# Patient Record
Sex: FEMALE | Race: WHITE | NOT HISPANIC OR LATINO
[De-identification: names, ages, dates, MRNs, and addresses within clinical notes are randomized per-mention and may not be internally consistent; named-entity substitution may affect disease eponyms.]

---

## 2017-02-02 ENCOUNTER — APPOINTMENT (OUTPATIENT)
Dept: RADIOLOGY | Facility: CLINIC | Age: 57
End: 2017-02-02

## 2017-02-02 ENCOUNTER — OUTPATIENT (OUTPATIENT)
Dept: OUTPATIENT SERVICES | Facility: HOSPITAL | Age: 57
LOS: 1 days | End: 2017-02-02
Payer: MEDICAID

## 2017-02-02 DIAGNOSIS — Z00.8 ENCOUNTER FOR OTHER GENERAL EXAMINATION: ICD-10-CM

## 2017-02-02 PROCEDURE — 71046 X-RAY EXAM CHEST 2 VIEWS: CPT

## 2017-03-03 ENCOUNTER — APPOINTMENT (OUTPATIENT)
Dept: NEUROLOGY | Facility: CLINIC | Age: 57
End: 2017-03-03

## 2017-03-16 ENCOUNTER — RESULT REVIEW (OUTPATIENT)
Age: 57
End: 2017-03-16

## 2017-04-27 ENCOUNTER — EMERGENCY (EMERGENCY)
Facility: HOSPITAL | Age: 57
LOS: 1 days | Discharge: LEFT BEFORE DISCHARGE | End: 2017-04-27
Attending: EMERGENCY MEDICINE | Admitting: EMERGENCY MEDICINE
Payer: MEDICAID

## 2017-04-27 VITALS
HEART RATE: 70 BPM | SYSTOLIC BLOOD PRESSURE: 141 MMHG | RESPIRATION RATE: 18 BRPM | TEMPERATURE: 98 F | DIASTOLIC BLOOD PRESSURE: 90 MMHG

## 2017-04-27 VITALS — WEIGHT: 164.91 LBS | HEIGHT: 63 IN

## 2017-04-27 LAB
ALBUMIN SERPL ELPH-MCNC: 3.7 G/DL — SIGNIFICANT CHANGE UP (ref 3.3–5)
ALP SERPL-CCNC: 125 U/L — HIGH (ref 40–120)
ALT FLD-CCNC: 37 U/L — SIGNIFICANT CHANGE UP (ref 12–78)
ANION GAP SERPL CALC-SCNC: 9 MMOL/L — SIGNIFICANT CHANGE UP (ref 5–17)
AST SERPL-CCNC: 22 U/L — SIGNIFICANT CHANGE UP (ref 15–37)
BASOPHILS # BLD AUTO: 0.1 K/UL — SIGNIFICANT CHANGE UP (ref 0–0.2)
BASOPHILS NFR BLD AUTO: 1.4 % — SIGNIFICANT CHANGE UP (ref 0–2)
BILIRUB SERPL-MCNC: 0.4 MG/DL — SIGNIFICANT CHANGE UP (ref 0.2–1.2)
BUN SERPL-MCNC: 12 MG/DL — SIGNIFICANT CHANGE UP (ref 7–23)
CALCIUM SERPL-MCNC: 9 MG/DL — SIGNIFICANT CHANGE UP (ref 8.5–10.1)
CHLORIDE SERPL-SCNC: 111 MMOL/L — HIGH (ref 96–108)
CK SERPL-CCNC: 232 U/L — HIGH (ref 26–192)
CO2 SERPL-SCNC: 23 MMOL/L — SIGNIFICANT CHANGE UP (ref 22–31)
CREAT SERPL-MCNC: 0.79 MG/DL — SIGNIFICANT CHANGE UP (ref 0.5–1.3)
EOSINOPHIL # BLD AUTO: 0.3 K/UL — SIGNIFICANT CHANGE UP (ref 0–0.5)
EOSINOPHIL NFR BLD AUTO: 2.4 % — SIGNIFICANT CHANGE UP (ref 0–6)
GLUCOSE SERPL-MCNC: 47 MG/DL — LOW (ref 70–99)
HCT VFR BLD CALC: 41.9 % — SIGNIFICANT CHANGE UP (ref 34.5–45)
HGB BLD-MCNC: 14.6 G/DL — SIGNIFICANT CHANGE UP (ref 11.5–15.5)
LYMPHOCYTES # BLD AUTO: 3.8 K/UL — HIGH (ref 1–3.3)
LYMPHOCYTES # BLD AUTO: 34.4 % — SIGNIFICANT CHANGE UP (ref 13–44)
MAGNESIUM SERPL-MCNC: 1.9 MG/DL — SIGNIFICANT CHANGE UP (ref 1.8–2.4)
MCHC RBC-ENTMCNC: 31 PG — SIGNIFICANT CHANGE UP (ref 27–34)
MCHC RBC-ENTMCNC: 34.8 GM/DL — SIGNIFICANT CHANGE UP (ref 32–36)
MCV RBC AUTO: 89.2 FL — SIGNIFICANT CHANGE UP (ref 80–100)
MONOCYTES # BLD AUTO: 1.1 K/UL — HIGH (ref 0–0.9)
MONOCYTES NFR BLD AUTO: 9.8 % — SIGNIFICANT CHANGE UP (ref 2–14)
NEUTROPHILS # BLD AUTO: 5.7 K/UL — SIGNIFICANT CHANGE UP (ref 1.8–7.4)
NEUTROPHILS NFR BLD AUTO: 52.1 % — SIGNIFICANT CHANGE UP (ref 43–77)
NT-PROBNP SERPL-SCNC: 207 PG/ML — HIGH (ref 0–125)
PLATELET # BLD AUTO: 250 K/UL — SIGNIFICANT CHANGE UP (ref 150–400)
POTASSIUM SERPL-MCNC: 4 MMOL/L — SIGNIFICANT CHANGE UP (ref 3.5–5.3)
POTASSIUM SERPL-SCNC: 4 MMOL/L — SIGNIFICANT CHANGE UP (ref 3.5–5.3)
PROT SERPL-MCNC: 7.2 GM/DL — SIGNIFICANT CHANGE UP (ref 6–8.3)
RBC # BLD: 4.7 M/UL — SIGNIFICANT CHANGE UP (ref 3.8–5.2)
RBC # FLD: 11.4 % — SIGNIFICANT CHANGE UP (ref 10.3–14.5)
SODIUM SERPL-SCNC: 143 MMOL/L — SIGNIFICANT CHANGE UP (ref 135–145)
TROPONIN I SERPL-MCNC: <0.015 NG/ML — SIGNIFICANT CHANGE UP (ref 0.01–0.04)
TROPONIN I SERPL-MCNC: <0.015 NG/ML — SIGNIFICANT CHANGE UP (ref 0.01–0.04)
WBC # BLD: 11 K/UL — HIGH (ref 3.8–10.5)
WBC # FLD AUTO: 11 K/UL — HIGH (ref 3.8–10.5)

## 2017-04-27 PROCEDURE — 71020: CPT | Mod: 26

## 2017-04-27 PROCEDURE — 71250 CT THORAX DX C-: CPT | Mod: 26

## 2017-04-27 PROCEDURE — 93010 ELECTROCARDIOGRAM REPORT: CPT

## 2017-04-27 PROCEDURE — 99283 EMERGENCY DEPT VISIT LOW MDM: CPT

## 2017-04-27 RX ORDER — DOCUSATE SODIUM 100 MG
100 CAPSULE ORAL THREE TIMES A DAY
Qty: 0 | Refills: 0 | Status: DISCONTINUED | OUTPATIENT
Start: 2017-04-27 | End: 2017-05-01

## 2017-04-27 RX ORDER — DEXTROSE 50 % IN WATER 50 %
25 SYRINGE (ML) INTRAVENOUS ONCE
Qty: 0 | Refills: 0 | Status: DISCONTINUED | OUTPATIENT
Start: 2017-04-27 | End: 2017-05-01

## 2017-04-27 RX ORDER — AZITHROMYCIN 500 MG/1
500 TABLET, FILM COATED ORAL EVERY 24 HOURS
Qty: 0 | Refills: 0 | Status: DISCONTINUED | OUTPATIENT
Start: 2017-04-28 | End: 2017-05-01

## 2017-04-27 RX ORDER — METOPROLOL TARTRATE 50 MG
25 TABLET ORAL
Qty: 0 | Refills: 0 | Status: DISCONTINUED | OUTPATIENT
Start: 2017-04-27 | End: 2017-05-01

## 2017-04-27 RX ORDER — ISOSORBIDE MONONITRATE 60 MG/1
30 TABLET, EXTENDED RELEASE ORAL DAILY
Qty: 0 | Refills: 0 | Status: DISCONTINUED | OUTPATIENT
Start: 2017-04-27 | End: 2017-05-01

## 2017-04-27 RX ORDER — ATORVASTATIN CALCIUM 80 MG/1
40 TABLET, FILM COATED ORAL AT BEDTIME
Qty: 0 | Refills: 0 | Status: DISCONTINUED | OUTPATIENT
Start: 2017-04-27 | End: 2017-05-01

## 2017-04-27 RX ORDER — ASPIRIN/CALCIUM CARB/MAGNESIUM 324 MG
325 TABLET ORAL DAILY
Qty: 0 | Refills: 0 | Status: DISCONTINUED | OUTPATIENT
Start: 2017-04-27 | End: 2017-05-01

## 2017-04-27 RX ORDER — AZITHROMYCIN 500 MG/1
TABLET, FILM COATED ORAL
Qty: 0 | Refills: 0 | Status: DISCONTINUED | OUTPATIENT
Start: 2017-04-27 | End: 2017-05-01

## 2017-04-27 RX ORDER — DEXTROSE 50 % IN WATER 50 %
12.5 SYRINGE (ML) INTRAVENOUS ONCE
Qty: 0 | Refills: 0 | Status: DISCONTINUED | OUTPATIENT
Start: 2017-04-27 | End: 2017-05-01

## 2017-04-27 RX ORDER — GLUCAGON INJECTION, SOLUTION 0.5 MG/.1ML
1 INJECTION, SOLUTION SUBCUTANEOUS ONCE
Qty: 0 | Refills: 0 | Status: DISCONTINUED | OUTPATIENT
Start: 2017-04-27 | End: 2017-05-01

## 2017-04-27 RX ORDER — RANOLAZINE 500 MG/1
1000 TABLET, FILM COATED, EXTENDED RELEASE ORAL
Qty: 0 | Refills: 0 | Status: DISCONTINUED | OUTPATIENT
Start: 2017-04-27 | End: 2017-05-01

## 2017-04-27 RX ORDER — ALBUTEROL 90 UG/1
2.5 AEROSOL, METERED ORAL ONCE
Qty: 0 | Refills: 0 | Status: COMPLETED | OUTPATIENT
Start: 2017-04-27 | End: 2017-04-27

## 2017-04-27 RX ORDER — ONDANSETRON 8 MG/1
4 TABLET, FILM COATED ORAL EVERY 6 HOURS
Qty: 0 | Refills: 0 | Status: DISCONTINUED | OUTPATIENT
Start: 2017-04-27 | End: 2017-05-01

## 2017-04-27 RX ORDER — PANTOPRAZOLE SODIUM 20 MG/1
40 TABLET, DELAYED RELEASE ORAL
Qty: 0 | Refills: 0 | Status: DISCONTINUED | OUTPATIENT
Start: 2017-04-27 | End: 2017-05-01

## 2017-04-27 RX ORDER — MORPHINE SULFATE 50 MG/1
2 CAPSULE, EXTENDED RELEASE ORAL EVERY 6 HOURS
Qty: 0 | Refills: 0 | Status: DISCONTINUED | OUTPATIENT
Start: 2017-04-27 | End: 2017-04-27

## 2017-04-27 RX ORDER — DEXTROSE 50 % IN WATER 50 %
1 SYRINGE (ML) INTRAVENOUS ONCE
Qty: 0 | Refills: 0 | Status: DISCONTINUED | OUTPATIENT
Start: 2017-04-27 | End: 2017-05-01

## 2017-04-27 RX ORDER — CLOPIDOGREL BISULFATE 75 MG/1
75 TABLET, FILM COATED ORAL DAILY
Qty: 0 | Refills: 0 | Status: DISCONTINUED | OUTPATIENT
Start: 2017-04-27 | End: 2017-05-01

## 2017-04-27 RX ORDER — GABAPENTIN 400 MG/1
600 CAPSULE ORAL THREE TIMES A DAY
Qty: 0 | Refills: 0 | Status: DISCONTINUED | OUTPATIENT
Start: 2017-04-27 | End: 2017-05-01

## 2017-04-27 RX ORDER — AMLODIPINE BESYLATE 2.5 MG/1
5 TABLET ORAL DAILY
Qty: 0 | Refills: 0 | Status: DISCONTINUED | OUTPATIENT
Start: 2017-04-27 | End: 2017-05-01

## 2017-04-27 RX ORDER — NITROGLYCERIN 6.5 MG
0.4 CAPSULE, EXTENDED RELEASE ORAL
Qty: 0 | Refills: 0 | Status: DISCONTINUED | OUTPATIENT
Start: 2017-04-27 | End: 2017-05-01

## 2017-04-27 RX ORDER — HEPARIN SODIUM 5000 [USP'U]/ML
5000 INJECTION INTRAVENOUS; SUBCUTANEOUS EVERY 12 HOURS
Qty: 0 | Refills: 0 | Status: DISCONTINUED | OUTPATIENT
Start: 2017-04-27 | End: 2017-05-01

## 2017-04-27 RX ORDER — SODIUM CHLORIDE 9 MG/ML
1000 INJECTION, SOLUTION INTRAVENOUS
Qty: 0 | Refills: 0 | Status: DISCONTINUED | OUTPATIENT
Start: 2017-04-27 | End: 2017-05-01

## 2017-04-27 RX ORDER — AZITHROMYCIN 500 MG/1
500 TABLET, FILM COATED ORAL ONCE
Qty: 0 | Refills: 0 | Status: COMPLETED | OUTPATIENT
Start: 2017-04-27 | End: 2017-04-27

## 2017-04-27 RX ORDER — SENNA PLUS 8.6 MG/1
2 TABLET ORAL AT BEDTIME
Qty: 0 | Refills: 0 | Status: DISCONTINUED | OUTPATIENT
Start: 2017-04-27 | End: 2017-05-01

## 2017-04-27 RX ORDER — INSULIN LISPRO 100/ML
VIAL (ML) SUBCUTANEOUS
Qty: 0 | Refills: 0 | Status: DISCONTINUED | OUTPATIENT
Start: 2017-04-27 | End: 2017-05-01

## 2017-04-27 RX ORDER — IPRATROPIUM/ALBUTEROL SULFATE 18-103MCG
3 AEROSOL WITH ADAPTER (GRAM) INHALATION EVERY 6 HOURS
Qty: 0 | Refills: 0 | Status: DISCONTINUED | OUTPATIENT
Start: 2017-04-27 | End: 2017-05-01

## 2017-04-27 RX ORDER — LEVOTHYROXINE SODIUM 125 MCG
112 TABLET ORAL DAILY
Qty: 0 | Refills: 0 | Status: DISCONTINUED | OUTPATIENT
Start: 2017-04-27 | End: 2017-05-01

## 2017-04-27 RX ORDER — INSULIN LISPRO 100/ML
VIAL (ML) SUBCUTANEOUS AT BEDTIME
Qty: 0 | Refills: 0 | Status: DISCONTINUED | OUTPATIENT
Start: 2017-04-27 | End: 2017-05-01

## 2017-04-27 RX ORDER — DEXTROSE 50 % IN WATER 50 %
25 SYRINGE (ML) INTRAVENOUS ONCE
Qty: 0 | Refills: 0 | Status: DISCONTINUED | OUTPATIENT
Start: 2017-04-27 | End: 2017-04-27

## 2017-04-27 RX ORDER — SODIUM CHLORIDE 9 MG/ML
1000 INJECTION INTRAMUSCULAR; INTRAVENOUS; SUBCUTANEOUS
Qty: 0 | Refills: 0 | Status: DISCONTINUED | OUTPATIENT
Start: 2017-04-27 | End: 2017-05-01

## 2017-04-27 RX ADMIN — AZITHROMYCIN 255 MILLIGRAM(S): 500 TABLET, FILM COATED ORAL at 18:24

## 2017-04-27 RX ADMIN — ALBUTEROL 2.5 MILLIGRAM(S): 90 AEROSOL, METERED ORAL at 12:30

## 2017-04-27 RX ADMIN — Medication 125 MILLIGRAM(S): at 12:36

## 2017-04-27 NOTE — H&P ADULT - NSHPLABSRESULTS_GEN_ALL_CORE
Lab Results:  CBC  CBC Full  -  ( 27 Apr 2017 13:26 )  WBC Count : 11.0 K/uL  Hemoglobin : 14.6 g/dL  Hematocrit : 41.9 %  Platelet Count - Automated : 250 K/uL  Mean Cell Volume : 89.2 fl  Mean Cell Hemoglobin : 31.0 pg  Mean Cell Hemoglobin Concentration : 34.8 gm/dL  Auto Neutrophil # : 5.7 K/uL  Auto Lymphocyte # : 3.8 K/uL  Auto Monocyte # : 1.1 K/uL  Auto Eosinophil # : 0.3 K/uL  Auto Basophil # : 0.1 K/uL  Auto Neutrophil % : 52.1 %  Auto Lymphocyte % : 34.4 %  Auto Monocyte % : 9.8 %  Auto Eosinophil % : 2.4 %  Auto Basophil % : 1.4 %    .		Differential:	[] Automated		[] Manual  Chemistry                        14.6   11.0  )-----------( 250      ( 27 Apr 2017 13:26 )             41.9     04-27    143  |  111<H>  |  12  ----------------------------<  47<L>  4.0   |  23  |  0.79    Ca    9.0      27 Apr 2017 13:26  Mg     1.9     04-27    TPro  7.2  /  Alb  3.7  /  TBili  0.4  /  DBili  x   /  AST  22  /  ALT  37  /  AlkPhos  125<H>  04-27    LIVER FUNCTIONS - ( 27 Apr 2017 13:26 )  Alb: 3.7 g/dL / Pro: 7.2 gm/dL / ALK PHOS: 125 U/L / ALT: 37 U/L / AST: 22 U/L / GGT: x             RADIOLOGY RESULTS:  Perliminary CXR- no infiltrates or effusion    EKG-     EXAM:  CT CHEST 04/27/2017    IMPRESSION:     Mild emphysema. No acute airspace disease, consolidation, suspicious   pulmonary nodule or mass lesion.    Debris in the proximal to mid esophagus without significant esophageal   dilatation or evidence of hiatal hernia. Findings may represent reflux or   dysmotility.    Significant progression of atherosclerotic plaque in the thoracic aorta   and coronary arteries since 2006. Coronary artery calcification   indicating significant underlying plaque burden. Recommend cardiac risk   assessment if not already performed. I25.10 Lab Results:  CBC  CBC Full  -  ( 27 Apr 2017 13:26 )  WBC Count : 11.0 K/uL  Hemoglobin : 14.6 g/dL  Hematocrit : 41.9 %  Platelet Count - Automated : 250 K/uL  Mean Cell Volume : 89.2 fl  Mean Cell Hemoglobin : 31.0 pg  Mean Cell Hemoglobin Concentration : 34.8 gm/dL  Auto Neutrophil # : 5.7 K/uL  Auto Lymphocyte # : 3.8 K/uL  Auto Monocyte # : 1.1 K/uL  Auto Eosinophil # : 0.3 K/uL  Auto Basophil # : 0.1 K/uL  Auto Neutrophil % : 52.1 %  Auto Lymphocyte % : 34.4 %  Auto Monocyte % : 9.8 %  Auto Eosinophil % : 2.4 %  Auto Basophil % : 1.4 %    .		Differential:	[] Automated		[] Manual  Chemistry                        14.6   11.0  )-----------( 250      ( 27 Apr 2017 13:26 )             41.9     04-27    143  |  111<H>  |  12  ----------------------------<  47<L>  4.0   |  23  |  0.79    Ca    9.0      27 Apr 2017 13:26  Mg     1.9     04-27    TPro  7.2  /  Alb  3.7  /  TBili  0.4  /  DBili  x   /  AST  22  /  ALT  37  /  AlkPhos  125<H>  04-27    LIVER FUNCTIONS - ( 27 Apr 2017 13:26 )  Alb: 3.7 g/dL / Pro: 7.2 gm/dL / ALK PHOS: 125 U/L / ALT: 37 U/L / AST: 22 U/L / GGT: x             RADIOLOGY RESULTS:  Perliminary CXR- no infiltrates or effusion    EKG- NSR nonspecific ST/T wave changes    EXAM:  CT CHEST 04/27/2017    IMPRESSION:     Mild emphysema. No acute airspace disease, consolidation, suspicious   pulmonary nodule or mass lesion.    Debris in the proximal to mid esophagus without significant esophageal   dilatation or evidence of hiatal hernia. Findings may represent reflux or   dysmotility.    Significant progression of atherosclerotic plaque in the thoracic aorta   and coronary arteries since 2006. Coronary artery calcification   indicating significant underlying plaque burden. Recommend cardiac risk   assessment if not already performed. I25.10

## 2017-04-27 NOTE — ED STATDOCS - OBJECTIVE STATEMENT
55 y/o female with DM type 1, hypothyroidism, CAD, COPD s/p stent in legs presents to the ED c/o SOB x few days. Also c/o right rib pain and CP. Describes the CP as an angina attack and has had it in the past. ai [place] for it. Denies fever. +cough with white phlegm production. Denies leg pain or swelling. No recent travel. Current smoker. NKDA. 57 y/o female with DM type 1, hypothyroidism,  HTN,  neuropathy, chronic LBP, CAD, COPD s/p stent in legs presents to the ED c/o SOB x few days. Also c/o right rib pain and CP. Describes the CP as an angina attack and has had it in the past. yashes Ranexa for it. Denies fever. +cough with white phlegm production. Denies leg pain or swelling. No recent travel. Current smoker. NKDA.

## 2017-04-27 NOTE — H&P ADULT - NSHPPHYSICALEXAM_GEN_ALL_CORE
PHYSICAL EXAM:  Constitutional: NAD, awake and alert, well-developed  Neurological: AAO x 3, no focal deficits  HEENT: PERRLA, EOMI, MMM  Neck: Soft and supple, No LAD, No JVD  Respiratory: Breath sounds are clear bilaterally, No wheezing, rales or rhonchi  Cardiovascular: S1 and S2, regular rate and rhythm; no Murmurs, gallops or rubs  Gastrointestinal: Bowel Sounds present, soft, nontender, nondistended, no guarding, no rebound tenderness  Back: No CVA tenderness   Extremities: No peripheral edema  Vascular: 2+ peripheral pulses  Musculoskeletal: 5/5 strength b/l upper and lower extremities  Skin: No rashes  Breast: Deferred  Rectal: Deferred PHYSICAL EXAM:  Constitutional: NAD, awake and alert, well-developed  Neurological: AAO x 3, no focal deficits  HEENT: PERRLA, EOMI, MMM  Neck: Soft and supple, No LAD, No JVD  Respiratory: Breath sounds are clear bilaterally, No wheezing, rales or rhonchi  Cardiovascular: S1 and S2, regular rate and rhythm; no Murmurs, gallops or rubs; + Chest pain reproducible under the right breast; Skin intact  Gastrointestinal: Bowel Sounds present, soft, nontender, nondistended, no guarding, no rebound tenderness  Back: No CVA tenderness   Extremities: No peripheral edema  Vascular: 2+ peripheral pulses  Musculoskeletal: 5/5 strength b/l upper and lower extremities  Skin: No rashes  Breast: Deferred  Rectal: Deferred

## 2017-04-27 NOTE — H&P ADULT - ASSESSMENT
*Chest Pain + Dyspnea R/O ACS  -CXR- clear  -CT chest - Mild emphysema, possible GERD/dysmotility  -Admit to telemetry  -continue ASA, plavix, BB, ranexa and statin  -NTG/Morphine PRN  -serial troponin and EKG  -cardio consult  -FU ECHO  -Cardio consult    *Hypoglycemia with Hx of IDDM  -continue ISS  -Hold Lantus and reassess in AM  -monitor FSBG closely    *HTN - stable  -continue Norvasc, BB and imdur    *Neuropathy  continue Gabapentin    *PVD S/P Stent  -continue ASA, plavix and Statin    *Hypothyroidism  -continue Synthroid  -FU TSH levels *Chest Pain R/O ACS  -CXR- clear  -CT chest - Mild emphysema, possible GERD/dysmotility  -Admit to telemetry  -continue ASA, plavix, BB, ranexa and statin  -NTG/Morphine PRN  -serial troponin and EKG  -cardio consult  -FU ECHO    *Dyspnea and Cough 2ndry to Acute bronchitis  -O2 2L NC, monitor pulse Ox  -zithromax  -No PNA on CT or CXR    *Hypoglycemia with Hx of IDDM  -continue ISS  -Hold Lantus and reassess in AM  -monitor FSBG closely    *HTN - stable  -continue Norvasc, BB and imdur    *Neuropathy  continue Gabapentin    *PVD S/P Stent  -continue ASA, plavix and Statin    *Hypothyroidism  -continue Synthroid  -FU TSH levels

## 2017-04-27 NOTE — ED STATDOCS - ATTENDING CONTRIBUTION TO CARE
I personally saw the patient with the PA, and completed the key components of the history and physical exam. I then discussed the management plan with the PA.  I, Emir Delacruz,, performed the initial face to face bedside interview with this patient regarding history of present illness, review of symptoms and relevant past medical, social and family history.  I completed an independent physical examination.  I was the initial provider who evaluated this patient.  The history, relevant review of systems, past medical and surgical history, medical decision making, and physical examination was documented by the scribe in my presence and I attest to the accuracy of the documentation.

## 2017-04-27 NOTE — ED STATDOCS - PROGRESS NOTE DETAILS
SAMIRA Mcclendon:   Patient has been seen, evaluated and orders have been written by the attending in intake. Patient is stable.  I will follow up the results of orders written and I will continue to evaluate/observe the patient.  PMD:  Wenceslao   Cardio:  Rossy Mcclendon PA-C FS 47.  Pt. still does not have a peripheral IV line.  Flores Mcclendon PA-C

## 2017-04-27 NOTE — ED ADULT NURSE REASSESSMENT NOTE - NS ED NURSE REASSESS COMMENT FT1
Pt states her angina chest pain is gone but she has 4/10 chest pain with deep inspiration and 10/10 when coughing. Call bell in reach. Resp even and unlabored. skin good color, warm and dry.

## 2017-04-27 NOTE — ED STATDOCS - PMH
CAD (coronary artery disease)    COPD (chronic obstructive pulmonary disease)    DM (diabetes mellitus), type 1    Hypothyroidism

## 2017-04-27 NOTE — ED STATDOCS - NS ED MD SCRIBE ATTENDING SCRIBE SECTIONS
VITAL SIGNS( Pullset)/RESULTS/PAST MEDICAL/SURGICAL/SOCIAL HISTORY/HISTORY OF PRESENT ILLNESS/PHYSICAL EXAM/REVIEW OF SYSTEMS/DISPOSITION/PROGRESS NOTE

## 2017-04-27 NOTE — H&P ADULT - HISTORY OF PRESENT ILLNESS
57 y/o female with PMH of DM type 1, hypothyroidism, HTN, neuropathy, chronic LBP, CAD, COPD, PVD s/p stent in b/L LE presents to the ED c/o SOB x few days. Also c/o right rib pain and CP. Describes the CP as an angina attack and has had it in the past. ai [place] for it. Denies fever. +cough with white phlegm production. Denies leg pain or swelling. No recent travel.     PMH: DM type 1, hypothyroidism,  HTN,  neuropathy, chronic LBP, CAD, COPD, PVD s/p stent B/L LE, Anxiety, Bipolar Dx  PSH:  PVD s/p stent in legs  SH: Lives at Home. No alcohol or illicit drug use. + Tobacco use   FH: Reviewed and Noncontributory to current Illness 57 y/o female with PMH of DM type 1, hypothyroidism, HTN, neuropathy, chronic LBP, CAD, COPD, PVD s/p stent in b/L LE presents to the ED c/o SOB and productive cough x few days. Also c/o right rib pain and CP in the middle of the chest. Describes the CP as an angina attack and has had it in the past. takes Ranexa for it. Denies fever, trauma to chest, wheezing, n/v +cough with white phlegm production. Denies leg pain or swelling. No recent travel.     PMH: DM type 1, hypothyroidism,  HTN,  neuropathy, chronic LBP, CAD, COPD, PVD s/p stent B/L LE, Anxiety, Bipolar Dx  PSH:  PVD s/p stent in legs  SH: Lives at Home. No alcohol or illicit drug use. + Tobacco use   FH: Reviewed and Noncontributory to current Illness

## 2017-04-27 NOTE — H&P ADULT - NSHPREVIEWOFSYSTEMS_GEN_ALL_CORE
REVIEW OF SYSTEMS:    CONSTITUTIONAL: No weakness, fevers or chills  EYES/ENT: No visual changes; vertigo or throat pain   NECK: No pain or stiffness  RESPIRATORY: No cough, wheezing, hemoptysis or shortness of breath  CARDIOVASCULAR: No chest pain or palpitations  GASTROINTESTINAL: No abdominal or epigastric pain. No nausea, vomiting, or hematemesis; No diarrhea or constipation. No melena or hematochezia.  GENITOURINARY: No dysuria, urinary frequency or hematuria  NEUROLOGICAL: No numbness or weakness  EXTREMITIES: No swelling or tenderness  SKIN: No itching, burning, rashes, or lesions   All other review of systems is negative unless indicated above. REVIEW OF SYSTEMS:    CONSTITUTIONAL: No weakness, fevers or chills  EYES/ENT: No visual changes; vertigo or throat pain   NECK: No pain or stiffness  RESPIRATORY: +cough, +dyspnea No wheezing, hemoptysis  CARDIOVASCULAR: +chest pain No palpitations  GASTROINTESTINAL: No abdominal or epigastric pain. No nausea, vomiting, or hematemesis; No diarrhea or constipation. No melena or hematochezia.  GENITOURINARY: No dysuria, urinary frequency or hematuria  NEUROLOGICAL: No numbness or weakness  EXTREMITIES: No swelling or tenderness  SKIN: No itching, burning, rashes, or lesions   All other review of systems is negative unless indicated above.

## 2017-04-27 NOTE — H&P ADULT - NSHPSOCIALHISTORY_GEN_ALL_CORE
Lives at home. No alcohol or illicit drug use Lives at home. No alcohol or illicit drug use; +Tobacco 1 pack/day for the past 20-30 years

## 2017-04-27 NOTE — ED STATDOCS - MEDICAL DECISION MAKING DETAILS
CXR, CT 57 y/o F w cough, R sided pleuritic chest pain, and dyspnea. Also reports centrally located chest pain similar to previous episodes. Labs, CXR, CT PE r/o PE

## 2017-04-27 NOTE — ED STATDOCS - CARE PLAN
Principal Discharge DX:	Chest pain, unspecified type  Secondary Diagnosis:	Dyspnea, unspecified type

## 2017-04-29 RX ADMIN — Medication 3 MILLILITER(S): at 19:26

## 2017-05-01 DIAGNOSIS — R06.00 DYSPNEA, UNSPECIFIED: ICD-10-CM

## 2017-05-01 DIAGNOSIS — R07.9 CHEST PAIN, UNSPECIFIED: ICD-10-CM

## 2017-05-01 DIAGNOSIS — R06.02 SHORTNESS OF BREATH: ICD-10-CM

## 2017-06-08 ENCOUNTER — OUTPATIENT (OUTPATIENT)
Dept: OUTPATIENT SERVICES | Facility: HOSPITAL | Age: 57
LOS: 1 days | Discharge: HOME | End: 2017-06-08
Payer: MEDICAID

## 2017-06-08 VITALS
WEIGHT: 166.01 LBS | TEMPERATURE: 98 F | SYSTOLIC BLOOD PRESSURE: 110 MMHG | DIASTOLIC BLOOD PRESSURE: 56 MMHG | HEIGHT: 63 IN | OXYGEN SATURATION: 100 % | HEART RATE: 67 BPM | RESPIRATION RATE: 18 BRPM

## 2017-06-08 DIAGNOSIS — Z95.828 PRESENCE OF OTHER VASCULAR IMPLANTS AND GRAFTS: Chronic | ICD-10-CM

## 2017-06-08 DIAGNOSIS — Z98.890 OTHER SPECIFIED POSTPROCEDURAL STATES: Chronic | ICD-10-CM

## 2017-06-08 DIAGNOSIS — Z98.62 PERIPHERAL VASCULAR ANGIOPLASTY STATUS: Chronic | ICD-10-CM

## 2017-06-08 DIAGNOSIS — Z41.1 ENCOUNTER FOR COSMETIC SURGERY: Chronic | ICD-10-CM

## 2017-06-08 DIAGNOSIS — Z90.49 ACQUIRED ABSENCE OF OTHER SPECIFIED PARTS OF DIGESTIVE TRACT: Chronic | ICD-10-CM

## 2017-06-08 DIAGNOSIS — R13.19 OTHER DYSPHAGIA: ICD-10-CM

## 2017-06-08 DIAGNOSIS — K21.9 GASTRO-ESOPHAGEAL REFLUX DISEASE WITHOUT ESOPHAGITIS: ICD-10-CM

## 2017-06-08 LAB
ANION GAP SERPL CALC-SCNC: 5 MMOL/L — SIGNIFICANT CHANGE UP (ref 5–17)
BASOPHILS # BLD AUTO: 0.1 K/UL — SIGNIFICANT CHANGE UP (ref 0–0.2)
BASOPHILS NFR BLD AUTO: 1.4 % — SIGNIFICANT CHANGE UP (ref 0–2)
BUN SERPL-MCNC: 15 MG/DL — SIGNIFICANT CHANGE UP (ref 7–23)
CALCIUM SERPL-MCNC: 8.7 MG/DL — SIGNIFICANT CHANGE UP (ref 8.5–10.1)
CHLORIDE SERPL-SCNC: 106 MMOL/L — SIGNIFICANT CHANGE UP (ref 96–108)
CO2 SERPL-SCNC: 28 MMOL/L — SIGNIFICANT CHANGE UP (ref 22–31)
CREAT SERPL-MCNC: 0.8 MG/DL — SIGNIFICANT CHANGE UP (ref 0.5–1.3)
EOSINOPHIL # BLD AUTO: 0.2 K/UL — SIGNIFICANT CHANGE UP (ref 0–0.5)
EOSINOPHIL NFR BLD AUTO: 2 % — SIGNIFICANT CHANGE UP (ref 0–6)
GLUCOSE SERPL-MCNC: 143 MG/DL — HIGH (ref 70–99)
HCT VFR BLD CALC: 36 % — SIGNIFICANT CHANGE UP (ref 34.5–45)
HGB BLD-MCNC: 12.4 G/DL — SIGNIFICANT CHANGE UP (ref 11.5–15.5)
LYMPHOCYTES # BLD AUTO: 2.4 K/UL — SIGNIFICANT CHANGE UP (ref 1–3.3)
LYMPHOCYTES # BLD AUTO: 25.6 % — SIGNIFICANT CHANGE UP (ref 13–44)
MCHC RBC-ENTMCNC: 31 PG — SIGNIFICANT CHANGE UP (ref 27–34)
MCHC RBC-ENTMCNC: 34.4 GM/DL — SIGNIFICANT CHANGE UP (ref 32–36)
MCV RBC AUTO: 90 FL — SIGNIFICANT CHANGE UP (ref 80–100)
MONOCYTES # BLD AUTO: 0.7 K/UL — SIGNIFICANT CHANGE UP (ref 0–0.9)
MONOCYTES NFR BLD AUTO: 7.8 % — SIGNIFICANT CHANGE UP (ref 2–14)
NEUTROPHILS # BLD AUTO: 6 K/UL — SIGNIFICANT CHANGE UP (ref 1.8–7.4)
NEUTROPHILS NFR BLD AUTO: 63.2 % — SIGNIFICANT CHANGE UP (ref 43–77)
PLATELET # BLD AUTO: 236 K/UL — SIGNIFICANT CHANGE UP (ref 150–400)
POTASSIUM SERPL-MCNC: 4.6 MMOL/L — SIGNIFICANT CHANGE UP (ref 3.5–5.3)
POTASSIUM SERPL-SCNC: 4.6 MMOL/L — SIGNIFICANT CHANGE UP (ref 3.5–5.3)
RBC # BLD: 4 M/UL — SIGNIFICANT CHANGE UP (ref 3.8–5.2)
RBC # FLD: 11.6 % — SIGNIFICANT CHANGE UP (ref 10.3–14.5)
SODIUM SERPL-SCNC: 139 MMOL/L — SIGNIFICANT CHANGE UP (ref 135–145)
WBC # BLD: 9.5 K/UL — SIGNIFICANT CHANGE UP (ref 3.8–10.5)
WBC # FLD AUTO: 9.5 K/UL — SIGNIFICANT CHANGE UP (ref 3.8–10.5)

## 2017-06-08 PROCEDURE — 93010 ELECTROCARDIOGRAM REPORT: CPT

## 2017-06-08 RX ORDER — INSULIN LISPRO 100/ML
1 VIAL (ML) SUBCUTANEOUS
Qty: 0 | Refills: 0 | COMMUNITY

## 2017-06-08 RX ORDER — METOPROLOL TARTRATE 50 MG
1 TABLET ORAL
Qty: 0 | Refills: 0 | COMMUNITY

## 2017-06-08 RX ORDER — PANTOPRAZOLE SODIUM 20 MG/1
1 TABLET, DELAYED RELEASE ORAL
Qty: 0 | Refills: 0 | COMMUNITY

## 2017-06-08 RX ORDER — AMLODIPINE BESYLATE 2.5 MG/1
1 TABLET ORAL
Qty: 0 | Refills: 0 | COMMUNITY

## 2017-06-08 RX ORDER — ENOXAPARIN SODIUM 100 MG/ML
30 INJECTION SUBCUTANEOUS
Qty: 0 | Refills: 0 | COMMUNITY

## 2017-06-08 RX ORDER — GABAPENTIN 400 MG/1
1 CAPSULE ORAL
Qty: 0 | Refills: 0 | COMMUNITY

## 2017-06-08 NOTE — H&P PST ADULT - HISTORY OF PRESENT ILLNESS
56 years old female with difficulty eating for "a month and a half".  She said "it feels as if my food is not going down. Like a lump in my stomach.'  Admits to early satiety. She said she has loss 15 pounds since February. Denies loss of appetite nausea or vomiting. planned upper endoscopy.

## 2017-06-08 NOTE — H&P PST ADULT - PSH
H/O angioplasty  Right iliac artery. 5/12/2017  H/O arterial bypass of lower limb  Femoral - Popliteal. 11/2014  H/O rhinoplasty  1980  H/O ventral hernia repair  3/2017  History of incision and drainage  of lower extremity incisional site x 5 . last done5/2015  History of laparoscopic cholecystectomy  2/2016  S/P dilatation and curettage  1991

## 2017-06-08 NOTE — H&P PST ADULT - ASSESSMENT
56 years old female present to PST prior to upper endoscopy with Dr. Jerardo Bangura.  Plan  1. Expect a call from Endoscopy the day before your procedure between 11am and 3pm.  2. Follow the GI doctor's instructions for preparation  and day before procedure activities.  3. Follow the GI doctor's  instructions for medications.

## 2017-06-08 NOTE — H&P PST ADULT - VISION (WITH CORRECTIVE LENSES IF THE PATIENT USUALLY WEARS THEM):
Normal vision: sees adequately in most situations; can see medication labels, newsprint/Bifocal lenses

## 2017-06-08 NOTE — H&P PST ADULT - FAMILY HISTORY
Father  Still living? No  Family history of alcoholism in father, Age at diagnosis: Age Unknown  Family history of heart disease, Age at diagnosis: Age Unknown     Mother  Still living? No  Family history of stroke, Age at diagnosis: Age Unknown  Family history of cardiovascular disease, Age at diagnosis: Age Unknown     Sibling  Still living? Yes, Estimated age: 61-70  Family history of epilepsy, Age at diagnosis: Age Unknown  Family history of asthma, Age at diagnosis: Age Unknown     Sibling  Still living? Yes, Estimated age: 61-70  Family history of epilepsy, Age at diagnosis: Age Unknown

## 2017-06-08 NOTE — H&P PST ADULT - PMH
Angina pectoris    Anxiety and depression    CAD (coronary artery disease)    Constipation, unspecified constipation type    COPD (chronic obstructive pulmonary disease)    DM (diabetes mellitus), type 1    Essential hypertension    Gall bladder disease  gall bladder removed  Hiatal hernia with GERD  hiatal hernia repaired  History of MRSA infection  left lower extremity incisional area 2015  Hyperlipidemia, unspecified hyperlipidemia type    Hypothyroidism    Lumbar herniated disc    Osteoarthritis of spine, unspecified spinal osteoarthritis complication status, unspecified spinal region    Osteoarthritis, unspecified osteoarthritis type, unspecified site    Overactive bladder    PAD (peripheral artery disease)    Shoulder disorder  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 Decreased ROM.  Transient cerebral ischemia, unspecified type    Urinary incontinence, unspecified type

## 2017-06-16 ENCOUNTER — RESULT REVIEW (OUTPATIENT)
Age: 57
End: 2017-06-16

## 2017-06-16 ENCOUNTER — OUTPATIENT (OUTPATIENT)
Dept: OUTPATIENT SERVICES | Facility: HOSPITAL | Age: 57
LOS: 1 days | Discharge: HOME | End: 2017-06-16
Payer: MEDICAID

## 2017-06-16 VITALS
WEIGHT: 164.91 LBS | TEMPERATURE: 98 F | DIASTOLIC BLOOD PRESSURE: 54 MMHG | RESPIRATION RATE: 11 BRPM | HEART RATE: 66 BPM | HEIGHT: 63 IN | OXYGEN SATURATION: 100 % | SYSTOLIC BLOOD PRESSURE: 103 MMHG

## 2017-06-16 DIAGNOSIS — Z95.828 PRESENCE OF OTHER VASCULAR IMPLANTS AND GRAFTS: Chronic | ICD-10-CM

## 2017-06-16 DIAGNOSIS — Z98.62 PERIPHERAL VASCULAR ANGIOPLASTY STATUS: Chronic | ICD-10-CM

## 2017-06-16 DIAGNOSIS — Z98.890 OTHER SPECIFIED POSTPROCEDURAL STATES: Chronic | ICD-10-CM

## 2017-06-16 DIAGNOSIS — Z41.1 ENCOUNTER FOR COSMETIC SURGERY: Chronic | ICD-10-CM

## 2017-06-16 DIAGNOSIS — Z90.49 ACQUIRED ABSENCE OF OTHER SPECIFIED PARTS OF DIGESTIVE TRACT: Chronic | ICD-10-CM

## 2017-06-16 PROCEDURE — 88305 TISSUE EXAM BY PATHOLOGIST: CPT | Mod: 26

## 2017-06-16 PROCEDURE — 88312 SPECIAL STAINS GROUP 1: CPT | Mod: 26

## 2017-06-16 PROCEDURE — 88313 SPECIAL STAINS GROUP 2: CPT | Mod: 26

## 2017-06-16 RX ORDER — SODIUM CHLORIDE 9 MG/ML
1000 INJECTION INTRAMUSCULAR; INTRAVENOUS; SUBCUTANEOUS
Qty: 0 | Refills: 0 | Status: DISCONTINUED | OUTPATIENT
Start: 2017-06-16 | End: 2017-07-01

## 2017-06-16 RX ADMIN — SODIUM CHLORIDE 75 MILLILITER(S): 9 INJECTION INTRAMUSCULAR; INTRAVENOUS; SUBCUTANEOUS at 13:33

## 2017-06-20 LAB — SURGICAL PATHOLOGY FINAL REPORT - CH: SIGNIFICANT CHANGE UP

## 2017-06-22 DIAGNOSIS — Z86.010 PERSONAL HISTORY OF COLONIC POLYPS: ICD-10-CM

## 2017-06-22 DIAGNOSIS — R13.10 DYSPHAGIA, UNSPECIFIED: ICD-10-CM

## 2017-06-22 DIAGNOSIS — F31.9 BIPOLAR DISORDER, UNSPECIFIED: ICD-10-CM

## 2017-06-22 DIAGNOSIS — E03.9 HYPOTHYROIDISM, UNSPECIFIED: ICD-10-CM

## 2017-06-22 DIAGNOSIS — I73.9 PERIPHERAL VASCULAR DISEASE, UNSPECIFIED: ICD-10-CM

## 2017-06-22 DIAGNOSIS — F41.9 ANXIETY DISORDER, UNSPECIFIED: ICD-10-CM

## 2017-06-22 DIAGNOSIS — I25.10 ATHEROSCLEROTIC HEART DISEASE OF NATIVE CORONARY ARTERY WITHOUT ANGINA PECTORIS: ICD-10-CM

## 2017-06-22 DIAGNOSIS — K21.0 GASTRO-ESOPHAGEAL REFLUX DISEASE WITH ESOPHAGITIS: ICD-10-CM

## 2017-06-22 DIAGNOSIS — E78.5 HYPERLIPIDEMIA, UNSPECIFIED: ICD-10-CM

## 2017-06-22 DIAGNOSIS — I10 ESSENTIAL (PRIMARY) HYPERTENSION: ICD-10-CM

## 2017-06-22 DIAGNOSIS — K29.50 UNSPECIFIED CHRONIC GASTRITIS WITHOUT BLEEDING: ICD-10-CM

## 2017-06-22 DIAGNOSIS — Z79.02 LONG TERM (CURRENT) USE OF ANTITHROMBOTICS/ANTIPLATELETS: ICD-10-CM

## 2017-06-22 DIAGNOSIS — F17.210 NICOTINE DEPENDENCE, CIGARETTES, UNCOMPLICATED: ICD-10-CM

## 2017-09-26 ENCOUNTER — OUTPATIENT (OUTPATIENT)
Dept: OUTPATIENT SERVICES | Facility: HOSPITAL | Age: 57
LOS: 1 days | End: 2017-09-26
Payer: MEDICAID

## 2017-09-26 ENCOUNTER — APPOINTMENT (OUTPATIENT)
Dept: MAMMOGRAPHY | Facility: CLINIC | Age: 57
End: 2017-09-26
Payer: MEDICAID

## 2017-09-26 DIAGNOSIS — Z95.828 PRESENCE OF OTHER VASCULAR IMPLANTS AND GRAFTS: Chronic | ICD-10-CM

## 2017-09-26 DIAGNOSIS — Z98.890 OTHER SPECIFIED POSTPROCEDURAL STATES: Chronic | ICD-10-CM

## 2017-09-26 DIAGNOSIS — Z00.8 ENCOUNTER FOR OTHER GENERAL EXAMINATION: ICD-10-CM

## 2017-09-26 DIAGNOSIS — Z41.1 ENCOUNTER FOR COSMETIC SURGERY: Chronic | ICD-10-CM

## 2017-09-26 DIAGNOSIS — Z90.49 ACQUIRED ABSENCE OF OTHER SPECIFIED PARTS OF DIGESTIVE TRACT: Chronic | ICD-10-CM

## 2017-09-26 DIAGNOSIS — Z98.62 PERIPHERAL VASCULAR ANGIOPLASTY STATUS: Chronic | ICD-10-CM

## 2017-09-26 PROCEDURE — G0202: CPT | Mod: 26

## 2017-09-26 PROCEDURE — 77063 BREAST TOMOSYNTHESIS BI: CPT

## 2017-09-26 PROCEDURE — 77063 BREAST TOMOSYNTHESIS BI: CPT | Mod: 26

## 2017-09-26 PROCEDURE — 77067 SCR MAMMO BI INCL CAD: CPT

## 2017-10-06 ENCOUNTER — APPOINTMENT (OUTPATIENT)
Dept: NEUROLOGY | Facility: CLINIC | Age: 57
End: 2017-10-06
Payer: MEDICAID

## 2017-10-06 VITALS
DIASTOLIC BLOOD PRESSURE: 56 MMHG | HEIGHT: 63 IN | HEART RATE: 62 BPM | SYSTOLIC BLOOD PRESSURE: 118 MMHG | WEIGHT: 164 LBS | BODY MASS INDEX: 29.06 KG/M2

## 2017-10-06 PROCEDURE — 99214 OFFICE O/P EST MOD 30 MIN: CPT

## 2017-10-06 RX ORDER — OXYCODONE 5 MG/1
5 TABLET ORAL
Qty: 24 | Refills: 0 | Status: COMPLETED | COMMUNITY
Start: 2017-05-13

## 2017-10-06 RX ORDER — PEN NEEDLE, DIABETIC 29 G X1/2"
32G X 4 MM NEEDLE, DISPOSABLE MISCELLANEOUS
Qty: 100 | Refills: 0 | Status: ACTIVE | COMMUNITY
Start: 2016-12-08

## 2017-10-06 RX ORDER — ATORVASTATIN CALCIUM 40 MG/1
40 TABLET, FILM COATED ORAL
Qty: 30 | Refills: 0 | Status: ACTIVE | COMMUNITY
Start: 2017-02-23

## 2017-10-06 RX ORDER — PREDNISONE 20 MG/1
20 TABLET ORAL
Qty: 12 | Refills: 0 | Status: COMPLETED | COMMUNITY
Start: 2017-05-02

## 2018-02-05 ENCOUNTER — INPATIENT (INPATIENT)
Facility: HOSPITAL | Age: 58
LOS: 0 days | Discharge: LEFT BEFORE DISCHARGE | End: 2018-02-06
Attending: FAMILY MEDICINE | Admitting: FAMILY MEDICINE
Payer: MEDICAID

## 2018-02-05 VITALS
HEART RATE: 67 BPM | WEIGHT: 164.91 LBS | SYSTOLIC BLOOD PRESSURE: 72 MMHG | OXYGEN SATURATION: 100 % | TEMPERATURE: 98 F | HEIGHT: 63 IN | RESPIRATION RATE: 18 BRPM | DIASTOLIC BLOOD PRESSURE: 40 MMHG

## 2018-02-05 DIAGNOSIS — Z98.890 OTHER SPECIFIED POSTPROCEDURAL STATES: Chronic | ICD-10-CM

## 2018-02-05 DIAGNOSIS — Z90.49 ACQUIRED ABSENCE OF OTHER SPECIFIED PARTS OF DIGESTIVE TRACT: Chronic | ICD-10-CM

## 2018-02-05 DIAGNOSIS — Z98.62 PERIPHERAL VASCULAR ANGIOPLASTY STATUS: Chronic | ICD-10-CM

## 2018-02-05 DIAGNOSIS — Z95.828 PRESENCE OF OTHER VASCULAR IMPLANTS AND GRAFTS: Chronic | ICD-10-CM

## 2018-02-05 DIAGNOSIS — Z41.1 ENCOUNTER FOR COSMETIC SURGERY: Chronic | ICD-10-CM

## 2018-02-05 LAB
ACETONE SERPL-MCNC: NEGATIVE — SIGNIFICANT CHANGE UP
ALBUMIN SERPL ELPH-MCNC: 4 G/DL — SIGNIFICANT CHANGE UP (ref 3.3–5)
ALP SERPL-CCNC: 153 U/L — HIGH (ref 40–120)
ALT FLD-CCNC: 23 U/L — SIGNIFICANT CHANGE UP (ref 12–78)
AMPHET UR-MCNC: NEGATIVE — SIGNIFICANT CHANGE UP
ANION GAP SERPL CALC-SCNC: 6 MMOL/L — SIGNIFICANT CHANGE UP (ref 5–17)
APPEARANCE UR: CLEAR — SIGNIFICANT CHANGE UP
AST SERPL-CCNC: 10 U/L — LOW (ref 15–37)
BACTERIA # UR AUTO: (no result)
BARBITURATES UR SCN-MCNC: POSITIVE — SIGNIFICANT CHANGE UP
BASOPHILS # BLD AUTO: 0.1 K/UL — SIGNIFICANT CHANGE UP (ref 0–0.2)
BASOPHILS NFR BLD AUTO: 0.9 % — SIGNIFICANT CHANGE UP (ref 0–2)
BENZODIAZ UR-MCNC: NEGATIVE — SIGNIFICANT CHANGE UP
BILIRUB SERPL-MCNC: 0.7 MG/DL — SIGNIFICANT CHANGE UP (ref 0.2–1.2)
BILIRUB UR-MCNC: (no result)
BUN SERPL-MCNC: 20 MG/DL — SIGNIFICANT CHANGE UP (ref 7–23)
CALCIUM SERPL-MCNC: 8.9 MG/DL — SIGNIFICANT CHANGE UP (ref 8.5–10.1)
CHLORIDE SERPL-SCNC: 103 MMOL/L — SIGNIFICANT CHANGE UP (ref 96–108)
CK SERPL-CCNC: 76 U/L — SIGNIFICANT CHANGE UP (ref 26–192)
CO2 SERPL-SCNC: 27 MMOL/L — SIGNIFICANT CHANGE UP (ref 22–31)
COCAINE METAB.OTHER UR-MCNC: POSITIVE — SIGNIFICANT CHANGE UP
COLOR SPEC: YELLOW — SIGNIFICANT CHANGE UP
COMMENT - URINE: SIGNIFICANT CHANGE UP
CREAT SERPL-MCNC: 1.62 MG/DL — HIGH (ref 0.5–1.3)
DIFF PNL FLD: (no result)
EOSINOPHIL # BLD AUTO: 0.1 K/UL — SIGNIFICANT CHANGE UP (ref 0–0.5)
EOSINOPHIL NFR BLD AUTO: 0.6 % — SIGNIFICANT CHANGE UP (ref 0–6)
EPI CELLS # UR: SIGNIFICANT CHANGE UP
GLUCOSE BLDC GLUCOMTR-MCNC: 225 MG/DL — HIGH (ref 70–99)
GLUCOSE SERPL-MCNC: 277 MG/DL — HIGH (ref 70–99)
GLUCOSE UR QL: 1000 MG/DL
HCT VFR BLD CALC: 35.4 % — SIGNIFICANT CHANGE UP (ref 34.5–45)
HGB BLD-MCNC: 12.1 G/DL — SIGNIFICANT CHANGE UP (ref 11.5–15.5)
KETONES UR-MCNC: NEGATIVE — SIGNIFICANT CHANGE UP
LACTATE SERPL-SCNC: 1.7 MMOL/L — SIGNIFICANT CHANGE UP (ref 0.7–2)
LEUKOCYTE ESTERASE UR-ACNC: (no result)
LYMPHOCYTES # BLD AUTO: 2.6 K/UL — SIGNIFICANT CHANGE UP (ref 1–3.3)
LYMPHOCYTES # BLD AUTO: 20.5 % — SIGNIFICANT CHANGE UP (ref 13–44)
MCHC RBC-ENTMCNC: 30.5 PG — SIGNIFICANT CHANGE UP (ref 27–34)
MCHC RBC-ENTMCNC: 34.1 GM/DL — SIGNIFICANT CHANGE UP (ref 32–36)
MCV RBC AUTO: 89.3 FL — SIGNIFICANT CHANGE UP (ref 80–100)
METHADONE UR-MCNC: NEGATIVE — SIGNIFICANT CHANGE UP
MONOCYTES # BLD AUTO: 0.8 K/UL — SIGNIFICANT CHANGE UP (ref 0–0.9)
MONOCYTES NFR BLD AUTO: 6.4 % — SIGNIFICANT CHANGE UP (ref 2–14)
NEUTROPHILS # BLD AUTO: 9 K/UL — HIGH (ref 1.8–7.4)
NEUTROPHILS NFR BLD AUTO: 71.6 % — SIGNIFICANT CHANGE UP (ref 43–77)
NITRITE UR-MCNC: NEGATIVE — SIGNIFICANT CHANGE UP
OPIATES UR-MCNC: NEGATIVE — SIGNIFICANT CHANGE UP
PCP SPEC-MCNC: SIGNIFICANT CHANGE UP
PCP UR-MCNC: NEGATIVE — SIGNIFICANT CHANGE UP
PH UR: 5 — SIGNIFICANT CHANGE UP (ref 5–8)
PLATELET # BLD AUTO: 299 K/UL — SIGNIFICANT CHANGE UP (ref 150–400)
POTASSIUM SERPL-MCNC: 3.9 MMOL/L — SIGNIFICANT CHANGE UP (ref 3.5–5.3)
POTASSIUM SERPL-SCNC: 3.9 MMOL/L — SIGNIFICANT CHANGE UP (ref 3.5–5.3)
PROT SERPL-MCNC: 7.3 GM/DL — SIGNIFICANT CHANGE UP (ref 6–8.3)
PROT UR-MCNC: 30 MG/DL
RBC # BLD: 3.96 M/UL — SIGNIFICANT CHANGE UP (ref 3.8–5.2)
RBC # FLD: 11.6 % — SIGNIFICANT CHANGE UP (ref 10.3–14.5)
RBC CASTS # UR COMP ASSIST: (no result) /HPF (ref 0–4)
SODIUM SERPL-SCNC: 136 MMOL/L — SIGNIFICANT CHANGE UP (ref 135–145)
SP GR SPEC: 1.01 — SIGNIFICANT CHANGE UP (ref 1.01–1.02)
THC UR QL: POSITIVE — SIGNIFICANT CHANGE UP
TROPONIN I SERPL-MCNC: <0.015 NG/ML — SIGNIFICANT CHANGE UP (ref 0.01–0.04)
TROPONIN I SERPL-MCNC: <0.015 NG/ML — SIGNIFICANT CHANGE UP (ref 0.01–0.04)
UROBILINOGEN FLD QL: 1 MG/DL
WBC # BLD: 12.6 K/UL — HIGH (ref 3.8–10.5)
WBC # FLD AUTO: 12.6 K/UL — HIGH (ref 3.8–10.5)
WBC UR QL: SIGNIFICANT CHANGE UP

## 2018-02-05 PROCEDURE — 99291 CRITICAL CARE FIRST HOUR: CPT

## 2018-02-05 PROCEDURE — 70450 CT HEAD/BRAIN W/O DYE: CPT | Mod: 26

## 2018-02-05 PROCEDURE — 93010 ELECTROCARDIOGRAM REPORT: CPT

## 2018-02-05 PROCEDURE — 71045 X-RAY EXAM CHEST 1 VIEW: CPT | Mod: 26

## 2018-02-05 PROCEDURE — 95816 EEG AWAKE AND DROWSY: CPT | Mod: 26

## 2018-02-05 RX ORDER — ISOSORBIDE MONONITRATE 60 MG/1
30 TABLET, EXTENDED RELEASE ORAL DAILY
Qty: 0 | Refills: 0 | Status: DISCONTINUED | OUTPATIENT
Start: 2018-02-05 | End: 2018-02-05

## 2018-02-05 RX ORDER — INSULIN GLARGINE 100 [IU]/ML
25 INJECTION, SOLUTION SUBCUTANEOUS EVERY MORNING
Qty: 0 | Refills: 0 | Status: DISCONTINUED | OUTPATIENT
Start: 2018-02-06 | End: 2018-02-06

## 2018-02-05 RX ORDER — NICOTINE POLACRILEX 2 MG
1 GUM BUCCAL DAILY
Qty: 0 | Refills: 0 | Status: DISCONTINUED | OUTPATIENT
Start: 2018-02-05 | End: 2018-02-06

## 2018-02-05 RX ORDER — ONDANSETRON 8 MG/1
4 TABLET, FILM COATED ORAL EVERY 6 HOURS
Qty: 0 | Refills: 0 | Status: DISCONTINUED | OUTPATIENT
Start: 2018-02-05 | End: 2018-02-06

## 2018-02-05 RX ORDER — DEXTROSE 50 % IN WATER 50 %
1 SYRINGE (ML) INTRAVENOUS ONCE
Qty: 0 | Refills: 0 | Status: DISCONTINUED | OUTPATIENT
Start: 2018-02-05 | End: 2018-02-06

## 2018-02-05 RX ORDER — DEXTROSE 50 % IN WATER 50 %
25 SYRINGE (ML) INTRAVENOUS ONCE
Qty: 0 | Refills: 0 | Status: DISCONTINUED | OUTPATIENT
Start: 2018-02-05 | End: 2018-02-06

## 2018-02-05 RX ORDER — LEVOTHYROXINE SODIUM 125 MCG
112 TABLET ORAL DAILY
Qty: 0 | Refills: 0 | Status: DISCONTINUED | OUTPATIENT
Start: 2018-02-06 | End: 2018-02-06

## 2018-02-05 RX ORDER — DEXTROSE 50 % IN WATER 50 %
12.5 SYRINGE (ML) INTRAVENOUS ONCE
Qty: 0 | Refills: 0 | Status: DISCONTINUED | OUTPATIENT
Start: 2018-02-05 | End: 2018-02-06

## 2018-02-05 RX ORDER — LEVOTHYROXINE SODIUM 125 MCG
112 TABLET ORAL DAILY
Qty: 0 | Refills: 0 | Status: DISCONTINUED | OUTPATIENT
Start: 2018-02-05 | End: 2018-02-05

## 2018-02-05 RX ORDER — INSULIN LISPRO 100/ML
VIAL (ML) SUBCUTANEOUS AT BEDTIME
Qty: 0 | Refills: 0 | Status: DISCONTINUED | OUTPATIENT
Start: 2018-02-05 | End: 2018-02-06

## 2018-02-05 RX ORDER — METOPROLOL TARTRATE 50 MG
50 TABLET ORAL DAILY
Qty: 0 | Refills: 0 | Status: DISCONTINUED | OUTPATIENT
Start: 2018-02-05 | End: 2018-02-05

## 2018-02-05 RX ORDER — GABAPENTIN 400 MG/1
800 CAPSULE ORAL
Qty: 0 | Refills: 0 | Status: DISCONTINUED | OUTPATIENT
Start: 2018-02-06 | End: 2018-02-06

## 2018-02-05 RX ORDER — RANOLAZINE 500 MG/1
1000 TABLET, FILM COATED, EXTENDED RELEASE ORAL
Qty: 0 | Refills: 0 | Status: DISCONTINUED | OUTPATIENT
Start: 2018-02-05 | End: 2018-02-05

## 2018-02-05 RX ORDER — INSULIN LISPRO 100/ML
VIAL (ML) SUBCUTANEOUS
Qty: 0 | Refills: 0 | Status: DISCONTINUED | OUTPATIENT
Start: 2018-02-05 | End: 2018-02-06

## 2018-02-05 RX ORDER — GABAPENTIN 400 MG/1
800 CAPSULE ORAL
Qty: 0 | Refills: 0 | Status: DISCONTINUED | OUTPATIENT
Start: 2018-02-05 | End: 2018-02-05

## 2018-02-05 RX ORDER — SODIUM CHLORIDE 9 MG/ML
1000 INJECTION, SOLUTION INTRAVENOUS
Qty: 0 | Refills: 0 | Status: DISCONTINUED | OUTPATIENT
Start: 2018-02-05 | End: 2018-02-06

## 2018-02-05 RX ORDER — HEPARIN SODIUM 5000 [USP'U]/ML
5000 INJECTION INTRAVENOUS; SUBCUTANEOUS EVERY 12 HOURS
Qty: 0 | Refills: 0 | Status: DISCONTINUED | OUTPATIENT
Start: 2018-02-05 | End: 2018-02-06

## 2018-02-05 RX ORDER — SODIUM CHLORIDE 9 MG/ML
2000 INJECTION INTRAMUSCULAR; INTRAVENOUS; SUBCUTANEOUS ONCE
Qty: 0 | Refills: 0 | Status: COMPLETED | OUTPATIENT
Start: 2018-02-05 | End: 2018-02-05

## 2018-02-05 RX ORDER — ACETAMINOPHEN 500 MG
650 TABLET ORAL EVERY 6 HOURS
Qty: 0 | Refills: 0 | Status: DISCONTINUED | OUTPATIENT
Start: 2018-02-05 | End: 2018-02-06

## 2018-02-05 RX ORDER — CLOPIDOGREL BISULFATE 75 MG/1
75 TABLET, FILM COATED ORAL DAILY
Qty: 0 | Refills: 0 | Status: DISCONTINUED | OUTPATIENT
Start: 2018-02-06 | End: 2018-02-06

## 2018-02-05 RX ORDER — SODIUM CHLORIDE 9 MG/ML
3 INJECTION INTRAMUSCULAR; INTRAVENOUS; SUBCUTANEOUS ONCE
Qty: 0 | Refills: 0 | Status: COMPLETED | OUTPATIENT
Start: 2018-02-05 | End: 2018-02-05

## 2018-02-05 RX ORDER — INSULIN GLARGINE 100 [IU]/ML
25 INJECTION, SOLUTION SUBCUTANEOUS EVERY MORNING
Qty: 0 | Refills: 0 | Status: DISCONTINUED | OUTPATIENT
Start: 2018-02-05 | End: 2018-02-05

## 2018-02-05 RX ORDER — AMLODIPINE BESYLATE 2.5 MG/1
5 TABLET ORAL DAILY
Qty: 0 | Refills: 0 | Status: DISCONTINUED | OUTPATIENT
Start: 2018-02-05 | End: 2018-02-05

## 2018-02-05 RX ORDER — SODIUM CHLORIDE 9 MG/ML
1000 INJECTION INTRAMUSCULAR; INTRAVENOUS; SUBCUTANEOUS
Qty: 0 | Refills: 0 | Status: DISCONTINUED | OUTPATIENT
Start: 2018-02-05 | End: 2018-02-06

## 2018-02-05 RX ORDER — ATORVASTATIN CALCIUM 80 MG/1
40 TABLET, FILM COATED ORAL AT BEDTIME
Qty: 0 | Refills: 0 | Status: DISCONTINUED | OUTPATIENT
Start: 2018-02-05 | End: 2018-02-06

## 2018-02-05 RX ORDER — SODIUM CHLORIDE 9 MG/ML
3000 INJECTION INTRAMUSCULAR; INTRAVENOUS; SUBCUTANEOUS ONCE
Qty: 0 | Refills: 0 | Status: COMPLETED | OUTPATIENT
Start: 2018-02-05 | End: 2018-02-05

## 2018-02-05 RX ORDER — ASPIRIN/CALCIUM CARB/MAGNESIUM 324 MG
325 TABLET ORAL DAILY
Qty: 0 | Refills: 0 | Status: DISCONTINUED | OUTPATIENT
Start: 2018-02-06 | End: 2018-02-06

## 2018-02-05 RX ORDER — CLOPIDOGREL BISULFATE 75 MG/1
75 TABLET, FILM COATED ORAL DAILY
Qty: 0 | Refills: 0 | Status: DISCONTINUED | OUTPATIENT
Start: 2018-02-05 | End: 2018-02-05

## 2018-02-05 RX ORDER — PANTOPRAZOLE SODIUM 20 MG/1
40 TABLET, DELAYED RELEASE ORAL
Qty: 0 | Refills: 0 | Status: DISCONTINUED | OUTPATIENT
Start: 2018-02-05 | End: 2018-02-05

## 2018-02-05 RX ORDER — ASPIRIN/CALCIUM CARB/MAGNESIUM 324 MG
325 TABLET ORAL DAILY
Qty: 0 | Refills: 0 | Status: DISCONTINUED | OUTPATIENT
Start: 2018-02-05 | End: 2018-02-05

## 2018-02-05 RX ORDER — GLUCAGON INJECTION, SOLUTION 0.5 MG/.1ML
1 INJECTION, SOLUTION SUBCUTANEOUS ONCE
Qty: 0 | Refills: 0 | Status: DISCONTINUED | OUTPATIENT
Start: 2018-02-05 | End: 2018-02-06

## 2018-02-05 RX ORDER — ATORVASTATIN CALCIUM 80 MG/1
40 TABLET, FILM COATED ORAL AT BEDTIME
Qty: 0 | Refills: 0 | Status: DISCONTINUED | OUTPATIENT
Start: 2018-02-05 | End: 2018-02-05

## 2018-02-05 RX ORDER — PANTOPRAZOLE SODIUM 20 MG/1
40 TABLET, DELAYED RELEASE ORAL
Qty: 0 | Refills: 0 | Status: DISCONTINUED | OUTPATIENT
Start: 2018-02-05 | End: 2018-02-06

## 2018-02-05 RX ADMIN — SODIUM CHLORIDE 2000 MILLILITER(S): 9 INJECTION INTRAMUSCULAR; INTRAVENOUS; SUBCUTANEOUS at 19:28

## 2018-02-05 RX ADMIN — SODIUM CHLORIDE 3000 MILLILITER(S): 9 INJECTION INTRAMUSCULAR; INTRAVENOUS; SUBCUTANEOUS at 17:00

## 2018-02-05 RX ADMIN — SODIUM CHLORIDE 100 MILLILITER(S): 9 INJECTION INTRAMUSCULAR; INTRAVENOUS; SUBCUTANEOUS at 23:07

## 2018-02-05 RX ADMIN — ATORVASTATIN CALCIUM 40 MILLIGRAM(S): 80 TABLET, FILM COATED ORAL at 23:07

## 2018-02-05 RX ADMIN — SODIUM CHLORIDE 3 MILLILITER(S): 9 INJECTION INTRAMUSCULAR; INTRAVENOUS; SUBCUTANEOUS at 17:00

## 2018-02-05 NOTE — ED PROVIDER NOTE - CRITICAL CARE PROVIDED
direct patient care (not related to procedure)/interpretation of diagnostic studies/additional history taking/documentation/consult w/ pt's family directly relating to pts condition

## 2018-02-05 NOTE — H&P ADULT - HISTORY OF PRESENT ILLNESS
· HPI Objective Statement: 57 y-o Female with PMHX of DM type 1, CAD, HTN , Angio plasty presents to the ED BIB EMS c/o of seizure like episodes. Pt's daughter at bedside states they were at the laundromat when she started to walk awkwardly and shake. Pt notes intermittent CP, hx of recent fall, did not see outpatient after fall. Pt notes poor PO intake and feels dehydrated. EMS personnel note BGL of 443. Denies SOB, HA, LOC, N/V, dysuria. 57 y-o Female with PMHX of DM type 1, CAD, HTN , Angio plasty, hx of MRSA, hx of ETOH abuse  presents to the ED BIB EMS c/o of seizure like episodes.  As per daughter whom lives with her mother, patient was witnessed to be lethargic and  incoherent. Daughter subsequently checked her FS and she was  449 at which point daughter gave her insulin which initally improved her symptoms but then patient started to shake and became non reponsive  and was subsequently bought to ER. In ER patient was found to be hypotensive SBP 72, HR 67.  In ED they attempted to put central line, but were unsuccesful. They then gave her several fluid bolus , which bought her BP up. Patient is full oriented in ER on my encounter. She states the last thing she remmebrs is comming out of the ambulance into ER. Daughter states paitent was incoherent the until patient came to ER.  As per daughter, jeny drinks 3-4 tiems per week, snorts cocaine and smokes crack daily. Currently has no complaints. Denies any chest pain, SOB, n/v/d. 57 y-o Female with PMHX of DM type 1, CAD, HTN , Angio plasty, hx of MRSA, hx of ETOH abuse  presents to the ED BIB EMS c/o of seizure like episodes.  As per daughter whom lives with her mother, patient was witnessed to be lethargic and  incoherent. Daughter subsequently checked her FS and she was  449 at which point daughter gave her insulin which initally improved her symptoms but then patient started to shake and became non reponsive  and was subsequently bought to ER. In ER patient was found to be hypotensive SBP 72, HR 67.  In ED they were unable to obtain access, so they put a right groin femoral. They then gave her several fluid bolus , which bought her BP up. Patient is full oriented in ER on my encounter. She states the last thing she remmebrs is comming out of the ambulance into ER. Daughter states paitent was incoherent the until patient came to ER.  As per daughter, jeny drinks 3-4 tiems per week, snorts cocaine and smokes crack daily. Currently has no complaints. Denies any chest pain, SOB, n/v/d.

## 2018-02-05 NOTE — H&P ADULT - NSHPLABSRESULTS_GEN_ALL_CORE
05 Feb 2018 16:53    136    |  103    |  20     ----------------------------<  277    3.9     |  27     |  1.62     Ca    8.9        05 Feb 2018 16:53    TPro  7.3    /  Alb  4.0    /  TBili  0.7    /  DBili  x      /  AST  10     /  ALT  23     /  AlkPhos  153    05 Feb 2018 16:53  LIVER FUNCTIONS - ( 05 Feb 2018 16:53 )  Alb: 4.0 g/dL / Pro: 7.3 gm/dL / ALK PHOS: 153 U/L / ALT: 23 U/L / AST: 10 U/L / GGT: x         CBC Full  -  ( 05 Feb 2018 16:53 )  WBC Count : 12.6 K/uL  Hemoglobin : 12.1 g/dL  Hematocrit : 35.4 %  Platelet Count - Automated : 299 K/uL  Mean Cell Volume : 89.3 fl  Mean Cell Hemoglobin : 30.5 pg  Mean Cell Hemoglobin Concentration : 34.1 gm/dL  Auto Neutrophil # : 9.0 K/uL  Auto Lymphocyte # : 2.6 K/uL  Auto Monocyte # : 0.8 K/uL  Auto Eosinophil # : 0.1 K/uL  Auto Basophil # : 0.1 K/uL  Auto Neutrophil % : 71.6 %  Auto Lymphocyte % : 20.5 %  Auto Monocyte % : 6.4 %  Auto Eosinophil % : 0.6 %  Auto Basophil % : 0.9 %  CARDIAC MARKERS ( 05 Feb 2018 16:53 )  <0.015 ng/mL / x     / 76 U/L / x     / x

## 2018-02-05 NOTE — H&P ADULT - NSHPSOCIALHISTORY_GEN_ALL_CORE
patient is a etoh drinker > 3-4 x per week, marijuana use, and as per daughter crack use and cocaine snorting.

## 2018-02-05 NOTE — ED PROVIDER NOTE - OBJECTIVE STATEMENT
57 y-o Female with PMHX of DM type 1, CAD, HTN , Angio plasty presents to the ED BIB EMS c/o of seizure like episodes. Pt's daughter at bedside states they were at the laundromat when she started to walk awkwardly and shake. Pt notes intermittent CP, hx of recent fall, did not see outpatient after fall. Pt notes poor PO intake and feels dehydrated. EMS personnel note BGL of 443. Denies SOB, HA, LOC, N/V, dysuria. PMD Carole Billy

## 2018-02-05 NOTE — ED ADULT NURSE NOTE - CHIEF COMPLAINT QUOTE
Pt's family called EMS for possible stroke because of slurred speech.  Pt without deficit on arrival, evaluated by Dr. Ayers at intake.  Pt states she feels "spaced out" but reportedly was unable to stand at home earlier.

## 2018-02-05 NOTE — ED ADULT NURSE REASSESSMENT NOTE - NS ED NURSE REASSESS COMMENT FT1
Pt A&O x4, recv'd pt from [facility] in bed with rails up, cardiac monitor in place, IV fluids infusing. Pt hypotensive but awake and alert at this time. Bed rails up for safety, Dr. Garcia at bedside. will monitor.

## 2018-02-05 NOTE — H&P ADULT - ASSESSMENT
57 y-o Female with PMHX of DM type 1, CAD, HTN , Angio plasty, hx of MRSA, hx of ETOH abuse  presents to the ED BIB EMS c/o of seizure like episodes.  As per daughter whom lives with her mother, patient was witnessed to be lethargic and  incoherent. Daughter subsequently checked her FS and she was  449 at which point daughter gave her insulin which initally improved her symptoms but then patient started to shake and became non reponsive  and was subsequently bought to ER. In ER patient was found to be hypotensive SBP 72, HR 67.  In ED they attempted to put central line, but were unsuccesful. They then gave her several fluid bolus , which bought her BP up. Patient is full oriented in ER on my encounter. She states the last thing she remmebrs is comming out of the ambulance into ER. Daughter states paitent was incoherent the until patient came to ER.  As per daughter, patietn drinks 3-4 tiems per week, snorts cocaine and smokes crack daily. Patient has had these shaking symptoms before, and was previoulsy followed outpatient w/Dr. nancy cain.  Currently has no complaints.  Found to have SBP of 77, HR 67 on initial arrival to ER  WBC 12.6, creatinine 1.62      Admit to [facility]     1-Seizure like acitivity- possibly secondary to siezure   -patient has family hx of seizure, both her siblings have seizure.  -EEG  -neurology consult Dr. nancy cain, is her neurologist  -urine drug screen    2-Hypotension- SIRS/sepsis vs dehydration  -s/p several liters bolus  -BP on my arrival is 90SBP,  -patient is not tachycardic, and is full coherent  -start fluids 100cc/hr  -blood cultures  -urine cultures    3-acute renal failure:  c/w iv fluids    4-DVT proph- sc heparian    5-c/w home mds 57 y-o Female with PMHX of DM type 1, CAD, HTN , Angio plasty, hx of MRSA, hx of ETOH abuse  presents to the ED BIB EMS c/o of seizure like episodes.  As per daughter whom lives with her mother, patient was witnessed to be lethargic and  incoherent. Daughter subsequently checked her FS and she was  449 at which point daughter gave her insulin which initally improved her symptoms but then patient started to shake and became non reponsive  and was subsequently bought to ER. In ER patient was found to be hypotensive SBP 72, HR 67.  In ED they were unable to obtain access, so they put a right groin femoral. They then gave her several fluid bolus , which bought her BP up. Patient is full oriented in ER on my encounter. She states the last thing she remmebrs is comming out of the ambulance into ER. Daughter states paitent was incoherent the until patient came to ER.  As per daughter, jeny drinks 3-4 tiems per week, snorts cocaine and smokes crack daily. Currently has no complaints.   Found to have SBP of 77, HR 67 on initial arrival to ER  WBC 12.6, creatinine 1.62      [facility]    1-Seizure like acitivity- possibly secondary to siezure   -patient has family hx of seizure, both her siblings have seizure.  -EEG  -neurology consult Dr. nancy cain, is her neurologist  -urine drug screen    2-Hypotension- SIRS/sepsis vs dehydration  -s/p several liters bolus  -BP on my arrival is 90SBP,  -patient is not tachycardic, and is full coherent  -start fluids 100cc/hr  -blood cultures  -urine cultures    3-acute renal failure:  c/w iv fluids    4-DVT proph- sc heparian    5-c/w home mds    NOTE: PATIENT HAS A RIGHT GROIN FEMORAL THAT NEEDS TO COME OUT 2/6 BY LATE AFTERNOON.  MUST OBTAIN PERIPHERAL ACESS PRIOR TO THAT.

## 2018-02-05 NOTE — ED PROVIDER NOTE - MEDICAL DECISION MAKING DETAILS
Syncope with hypotension and hyperglycemia.  Pt has no e/o DKA.  right femoral TLC placed for IV access.  Pt hydrated with 4 L NS

## 2018-02-05 NOTE — ED ADULT TRIAGE NOTE - CHIEF COMPLAINT QUOTE
Pt's family called EMS for possible stroke because of slurred speech.  Pt without deficit on arrival, evaluated by Dr. Ayers at intake.  Pt sttes she feels "spaced out" but reportedly was unable to stand at home earlier.

## 2018-02-06 VITALS
DIASTOLIC BLOOD PRESSURE: 45 MMHG | RESPIRATION RATE: 18 BRPM | HEART RATE: 80 BPM | SYSTOLIC BLOOD PRESSURE: 129 MMHG | OXYGEN SATURATION: 96 % | TEMPERATURE: 98 F

## 2018-02-06 LAB
ALBUMIN SERPL ELPH-MCNC: 3 G/DL — LOW (ref 3.3–5)
ALP SERPL-CCNC: 136 U/L — HIGH (ref 40–120)
ALT FLD-CCNC: 19 U/L — SIGNIFICANT CHANGE UP (ref 12–78)
ANION GAP SERPL CALC-SCNC: 7 MMOL/L — SIGNIFICANT CHANGE UP (ref 5–17)
AST SERPL-CCNC: 10 U/L — LOW (ref 15–37)
BASOPHILS # BLD AUTO: 0.1 K/UL — SIGNIFICANT CHANGE UP (ref 0–0.2)
BASOPHILS NFR BLD AUTO: 0.8 % — SIGNIFICANT CHANGE UP (ref 0–2)
BILIRUB SERPL-MCNC: 0.4 MG/DL — SIGNIFICANT CHANGE UP (ref 0.2–1.2)
BUN SERPL-MCNC: 10 MG/DL — SIGNIFICANT CHANGE UP (ref 7–23)
CALCIUM SERPL-MCNC: 7.7 MG/DL — LOW (ref 8.5–10.1)
CHLORIDE SERPL-SCNC: 114 MMOL/L — HIGH (ref 96–108)
CO2 SERPL-SCNC: 21 MMOL/L — LOW (ref 22–31)
CREAT SERPL-MCNC: 0.72 MG/DL — SIGNIFICANT CHANGE UP (ref 0.5–1.3)
CULTURE RESULTS: SIGNIFICANT CHANGE UP
EOSINOPHIL # BLD AUTO: 0.2 K/UL — SIGNIFICANT CHANGE UP (ref 0–0.5)
EOSINOPHIL NFR BLD AUTO: 2.2 % — SIGNIFICANT CHANGE UP (ref 0–6)
GLUCOSE BLDC GLUCOMTR-MCNC: 164 MG/DL — HIGH (ref 70–99)
GLUCOSE BLDC GLUCOMTR-MCNC: 278 MG/DL — HIGH (ref 70–99)
GLUCOSE SERPL-MCNC: 158 MG/DL — HIGH (ref 70–99)
HBA1C BLD-MCNC: 9.3 % — HIGH (ref 4–5.6)
HCT VFR BLD CALC: 32.3 % — LOW (ref 34.5–45)
HGB BLD-MCNC: 10.9 G/DL — LOW (ref 11.5–15.5)
LYMPHOCYTES # BLD AUTO: 2 K/UL — SIGNIFICANT CHANGE UP (ref 1–3.3)
LYMPHOCYTES # BLD AUTO: 20.9 % — SIGNIFICANT CHANGE UP (ref 13–44)
MCHC RBC-ENTMCNC: 30.4 PG — SIGNIFICANT CHANGE UP (ref 27–34)
MCHC RBC-ENTMCNC: 33.9 GM/DL — SIGNIFICANT CHANGE UP (ref 32–36)
MCV RBC AUTO: 89.7 FL — SIGNIFICANT CHANGE UP (ref 80–100)
MONOCYTES # BLD AUTO: 0.7 K/UL — SIGNIFICANT CHANGE UP (ref 0–0.9)
MONOCYTES NFR BLD AUTO: 6.7 % — SIGNIFICANT CHANGE UP (ref 2–14)
NEUTROPHILS # BLD AUTO: 6.7 K/UL — SIGNIFICANT CHANGE UP (ref 1.8–7.4)
NEUTROPHILS NFR BLD AUTO: 69.4 % — SIGNIFICANT CHANGE UP (ref 43–77)
PLATELET # BLD AUTO: 256 K/UL — SIGNIFICANT CHANGE UP (ref 150–400)
POTASSIUM SERPL-MCNC: 4 MMOL/L — SIGNIFICANT CHANGE UP (ref 3.5–5.3)
POTASSIUM SERPL-SCNC: 4 MMOL/L — SIGNIFICANT CHANGE UP (ref 3.5–5.3)
PROT SERPL-MCNC: 6 GM/DL — SIGNIFICANT CHANGE UP (ref 6–8.3)
RBC # BLD: 3.6 M/UL — LOW (ref 3.8–5.2)
RBC # FLD: 12 % — SIGNIFICANT CHANGE UP (ref 10.3–14.5)
SODIUM SERPL-SCNC: 142 MMOL/L — SIGNIFICANT CHANGE UP (ref 135–145)
SPECIMEN SOURCE: SIGNIFICANT CHANGE UP
T3 SERPL-MCNC: 62 NG/DL — LOW (ref 80–200)
T4 AB SER-ACNC: 7 UG/DL — SIGNIFICANT CHANGE UP (ref 4.6–12)
TSH SERPL-MCNC: 0.47 UU/ML — SIGNIFICANT CHANGE UP (ref 0.36–3.74)
WBC # BLD: 9.7 K/UL — SIGNIFICANT CHANGE UP (ref 3.8–10.5)
WBC # FLD AUTO: 9.7 K/UL — SIGNIFICANT CHANGE UP (ref 3.8–10.5)

## 2018-02-06 PROCEDURE — 99222 1ST HOSP IP/OBS MODERATE 55: CPT

## 2018-02-06 PROCEDURE — 95819 EEG AWAKE AND ASLEEP: CPT | Mod: 26

## 2018-02-06 RX ADMIN — Medication 2: at 08:36

## 2018-02-06 RX ADMIN — Medication 325 MILLIGRAM(S): at 12:06

## 2018-02-06 RX ADMIN — SODIUM CHLORIDE 100 MILLILITER(S): 9 INJECTION INTRAMUSCULAR; INTRAVENOUS; SUBCUTANEOUS at 09:57

## 2018-02-06 RX ADMIN — CLOPIDOGREL BISULFATE 75 MILLIGRAM(S): 75 TABLET, FILM COATED ORAL at 12:06

## 2018-02-06 RX ADMIN — GABAPENTIN 800 MILLIGRAM(S): 400 CAPSULE ORAL at 06:10

## 2018-02-06 RX ADMIN — PANTOPRAZOLE SODIUM 40 MILLIGRAM(S): 20 TABLET, DELAYED RELEASE ORAL at 06:10

## 2018-02-06 RX ADMIN — HEPARIN SODIUM 5000 UNIT(S): 5000 INJECTION INTRAVENOUS; SUBCUTANEOUS at 06:10

## 2018-02-06 RX ADMIN — Medication 112 MICROGRAM(S): at 06:10

## 2018-02-06 RX ADMIN — Medication 6: at 12:04

## 2018-02-06 RX ADMIN — INSULIN GLARGINE 25 UNIT(S): 100 INJECTION, SOLUTION SUBCUTANEOUS at 10:28

## 2018-02-06 NOTE — CONSULT NOTE ADULT - ASSESSMENT
57 year old female with PMHX of DM type 1, CAD, HTN , ETOH / substance abuse presented to the ED via EMS with ? seizure-like episodes; patient was witnessed to be lethargic and incoherent, , got insulin, her symptoms improved, then patient started to shake and became less reponsive; In ER patient was found to be hypotensive SBP 72, HR 67, was given fluid bolus, her BP perked up, and she was fully oriented upon admitting physicians exam. UA was + for coacaine, THC and brabiturates. CTH was negative.    Patient states thro'out the episode she was fully aware and could hear people around her, she recalls coming out of the ambulance into ER. Pt admits to drinking wine regularly and using POT, as per daughter she snorts cocaine and smokes crack daily.     # Episode of shaking / unresponsiveness: doubt seizure    # Encephalopathy; toxic-metabolic / hypotension    - I would not add AED at present; pt will f/u with Dr. Gay, f/u with EEG, then decide on further treament.  - For now continue Gabapentin.  - Pt counselled about smoking, substance abuse    D/W Dr. Garcia

## 2018-02-06 NOTE — PROGRESS NOTE ADULT - SUBJECTIVE AND OBJECTIVE BOX
CHIEF COMPLAINT/Diagnosis:  illicit drug use/ hypotension/ dehydration    SUBJECTIVE: wants to leave the hospital, says "i dont want to be here anymore"    REVIEW OF SYSTEMS:    CONSTITUTIONAL: No weakness, fevers or chills  EYES/ENT: No visual changes;  No vertigo or throat pain   NECK: No pain or stiffness  RESPIRATORY: No cough, wheezing, hemoptysis; No shortness of breath  CARDIOVASCULAR: No chest pain or palpitations  GASTROINTESTINAL: No abdominal or epigastric pain. No nausea, vomiting, or hematemesis; No diarrhea or constipation. No melena or hematochezia.  GENITOURINARY: No dysuria, frequency or hematuria  NEUROLOGICAL: No numbness or weakness  SKIN: No itching, burning, rashes, or lesions   All other review of systems is negative unless indicated above    Vital Signs Last 24 Hrs  T(C): 36.6 (06 Feb 2018 10:37), Max: 37.2 (06 Feb 2018 09:51)  T(F): 97.8 (06 Feb 2018 10:37), Max: 98.9 (06 Feb 2018 09:51)  HR: 80 (06 Feb 2018 10:37) (66 - 80)  BP: 129/45 (06 Feb 2018 10:37) (50/34 - 131/59)  BP(mean): --  RR: 18 (06 Feb 2018 10:37) (14 - 20)  SpO2: 96% (06 Feb 2018 10:37) (95% - 100%)    I&O's Summary    05 Feb 2018 07:01  -  06 Feb 2018 07:00  --------------------------------------------------------  IN: 100 mL / OUT: 0 mL / NET: 100 mL    06 Feb 2018 07:01  -  06 Feb 2018 13:24  --------------------------------------------------------  IN: 1000 mL / OUT: 0 mL / NET: 1000 mL        CAPILLARY BLOOD GLUCOSE      POCT Blood Glucose.: 278 mg/dL (06 Feb 2018 12:02)  POCT Blood Glucose.: 164 mg/dL (06 Feb 2018 08:34)  POCT Blood Glucose.: 225 mg/dL (05 Feb 2018 23:04)      PHYSICAL EXAM:    Constitutional: NAD, awake and alert, well-developed  HEENT: PERR, EOMI, Normal Hearing, MMM  Neck: Soft and supple, No LAD, No JVD  Respiratory: Breath sounds are clear bilaterally, No wheezing, rales or rhonchi  Cardiovascular: S1 and S2, regular rate and rhythm, no Murmurs, gallops or rubs  Gastrointestinal: Bowel Sounds present, soft, nontender, nondistended, no guarding, no rebound  Extremities: No peripheral edema  Vascular: 2+ peripheral pulses  Neurological: A/O x 3, no focal deficits  Musculoskeletal: 5/5 strength b/l upper and lower extremities  Skin: No rashes    MEDICATIONS:  MEDICATIONS  (STANDING):  aspirin 325 milliGRAM(s) Oral daily  atorvastatin 40 milliGRAM(s) Oral at bedtime  clopidogrel Tablet 75 milliGRAM(s) Oral daily  dextrose 5%. 1000 milliLiter(s) (50 mL/Hr) IV Continuous <Continuous>  dextrose 50% Injectable 12.5 Gram(s) IV Push once  dextrose 50% Injectable 25 Gram(s) IV Push once  dextrose 50% Injectable 25 Gram(s) IV Push once  gabapentin 800 milliGRAM(s) Oral two times a day  heparin  Injectable 5000 Unit(s) SubCutaneous every 12 hours  insulin glargine Injectable (LANTUS) 25 Unit(s) SubCutaneous every morning  insulin lispro (HumaLOG) corrective regimen sliding scale   SubCutaneous three times a day before meals  insulin lispro (HumaLOG) corrective regimen sliding scale   SubCutaneous at bedtime  levothyroxine 112 MICROGram(s) Oral daily  nicotine -  14 mG/24Hr(s) Patch 1 patch Transdermal daily  pantoprazole    Tablet 40 milliGRAM(s) Oral two times a day before meals  sodium chloride 0.9%. 1000 milliLiter(s) (100 mL/Hr) IV Continuous <Continuous>      LABS: All Labs Reviewed:                        10.9   9.7   )-----------( 256      ( 06 Feb 2018 07:39 )             32.3     02-06    142  |  114<H>  |  10  ----------------------------<  158<H>  4.0   |  21<L>  |  0.72    Ca    7.7<L>      06 Feb 2018 07:39    TPro  6.0  /  Alb  3.0<L>  /  TBili  0.4  /  DBili  x   /  AST  10<L>  /  ALT  19  /  AlkPhos  136<H>  02-06      CARDIAC MARKERS ( 05 Feb 2018 19:34 )  <0.015 ng/mL / x     / x     / x     / x      CARDIAC MARKERS ( 05 Feb 2018 16:53 )  <0.015 ng/mL / x     / 76 U/L / x     / x          Assessment and Plan    57 y-o Female with PMHX of DM type 1, CAD, HTN , Angio plasty, hx of MRSA, hx of ETOH abuse  presents to the ED BIB EMS c/o of seizure like episodes.  As per daughter whom lives with her mother, patient was witnessed to be lethargic and  incoherent. Daughter subsequently checked her FS and she was  449 at which point daughter gave her insulin which initally improved her symptoms but then patient started to shake and became non reponsive  and was subsequently bought to ER. In ER patient was found to be hypotensive SBP 72, HR 67.  In ED they were unable to obtain access, so they put a right groin femoral. They then gave her several fluid bolus , which bought her BP up. Patient is full oriented in ER on my encounter. She states the last thing she remmebrs is comming out of the ambulance into ER. Daughter states paitent was incoherent the until patient came to ER.  As per daughter, jeny drinks 3-4 tiems per week, snorts cocaine and smokes crack daily. Currently has no complaints.   Found to have SBP of 77, HR 67 on initial arrival to ER  WBC 12.6, creatinine 1.62      1-Seizure like acitivity- possibly secondary to siezure  vs drug abuse  -patient has family hx of seizure, both her siblings have seizure.  -EEG completed  -patient will up outpatient with her neurologist  -no siezure like acitivity noted while inpatient. likely patient did not have a seizure , and sedation and unarousability likely secondary to drug overdose  -urine drug screen is positive- cocaine, marijuana and barbituates    2-Hypotension- SIRS/sepsis vs dehydration  -patient has hx of sepsis and mrsa  -BP much improved now  -blood cultures pending  -urine cultures pending  -patient currently wants to leave. Advised that given her high risk outpatient behavior (illicit drugs), can not discharge her without blood cultures.   -at this time she will sign out AMA    3-acute renal failure:  c/w iv fluids    4-DVT proph- sc heparian    5-c/w home mds        -patient currently wants to leave. Advised that given her high risk outpatient behavior (illicit drugs), can not discharge her without blood cultures.   -at this time she will sign out AMA  -right groin femoral line is removed.

## 2018-02-06 NOTE — CONSULT NOTE ADULT - SUBJECTIVE AND OBJECTIVE BOX
CC: 57 y old female who presents with a chief complaint of seizure-like episode (05 Feb 2018 19:11)    HPI:  57 year old female with PMHX of DM type 1, CAD, HTN , Angioplasty, hx of MRSA, hx of ETOH abuse presented to the ED via EMS with c/o of seizure-like episodes.  As per pts daughter who lives with her mother, patient was witnessed to be lethargic and incoherent, her FS was  449 at which point daughter gave her insulin, her symptoms improved, but then patient started to shake and became less reponsive was bought to ER. In ER patient was found to be hypotensive SBP 72, HR 67, was given fluid bolus, her BP perked up, and she was fully oriented upon admitting physicians exam. UA was + for coacaine, THC and brabiturates. CTH was negative.    Upon questioning pt today, she states thro'out the episode she was fully aware and could hear people around her, she recalls coming out of the ambulance into ER. Pt admits to drinking wine regularly and using POT, as per daughter she snorts cocaine and smokes crack daily.     Pt is known to Dr. 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 Decreased ROM.  Angina pectoris  History of MRSA infection: left lower extremity incisional area 2015  Anxiety and depression  Transient cerebral ischemia, unspecified type  Osteoarthritis, unspecified osteoarthritis type, unspecified site  Osteoarthritis of spine, unspecified spinal osteoarthritis complication status, unspecified spinal region  Lumbar herniated disc  Urinary incontinence, unspecified type  Overactive bladder  Gall bladder disease: gall bladder removed  Constipation, unspecified constipation type  Hiatal hernia with GERD: hiatal hernia repaired  PAD (peripheral artery disease)  Hyperlipidemia, unspecified hyperlipidemia type  Essential hypertension  Hypothyroidism  DM (diabetes mellitus), type 1  COPD (chronic obstructive pulmonary disease)  CAD (coronary artery disease)  S/P dilatation and curettage: 1991  H/O rhinoplasty: 1980  H/O angioplasty: Right iliac artery. 5/12/2017  H/O ventral hernia repair: 3/2017  History of incision and drainage: of lower extremity incisional site x 5 . last done5/2015  H/O arterial bypass of lower limb: Femoral - Popliteal. 11/2014  History of laparoscopic cholecystectomy: 2/2016    FAMILY HISTORY:  Family history of asthma (Sibling): sister  Family history of epilepsy (Sibling, Sibling): 2 sisters  Family history of cardiovascular disease (Mother): mother  Family history of stroke (Mother): mother  Family history of heart disease (Father): father  Family history of alcoholism in father (Father)    Social Hx: Smoker, drinks wine, Cocaine /THC use regular    MEDICATIONS  (STANDING):  aspirin 325 milliGRAM(s) Oral daily  atorvastatin 40 milliGRAM(s) Oral at bedtime  clopidogrel Tablet 75 milliGRAM(s) Oral daily  dextrose 5%. 1000 milliLiter(s) (50 mL/Hr) IV Continuous <Continuous>  dextrose 50% Injectable 12.5 Gram(s) IV Push once  dextrose 50% Injectable 25 Gram(s) IV Push once  dextrose 50% Injectable 25 Gram(s) IV Push once  gabapentin 800 milliGRAM(s) Oral two times a day  heparin  Injectable 5000 Unit(s) SubCutaneous every 12 hours  insulin glargine Injectable (LANTUS) 25 Unit(s) SubCutaneous every morning  insulin lispro (HumaLOG) corrective regimen sliding scale   SubCutaneous three times a day before meals  insulin lispro (HumaLOG) corrective regimen sliding scale   SubCutaneous at bedtime  levothyroxine 112 MICROGram(s) Oral daily  nicotine -  14 mG/24Hr(s) Patch 1 patch Transdermal daily  pantoprazole    Tablet 40 milliGRAM(s) Oral two times a day before meals  sodium chloride 0.9%. 1000 milliLiter(s) (100 mL/Hr) IV Continuous <Continuous>     Allergies  No Known Allergies  Intolerances    ROS: Pertinent positives in HPI, all other ROS were reviewed and are negative.      Vital Signs Last 24 Hrs  T(C): 36.6 (06 Feb 2018 10:37), Max: 37.2 (06 Feb 2018 09:51)  T(F): 97.8 (06 Feb 2018 10:37), Max: 98.9 (06 Feb 2018 09:51)  HR: 80 (06 Feb 2018 10:37) (66 - 80)  BP: 129/45 (06 Feb 2018 10:37) (50/34 - 131/59)  BP(mean): --  RR: 18 (06 Feb 2018 10:37) (14 - 20)  SpO2: 96% (06 Feb 2018 10:37) (95% - 100%)    GE:  Constitutional: awake and alert.  HEENT: PERRLA, EOMI,   Neck: Supple.  Respiratory: Breath sounds are clear bilaterally  Cardiovascular: S1 and S2, regular rhythm  Extremities:  no edema  Vascular: Caritid Bruit - no  Musculoskeletal: no joint swelling/tenderness, no abnormal movements  Skin: No rashes    Neurological exam:  HF: A x O x 3. Speech fluent, No Aphasia or paraphasic errors. Naming /repetition intact   CN: CALE, EOMI, VFF, facial sensation normal, no NLFD, tongue midline, Palate moves equally, SCM equal bilaterally  Motor: No pronator drift, Strength 5/5 in all 4 ext, (mild weakness - left arm - developmental), normal bulk and tone, no tremor, rigidit.    Sens: Intact to light touch / PP    Reflexes: BJ 2+, BR 2+, KJ 2+, AJ trace, downgoing toes b/l  Coord:  No FNFA, dysmetria, PRANAY intact   Gait/Balance: Not tested    Labs:   02-06    U-TOX: + THC, BARBIT, Cocaine    142  |  114<H>  |  10  ----------------------------<  158<H>  4.0   |  21<L>  |  0.72    Ca    7.7<L>      06 Feb 2018 07:39    TPro  6.0  /  Alb  3.0<L>  /  TBili  0.4  /  DBili  x   /  AST  10<L>  /  ALT  19  /  AlkPhos  136<H>  02-06                      10.9   9.7   )-----------( 256      ( 06 Feb 2018 07:39 )             32.3       Radiology:  - CT Head:< from: CT Head No Cont (02.05.18 @ 18:26) >  No intracranial hemorrhage, mass effect or CT evidence of acute infarct      < end of copied text >    - EEG- P

## 2018-02-11 LAB
CULTURE RESULTS: SIGNIFICANT CHANGE UP
CULTURE RESULTS: SIGNIFICANT CHANGE UP
SPECIMEN SOURCE: SIGNIFICANT CHANGE UP
SPECIMEN SOURCE: SIGNIFICANT CHANGE UP

## 2018-02-15 DIAGNOSIS — F10.10 ALCOHOL ABUSE, UNCOMPLICATED: ICD-10-CM

## 2018-02-15 DIAGNOSIS — Z98.62 PERIPHERAL VASCULAR ANGIOPLASTY STATUS: ICD-10-CM

## 2018-02-15 DIAGNOSIS — Z79.82 LONG TERM (CURRENT) USE OF ASPIRIN: ICD-10-CM

## 2018-02-15 DIAGNOSIS — I25.119 ATHEROSCLEROTIC HEART DISEASE OF NATIVE CORONARY ARTERY WITH UNSPECIFIED ANGINA PECTORIS: ICD-10-CM

## 2018-02-15 DIAGNOSIS — F13.10 SEDATIVE, HYPNOTIC OR ANXIOLYTIC ABUSE, UNCOMPLICATED: ICD-10-CM

## 2018-02-15 DIAGNOSIS — R32 UNSPECIFIED URINARY INCONTINENCE: ICD-10-CM

## 2018-02-15 DIAGNOSIS — F41.9 ANXIETY DISORDER, UNSPECIFIED: ICD-10-CM

## 2018-02-15 DIAGNOSIS — F12.10 CANNABIS ABUSE, UNCOMPLICATED: ICD-10-CM

## 2018-02-15 DIAGNOSIS — G92 TOXIC ENCEPHALOPATHY: ICD-10-CM

## 2018-02-15 DIAGNOSIS — Z79.4 LONG TERM (CURRENT) USE OF INSULIN: ICD-10-CM

## 2018-02-15 DIAGNOSIS — M51.26 OTHER INTERVERTEBRAL DISC DISPLACEMENT, LUMBAR REGION: ICD-10-CM

## 2018-02-15 DIAGNOSIS — E10.65 TYPE 1 DIABETES MELLITUS WITH HYPERGLYCEMIA: ICD-10-CM

## 2018-02-15 DIAGNOSIS — M47.9 SPONDYLOSIS, UNSPECIFIED: ICD-10-CM

## 2018-02-15 DIAGNOSIS — F14.10 COCAINE ABUSE, UNCOMPLICATED: ICD-10-CM

## 2018-02-15 DIAGNOSIS — I10 ESSENTIAL (PRIMARY) HYPERTENSION: ICD-10-CM

## 2018-02-15 DIAGNOSIS — F32.9 MAJOR DEPRESSIVE DISORDER, SINGLE EPISODE, UNSPECIFIED: ICD-10-CM

## 2018-02-15 DIAGNOSIS — K21.9 GASTRO-ESOPHAGEAL REFLUX DISEASE WITHOUT ESOPHAGITIS: ICD-10-CM

## 2018-02-15 DIAGNOSIS — Z53.21 PROCEDURE AND TREATMENT NOT CARRIED OUT DUE TO PATIENT LEAVING PRIOR TO BEING SEEN BY HEALTH CARE PROVIDER: ICD-10-CM

## 2018-02-15 DIAGNOSIS — I95.9 HYPOTENSION, UNSPECIFIED: ICD-10-CM

## 2018-02-15 DIAGNOSIS — J44.9 CHRONIC OBSTRUCTIVE PULMONARY DISEASE, UNSPECIFIED: ICD-10-CM

## 2018-02-15 DIAGNOSIS — R55 SYNCOPE AND COLLAPSE: ICD-10-CM

## 2018-02-15 DIAGNOSIS — T50.902A POISONING BY UNSPECIFIED DRUGS, MEDICAMENTS AND BIOLOGICAL SUBSTANCES, INTENTIONAL SELF-HARM, INITIAL ENCOUNTER: ICD-10-CM

## 2018-02-15 DIAGNOSIS — E03.9 HYPOTHYROIDISM, UNSPECIFIED: ICD-10-CM

## 2018-02-15 DIAGNOSIS — Q74.0 OTHER CONGENITAL MALFORMATIONS OF UPPER LIMB(S), INCLUDING SHOULDER GIRDLE: ICD-10-CM

## 2018-02-15 DIAGNOSIS — E86.0 DEHYDRATION: ICD-10-CM

## 2018-02-15 DIAGNOSIS — E78.5 HYPERLIPIDEMIA, UNSPECIFIED: ICD-10-CM

## 2018-02-15 DIAGNOSIS — N17.9 ACUTE KIDNEY FAILURE, UNSPECIFIED: ICD-10-CM

## 2018-02-15 DIAGNOSIS — E10.51 TYPE 1 DIABETES MELLITUS WITH DIABETIC PERIPHERAL ANGIOPATHY WITHOUT GANGRENE: ICD-10-CM

## 2018-02-15 DIAGNOSIS — R56.9 UNSPECIFIED CONVULSIONS: ICD-10-CM

## 2018-04-13 ENCOUNTER — APPOINTMENT (OUTPATIENT)
Dept: NEUROLOGY | Facility: CLINIC | Age: 58
End: 2018-04-13

## 2018-05-07 ENCOUNTER — RESULT REVIEW (OUTPATIENT)
Age: 58
End: 2018-05-07

## 2018-07-19 ENCOUNTER — APPOINTMENT (OUTPATIENT)
Dept: NEUROLOGY | Facility: CLINIC | Age: 58
End: 2018-07-19
Payer: MEDICAID

## 2018-07-19 VITALS
HEART RATE: 70 BPM | SYSTOLIC BLOOD PRESSURE: 122 MMHG | WEIGHT: 148 LBS | HEIGHT: 63 IN | DIASTOLIC BLOOD PRESSURE: 54 MMHG | BODY MASS INDEX: 26.22 KG/M2

## 2018-07-19 DIAGNOSIS — R47.81 SLURRED SPEECH: ICD-10-CM

## 2018-07-19 PROBLEM — G45.9 TRANSIENT CEREBRAL ISCHEMIC ATTACK, UNSPECIFIED: Chronic | Status: ACTIVE | Noted: 2017-06-08

## 2018-07-19 PROBLEM — F41.9 ANXIETY DISORDER, UNSPECIFIED: Chronic | Status: ACTIVE | Noted: 2017-06-08

## 2018-07-19 PROBLEM — R32 UNSPECIFIED URINARY INCONTINENCE: Chronic | Status: ACTIVE | Noted: 2017-06-08

## 2018-07-19 PROBLEM — I73.9 PERIPHERAL VASCULAR DISEASE, UNSPECIFIED: Chronic | Status: ACTIVE | Noted: 2017-06-08

## 2018-07-19 PROBLEM — N32.81 OVERACTIVE BLADDER: Chronic | Status: ACTIVE | Noted: 2017-06-08

## 2018-07-19 PROBLEM — J44.9 CHRONIC OBSTRUCTIVE PULMONARY DISEASE, UNSPECIFIED: Chronic | Status: ACTIVE | Noted: 2017-04-30

## 2018-07-19 PROBLEM — I25.10 ATHEROSCLEROTIC HEART DISEASE OF NATIVE CORONARY ARTERY WITHOUT ANGINA PECTORIS: Chronic | Status: ACTIVE | Noted: 2017-04-30

## 2018-07-19 PROBLEM — M47.9 SPONDYLOSIS, UNSPECIFIED: Chronic | Status: ACTIVE | Noted: 2017-06-08

## 2018-07-19 PROBLEM — I20.9 ANGINA PECTORIS, UNSPECIFIED: Chronic | Status: ACTIVE | Noted: 2017-06-08

## 2018-07-19 PROBLEM — M51.26 OTHER INTERVERTEBRAL DISC DISPLACEMENT, LUMBAR REGION: Chronic | Status: ACTIVE | Noted: 2017-06-08

## 2018-07-19 PROBLEM — M25.9 JOINT DISORDER, UNSPECIFIED: Chronic | Status: ACTIVE | Noted: 2017-06-08

## 2018-07-19 PROBLEM — Z86.14 PERSONAL HISTORY OF METHICILLIN RESISTANT STAPHYLOCOCCUS AUREUS INFECTION: Chronic | Status: ACTIVE | Noted: 2017-06-08

## 2018-07-19 PROBLEM — E03.9 HYPOTHYROIDISM, UNSPECIFIED: Chronic | Status: ACTIVE | Noted: 2017-04-30

## 2018-07-19 PROBLEM — K21.9 GASTRO-ESOPHAGEAL REFLUX DISEASE WITHOUT ESOPHAGITIS: Chronic | Status: ACTIVE | Noted: 2017-06-08

## 2018-07-19 PROBLEM — E78.5 HYPERLIPIDEMIA, UNSPECIFIED: Chronic | Status: ACTIVE | Noted: 2017-06-08

## 2018-07-19 PROBLEM — I10 ESSENTIAL (PRIMARY) HYPERTENSION: Chronic | Status: ACTIVE | Noted: 2017-06-08

## 2018-07-19 PROCEDURE — 99214 OFFICE O/P EST MOD 30 MIN: CPT

## 2018-09-06 ENCOUNTER — OUTPATIENT (OUTPATIENT)
Dept: OUTPATIENT SERVICES | Facility: HOSPITAL | Age: 58
LOS: 1 days | Discharge: HOME | End: 2018-09-06
Payer: MEDICAID

## 2018-09-06 VITALS
RESPIRATION RATE: 18 BRPM | TEMPERATURE: 98 F | HEIGHT: 63 IN | OXYGEN SATURATION: 100 % | SYSTOLIC BLOOD PRESSURE: 130 MMHG | DIASTOLIC BLOOD PRESSURE: 57 MMHG | WEIGHT: 149.03 LBS | HEART RATE: 74 BPM

## 2018-09-06 DIAGNOSIS — M43.16 SPONDYLOLISTHESIS, LUMBAR REGION: ICD-10-CM

## 2018-09-06 DIAGNOSIS — M51.37 OTHER INTERVERTEBRAL DISC DEGENERATION, LUMBOSACRAL REGION: ICD-10-CM

## 2018-09-06 DIAGNOSIS — Z95.828 PRESENCE OF OTHER VASCULAR IMPLANTS AND GRAFTS: Chronic | ICD-10-CM

## 2018-09-06 DIAGNOSIS — Z98.62 PERIPHERAL VASCULAR ANGIOPLASTY STATUS: Chronic | ICD-10-CM

## 2018-09-06 DIAGNOSIS — Z90.49 ACQUIRED ABSENCE OF OTHER SPECIFIED PARTS OF DIGESTIVE TRACT: Chronic | ICD-10-CM

## 2018-09-06 DIAGNOSIS — Z98.890 OTHER SPECIFIED POSTPROCEDURAL STATES: Chronic | ICD-10-CM

## 2018-09-06 DIAGNOSIS — Z29.9 ENCOUNTER FOR PROPHYLACTIC MEASURES, UNSPECIFIED: ICD-10-CM

## 2018-09-06 DIAGNOSIS — Z01.818 ENCOUNTER FOR OTHER PREPROCEDURAL EXAMINATION: ICD-10-CM

## 2018-09-06 DIAGNOSIS — M51.36 OTHER INTERVERTEBRAL DISC DEGENERATION, LUMBAR REGION: ICD-10-CM

## 2018-09-06 DIAGNOSIS — Z53.8 PROCEDURE AND TREATMENT NOT CARRIED OUT FOR OTHER REASONS: ICD-10-CM

## 2018-09-06 DIAGNOSIS — Z41.1 ENCOUNTER FOR COSMETIC SURGERY: Chronic | ICD-10-CM

## 2018-09-06 LAB
ANION GAP SERPL CALC-SCNC: 10 MMOL/L — SIGNIFICANT CHANGE UP (ref 5–17)
APPEARANCE UR: CLEAR — SIGNIFICANT CHANGE UP
APTT BLD: 30.7 SEC — SIGNIFICANT CHANGE UP (ref 27.5–37.4)
BACTERIA # UR AUTO: ABNORMAL
BASOPHILS # BLD AUTO: 0.05 K/UL — SIGNIFICANT CHANGE UP (ref 0–0.2)
BASOPHILS NFR BLD AUTO: 0.5 % — SIGNIFICANT CHANGE UP (ref 0–2)
BILIRUB UR-MCNC: ABNORMAL
BLD GP AB SCN SERPL QL: SIGNIFICANT CHANGE UP
BUN SERPL-MCNC: 27 MG/DL — HIGH (ref 7–23)
CALCIUM SERPL-MCNC: 9.3 MG/DL — SIGNIFICANT CHANGE UP (ref 8.5–10.1)
CHLORIDE SERPL-SCNC: 105 MMOL/L — SIGNIFICANT CHANGE UP (ref 96–108)
CO2 SERPL-SCNC: 24 MMOL/L — SIGNIFICANT CHANGE UP (ref 22–31)
COLOR SPEC: ABNORMAL
CREAT SERPL-MCNC: 1.35 MG/DL — HIGH (ref 0.5–1.3)
CRP SERPL-MCNC: 0.4 MG/DL — SIGNIFICANT CHANGE UP (ref 0–0.4)
DIFF PNL FLD: NEGATIVE — SIGNIFICANT CHANGE UP
EOSINOPHIL # BLD AUTO: 0.26 K/UL — SIGNIFICANT CHANGE UP (ref 0–0.5)
EOSINOPHIL NFR BLD AUTO: 2.8 % — SIGNIFICANT CHANGE UP (ref 0–6)
EPI CELLS # UR: ABNORMAL
ERYTHROCYTE [SEDIMENTATION RATE] IN BLOOD: 38 MM/HR — HIGH (ref 0–20)
GLUCOSE SERPL-MCNC: 122 MG/DL — HIGH (ref 70–99)
GLUCOSE UR QL: 250 MG/DL
HCT VFR BLD CALC: 34.8 % — SIGNIFICANT CHANGE UP (ref 34.5–45)
HGB BLD-MCNC: 11.8 G/DL — SIGNIFICANT CHANGE UP (ref 11.5–15.5)
HYALINE CASTS # UR AUTO: ABNORMAL /LPF
IMM GRANULOCYTES NFR BLD AUTO: 0.4 % — SIGNIFICANT CHANGE UP (ref 0–1.5)
INR BLD: 1.08 RATIO — SIGNIFICANT CHANGE UP (ref 0.88–1.16)
KETONES UR-MCNC: ABNORMAL
LEUKOCYTE ESTERASE UR-ACNC: ABNORMAL
LYMPHOCYTES # BLD AUTO: 2.76 K/UL — SIGNIFICANT CHANGE UP (ref 1–3.3)
LYMPHOCYTES # BLD AUTO: 30 % — SIGNIFICANT CHANGE UP (ref 13–44)
MCHC RBC-ENTMCNC: 30.5 PG — SIGNIFICANT CHANGE UP (ref 27–34)
MCHC RBC-ENTMCNC: 33.9 GM/DL — SIGNIFICANT CHANGE UP (ref 32–36)
MCV RBC AUTO: 89.9 FL — SIGNIFICANT CHANGE UP (ref 80–100)
MONOCYTES # BLD AUTO: 0.72 K/UL — SIGNIFICANT CHANGE UP (ref 0–0.9)
MONOCYTES NFR BLD AUTO: 7.8 % — SIGNIFICANT CHANGE UP (ref 2–14)
NEUTROPHILS # BLD AUTO: 5.38 K/UL — SIGNIFICANT CHANGE UP (ref 1.8–7.4)
NEUTROPHILS NFR BLD AUTO: 58.5 % — SIGNIFICANT CHANGE UP (ref 43–77)
NITRITE UR-MCNC: POSITIVE
NRBC # BLD: 0 /100 WBCS — SIGNIFICANT CHANGE UP (ref 0–0)
PH UR: 5 — SIGNIFICANT CHANGE UP (ref 5–8)
PLATELET # BLD AUTO: 317 K/UL — SIGNIFICANT CHANGE UP (ref 150–400)
POTASSIUM SERPL-MCNC: 4.6 MMOL/L — SIGNIFICANT CHANGE UP (ref 3.5–5.3)
POTASSIUM SERPL-SCNC: 4.6 MMOL/L — SIGNIFICANT CHANGE UP (ref 3.5–5.3)
PROT UR-MCNC: 30 MG/DL
PROTHROM AB SERPL-ACNC: 11.7 SEC — SIGNIFICANT CHANGE UP (ref 9.8–12.7)
RBC # BLD: 3.87 M/UL — SIGNIFICANT CHANGE UP (ref 3.8–5.2)
RBC # FLD: 12.6 % — SIGNIFICANT CHANGE UP (ref 10.3–14.5)
RBC CASTS # UR COMP ASSIST: SIGNIFICANT CHANGE UP /HPF (ref 0–4)
SODIUM SERPL-SCNC: 139 MMOL/L — SIGNIFICANT CHANGE UP (ref 135–145)
SP GR SPEC: 1.01 — SIGNIFICANT CHANGE UP (ref 1.01–1.02)
TYPE + AB SCN PNL BLD: SIGNIFICANT CHANGE UP
UROBILINOGEN FLD QL: 4 MG/DL
WBC # BLD: 9.21 K/UL — SIGNIFICANT CHANGE UP (ref 3.8–10.5)
WBC # FLD AUTO: 9.21 K/UL — SIGNIFICANT CHANGE UP (ref 3.8–10.5)
WBC UR QL: ABNORMAL

## 2018-09-06 PROCEDURE — 93010 ELECTROCARDIOGRAM REPORT: CPT

## 2018-09-06 RX ORDER — ATORVASTATIN CALCIUM 80 MG/1
1 TABLET, FILM COATED ORAL
Qty: 0 | Refills: 0 | COMMUNITY

## 2018-09-06 RX ORDER — ENOXAPARIN SODIUM 100 MG/ML
25 INJECTION SUBCUTANEOUS
Qty: 0 | Refills: 0 | COMMUNITY

## 2018-09-06 RX ORDER — GABAPENTIN 400 MG/1
1 CAPSULE ORAL
Qty: 0 | Refills: 0 | COMMUNITY

## 2018-09-06 RX ORDER — METOPROLOL TARTRATE 50 MG
2 TABLET ORAL
Qty: 0 | Refills: 0 | COMMUNITY

## 2018-09-06 RX ORDER — INSULIN LISPRO 100/ML
1 VIAL (ML) SUBCUTANEOUS
Qty: 0 | Refills: 0 | COMMUNITY

## 2018-09-06 RX ORDER — AMLODIPINE BESYLATE 2.5 MG/1
1 TABLET ORAL
Qty: 0 | Refills: 0 | COMMUNITY

## 2018-09-06 RX ORDER — PANTOPRAZOLE SODIUM 20 MG/1
1 TABLET, DELAYED RELEASE ORAL
Qty: 0 | Refills: 0 | COMMUNITY

## 2018-09-06 NOTE — H&P PST ADULT - ASSESSMENT
This is a 56 y/o female with lower back pain who is now scheduled for a L4 -L5 laminectomy.    Patient instructed on     1. NPO post midnight of surgery  2. On the use of EZ sponges  3. Mupirocin use  4. Aware that she needs medical clearance (has an appointment with Dr. Carole Billy on 9/11/18)  5. May take  with a sip of water This is a 58 y/o female with lower back pain who is now scheduled for a L4 -L5 laminectomy.    Patient instructed on     1. NPO post midnight of surgery  2. On the use of EZ sponges  3. Mupirocin use  4. Aware that she needs medical clearance (has an appointment with Dr. Carole Billy on 9/11/18)  5. May take  with a sip of water Lisinopril, Imdur, Ranexa, Synthroid, Lopressor, Symbicort, Chantix, Gabapentin, Protonix, Abilify, Atorvastatin, and Ellipta on morning of procedure with a sip of water This is a 58 y/o female with lower back pain who is now scheduled for a L4 -L5 laminectomy.    Patient instructed on     1. NPO post midnight of surgery  2. On the use of EZ sponges  3. Mupirocin use  4. Aware that she needs medical clearance (has an appointment with Dr. Carole Billy on 18)  5. May take  with a sip of water Lisinopril, Imdur, Ranexa, Synthroid, Lopressor, Symbicort, Chantix, Gabapentin, Protonix, Abilify, Atorvastatin, and Ellipta on morning of procedure with a sip of water    CAPRINI SCORE    AGE RELATED RISK FACTORS                                                       MOBILITY RELATED FACTORS  X Age 41-60 years                                            (1 Point)                  [ ] Bed rest                                                        (1 Point)  [ ] Age: 61-74 years                                           (2 Points)                [ ] Plaster cast                                                   (2 Points)  [ ] Age= 75 years                                              (3 Points)                 [ ] Bed bound for more than 72 hours                   (2 Points)    DISEASE RELATED RISK FACTORS                                               GENDER SPECIFIC FACTORS  [ ] Edema in the lower extremities                       (1 Point)                  [ ] Pregnancy                                                     (1 Point)  [ ] Varicose veins                                               (1 Point)                  [ ] Post-partum < 6 weeks                                   (1 Point)             X BMI > 25 Kg/m2                                            (1 Point)                  [ ] Hormonal therapy  or oral contraception            (1 Point)                 [ ] Sepsis (in the previous month)                        (1 Point)                  [ ] History of pregnancy complications  [ ] Pneumonia or serious lung disease                                               [ ] Unexplained or recurrent                       (1 Point)           (in the previous month)                               (1 Point)  [ ] Abnormal pulmonary function test                     (1 Point)                 SURGERY RELATED RISK FACTORS  [ ] Acute myocardial infarction                              (1 Point)                 [ ]  Section                                            (1 Point)  [ ] Congestive heart failure (in the previous month)  (1 Point)                 [ ] Minor surgery                                                 (1 Point)   [ ] Inflammatory bowel disease                             (1 Point)                 [ ] Arthroscopic surgery                                        (2 Points)  [ ] Central venous access                                    (2 Points)                 X General surgery lasting more than 45 minutes   (2 Points)       [ ] Stroke (in the previous month)                          (5 Points)               [ ] Elective arthroplasty                                        (5 Points)                                                                                                                                               HEMATOLOGY RELATED FACTORS                                                 TRAUMA RELATED RISK FACTORS  [ ] Prior episodes of VTE                                     (3 Points)                 [ ] Fracture of the hip, pelvis, or leg                       (5 Points)  [ ] Positive family history for VTE                         (3 Points)                 [ ] Acute spinal cord injury (in the previous month)  (5 Points)  [ ] Prothrombin 69654 A                                      (3 Points)                 [ ] Paralysis  (less than 1 month)                          (5 Points)  [ ] Factor V Leiden                                             (3 Points)                 [ ] Multiple Trauma within 1 month                         (5 Points)  [ ] Lupus anticoagulants                                     (3 Points)                                                           [ ] Anticardiolipin antibodies                                (3 Points)                                                       [ ] High homocysteine in the blood                      (3 Points)                                             [ ] Other congenital or acquired thrombophilia       (3 Points)                                                [ ] Heparin induced thrombocytopenia                  (3 Points)                                          Total Score 4

## 2018-09-06 NOTE — H&P PST ADULT - PSH
H/O angioplasty  Right iliac artery. 5/12/2017  H/O arterial bypass of lower limb  Femoral - Popliteal. 11/2014 Left lower side with stents  H/O rhinoplasty  1980  H/O ventral hernia repair  3/2017  History of incision and drainage  of lower extremity incisional site x 5 . last done5/2015  History of laparoscopic cholecystectomy  2/2016  S/P dilatation and curettage  1991

## 2018-09-06 NOTE — H&P PST ADULT - MUSCULOSKELETAL
No joint pain, swelling or deformity; no limitation of movement negative details… detailed exam Left UE with decreased ROM due to deformity from birth

## 2018-09-06 NOTE — H&P PST ADULT - HISTORY OF PRESENT ILLNESS
This is a 58 y/o female with a PMH of bipolar, anxiety, depression COPD (not on O2), PAD s/p fem-pop bypass 2014 complicated by wound infections (MRSA), sepsis and a ruptured femoral artery, HTN, HLD, angina, DM on insulin and GERD. Pt reports lower back pain that radiates to her pelvis and B/L LE. Denies any injury or trauma to her back.  Pain is described as a sharp, stabbing pain. She has been taking Aleve for pain with moderated relief, but reports she will be taking Tramadol as of later today after she picks it up at the pharmacy. She is now scheduled for a L4 -L5 laminectomy. This is a 58 y/o female with a PMH of bipolar, anxiety, depression COPD (not on O2), PAD, s/p fem-pop bypass 2014 complicated by wound infections (MRSA), sepsis and a ruptured femoral artery, HTN, HLD, angina, DM on insulin and GERD. Pt reports lower back pain that radiates to her pelvis and B/L LE. Denies any injury or trauma to her back.  Pain is described as a sharp, stabbing pain. She has been taking Aleve for pain with moderated relief, but reports she will be taking Tramadol as of later today after she picks it up at the pharmacy. She is now scheduled for a L4 -L5 laminectomy.

## 2018-09-06 NOTE — H&P PST ADULT - PMH
Angina pectoris    Anxiety and depression    Bipolar depression    CAD (coronary artery disease)    COPD (chronic obstructive pulmonary disease)    DM (diabetes mellitus), type 1    Essential hypertension    Hiatal hernia with GERD  hiatal hernia repaired  History of MRSA infection  left lower extremity incisional area 2015  Hyperlipidemia, unspecified hyperlipidemia type    Hypothyroidism    Lumbar herniated disc    Osteoarthritis of spine, unspecified spinal osteoarthritis complication status, unspecified spinal region    Overactive bladder    PAD (peripheral artery disease)    Shoulder disorder  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 Decreased ROM.  Transient cerebral ischemia, unspecified type    Urinary incontinence, unspecified type

## 2018-09-07 PROBLEM — K59.00 CONSTIPATION, UNSPECIFIED: Chronic | Status: INACTIVE | Noted: 2017-06-08 | Resolved: 2018-09-06

## 2018-09-07 PROBLEM — M19.90 UNSPECIFIED OSTEOARTHRITIS, UNSPECIFIED SITE: Chronic | Status: INACTIVE | Noted: 2017-06-08 | Resolved: 2018-09-06

## 2018-09-07 PROBLEM — K82.9 DISEASE OF GALLBLADDER, UNSPECIFIED: Chronic | Status: INACTIVE | Noted: 2017-06-08 | Resolved: 2018-09-06

## 2018-09-07 LAB
CULTURE RESULTS: SIGNIFICANT CHANGE UP
MRSA PCR RESULT.: SIGNIFICANT CHANGE UP
S AUREUS DNA NOSE QL NAA+PROBE: DETECTED
SPECIMEN SOURCE: SIGNIFICANT CHANGE UP

## 2018-09-12 RX ORDER — ONDANSETRON 8 MG/1
4 TABLET, FILM COATED ORAL EVERY 6 HOURS
Qty: 0 | Refills: 0 | Status: DISCONTINUED | OUTPATIENT
Start: 2018-11-08 | End: 2018-11-09

## 2018-09-12 RX ORDER — NALOXONE HYDROCHLORIDE 4 MG/.1ML
0.1 SPRAY NASAL
Qty: 0 | Refills: 0 | Status: DISCONTINUED | OUTPATIENT
Start: 2018-11-08 | End: 2018-11-09

## 2018-09-12 RX ORDER — HYDROMORPHONE HYDROCHLORIDE 2 MG/ML
30 INJECTION INTRAMUSCULAR; INTRAVENOUS; SUBCUTANEOUS
Qty: 0 | Refills: 0 | Status: DISCONTINUED | OUTPATIENT
Start: 2018-11-08 | End: 2018-11-09

## 2018-09-12 RX ORDER — HYDROMORPHONE HYDROCHLORIDE 2 MG/ML
0.5 INJECTION INTRAMUSCULAR; INTRAVENOUS; SUBCUTANEOUS
Qty: 0 | Refills: 0 | Status: DISCONTINUED | OUTPATIENT
Start: 2018-11-08 | End: 2018-11-09

## 2018-09-13 PROBLEM — F31.30 BIPOLAR DISORDER, CURRENT EPISODE DEPRESSED, MILD OR MODERATE SEVERITY, UNSPECIFIED: Chronic | Status: ACTIVE | Noted: 2018-09-06

## 2018-10-19 ENCOUNTER — APPOINTMENT (OUTPATIENT)
Dept: MAMMOGRAPHY | Facility: CLINIC | Age: 58
End: 2018-10-19
Payer: COMMERCIAL

## 2018-10-19 ENCOUNTER — OUTPATIENT (OUTPATIENT)
Dept: OUTPATIENT SERVICES | Facility: HOSPITAL | Age: 58
LOS: 1 days | End: 2018-10-19
Payer: MEDICAID

## 2018-10-19 DIAGNOSIS — Z41.1 ENCOUNTER FOR COSMETIC SURGERY: Chronic | ICD-10-CM

## 2018-10-19 DIAGNOSIS — Z90.49 ACQUIRED ABSENCE OF OTHER SPECIFIED PARTS OF DIGESTIVE TRACT: Chronic | ICD-10-CM

## 2018-10-19 DIAGNOSIS — Z98.890 OTHER SPECIFIED POSTPROCEDURAL STATES: Chronic | ICD-10-CM

## 2018-10-19 DIAGNOSIS — Z95.828 PRESENCE OF OTHER VASCULAR IMPLANTS AND GRAFTS: Chronic | ICD-10-CM

## 2018-10-19 DIAGNOSIS — Z98.62 PERIPHERAL VASCULAR ANGIOPLASTY STATUS: Chronic | ICD-10-CM

## 2018-10-19 DIAGNOSIS — Z00.8 ENCOUNTER FOR OTHER GENERAL EXAMINATION: ICD-10-CM

## 2018-10-19 PROCEDURE — 77067 SCR MAMMO BI INCL CAD: CPT | Mod: 26

## 2018-10-19 PROCEDURE — 77063 BREAST TOMOSYNTHESIS BI: CPT

## 2018-10-19 PROCEDURE — 77063 BREAST TOMOSYNTHESIS BI: CPT | Mod: 26

## 2018-10-19 PROCEDURE — 77067 SCR MAMMO BI INCL CAD: CPT

## 2018-11-02 ENCOUNTER — OUTPATIENT (OUTPATIENT)
Dept: OUTPATIENT SERVICES | Facility: HOSPITAL | Age: 58
LOS: 1 days | Discharge: HOME | End: 2018-11-02

## 2018-11-02 VITALS
WEIGHT: 147.93 LBS | OXYGEN SATURATION: 98 % | TEMPERATURE: 98 F | HEART RATE: 69 BPM | HEIGHT: 63 IN | RESPIRATION RATE: 20 BRPM | DIASTOLIC BLOOD PRESSURE: 53 MMHG | SYSTOLIC BLOOD PRESSURE: 97 MMHG

## 2018-11-02 DIAGNOSIS — Z98.890 OTHER SPECIFIED POSTPROCEDURAL STATES: Chronic | ICD-10-CM

## 2018-11-02 DIAGNOSIS — Z90.49 ACQUIRED ABSENCE OF OTHER SPECIFIED PARTS OF DIGESTIVE TRACT: Chronic | ICD-10-CM

## 2018-11-02 DIAGNOSIS — Z95.828 PRESENCE OF OTHER VASCULAR IMPLANTS AND GRAFTS: Chronic | ICD-10-CM

## 2018-11-02 DIAGNOSIS — M51.36 OTHER INTERVERTEBRAL DISC DEGENERATION, LUMBAR REGION: ICD-10-CM

## 2018-11-02 DIAGNOSIS — Z98.62 PERIPHERAL VASCULAR ANGIOPLASTY STATUS: Chronic | ICD-10-CM

## 2018-11-02 DIAGNOSIS — M51.37 OTHER INTERVERTEBRAL DISC DEGENERATION, LUMBOSACRAL REGION: ICD-10-CM

## 2018-11-02 DIAGNOSIS — Z41.1 ENCOUNTER FOR COSMETIC SURGERY: Chronic | ICD-10-CM

## 2018-11-02 DIAGNOSIS — M43.16 SPONDYLOLISTHESIS, LUMBAR REGION: ICD-10-CM

## 2018-11-02 LAB
ANION GAP SERPL CALC-SCNC: 9 MMOL/L — SIGNIFICANT CHANGE UP (ref 5–17)
APPEARANCE UR: CLEAR — SIGNIFICANT CHANGE UP
APTT BLD: 31.2 SEC — SIGNIFICANT CHANGE UP (ref 27.5–36.3)
BACTERIA # UR AUTO: ABNORMAL
BASOPHILS # BLD AUTO: 0.07 K/UL — SIGNIFICANT CHANGE UP (ref 0–0.2)
BASOPHILS NFR BLD AUTO: 0.6 % — SIGNIFICANT CHANGE UP (ref 0–2)
BILIRUB UR-MCNC: ABNORMAL
BLD GP AB SCN SERPL QL: SIGNIFICANT CHANGE UP
BUN SERPL-MCNC: 16 MG/DL — SIGNIFICANT CHANGE UP (ref 7–23)
CALCIUM SERPL-MCNC: 9.2 MG/DL — SIGNIFICANT CHANGE UP (ref 8.5–10.1)
CHLORIDE SERPL-SCNC: 107 MMOL/L — SIGNIFICANT CHANGE UP (ref 96–108)
CO2 SERPL-SCNC: 24 MMOL/L — SIGNIFICANT CHANGE UP (ref 22–31)
COLOR SPEC: YELLOW — SIGNIFICANT CHANGE UP
CREAT SERPL-MCNC: 1.48 MG/DL — HIGH (ref 0.5–1.3)
CRP SERPL-MCNC: 0.1 MG/DL — SIGNIFICANT CHANGE UP (ref 0–0.4)
DIFF PNL FLD: NEGATIVE — SIGNIFICANT CHANGE UP
EOSINOPHIL # BLD AUTO: 0.29 K/UL — SIGNIFICANT CHANGE UP (ref 0–0.5)
EOSINOPHIL NFR BLD AUTO: 2.5 % — SIGNIFICANT CHANGE UP (ref 0–6)
EPI CELLS # UR: SIGNIFICANT CHANGE UP
ERYTHROCYTE [SEDIMENTATION RATE] IN BLOOD: 32 MM/HR — HIGH (ref 0–20)
GLUCOSE SERPL-MCNC: 98 MG/DL — SIGNIFICANT CHANGE UP (ref 70–99)
GLUCOSE UR QL: NEGATIVE MG/DL — SIGNIFICANT CHANGE UP
HCT VFR BLD CALC: 36.6 % — SIGNIFICANT CHANGE UP (ref 34.5–45)
HGB BLD-MCNC: 12.4 G/DL — SIGNIFICANT CHANGE UP (ref 11.5–15.5)
HYALINE CASTS # UR AUTO: ABNORMAL /LPF
IMM GRANULOCYTES NFR BLD AUTO: 0.4 % — SIGNIFICANT CHANGE UP (ref 0–1.5)
INR BLD: 1.09 RATIO — SIGNIFICANT CHANGE UP (ref 0.88–1.16)
KETONES UR-MCNC: ABNORMAL
LEUKOCYTE ESTERASE UR-ACNC: ABNORMAL
LYMPHOCYTES # BLD AUTO: 29.1 % — SIGNIFICANT CHANGE UP (ref 13–44)
LYMPHOCYTES # BLD AUTO: 3.31 K/UL — HIGH (ref 1–3.3)
MCHC RBC-ENTMCNC: 30.8 PG — SIGNIFICANT CHANGE UP (ref 27–34)
MCHC RBC-ENTMCNC: 33.9 GM/DL — SIGNIFICANT CHANGE UP (ref 32–36)
MCV RBC AUTO: 91 FL — SIGNIFICANT CHANGE UP (ref 80–100)
MONOCYTES # BLD AUTO: 0.88 K/UL — SIGNIFICANT CHANGE UP (ref 0–0.9)
MONOCYTES NFR BLD AUTO: 7.7 % — SIGNIFICANT CHANGE UP (ref 2–14)
MRSA PCR RESULT.: SIGNIFICANT CHANGE UP
NEUTROPHILS # BLD AUTO: 6.8 K/UL — SIGNIFICANT CHANGE UP (ref 1.8–7.4)
NEUTROPHILS NFR BLD AUTO: 59.7 % — SIGNIFICANT CHANGE UP (ref 43–77)
NITRITE UR-MCNC: NEGATIVE — SIGNIFICANT CHANGE UP
NRBC # BLD: 0 /100 WBCS — SIGNIFICANT CHANGE UP (ref 0–0)
PH UR: 6 — SIGNIFICANT CHANGE UP (ref 5–8)
PLATELET # BLD AUTO: 338 K/UL — SIGNIFICANT CHANGE UP (ref 150–400)
POTASSIUM SERPL-MCNC: 4.4 MMOL/L — SIGNIFICANT CHANGE UP (ref 3.5–5.3)
POTASSIUM SERPL-SCNC: 4.4 MMOL/L — SIGNIFICANT CHANGE UP (ref 3.5–5.3)
PROT UR-MCNC: 30 MG/DL
PROTHROM AB SERPL-ACNC: 12.1 SEC — SIGNIFICANT CHANGE UP (ref 10–12.9)
RBC # BLD: 4.02 M/UL — SIGNIFICANT CHANGE UP (ref 3.8–5.2)
RBC # FLD: 13.2 % — SIGNIFICANT CHANGE UP (ref 10.3–14.5)
RBC CASTS # UR COMP ASSIST: SIGNIFICANT CHANGE UP /HPF (ref 0–4)
S AUREUS DNA NOSE QL NAA+PROBE: DETECTED
SODIUM SERPL-SCNC: 140 MMOL/L — SIGNIFICANT CHANGE UP (ref 135–145)
SP GR SPEC: 1.01 — SIGNIFICANT CHANGE UP (ref 1.01–1.02)
TYPE + AB SCN PNL BLD: SIGNIFICANT CHANGE UP
UROBILINOGEN FLD QL: 4 MG/DL
WBC # BLD: 11.39 K/UL — HIGH (ref 3.8–10.5)
WBC # FLD AUTO: 11.39 K/UL — HIGH (ref 3.8–10.5)
WBC UR QL: SIGNIFICANT CHANGE UP

## 2018-11-02 RX ORDER — INSULIN LISPRO 100/ML
1 VIAL (ML) SUBCUTANEOUS
Qty: 0 | Refills: 0 | COMMUNITY

## 2018-11-02 RX ORDER — ARIPIPRAZOLE 15 MG/1
1 TABLET ORAL
Qty: 0 | Refills: 0 | COMMUNITY

## 2018-11-02 RX ORDER — PANTOPRAZOLE SODIUM 20 MG/1
1 TABLET, DELAYED RELEASE ORAL
Qty: 0 | Refills: 0 | COMMUNITY

## 2018-11-02 RX ORDER — METOPROLOL TARTRATE 50 MG
1 TABLET ORAL
Qty: 0 | Refills: 0 | COMMUNITY

## 2018-11-02 RX ORDER — ENOXAPARIN SODIUM 100 MG/ML
40 INJECTION SUBCUTANEOUS
Qty: 0 | Refills: 0 | COMMUNITY

## 2018-11-02 RX ORDER — BUDESONIDE AND FORMOTEROL FUMARATE DIHYDRATE 160; 4.5 UG/1; UG/1
2 AEROSOL RESPIRATORY (INHALATION)
Qty: 0 | Refills: 0 | COMMUNITY

## 2018-11-02 NOTE — H&P PST ADULT - HISTORY OF PRESENT ILLNESS
This is a 58 y/o female with a PMH of bipolar, anxiety, depression COPD (not on O2), PAD, s/p fem-pop bypass 2014 complicated by wound infections (MRSA), sepsis and a ruptured femoral artery, HTN, HLD, angina, DM on insulin and GERD. Pt reports lower back pain that radiates to her pelvis and B/L LE. Denies any injury or trauma to her back.  Pain is described as a sharp, stabbing pain. She has been taking Aleve for pain with moderated relief, but reports she will be taking Tramadol as of later today after she picks it up at the pharmacy. She is now scheduled for a L4 -L5 laminectomy and fusion. This is a 56 y/o female with a PMH of bipolar, anxiety, depression COPD (not on O2), PAD, s/p fem-pop bypass 2014 complicated by wound infections (MRSA), sepsis and a ruptured femoral artery, HTN, HLD, angina, DM on insulin and GERD. Pt reports lower back pain that radiates to her pelvis and B/L LE. Denies any injury or trauma to her back.  Pain is described as a sharp, stabbing pain. She has been taking Aleve and Tramadol for pain with moderate pain relief. She is now scheduled for a L4 -L5 laminectomy and fusion. Pt reports cancellation of this surgery 2 months ago due to her need of cardiac optimization.

## 2018-11-02 NOTE — H&P PST ADULT - PMH
Angina pectoris    Anxiety and depression    Bipolar depression    CAD (coronary artery disease)    Carotid artery stenosis  " 45 percent"  COPD (chronic obstructive pulmonary disease)    Cystic breast  b/l  DDD (degenerative disc disease), lumbar    DM (diabetes mellitus), type 1    Essential hypertension    GERD (gastroesophageal reflux disease)    Hiatal hernia with GERD  hiatal hernia repaired  History of MRSA infection  left lower extremity incisional area 2015  Hyperlipidemia, unspecified hyperlipidemia type    Hypothyroidism    Lumbar herniated disc    Osteoarthritis of spine, unspecified spinal osteoarthritis complication status, unspecified spinal region    Overactive bladder    PAD (peripheral artery disease)    Shoulder disorder  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 Decreased ROM.  Spinal stenosis of lumbar region    Transient cerebral ischemia, unspecified type    Urinary incontinence, unspecified type

## 2018-11-02 NOTE — H&P PST ADULT - ASSESSMENT
This is a 58 y/o female with lower back pain who is now scheduled for a L4 -L5 laminectomy.    Patient instructed on     1. NPO post midnight of surgery  2. On the use of EZ sponges  3. Mupirocin use  4. Aware that she needs medical clearance (has an appointment with Dr. Carole Billy on 18)  5. May take  with a sip of water Lisinopril, Imdur, Ranexa, Synthroid, Lopressor, Symbicort, Chantix, Gabapentin, Protonix, Abilify, Atorvastatin, and Ellipta on morning of procedure with a sip of water    CAPRINI SCORE    AGE RELATED RISK FACTORS                                                       MOBILITY RELATED FACTORS  X Age 41-60 years                                            (1 Point)                  [ ] Bed rest                                                        (1 Point)  [ ] Age: 61-74 years                                           (2 Points)                [ ] Plaster cast                                                   (2 Points)  [ ] Age= 75 years                                              (3 Points)                 [ ] Bed bound for more than 72 hours                   (2 Points)    DISEASE RELATED RISK FACTORS                                               GENDER SPECIFIC FACTORS  [ ] Edema in the lower extremities                       (1 Point)                  [ ] Pregnancy                                                     (1 Point)  [ ] Varicose veins                                               (1 Point)                  [ ] Post-partum < 6 weeks                                   (1 Point)             X BMI > 25 Kg/m2                                            (1 Point)                  [ ] Hormonal therapy  or oral contraception            (1 Point)                 [ ] Sepsis (in the previous month)                        (1 Point)                  [ ] History of pregnancy complications  [ ] Pneumonia or serious lung disease                                               [ ] Unexplained or recurrent                       (1 Point)           (in the previous month)                               (1 Point)  [ ] Abnormal pulmonary function test                     (1 Point)                 SURGERY RELATED RISK FACTORS  [ ] Acute myocardial infarction                              (1 Point)                 [ ]  Section                                            (1 Point)  [ ] Congestive heart failure (in the previous month)  (1 Point)                 [ ] Minor surgery                                                 (1 Point)   [ ] Inflammatory bowel disease                             (1 Point)                 [ ] Arthroscopic surgery                                        (2 Points)  [ ] Central venous access                                    (2 Points)                 X General surgery lasting more than 45 minutes   (2 Points)       [ ] Stroke (in the previous month)                          (5 Points)               [ ] Elective arthroplasty                                        (5 Points)                                                                                                                                               HEMATOLOGY RELATED FACTORS                                                 TRAUMA RELATED RISK FACTORS  [ ] Prior episodes of VTE                                     (3 Points)                 [ ] Fracture of the hip, pelvis, or leg                       (5 Points)  [ ] Positive family history for VTE                         (3 Points)                 [ ] Acute spinal cord injury (in the previous month)  (5 Points)  [ ] Prothrombin 26622 A                                      (3 Points)                 [ ] Paralysis  (less than 1 month)                          (5 Points)  [ ] Factor V Leiden                                             (3 Points)                 [ ] Multiple Trauma within 1 month                         (5 Points)  [ ] Lupus anticoagulants                                     (3 Points)                                                           [ ] Anticardiolipin antibodies                                (3 Points)                                                       [ ] High homocysteine in the blood                      (3 Points)                                             [ ] Other congenital or acquired thrombophilia       (3 Points)                                                [ ] Heparin induced thrombocytopenia                  (3 Points)                                          Total Score 4 This is a 56 y/o female with lower back pain who is now scheduled for a L4 -L5 laminectomy and fusion.    Plan  1. Stop all NSAIDS, herbal supplements and vitamins for 7 days.  2. NPO at midnight.  3. Take the following medications (isosorbide, levothyroxine, lisinopril, abilify, metoprolol, pantoprazole ) with small sips of water on the morning of your procedure/surgery. And use inhalers morning of surgery.  4. Use EZ sponges as directed  5. Use mupirocin as directed  6. Pt seen cardiologist already. Pt to see PMD prior to surgery.    CAPRINI SCORE    AGE RELATED RISK FACTORS                                                       MOBILITY RELATED FACTORS  X Age 41-60 years                                            (1 Point)                  [ ] Bed rest                                                        (1 Point)  [ ] Age: 61-74 years                                           (2 Points)                [ ] Plaster cast                                                   (2 Points)  [ ] Age= 75 years                                              (3 Points)                 [ ] Bed bound for more than 72 hours                   (2 Points)    DISEASE RELATED RISK FACTORS                                               GENDER SPECIFIC FACTORS  [ ] Edema in the lower extremities                       (1 Point)                  [ ] Pregnancy                                                     (1 Point)  [ ] Varicose veins                                               (1 Point)                  [ ] Post-partum < 6 weeks                                   (1 Point)             X BMI > 25 Kg/m2                                            (1 Point)                  [ ] Hormonal therapy  or oral contraception            (1 Point)                 [ ] Sepsis (in the previous month)                        (1 Point)                  [ ] History of pregnancy complications  [ ] Pneumonia or serious lung disease                                               [ ] Unexplained or recurrent                       (1 Point)           (in the previous month)                               (1 Point)  [ ] Abnormal pulmonary function test                     (1 Point)                 SURGERY RELATED RISK FACTORS  [ ] Acute myocardial infarction                              (1 Point)                 [ ]  Section                                            (1 Point)  [ ] Congestive heart failure (in the previous month)  (1 Point)                 [ ] Minor surgery                                                 (1 Point)   [ ] Inflammatory bowel disease                             (1 Point)                 [ ] Arthroscopic surgery                                        (2 Points)  [ ] Central venous access                                    (2 Points)                 X General surgery lasting more than 45 minutes   (2 Points)       [ ] Stroke (in the previous month)                          (5 Points)               [ ] Elective arthroplasty                                        (5 Points)                                                                                                                                               HEMATOLOGY RELATED FACTORS                                                 TRAUMA RELATED RISK FACTORS  [ ] Prior episodes of VTE                                     (3 Points)                 [ ] Fracture of the hip, pelvis, or leg                       (5 Points)  [ ] Positive family history for VTE                         (3 Points)                 [ ] Acute spinal cord injury (in the previous month)  (5 Points)  [ ] Prothrombin  A                                      (3 Points)                 [ ] Paralysis  (less than 1 month)                          (5 Points)  [ ] Factor V Leiden                                             (3 Points)                 [ ] Multiple Trauma within 1 month                         (5 Points)  [ ] Lupus anticoagulants                                     (3 Points)                                                           [ ] Anticardiolipin antibodies                                (3 Points)                                                       [ ] High homocysteine in the blood                      (3 Points)                                             [ ] Other congenital or acquired thrombophilia       (3 Points)                                                [ ] Heparin induced thrombocytopenia                  (3 Points)                                          Total Score 4

## 2018-11-02 NOTE — H&P PST ADULT - PSH
H/O angioplasty  Right iliac artery. 5/12/2017  H/O arterial bypass of lower limb  Femoral - Popliteal. 11/2014 Left lower side with stents  H/O hernia repair  hiatal hernia - 2017, 2018  H/O rhinoplasty  1980  H/O ventral hernia repair  3/2017  History of incision and drainage  of lower extremity incisional site x 5 . last done5/2015  History of laparoscopic cholecystectomy  2/2016  S/P dilatation and curettage  1991

## 2018-11-03 LAB
CULTURE RESULTS: SIGNIFICANT CHANGE UP
SPECIMEN SOURCE: SIGNIFICANT CHANGE UP

## 2018-11-04 RX ORDER — MUPIROCIN 20 MG/G
1 OINTMENT TOPICAL
Qty: 0 | Refills: 0 | COMMUNITY
Start: 2018-11-04 | End: 2018-11-08

## 2018-11-07 RX ORDER — SODIUM CHLORIDE 9 MG/ML
1000 INJECTION INTRAMUSCULAR; INTRAVENOUS; SUBCUTANEOUS
Qty: 0 | Refills: 0 | Status: DISCONTINUED | OUTPATIENT
Start: 2018-11-08 | End: 2018-11-11

## 2018-11-08 ENCOUNTER — INPATIENT (INPATIENT)
Facility: HOSPITAL | Age: 58
LOS: 5 days | Discharge: HOME | End: 2018-11-14
Attending: ORTHOPAEDIC SURGERY | Admitting: ORTHOPAEDIC SURGERY
Payer: MEDICAID

## 2018-11-08 VITALS
DIASTOLIC BLOOD PRESSURE: 52 MMHG | OXYGEN SATURATION: 98 % | TEMPERATURE: 98 F | HEIGHT: 63 IN | SYSTOLIC BLOOD PRESSURE: 93 MMHG | HEART RATE: 81 BPM | WEIGHT: 148.37 LBS | RESPIRATION RATE: 15 BRPM

## 2018-11-08 DIAGNOSIS — Z98.890 OTHER SPECIFIED POSTPROCEDURAL STATES: Chronic | ICD-10-CM

## 2018-11-08 DIAGNOSIS — Z98.62 PERIPHERAL VASCULAR ANGIOPLASTY STATUS: Chronic | ICD-10-CM

## 2018-11-08 DIAGNOSIS — Z90.49 ACQUIRED ABSENCE OF OTHER SPECIFIED PARTS OF DIGESTIVE TRACT: Chronic | ICD-10-CM

## 2018-11-08 DIAGNOSIS — Z95.828 PRESENCE OF OTHER VASCULAR IMPLANTS AND GRAFTS: Chronic | ICD-10-CM

## 2018-11-08 DIAGNOSIS — Z41.1 ENCOUNTER FOR COSMETIC SURGERY: Chronic | ICD-10-CM

## 2018-11-08 LAB
GLUCOSE BLDC GLUCOMTR-MCNC: 122 MG/DL — HIGH (ref 70–99)
GLUCOSE BLDC GLUCOMTR-MCNC: 162 MG/DL — HIGH (ref 70–99)
GLUCOSE BLDC GLUCOMTR-MCNC: 173 MG/DL — HIGH (ref 70–99)
GLUCOSE BLDC GLUCOMTR-MCNC: 198 MG/DL — HIGH (ref 70–99)
GLUCOSE BLDC GLUCOMTR-MCNC: 236 MG/DL — HIGH (ref 70–99)

## 2018-11-08 RX ORDER — HYDROMORPHONE HYDROCHLORIDE 2 MG/ML
30 INJECTION INTRAMUSCULAR; INTRAVENOUS; SUBCUTANEOUS
Qty: 0 | Refills: 0 | Status: DISCONTINUED | OUTPATIENT
Start: 2018-11-08 | End: 2018-11-10

## 2018-11-08 RX ORDER — DEXTROSE 50 % IN WATER 50 %
25 SYRINGE (ML) INTRAVENOUS ONCE
Qty: 0 | Refills: 0 | Status: DISCONTINUED | OUTPATIENT
Start: 2018-11-08 | End: 2018-11-14

## 2018-11-08 RX ORDER — DOCUSATE SODIUM 100 MG
100 CAPSULE ORAL THREE TIMES A DAY
Qty: 0 | Refills: 0 | Status: DISCONTINUED | OUTPATIENT
Start: 2018-11-08 | End: 2018-11-14

## 2018-11-08 RX ORDER — RANOLAZINE 500 MG/1
1000 TABLET, FILM COATED, EXTENDED RELEASE ORAL
Qty: 0 | Refills: 0 | Status: DISCONTINUED | OUTPATIENT
Start: 2018-11-08 | End: 2018-11-14

## 2018-11-08 RX ORDER — DIPHENHYDRAMINE HCL 50 MG
25 CAPSULE ORAL EVERY 4 HOURS
Qty: 0 | Refills: 0 | Status: DISCONTINUED | OUTPATIENT
Start: 2018-11-08 | End: 2018-11-14

## 2018-11-08 RX ORDER — CEFAZOLIN SODIUM 1 G
2000 VIAL (EA) INJECTION EVERY 8 HOURS
Qty: 0 | Refills: 0 | Status: COMPLETED | OUTPATIENT
Start: 2018-11-08 | End: 2018-11-09

## 2018-11-08 RX ORDER — INSULIN LISPRO 100/ML
2 VIAL (ML) SUBCUTANEOUS ONCE
Qty: 0 | Refills: 0 | Status: COMPLETED | OUTPATIENT
Start: 2018-11-08 | End: 2018-11-08

## 2018-11-08 RX ORDER — MEPERIDINE HYDROCHLORIDE 50 MG/ML
12.5 INJECTION INTRAMUSCULAR; INTRAVENOUS; SUBCUTANEOUS
Qty: 0 | Refills: 0 | Status: DISCONTINUED | OUTPATIENT
Start: 2018-11-08 | End: 2018-11-08

## 2018-11-08 RX ORDER — SODIUM CHLORIDE 9 MG/ML
1000 INJECTION, SOLUTION INTRAVENOUS
Qty: 0 | Refills: 0 | Status: DISCONTINUED | OUTPATIENT
Start: 2018-11-08 | End: 2018-11-11

## 2018-11-08 RX ORDER — SODIUM CHLORIDE 9 MG/ML
3 INJECTION INTRAMUSCULAR; INTRAVENOUS; SUBCUTANEOUS EVERY 8 HOURS
Qty: 0 | Refills: 0 | Status: DISCONTINUED | OUTPATIENT
Start: 2018-11-08 | End: 2018-11-08

## 2018-11-08 RX ORDER — INSULIN GLARGINE 100 [IU]/ML
8 INJECTION, SOLUTION SUBCUTANEOUS AT BEDTIME
Qty: 0 | Refills: 0 | Status: DISCONTINUED | OUTPATIENT
Start: 2018-11-08 | End: 2018-11-12

## 2018-11-08 RX ORDER — GLUCAGON INJECTION, SOLUTION 0.5 MG/.1ML
1 INJECTION, SOLUTION SUBCUTANEOUS ONCE
Qty: 0 | Refills: 0 | Status: DISCONTINUED | OUTPATIENT
Start: 2018-11-08 | End: 2018-11-14

## 2018-11-08 RX ORDER — INSULIN LISPRO 100/ML
VIAL (ML) SUBCUTANEOUS AT BEDTIME
Qty: 0 | Refills: 0 | Status: DISCONTINUED | OUTPATIENT
Start: 2018-11-08 | End: 2018-11-14

## 2018-11-08 RX ORDER — NALOXONE HYDROCHLORIDE 4 MG/.1ML
0.1 SPRAY NASAL
Qty: 0 | Refills: 0 | Status: DISCONTINUED | OUTPATIENT
Start: 2018-11-08 | End: 2018-11-14

## 2018-11-08 RX ORDER — ONDANSETRON 8 MG/1
4 TABLET, FILM COATED ORAL EVERY 6 HOURS
Qty: 0 | Refills: 0 | Status: DISCONTINUED | OUTPATIENT
Start: 2018-11-08 | End: 2018-11-14

## 2018-11-08 RX ORDER — ACETAMINOPHEN 500 MG
975 TABLET ORAL ONCE
Qty: 0 | Refills: 0 | Status: COMPLETED | OUTPATIENT
Start: 2018-11-08 | End: 2018-11-08

## 2018-11-08 RX ORDER — SODIUM CHLORIDE 9 MG/ML
1000 INJECTION, SOLUTION INTRAVENOUS
Qty: 0 | Refills: 0 | Status: DISCONTINUED | OUTPATIENT
Start: 2018-11-08 | End: 2018-11-14

## 2018-11-08 RX ORDER — OXYCODONE HYDROCHLORIDE 5 MG/1
10 TABLET ORAL ONCE
Qty: 0 | Refills: 0 | Status: DISCONTINUED | OUTPATIENT
Start: 2018-11-08 | End: 2018-11-08

## 2018-11-08 RX ORDER — CEFAZOLIN SODIUM 1 G
2000 VIAL (EA) INJECTION ONCE
Qty: 0 | Refills: 0 | Status: COMPLETED | OUTPATIENT
Start: 2018-11-08 | End: 2018-11-08

## 2018-11-08 RX ORDER — DEXTROSE 50 % IN WATER 50 %
15 SYRINGE (ML) INTRAVENOUS ONCE
Qty: 0 | Refills: 0 | Status: DISCONTINUED | OUTPATIENT
Start: 2018-11-08 | End: 2018-11-14

## 2018-11-08 RX ORDER — SODIUM CHLORIDE 9 MG/ML
1000 INJECTION, SOLUTION INTRAVENOUS
Qty: 0 | Refills: 0 | Status: DISCONTINUED | OUTPATIENT
Start: 2018-11-08 | End: 2018-11-08

## 2018-11-08 RX ORDER — DEXTROSE 50 % IN WATER 50 %
12.5 SYRINGE (ML) INTRAVENOUS ONCE
Qty: 0 | Refills: 0 | Status: DISCONTINUED | OUTPATIENT
Start: 2018-11-08 | End: 2018-11-14

## 2018-11-08 RX ORDER — CEFAZOLIN SODIUM 1 G
VIAL (EA) INJECTION
Qty: 0 | Refills: 0 | Status: COMPLETED | OUTPATIENT
Start: 2018-11-08 | End: 2018-11-09

## 2018-11-08 RX ORDER — HYDROMORPHONE HYDROCHLORIDE 2 MG/ML
1 INJECTION INTRAMUSCULAR; INTRAVENOUS; SUBCUTANEOUS
Qty: 0 | Refills: 0 | Status: DISCONTINUED | OUTPATIENT
Start: 2018-11-08 | End: 2018-11-14

## 2018-11-08 RX ORDER — INSULIN LISPRO 100/ML
VIAL (ML) SUBCUTANEOUS
Qty: 0 | Refills: 0 | Status: DISCONTINUED | OUTPATIENT
Start: 2018-11-08 | End: 2018-11-14

## 2018-11-08 RX ORDER — INSULIN LISPRO 100/ML
4 VIAL (ML) SUBCUTANEOUS ONCE
Qty: 0 | Refills: 0 | Status: COMPLETED | OUTPATIENT
Start: 2018-11-08 | End: 2018-11-08

## 2018-11-08 RX ORDER — ALBUTEROL 90 UG/1
2.5 AEROSOL, METERED ORAL ONCE
Qty: 0 | Refills: 0 | Status: COMPLETED | OUTPATIENT
Start: 2018-11-08 | End: 2018-11-08

## 2018-11-08 RX ORDER — ONDANSETRON 8 MG/1
4 TABLET, FILM COATED ORAL ONCE
Qty: 0 | Refills: 0 | Status: DISCONTINUED | OUTPATIENT
Start: 2018-11-08 | End: 2018-11-08

## 2018-11-08 RX ORDER — ACETAMINOPHEN 500 MG
1000 TABLET ORAL ONCE
Qty: 0 | Refills: 0 | Status: DISCONTINUED | OUTPATIENT
Start: 2018-11-08 | End: 2018-11-08

## 2018-11-08 RX ORDER — GABAPENTIN 400 MG/1
600 CAPSULE ORAL THREE TIMES A DAY
Qty: 0 | Refills: 0 | Status: DISCONTINUED | OUTPATIENT
Start: 2018-11-08 | End: 2018-11-14

## 2018-11-08 RX ADMIN — ALBUTEROL 2.5 MILLIGRAM(S): 90 AEROSOL, METERED ORAL at 07:21

## 2018-11-08 RX ADMIN — SODIUM CHLORIDE 100 MILLILITER(S): 9 INJECTION, SOLUTION INTRAVENOUS at 22:50

## 2018-11-08 RX ADMIN — HYDROMORPHONE HYDROCHLORIDE 30 MILLILITER(S): 2 INJECTION INTRAMUSCULAR; INTRAVENOUS; SUBCUTANEOUS at 11:45

## 2018-11-08 RX ADMIN — OXYCODONE HYDROCHLORIDE 10 MILLIGRAM(S): 5 TABLET ORAL at 07:21

## 2018-11-08 RX ADMIN — Medication 2 UNIT(S): at 11:40

## 2018-11-08 RX ADMIN — Medication 100 MILLIGRAM(S): at 17:03

## 2018-11-08 RX ADMIN — Medication 975 MILLIGRAM(S): at 07:21

## 2018-11-08 RX ADMIN — GABAPENTIN 600 MILLIGRAM(S): 400 CAPSULE ORAL at 22:22

## 2018-11-08 RX ADMIN — INSULIN GLARGINE 8 UNIT(S): 100 INJECTION, SOLUTION SUBCUTANEOUS at 22:23

## 2018-11-08 RX ADMIN — OXYCODONE HYDROCHLORIDE 10 MILLIGRAM(S): 5 TABLET ORAL at 07:26

## 2018-11-08 RX ADMIN — Medication 4 UNIT(S): at 07:42

## 2018-11-08 RX ADMIN — Medication 100 MILLIGRAM(S): at 22:22

## 2018-11-08 RX ADMIN — Medication 975 MILLIGRAM(S): at 07:26

## 2018-11-08 NOTE — CONSULT NOTE ADULT - SUBJECTIVE AND OBJECTIVE BOX
Date of Service 11-08-18 @ 18:03    Patient is a 57y old  Female who presents with a chief complaint of " I had my surgery"    HPI: Pt is a 58 y/o female with h/o angina, anxiety, bipolar d/o, hypothyroidism, hyperlipidemia, type 2 diabetes on insulin, COPD, bipolar disorder, lumbar stenosis who has been having increasing LBP.  She was admitted for elective L4-L5 laminectomy with fusion.  Postop medicine consult called for comanagement.  Pt seen and examined in PACU, c/o mild bank pain, no CP or SOB.       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 Decreased ROM.  Angina pectoris  History of MRSA infection: left lower extremity incisional area 2015  Anxiety and depression  Transient cerebral ischemia, unspecified type  Osteoarthritis of spine, unspecified spinal osteoarthritis complication status, unspecified spinal region  Lumbar herniated disc  Urinary incontinence, unspecified type  Overactive bladder  Hiatal hernia with GERD: hiatal hernia repaired  PAD (peripheral artery disease)  Hyperlipidemia, unspecified hyperlipidemia type  Essential hypertension  Hypothyroidism  DM (diabetes mellitus), type 1  COPD (chronic obstructive pulmonary disease)  CAD (coronary artery disease)  H/O hernia repair: hiatal hernia - 2017, 2018  S/P dilatation and curettage: 1991  H/O rhinoplasty: 1980  H/O angioplasty: Right iliac artery. 5/12/2017  H/O ventral hernia repair: 3/2017  History of incision and drainage: of lower extremity incisional site x 5 . last done5/2015  H/O arterial bypass of lower limb: Femoral - Popliteal. 11/2014 Left lower side with stents  History of laparoscopic cholecystectomy: 2/2016      Allergies    No Known Allergies    Intolerances        MEDICATIONS  (STANDING):  acetaminophen  IVPB .. 1000 milliGRAM(s) IV Intermittent once  ceFAZolin   IVPB 2000 milliGRAM(s) IV Intermittent every 8 hours  ceFAZolin   IVPB      HYDROmorphone PCA (1 mG/mL) 30 milliLiter(s) PCA Continuous PCA Continuous  lactated ringers. 1000 milliLiter(s) (100 mL/Hr) IV Continuous <Continuous>    MEDICATIONS  (PRN):  diphenhydrAMINE   Injectable 25 milliGRAM(s) IV Push every 4 hours PRN Pruritus  HYDROmorphone PCA (1 mG/mL) Rescue Clinician Bolus 1 milliGRAM(s) IV Push every 15 minutes PRN for Pain Scale GREATER THAN 6  meperidine     Injectable 12.5 milliGRAM(s) IV Push every 10 minutes PRN Shivering  naloxone Injectable 0.1 milliGRAM(s) IV Push every 3 minutes PRN For ANY of the following changes in patient status:  A. RR LESS THAN 10 breaths per minute, B. Oxygen saturation LESS THAN 90%, C. Sedation score of 6  ondansetron Injectable 4 milliGRAM(s) IV Push every 6 hours PRN Nausea  ondansetron Injectable 4 milliGRAM(s) IV Push once PRN Nausea and/or Vomiting      FAMILY HISTORY:  Family history of asthma (Sibling): sister  Family history of epilepsy (Sibling, Sibling): 2 sisters  Family history of cardiovascular disease (Mother): mother  Family history of stroke (Mother): mother  Family history of heart disease (Father): father  Family history of alcoholism in father (Father)      Social History: smoking1 ppd for 30 years, social ETOH use      Vital Signs Last 24 Hrs  T(C): 36.1 (08 Nov 2018 11:40), Max: 36.6 (08 Nov 2018 07:27)  T(F): 97 (08 Nov 2018 11:40), Max: 97.8 (08 Nov 2018 07:27)  HR: 69 (08 Nov 2018 17:00) (66 - 81)  BP: 104/63 (08 Nov 2018 17:00) (93/52 - 109/43)  BP(mean): --  RR: 14 (08 Nov 2018 17:00) (10 - 15)  SpO2: 100% (08 Nov 2018 17:00) (98% - 100%)    Daily Height in cm: 160.02 (08 Nov 2018 08:09)    Daily     I&O's Summary    08 Nov 2018 07:01  -  08 Nov 2018 18:03  --------------------------------------------------------  IN: 1300 mL / OUT: 130 mL / NET: 1170 mL          Data

## 2018-11-08 NOTE — CONSULT NOTE ADULT - RS GEN PE MLT RESP DETAILS PC
diminished breath sounds, R/diminished breath sounds, L/breath sounds equal/respirations non-labored

## 2018-11-08 NOTE — BRIEF OPERATIVE NOTE - POST-OP DX
Lumbar stenosis with neurogenic claudication  11/08/2018    Active  Oumar Zarco  Spondylolisthesis at L4-L5 level  11/08/2018    Active  Oumar Zarco

## 2018-11-08 NOTE — BRIEF OPERATIVE NOTE - OPERATION/FINDINGS
spondylolisthesis L4-5, severe facet hypertrophy and arthrosis L4-5, spinal instability L4-5, stenosis L4-5

## 2018-11-08 NOTE — BRIEF OPERATIVE NOTE - PROCEDURE
<<-----Click on this checkbox to enter Procedure Lumbar fusion with pedicle screw  11/08/2018  L4,L5 laminectomy, foraminotomies, posterior fusion with bilateral pedicle screw instrumentation, loval bone graft, BMP, intraop fluoro, intra op neurophysiologic monitoring  Active  CFRENDO

## 2018-11-08 NOTE — CONSULT NOTE ADULT - ASSESSMENT
Pt is a 56 y/o female with h/o angina, anxiety, bipolar d/o, hypothyroidism, hyperlipidemia, type 2 diabetes on insulin, COPD, bipolar disorder, lumbar stenosis who has been having increasing LBP.  She was admitted for elective L4-L5 laminectomy with fusion.  Postop medicine consult called for comanagement.      * Lumbar Stenosis-s/p L4-L5 laminectomy with fusion, monitor drain outpt, pain control, PT, monitor postop labs.  * HTN-monitor postop bp on metoprolol, hold lisinopril  * CAD-stable, continue ranexa, atorvastatin, metoprolol, monitor off asa and plavix.  * Type 2 Diabetes-resume insulin but lower dose until she tolerates po diet.  * Hypothyroidism-synthroid  * Hyperlipidemia-atorvastatin  * Disp-OOB, PT, monitor in SDU  * Proph- venodynes while drain in place   * Comm- d/w pt, RN

## 2018-11-09 DIAGNOSIS — M51.36 OTHER INTERVERTEBRAL DISC DEGENERATION, LUMBAR REGION: ICD-10-CM

## 2018-11-09 LAB
ANION GAP SERPL CALC-SCNC: 6 MMOL/L — SIGNIFICANT CHANGE UP (ref 5–17)
BASOPHILS # BLD AUTO: 0.03 K/UL — SIGNIFICANT CHANGE UP (ref 0–0.2)
BASOPHILS NFR BLD AUTO: 0.3 % — SIGNIFICANT CHANGE UP (ref 0–2)
BUN SERPL-MCNC: 16 MG/DL — SIGNIFICANT CHANGE UP (ref 7–23)
CALCIUM SERPL-MCNC: 7.6 MG/DL — LOW (ref 8.5–10.1)
CHLORIDE SERPL-SCNC: 107 MMOL/L — SIGNIFICANT CHANGE UP (ref 96–108)
CO2 SERPL-SCNC: 26 MMOL/L — SIGNIFICANT CHANGE UP (ref 22–31)
CREAT SERPL-MCNC: 0.84 MG/DL — SIGNIFICANT CHANGE UP (ref 0.5–1.3)
EOSINOPHIL # BLD AUTO: 0.3 K/UL — SIGNIFICANT CHANGE UP (ref 0–0.5)
EOSINOPHIL NFR BLD AUTO: 2.9 % — SIGNIFICANT CHANGE UP (ref 0–6)
GLUCOSE BLDC GLUCOMTR-MCNC: 141 MG/DL — HIGH (ref 70–99)
GLUCOSE BLDC GLUCOMTR-MCNC: 171 MG/DL — HIGH (ref 70–99)
GLUCOSE BLDC GLUCOMTR-MCNC: 178 MG/DL — HIGH (ref 70–99)
GLUCOSE BLDC GLUCOMTR-MCNC: 191 MG/DL — HIGH (ref 70–99)
GLUCOSE SERPL-MCNC: 174 MG/DL — HIGH (ref 70–99)
HBA1C BLD-MCNC: 8 % — HIGH (ref 4–5.6)
HCT VFR BLD CALC: 30 % — LOW (ref 34.5–45)
HGB BLD-MCNC: 9.7 G/DL — LOW (ref 11.5–15.5)
IMM GRANULOCYTES NFR BLD AUTO: 0.2 % — SIGNIFICANT CHANGE UP (ref 0–1.5)
LYMPHOCYTES # BLD AUTO: 1.48 K/UL — SIGNIFICANT CHANGE UP (ref 1–3.3)
LYMPHOCYTES # BLD AUTO: 14.4 % — SIGNIFICANT CHANGE UP (ref 13–44)
MCHC RBC-ENTMCNC: 30.4 PG — SIGNIFICANT CHANGE UP (ref 27–34)
MCHC RBC-ENTMCNC: 32.3 GM/DL — SIGNIFICANT CHANGE UP (ref 32–36)
MCV RBC AUTO: 94 FL — SIGNIFICANT CHANGE UP (ref 80–100)
MONOCYTES # BLD AUTO: 0.77 K/UL — SIGNIFICANT CHANGE UP (ref 0–0.9)
MONOCYTES NFR BLD AUTO: 7.5 % — SIGNIFICANT CHANGE UP (ref 2–14)
NEUTROPHILS # BLD AUTO: 7.71 K/UL — HIGH (ref 1.8–7.4)
NEUTROPHILS NFR BLD AUTO: 74.7 % — SIGNIFICANT CHANGE UP (ref 43–77)
NRBC # BLD: 0 /100 WBCS — SIGNIFICANT CHANGE UP (ref 0–0)
PLATELET # BLD AUTO: 220 K/UL — SIGNIFICANT CHANGE UP (ref 150–400)
POTASSIUM SERPL-MCNC: 4.3 MMOL/L — SIGNIFICANT CHANGE UP (ref 3.5–5.3)
POTASSIUM SERPL-SCNC: 4.3 MMOL/L — SIGNIFICANT CHANGE UP (ref 3.5–5.3)
RBC # BLD: 3.19 M/UL — LOW (ref 3.8–5.2)
RBC # FLD: 12.7 % — SIGNIFICANT CHANGE UP (ref 10.3–14.5)
SODIUM SERPL-SCNC: 139 MMOL/L — SIGNIFICANT CHANGE UP (ref 135–145)
WBC # BLD: 10.31 K/UL — SIGNIFICANT CHANGE UP (ref 3.8–10.5)
WBC # FLD AUTO: 10.31 K/UL — SIGNIFICANT CHANGE UP (ref 3.8–10.5)

## 2018-11-09 RX ORDER — ATORVASTATIN CALCIUM 80 MG/1
40 TABLET, FILM COATED ORAL AT BEDTIME
Qty: 0 | Refills: 0 | Status: DISCONTINUED | OUTPATIENT
Start: 2018-11-09 | End: 2018-11-14

## 2018-11-09 RX ORDER — PANTOPRAZOLE SODIUM 20 MG/1
40 TABLET, DELAYED RELEASE ORAL
Qty: 0 | Refills: 0 | Status: DISCONTINUED | OUTPATIENT
Start: 2018-11-09 | End: 2018-11-14

## 2018-11-09 RX ORDER — METOPROLOL TARTRATE 50 MG
25 TABLET ORAL EVERY 12 HOURS
Qty: 0 | Refills: 0 | Status: DISCONTINUED | OUTPATIENT
Start: 2018-11-09 | End: 2018-11-14

## 2018-11-09 RX ORDER — LEVOTHYROXINE SODIUM 125 MCG
112 TABLET ORAL DAILY
Qty: 0 | Refills: 0 | Status: DISCONTINUED | OUTPATIENT
Start: 2018-11-09 | End: 2018-11-14

## 2018-11-09 RX ORDER — IPRATROPIUM/ALBUTEROL SULFATE 18-103MCG
3 AEROSOL WITH ADAPTER (GRAM) INHALATION EVERY 6 HOURS
Qty: 0 | Refills: 0 | Status: DISCONTINUED | OUTPATIENT
Start: 2018-11-09 | End: 2018-11-14

## 2018-11-09 RX ORDER — ACETAMINOPHEN 500 MG
650 TABLET ORAL EVERY 6 HOURS
Qty: 0 | Refills: 0 | Status: DISCONTINUED | OUTPATIENT
Start: 2018-11-09 | End: 2018-11-14

## 2018-11-09 RX ADMIN — Medication 3 MILLILITER(S): at 20:17

## 2018-11-09 RX ADMIN — INSULIN GLARGINE 8 UNIT(S): 100 INJECTION, SOLUTION SUBCUTANEOUS at 22:08

## 2018-11-09 RX ADMIN — RANOLAZINE 1000 MILLIGRAM(S): 500 TABLET, FILM COATED, EXTENDED RELEASE ORAL at 05:36

## 2018-11-09 RX ADMIN — GABAPENTIN 600 MILLIGRAM(S): 400 CAPSULE ORAL at 22:02

## 2018-11-09 RX ADMIN — Medication 100 MILLIGRAM(S): at 14:48

## 2018-11-09 RX ADMIN — Medication 25 MILLIGRAM(S): at 10:03

## 2018-11-09 RX ADMIN — PANTOPRAZOLE SODIUM 40 MILLIGRAM(S): 20 TABLET, DELAYED RELEASE ORAL at 22:17

## 2018-11-09 RX ADMIN — Medication 2: at 11:53

## 2018-11-09 RX ADMIN — PANTOPRAZOLE SODIUM 40 MILLIGRAM(S): 20 TABLET, DELAYED RELEASE ORAL at 10:03

## 2018-11-09 RX ADMIN — RANOLAZINE 1000 MILLIGRAM(S): 500 TABLET, FILM COATED, EXTENDED RELEASE ORAL at 17:00

## 2018-11-09 RX ADMIN — ATORVASTATIN CALCIUM 40 MILLIGRAM(S): 80 TABLET, FILM COATED ORAL at 22:03

## 2018-11-09 RX ADMIN — Medication 650 MILLIGRAM(S): at 14:51

## 2018-11-09 RX ADMIN — Medication 100 MILLIGRAM(S): at 05:36

## 2018-11-09 RX ADMIN — GABAPENTIN 600 MILLIGRAM(S): 400 CAPSULE ORAL at 05:36

## 2018-11-09 RX ADMIN — Medication 3 MILLILITER(S): at 14:17

## 2018-11-09 RX ADMIN — SODIUM CHLORIDE 100 MILLILITER(S): 9 INJECTION, SOLUTION INTRAVENOUS at 11:51

## 2018-11-09 RX ADMIN — Medication 650 MILLIGRAM(S): at 16:00

## 2018-11-09 RX ADMIN — Medication 100 MILLIGRAM(S): at 22:02

## 2018-11-09 RX ADMIN — Medication 2: at 08:07

## 2018-11-09 RX ADMIN — Medication 1 TABLET(S): at 11:52

## 2018-11-09 RX ADMIN — Medication 2: at 16:50

## 2018-11-09 RX ADMIN — SODIUM CHLORIDE 100 MILLILITER(S): 9 INJECTION, SOLUTION INTRAVENOUS at 05:37

## 2018-11-09 RX ADMIN — Medication 25 MILLIGRAM(S): at 22:03

## 2018-11-09 RX ADMIN — SODIUM CHLORIDE 100 MILLILITER(S): 9 INJECTION, SOLUTION INTRAVENOUS at 21:58

## 2018-11-09 RX ADMIN — Medication 100 MILLIGRAM(S): at 01:17

## 2018-11-09 NOTE — PROGRESS NOTE ADULT - SUBJECTIVE AND OBJECTIVE BOX
POD 1  Patient has incisional pain but states her LE radiculopathy is gone.   She has been tolerating PO clear fluids.     PE: Afebrile, NAD VSS.   DDI  Moving LE well.   LLE foot  (AT/EHL) with 2/5 strength;  otherwise 5/5 throughout LE.     WBC10.31  H/H 9.7/30.0  Hv 100cc last shift.

## 2018-11-09 NOTE — PHYSICAL THERAPY INITIAL EVALUATION ADULT - GENERAL OBSERVATIONS, REHAB EVAL
pt rec'd supine in bed on SDU, monitors, hughes, HVAC, PCA/IV. agreeable to PT. LSO at bedside. + cough.

## 2018-11-09 NOTE — PHYSICAL THERAPY INITIAL EVALUATION ADULT - ACTIVE RANGE OF MOTION EXAMINATION, REHAB EVAL
bilateral upper extremity Active ROM was WFL (within functional limits)/bilateral  lower extremity Active ROM was WFL (within functional limits)/B hip <90 deg, L shld elev w/ IR (pt w/ "paralysis" since birth per pt), min L DF bilateral upper extremity Active ROM was WFL (within functional limits)/bilateral  lower extremity Active ROM was WFL (within functional limits)/except B hip <90 deg, L shld elev w/ IR (pt w/ "paralysis" since birth per pt), min L DF

## 2018-11-09 NOTE — PROGRESS NOTE ADULT - SUBJECTIVE AND OBJECTIVE BOX
POD#1 s/p PSF L4-5    Pain well controlled. No paresthesias.   Using PCA.   No overnight events.   Black in place; Hemovac in place.     Vital Signs Last 24 Hrs  T(C): 36.8 (09 Nov 2018 05:29), Max: 36.9 (08 Nov 2018 21:07)  T(F): 98.3 (09 Nov 2018 05:29), Max: 98.4 (08 Nov 2018 21:07)  HR: 85 (09 Nov 2018 01:00) (66 - 85)  BP: 110/43 (09 Nov 2018 01:00) (93/52 - 120/49)  BP(mean): 59 (09 Nov 2018 01:00) (59 - 68)  RR: 11 (09 Nov 2018 01:00) (8 - 15)  SpO2: 97% (09 Nov 2018 01:00) (97% - 100%)    PE:     PHYSICAL EXAM:  General; Awake and alert, Oriented x 3  Spine exam: DSG CDI; HMV intact.   Spine:                       Motor exam:          Right Upper extremity: Delt 5/5, Biceps 5/5, Triceps 5/5, Wrist Ext 5/5, Wrist Flexion 5/5,  Strength 5/5         SILT C5-S1, No Clonus, Negative [place], +RP, Compartments soft           Left Upper extremity: Delt 5/5, Biceps 5/5, Triceps 5/5, Wrist Ext 5/5, Wrist Flexion 5/5,  Strength 5/5         SILT C5-S1, No Clonus, Negative [place], +RP, Compartments soft           Right Lower Extremity: Hip Flexion 5/5, Hip Extension 5/5, Knee Flexion 5/5, Knee Extension 5/5, Ankle Dorsiflexion 5/5,          Ankle Plantar Flexion 5/5, Extensor hallucis Longus 5/5         SILT L2-S1, No pain with SLR, Negative Clonus, Negative Babinski                  Left Lower Extremity: Hip Flexion 5/5, Hip Extension 5/5, Knee Flexion 5/5, Knee Extension 5/5, Ankle Dorsiflexion 5/5,          Ankle Plantar Flexion 5/5, Extensor hallucis Longus 5/5         SILT L2-S1, No pain with SLR, Negative Clonus, Negative Babinski                                            A/P: 57F s/p L4-5 PSF POD #1    Pain control w PCA  Black  Need AM drain outputs  OOB w PT  Dispo planning

## 2018-11-09 NOTE — PHYSICAL THERAPY INITIAL EVALUATION ADULT - MANUAL MUSCLE TESTING RESULTS, REHAB EVAL
except B hip NT, L shld weakness, L EHL 2/5, DF 2-/5-NS PA aware of weakness. (pt reports some pre-existing weakness and/or numbness but not this extent)/no strength deficits were identified

## 2018-11-09 NOTE — PROGRESS NOTE ADULT - ASSESSMENT
Pt is a 56 y/o female with h/o angina, anxiety, bipolar d/o, hypothyroidism, hyperlipidemia, type 2 diabetes on insulin, COPD, bipolar disorder, lumbar stenosis who has been having increasing LBP.  She was admitted for elective L4-L5 laminectomy with fusion.  Postop medicine consult called for comanagement.      * Lumbar Stenosis-s/p L4-L5 laminectomy with fusion, monitor drain outpt, pain control, PT, monitor postop labs.  * HTN-monitor postop bp on metoprolol, hold lisinopril for now  * CAD-stable, continue ranexa, atorvastatin, metoprolol, monitor off asa and plavix.  * Type 2 Diabetes-continue lower dose lantus with ISS until better po diet.    * Acute blood Loss anemia-monitor cbc and drain outpt.  * Hypothyroidism-synthroid  * Hyperlipidemia-atorvastatin  * Disp-OOB, PT, monitor in SDU  * Proph- venodynes while drain in place   * Comm- d/w pt, RN

## 2018-11-09 NOTE — PROGRESS NOTE ADULT - SUBJECTIVE AND OBJECTIVE BOX
Pt is c/o back pain from surgery otherwise uneventful night.  No new complaints.    Date of Service: 11-09-18 @ 09:37    Vital Signs Last 24 Hrs  T(C): 36.7 (09 Nov 2018 08:40), Max: 36.9 (08 Nov 2018 21:07)  T(F): 98.1 (09 Nov 2018 08:40), Max: 98.4 (08 Nov 2018 21:07)  HR: 86 (09 Nov 2018 09:00) (66 - 88)  BP: 138/49 (09 Nov 2018 09:00) (95/42 - 138/49)  BP(mean): 69 (09 Nov 2018 09:00) (59 - 72)  RR: 12 (09 Nov 2018 09:00) (8 - 15)  SpO2: 93% (09 Nov 2018 08:00) (93% - 100%)    Daily     Daily     I&O's Detail    08 Nov 2018 07:01  -  09 Nov 2018 07:00  --------------------------------------------------------  IN:    lactated ringers.: 2000 mL    Other: 1300 mL  Total IN: 3300 mL    OUT:    Drain: 205 mL    Indwelling Catheter - Urethral: 1295 mL    Other: 100 mL  Total OUT: 1600 mL    Total NET: 1700 mL      09 Nov 2018 07:01  -  09 Nov 2018 09:37  --------------------------------------------------------  IN:    lactated ringers.: 100 mL  Total IN: 100 mL    OUT:  Total OUT: 0 mL    Total NET: 100 mL          CAPILLARY BLOOD GLUCOSE      POCT Blood Glucose.: 178 mg/dL (09 Nov 2018 07:55)  POCT Blood Glucose.: 162 mg/dL (08 Nov 2018 22:21)  POCT Blood Glucose.: 122 mg/dL (08 Nov 2018 16:57)  POCT Blood Glucose.: 173 mg/dL (08 Nov 2018 11:30)  POCT Blood Glucose.: 198 mg/dL (08 Nov 2018 10:01)                                      9.7    10.31 )-----------( 220      ( 09 Nov 2018 05:14 )             30.0       11-09    139  |  107  |  16  ----------------------------<  174<H>  4.3   |  26  |  0.84    Ca    7.6<L>      09 Nov 2018 05:14                        MEDICATIONS  (STANDING):  dextrose 5%. 1000 milliLiter(s) (50 mL/Hr) IV Continuous <Continuous>  dextrose 50% Injectable 12.5 Gram(s) IV Push once  dextrose 50% Injectable 25 Gram(s) IV Push once  dextrose 50% Injectable 25 Gram(s) IV Push once  docusate sodium 100 milliGRAM(s) Oral three times a day  gabapentin 600 milliGRAM(s) Oral three times a day  HYDROmorphone PCA (1 mG/mL) 30 milliLiter(s) PCA Continuous PCA Continuous  insulin glargine Injectable (LANTUS) 8 Unit(s) SubCutaneous at bedtime  insulin lispro (HumaLOG) corrective regimen sliding scale   SubCutaneous three times a day before meals  insulin lispro (HumaLOG) corrective regimen sliding scale   SubCutaneous at bedtime  lactated ringers. 1000 milliLiter(s) (100 mL/Hr) IV Continuous <Continuous>  multivitamin 1 Tablet(s) Oral daily  ranolazine 1000 milliGRAM(s) Oral two times a day  sodium chloride 0.9%. 1000 milliLiter(s) (75 mL/Hr) IV Continuous <Continuous>    MEDICATIONS  (PRN):  dextrose 40% Gel 15 Gram(s) Oral once PRN Blood Glucose LESS THAN 70 milliGRAM(s)/deciliter  diphenhydrAMINE   Injectable 25 milliGRAM(s) IV Push every 4 hours PRN Pruritus  glucagon  Injectable 1 milliGRAM(s) IntraMuscular once PRN Glucose LESS THAN 70 milligrams/deciliter  HYDROmorphone PCA (1 mG/mL) Rescue Clinician Bolus 1 milliGRAM(s) IV Push every 15 minutes PRN for Pain Scale GREATER THAN 6  naloxone Injectable 0.1 milliGRAM(s) IV Push every 3 minutes PRN For ANY of the following changes in patient status:  A. RR LESS THAN 10 breaths per minute, B. Oxygen saturation LESS THAN 90%, C. Sedation score of 6  ondansetron Injectable 4 milliGRAM(s) IV Push every 6 hours PRN Nausea

## 2018-11-09 NOTE — PHYSICAL THERAPY INITIAL EVALUATION ADULT - DID THE PATIENT HAVE SURGERY?
L4,L5 laminectomy, foraminotomies, posterior fusion with bilateral pedicle screw instrumentation/yes

## 2018-11-10 LAB
ANION GAP SERPL CALC-SCNC: 6 MMOL/L — SIGNIFICANT CHANGE UP (ref 5–17)
BASOPHILS # BLD AUTO: 0.02 K/UL — SIGNIFICANT CHANGE UP (ref 0–0.2)
BASOPHILS NFR BLD AUTO: 0.2 % — SIGNIFICANT CHANGE UP (ref 0–2)
BUN SERPL-MCNC: 13 MG/DL — SIGNIFICANT CHANGE UP (ref 7–23)
CALCIUM SERPL-MCNC: 8.2 MG/DL — LOW (ref 8.5–10.1)
CHLORIDE SERPL-SCNC: 105 MMOL/L — SIGNIFICANT CHANGE UP (ref 96–108)
CO2 SERPL-SCNC: 27 MMOL/L — SIGNIFICANT CHANGE UP (ref 22–31)
CREAT SERPL-MCNC: 0.78 MG/DL — SIGNIFICANT CHANGE UP (ref 0.5–1.3)
EOSINOPHIL # BLD AUTO: 0.2 K/UL — SIGNIFICANT CHANGE UP (ref 0–0.5)
EOSINOPHIL NFR BLD AUTO: 1.9 % — SIGNIFICANT CHANGE UP (ref 0–6)
GLUCOSE BLDC GLUCOMTR-MCNC: 164 MG/DL — HIGH (ref 70–99)
GLUCOSE BLDC GLUCOMTR-MCNC: 274 MG/DL — HIGH (ref 70–99)
GLUCOSE BLDC GLUCOMTR-MCNC: 281 MG/DL — HIGH (ref 70–99)
GLUCOSE SERPL-MCNC: 130 MG/DL — HIGH (ref 70–99)
HCT VFR BLD CALC: 31.9 % — LOW (ref 34.5–45)
HGB BLD-MCNC: 10.3 G/DL — LOW (ref 11.5–15.5)
IMM GRANULOCYTES NFR BLD AUTO: 0.2 % — SIGNIFICANT CHANGE UP (ref 0–1.5)
LYMPHOCYTES # BLD AUTO: 1.87 K/UL — SIGNIFICANT CHANGE UP (ref 1–3.3)
LYMPHOCYTES # BLD AUTO: 17.9 % — SIGNIFICANT CHANGE UP (ref 13–44)
MCHC RBC-ENTMCNC: 29.9 PG — SIGNIFICANT CHANGE UP (ref 27–34)
MCHC RBC-ENTMCNC: 32.3 GM/DL — SIGNIFICANT CHANGE UP (ref 32–36)
MCV RBC AUTO: 92.7 FL — SIGNIFICANT CHANGE UP (ref 80–100)
MONOCYTES # BLD AUTO: 0.86 K/UL — SIGNIFICANT CHANGE UP (ref 0–0.9)
MONOCYTES NFR BLD AUTO: 8.2 % — SIGNIFICANT CHANGE UP (ref 2–14)
NEUTROPHILS # BLD AUTO: 7.48 K/UL — HIGH (ref 1.8–7.4)
NEUTROPHILS NFR BLD AUTO: 71.6 % — SIGNIFICANT CHANGE UP (ref 43–77)
NRBC # BLD: 0 /100 WBCS — SIGNIFICANT CHANGE UP (ref 0–0)
PLATELET # BLD AUTO: 205 K/UL — SIGNIFICANT CHANGE UP (ref 150–400)
POTASSIUM SERPL-MCNC: 4.6 MMOL/L — SIGNIFICANT CHANGE UP (ref 3.5–5.3)
POTASSIUM SERPL-SCNC: 4.6 MMOL/L — SIGNIFICANT CHANGE UP (ref 3.5–5.3)
RBC # BLD: 3.44 M/UL — LOW (ref 3.8–5.2)
RBC # FLD: 12.9 % — SIGNIFICANT CHANGE UP (ref 10.3–14.5)
SODIUM SERPL-SCNC: 138 MMOL/L — SIGNIFICANT CHANGE UP (ref 135–145)
WBC # BLD: 10.45 K/UL — SIGNIFICANT CHANGE UP (ref 3.8–10.5)
WBC # FLD AUTO: 10.45 K/UL — SIGNIFICANT CHANGE UP (ref 3.8–10.5)

## 2018-11-10 RX ORDER — OXYCODONE HYDROCHLORIDE 5 MG/1
10 TABLET ORAL EVERY 4 HOURS
Qty: 0 | Refills: 0 | Status: DISCONTINUED | OUTPATIENT
Start: 2018-11-10 | End: 2018-11-14

## 2018-11-10 RX ORDER — ARIPIPRAZOLE 15 MG/1
20 TABLET ORAL DAILY
Qty: 0 | Refills: 0 | Status: DISCONTINUED | OUTPATIENT
Start: 2018-11-10 | End: 2018-11-14

## 2018-11-10 RX ORDER — OXYCODONE HYDROCHLORIDE 5 MG/1
5 TABLET ORAL EVERY 4 HOURS
Qty: 0 | Refills: 0 | Status: DISCONTINUED | OUTPATIENT
Start: 2018-11-10 | End: 2018-11-14

## 2018-11-10 RX ORDER — HYDROMORPHONE HYDROCHLORIDE 2 MG/ML
0.5 INJECTION INTRAMUSCULAR; INTRAVENOUS; SUBCUTANEOUS
Qty: 0 | Refills: 0 | Status: DISCONTINUED | OUTPATIENT
Start: 2018-11-10 | End: 2018-11-14

## 2018-11-10 RX ADMIN — Medication 6: at 16:43

## 2018-11-10 RX ADMIN — OXYCODONE HYDROCHLORIDE 10 MILLIGRAM(S): 5 TABLET ORAL at 16:18

## 2018-11-10 RX ADMIN — RANOLAZINE 1000 MILLIGRAM(S): 500 TABLET, FILM COATED, EXTENDED RELEASE ORAL at 18:01

## 2018-11-10 RX ADMIN — PANTOPRAZOLE SODIUM 40 MILLIGRAM(S): 20 TABLET, DELAYED RELEASE ORAL at 18:00

## 2018-11-10 RX ADMIN — GABAPENTIN 600 MILLIGRAM(S): 400 CAPSULE ORAL at 14:32

## 2018-11-10 RX ADMIN — Medication 100 MILLIGRAM(S): at 14:32

## 2018-11-10 RX ADMIN — Medication 3 MILLILITER(S): at 08:46

## 2018-11-10 RX ADMIN — GABAPENTIN 600 MILLIGRAM(S): 400 CAPSULE ORAL at 23:22

## 2018-11-10 RX ADMIN — Medication 25 MILLIGRAM(S): at 12:19

## 2018-11-10 RX ADMIN — Medication 3 MILLILITER(S): at 02:14

## 2018-11-10 RX ADMIN — Medication 3 MILLILITER(S): at 20:24

## 2018-11-10 RX ADMIN — Medication 112 MICROGRAM(S): at 05:30

## 2018-11-10 RX ADMIN — RANOLAZINE 1000 MILLIGRAM(S): 500 TABLET, FILM COATED, EXTENDED RELEASE ORAL at 05:31

## 2018-11-10 RX ADMIN — PANTOPRAZOLE SODIUM 40 MILLIGRAM(S): 20 TABLET, DELAYED RELEASE ORAL at 05:30

## 2018-11-10 RX ADMIN — GABAPENTIN 600 MILLIGRAM(S): 400 CAPSULE ORAL at 05:30

## 2018-11-10 RX ADMIN — OXYCODONE HYDROCHLORIDE 10 MILLIGRAM(S): 5 TABLET ORAL at 15:55

## 2018-11-10 RX ADMIN — Medication 100 MILLIGRAM(S): at 05:30

## 2018-11-10 RX ADMIN — ATORVASTATIN CALCIUM 40 MILLIGRAM(S): 80 TABLET, FILM COATED ORAL at 23:22

## 2018-11-10 RX ADMIN — Medication 100 MILLIGRAM(S): at 23:22

## 2018-11-10 RX ADMIN — ARIPIPRAZOLE 20 MILLIGRAM(S): 15 TABLET ORAL at 16:40

## 2018-11-10 RX ADMIN — Medication 1 TABLET(S): at 12:21

## 2018-11-10 NOTE — PROGRESS NOTE ADULT - SUBJECTIVE AND OBJECTIVE BOX
Pt was seen earlier today, c/o back pain, no CP or SOB.    Date of Service: 11-10-18 @ 13:20    Vital Signs Last 24 Hrs  T(C): 36.9 (10 Nov 2018 09:09), Max: 38.3 (09 Nov 2018 13:52)  T(F): 98.4 (10 Nov 2018 09:09), Max: 101 (09 Nov 2018 13:52)  HR: 91 (10 Nov 2018 12:01) (76 - 92)  BP: 130/58 (10 Nov 2018 09:01) (102/71 - 134/71)  BP(mean): 76 (10 Nov 2018 09:01) (65 - 87)  RR: 15 (10 Nov 2018 12:01) (9 - 20)  SpO2: 87% (10 Nov 2018 12:01) (80% - 100%)    Daily     Daily     I&O's Detail    09 Nov 2018 07:01  -  10 Nov 2018 07:00  --------------------------------------------------------  IN:    lactated ringers.: 2000 mL  Total IN: 2000 mL    OUT:    Drain: 75 mL    Indwelling Catheter - Urethral: 1450 mL  Total OUT: 1525 mL    Total NET: 475 mL          CAPILLARY BLOOD GLUCOSE      POCT Blood Glucose.: 164 mg/dL (10 Nov 2018 12:26)  POCT Blood Glucose.: 141 mg/dL (09 Nov 2018 22:02)  POCT Blood Glucose.: 191 mg/dL (09 Nov 2018 16:48)                                      10.3   10.45 )-----------( 205      ( 10 Nov 2018 05:49 )             31.9       11-10    138  |  105  |  13  ----------------------------<  130<H>  4.6   |  27  |  0.78    Ca    8.2<L>      10 Nov 2018 05:49                        MEDICATIONS  (STANDING):  ALBUTerol/ipratropium for Nebulization 3 milliLiter(s) Nebulizer every 6 hours  atorvastatin 40 milliGRAM(s) Oral at bedtime  dextrose 5%. 1000 milliLiter(s) (50 mL/Hr) IV Continuous <Continuous>  dextrose 50% Injectable 12.5 Gram(s) IV Push once  dextrose 50% Injectable 25 Gram(s) IV Push once  dextrose 50% Injectable 25 Gram(s) IV Push once  docusate sodium 100 milliGRAM(s) Oral three times a day  gabapentin 600 milliGRAM(s) Oral three times a day  insulin glargine Injectable (LANTUS) 8 Unit(s) SubCutaneous at bedtime  insulin lispro (HumaLOG) corrective regimen sliding scale   SubCutaneous three times a day before meals  insulin lispro (HumaLOG) corrective regimen sliding scale   SubCutaneous at bedtime  lactated ringers. 1000 milliLiter(s) (100 mL/Hr) IV Continuous <Continuous>  levothyroxine 112 MICROGram(s) Oral daily  metoprolol tartrate 25 milliGRAM(s) Oral every 12 hours  multivitamin 1 Tablet(s) Oral daily  pantoprazole    Tablet 40 milliGRAM(s) Oral two times a day  ranolazine 1000 milliGRAM(s) Oral two times a day  sodium chloride 0.9%. 1000 milliLiter(s) (75 mL/Hr) IV Continuous <Continuous>    MEDICATIONS  (PRN):  acetaminophen   Tablet .. 650 milliGRAM(s) Oral every 6 hours PRN Temp greater or equal to 38C (100.4F)  dextrose 40% Gel 15 Gram(s) Oral once PRN Blood Glucose LESS THAN 70 milliGRAM(s)/deciliter  diphenhydrAMINE   Injectable 25 milliGRAM(s) IV Push every 4 hours PRN Pruritus  glucagon  Injectable 1 milliGRAM(s) IntraMuscular once PRN Glucose LESS THAN 70 milligrams/deciliter  HYDROmorphone  Injectable 0.5 milliGRAM(s) IV Push every 3 hours PRN Severe Pain (7 - 10)  HYDROmorphone PCA (1 mG/mL) Rescue Clinician Bolus 1 milliGRAM(s) IV Push every 15 minutes PRN for Pain Scale GREATER THAN 6  naloxone Injectable 0.1 milliGRAM(s) IV Push every 3 minutes PRN For ANY of the following changes in patient status:  A. RR LESS THAN 10 breaths per minute, B. Oxygen saturation LESS THAN 90%, C. Sedation score of 6  ondansetron Injectable 4 milliGRAM(s) IV Push every 6 hours PRN Nausea  oxyCODONE    IR 5 milliGRAM(s) Oral every 4 hours PRN Moderate Pain (4 - 6)  oxyCODONE    IR 10 milliGRAM(s) Oral every 4 hours PRN Severe Pain (7 - 10)

## 2018-11-10 NOTE — PROGRESS NOTE ADULT - SUBJECTIVE AND OBJECTIVE BOX
Ortho Spine Progress Note    Pt having some discomfort this am.  No flatus yet.  Unable to mobilize well yesterday, just 3 feet with PT.    AFVSS  A&O x 3  Dsg dry/intact.  Dsg changed.  HV with 20 cc overnight shift, removed.  Incision clean and dry, healing well.   Motor exam -  5/5 throughout RLE  2/5 left tib ant, otherwise 5/5 throughout LLE  Sensation grossly intact light touch throughout b/l LE     WBC 10.45  Hgb 10.3

## 2018-11-10 NOTE — PROGRESS NOTE ADULT - ASSESSMENT
Pt is a 56 y/o female with h/o angina, anxiety, bipolar d/o, hypothyroidism, hyperlipidemia, type 2 diabetes on insulin, COPD, bipolar disorder, lumbar stenosis who has been having increasing LBP.  She was admitted for elective L4-L5 laminectomy with fusion.  Postop medicine consult called for comanagement.      * Lumbar Stenosis-s/p L4-L5 laminectomy with fusion, drain just removed, continue pain control, PT, monitor postop labs.  * HTN-monitor postop bp on metoprolol, hold lisinopril for now  * CAD-stable, continue ranexa, atorvastatin, metoprolol, monitor off asa and plavix (resume when okay with spine)  * Type 2 Diabetes-continue lower dose lantus with ISS until better po diet.    * Acute blood Loss anemia-monitor cbc and drain outpt.  * Hypothyroidism-synthroid  * Hyperlipidemia-atorvastatin  * Disp-OOB, PT   * Proph- venodynes until heparin okay with spine  * Comm- d/w pt, RN Pt is a 58 y/o female with h/o angina, anxiety, bipolar d/o, hypothyroidism, hyperlipidemia, type 2 diabetes on insulin, COPD, bipolar disorder, lumbar stenosis who has been having increasing LBP.  She was admitted for elective L4-L5 laminectomy with fusion.  Postop medicine consult called for comanagement.      * Lumbar Stenosis-s/p L4-L5 laminectomy with fusion, drain just removed, continue pain control, PT, monitor postop labs.  * HTN-monitor postop bp on metoprolol, hold lisinopril for now  * CAD-stable, continue ranexa, atorvastatin, metoprolol, monitor off asa and plavix (resume when okay with spine)  * Type 2 Diabetes-continue lower dose lantus with ISS until better po diet.    * Tobacco Use-reviewed importance of smoking cessation ? underlying COPD, nebs prn, encouraged use of incentive spirometry, PFTs as outpt  * Acute blood Loss anemia-monitor cbc and drain outpt.  * Hypothyroidism-synthroid  * Hyperlipidemia-atorvastatin  * Disp-OOB, PT   * Proph- venodynes until heparin okay with spine  * Comm- d/w pt, RN

## 2018-11-10 NOTE — PROGRESS NOTE ADULT - ASSESSMENT
58 yo F s/p L4-5 laminectomy/fusion  - HV removed  - D/C PCA. Transition to PO meds.   - D/C hughes today.   - Mobilize with PT with LSO brace  - Transfer to regular floor   - Encouraged IS x 10 q 1hr  [facility]  No chem DVT ppx due to risk of epidural hematoma    Bo Bryan MD  [facility]

## 2018-11-11 LAB
ANION GAP SERPL CALC-SCNC: 8 MMOL/L — SIGNIFICANT CHANGE UP (ref 5–17)
BASOPHILS # BLD AUTO: 0.02 K/UL — SIGNIFICANT CHANGE UP (ref 0–0.2)
BASOPHILS NFR BLD AUTO: 0.2 % — SIGNIFICANT CHANGE UP (ref 0–2)
BUN SERPL-MCNC: 10 MG/DL — SIGNIFICANT CHANGE UP (ref 7–23)
CALCIUM SERPL-MCNC: 8.4 MG/DL — LOW (ref 8.5–10.1)
CHLORIDE SERPL-SCNC: 104 MMOL/L — SIGNIFICANT CHANGE UP (ref 96–108)
CO2 SERPL-SCNC: 25 MMOL/L — SIGNIFICANT CHANGE UP (ref 22–31)
CREAT SERPL-MCNC: 0.76 MG/DL — SIGNIFICANT CHANGE UP (ref 0.5–1.3)
EOSINOPHIL # BLD AUTO: 0.08 K/UL — SIGNIFICANT CHANGE UP (ref 0–0.5)
EOSINOPHIL NFR BLD AUTO: 0.9 % — SIGNIFICANT CHANGE UP (ref 0–6)
GLUCOSE BLDC GLUCOMTR-MCNC: 239 MG/DL — HIGH (ref 70–99)
GLUCOSE BLDC GLUCOMTR-MCNC: 288 MG/DL — HIGH (ref 70–99)
GLUCOSE BLDC GLUCOMTR-MCNC: 290 MG/DL — HIGH (ref 70–99)
GLUCOSE BLDC GLUCOMTR-MCNC: 294 MG/DL — HIGH (ref 70–99)
GLUCOSE SERPL-MCNC: 201 MG/DL — HIGH (ref 70–99)
HCT VFR BLD CALC: 27.6 % — LOW (ref 34.5–45)
HGB BLD-MCNC: 9.1 G/DL — LOW (ref 11.5–15.5)
IMM GRANULOCYTES NFR BLD AUTO: 0.5 % — SIGNIFICANT CHANGE UP (ref 0–1.5)
LYMPHOCYTES # BLD AUTO: 1.73 K/UL — SIGNIFICANT CHANGE UP (ref 1–3.3)
LYMPHOCYTES # BLD AUTO: 19.8 % — SIGNIFICANT CHANGE UP (ref 13–44)
MCHC RBC-ENTMCNC: 30.5 PG — SIGNIFICANT CHANGE UP (ref 27–34)
MCHC RBC-ENTMCNC: 33 GM/DL — SIGNIFICANT CHANGE UP (ref 32–36)
MCV RBC AUTO: 92.6 FL — SIGNIFICANT CHANGE UP (ref 80–100)
MONOCYTES # BLD AUTO: 0.79 K/UL — SIGNIFICANT CHANGE UP (ref 0–0.9)
MONOCYTES NFR BLD AUTO: 9 % — SIGNIFICANT CHANGE UP (ref 2–14)
NEUTROPHILS # BLD AUTO: 6.07 K/UL — SIGNIFICANT CHANGE UP (ref 1.8–7.4)
NEUTROPHILS NFR BLD AUTO: 69.6 % — SIGNIFICANT CHANGE UP (ref 43–77)
NRBC # BLD: 0 /100 WBCS — SIGNIFICANT CHANGE UP (ref 0–0)
PLATELET # BLD AUTO: 171 K/UL — SIGNIFICANT CHANGE UP (ref 150–400)
POTASSIUM SERPL-MCNC: 4.1 MMOL/L — SIGNIFICANT CHANGE UP (ref 3.5–5.3)
POTASSIUM SERPL-SCNC: 4.1 MMOL/L — SIGNIFICANT CHANGE UP (ref 3.5–5.3)
RBC # BLD: 2.98 M/UL — LOW (ref 3.8–5.2)
RBC # FLD: 12.5 % — SIGNIFICANT CHANGE UP (ref 10.3–14.5)
SODIUM SERPL-SCNC: 137 MMOL/L — SIGNIFICANT CHANGE UP (ref 135–145)
WBC # BLD: 8.73 K/UL — SIGNIFICANT CHANGE UP (ref 3.8–10.5)
WBC # FLD AUTO: 8.73 K/UL — SIGNIFICANT CHANGE UP (ref 3.8–10.5)

## 2018-11-11 PROCEDURE — 93971 EXTREMITY STUDY: CPT | Mod: 26,RT

## 2018-11-11 PROCEDURE — 71045 X-RAY EXAM CHEST 1 VIEW: CPT | Mod: 26

## 2018-11-11 PROCEDURE — 72131 CT LUMBAR SPINE W/O DYE: CPT | Mod: 26

## 2018-11-11 PROCEDURE — 71250 CT THORAX DX C-: CPT | Mod: 26

## 2018-11-11 RX ORDER — PIPERACILLIN AND TAZOBACTAM 4; .5 G/20ML; G/20ML
3.38 INJECTION, POWDER, LYOPHILIZED, FOR SOLUTION INTRAVENOUS EVERY 8 HOURS
Qty: 0 | Refills: 0 | Status: DISCONTINUED | OUTPATIENT
Start: 2018-11-11 | End: 2018-11-14

## 2018-11-11 RX ORDER — HEPARIN SODIUM 5000 [USP'U]/ML
5000 INJECTION INTRAVENOUS; SUBCUTANEOUS EVERY 12 HOURS
Qty: 0 | Refills: 0 | Status: DISCONTINUED | OUTPATIENT
Start: 2018-11-11 | End: 2018-11-14

## 2018-11-11 RX ADMIN — GABAPENTIN 600 MILLIGRAM(S): 400 CAPSULE ORAL at 04:52

## 2018-11-11 RX ADMIN — Medication 6: at 17:15

## 2018-11-11 RX ADMIN — Medication 3 MILLILITER(S): at 19:58

## 2018-11-11 RX ADMIN — PIPERACILLIN AND TAZOBACTAM 25 GRAM(S): 4; .5 INJECTION, POWDER, LYOPHILIZED, FOR SOLUTION INTRAVENOUS at 16:23

## 2018-11-11 RX ADMIN — Medication 4: at 08:09

## 2018-11-11 RX ADMIN — GABAPENTIN 600 MILLIGRAM(S): 400 CAPSULE ORAL at 13:33

## 2018-11-11 RX ADMIN — Medication 100 MILLIGRAM(S): at 13:33

## 2018-11-11 RX ADMIN — Medication 3 MILLILITER(S): at 02:00

## 2018-11-11 RX ADMIN — HEPARIN SODIUM 5000 UNIT(S): 5000 INJECTION INTRAVENOUS; SUBCUTANEOUS at 17:18

## 2018-11-11 RX ADMIN — PANTOPRAZOLE SODIUM 40 MILLIGRAM(S): 20 TABLET, DELAYED RELEASE ORAL at 17:16

## 2018-11-11 RX ADMIN — INSULIN GLARGINE 8 UNIT(S): 100 INJECTION, SOLUTION SUBCUTANEOUS at 23:08

## 2018-11-11 RX ADMIN — Medication 1 TABLET(S): at 11:41

## 2018-11-11 RX ADMIN — PIPERACILLIN AND TAZOBACTAM 25 GRAM(S): 4; .5 INJECTION, POWDER, LYOPHILIZED, FOR SOLUTION INTRAVENOUS at 23:09

## 2018-11-11 RX ADMIN — Medication 3 MILLILITER(S): at 14:45

## 2018-11-11 RX ADMIN — GABAPENTIN 600 MILLIGRAM(S): 400 CAPSULE ORAL at 23:08

## 2018-11-11 RX ADMIN — Medication 100 MILLIGRAM(S): at 23:08

## 2018-11-11 RX ADMIN — Medication 6: at 11:39

## 2018-11-11 RX ADMIN — OXYCODONE HYDROCHLORIDE 10 MILLIGRAM(S): 5 TABLET ORAL at 15:04

## 2018-11-11 RX ADMIN — Medication 1: at 00:06

## 2018-11-11 RX ADMIN — Medication 3 MILLILITER(S): at 08:49

## 2018-11-11 RX ADMIN — PANTOPRAZOLE SODIUM 40 MILLIGRAM(S): 20 TABLET, DELAYED RELEASE ORAL at 04:52

## 2018-11-11 RX ADMIN — Medication 25 MILLIGRAM(S): at 17:16

## 2018-11-11 RX ADMIN — ARIPIPRAZOLE 20 MILLIGRAM(S): 15 TABLET ORAL at 11:42

## 2018-11-11 RX ADMIN — OXYCODONE HYDROCHLORIDE 10 MILLIGRAM(S): 5 TABLET ORAL at 14:38

## 2018-11-11 RX ADMIN — Medication 1: at 23:08

## 2018-11-11 RX ADMIN — ATORVASTATIN CALCIUM 40 MILLIGRAM(S): 80 TABLET, FILM COATED ORAL at 23:08

## 2018-11-11 RX ADMIN — OXYCODONE HYDROCHLORIDE 10 MILLIGRAM(S): 5 TABLET ORAL at 10:55

## 2018-11-11 RX ADMIN — INSULIN GLARGINE 8 UNIT(S): 100 INJECTION, SOLUTION SUBCUTANEOUS at 00:10

## 2018-11-11 RX ADMIN — Medication 112 MICROGRAM(S): at 04:53

## 2018-11-11 RX ADMIN — RANOLAZINE 1000 MILLIGRAM(S): 500 TABLET, FILM COATED, EXTENDED RELEASE ORAL at 17:17

## 2018-11-11 RX ADMIN — Medication 25 MILLIGRAM(S): at 04:51

## 2018-11-11 RX ADMIN — RANOLAZINE 1000 MILLIGRAM(S): 500 TABLET, FILM COATED, EXTENDED RELEASE ORAL at 04:51

## 2018-11-11 RX ADMIN — OXYCODONE HYDROCHLORIDE 10 MILLIGRAM(S): 5 TABLET ORAL at 09:58

## 2018-11-11 NOTE — PROGRESS NOTE ADULT - SUBJECTIVE AND OBJECTIVE BOX
Pt with intermittent confusion, coughing, denies any SOB or CP.  C/o back pain from surgery.    Date of Service: 11-11-18 @ 09:36    Vital Signs Last 24 Hrs  T(C): 37.2 (11 Nov 2018 04:52), Max: 37.4 (10 Nov 2018 20:17)  T(F): 99 (11 Nov 2018 04:52), Max: 99.4 (10 Nov 2018 20:17)  HR: 85 (11 Nov 2018 04:52) (75 - 92)  BP: 140/44 (11 Nov 2018 04:52) (91/54 - 140/44)  BP(mean): 73 (10 Nov 2018 15:00) (73 - 78)  RR: 16 (11 Nov 2018 04:52) (10 - 20)  SpO2: 100% (11 Nov 2018 04:52) (82% - 100%)    Daily     Daily     I&O's Detail    10 Nov 2018 07:01  -  11 Nov 2018 07:00  --------------------------------------------------------  IN:    Oral Fluid: 240 mL    sodium chloride 0.9%: 225 mL  Total IN: 465 mL    OUT:    Voided: 301 mL  Total OUT: 301 mL    Total NET: 164 mL          CAPILLARY BLOOD GLUCOSE      POCT Blood Glucose.: 239 mg/dL (11 Nov 2018 07:32)  POCT Blood Glucose.: 281 mg/dL (10 Nov 2018 23:17)  POCT Blood Glucose.: 274 mg/dL (10 Nov 2018 16:39)  POCT Blood Glucose.: 164 mg/dL (10 Nov 2018 12:26)                                      9.1    8.73  )-----------( 171      ( 11 Nov 2018 05:44 )             27.6       11-11    137  |  104  |  10  ----------------------------<  201<H>  4.1   |  25  |  0.76    Ca    8.4<L>      11 Nov 2018 05:44                        MEDICATIONS  (STANDING):  ALBUTerol/ipratropium for Nebulization 3 milliLiter(s) Nebulizer every 6 hours  ARIPiprazole 20 milliGRAM(s) Oral daily  atorvastatin 40 milliGRAM(s) Oral at bedtime  dextrose 5%. 1000 milliLiter(s) (50 mL/Hr) IV Continuous <Continuous>  dextrose 50% Injectable 12.5 Gram(s) IV Push once  dextrose 50% Injectable 25 Gram(s) IV Push once  dextrose 50% Injectable 25 Gram(s) IV Push once  docusate sodium 100 milliGRAM(s) Oral three times a day  gabapentin 600 milliGRAM(s) Oral three times a day  insulin glargine Injectable (LANTUS) 8 Unit(s) SubCutaneous at bedtime  insulin lispro (HumaLOG) corrective regimen sliding scale   SubCutaneous three times a day before meals  insulin lispro (HumaLOG) corrective regimen sliding scale   SubCutaneous at bedtime  levothyroxine 112 MICROGram(s) Oral daily  metoprolol tartrate 25 milliGRAM(s) Oral every 12 hours  multivitamin 1 Tablet(s) Oral daily  pantoprazole    Tablet 40 milliGRAM(s) Oral two times a day  ranolazine 1000 milliGRAM(s) Oral two times a day    MEDICATIONS  (PRN):  acetaminophen   Tablet .. 650 milliGRAM(s) Oral every 6 hours PRN Temp greater or equal to 38C (100.4F)  dextrose 40% Gel 15 Gram(s) Oral once PRN Blood Glucose LESS THAN 70 milliGRAM(s)/deciliter  diphenhydrAMINE   Injectable 25 milliGRAM(s) IV Push every 4 hours PRN Pruritus  glucagon  Injectable 1 milliGRAM(s) IntraMuscular once PRN Glucose LESS THAN 70 milligrams/deciliter  HYDROmorphone  Injectable 0.5 milliGRAM(s) IV Push every 3 hours PRN Severe Pain (7 - 10)  HYDROmorphone PCA (1 mG/mL) Rescue Clinician Bolus 1 milliGRAM(s) IV Push every 15 minutes PRN for Pain Scale GREATER THAN 6  naloxone Injectable 0.1 milliGRAM(s) IV Push every 3 minutes PRN For ANY of the following changes in patient status:  A. RR LESS THAN 10 breaths per minute, B. Oxygen saturation LESS THAN 90%, C. Sedation score of 6  ondansetron Injectable 4 milliGRAM(s) IV Push every 6 hours PRN Nausea  oxyCODONE    IR 5 milliGRAM(s) Oral every 4 hours PRN Moderate Pain (4 - 6)  oxyCODONE    IR 10 milliGRAM(s) Oral every 4 hours PRN Severe Pain (7 - 10)

## 2018-11-11 NOTE — PROGRESS NOTE ADULT - SUBJECTIVE AND OBJECTIVE BOX
[facility]                                                           Orthopedic Spine Progress Note      POST OPERATIVE DAY #:   STATUS POST:                 Pre-Op Dx: Lumbar stenosis with neurogenic claudication  Spondylolisthesis at L4-L5 level    Post-Op Dx:  Lumbar stenosis with neurogenic claudication  Spondylolisthesis at L4-L5 level    . Procedure:      SUBJECTIVE: Patient seen and examined. Patient is coughing but just had a Neb treatment and says that's always how she is after the treatment.   She has incisional pain.  She still is complaining of Left foot weakness. She did much better with PT today and was able to walk down the miguel.        Vital Signs Last 24 Hrs  T(C): 37.2 (11 Nov 2018 04:52), Max: 37.4 (10 Nov 2018 20:17)  T(F): 99 (11 Nov 2018 04:52), Max: 99.4 (10 Nov 2018 20:17)  HR: 85 (11 Nov 2018 04:52) (75 - 92)  BP: 140/44 (11 Nov 2018 04:52) (91/54 - 140/44)  BP(mean): 73 (10 Nov 2018 15:00) (73 - 78)  RR: 16 (11 Nov 2018 04:52) (10 - 20)  SpO2: 100% (11 Nov 2018 04:52) (82% - 100%)      OBJECTIVE: NAD, Afebrile.   Tenderness to the Right calf.   Left foot EHL and AT 3/5 strength, otherwise 5/5 throughout the B/L LE.   Dressing changed. Wound without erythema, edema or drainage.         LABS:                        9.1    8.73  )-----------( 171      ( 11 Nov 2018 05:44 )             27.6     11-11    137  |  104  |  10  ----------------------------<  201<H>  4.1   |  25  |  0.76    Ca    8.4<L>      11 Nov 2018 05:44 [facility]                                                           Orthopedic Spine Progress Note      POST OPERATIVE DAY #:   STATUS POST:                 Pre-Op Dx: Lumbar stenosis with neurogenic claudication  Spondylolisthesis at L4-L5 level    Post-Op Dx:  Lumbar stenosis with neurogenic claudication  Spondylolisthesis at L4-L5 level    . Procedure:  Lumbar Lami/Fusion L4/5    SUBJECTIVE: Patient seen and examined. Patient is coughing but just had a Neb treatment and says that's always how she is after the treatment.   She has incisional pain.  She still is complaining of Left foot weakness. She did much better with PT today and was able to walk down the miguel.        Vital Signs Last 24 Hrs  T(C): 37.2 (11 Nov 2018 04:52), Max: 37.4 (10 Nov 2018 20:17)  T(F): 99 (11 Nov 2018 04:52), Max: 99.4 (10 Nov 2018 20:17)  HR: 85 (11 Nov 2018 04:52) (75 - 92)  BP: 140/44 (11 Nov 2018 04:52) (91/54 - 140/44)  BP(mean): 73 (10 Nov 2018 15:00) (73 - 78)  RR: 16 (11 Nov 2018 04:52) (10 - 20)  SpO2: 100% (11 Nov 2018 04:52) (82% - 100%)      OBJECTIVE: NAD, Afebrile.   Tenderness to the Right calf.   Left foot EHL and AT 3/5 strength, otherwise 5/5 throughout the B/L LE.   Dressing changed. Wound without erythema, edema or drainage.         LABS:                        9.1    8.73  )-----------( 171      ( 11 Nov 2018 05:44 )             27.6     11-11    137  |  104  |  10  ----------------------------<  201<H>  4.1   |  25  |  0.76    Ca    8.4<L>      11 Nov 2018 05:44 [facility]                                                           Orthopedic Spine Progress Note      POST OPERATIVE DAY #: 3    Pre-Op Dx: Lumbar stenosis with neurogenic claudication  Spondylolisthesis at L4-L5 level    Post-Op Dx:  Lumbar stenosis with neurogenic claudication  Spondylolisthesis at L4-L5 level    . Procedure:  Lumbar Lami/Fusion L4/5    SUBJECTIVE: Patient seen and examined. Patient is coughing but just had a Neb treatment and says that's always how she is after the treatment.   She has incisional pain.  She still is complaining of Left foot weakness. She did much better with PT today and was able to walk down the miguel.        Vital Signs Last 24 Hrs  T(C): 37.2 (11 Nov 2018 04:52), Max: 37.4 (10 Nov 2018 20:17)  T(F): 99 (11 Nov 2018 04:52), Max: 99.4 (10 Nov 2018 20:17)  HR: 85 (11 Nov 2018 04:52) (75 - 92)  BP: 140/44 (11 Nov 2018 04:52) (91/54 - 140/44)  BP(mean): 73 (10 Nov 2018 15:00) (73 - 78)  RR: 16 (11 Nov 2018 04:52) (10 - 20)  SpO2: 100% (11 Nov 2018 04:52) (82% - 100%)      OBJECTIVE: NAD, Afebrile.   Tenderness to the Right calf.   Left foot EHL and AT 3/5 strength, otherwise 5/5 throughout the B/L LE.   Dressing changed. Wound without erythema, edema or drainage.         LABS:                        9.1    8.73  )-----------( 171      ( 11 Nov 2018 05:44 )             27.6     11-11    137  |  104  |  10  ----------------------------<  201<H>  4.1   |  25  |  0.76    Ca    8.4<L>      11 Nov 2018 05:44

## 2018-11-11 NOTE — PROGRESS NOTE ADULT - ASSESSMENT
Patient is POD 3  Left foot weakness.   Right calf tenderness.  Evaluation by Dr. Polanco appreciated.   He is ordering Chest CT for wet cough.

## 2018-11-11 NOTE — PROGRESS NOTE ADULT - PROBLEM SELECTOR PLAN 1
Mobilize  Continue incentive spirometer.   LLE weakness: Will order CT scan Lumbar spine to assess  RLE venous doppler to r/o DVT    PT recommending considering an AFO if LLE weakness persists.

## 2018-11-11 NOTE — PROGRESS NOTE ADULT - SUBJECTIVE AND OBJECTIVE BOX
Discussed with Dr. Zarco:    May start SubQ heparin for DVT prophylaxis.   May resume pre-operative Anticoagulation of Plavix and ASA 1 week post op.   Dr. Sherri miller.

## 2018-11-11 NOTE — PROGRESS NOTE ADULT - ASSESSMENT
Pt is a 56 y/o female with h/o angina, anxiety, bipolar d/o, hypothyroidism, hyperlipidemia, type 2 diabetes on insulin, COPD, bipolar disorder, lumbar stenosis who has been having increasing LBP.  She was admitted for elective L4-L5 laminectomy with fusion.  Postop medicine consult called for comanagement.      * Lumbar Stenosis-s/p L4-L5 laminectomy with fusion, continue pain control (minimize narcotics), PT, monitor postop labs.  * HTN-monitor postop bp on metoprolol, hold lisinopril for now  * Cough-from smoking, order cxr to r/o pneumonia, continue nebs, encouraged use of incentive spirometry.   * CAD-stable, continue ranexa, atorvastatin, metoprolol, monitor off asa and plavix (resume when okay with spine)  * Type 2 Diabetes-continue lower dose lantus with ISS until better po diet.    * Tobacco Use-reviewed importance of smoking cessation ? underlying COPD, nebs prn, encouraged use of incentive spirometry, PFTs as outpt  * Acute blood Loss anemia-monitor cbc and drain outpt.  * Hypothyroidism-synthroid  * Hyperlipidemia-atorvastatin  * Disp-OOB, PT   * Proph- venodynes, heparin when okay with spine  * Comm- d/w pt, RN, Jenn

## 2018-11-12 LAB
ALBUMIN SERPL ELPH-MCNC: 2.2 G/DL — LOW (ref 3.3–5)
ALP SERPL-CCNC: 146 U/L — HIGH (ref 40–120)
ALT FLD-CCNC: 28 U/L — SIGNIFICANT CHANGE UP (ref 12–78)
ANION GAP SERPL CALC-SCNC: 9 MMOL/L — SIGNIFICANT CHANGE UP (ref 5–17)
AST SERPL-CCNC: 23 U/L — SIGNIFICANT CHANGE UP (ref 15–37)
BILIRUB SERPL-MCNC: 0.4 MG/DL — SIGNIFICANT CHANGE UP (ref 0.2–1.2)
BUN SERPL-MCNC: 11 MG/DL — SIGNIFICANT CHANGE UP (ref 7–23)
CALCIUM SERPL-MCNC: 8.2 MG/DL — LOW (ref 8.5–10.1)
CHLORIDE SERPL-SCNC: 101 MMOL/L — SIGNIFICANT CHANGE UP (ref 96–108)
CO2 SERPL-SCNC: 25 MMOL/L — SIGNIFICANT CHANGE UP (ref 22–31)
CREAT SERPL-MCNC: 0.82 MG/DL — SIGNIFICANT CHANGE UP (ref 0.5–1.3)
GLUCOSE BLDC GLUCOMTR-MCNC: 286 MG/DL — HIGH (ref 70–99)
GLUCOSE BLDC GLUCOMTR-MCNC: 290 MG/DL — HIGH (ref 70–99)
GLUCOSE BLDC GLUCOMTR-MCNC: 319 MG/DL — HIGH (ref 70–99)
GLUCOSE BLDC GLUCOMTR-MCNC: 353 MG/DL — HIGH (ref 70–99)
GLUCOSE SERPL-MCNC: 256 MG/DL — HIGH (ref 70–99)
HCT VFR BLD CALC: 25.6 % — LOW (ref 34.5–45)
HGB BLD-MCNC: 8.6 G/DL — LOW (ref 11.5–15.5)
MCHC RBC-ENTMCNC: 30.7 PG — SIGNIFICANT CHANGE UP (ref 27–34)
MCHC RBC-ENTMCNC: 33.6 GM/DL — SIGNIFICANT CHANGE UP (ref 32–36)
MCV RBC AUTO: 91.4 FL — SIGNIFICANT CHANGE UP (ref 80–100)
NRBC # BLD: 0 /100 WBCS — SIGNIFICANT CHANGE UP (ref 0–0)
PLATELET # BLD AUTO: 195 K/UL — SIGNIFICANT CHANGE UP (ref 150–400)
POTASSIUM SERPL-MCNC: 3.8 MMOL/L — SIGNIFICANT CHANGE UP (ref 3.5–5.3)
POTASSIUM SERPL-SCNC: 3.8 MMOL/L — SIGNIFICANT CHANGE UP (ref 3.5–5.3)
PROT SERPL-MCNC: 6.3 GM/DL — SIGNIFICANT CHANGE UP (ref 6–8.3)
RBC # BLD: 2.8 M/UL — LOW (ref 3.8–5.2)
RBC # FLD: 12.4 % — SIGNIFICANT CHANGE UP (ref 10.3–14.5)
SODIUM SERPL-SCNC: 135 MMOL/L — SIGNIFICANT CHANGE UP (ref 135–145)
WBC # BLD: 7.83 K/UL — SIGNIFICANT CHANGE UP (ref 3.8–10.5)
WBC # FLD AUTO: 7.83 K/UL — SIGNIFICANT CHANGE UP (ref 3.8–10.5)

## 2018-11-12 RX ORDER — INSULIN GLARGINE 100 [IU]/ML
20 INJECTION, SOLUTION SUBCUTANEOUS AT BEDTIME
Qty: 0 | Refills: 0 | Status: DISCONTINUED | OUTPATIENT
Start: 2018-11-12 | End: 2018-11-13

## 2018-11-12 RX ADMIN — INSULIN GLARGINE 20 UNIT(S): 100 INJECTION, SOLUTION SUBCUTANEOUS at 22:59

## 2018-11-12 RX ADMIN — HEPARIN SODIUM 5000 UNIT(S): 5000 INJECTION INTRAVENOUS; SUBCUTANEOUS at 06:19

## 2018-11-12 RX ADMIN — Medication 25 MILLIGRAM(S): at 17:14

## 2018-11-12 RX ADMIN — GABAPENTIN 600 MILLIGRAM(S): 400 CAPSULE ORAL at 22:59

## 2018-11-12 RX ADMIN — Medication 1 TABLET(S): at 11:54

## 2018-11-12 RX ADMIN — ATORVASTATIN CALCIUM 40 MILLIGRAM(S): 80 TABLET, FILM COATED ORAL at 22:59

## 2018-11-12 RX ADMIN — PIPERACILLIN AND TAZOBACTAM 25 GRAM(S): 4; .5 INJECTION, POWDER, LYOPHILIZED, FOR SOLUTION INTRAVENOUS at 14:08

## 2018-11-12 RX ADMIN — Medication 3 MILLILITER(S): at 09:08

## 2018-11-12 RX ADMIN — GABAPENTIN 600 MILLIGRAM(S): 400 CAPSULE ORAL at 14:08

## 2018-11-12 RX ADMIN — PIPERACILLIN AND TAZOBACTAM 25 GRAM(S): 4; .5 INJECTION, POWDER, LYOPHILIZED, FOR SOLUTION INTRAVENOUS at 23:03

## 2018-11-12 RX ADMIN — OXYCODONE HYDROCHLORIDE 5 MILLIGRAM(S): 5 TABLET ORAL at 17:17

## 2018-11-12 RX ADMIN — Medication 100 MILLIGRAM(S): at 06:19

## 2018-11-12 RX ADMIN — Medication 3 MILLILITER(S): at 20:03

## 2018-11-12 RX ADMIN — Medication 10: at 16:55

## 2018-11-12 RX ADMIN — PANTOPRAZOLE SODIUM 40 MILLIGRAM(S): 20 TABLET, DELAYED RELEASE ORAL at 17:14

## 2018-11-12 RX ADMIN — GABAPENTIN 600 MILLIGRAM(S): 400 CAPSULE ORAL at 06:19

## 2018-11-12 RX ADMIN — Medication 6: at 11:49

## 2018-11-12 RX ADMIN — ARIPIPRAZOLE 20 MILLIGRAM(S): 15 TABLET ORAL at 11:53

## 2018-11-12 RX ADMIN — PIPERACILLIN AND TAZOBACTAM 25 GRAM(S): 4; .5 INJECTION, POWDER, LYOPHILIZED, FOR SOLUTION INTRAVENOUS at 06:15

## 2018-11-12 RX ADMIN — PANTOPRAZOLE SODIUM 40 MILLIGRAM(S): 20 TABLET, DELAYED RELEASE ORAL at 06:19

## 2018-11-12 RX ADMIN — Medication 25 MILLIGRAM(S): at 06:19

## 2018-11-12 RX ADMIN — RANOLAZINE 1000 MILLIGRAM(S): 500 TABLET, FILM COATED, EXTENDED RELEASE ORAL at 06:21

## 2018-11-12 RX ADMIN — RANOLAZINE 1000 MILLIGRAM(S): 500 TABLET, FILM COATED, EXTENDED RELEASE ORAL at 17:14

## 2018-11-12 RX ADMIN — Medication 112 MICROGRAM(S): at 06:20

## 2018-11-12 RX ADMIN — HEPARIN SODIUM 5000 UNIT(S): 5000 INJECTION INTRAVENOUS; SUBCUTANEOUS at 17:13

## 2018-11-12 RX ADMIN — OXYCODONE HYDROCHLORIDE 5 MILLIGRAM(S): 5 TABLET ORAL at 18:02

## 2018-11-12 RX ADMIN — Medication 3 MILLILITER(S): at 13:21

## 2018-11-12 RX ADMIN — Medication 2: at 23:05

## 2018-11-12 RX ADMIN — Medication 6: at 08:27

## 2018-11-12 NOTE — PROGRESS NOTE ADULT - SUBJECTIVE AND OBJECTIVE BOX
Pt jade.  no acute events overnight.        Vital Signs Last 24 Hrs  T(C): 37.2 (12 Nov 2018 05:20), Max: 37.2 (12 Nov 2018 05:20)  T(F): 98.9 (12 Nov 2018 05:20), Max: 98.9 (12 Nov 2018 05:20)  HR: 80 (12 Nov 2018 05:20) (72 - 93)  BP: 132/50 (12 Nov 2018 05:20) (122/51 - 148/55)  BP(mean): --  RR: 16 (12 Nov 2018 05:20) (16 - 16)  SpO2: 97% (12 Nov 2018 05:20) (94% - 100%)    PE:     PHYSICAL EXAM:  General; Awake and alert, Oriented x 3  Spine exam: DSG CDI;   Spine:                       Motor exam:          Right Upper extremity: Delt 5/5, Biceps 5/5, Triceps 5/5, Wrist Ext 5/5, Wrist Flexion 5/5,  Strength 5/5         SILT C5-S1, No Clonus, Negative [place], +RP, Compartments soft           Left Upper extremity: Delt 5/5, Biceps 5/5, Triceps 5/5, Wrist Ext 5/5, Wrist Flexion 5/5,  Strength 5/5         SILT C5-S1, No Clonus, Negative [place], +RP, Compartments soft           Right Lower Extremity: Hip Flexion 5/5, Hip Extension 5/5, Knee Flexion 5/5, Knee Extension 5/5, Ankle Dorsiflexion 5/5,          Ankle Plantar Flexion 5/5, Extensor hallucis Longus 5/5         SILT L2-S1, No pain with SLR, Negative Clonus, Negative Babinski                  Left Lower Extremity: Hip Flexion 4/5, Hip Extension 5/5, Knee Flexion 5/5, Knee Extension 4/5, Ankle Dorsiflexion 3/5,          Ankle Plantar Flexion 5/5, Extensor hallucis Longus 3/5         SILT L2-S1, No pain with SLR, Negative Clonus, Negative Babinski                                            A/P: 57F s/p L4-5 PSF POD #4    Pain control  WBAT  OOB w PT  fu labs  med tx for PNA  Dispo planning

## 2018-11-12 NOTE — PROGRESS NOTE ADULT - PROBLEM SELECTOR PLAN 1
Mobilize  Incentive spirometer.  Possible d/c home tomorrow is she can walk more today.   Continue IV Zosyn while in hospital for possible pneumonia.

## 2018-11-12 NOTE — PROGRESS NOTE ADULT - SUBJECTIVE AND OBJECTIVE BOX
[facility]                                                           Orthopedic Spine Progress Note      POST OPERATIVE DAY #: 4  STATUS POST: Lumbar lami/fusion L4/5                 Pre-Op Dx: Lumbar stenosis with neurogenic claudication  Spondylolisthesis at L4-L5 level    Post-Op Dx:  Lumbar stenosis with neurogenic claudication  Spondylolisthesis at L4-L5 level  SUBJECTIVE: Patient seen and examined. She feels she is moving the left foot a little better.   Medicine note appreciated.   Vital Signs Last 24 Hrs  T(C): 37.2 (12 Nov 2018 05:20), Max: 37.2 (12 Nov 2018 05:20)  T(F): 98.9 (12 Nov 2018 05:20), Max: 98.9 (12 Nov 2018 05:20)  HR: 80 (12 Nov 2018 05:20) (72 - 93)  BP: 132/50 (12 Nov 2018 05:20) (122/51 - 148/55)  BP(mean): --  RR: 16 (12 Nov 2018 05:20) (16 - 16)  SpO2: 97% (12 Nov 2018 05:20) (94% - 100%)    OBJECTIVE: NAD A&Ox3  Afebrile  Moving LE well except the left foot. Moving this a little better than since post op, AT and EHL 3/5  Wound looks good. Dressing saturated with Urine. Used Tegaderm and gauze.                                LABS:                        8.6    7.83  )-----------( 195      ( 12 Nov 2018 06:10 )             25.6     11-12    135  |  101  |  11  ----------------------------<  256<H>  3.8   |  25  |  0.82    Ca    8.2<L>      12 Nov 2018 06:10    TPro  6.3  /  Alb  2.2<L>  /  TBili  0.4  /  DBili  x   /  AST  23  /  ALT  28  /  AlkPhos  146<H>  11-12    RAD:  CT scan of Lumbar spine reviewed by Dr. Bryan, instrumentation in good position  CT lungs: Atelectasis vs. Pneumonia, now on IV Zosyn

## 2018-11-12 NOTE — PROGRESS NOTE ADULT - ASSESSMENT
Pt is a 56 y/o female with h/o angina, anxiety, bipolar d/o, hypothyroidism, hyperlipidemia, type 2 diabetes on insulin, COPD, bipolar disorder, lumbar stenosis who has been having increasing LBP.  She was admitted for elective L4-L5 laminectomy with fusion.  Postop medicine consult called for comanagement.      * Lumbar Stenosis-s/p L4-L5 laminectomy with fusion, continue pain control, PT, monitor postop labs, further management per spine surgery.  * HTN-monitor postop bp on metoprolol, hold lisinopril for now  * Blt lower lobe pneumonia- hard to differntiate from atelectasis, continue empiric zosyn, clinically lung exam better, continue nebs, encouraged use of incentive spirometry.  When ready for d/c by spine can d/c home on augmentin 875 mg po q12h for 7 days.  * CAD-stable, continue ranexa, atorvastatin, metoprolol, monitor off asa and plavix (resume when okay with spine)  * Type 2 Diabetes-continue lower dose lantus with ISS until better po diet.    * Tobacco Use-reviewed importance of smoking cessation ? underlying COPD, nebs prn, encouraged use of incentive spirometry, PFTs as outpt  * Acute blood Loss anemia-monitor cbc and drain outpt.  * Hypothyroidism-synthroid  * Hyperlipidemia-atorvastatin  * Disp-OOB, PT   * Proph- heparin    * Comm- d/w pt, RN Pt is a 58 y/o female with h/o angina, anxiety, bipolar d/o, hypothyroidism, hyperlipidemia, type 2 diabetes on insulin, COPD, bipolar disorder, lumbar stenosis who has been having increasing LBP.  She was admitted for elective L4-L5 laminectomy with fusion.  Postop medicine consult called for comanagement.      * Lumbar Stenosis-s/p L4-L5 laminectomy with fusion, continue pain control, PT, monitor postop labs, further management per spine surgery.  * HTN-monitor postop bp on metoprolol, hold lisinopril for now  * Blt lower lobe pneumonia- hard to differntiate from atelectasis, continue empiric zosyn, clinically lung exam better, continue nebs, encouraged use of incentive spirometry.  When ready for d/c by spine can d/c home on augmentin 875 mg po q12h for 7 days.  * CAD-stable, continue ranexa, atorvastatin, metoprolol, monitor off asa and plavix (resume when okay with spine)  * Type 2 Diabetes-bgm elevated, inc lantus, continue with ISS    * Tobacco Use-reviewed importance of smoking cessation ? underlying COPD, nebs prn, encouraged use of incentive spirometry, PFTs as outpt  * Acute blood Loss anemia-monitor cbc and drain outpt.  * Hypothyroidism-synthroid  * Hyperlipidemia-atorvastatin  * Disp-OOB, PT   * Proph- heparin    * Comm- d/w pt, RN

## 2018-11-12 NOTE — PROGRESS NOTE ADULT - SUBJECTIVE AND OBJECTIVE BOX
Pt is c/o LBP.  She was started on IV zosyn last night due to evidence of blt lower lobe pneumonia on CT chest.  Pt feels better, less cough.    Date of Service: 11-12-18 @ 08:02    Vital Signs Last 24 Hrs  T(C): 37.2 (12 Nov 2018 05:20), Max: 37.2 (12 Nov 2018 05:20)  T(F): 98.9 (12 Nov 2018 05:20), Max: 98.9 (12 Nov 2018 05:20)  HR: 80 (12 Nov 2018 05:20) (72 - 93)  BP: 132/50 (12 Nov 2018 05:20) (122/51 - 148/55)  BP(mean): --  RR: 16 (12 Nov 2018 05:20) (16 - 16)  SpO2: 97% (12 Nov 2018 05:20) (94% - 100%)    Daily     Daily     I&O's Detail    11 Nov 2018 07:01  -  12 Nov 2018 07:00  --------------------------------------------------------  IN:    IV PiggyBack: 100 mL    Oral Fluid: 720 mL  Total IN: 820 mL    OUT:    Voided: 1251 mL  Total OUT: 1251 mL    Total NET: -431 mL          CAPILLARY BLOOD GLUCOSE      POCT Blood Glucose.: 290 mg/dL (12 Nov 2018 07:34)  POCT Blood Glucose.: 290 mg/dL (11 Nov 2018 23:06)  POCT Blood Glucose.: 288 mg/dL (11 Nov 2018 16:27)  POCT Blood Glucose.: 294 mg/dL (11 Nov 2018 11:26)                                      8.6    7.83  )-----------( 195      ( 12 Nov 2018 06:10 )             25.6       11-12    135  |  101  |  11  ----------------------------<  256<H>  3.8   |  25  |  0.82    Ca    8.2<L>      12 Nov 2018 06:10    TPro  6.3  /  Alb  2.2<L>  /  TBili  0.4  /  DBili  x   /  AST  23  /  ALT  28  /  AlkPhos  146<H>  11-12          LIVER FUNCTIONS - ( 12 Nov 2018 06:10 )  Alb: 2.2 g/dL / Pro: 6.3 gm/dL / ALK PHOS: 146 U/L / ALT: 28 U/L / AST: 23 U/L / GGT: x             CT chest-< from: CT Chest No Cont (11.11.18 @ 10:14) >  IMPRESSION:   Bilateral lower lobe pneumonia.     Extensive atherosclerotic arterial calcification, greater than expected   for the patient's age, including diffuse coronary artery calcification.      < end of copied text >          MEDICATIONS  (STANDING):  ALBUTerol/ipratropium for Nebulization 3 milliLiter(s) Nebulizer every 6 hours  ARIPiprazole 20 milliGRAM(s) Oral daily  atorvastatin 40 milliGRAM(s) Oral at bedtime  dextrose 5%. 1000 milliLiter(s) (50 mL/Hr) IV Continuous <Continuous>  dextrose 50% Injectable 12.5 Gram(s) IV Push once  dextrose 50% Injectable 25 Gram(s) IV Push once  dextrose 50% Injectable 25 Gram(s) IV Push once  docusate sodium 100 milliGRAM(s) Oral three times a day  gabapentin 600 milliGRAM(s) Oral three times a day  heparin  Injectable 5000 Unit(s) SubCutaneous every 12 hours  insulin glargine Injectable (LANTUS) 8 Unit(s) SubCutaneous at bedtime  insulin lispro (HumaLOG) corrective regimen sliding scale   SubCutaneous three times a day before meals  insulin lispro (HumaLOG) corrective regimen sliding scale   SubCutaneous at bedtime  levothyroxine 112 MICROGram(s) Oral daily  metoprolol tartrate 25 milliGRAM(s) Oral every 12 hours  multivitamin 1 Tablet(s) Oral daily  pantoprazole    Tablet 40 milliGRAM(s) Oral two times a day  piperacillin/tazobactam IVPB. 3.375 Gram(s) IV Intermittent every 8 hours  ranolazine 1000 milliGRAM(s) Oral two times a day    MEDICATIONS  (PRN):  acetaminophen   Tablet .. 650 milliGRAM(s) Oral every 6 hours PRN Temp greater or equal to 38C (100.4F)  dextrose 40% Gel 15 Gram(s) Oral once PRN Blood Glucose LESS THAN 70 milliGRAM(s)/deciliter  diphenhydrAMINE   Injectable 25 milliGRAM(s) IV Push every 4 hours PRN Pruritus  glucagon  Injectable 1 milliGRAM(s) IntraMuscular once PRN Glucose LESS THAN 70 milligrams/deciliter  HYDROmorphone  Injectable 0.5 milliGRAM(s) IV Push every 3 hours PRN Severe Pain (7 - 10)  HYDROmorphone PCA (1 mG/mL) Rescue Clinician Bolus 1 milliGRAM(s) IV Push every 15 minutes PRN for Pain Scale GREATER THAN 6  naloxone Injectable 0.1 milliGRAM(s) IV Push every 3 minutes PRN For ANY of the following changes in patient status:  A. RR LESS THAN 10 breaths per minute, B. Oxygen saturation LESS THAN 90%, C. Sedation score of 6  ondansetron Injectable 4 milliGRAM(s) IV Push every 6 hours PRN Nausea  oxyCODONE    IR 5 milliGRAM(s) Oral every 4 hours PRN Moderate Pain (4 - 6)  oxyCODONE    IR 10 milliGRAM(s) Oral every 4 hours PRN Severe Pain (7 - 10)

## 2018-11-12 NOTE — PROGRESS NOTE ADULT - ASSESSMENT
Continue Zosyn while inhouse. May be placed on Augmentin  PO 875mg q12 x 7 days when discharged.  MOBILIZE!  I discussed home vs. Rehab. She would like to go home.   She needs to get up and walk more than she has. This was discussed with patient at length.

## 2018-11-13 LAB
ALBUMIN SERPL ELPH-MCNC: 2.1 G/DL — LOW (ref 3.3–5)
ALP SERPL-CCNC: 132 U/L — HIGH (ref 40–120)
ALT FLD-CCNC: 27 U/L — SIGNIFICANT CHANGE UP (ref 12–78)
ANION GAP SERPL CALC-SCNC: 8 MMOL/L — SIGNIFICANT CHANGE UP (ref 5–17)
AST SERPL-CCNC: 21 U/L — SIGNIFICANT CHANGE UP (ref 15–37)
BILIRUB SERPL-MCNC: 0.4 MG/DL — SIGNIFICANT CHANGE UP (ref 0.2–1.2)
BUN SERPL-MCNC: 13 MG/DL — SIGNIFICANT CHANGE UP (ref 7–23)
CALCIUM SERPL-MCNC: 8.5 MG/DL — SIGNIFICANT CHANGE UP (ref 8.5–10.1)
CHLORIDE SERPL-SCNC: 105 MMOL/L — SIGNIFICANT CHANGE UP (ref 96–108)
CO2 SERPL-SCNC: 27 MMOL/L — SIGNIFICANT CHANGE UP (ref 22–31)
CREAT SERPL-MCNC: 0.83 MG/DL — SIGNIFICANT CHANGE UP (ref 0.5–1.3)
GLUCOSE BLDC GLUCOMTR-MCNC: 241 MG/DL — HIGH (ref 70–99)
GLUCOSE BLDC GLUCOMTR-MCNC: 250 MG/DL — HIGH (ref 70–99)
GLUCOSE BLDC GLUCOMTR-MCNC: 272 MG/DL — HIGH (ref 70–99)
GLUCOSE BLDC GLUCOMTR-MCNC: 338 MG/DL — HIGH (ref 70–99)
GLUCOSE SERPL-MCNC: 200 MG/DL — HIGH (ref 70–99)
HCT VFR BLD CALC: 26.1 % — LOW (ref 34.5–45)
HGB BLD-MCNC: 8.6 G/DL — LOW (ref 11.5–15.5)
MCHC RBC-ENTMCNC: 30.2 PG — SIGNIFICANT CHANGE UP (ref 27–34)
MCHC RBC-ENTMCNC: 33 GM/DL — SIGNIFICANT CHANGE UP (ref 32–36)
MCV RBC AUTO: 91.6 FL — SIGNIFICANT CHANGE UP (ref 80–100)
NRBC # BLD: 0 /100 WBCS — SIGNIFICANT CHANGE UP (ref 0–0)
PLATELET # BLD AUTO: 218 K/UL — SIGNIFICANT CHANGE UP (ref 150–400)
POTASSIUM SERPL-MCNC: 3.8 MMOL/L — SIGNIFICANT CHANGE UP (ref 3.5–5.3)
POTASSIUM SERPL-SCNC: 3.8 MMOL/L — SIGNIFICANT CHANGE UP (ref 3.5–5.3)
PROT SERPL-MCNC: 6.2 GM/DL — SIGNIFICANT CHANGE UP (ref 6–8.3)
RBC # BLD: 2.85 M/UL — LOW (ref 3.8–5.2)
RBC # FLD: 12.1 % — SIGNIFICANT CHANGE UP (ref 10.3–14.5)
SODIUM SERPL-SCNC: 140 MMOL/L — SIGNIFICANT CHANGE UP (ref 135–145)
WBC # BLD: 6.59 K/UL — SIGNIFICANT CHANGE UP (ref 3.8–10.5)
WBC # FLD AUTO: 6.59 K/UL — SIGNIFICANT CHANGE UP (ref 3.8–10.5)

## 2018-11-13 RX ORDER — INSULIN GLARGINE 100 [IU]/ML
30 INJECTION, SOLUTION SUBCUTANEOUS AT BEDTIME
Qty: 0 | Refills: 0 | Status: DISCONTINUED | OUTPATIENT
Start: 2018-11-13 | End: 2018-11-14

## 2018-11-13 RX ADMIN — Medication 8: at 16:49

## 2018-11-13 RX ADMIN — OXYCODONE HYDROCHLORIDE 10 MILLIGRAM(S): 5 TABLET ORAL at 16:54

## 2018-11-13 RX ADMIN — ARIPIPRAZOLE 20 MILLIGRAM(S): 15 TABLET ORAL at 11:48

## 2018-11-13 RX ADMIN — PANTOPRAZOLE SODIUM 40 MILLIGRAM(S): 20 TABLET, DELAYED RELEASE ORAL at 18:18

## 2018-11-13 RX ADMIN — HEPARIN SODIUM 5000 UNIT(S): 5000 INJECTION INTRAVENOUS; SUBCUTANEOUS at 18:18

## 2018-11-13 RX ADMIN — PANTOPRAZOLE SODIUM 40 MILLIGRAM(S): 20 TABLET, DELAYED RELEASE ORAL at 07:14

## 2018-11-13 RX ADMIN — Medication 3 MILLILITER(S): at 08:04

## 2018-11-13 RX ADMIN — GABAPENTIN 600 MILLIGRAM(S): 400 CAPSULE ORAL at 07:14

## 2018-11-13 RX ADMIN — OXYCODONE HYDROCHLORIDE 10 MILLIGRAM(S): 5 TABLET ORAL at 16:12

## 2018-11-13 RX ADMIN — OXYCODONE HYDROCHLORIDE 5 MILLIGRAM(S): 5 TABLET ORAL at 07:27

## 2018-11-13 RX ADMIN — PIPERACILLIN AND TAZOBACTAM 25 GRAM(S): 4; .5 INJECTION, POWDER, LYOPHILIZED, FOR SOLUTION INTRAVENOUS at 15:06

## 2018-11-13 RX ADMIN — Medication 25 MILLIGRAM(S): at 07:14

## 2018-11-13 RX ADMIN — ATORVASTATIN CALCIUM 40 MILLIGRAM(S): 80 TABLET, FILM COATED ORAL at 23:28

## 2018-11-13 RX ADMIN — PIPERACILLIN AND TAZOBACTAM 25 GRAM(S): 4; .5 INJECTION, POWDER, LYOPHILIZED, FOR SOLUTION INTRAVENOUS at 07:14

## 2018-11-13 RX ADMIN — RANOLAZINE 1000 MILLIGRAM(S): 500 TABLET, FILM COATED, EXTENDED RELEASE ORAL at 07:14

## 2018-11-13 RX ADMIN — GABAPENTIN 600 MILLIGRAM(S): 400 CAPSULE ORAL at 23:28

## 2018-11-13 RX ADMIN — Medication 112 MICROGRAM(S): at 07:14

## 2018-11-13 RX ADMIN — GABAPENTIN 600 MILLIGRAM(S): 400 CAPSULE ORAL at 15:07

## 2018-11-13 RX ADMIN — Medication 4: at 08:19

## 2018-11-13 RX ADMIN — OXYCODONE HYDROCHLORIDE 5 MILLIGRAM(S): 5 TABLET ORAL at 08:22

## 2018-11-13 RX ADMIN — Medication 3 MILLILITER(S): at 14:24

## 2018-11-13 RX ADMIN — PIPERACILLIN AND TAZOBACTAM 25 GRAM(S): 4; .5 INJECTION, POWDER, LYOPHILIZED, FOR SOLUTION INTRAVENOUS at 23:27

## 2018-11-13 RX ADMIN — Medication 6: at 11:48

## 2018-11-13 RX ADMIN — Medication 1 TABLET(S): at 11:48

## 2018-11-13 RX ADMIN — Medication 3 MILLILITER(S): at 19:43

## 2018-11-13 RX ADMIN — HEPARIN SODIUM 5000 UNIT(S): 5000 INJECTION INTRAVENOUS; SUBCUTANEOUS at 07:14

## 2018-11-13 RX ADMIN — INSULIN GLARGINE 30 UNIT(S): 100 INJECTION, SOLUTION SUBCUTANEOUS at 23:29

## 2018-11-13 RX ADMIN — RANOLAZINE 1000 MILLIGRAM(S): 500 TABLET, FILM COATED, EXTENDED RELEASE ORAL at 18:18

## 2018-11-13 RX ADMIN — Medication 25 MILLIGRAM(S): at 18:18

## 2018-11-13 NOTE — PROGRESS NOTE ADULT - SUBJECTIVE AND OBJECTIVE BOX
57 female  POD 5 L4-5 lami/fusion  VSS, afebrile  resting comfortably  preop lower extremity markedly improved  weakness left TA/EHL persists (3/5)  sensation intact throughout all dermatomes to light touch  incision clean and dry  no calf tenderness  on abx for pneumonia as per medicine team  continue scd's  incentive spirometry  mobilization, need to do stairs with PT  tentative d/c home tomorrow vs rehab tomorrow pending progress with PT

## 2018-11-13 NOTE — PROGRESS NOTE ADULT - SUBJECTIVE AND OBJECTIVE BOX
Pt jade.  no acute events overnight.        Vital Signs Last 24 Hrs  T(C): 36.8 (13 Nov 2018 06:50), Max: 37.7 (12 Nov 2018 17:00)  T(F): 98.2 (13 Nov 2018 06:50), Max: 99.9 (12 Nov 2018 17:00)  HR: 80 (13 Nov 2018 06:50) (72 - 84)  BP: 116/49 (13 Nov 2018 06:50) (91/57 - 133/59)  BP(mean): --  RR: 16 (12 Nov 2018 23:50) (16 - 17)  SpO2: 94% (13 Nov 2018 06:50) (94% - 100%)    PE:     PHYSICAL EXAM:  General; Awake and alert, Oriented x 3  Spine exam: DSG CDI;   Spine:                       Motor exam:          Right Upper extremity: Delt 5/5, Biceps 5/5, Triceps 5/5, Wrist Ext 5/5, Wrist Flexion 5/5,  Strength 5/5         SILT C5-S1, No Clonus, Negative [place], +RP, Compartments soft           Left Upper extremity: Delt 5/5, Biceps 5/5, Triceps 5/5, Wrist Ext 5/5, Wrist Flexion 5/5,  Strength 5/5         SILT C5-S1, No Clonus, Negative [place], +RP, Compartments soft           Right Lower Extremity: Hip Flexion 5/5, Hip Extension 5/5, Knee Flexion 5/5, Knee Extension 5/5, Ankle Dorsiflexion 5/5,          Ankle Plantar Flexion 5/5, Extensor hallucis Longus 5/5         SILT L2-S1, No pain with SLR, Negative Clonus, Negative Babinski                  Left Lower Extremity: Hip Flexion 4/5, Hip Extension 5/5, Knee Flexion 5/5, Knee Extension 4/5, Ankle Dorsiflexion 1-2/5,          Ankle Plantar Flexion 5/5, Extensor hallucis Longus 1-2/5         SILT L2-S1, No pain with SLR, Negative Clonus, Negative Babinski                                            A/P: 57F s/p L4-5 PSF POD #5    Pain control  WBAT  OOB w PT  fu labs   PNA tx  AFO LLE for ambulation  Dispo planning

## 2018-11-13 NOTE — PROGRESS NOTE ADULT - SUBJECTIVE AND OBJECTIVE BOX
Pt is c/o LBP from surgery, cough better, no new complaints.  Tolerating IV zosyn.      Date of Service: 11-13-18 @ 10:40    Vital Signs Last 24 Hrs  T(C): 36.8 (13 Nov 2018 06:50), Max: 37.7 (12 Nov 2018 17:00)  T(F): 98.2 (13 Nov 2018 06:50), Max: 99.9 (12 Nov 2018 17:00)  HR: 80 (13 Nov 2018 08:05) (72 - 86)  BP: 116/49 (13 Nov 2018 06:50) (91/57 - 133/59)  BP(mean): --  RR: 16 (12 Nov 2018 23:50) (16 - 17)  SpO2: 94% (13 Nov 2018 06:50) (94% - 100%)    Daily     Daily     I&O's Detail    12 Nov 2018 07:01  -  13 Nov 2018 07:00  --------------------------------------------------------  IN:    IV PiggyBack: 100 mL  Total IN: 100 mL    OUT:    Voided: 1 mL  Total OUT: 1 mL    Total NET: 99 mL          CAPILLARY BLOOD GLUCOSE      POCT Blood Glucose.: 250 mg/dL (13 Nov 2018 08:11)  POCT Blood Glucose.: 319 mg/dL (12 Nov 2018 22:51)  POCT Blood Glucose.: 353 mg/dL (12 Nov 2018 16:53)  POCT Blood Glucose.: 286 mg/dL (12 Nov 2018 11:48)                                      8.6    6.59  )-----------( 218      ( 13 Nov 2018 05:46 )             26.1       11-13    140  |  105  |  13  ----------------------------<  200<H>  3.8   |  27  |  0.83    Ca    8.5      13 Nov 2018 05:46    TPro  6.2  /  Alb  2.1<L>  /  TBili  0.4  /  DBili  x   /  AST  21  /  ALT  27  /  AlkPhos  132<H>  11-13          LIVER FUNCTIONS - ( 13 Nov 2018 05:46 )  Alb: 2.1 g/dL / Pro: 6.2 gm/dL / ALK PHOS: 132 U/L / ALT: 27 U/L / AST: 21 U/L / GGT: x                     MEDICATIONS  (STANDING):  ALBUTerol/ipratropium for Nebulization 3 milliLiter(s) Nebulizer every 6 hours  ARIPiprazole 20 milliGRAM(s) Oral daily  atorvastatin 40 milliGRAM(s) Oral at bedtime  dextrose 5%. 1000 milliLiter(s) (50 mL/Hr) IV Continuous <Continuous>  dextrose 50% Injectable 12.5 Gram(s) IV Push once  dextrose 50% Injectable 25 Gram(s) IV Push once  dextrose 50% Injectable 25 Gram(s) IV Push once  docusate sodium 100 milliGRAM(s) Oral three times a day  gabapentin 600 milliGRAM(s) Oral three times a day  heparin  Injectable 5000 Unit(s) SubCutaneous every 12 hours  insulin glargine Injectable (LANTUS) 20 Unit(s) SubCutaneous at bedtime  insulin lispro (HumaLOG) corrective regimen sliding scale   SubCutaneous three times a day before meals  insulin lispro (HumaLOG) corrective regimen sliding scale   SubCutaneous at bedtime  levothyroxine 112 MICROGram(s) Oral daily  metoprolol tartrate 25 milliGRAM(s) Oral every 12 hours  multivitamin 1 Tablet(s) Oral daily  pantoprazole    Tablet 40 milliGRAM(s) Oral two times a day  piperacillin/tazobactam IVPB. 3.375 Gram(s) IV Intermittent every 8 hours  ranolazine 1000 milliGRAM(s) Oral two times a day    MEDICATIONS  (PRN):  acetaminophen   Tablet .. 650 milliGRAM(s) Oral every 6 hours PRN Temp greater or equal to 38C (100.4F)  dextrose 40% Gel 15 Gram(s) Oral once PRN Blood Glucose LESS THAN 70 milliGRAM(s)/deciliter  diphenhydrAMINE   Injectable 25 milliGRAM(s) IV Push every 4 hours PRN Pruritus  glucagon  Injectable 1 milliGRAM(s) IntraMuscular once PRN Glucose LESS THAN 70 milligrams/deciliter  HYDROmorphone  Injectable 0.5 milliGRAM(s) IV Push every 3 hours PRN Severe Pain (7 - 10)  HYDROmorphone PCA (1 mG/mL) Rescue Clinician Bolus 1 milliGRAM(s) IV Push every 15 minutes PRN for Pain Scale GREATER THAN 6  naloxone Injectable 0.1 milliGRAM(s) IV Push every 3 minutes PRN For ANY of the following changes in patient status:  A. RR LESS THAN 10 breaths per minute, B. Oxygen saturation LESS THAN 90%, C. Sedation score of 6  ondansetron Injectable 4 milliGRAM(s) IV Push every 6 hours PRN Nausea  oxyCODONE    IR 5 milliGRAM(s) Oral every 4 hours PRN Moderate Pain (4 - 6)  oxyCODONE    IR 10 milliGRAM(s) Oral every 4 hours PRN Severe Pain (7 - 10)

## 2018-11-13 NOTE — PROGRESS NOTE ADULT - SUBJECTIVE AND OBJECTIVE BOX
POD#5. Pt seen resting on chair reading a book. Pt in good spirits and states "I feel so much better compared to yesterday. I was able to get up from bed". Pt did not do physical therapy sessions yet. She has incisional site pain tolerable with current meds. She has left buttock pain radiating to posterior aspect of LLE to the left foot since surgery. She is able to void on own without complications. Pt denies chest pain    PE  Gen appearance: NAD  Motor strength: 3/5 of the left ant tib and left EHL otherwise 5/5 of b/l lower ext  Sensation: hyposensitive of the dorsum of left foot otherwise intact throughout  No calf tenderness bilaterally  Incisional site: clean and dry. Dressing clean, but changed to visualize wound.     Plan  tmax:99.9, VSS  WBC:6.59, H/H: 8.6/26.1 manage per Med  Cont abx per Med team  Mobilize with physical therapy  DC home vs rehab tomorrow   Encouraged incentive spirometer.

## 2018-11-13 NOTE — PROGRESS NOTE ADULT - ASSESSMENT
Pt is a 58 y/o female with h/o angina, anxiety, bipolar d/o, hypothyroidism, hyperlipidemia, type 2 diabetes on insulin, COPD, bipolar disorder, lumbar stenosis who has been having increasing LBP.  She was admitted for elective L4-L5 laminectomy with fusion.  Postop medicine consult called for comanagement.      * Lumbar Stenosis-s/p L4-L5 laminectomy with fusion, continue pain control, PT, monitor postop labs, further management per spine surgery.  * HTN-monitor postop bp on metoprolol, hold lisinopril for now  * Blt lower lobe pneumonia- hard to differntiate from atelectasis, continue empiric zosyn, clinically improving, continue nebs, encouraged use of incentive spirometry.  When ready for d/c by spine can d/c home on augmentin 875 mg po q12h for 7 days.    * CAD-stable, continue ranexa, atorvastatin, metoprolol, monitor off asa and plavix (resume when okay with spine, not for 1 week per PA).  * Type 2 Diabetes- bgm remains elevated, inc lantus to 30 units, continue with ISS    * Tobacco Use-reviewed importance of smoking cessation ? underlying COPD, nebs prn, encouraged use of incentive spirometry, PFTs as outpt  * Acute blood Loss anemia-monitor cbc    * Hypothyroidism-synthroid  * Hyperlipidemia-atorvastatin  * Disp-OOB, PT, s/c planning per spine surgery  * Proph- heparin    * Comm- d/w pt, RN

## 2018-11-14 ENCOUNTER — TRANSCRIPTION ENCOUNTER (OUTPATIENT)
Age: 58
End: 2018-11-14

## 2018-11-14 VITALS
SYSTOLIC BLOOD PRESSURE: 117 MMHG | HEART RATE: 76 BPM | OXYGEN SATURATION: 98 % | TEMPERATURE: 98 F | DIASTOLIC BLOOD PRESSURE: 49 MMHG | RESPIRATION RATE: 16 BRPM

## 2018-11-14 LAB
GLUCOSE BLDC GLUCOMTR-MCNC: 211 MG/DL — HIGH (ref 70–99)
GLUCOSE BLDC GLUCOMTR-MCNC: 301 MG/DL — HIGH (ref 70–99)

## 2018-11-14 RX ORDER — OXYCODONE HYDROCHLORIDE 5 MG/1
1 TABLET ORAL
Qty: 0 | Refills: 0 | COMMUNITY
Start: 2018-11-14

## 2018-11-14 RX ADMIN — Medication 4: at 08:23

## 2018-11-14 RX ADMIN — RANOLAZINE 1000 MILLIGRAM(S): 500 TABLET, FILM COATED, EXTENDED RELEASE ORAL at 06:57

## 2018-11-14 RX ADMIN — OXYCODONE HYDROCHLORIDE 10 MILLIGRAM(S): 5 TABLET ORAL at 09:46

## 2018-11-14 RX ADMIN — PANTOPRAZOLE SODIUM 40 MILLIGRAM(S): 20 TABLET, DELAYED RELEASE ORAL at 06:57

## 2018-11-14 RX ADMIN — Medication 1 TABLET(S): at 12:04

## 2018-11-14 RX ADMIN — HEPARIN SODIUM 5000 UNIT(S): 5000 INJECTION INTRAVENOUS; SUBCUTANEOUS at 06:56

## 2018-11-14 RX ADMIN — Medication 112 MICROGRAM(S): at 06:57

## 2018-11-14 RX ADMIN — OXYCODONE HYDROCHLORIDE 10 MILLIGRAM(S): 5 TABLET ORAL at 02:36

## 2018-11-14 RX ADMIN — PIPERACILLIN AND TAZOBACTAM 25 GRAM(S): 4; .5 INJECTION, POWDER, LYOPHILIZED, FOR SOLUTION INTRAVENOUS at 06:57

## 2018-11-14 RX ADMIN — Medication 8: at 12:04

## 2018-11-14 RX ADMIN — Medication 3 MILLILITER(S): at 08:42

## 2018-11-14 RX ADMIN — OXYCODONE HYDROCHLORIDE 10 MILLIGRAM(S): 5 TABLET ORAL at 10:40

## 2018-11-14 RX ADMIN — OXYCODONE HYDROCHLORIDE 10 MILLIGRAM(S): 5 TABLET ORAL at 02:06

## 2018-11-14 RX ADMIN — Medication 3 MILLILITER(S): at 02:10

## 2018-11-14 RX ADMIN — Medication 25 MILLIGRAM(S): at 06:57

## 2018-11-14 RX ADMIN — GABAPENTIN 600 MILLIGRAM(S): 400 CAPSULE ORAL at 06:56

## 2018-11-14 RX ADMIN — GABAPENTIN 600 MILLIGRAM(S): 400 CAPSULE ORAL at 13:20

## 2018-11-14 RX ADMIN — ARIPIPRAZOLE 20 MILLIGRAM(S): 15 TABLET ORAL at 12:04

## 2018-11-14 NOTE — PROGRESS NOTE ADULT - GASTROINTESTINAL DETAILS
nontender/bowel sounds normal/soft/no distention
no distention/soft/nontender/bowel sounds normal
soft/nontender/no distention/bowel sounds normal
no distention/nontender/soft/bowel sounds normal
soft/bowel sounds normal/nontender/no distention
no distention/bowel sounds normal/soft/nontender

## 2018-11-14 NOTE — PROGRESS NOTE ADULT - PSYCHIATRIC DETAILS
normal behavior/anxious
normal affect/normal behavior
normal behavior/normal affect
normal affect/normal behavior

## 2018-11-14 NOTE — DISCHARGE NOTE ADULT - CARE PLAN
Principal Discharge DX:	Spinal stenosis of lumbar region with neurogenic claudication  Goal:	increase strength, decrease pain, improve mobility  Assessment and plan of treatment:	surgery, analgesics, physical therapy

## 2018-11-14 NOTE — PROGRESS NOTE ADULT - RS GEN PE MLT RESP DETAILS PC
breath sounds equal/diminished breath sounds, R/respirations non-labored/diminished breath sounds, L
breath sounds equal/respirations non-labored/no wheezes/no rales/rhonchi
respirations non-labored/no rales/breath sounds equal/rhonchi
no wheezes/rhonchi/breath sounds equal/respirations non-labored/no rales
rhonchi/no wheezes/breath sounds equal/no rales/respirations non-labored
no rhonchi/diminished breath sounds, L/respirations non-labored/diminished breath sounds, R/breath sounds equal/no rales/no wheezes

## 2018-11-14 NOTE — PROGRESS NOTE ADULT - ASSESSMENT
Pt is a 56 y/o female with h/o angina, anxiety, bipolar d/o, hypothyroidism, hyperlipidemia, type 2 diabetes on insulin, COPD, bipolar disorder, lumbar stenosis who has been having increasing LBP.  She was admitted for elective L4-L5 laminectomy with fusion.  Postop medicine consult called for comanagement.      * Lumbar Stenosis-s/p L4-L5 laminectomy with fusion, continue pain control, PT, monitor postop labs, further management per spine surgery.  * HTN-monitor postop bp on metoprolol, resume lisinopril at discharge  * Blt lower lobe pneumonia- hard to differntiate from atelectasis, doing well, change zosyn to augmentin 875 mg po q12h for 5 more days, encouraged use of incentive spirometry.  Advised pt to f/u with her pulm Dr Vega as outpt  * CAD-stable, continue ranexa, atorvastatin, metoprolol, resume asa and plavix  when okay with spine   * Type 2 Diabetes- continue lantus to 30 units, continue with ISS    * Tobacco Use-again reviewed importance of smoking cessation ? underlying COPD, nebs prn, encouraged use of incentive spirometry, PFTs as outpt  * Acute blood Loss anemia-monitor cbc    * Hypothyroidism-synthroid  * Hyperlipidemia-atorvastatin  * Disp-OOB, PT, s/c planning per spine surgery, pt is medically stable for d/c  * Proph- heparin    * Comm- d/w pt, RN

## 2018-11-14 NOTE — DISCHARGE NOTE ADULT - HOSPITAL COURSE
Homeless
The patient was admitted on 11-08-18 for elective L4-5 laminectomy and fusion done on the day of admission. The patient tolerated the surgery without complication and was transferred to monitored unit from the PACU (history of diabetes, HTN, CAD, PVD, MRSA, prior fem-pop bypass w/complications). Medicine (Dr. Polanco) was consulted to oversee medical comorbidities. Preoperative lower extremity radicular pain was improved in PACU. POD #1 - denied lower extremity pain, VSS, afebrile, labs stable, drain output 100cc - drain kept, weakness left EHL and ant tib (had preop), remainder of motor 5/5, PCA/hughes were kept, PT evaluation ordered. POD #2 - not mobilizing well w/PT, VSS, afebrile, dressing changed - wound clean, dry, intact, drain 20cc - removed, labs stable, PCA/hughes were discontinued, transferred to [facility]. POD #3 - wet cough after nebulizer treatment - CT chest ordered by Dr. Polanco - atelectasis versus b/l pneumonia - IV Zosyn started, mobilizing better w/PT, left foot weakness - CT lumbar ordered by attending - screws in good position w/o compression, right calf tenderness on exam - Doppler ordered and was negative for DVT, dressing changed - wound intact, Tmax 99.4, labs stable, voiding. POD #4 - VSS, afebrile, labs stable, tolerating IV Zosyn, dressing changed, mobilizing better, left AFO delivered to patient for left foot weakness. POD #5 - Tmax 99.9, VSS, labs stable, continued to mobilize, wound healing well, motor/neuro unchanged. POD #6 - VSS, afebrile, cleared by medicine for discharge home on Augmentin 875mg BID x 5 days, is to f/u with Pulmonology (Dr. Vega) next week. Mobilizing better, wearing left AFO on ambulation, voiding well, pain well-controlled w/PO medications, wound healing well - dressing changed. Patient is subsequently discharged home in stable condition on 11-14-18.

## 2018-11-14 NOTE — PROGRESS NOTE ADULT - MS EXT PE MLT D E PC
no clubbing/no cyanosis
no cyanosis/no clubbing
no cyanosis/no clubbing
no clubbing/no cyanosis

## 2018-11-14 NOTE — PROGRESS NOTE ADULT - CARDIOVASCULAR DETAILS
positive S1/positive S2
positive S2/positive S1
positive S2/positive S1
positive S1/positive S2

## 2018-11-14 NOTE — DISCHARGE NOTE ADULT - MEDICATION SUMMARY - MEDICATIONS TO TAKE
I will START or STAY ON the medications listed below when I get home from the hospital:    aspirin 325 mg oral tablet  -- 1 tab(s) by mouth once a day. Last dose before procedure was 10/31/18  -- Indication: For blood thinner    oxyCODONE 5 mg oral tablet  -- 1 tab(s) by mouth every 4 hours, As needed, Moderate Pain (4 - 6)  -- Indication: For pain    oxyCODONE 10 mg oral tablet  -- 1 tab(s) by mouth every 4 hours, As needed, Severe Pain (7 - 10)  -- Indication: For pain    lisinopril 5 mg oral tablet  -- 1 tab(s) by mouth once a day  -- Indication: For HTN    isosorbide mononitrate 30 mg oral tablet, extended release  -- 1 tab(s) by mouth once a day (in the morning)  -- Indication: For HTN    Ranexa 1000 mg oral tablet, extended release  -- 1 tab(s) by mouth 2 times a day  -- Indication: For HTN    gabapentin 300 mg oral tablet  -- 2 cap(s) by mouth 3 times a day. Equals 600mg 3 times a day  -- Indication: For neuropathic pain    Basaglar KwikPen 100 units/mL subcutaneous solution  -- 40 unit(s) subcutaneous once a day (at bedtime)  -- Indication: For Diabetes    HumaLOG KwikPen 100 units/mL subcutaneous solution  -- dose(s) subcutaneous 4 times a day (before meals and at bedtime) as per fingerstick result  -- Indication: For Diabetes    atorvastatin 40 mg oral tablet  -- 1 tab(s) by mouth once a day  -- Indication: For cholesterol    clopidogrel 75 mg oral tablet  -- 1 tab(s) by mouth once a day. Last dose was 10/31/18 as per cardiologist  -- Indication: For blood thinner    Abilify 20 mg oral tablet  -- 1 tab(s) by mouth once a day  -- Indication: For psychiatric    metoprolol tartrate 25 mg oral tablet  -- 1 tab(s) by mouth 2 times a day  -- Indication: For HTN    Incruse Ellipta 62.5 mcg/inh inhalation powder  -- 1 puff(s) inhaled once a day  -- Indication: For asthma/COPD    albuterol  -- inhaled 2 times a day  -- Indication: For asthma/COPD    Bactroban 2% nasal ointment  -- 1 application into nose 2 times a day  -- Indication: For MRSA    Augmentin 875 mg-125 mg oral tablet  -- 1 tab(s) by mouth every 12 hours  -- Indication: For pneumonia    pantoprazole 40 mg oral delayed release tablet  -- 1 tab(s) by mouth 2 times a day  -- Indication: For GERD    levothyroxine 112 mcg (0.112 mg) oral tablet  -- 1 tab(s) by mouth once a day  -- Indication: For thyroid    Multiple Vitamins oral capsule  -- 1 cap(s) by mouth once a day  -- Indication: For general health

## 2018-11-14 NOTE — DISCHARGE NOTE ADULT - PATIENT PORTAL LINK FT
You can access the [facility] Patient Portal, offered by [facility], by registering with the following website: [email or web address]

## 2018-11-14 NOTE — PROGRESS NOTE ADULT - RESPIRATORY COMMENTS
krystal huerta
poor inspiratory efforts blt
blt occasional ronchi
lucie huerta
overall better air entry

## 2018-11-14 NOTE — DISCHARGE NOTE ADULT - INSTRUCTIONS
diabetic diet Monitor for any signs of infection such as new onset pain, redness, swelling, smell or drainage at the incisional site or a temperature > 101 degrees F.  Turn & reposition within the bed/chair to promote skin health & prevent and skin break down.

## 2018-11-14 NOTE — PROGRESS NOTE ADULT - SUBJECTIVE AND OBJECTIVE BOX
Pt jade.  no acute events overnight.        Vital Signs Last 24 Hrs  T(C): 36.9 (14 Nov 2018 05:25), Max: 37.1 (13 Nov 2018 11:03)  T(F): 98.5 (14 Nov 2018 05:25), Max: 98.7 (13 Nov 2018 11:03)  HR: 75 (14 Nov 2018 05:25) (71 - 86)  BP: 133/58 (14 Nov 2018 05:25) (122/46 - 138/44)  BP(mean): --  RR: 16 (14 Nov 2018 05:25) (16 - 16)  SpO2: 100% (14 Nov 2018 05:25) (97% - 100%)    PE:     PHYSICAL EXAM:  General; Awake and alert, Oriented x 3  Spine exam: DSG CDI;   Spine:                       Motor exam:          Right Upper extremity: Delt 5/5, Biceps 5/5, Triceps 5/5, Wrist Ext 5/5, Wrist Flexion 5/5,  Strength 5/5         SILT C5-S1, No Clonus, Negative [place], +RP, Compartments soft           Left Upper extremity: Delt 5/5, Biceps 5/5, Triceps 5/5, Wrist Ext 5/5, Wrist Flexion 5/5,  Strength 5/5         SILT C5-S1, No Clonus, Negative [place], +RP, Compartments soft           Right Lower Extremity: Hip Flexion 5/5, Hip Extension 5/5, Knee Flexion 5/5, Knee Extension 5/5, Ankle Dorsiflexion 5/5,          Ankle Plantar Flexion 5/5, Extensor hallucis Longus 5/5         SILT L2-S1, No pain with SLR, Negative Clonus, Negative Babinski                  Left Lower Extremity: Hip Flexion 4/5, Hip Extension 5/5, Knee Flexion 5/5, Knee Extension 4/5, Ankle Dorsiflexion 2-3/5,          Ankle Plantar Flexion 5/5, Extensor hallucis Longus 2-3/5         SILT L2-S1, No pain with SLR, Negative Clonus, Negative Babinski                                            A/P: 57F s/p L4-5 PSF POD #6    Pain control  WBAT  OOB w PT  fu labs   PNA tx per medicine  AFO LLE for ambulation  Dispo planning

## 2018-11-14 NOTE — DISCHARGE NOTE ADULT - CARE PROVIDER_API CALL
Oumar Zarco (DO), Orthopaedic Surgery  [address] [place] [address]  Phone: [phone]  Fax: [phone]    TRISHA Vega (DO), Pulmonary Disease; Sleep Medicine  [address] [place] [address]  Phone: [phone]  Fax: [phone]

## 2018-11-14 NOTE — PROGRESS NOTE ADULT - REASON FOR ADMISSION
Back pain
L4-5 spondylolisthesis, laminectomy and fusion with instrumentation
s/p lumbar fusion
Back pain
Low back pain
low back pain with leg pain.
back pain

## 2018-11-14 NOTE — DISCHARGE NOTE ADULT - MEDICATION SUMMARY - MEDICATIONS TO STOP TAKING
I will STOP taking the medications listed below when I get home from the hospital:    naproxen sodium 220 mg oral tablet  -- 1 tab(s) by mouth 2 times a day, As Needed - for severe pain, for moderate pain

## 2018-11-14 NOTE — PROGRESS NOTE ADULT - SUBJECTIVE AND OBJECTIVE BOX
Pt feels much better, LBP better, cough improved, no SOB.  She wants to go home today.    Date of Service: 11-14-18 @ 08:58    Vital Signs Last 24 Hrs  T(C): 36.9 (14 Nov 2018 05:25), Max: 37.1 (13 Nov 2018 11:03)  T(F): 98.5 (14 Nov 2018 05:25), Max: 98.7 (13 Nov 2018 11:03)  HR: 72 (14 Nov 2018 08:40) (71 - 80)  BP: 133/58 (14 Nov 2018 05:25) (122/46 - 138/44)  BP(mean): --  RR: 16 (14 Nov 2018 05:25) (16 - 16)  SpO2: 100% (14 Nov 2018 05:25) (97% - 100%)    Daily     Daily     I&O's Detail    13 Nov 2018 07:01  -  14 Nov 2018 07:00  --------------------------------------------------------  IN:    IV PiggyBack: 100 mL  Total IN: 100 mL    OUT:  Total OUT: 0 mL    Total NET: 100 mL          CAPILLARY BLOOD GLUCOSE      POCT Blood Glucose.: 211 mg/dL (14 Nov 2018 08:13)  POCT Blood Glucose.: 241 mg/dL (13 Nov 2018 23:23)  POCT Blood Glucose.: 338 mg/dL (13 Nov 2018 16:47)  POCT Blood Glucose.: 272 mg/dL (13 Nov 2018 11:45)                                      8.6    6.59  )-----------( 218      ( 13 Nov 2018 05:46 )             26.1       11-13    140  |  105  |  13  ----------------------------<  200<H>  3.8   |  27  |  0.83    Ca    8.5      13 Nov 2018 05:46    TPro  6.2  /  Alb  2.1<L>  /  TBili  0.4  /  DBili  x   /  AST  21  /  ALT  27  /  AlkPhos  132<H>  11-13          LIVER FUNCTIONS - ( 13 Nov 2018 05:46 )  Alb: 2.1 g/dL / Pro: 6.2 gm/dL / ALK PHOS: 132 U/L / ALT: 27 U/L / AST: 21 U/L / GGT: x                     MEDICATIONS  (STANDING):  ALBUTerol/ipratropium for Nebulization 3 milliLiter(s) Nebulizer every 6 hours  ARIPiprazole 20 milliGRAM(s) Oral daily  atorvastatin 40 milliGRAM(s) Oral at bedtime  dextrose 5%. 1000 milliLiter(s) (50 mL/Hr) IV Continuous <Continuous>  dextrose 50% Injectable 12.5 Gram(s) IV Push once  dextrose 50% Injectable 25 Gram(s) IV Push once  dextrose 50% Injectable 25 Gram(s) IV Push once  docusate sodium 100 milliGRAM(s) Oral three times a day  gabapentin 600 milliGRAM(s) Oral three times a day  heparin  Injectable 5000 Unit(s) SubCutaneous every 12 hours  insulin glargine Injectable (LANTUS) 30 Unit(s) SubCutaneous at bedtime  insulin lispro (HumaLOG) corrective regimen sliding scale   SubCutaneous three times a day before meals  insulin lispro (HumaLOG) corrective regimen sliding scale   SubCutaneous at bedtime  levothyroxine 112 MICROGram(s) Oral daily  metoprolol tartrate 25 milliGRAM(s) Oral every 12 hours  multivitamin 1 Tablet(s) Oral daily  pantoprazole    Tablet 40 milliGRAM(s) Oral two times a day  piperacillin/tazobactam IVPB. 3.375 Gram(s) IV Intermittent every 8 hours  ranolazine 1000 milliGRAM(s) Oral two times a day    MEDICATIONS  (PRN):  acetaminophen   Tablet .. 650 milliGRAM(s) Oral every 6 hours PRN Temp greater or equal to 38C (100.4F)  dextrose 40% Gel 15 Gram(s) Oral once PRN Blood Glucose LESS THAN 70 milliGRAM(s)/deciliter  diphenhydrAMINE   Injectable 25 milliGRAM(s) IV Push every 4 hours PRN Pruritus  glucagon  Injectable 1 milliGRAM(s) IntraMuscular once PRN Glucose LESS THAN 70 milligrams/deciliter  HYDROmorphone  Injectable 0.5 milliGRAM(s) IV Push every 3 hours PRN Severe Pain (7 - 10)  HYDROmorphone PCA (1 mG/mL) Rescue Clinician Bolus 1 milliGRAM(s) IV Push every 15 minutes PRN for Pain Scale GREATER THAN 6  naloxone Injectable 0.1 milliGRAM(s) IV Push every 3 minutes PRN For ANY of the following changes in patient status:  A. RR LESS THAN 10 breaths per minute, B. Oxygen saturation LESS THAN 90%, C. Sedation score of 6  ondansetron Injectable 4 milliGRAM(s) IV Push every 6 hours PRN Nausea  oxyCODONE    IR 5 milliGRAM(s) Oral every 4 hours PRN Moderate Pain (4 - 6)  oxyCODONE    IR 10 milliGRAM(s) Oral every 4 hours PRN Severe Pain (7 - 10)

## 2018-11-14 NOTE — DISCHARGE NOTE ADULT - NS AS ACTIVITY OBS
Stairs allowed/Walking-Outdoors allowed/No Heavy lifting/straining/Do not make important decisions/Showering allowed/Walking-Indoors allowed

## 2018-11-20 DIAGNOSIS — Z72.0 TOBACCO USE: ICD-10-CM

## 2018-11-20 DIAGNOSIS — Z79.4 LONG TERM (CURRENT) USE OF INSULIN: ICD-10-CM

## 2018-11-20 DIAGNOSIS — E03.9 HYPOTHYROIDISM, UNSPECIFIED: ICD-10-CM

## 2018-11-20 DIAGNOSIS — R32 UNSPECIFIED URINARY INCONTINENCE: ICD-10-CM

## 2018-11-20 DIAGNOSIS — M48.062 SPINAL STENOSIS, LUMBAR REGION WITH NEUROGENIC CLAUDICATION: ICD-10-CM

## 2018-11-20 DIAGNOSIS — G31.84 MILD COGNITIVE IMPAIRMENT OF UNCERTAIN OR UNKNOWN ETIOLOGY: ICD-10-CM

## 2018-11-20 DIAGNOSIS — F31.9 BIPOLAR DISORDER, UNSPECIFIED: ICD-10-CM

## 2018-11-20 DIAGNOSIS — E11.9 TYPE 2 DIABETES MELLITUS WITHOUT COMPLICATIONS: ICD-10-CM

## 2018-11-20 DIAGNOSIS — I73.9 PERIPHERAL VASCULAR DISEASE, UNSPECIFIED: ICD-10-CM

## 2018-11-20 DIAGNOSIS — J98.11 ATELECTASIS: ICD-10-CM

## 2018-11-20 DIAGNOSIS — Z90.49 ACQUIRED ABSENCE OF OTHER SPECIFIED PARTS OF DIGESTIVE TRACT: ICD-10-CM

## 2018-11-20 DIAGNOSIS — M43.16 SPONDYLOLISTHESIS, LUMBAR REGION: ICD-10-CM

## 2018-11-20 DIAGNOSIS — J18.9 PNEUMONIA, UNSPECIFIED ORGANISM: ICD-10-CM

## 2018-11-20 DIAGNOSIS — F43.10 POST-TRAUMATIC STRESS DISORDER, UNSPECIFIED: ICD-10-CM

## 2018-11-20 DIAGNOSIS — N32.81 OVERACTIVE BLADDER: ICD-10-CM

## 2018-11-20 DIAGNOSIS — M47.9 SPONDYLOSIS, UNSPECIFIED: ICD-10-CM

## 2018-11-20 DIAGNOSIS — D62 ACUTE POSTHEMORRHAGIC ANEMIA: ICD-10-CM

## 2018-11-20 DIAGNOSIS — J44.9 CHRONIC OBSTRUCTIVE PULMONARY DISEASE, UNSPECIFIED: ICD-10-CM

## 2018-11-20 DIAGNOSIS — F41.9 ANXIETY DISORDER, UNSPECIFIED: ICD-10-CM

## 2018-11-20 DIAGNOSIS — I25.10 ATHEROSCLEROTIC HEART DISEASE OF NATIVE CORONARY ARTERY WITHOUT ANGINA PECTORIS: ICD-10-CM

## 2018-11-20 DIAGNOSIS — Z95.820 PERIPHERAL VASCULAR ANGIOPLASTY STATUS WITH IMPLANTS AND GRAFTS: ICD-10-CM

## 2018-11-23 PROBLEM — M51.36 OTHER INTERVERTEBRAL DISC DEGENERATION, LUMBAR REGION: Chronic | Status: ACTIVE | Noted: 2018-11-02

## 2018-11-23 PROBLEM — K21.9 GASTRO-ESOPHAGEAL REFLUX DISEASE WITHOUT ESOPHAGITIS: Chronic | Status: ACTIVE | Noted: 2018-11-02

## 2018-11-23 PROBLEM — N60.19 DIFFUSE CYSTIC MASTOPATHY OF UNSPECIFIED BREAST: Chronic | Status: ACTIVE | Noted: 2018-11-02

## 2018-11-23 PROBLEM — M48.061 SPINAL STENOSIS, LUMBAR REGION WITHOUT NEUROGENIC CLAUDICATION: Chronic | Status: ACTIVE | Noted: 2018-11-02

## 2018-11-23 PROBLEM — I65.29 OCCLUSION AND STENOSIS OF UNSPECIFIED CAROTID ARTERY: Chronic | Status: ACTIVE | Noted: 2018-11-02

## 2018-11-26 ENCOUNTER — APPOINTMENT (OUTPATIENT)
Dept: MAMMOGRAPHY | Facility: CLINIC | Age: 58
End: 2018-11-26
Payer: MEDICAID

## 2018-12-17 ENCOUNTER — OUTPATIENT (OUTPATIENT)
Dept: OUTPATIENT SERVICES | Facility: HOSPITAL | Age: 58
LOS: 1 days | End: 2018-12-17
Payer: MEDICAID

## 2018-12-17 ENCOUNTER — APPOINTMENT (OUTPATIENT)
Dept: MAMMOGRAPHY | Facility: CLINIC | Age: 58
End: 2018-12-17

## 2018-12-17 DIAGNOSIS — Z98.890 OTHER SPECIFIED POSTPROCEDURAL STATES: Chronic | ICD-10-CM

## 2018-12-17 DIAGNOSIS — Z98.62 PERIPHERAL VASCULAR ANGIOPLASTY STATUS: Chronic | ICD-10-CM

## 2018-12-17 DIAGNOSIS — Z95.828 PRESENCE OF OTHER VASCULAR IMPLANTS AND GRAFTS: Chronic | ICD-10-CM

## 2018-12-17 DIAGNOSIS — Z90.49 ACQUIRED ABSENCE OF OTHER SPECIFIED PARTS OF DIGESTIVE TRACT: Chronic | ICD-10-CM

## 2018-12-17 DIAGNOSIS — Z41.1 ENCOUNTER FOR COSMETIC SURGERY: Chronic | ICD-10-CM

## 2018-12-17 DIAGNOSIS — Z00.8 ENCOUNTER FOR OTHER GENERAL EXAMINATION: ICD-10-CM

## 2018-12-17 PROCEDURE — G0279: CPT | Mod: 26

## 2018-12-17 PROCEDURE — G0279: CPT

## 2018-12-17 PROCEDURE — 77066 DX MAMMO INCL CAD BI: CPT

## 2018-12-17 PROCEDURE — 77066 DX MAMMO INCL CAD BI: CPT | Mod: 26

## 2019-01-18 ENCOUNTER — APPOINTMENT (OUTPATIENT)
Dept: NEUROLOGY | Facility: CLINIC | Age: 59
End: 2019-01-18
Payer: MEDICAID

## 2019-01-18 VITALS
SYSTOLIC BLOOD PRESSURE: 118 MMHG | HEART RATE: 71 BPM | HEIGHT: 63 IN | DIASTOLIC BLOOD PRESSURE: 58 MMHG | BODY MASS INDEX: 23.92 KG/M2 | WEIGHT: 135 LBS

## 2019-01-18 PROCEDURE — 99214 OFFICE O/P EST MOD 30 MIN: CPT

## 2019-01-18 RX ORDER — VARENICLINE TARTRATE 1 MG/1
1 TABLET, FILM COATED ORAL
Refills: 0 | Status: DISCONTINUED | COMMUNITY
End: 2019-01-18

## 2019-01-18 NOTE — REASON FOR VISIT
[Follow-Up: _____] : a [unfilled] follow-up visit [FreeTextEntry1] : Follow up visit for pt with Diabetic neuropathy and short term memory problems with s/p lB surgery with left Foot drop

## 2019-01-18 NOTE — PHYSICAL EXAM
[General Appearance - Alert] : alert [General Appearance - In No Acute Distress] : in no acute distress [Oriented To Time, Place, And Person] : oriented to person, place, and time [Impaired Insight] : insight and judgment were intact [Affect] : the affect was normal [Person] : oriented to person [Place] : oriented to place [Time] : oriented to time [Short Term Intact] : short term memory impaired [Concentration Intact] : normal concentrating ability [Visual Intact] : visual attention was ~T not ~L decreased [Naming Objects] : no difficulty naming common objects [Repeating Phrases] : no difficulty repeating a phrase [Writing A Sentence] : no difficulty writing a sentence [Fluency] : fluency intact [Comprehension] : comprehension intact [Reading] : reading intact [Past History] : adequate knowledge of personal past history [Cranial Nerves Optic (II)] : visual acuity intact bilaterally,  visual fields full to confrontation, pupils equal round and reactive to light [Cranial Nerves Oculomotor (III)] : extraocular motion intact [Cranial Nerves Trigeminal (V)] : facial sensation intact symmetrically [Cranial Nerves Facial (VII)] : face symmetrical [Cranial Nerves Vestibulocochlear (VIII)] : hearing was intact bilaterally [Cranial Nerves Glossopharyngeal (IX)] : tongue and palate midline [Cranial Nerves Accessory (XI - Cranial And Spinal)] : head turning and shoulder shrug symmetric [Cranial Nerves Hypoglossal (XII)] : there was no tongue deviation with protrusion [Motor Strength] : muscle strength was normal in all four extremities [No Muscle Atrophy] : normal bulk in all four extremities [Motor Strength Lower Extremities Left] : there was weakness of the left lower extremity [Sensation Tactile Decrease] : light touch was intact [Limited Balance] : the patient's balance was impaired [Past-pointing] : there was no past-pointing [Tremor] : no tremor present [1+] : Patella left 1+ [0] : Ankle jerk left 0 [Plantar Reflex Right Only] : normal on the right [Plantar Reflex Left Only] : normal on the left [___] : absent on the right [___] : absent on the left [FreeTextEntry4] : MMSE scores 27/30 [FreeTextEntry6] : left foot drop [FreeTextEntry7] : Distal sensory loss [FreeTextEntry8] : Unsteady and left foot drop with weakness [Sclera] : the sclera and conjunctiva were normal [PERRL With Normal Accommodation] : pupils were equal in size, round, reactive to light, with normal accommodation [Extraocular Movements] : extraocular movements were intact [Outer Ear] : the ears and nose were normal in appearance [Oropharynx] : the oropharynx was normal [Neck Appearance] : the appearance of the neck was normal [Neck Cervical Mass (___cm)] : no neck mass was observed [Jugular Venous Distention Increased] : there was no jugular-venous distention [Thyroid Diffuse Enlargement] : the thyroid was not enlarged [Thyroid Nodule] : there were no palpable thyroid nodules [Auscultation Breath Sounds / Voice Sounds] : lungs were clear to auscultation bilaterally [Heart Rate And Rhythm] : heart rate was normal and rhythm regular [Heart Sounds] : normal S1 and S2 [Heart Sounds Gallop] : no gallops [Murmurs] : no murmurs [Heart Sounds Pericardial Friction Rub] : no pericardial rub [Full Pulse] : the pedal pulses are present [Edema] : there was no peripheral edema [Bowel Sounds] : normal bowel sounds [Abdomen Soft] : soft [Abdomen Tenderness] : non-tender [Abdomen Mass (___ Cm)] : no abdominal mass palpated [No CVA Tenderness] : no ~M costovertebral angle tenderness [No Spinal Tenderness] : no spinal tenderness [Nail Clubbing] : no clubbing  or cyanosis of the fingernails [Motor Tone] : muscle strength and tone were normal [Skin Turgor] : normal skin turgor [] : no rash [FreeTextEntry1] : bruises on UE noted

## 2019-01-18 NOTE — DISCUSSION/SUMMARY
[FreeTextEntry1] : Patient with diabetic peripheral neuropathy with underlying cognitive impairment with especially difficulty with names status post spinal surgery recently and left footdrop postoperatively.\par \par Continue recommend physical therapy.\par \par AFO brace for walking.\par \par Recommend exercises for strengthening.\par \par Continue gabapentin 600 mg 3 times a day.\par \par Followup evaluation in 6 months or as needed.\par \par Recommend smoking cessation as patient stopped using Chantix.\par \par Followup with you for other medical problems and recommended to see endocrinologist for adequate control of blood sugars.\par \par Patient education provided.\par \par

## 2019-01-18 NOTE — HISTORY OF PRESENT ILLNESS
[FreeTextEntry1] : She is 58-year-old patient coming here for followup evaluation for diabetic peripheral neuropathy and mild sharp some cognitive impairment with chronic low back pain status post surgery with laminectomy recently in November with complication with left foot drop. Patient is doing well but consistently elevated hemoglobin A1c levels above  8.\par \par Patient started physical therapy for her back and foot drop. No new complaints. Still taking gabapentin 600 mg 3 times a day and currently postoperatively taking hydrocodone twice a day which is helping her pain. Offers no other new complaints since last visit. Mild difficulties with names still persisting.\par \par No other focal weakness except for left foot weakness. Had brace but not wearing it.

## 2019-01-18 NOTE — REVIEW OF SYSTEMS
[Memory Lapses or Loss] : memory loss [Hand Weakness] :  hand weakness [Leg Weakness] : leg weakness [Poor Coordination] : poor coordination [Numbness] : numbness [Tingling] : tingling [Abnormal Sensation] : an abnormal sensation [Hypersensitivity] : hypersensitivity [Dizziness] : dizziness [Difficulty Walking] : no difficulty walking [Frequent Falls] : not falling [Limping] : not limping [Depression] : depression [Joint Pain] : joint pain [Joint Stiffness] : joint stiffness [Limb Pain] : limb pain [Limb Swelling] : limb swelling [Skin Wound] : skin wound [Negative] : Heme/Lymph [de-identified] : left foot drop [FreeTextEntry9] : left foot weakness

## 2019-01-23 ENCOUNTER — OUTPATIENT (OUTPATIENT)
Dept: OUTPATIENT SERVICES | Facility: HOSPITAL | Age: 59
LOS: 1 days | End: 2019-01-23
Payer: MEDICAID

## 2019-01-23 ENCOUNTER — RESULT REVIEW (OUTPATIENT)
Age: 59
End: 2019-01-23

## 2019-01-23 ENCOUNTER — APPOINTMENT (OUTPATIENT)
Dept: MAMMOGRAPHY | Facility: CLINIC | Age: 59
End: 2019-01-23
Payer: MEDICAID

## 2019-01-23 DIAGNOSIS — Z41.1 ENCOUNTER FOR COSMETIC SURGERY: Chronic | ICD-10-CM

## 2019-01-23 DIAGNOSIS — Z98.890 OTHER SPECIFIED POSTPROCEDURAL STATES: Chronic | ICD-10-CM

## 2019-01-23 DIAGNOSIS — Z00.8 ENCOUNTER FOR OTHER GENERAL EXAMINATION: ICD-10-CM

## 2019-01-23 DIAGNOSIS — Z98.62 PERIPHERAL VASCULAR ANGIOPLASTY STATUS: Chronic | ICD-10-CM

## 2019-01-23 DIAGNOSIS — Z90.49 ACQUIRED ABSENCE OF OTHER SPECIFIED PARTS OF DIGESTIVE TRACT: Chronic | ICD-10-CM

## 2019-01-23 DIAGNOSIS — Z95.828 PRESENCE OF OTHER VASCULAR IMPLANTS AND GRAFTS: Chronic | ICD-10-CM

## 2019-01-23 PROCEDURE — A4648: CPT

## 2019-01-23 PROCEDURE — 19081 BX BREAST 1ST LESION STRTCTC: CPT | Mod: LT

## 2019-01-23 PROCEDURE — 77065 DX MAMMO INCL CAD UNI: CPT | Mod: 26,LT

## 2019-01-23 PROCEDURE — 19081 BX BREAST 1ST LESION STRTCTC: CPT

## 2019-01-23 PROCEDURE — 77065 DX MAMMO INCL CAD UNI: CPT

## 2019-01-24 ENCOUNTER — INPATIENT (INPATIENT)
Facility: HOSPITAL | Age: 59
LOS: 0 days | Discharge: HOME | End: 2019-01-25
Attending: FAMILY MEDICINE | Admitting: FAMILY MEDICINE
Payer: MEDICAID

## 2019-01-24 VITALS
SYSTOLIC BLOOD PRESSURE: 79 MMHG | OXYGEN SATURATION: 98 % | RESPIRATION RATE: 18 BRPM | HEART RATE: 105 BPM | WEIGHT: 134.92 LBS | TEMPERATURE: 98 F | DIASTOLIC BLOOD PRESSURE: 43 MMHG | HEIGHT: 63 IN

## 2019-01-24 DIAGNOSIS — Z98.62 PERIPHERAL VASCULAR ANGIOPLASTY STATUS: Chronic | ICD-10-CM

## 2019-01-24 DIAGNOSIS — M48.061 SPINAL STENOSIS, LUMBAR REGION WITHOUT NEUROGENIC CLAUDICATION: ICD-10-CM

## 2019-01-24 DIAGNOSIS — F31.30 BIPOLAR DISORDER, CURRENT EPISODE DEPRESSED, MILD OR MODERATE SEVERITY, UNSPECIFIED: ICD-10-CM

## 2019-01-24 DIAGNOSIS — I73.9 PERIPHERAL VASCULAR DISEASE, UNSPECIFIED: ICD-10-CM

## 2019-01-24 DIAGNOSIS — Z98.890 OTHER SPECIFIED POSTPROCEDURAL STATES: Chronic | ICD-10-CM

## 2019-01-24 DIAGNOSIS — E03.9 HYPOTHYROIDISM, UNSPECIFIED: ICD-10-CM

## 2019-01-24 DIAGNOSIS — Z95.828 PRESENCE OF OTHER VASCULAR IMPLANTS AND GRAFTS: Chronic | ICD-10-CM

## 2019-01-24 DIAGNOSIS — R55 SYNCOPE AND COLLAPSE: ICD-10-CM

## 2019-01-24 DIAGNOSIS — I95.9 HYPOTENSION, UNSPECIFIED: ICD-10-CM

## 2019-01-24 DIAGNOSIS — I10 ESSENTIAL (PRIMARY) HYPERTENSION: ICD-10-CM

## 2019-01-24 DIAGNOSIS — Z90.49 ACQUIRED ABSENCE OF OTHER SPECIFIED PARTS OF DIGESTIVE TRACT: Chronic | ICD-10-CM

## 2019-01-24 DIAGNOSIS — Z41.1 ENCOUNTER FOR COSMETIC SURGERY: Chronic | ICD-10-CM

## 2019-01-24 DIAGNOSIS — J42 UNSPECIFIED CHRONIC BRONCHITIS: ICD-10-CM

## 2019-01-24 DIAGNOSIS — E11.65 TYPE 2 DIABETES MELLITUS WITH HYPERGLYCEMIA: ICD-10-CM

## 2019-01-24 LAB
ALBUMIN SERPL ELPH-MCNC: 3.4 G/DL — SIGNIFICANT CHANGE UP (ref 3.3–5)
ALP SERPL-CCNC: 171 U/L — HIGH (ref 40–120)
ALT FLD-CCNC: 28 U/L — SIGNIFICANT CHANGE UP (ref 12–78)
ANION GAP SERPL CALC-SCNC: 11 MMOL/L — SIGNIFICANT CHANGE UP (ref 5–17)
APTT BLD: 29 SEC — SIGNIFICANT CHANGE UP (ref 27.5–36.3)
AST SERPL-CCNC: 20 U/L — SIGNIFICANT CHANGE UP (ref 15–37)
BASOPHILS # BLD AUTO: 0.03 K/UL — SIGNIFICANT CHANGE UP (ref 0–0.2)
BASOPHILS # BLD AUTO: 0.04 K/UL — SIGNIFICANT CHANGE UP (ref 0–0.2)
BASOPHILS NFR BLD AUTO: 0.5 % — SIGNIFICANT CHANGE UP (ref 0–2)
BASOPHILS NFR BLD AUTO: 0.5 % — SIGNIFICANT CHANGE UP (ref 0–2)
BILIRUB SERPL-MCNC: 0.4 MG/DL — SIGNIFICANT CHANGE UP (ref 0.2–1.2)
BUN SERPL-MCNC: 13 MG/DL — SIGNIFICANT CHANGE UP (ref 7–23)
CALCIUM SERPL-MCNC: 8.8 MG/DL — SIGNIFICANT CHANGE UP (ref 8.5–10.1)
CHLORIDE SERPL-SCNC: 98 MMOL/L — SIGNIFICANT CHANGE UP (ref 96–108)
CO2 SERPL-SCNC: 23 MMOL/L — SIGNIFICANT CHANGE UP (ref 22–31)
CREAT SERPL-MCNC: 0.94 MG/DL — SIGNIFICANT CHANGE UP (ref 0.5–1.3)
EOSINOPHIL # BLD AUTO: 0.04 K/UL — SIGNIFICANT CHANGE UP (ref 0–0.5)
EOSINOPHIL # BLD AUTO: 0.06 K/UL — SIGNIFICANT CHANGE UP (ref 0–0.5)
EOSINOPHIL NFR BLD AUTO: 0.5 % — SIGNIFICANT CHANGE UP (ref 0–6)
EOSINOPHIL NFR BLD AUTO: 1 % — SIGNIFICANT CHANGE UP (ref 0–6)
GLUCOSE BLDC GLUCOMTR-MCNC: 376 MG/DL — HIGH (ref 70–99)
GLUCOSE BLDC GLUCOMTR-MCNC: 377 MG/DL — HIGH (ref 70–99)
GLUCOSE BLDC GLUCOMTR-MCNC: 480 MG/DL — CRITICAL HIGH (ref 70–99)
GLUCOSE SERPL-MCNC: 441 MG/DL — HIGH (ref 70–99)
HCT VFR BLD CALC: 28.8 % — LOW (ref 34.5–45)
HCT VFR BLD CALC: 30.7 % — LOW (ref 34.5–45)
HGB BLD-MCNC: 10 G/DL — LOW (ref 11.5–15.5)
HGB BLD-MCNC: 10.5 G/DL — LOW (ref 11.5–15.5)
IMM GRANULOCYTES NFR BLD AUTO: 0.2 % — SIGNIFICANT CHANGE UP (ref 0–1.5)
IMM GRANULOCYTES NFR BLD AUTO: 0.3 % — SIGNIFICANT CHANGE UP (ref 0–1.5)
INR BLD: 1.04 RATIO — SIGNIFICANT CHANGE UP (ref 0.88–1.16)
LYMPHOCYTES # BLD AUTO: 1.66 K/UL — SIGNIFICANT CHANGE UP (ref 1–3.3)
LYMPHOCYTES # BLD AUTO: 1.76 K/UL — SIGNIFICANT CHANGE UP (ref 1–3.3)
LYMPHOCYTES # BLD AUTO: 21 % — SIGNIFICANT CHANGE UP (ref 13–44)
LYMPHOCYTES # BLD AUTO: 28.9 % — SIGNIFICANT CHANGE UP (ref 13–44)
MCHC RBC-ENTMCNC: 29.8 PG — SIGNIFICANT CHANGE UP (ref 27–34)
MCHC RBC-ENTMCNC: 30.1 PG — SIGNIFICANT CHANGE UP (ref 27–34)
MCHC RBC-ENTMCNC: 34.2 GM/DL — SIGNIFICANT CHANGE UP (ref 32–36)
MCHC RBC-ENTMCNC: 34.7 GM/DL — SIGNIFICANT CHANGE UP (ref 32–36)
MCV RBC AUTO: 86.7 FL — SIGNIFICANT CHANGE UP (ref 80–100)
MCV RBC AUTO: 87.2 FL — SIGNIFICANT CHANGE UP (ref 80–100)
MONOCYTES # BLD AUTO: 0.48 K/UL — SIGNIFICANT CHANGE UP (ref 0–0.9)
MONOCYTES # BLD AUTO: 0.52 K/UL — SIGNIFICANT CHANGE UP (ref 0–0.9)
MONOCYTES NFR BLD AUTO: 6.2 % — SIGNIFICANT CHANGE UP (ref 2–14)
MONOCYTES NFR BLD AUTO: 8.3 % — SIGNIFICANT CHANGE UP (ref 2–14)
NEUTROPHILS # BLD AUTO: 3.5 K/UL — SIGNIFICANT CHANGE UP (ref 1.8–7.4)
NEUTROPHILS # BLD AUTO: 6.01 K/UL — SIGNIFICANT CHANGE UP (ref 1.8–7.4)
NEUTROPHILS NFR BLD AUTO: 61 % — SIGNIFICANT CHANGE UP (ref 43–77)
NEUTROPHILS NFR BLD AUTO: 71.6 % — SIGNIFICANT CHANGE UP (ref 43–77)
PLATELET # BLD AUTO: 271 K/UL — SIGNIFICANT CHANGE UP (ref 150–400)
PLATELET # BLD AUTO: 303 K/UL — SIGNIFICANT CHANGE UP (ref 150–400)
POTASSIUM SERPL-MCNC: 4.4 MMOL/L — SIGNIFICANT CHANGE UP (ref 3.5–5.3)
POTASSIUM SERPL-SCNC: 4.4 MMOL/L — SIGNIFICANT CHANGE UP (ref 3.5–5.3)
PROT SERPL-MCNC: 7.2 GM/DL — SIGNIFICANT CHANGE UP (ref 6–8.3)
PROTHROM AB SERPL-ACNC: 11.6 SEC — SIGNIFICANT CHANGE UP (ref 10–12.9)
RBC # BLD: 3.32 M/UL — LOW (ref 3.8–5.2)
RBC # BLD: 3.52 M/UL — LOW (ref 3.8–5.2)
RBC # FLD: 13.2 % — SIGNIFICANT CHANGE UP (ref 10.3–14.5)
RBC # FLD: 13.2 % — SIGNIFICANT CHANGE UP (ref 10.3–14.5)
SODIUM SERPL-SCNC: 132 MMOL/L — LOW (ref 135–145)
TYPE + AB SCN PNL BLD: SIGNIFICANT CHANGE UP
WBC # BLD: 5.75 K/UL — SIGNIFICANT CHANGE UP (ref 3.8–10.5)
WBC # BLD: 8.39 K/UL — SIGNIFICANT CHANGE UP (ref 3.8–10.5)
WBC # FLD AUTO: 5.75 K/UL — SIGNIFICANT CHANGE UP (ref 3.8–10.5)
WBC # FLD AUTO: 8.39 K/UL — SIGNIFICANT CHANGE UP (ref 3.8–10.5)

## 2019-01-24 PROCEDURE — 99285 EMERGENCY DEPT VISIT HI MDM: CPT

## 2019-01-24 PROCEDURE — 93010 ELECTROCARDIOGRAM REPORT: CPT

## 2019-01-24 PROCEDURE — 99223 1ST HOSP IP/OBS HIGH 75: CPT

## 2019-01-24 PROCEDURE — 86079 PHYS BLOOD BANK SERV AUTHRJ: CPT

## 2019-01-24 RX ORDER — INSULIN GLARGINE 100 [IU]/ML
45 INJECTION, SOLUTION SUBCUTANEOUS AT BEDTIME
Qty: 0 | Refills: 0 | Status: DISCONTINUED | OUTPATIENT
Start: 2019-01-24 | End: 2019-01-25

## 2019-01-24 RX ORDER — ALBUTEROL 90 UG/1
0 AEROSOL, METERED ORAL
Qty: 0 | Refills: 0 | COMMUNITY

## 2019-01-24 RX ORDER — METOPROLOL TARTRATE 50 MG
25 TABLET ORAL
Qty: 0 | Refills: 0 | Status: DISCONTINUED | OUTPATIENT
Start: 2019-01-24 | End: 2019-01-25

## 2019-01-24 RX ORDER — ATORVASTATIN CALCIUM 80 MG/1
40 TABLET, FILM COATED ORAL AT BEDTIME
Qty: 0 | Refills: 0 | Status: DISCONTINUED | OUTPATIENT
Start: 2019-01-24 | End: 2019-01-25

## 2019-01-24 RX ORDER — INSULIN LISPRO 100/ML
3 VIAL (ML) SUBCUTANEOUS
Qty: 0 | Refills: 0 | Status: DISCONTINUED | OUTPATIENT
Start: 2019-01-24 | End: 2019-01-25

## 2019-01-24 RX ORDER — LEVOTHYROXINE SODIUM 125 MCG
112 TABLET ORAL DAILY
Qty: 0 | Refills: 0 | Status: DISCONTINUED | OUTPATIENT
Start: 2019-01-24 | End: 2019-01-25

## 2019-01-24 RX ORDER — RANOLAZINE 500 MG/1
1000 TABLET, FILM COATED, EXTENDED RELEASE ORAL
Qty: 0 | Refills: 0 | Status: DISCONTINUED | OUTPATIENT
Start: 2019-01-24 | End: 2019-01-25

## 2019-01-24 RX ORDER — DEXTROSE 50 % IN WATER 50 %
25 SYRINGE (ML) INTRAVENOUS ONCE
Qty: 0 | Refills: 0 | Status: DISCONTINUED | OUTPATIENT
Start: 2019-01-24 | End: 2019-01-25

## 2019-01-24 RX ORDER — LISINOPRIL 2.5 MG/1
5 TABLET ORAL DAILY
Qty: 0 | Refills: 0 | Status: DISCONTINUED | OUTPATIENT
Start: 2019-01-24 | End: 2019-01-25

## 2019-01-24 RX ORDER — INSULIN LISPRO 100/ML
VIAL (ML) SUBCUTANEOUS AT BEDTIME
Qty: 0 | Refills: 0 | Status: DISCONTINUED | OUTPATIENT
Start: 2019-01-24 | End: 2019-01-25

## 2019-01-24 RX ORDER — ACETAMINOPHEN 500 MG
650 TABLET ORAL EVERY 6 HOURS
Qty: 0 | Refills: 0 | Status: DISCONTINUED | OUTPATIENT
Start: 2019-01-24 | End: 2019-01-25

## 2019-01-24 RX ORDER — GABAPENTIN 400 MG/1
600 CAPSULE ORAL EVERY 8 HOURS
Qty: 0 | Refills: 0 | Status: DISCONTINUED | OUTPATIENT
Start: 2019-01-24 | End: 2019-01-25

## 2019-01-24 RX ORDER — GLUCAGON INJECTION, SOLUTION 0.5 MG/.1ML
1 INJECTION, SOLUTION SUBCUTANEOUS ONCE
Qty: 0 | Refills: 0 | Status: DISCONTINUED | OUTPATIENT
Start: 2019-01-24 | End: 2019-01-25

## 2019-01-24 RX ORDER — SENNA PLUS 8.6 MG/1
2 TABLET ORAL AT BEDTIME
Qty: 0 | Refills: 0 | Status: DISCONTINUED | OUTPATIENT
Start: 2019-01-24 | End: 2019-01-25

## 2019-01-24 RX ORDER — DOCUSATE SODIUM 100 MG
100 CAPSULE ORAL THREE TIMES A DAY
Qty: 0 | Refills: 0 | Status: DISCONTINUED | OUTPATIENT
Start: 2019-01-24 | End: 2019-01-25

## 2019-01-24 RX ORDER — CLOPIDOGREL BISULFATE 75 MG/1
75 TABLET, FILM COATED ORAL DAILY
Qty: 0 | Refills: 0 | Status: DISCONTINUED | OUTPATIENT
Start: 2019-01-24 | End: 2019-01-25

## 2019-01-24 RX ORDER — ISOSORBIDE MONONITRATE 60 MG/1
30 TABLET, EXTENDED RELEASE ORAL DAILY
Qty: 0 | Refills: 0 | Status: DISCONTINUED | OUTPATIENT
Start: 2019-01-24 | End: 2019-01-25

## 2019-01-24 RX ORDER — PANTOPRAZOLE SODIUM 20 MG/1
40 TABLET, DELAYED RELEASE ORAL
Qty: 0 | Refills: 0 | Status: DISCONTINUED | OUTPATIENT
Start: 2019-01-24 | End: 2019-01-25

## 2019-01-24 RX ORDER — ALBUTEROL 90 UG/1
2 AEROSOL, METERED ORAL EVERY 6 HOURS
Qty: 0 | Refills: 0 | Status: DISCONTINUED | OUTPATIENT
Start: 2019-01-24 | End: 2019-01-25

## 2019-01-24 RX ORDER — INSULIN LISPRO 100/ML
VIAL (ML) SUBCUTANEOUS
Qty: 0 | Refills: 0 | Status: DISCONTINUED | OUTPATIENT
Start: 2019-01-24 | End: 2019-01-25

## 2019-01-24 RX ORDER — ASPIRIN/CALCIUM CARB/MAGNESIUM 324 MG
325 TABLET ORAL DAILY
Qty: 0 | Refills: 0 | Status: DISCONTINUED | OUTPATIENT
Start: 2019-01-24 | End: 2019-01-25

## 2019-01-24 RX ORDER — DEXTROSE 50 % IN WATER 50 %
15 SYRINGE (ML) INTRAVENOUS ONCE
Qty: 0 | Refills: 0 | Status: DISCONTINUED | OUTPATIENT
Start: 2019-01-24 | End: 2019-01-25

## 2019-01-24 RX ORDER — DEXTROSE 50 % IN WATER 50 %
12.5 SYRINGE (ML) INTRAVENOUS ONCE
Qty: 0 | Refills: 0 | Status: DISCONTINUED | OUTPATIENT
Start: 2019-01-24 | End: 2019-01-25

## 2019-01-24 RX ORDER — ARIPIPRAZOLE 15 MG/1
20 TABLET ORAL DAILY
Qty: 0 | Refills: 0 | Status: DISCONTINUED | OUTPATIENT
Start: 2019-01-24 | End: 2019-01-25

## 2019-01-24 RX ORDER — SODIUM CHLORIDE 9 MG/ML
1000 INJECTION, SOLUTION INTRAVENOUS
Qty: 0 | Refills: 0 | Status: DISCONTINUED | OUTPATIENT
Start: 2019-01-24 | End: 2019-01-25

## 2019-01-24 NOTE — ED PROVIDER NOTE - SECONDARY DIAGNOSIS.
Hypotensive episode Hemorrhage DM (diabetes mellitus), type 1 H/O arterial bypass of lower limb H/O angioplasty

## 2019-01-24 NOTE — CONSULT NOTE ADULT - ASSESSMENT
A/P:  Left breast hemorrhage s/p stereotactic biopsy at outside institution, while pt on Plavix anticoagulation  Advise transfusion of platelets and blood products as required  Advise pressure dressing to region  Strongly advise breast surgical service consultation/evaluation  Multiple medical comorbidities of  Angina pectoris, Anxiety and depression, Bipolar depression, CAD, Carotid artery stenosis, COPD, Cystic breast  b/l, DDD, DM 1, Essential hypertension, GERD, HLD, Hypothyroidism, Lumbar herniated disc, Osteoarthritis of spine, severe PAD (peripheral artery disease), Transient cerebral ischemia  Advise medical admission for hemodynamic evaluation  No acute general surgical intervention for pt at present  Reconsult prn surgical needs

## 2019-01-24 NOTE — CONSULT NOTE ADULT - SUBJECTIVE AND OBJECTIVE BOX
CC:Patient is a 58y old  Female who presents with a chief complaint of left breast hemorrhage    Subjective:  Pt seen and examined at bedside with chaperone. Pt is AAOx3, pt in no acute distress. Pt s/p left breast stereotactic biopsy at outside institution 1/23/19; pt on plavix anticoagulation during procedure. Pt c/o continued hemorrhage from left breast s/p biopsy. Pt was hypotensive in ER, responded with fluid and blood product administration. Pt denied c/o fever, chills, chest pain, SOB, abd pain, N/V/D, extremity pain or dysfunction, hemoptysis, hematemesis, hematuria, hematochexia, headache, diplopia, vertigo, dizzyness. Pt tolerating diet, (+) void, (+) ambulation, (+) bowel function    Pt had c/o lightheadedness this am, 1/24/19    Pt on Plavix anticoagulation and has been since prior to breast biopsy per pt account    ROS:  lightheadedness, left breast hemorrhage otherwise as abovementioned    PMH: Angina pectoris, Anxiety and depression, Bipolar depression, CAD, Carotid artery stenosis, COPD, Cystic breast  b/l, DDD, DM 1, Essential hypertension, GERD, HLD, Hypothyroidism, Lumbar herniated disc, Osteoarthritis of spine, severe PAD (peripheral artery disease), Transient cerebral ischemia  PSH: ventral hernia repair, b/l lower ext angioplasty, fem-pop, , cholecystectomy  No Known Allergies  SH: 1 PPD tobacco x 46 years, denied etoh, or illicit drug use  FH:     Vital Signs Last 24 Hrs  T(C): 36.9 (24 Jan 2019 17:50), Max: 37 (24 Jan 2019 17:32)  T(F): 98.5 (24 Jan 2019 17:50), Max: 98.6 (24 Jan 2019 17:32)  HR: 105 (24 Jan 2019 17:50) (95 - 112)  BP: 146/83 (24 Jan 2019 17:50) (73/56 - 165/70)  BP(mean): --  RR: 15 (24 Jan 2019 17:50) (12 - 18)  SpO2: 100% (24 Jan 2019 17:50) (98% - 100%)    Labs:                                10.0   8.39  )-----------( 303      ( 24 Jan 2019 15:13 )             28.8     CBC Full  -  ( 24 Jan 2019 15:13 )  WBC Count : 8.39 K/uL  Hemoglobin : 10.0 g/dL  Hematocrit : 28.8 %  Platelet Count - Automated : 303 K/uL  Mean Cell Volume : 86.7 fl  Mean Cell Hemoglobin : 30.1 pg  Mean Cell Hemoglobin Concentration : 34.7 gm/dL  Auto Neutrophil # : 6.01 K/uL  Auto Lymphocyte # : 1.76 K/uL  Auto Monocyte # : 0.52 K/uL  Auto Eosinophil # : 0.04 K/uL  Auto Basophil # : 0.04 K/uL  Auto Neutrophil % : 71.6 %  Auto Lymphocyte % : 21.0 %  Auto Monocyte % : 6.2 %  Auto Eosinophil % : 0.5 %  Auto Basophil % : 0.5 %    01-24    132<L>  |  98  |  13  ----------------------------<  441<H>  4.4   |  23  |  0.94    Ca    8.8      24 Jan 2019 15:13    TPro  7.2  /  Alb  3.4  /  TBili  0.4  /  DBili  x   /  AST  20  /  ALT  28  /  AlkPhos  171<H>  01-24    LIVER FUNCTIONS - ( 24 Jan 2019 15:13 )  Alb: 3.4 g/dL / Pro: 7.2 gm/dL / ALK PHOS: 171 U/L / ALT: 28 U/L / AST: 20 U/L / GGT: x           PT/INR - ( 24 Jan 2019 15:13 )   PT: 11.6 sec;   INR: 1.04 ratio         PTT - ( 24 Jan 2019 15:13 )  PTT:29.0 sec      Meds:  acetaminophen   Tablet .. 650 milliGRAM(s) Oral every 6 hours PRN  ALBUTerol    90 MICROgram(s) HFA Inhaler 2 Puff(s) Inhalation every 6 hours PRN  ARIPiprazole 20 milliGRAM(s) Oral daily  aspirin 325 milliGRAM(s) Oral daily  atorvastatin 40 milliGRAM(s) Oral at bedtime  clopidogrel Tablet 75 milliGRAM(s) Oral daily  dextrose 40% Gel 15 Gram(s) Oral once PRN  dextrose 5%. 1000 milliLiter(s) IV Continuous <Continuous>  dextrose 50% Injectable 12.5 Gram(s) IV Push once  dextrose 50% Injectable 25 Gram(s) IV Push once  dextrose 50% Injectable 25 Gram(s) IV Push once  docusate sodium 100 milliGRAM(s) Oral three times a day  gabapentin 600 milliGRAM(s) Oral every 8 hours  glucagon  Injectable 1 milliGRAM(s) IntraMuscular once PRN  insulin glargine Injectable (LANTUS) 45 Unit(s) SubCutaneous at bedtime  insulin lispro (HumaLOG) corrective regimen sliding scale   SubCutaneous three times a day before meals  insulin lispro (HumaLOG) corrective regimen sliding scale   SubCutaneous at bedtime  insulin lispro Injectable (HumaLOG) 3 Unit(s) SubCutaneous three times a day before meals  isosorbide   mononitrate ER Tablet (IMDUR) 30 milliGRAM(s) Oral daily  levothyroxine 112 MICROGram(s) Oral daily  lisinopril 5 milliGRAM(s) Oral daily  metoprolol tartrate 25 milliGRAM(s) Oral two times a day  multivitamin 1 Tablet(s) Oral daily  pantoprazole    Tablet 40 milliGRAM(s) Oral before breakfast  ranolazine 1000 milliGRAM(s) Oral two times a day  senna 2 Tablet(s) Oral at bedtime PRN      Radiology:      Physical exam:  Pt is AAOx3  Pt in no acute distress  Neuro: CNII-XII grossly intact   Psych: normal affect  HEENT: Normocephalic, atraumatic, ABHI, EOM wnl  Neck: No crepitus, no ecchymosis, no hematoma, to exam, no JVD, no tracheal deviation  Cardiovascular: S1S2 Present  Chest: s/p left breast stereotactic biopsy at upper outer quadrant of breast (unknown direction or location of biopsy however), without evidence of active hemorrhage at time of exam  Respiratory: Respiratory Effort normal; no wheezes, rales or rhonchi to exam, CTAB  ABD: bowel sounds (+), soft, nontender, non distended, no rebound, no guarding, no rigidity, no skin changes to exam. No pelvic instability to exam, no skin changes, negative grewal's sign to exam  Musculoskeletal: All digits are warm and well perfused. Pt demonstrates grossly intact sensoromotor function. Pt has good capillary refill to digits, no calf edema or tenderness to exam.  Skin: no jaundice or icteric sclera to exam b/l, no skin changes to exam

## 2019-01-24 NOTE — H&P ADULT - PROBLEM SELECTOR PLAN 2
- resolved and likely secondary to vasovagal episode  - c/w NS at 100 cc/hr  - continue BP monitoring

## 2019-01-24 NOTE — ED PROVIDER NOTE - OBJECTIVE STATEMENT
59 yo female with PMH of breast calcifications s/p left breast biposy yesterday (thinks it was with a Dr. Barakat in [place]), peripheral vascular disease s/p lower extremity stent, DM type I, COPD and active smoker, presents to the ED for left breast bleeding since her biopsy yesterday and lightheadedness. EMS reports pt was tachycardic and hypotensive. Pt is on aspirin and plavix but states she did not take it today. Bleeding controlled by EMS with direct pressure dressing. Pt states the biopsy site has been oozing but no pulsatile bleeding. She states she tried putting "liquid bandage" on the bleeding but that did not work.

## 2019-01-24 NOTE — H&P ADULT - HISTORY OF PRESENT ILLNESS
59 y/o female with PMHx of PVD with stents to LE and on Plavix, Carotid Artery disease, IDDM, HTN, Current tobacco user that is admitted to  for a 1 day history of bleeding from breast biopsy site.  Patient states that she had a core biospy of left breast calcifications yesterday and that since the biopsy she has noticed a steady stream of blood coming from the biopsy site.  Patient states that despite applying a liquid bandage, that the wound has continued to bleed.  She states that this afternoon she stood up and felt very dizzy and light headed.  Denied chest pain, shortness of breath, and other symptoms.        ED course - Patient seen by Surgical team, pressure dressing applied to left breast wound with resolution of bleeding.  Hgb stable, BP on admission initially low and improved after platelets and PRBC.

## 2019-01-24 NOTE — ED ADULT NURSE REASSESSMENT NOTE - NS ED NURSE REASSESS COMMENT FT1
Notified by blood bank, 1 unit platelets available, 2nd unit will need to be ordered and will not be available for 2-3 hours.  Dr. Francisco Javier miller.

## 2019-01-24 NOTE — ED ADULT NURSE REASSESSMENT NOTE - NS ED NURSE REASSESS COMMENT FT1
H/H back around 10 & 30. 1 unit PRBCs infusion complete to treat the symptomatic pt. Pt states she is feeling better. BPs back within normal range. blood bank release request form sent for 1 unit of platelets. Pt appears more comfortable. Will recheck BG and continue to monitor

## 2019-01-24 NOTE — H&P ADULT - ASSESSMENT
57 y/o female with multiple medical comorbidities that presents for presyncope after persistent bleeding from left breast biopsy site  IMPROVE VTE Individual Risk Assessment    RISK                                                                Points    [  ] Previous VTE                                                  3    [  ] Thrombophilia                                               2    [  ] Lower limb paralysis                                      2        (unable to hold up >15 seconds)      [  ] Current Cancer                                              2         (within 6 months)    [  ] Immobilization > 24 hrs                                1    [  ] ICU/CCU stay > 24 hours                              1    [  ] Age > 60                                                      1    IMPROVE VTE Score __0___

## 2019-01-24 NOTE — ED PROVIDER NOTE - CARE PLAN
Principal Discharge DX:	Near syncope  Secondary Diagnosis:	Hypotensive episode  Secondary Diagnosis:	Hemorrhage  Secondary Diagnosis:	DM (diabetes mellitus), type 1  Secondary Diagnosis:	H/O angioplasty  Secondary Diagnosis:	H/O arterial bypass of lower limb

## 2019-01-24 NOTE — ED ADULT NURSE NOTE - OBJECTIVE STATEMENT
pt present to ED BIBEMS, c/o dizziness. Recently had a L breast biopsy done yesterday and site has been slowly oozing since. ABD pad and curlex dressing over site, not saturated. Pt states she has not drank any water. Lives with her two kids and they called the ambulance. pt stated when she stood up earlier she felt like she was going to pass out. States extensive pmhx. See MD note. pressure 99/54 upon arrival into trauma bay, pressure then dropped to systolic mid 80s and then to 73 systolic. 2L fluid going. Md at bedside. Called blood bank, emergent PRBCs en route. RFA 20G & EDMOND 20G placed. Pt appears pale and is lethargic but speaking full sentences and cooperative. T&S sent along with labs. Will ctm and reassess upon hanging blood.

## 2019-01-24 NOTE — ED ADULT NURSE NOTE - NSIMPLEMENTINTERV_GEN_ALL_ED
Implemented All Fall with Harm Risk Interventions:  [place] to call system. Call bell, personal items and telephone within reach. Instruct patient to call for assistance. Room bathroom lighting operational. Non-slip footwear when patient is off stretcher. Physically safe environment: no spills, clutter or unnecessary equipment. Stretcher in lowest position, wheels locked, appropriate side rails in place. Provide visual cue, wrist band, yellow gown, etc. Monitor gait and stability. Monitor for mental status changes and reorient to person, place, and time. Review medications for side effects contributing to fall risk. Reinforce activity limits and safety measures with patient and family. Provide visual clues: red socks.

## 2019-01-24 NOTE — H&P ADULT - NSHPPHYSICALEXAM_GEN_ALL_CORE
ICU Vital Signs Last 24 Hrs  T(C): 36.9 (24 Jan 2019 17:50), Max: 37 (24 Jan 2019 17:32)  T(F): 98.5 (24 Jan 2019 17:50), Max: 98.6 (24 Jan 2019 17:32)  HR: 105 (24 Jan 2019 17:50) (95 - 112)  BP: 146/83 (24 Jan 2019 17:50) (73/56 - 165/70)  RR: 15 (24 Jan 2019 17:50) (12 - 18)  SpO2: 100% (24 Jan 2019 17:50) (98% - 100%)

## 2019-01-24 NOTE — ED ADULT TRIAGE NOTE - CHIEF COMPLAINT QUOTE
patient had breast biopsy at approx 1:30 yesterday. patient reports she has been bleeding from site since biopsy. patient hypotensive in triage.

## 2019-01-24 NOTE — ED PROVIDER NOTE - ATTENDING CONTRIBUTION TO CARE
58F on plavix for PVD c/o continuous bleeding at site of breast biopsy. (+) dizziness upon standing. Hypotensive on arrival  Plan: labs, PRBCs, surgery eval

## 2019-01-24 NOTE — H&P ADULT - PROBLEM SELECTOR PLAN 4
- resume lantus and increase to 45 U QHS  - obtain am hgba1c  - HISS  - Accucheck QAC and HS  - premeal Humalog 3U QAC

## 2019-01-24 NOTE — H&P ADULT - PROBLEM SELECTOR PLAN 3
- unlikely that bleeding from biopsy caused hemodynamic instability  - will obtain left breast US   - trend hgb

## 2019-01-24 NOTE — ED PROVIDER NOTE - PROGRESS NOTE DETAILS
Paged surgeon on call, saw patient. Bleeding controlled with direct pressure prior to surgeon's arrival. He requested breast surgery to evaluate pt, breast surgery consulted. Pt admitted for near syncope, hypotension, given one unit PRBC and two units platelets in setting of plavix use and bleeding

## 2019-01-25 ENCOUNTER — TRANSCRIPTION ENCOUNTER (OUTPATIENT)
Age: 59
End: 2019-01-25

## 2019-01-25 VITALS
DIASTOLIC BLOOD PRESSURE: 57 MMHG | SYSTOLIC BLOOD PRESSURE: 136 MMHG | RESPIRATION RATE: 18 BRPM | TEMPERATURE: 98 F | HEART RATE: 80 BPM | OXYGEN SATURATION: 100 %

## 2019-01-25 LAB
ADD ON TEST-SPECIMEN IN LAB: SIGNIFICANT CHANGE UP
ALBUMIN SERPL ELPH-MCNC: 3.1 G/DL — LOW (ref 3.3–5)
ANION GAP SERPL CALC-SCNC: 7 MMOL/L — SIGNIFICANT CHANGE UP (ref 5–17)
BASOPHILS # BLD AUTO: 0.03 K/UL — SIGNIFICANT CHANGE UP (ref 0–0.2)
BASOPHILS NFR BLD AUTO: 0.5 % — SIGNIFICANT CHANGE UP (ref 0–2)
BUN SERPL-MCNC: 8 MG/DL — SIGNIFICANT CHANGE UP (ref 7–23)
CALCIUM SERPL-MCNC: 8.3 MG/DL — LOW (ref 8.5–10.1)
CHLORIDE SERPL-SCNC: 108 MMOL/L — SIGNIFICANT CHANGE UP (ref 96–108)
CO2 SERPL-SCNC: 25 MMOL/L — SIGNIFICANT CHANGE UP (ref 22–31)
CREAT SERPL-MCNC: 0.85 MG/DL — SIGNIFICANT CHANGE UP (ref 0.5–1.3)
EOSINOPHIL # BLD AUTO: 0.12 K/UL — SIGNIFICANT CHANGE UP (ref 0–0.5)
EOSINOPHIL NFR BLD AUTO: 2.2 % — SIGNIFICANT CHANGE UP (ref 0–6)
GLUCOSE BLDC GLUCOMTR-MCNC: 211 MG/DL — HIGH (ref 70–99)
GLUCOSE BLDC GLUCOMTR-MCNC: 295 MG/DL — HIGH (ref 70–99)
GLUCOSE BLDC GLUCOMTR-MCNC: 324 MG/DL — HIGH (ref 70–99)
GLUCOSE SERPL-MCNC: 240 MG/DL — HIGH (ref 70–99)
HBA1C BLD-MCNC: 7.8 % — HIGH (ref 4–5.6)
HCT VFR BLD CALC: 28.8 % — LOW (ref 34.5–45)
HCV AB S/CO SERPL IA: 0.14 S/CO — SIGNIFICANT CHANGE UP
HCV AB SERPL-IMP: SIGNIFICANT CHANGE UP
HGB BLD-MCNC: 9.7 G/DL — LOW (ref 11.5–15.5)
IMM GRANULOCYTES NFR BLD AUTO: 0.4 % — SIGNIFICANT CHANGE UP (ref 0–1.5)
LYMPHOCYTES # BLD AUTO: 1.59 K/UL — SIGNIFICANT CHANGE UP (ref 1–3.3)
LYMPHOCYTES # BLD AUTO: 29.1 % — SIGNIFICANT CHANGE UP (ref 13–44)
MCHC RBC-ENTMCNC: 29.5 PG — SIGNIFICANT CHANGE UP (ref 27–34)
MCHC RBC-ENTMCNC: 33.7 GM/DL — SIGNIFICANT CHANGE UP (ref 32–36)
MCV RBC AUTO: 87.5 FL — SIGNIFICANT CHANGE UP (ref 80–100)
MONOCYTES # BLD AUTO: 0.41 K/UL — SIGNIFICANT CHANGE UP (ref 0–0.9)
MONOCYTES NFR BLD AUTO: 7.5 % — SIGNIFICANT CHANGE UP (ref 2–14)
NEUTROPHILS # BLD AUTO: 3.3 K/UL — SIGNIFICANT CHANGE UP (ref 1.8–7.4)
NEUTROPHILS NFR BLD AUTO: 60.3 % — SIGNIFICANT CHANGE UP (ref 43–77)
NRBC # BLD: 0 /100 WBCS — SIGNIFICANT CHANGE UP (ref 0–0)
PHOSPHATE SERPL-MCNC: 2.8 MG/DL — SIGNIFICANT CHANGE UP (ref 2.5–4.5)
PLATELET # BLD AUTO: 237 K/UL — SIGNIFICANT CHANGE UP (ref 150–400)
POTASSIUM SERPL-MCNC: 4.1 MMOL/L — SIGNIFICANT CHANGE UP (ref 3.5–5.3)
POTASSIUM SERPL-SCNC: 4.1 MMOL/L — SIGNIFICANT CHANGE UP (ref 3.5–5.3)
RBC # BLD: 3.29 M/UL — LOW (ref 3.8–5.2)
RBC # FLD: 13.5 % — SIGNIFICANT CHANGE UP (ref 10.3–14.5)
SODIUM SERPL-SCNC: 140 MMOL/L — SIGNIFICANT CHANGE UP (ref 135–145)
WBC # BLD: 5.47 K/UL — SIGNIFICANT CHANGE UP (ref 3.8–10.5)
WBC # FLD AUTO: 5.47 K/UL — SIGNIFICANT CHANGE UP (ref 3.8–10.5)

## 2019-01-25 PROCEDURE — 76641 ULTRASOUND BREAST COMPLETE: CPT | Mod: 26,LT

## 2019-01-25 RX ORDER — ACETAMINOPHEN 500 MG
2 TABLET ORAL
Qty: 0 | Refills: 0 | DISCHARGE
Start: 2019-01-25

## 2019-01-25 RX ORDER — UMECLIDINIUM 62.5 UG/1
1 AEROSOL, POWDER ORAL
Qty: 0 | Refills: 0 | COMMUNITY

## 2019-01-25 RX ORDER — SENNA PLUS 8.6 MG/1
2 TABLET ORAL
Qty: 40 | Refills: 0
Start: 2019-01-25 | End: 2019-02-13

## 2019-01-25 RX ORDER — CLOPIDOGREL BISULFATE 75 MG/1
1 TABLET, FILM COATED ORAL
Qty: 0 | Refills: 0 | COMMUNITY

## 2019-01-25 RX ORDER — FERROUS SULFATE 325(65) MG
1 TABLET ORAL
Qty: 30 | Refills: 0
Start: 2019-01-25 | End: 2019-02-23

## 2019-01-25 RX ORDER — DOCUSATE SODIUM 100 MG
1 CAPSULE ORAL
Qty: 60 | Refills: 0
Start: 2019-01-25 | End: 2019-02-23

## 2019-01-25 RX ADMIN — Medication 25 MILLIGRAM(S): at 05:11

## 2019-01-25 RX ADMIN — Medication 1 TABLET(S): at 11:28

## 2019-01-25 RX ADMIN — Medication 3 UNIT(S): at 08:53

## 2019-01-25 RX ADMIN — Medication 2: at 00:33

## 2019-01-25 RX ADMIN — Medication 3: at 13:14

## 2019-01-25 RX ADMIN — Medication 3 UNIT(S): at 13:14

## 2019-01-25 RX ADMIN — RANOLAZINE 1000 MILLIGRAM(S): 500 TABLET, FILM COATED, EXTENDED RELEASE ORAL at 05:11

## 2019-01-25 RX ADMIN — ISOSORBIDE MONONITRATE 30 MILLIGRAM(S): 60 TABLET, EXTENDED RELEASE ORAL at 11:28

## 2019-01-25 RX ADMIN — GABAPENTIN 600 MILLIGRAM(S): 400 CAPSULE ORAL at 05:11

## 2019-01-25 RX ADMIN — GABAPENTIN 600 MILLIGRAM(S): 400 CAPSULE ORAL at 00:33

## 2019-01-25 RX ADMIN — Medication 112 MICROGRAM(S): at 05:11

## 2019-01-25 RX ADMIN — INSULIN GLARGINE 45 UNIT(S): 100 INJECTION, SOLUTION SUBCUTANEOUS at 01:23

## 2019-01-25 RX ADMIN — ARIPIPRAZOLE 20 MILLIGRAM(S): 15 TABLET ORAL at 11:27

## 2019-01-25 RX ADMIN — Medication 2: at 08:53

## 2019-01-25 RX ADMIN — ATORVASTATIN CALCIUM 40 MILLIGRAM(S): 80 TABLET, FILM COATED ORAL at 00:34

## 2019-01-25 RX ADMIN — PANTOPRAZOLE SODIUM 40 MILLIGRAM(S): 20 TABLET, DELAYED RELEASE ORAL at 05:12

## 2019-01-25 RX ADMIN — LISINOPRIL 5 MILLIGRAM(S): 2.5 TABLET ORAL at 05:11

## 2019-01-25 NOTE — DISCHARGE NOTE ADULT - CARE PROVIDER_API CALL
Carole Billy), Family Medicine  [address] [place] [address]  Phone: [phone]  Fax: [phone]    Betzy Naranjo), Surgery  [facility]  [address] [place] [address]  Phone: [phone]  Fax: [phone]

## 2019-01-25 NOTE — DISCHARGE NOTE ADULT - HOSPITAL COURSE
Subjective:  Patient is a 58y old  Female who presents with a chief complaint of I have been bleeding all night and I felt dizzy   HPI:    57 y/o female with PMHx of PVD with stents to LE and on Plavix, Carotid Artery disease, IDDM, HTN, Current tobacco user that is admitted to  for a 1 day history of bleeding from breast biopsy site.  Patient states that she had a core biospy of left breast calcifications yesterday and that since the biopsy she has noticed a steady stream of blood coming from the biopsy site.  Patient states that despite applying a liquid bandage, that the wound has continued to bleed.  She states that this afternoon she stood up and felt very dizzy and light headed.  Denied chest pain, shortness of breath, and other symptoms.    ED course - Patient seen by Surgical team, pressure dressing applied to left breast wound with resolution of bleeding.  Hgb stable, BP on admission initially low and improved after platelets and PRBC.   1/25 - Patient seen and examined at bedside earlier today, denies pain, wants to go home, afebrile, denies dizziness, cp, palpitations    Review of system- Rest of the review of system are negative except mentioned in HPI    OBJECTIVE:   T(C): 36.7 (01-25-19 @ 11:30), Max: 37.3 (01-24-19 @ 22:24)  HR: 80 (01-25-19 @ 11:30) (80 - 112)  BP: 136/57 (01-25-19 @ 11:30) (73/56 - 176/78)  RR: 18 (01-25-19 @ 11:30) (12 - 18)  SpO2: 100% (01-25-19 @ 11:30) (96% - 100%)  Wt(kg): --  Daily Height in cm: 160.02 (24 Jan 2019 14:50)    Daily     PHYSICAL EXAM:  GENERAL: NAD  NERVOUS SYSTEM:  Alert & Oriented X3, non- focal exam, Motor Strength 5/5 B/L upper and lower extremities; DTRs 2+ intact and symmetric  HEAD:  Atraumatic, Normocephalic  EYES: EOMI, PERRLA, conjunctiva and sclera clear  HEENT: Moist mucous membranes  NECK: Supple, No JVD  CHEST/LUNG: Clear to auscultation bilaterally; No rales, no rhonchi, no wheezing, or rubs  HEART: Regular rate and rhythm; No murmurs, rubs, or gallops  ABDOMEN: Soft, Nontender, Nondistended; Bowel sounds present  GENITOURINARY- Voiding, no suprapubic tenderness  EXTREMITIES:  2+ Peripheral Pulses, No clubbing, cyanosis, or edema  MUSCULOSKELETAL:- No muscle tenderness, Muscle tone normal, No joint tenderness, no Joint swelling, Joint range of motion-normal  SKIN-no rash, lupper breast subcutaneous hematoma , tender to touch     57 y/o female with multiple medical comorbidities that presents for presyncope after persistent bleeding from left breast biopsy site  Near syncope.  Plan: - likely vasovagal  - orthostatic vitals.   Hypotensive episode.  Plan: - resolved and likely secondary to vasovagal episode  - c/w NS at 100 cc/hr  - continue BP monitoring.   R/O Blood loss anemia.  Plan: - unlikely that bleeding from biopsy caused hemodynamic instability  - will obtain left breast US  - hematoma d/w Dr. Bangura , pt can follow up with Dr. Naranjo as o/p   - trend hgb. stable, start iron    Type 2 diabetes mellitus with hyperglycemia, with long-term current use of insulin.  Plan: - resume lantus and increase to 45 U QHS  - obtain am hgba1c 7.8  - HISS, - Accucheck QAC and HS  - premeal Humalog 3U QAC  Bipolar depression.  Plan: - c/w with Ablilify 20 daily.   Spinal stenosis of lumbar region without neurogenic claudication. Plan: - c/w Gabapentin TID.  Peripheral vascular disease with claudication.  Plan: - would c/w ASA and Plavix.   Acquired hypothyroidism.  Plan: - c/w levothyroxine.   Chronic bronchitis, unspecified chronic bronchitis type. Plan; - c/w Ventolin  - patient may use her Incruse inhaler.  Disposition - medically optimized to be discharged home with close follow up with PCP, breast surgery within 1 week  return to ED if fever, abdominal pain, nausea, vomiting, chest pain, dyspnea  Discharge plan discussed with patient, RN  Patient advised to follow up with PCP within 3-7 days  time spend 40 min  Discharge note faxed to PCP with my contact information to call me back

## 2019-01-25 NOTE — DISCHARGE NOTE ADULT - PLAN OF CARE
prevent worsening take tylenol as needed for pain, follow up with breast surgeon Dr. Marcella Devine within 1 week take iron with colcae, check CBC in 1 week, follow up with PCP within 1 week follow up with PCP within 1 week

## 2019-01-25 NOTE — PROVIDER CONTACT NOTE (OTHER) - SITUATION
spoke with Yoko in office regarding consult.  Yoko stated that patient would have to follow up with Dr. Naranjo in office b/c of her type of insurance

## 2019-01-25 NOTE — DISCHARGE NOTE ADULT - CARE PLAN
Principal Discharge DX:	Breast hematoma  Goal:	prevent worsening  Assessment and plan of treatment:	take tylenol as needed for pain, follow up with breast surgeon Dr. Marcella Devine within 1 week  Secondary Diagnosis:	Blood loss anemia  Assessment and plan of treatment:	take iron with colcae, check CBC in 1 week, follow up with PCP within 1 week  Secondary Diagnosis:	CAD (coronary artery disease)  Assessment and plan of treatment:	follow up with PCP within 1 week  Secondary Diagnosis:	DM (diabetes mellitus), type 1  Assessment and plan of treatment:	follow up with PCP within 1 week

## 2019-01-25 NOTE — DISCHARGE NOTE ADULT - MEDICATION SUMMARY - MEDICATIONS TO TAKE
I will START or STAY ON the medications listed below when I get home from the hospital:    aspirin 325 mg oral tablet  -- 1 tab(s) by mouth once a day. Last dose before procedure was 10/31/18  -- Indication: For Home med    acetaminophen 325 mg oral tablet  -- 2 tab(s) by mouth every 6 hours, As needed, Temp greater or equal to 38C (100.4F), Mild Pain (1 - 3)  -- Indication: For Home med    lisinopril 5 mg oral tablet  -- 1 tab(s) by mouth once a day  -- Indication: For Home med    isosorbide mononitrate 30 mg oral tablet, extended release  -- 1 tab(s) by mouth once a day (in the morning)  -- Indication: For Home med    Ranexa 1000 mg oral tablet, extended release  -- 1 tab(s) by mouth 2 times a day  -- Indication: For Home med    gabapentin 300 mg oral tablet  -- 2 cap(s) by mouth 3 times a day. Equals 600mg 3 times a day  -- Indication: For Home med    Basaglar KwikPen 100 units/mL subcutaneous solution  -- 40 unit(s) subcutaneous once a day (at bedtime)  -- Indication: For Home med    HumaLOG KwikPen 100 units/mL subcutaneous solution  -- dose(s) subcutaneous 4 times a day (before meals and at bedtime) as per fingerstick result  -- Indication: For Home med    atorvastatin 40 mg oral tablet  -- 1 tab(s) by mouth once a day  -- Indication: For Home med    clopidogrel 75 mg oral tablet  -- 1 tab(s) by mouth once a day.   restat in 2 days   -- Indication: For Home med    Abilify 20 mg oral tablet  -- 1 tab(s) by mouth once a day  -- Indication: For Home med    metoprolol tartrate 25 mg oral tablet  -- 1 tab(s) by mouth 2 times a day  -- Indication: For Home med    Ventolin HFA 90 mcg/inh inhalation aerosol  -- 2 puff(s) inhaled 4 times a day, As Needed  -- Indication: For Home med    ferrous sulfate 325 mg (65 mg elemental iron) oral delayed release tablet  -- 1 tab(s) by mouth once a day   -- May discolor urine or feces.  Swallow whole.  Do not crush.    -- Indication: For Anemia    docusate sodium 100 mg oral capsule  -- 1 cap(s) by mouth 2 times a day   -- Indication: For Constipation    senna oral tablet  -- 2 tab(s) by mouth once a day (at bedtime), As needed, Constipation  -- Indication: For Constipation     pantoprazole 40 mg oral delayed release tablet  -- 1 tab(s) by mouth 2 times a day  -- Indication: For Home med    levothyroxine 112 mcg (0.112 mg) oral tablet  -- 1 tab(s) by mouth once a day  -- Indication: For Home med    Multiple Vitamins oral capsule  -- 1 cap(s) by mouth once a day  -- Indication: For Home med

## 2019-01-25 NOTE — ED ADULT NURSE REASSESSMENT NOTE - NS ED NURSE REASSESS COMMENT FT1
as per ultrasound, dr keshav chappell, requested ultrasound to be done 1/26/19 morning.  pt resting comfortably in bed, awaiting transfer to unit  pt in no distress  vss

## 2019-01-25 NOTE — DISCHARGE NOTE ADULT - CARE PROVIDERS DIRECT ADDRESSES
[email or web address]

## 2019-01-25 NOTE — DISCHARGE NOTE ADULT - OTHER SIGNIFICANT FINDINGS
Complete Blood Count + Automated Diff (01.25.19 @ 05:44)    WBC Count: 5.47 K/uL    RBC Count: 3.29 M/uL    Hemoglobin: 9.7 g/dL    Hematocrit: 28.8 %    Mean Cell Volume: 87.5 fl    Mean Cell Hemoglobin: 29.5 pg    Mean Cell Hemoglobin Conc: 33.7 gm/dL    Red Cell Distrib Width: 13.5 %    Platelet Count - Automated: 237 K/uL    Auto Neutrophil #: 3.30 K/uL    Auto Lymphocyte #: 1.59 K/uL    Auto Monocyte #: 0.41 K/uL    Auto Eosinophil #: 0.12 K/uL    Auto Basophil #: 0.03 K/uL    Auto Neutrophil %: 60.3: Differential percentages must be correlated with absolute numbers for  clinical significance. %    Auto Lymphocyte %: 29.1 %    Auto Monocyte %: 7.5 %    Auto Eosinophil %: 2.2 %    Auto Basophil %: 0.5 %    Auto Immature Granulocyte %: 0.4 %    Basic Metabolic Panel (11.11.18 @ 05:44)    Sodium, Serum: 137 mmol/L    Potassium, Serum: 4.1 mmol/L    Chloride, Serum: 104 mmol/L    Carbon Dioxide, Serum: 25 mmol/L    Anion Gap, Serum: 8 mmol/L    Blood Urea Nitrogen, Serum: 10 mg/dL    Creatinine, Serum: 0.76 mg/dL    Glucose, Serum: 201 mg/dL    Calcium, Total Serum: 8.4 mg/dL    < from: US Breast Complete, Left (01.25.19 @ 11:05) >    The upper outer quadrant of the left breast demonstrates a 7.6 x 1.7 x   4.1 cm hematoma in the RIGHT upper quadrant approximately at 2:00, 12 cm   from the nipple. No vascularity is noted.          IMPRESSION:     1. targeted Left breast:  7.6 x 1.7 x 4.1 cm hematoma in the RIGHT upper   quadrant approximately at 2:00, 12 cm from the nipple. No vascularity is   noted.        .         < end of copied text >

## 2019-01-25 NOTE — DISCHARGE NOTE ADULT - PATIENT PORTAL LINK FT
You can access the [facility] Patient Portal, offered by [facility], by registering with the following website: [email or web address]

## 2019-01-29 DIAGNOSIS — R55 SYNCOPE AND COLLAPSE: ICD-10-CM

## 2019-01-29 DIAGNOSIS — M48.061 SPINAL STENOSIS, LUMBAR REGION WITHOUT NEUROGENIC CLAUDICATION: ICD-10-CM

## 2019-01-29 DIAGNOSIS — E11.65 TYPE 2 DIABETES MELLITUS WITH HYPERGLYCEMIA: ICD-10-CM

## 2019-01-29 DIAGNOSIS — L76.21 POSTPROCEDURAL HEMORRHAGE OF SKIN AND SUBCUTANEOUS TISSUE FOLLOWING A DERMATOLOGIC PROCEDURE: ICD-10-CM

## 2019-01-29 DIAGNOSIS — F31.30 BIPOLAR DISORDER, CURRENT EPISODE DEPRESSED, MILD OR MODERATE SEVERITY, UNSPECIFIED: ICD-10-CM
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
## 2019-01-29 DIAGNOSIS — I25.10 ATHEROSCLEROTIC HEART DISEASE OF NATIVE CORONARY ARTERY WITHOUT ANGINA PECTORIS: ICD-10-CM

## 2019-01-29 DIAGNOSIS — D62 ACUTE POSTHEMORRHAGIC ANEMIA: ICD-10-CM

## 2019-01-29 DIAGNOSIS — Z95.5 PRESENCE OF CORONARY ANGIOPLASTY IMPLANT AND GRAFT: ICD-10-CM

## 2019-01-29 DIAGNOSIS — I95.9 HYPOTENSION, UNSPECIFIED: ICD-10-CM

## 2019-01-29 DIAGNOSIS — Z79.4 LONG TERM (CURRENT) USE OF INSULIN: ICD-10-CM

## 2019-01-29 DIAGNOSIS — E03.9 HYPOTHYROIDISM, UNSPECIFIED: ICD-10-CM
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
## 2019-02-04 NOTE — ED PROVIDER NOTE - NSTIMEPROVIDERCAREINITIATE_GEN_ER
Transition of Care Coordination/Hospital to Post Acute Facility: 
  
Date/Time:  2019 10:03 AM 
 
Patient was admitted to [facility] on 19 and discharged on 19 for dementai/ Failure to thrive. Patient was transferred to [facility] for continuation of care. Inpatient RRAT score: 22 Top Challenges reviewed Patient refused to move in with daughter or son. Lives alone and has dementia. Method of communication with care team :email Nurse Navigator(NN) [facility] with [facility] to provide introduction to self and explanation of the Nurse Navigator Role. Verified name and  as patient identifiers. Discussed and reviewed  anticipated length of stay, anticipated needs at time of discharge, discharge summary, follow up appointments, medication reconciliation ACP:  
Does the patient have a current ACP (including DDNR):  no 
Does the post acute facility have a copy of the patients ACP:  N/A Medication(s):  
New Medications at Discharge: levothyroxine, protonix Changed Medications at Discharge: none Discontinued Medications at Discharge: Norvasc, Aricept, toprol, lasix, seraquel PCP/Specialist follow up: No future appointments. Opportunity to ask questions was provided. Contact information was provided for future reference or further questions. Will continue to monitor. Med rec completed. CBC, BMP and Mg scheduled for 19, CBC and BMP weekly 19TSH scheduled for 3/1/19 24-Jan-2019 14:52
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
## 2019-07-17 VITALS — BODY MASS INDEX: 24.27 KG/M2 | WEIGHT: 137 LBS | HEIGHT: 63 IN

## 2019-07-17 DIAGNOSIS — F17.210 NICOTINE DEPENDENCE, CIGARETTES, UNCOMPLICATED: ICD-10-CM

## 2019-07-23 ENCOUNTER — APPOINTMENT (OUTPATIENT)
Dept: NEUROLOGY | Facility: CLINIC | Age: 59
End: 2019-07-23
Payer: MEDICAID

## 2019-07-23 VITALS
HEIGHT: 63 IN | DIASTOLIC BLOOD PRESSURE: 73 MMHG | HEART RATE: 89 BPM | WEIGHT: 135 LBS | SYSTOLIC BLOOD PRESSURE: 134 MMHG | BODY MASS INDEX: 23.92 KG/M2

## 2019-07-23 DIAGNOSIS — R29.6 REPEATED FALLS: ICD-10-CM

## 2019-07-23 DIAGNOSIS — M54.5 LOW BACK PAIN: ICD-10-CM

## 2019-07-23 DIAGNOSIS — Z86.73 PERSONAL HISTORY OF TRANSIENT ISCHEMIC ATTACK (TIA), AND CEREBRAL INFARCTION W/OUT RESIDUAL DEFICITS: ICD-10-CM

## 2019-07-23 DIAGNOSIS — G89.29 LOW BACK PAIN: ICD-10-CM

## 2019-07-23 DIAGNOSIS — R51 HEADACHE: ICD-10-CM

## 2019-07-23 DIAGNOSIS — G31.84 MILD COGNITIVE IMPAIRMENT, SO STATED: ICD-10-CM

## 2019-07-23 DIAGNOSIS — R41.3 OTHER AMNESIA: ICD-10-CM

## 2019-07-23 PROCEDURE — 99214 OFFICE O/P EST MOD 30 MIN: CPT

## 2019-07-23 RX ORDER — LISINOPRIL 5 MG/1
5 TABLET ORAL
Refills: 0 | Status: DISCONTINUED | COMMUNITY
End: 2019-07-23

## 2019-07-23 RX ORDER — AMLODIPINE BESYLATE 5 MG/1
5 TABLET ORAL
Qty: 30 | Refills: 0 | Status: DISCONTINUED | COMMUNITY
Start: 2017-04-24 | End: 2019-07-23

## 2019-07-23 RX ORDER — HYDROCODONE BITARTRATE AND ACETAMINOPHEN 10; 325 MG/1; MG/1
10-325 TABLET ORAL
Refills: 0 | Status: ACTIVE | COMMUNITY

## 2019-07-23 NOTE — REASON FOR VISIT
[Follow-Up: _____] : a [unfilled] follow-up visit [FreeTextEntry1] : Follow up for pt with Left > RT LE weakness with foot drop and frequent falls s/p Lumbar surgery

## 2019-07-23 NOTE — HISTORY OF PRESENT ILLNESS
[FreeTextEntry1] : She is 58-year-old patient coming here for followup evaluation for underlying diabetic neuropathy with chronic low back pain status post  lumbar laminectomy with left foot drop persistent symptoms with increasing frequent falls and imbalance and unsteadiness.\par \par Patient received physical therapy with some improvement with her footdrop. Persistent chronic pain syndrome currently on hydrocodone with gabapentin for neuropathy symptoms. Unsteadiness and imbalance persisting. Also complained about frequent falls. Increasing headaches recently with forgetfulness unchanged.\par \par Patient had EMG/NCV studies done prior to her surgery positive for radiculopathy with abnormal study left more than right side done at [facility].\par \par

## 2019-07-23 NOTE — PHYSICAL EXAM
[General Appearance - Alert] : alert [General Appearance - In No Acute Distress] : in no acute distress [Oriented To Time, Place, And Person] : oriented to person, place, and time [Impaired Insight] : insight and judgment were intact [Affect] : the affect was normal [Person] : oriented to person [Short Term Intact] : short term memory impaired [Time] : oriented to time [Place] : oriented to place [Concentration Intact] : normal concentrating ability [Visual Intact] : visual attention was ~T not ~L decreased [Naming Objects] : no difficulty naming common objects [Writing A Sentence] : no difficulty writing a sentence [Repeating Phrases] : no difficulty repeating a phrase [Fluency] : fluency intact [Comprehension] : comprehension intact [Reading] : reading intact [Cranial Nerves Oculomotor (III)] : extraocular motion intact [Past History] : adequate knowledge of personal past history [Cranial Nerves Optic (II)] : visual acuity intact bilaterally,  visual fields full to confrontation, pupils equal round and reactive to light [Cranial Nerves Trigeminal (V)] : facial sensation intact symmetrically [Cranial Nerves Vestibulocochlear (VIII)] : hearing was intact bilaterally [Cranial Nerves Facial (VII)] : face symmetrical [Cranial Nerves Hypoglossal (XII)] : there was no tongue deviation with protrusion [Cranial Nerves Accessory (XI - Cranial And Spinal)] : head turning and shoulder shrug symmetric [Cranial Nerves Glossopharyngeal (IX)] : tongue and palate midline [Motor Strength] : muscle strength was normal in all four extremities [No Muscle Atrophy] : normal bulk in all four extremities [Limited Balance] : the patient's balance was impaired [Motor Strength Lower Extremities Left] : there was weakness of the left lower extremity [Sensation Tactile Decrease] : light touch was intact [Tremor] : no tremor present [Past-pointing] : there was no past-pointing [1+] : Patella right 1+ [Plantar Reflex Right Only] : normal on the right [0] : Ankle jerk left 0 [___] : absent on the right [Plantar Reflex Left Only] : normal on the left [___] : absent on the left [FreeTextEntry4] : MMSE scores 27/30 [FreeTextEntry7] : Distal sensory loss [FreeTextEntry6] : left foot drop [Sclera] : the sclera and conjunctiva were normal [FreeTextEntry8] : Unsteady and left foot drop with weakness [PERRL With Normal Accommodation] : pupils were equal in size, round, reactive to light, with normal accommodation [Oropharynx] : the oropharynx was normal [Outer Ear] : the ears and nose were normal in appearance [Extraocular Movements] : extraocular movements were intact [Neck Cervical Mass (___cm)] : no neck mass was observed [Neck Appearance] : the appearance of the neck was normal [Thyroid Diffuse Enlargement] : the thyroid was not enlarged [Thyroid Nodule] : there were no palpable thyroid nodules [Jugular Venous Distention Increased] : there was no jugular-venous distention [Auscultation Breath Sounds / Voice Sounds] : lungs were clear to auscultation bilaterally [Heart Sounds] : normal S1 and S2 [Heart Rate And Rhythm] : heart rate was normal and rhythm regular [Murmurs] : no murmurs [Heart Sounds Gallop] : no gallops [Heart Sounds Pericardial Friction Rub] : no pericardial rub [Edema] : there was no peripheral edema [Full Pulse] : the pedal pulses are present [Abdomen Soft] : soft [Bowel Sounds] : normal bowel sounds [Abdomen Mass (___ Cm)] : no abdominal mass palpated [Abdomen Tenderness] : non-tender [No Spinal Tenderness] : no spinal tenderness [No CVA Tenderness] : no ~M costovertebral angle tenderness [Motor Tone] : muscle strength and tone were normal [Nail Clubbing] : no clubbing  or cyanosis of the fingernails [] : no rash [Skin Turgor] : normal skin turgor [FreeTextEntry1] : bruises on UE noted

## 2019-07-23 NOTE — CONSULT LETTER
[Dear  ___] : Dear  [unfilled], [Consult Letter:] : I had the pleasure of evaluating your patient, [unfilled]. [Please see my note below.] : Please see my note below. [Consult Closing:] : Thank you very much for allowing me to participate in the care of this patient.  If you have any questions, please do not hesitate to contact me. [Sincerely,] : Sincerely, [DrDevin  ___] : Dr. VANG

## 2019-07-23 NOTE — REVIEW OF SYSTEMS
[Hand Weakness] :  hand weakness [Memory Lapses or Loss] : memory loss [Leg Weakness] : leg weakness [Poor Coordination] : poor coordination [Tingling] : tingling [Numbness] : numbness [Hypersensitivity] : hypersensitivity [Abnormal Sensation] : an abnormal sensation [Frequent Falls] : frequent falls [Dizziness] : dizziness [Difficulty Walking] : no difficulty walking [Joint Pain] : joint pain [Limping] : not limping [Depression] : depression [Joint Stiffness] : joint stiffness [Limb Swelling] : limb swelling [Limb Pain] : limb pain [Skin Wound] : skin wound [Negative] : Heme/Lymph [de-identified] : left foot drop\par Frequent falls [FreeTextEntry9] : left foot weakness

## 2019-07-23 NOTE — DISCUSSION/SUMMARY
[FreeTextEntry1] : Patient with a diabetic neuropathy with underlying cognitive impairment with forgetfulness increasing headaches and left side more than right side weakness with footdrop.\par \par Recommend patient to follow up with Dr. leger.\par \par MRI of the brain to rule out any CNS pathology.\par \par Repeat EMG/NCV studies of both lower extremities.\par \par Get copies of lab work from your office.\par \par Recommend use cane for fall prevention.\par \par Return for reevaluation after workup is completed.\par \par Patient education provided to discuss the patient.\par \par

## 2019-07-29 ENCOUNTER — APPOINTMENT (OUTPATIENT)
Dept: MAMMOGRAPHY | Facility: CLINIC | Age: 59
End: 2019-07-29
Payer: MEDICAID

## 2019-07-29 ENCOUNTER — FORM ENCOUNTER (OUTPATIENT)
Age: 59
End: 2019-07-29

## 2019-07-29 ENCOUNTER — APPOINTMENT (OUTPATIENT)
Dept: CT IMAGING | Facility: CLINIC | Age: 59
End: 2019-07-29
Payer: MEDICAID

## 2019-07-29 ENCOUNTER — OUTPATIENT (OUTPATIENT)
Dept: OUTPATIENT SERVICES | Facility: HOSPITAL | Age: 59
LOS: 1 days | End: 2019-07-29
Payer: MEDICAID

## 2019-07-29 DIAGNOSIS — Z98.890 OTHER SPECIFIED POSTPROCEDURAL STATES: Chronic | ICD-10-CM

## 2019-07-29 DIAGNOSIS — Z95.828 PRESENCE OF OTHER VASCULAR IMPLANTS AND GRAFTS: Chronic | ICD-10-CM

## 2019-07-29 DIAGNOSIS — Z98.62 PERIPHERAL VASCULAR ANGIOPLASTY STATUS: Chronic | ICD-10-CM

## 2019-07-29 DIAGNOSIS — Z41.1 ENCOUNTER FOR COSMETIC SURGERY: Chronic | ICD-10-CM

## 2019-07-29 DIAGNOSIS — Z12.2 ENCOUNTER FOR SCREENING FOR MALIGNANT NEOPLASM OF RESPIRATORY ORGANS: ICD-10-CM

## 2019-07-29 DIAGNOSIS — Z90.49 ACQUIRED ABSENCE OF OTHER SPECIFIED PARTS OF DIGESTIVE TRACT: Chronic | ICD-10-CM

## 2019-07-29 DIAGNOSIS — R92.2 INCONCLUSIVE MAMMOGRAM: ICD-10-CM

## 2019-07-29 PROCEDURE — 77066 DX MAMMO INCL CAD BI: CPT | Mod: 26

## 2019-07-29 PROCEDURE — 77066 DX MAMMO INCL CAD BI: CPT

## 2019-07-29 PROCEDURE — G0297: CPT

## 2019-07-29 PROCEDURE — G0279: CPT | Mod: 26

## 2019-07-29 PROCEDURE — G0297: CPT | Mod: 26

## 2019-07-29 PROCEDURE — G0279: CPT

## 2019-07-30 ENCOUNTER — OUTPATIENT (OUTPATIENT)
Dept: OUTPATIENT SERVICES | Facility: HOSPITAL | Age: 59
LOS: 1 days | End: 2019-07-30
Payer: MEDICAID

## 2019-07-30 ENCOUNTER — APPOINTMENT (OUTPATIENT)
Dept: MRI IMAGING | Facility: CLINIC | Age: 59
End: 2019-07-30
Payer: MEDICAID

## 2019-07-30 DIAGNOSIS — Z41.1 ENCOUNTER FOR COSMETIC SURGERY: Chronic | ICD-10-CM

## 2019-07-30 DIAGNOSIS — Z90.49 ACQUIRED ABSENCE OF OTHER SPECIFIED PARTS OF DIGESTIVE TRACT: Chronic | ICD-10-CM

## 2019-07-30 DIAGNOSIS — Z98.890 OTHER SPECIFIED POSTPROCEDURAL STATES: Chronic | ICD-10-CM

## 2019-07-30 DIAGNOSIS — Z95.828 PRESENCE OF OTHER VASCULAR IMPLANTS AND GRAFTS: Chronic | ICD-10-CM

## 2019-07-30 DIAGNOSIS — Z98.62 PERIPHERAL VASCULAR ANGIOPLASTY STATUS: Chronic | ICD-10-CM

## 2019-07-30 DIAGNOSIS — Z00.8 ENCOUNTER FOR OTHER GENERAL EXAMINATION: ICD-10-CM

## 2019-07-30 PROCEDURE — 70551 MRI BRAIN STEM W/O DYE: CPT

## 2019-07-30 PROCEDURE — 70551 MRI BRAIN STEM W/O DYE: CPT | Mod: 26

## 2019-08-02 ENCOUNTER — EMERGENCY (EMERGENCY)
Facility: HOSPITAL | Age: 59
LOS: 0 days | Discharge: HOME | End: 2019-08-03
Attending: FAMILY MEDICINE | Admitting: FAMILY MEDICINE
Payer: MEDICAID

## 2019-08-02 VITALS
RESPIRATION RATE: 16 BRPM | HEIGHT: 63 IN | SYSTOLIC BLOOD PRESSURE: 157 MMHG | HEART RATE: 97 BPM | DIASTOLIC BLOOD PRESSURE: 76 MMHG | WEIGHT: 138.01 LBS | TEMPERATURE: 99 F | OXYGEN SATURATION: 96 %

## 2019-08-02 DIAGNOSIS — Z41.1 ENCOUNTER FOR COSMETIC SURGERY: Chronic | ICD-10-CM

## 2019-08-02 DIAGNOSIS — Z98.62 PERIPHERAL VASCULAR ANGIOPLASTY STATUS: Chronic | ICD-10-CM

## 2019-08-02 DIAGNOSIS — Z90.49 ACQUIRED ABSENCE OF OTHER SPECIFIED PARTS OF DIGESTIVE TRACT: Chronic | ICD-10-CM
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
## 2019-08-02 DIAGNOSIS — Z98.890 OTHER SPECIFIED POSTPROCEDURAL STATES: Chronic | ICD-10-CM

## 2019-08-02 DIAGNOSIS — Z79.4 LONG TERM (CURRENT) USE OF INSULIN: ICD-10-CM

## 2019-08-02 DIAGNOSIS — I20.9 ANGINA PECTORIS, UNSPECIFIED: ICD-10-CM

## 2019-08-02 DIAGNOSIS — I25.10 ATHEROSCLEROTIC HEART DISEASE OF NATIVE CORONARY ARTERY WITHOUT ANGINA PECTORIS: ICD-10-CM

## 2019-08-02 DIAGNOSIS — S00.83XA CONTUSION OF OTHER PART OF HEAD, INITIAL ENCOUNTER: ICD-10-CM

## 2019-08-02 DIAGNOSIS — Z95.828 PRESENCE OF OTHER VASCULAR IMPLANTS AND GRAFTS: Chronic | ICD-10-CM

## 2019-08-02 DIAGNOSIS — E78.5 HYPERLIPIDEMIA, UNSPECIFIED: ICD-10-CM

## 2019-08-02 DIAGNOSIS — F14.19 COCAINE ABUSE WITH UNSPECIFIED COCAINE-INDUCED DISORDER: ICD-10-CM

## 2019-08-02 DIAGNOSIS — F10.10 ALCOHOL ABUSE, UNCOMPLICATED: ICD-10-CM

## 2019-08-02 DIAGNOSIS — S09.90XA UNSPECIFIED INJURY OF HEAD, INITIAL ENCOUNTER: ICD-10-CM

## 2019-08-02 DIAGNOSIS — I73.9 PERIPHERAL VASCULAR DISEASE, UNSPECIFIED: ICD-10-CM

## 2019-08-02 DIAGNOSIS — E11.9 TYPE 2 DIABETES MELLITUS WITHOUT COMPLICATIONS: ICD-10-CM
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
## 2019-08-02 DIAGNOSIS — Z23 ENCOUNTER FOR IMMUNIZATION: ICD-10-CM

## 2019-08-02 DIAGNOSIS — S51.012A LACERATION WITHOUT FOREIGN BODY OF LEFT ELBOW, INITIAL ENCOUNTER: ICD-10-CM

## 2019-08-02 PROCEDURE — 70486 CT MAXILLOFACIAL W/O DYE: CPT | Mod: 26

## 2019-08-02 PROCEDURE — 71045 X-RAY EXAM CHEST 1 VIEW: CPT | Mod: 26

## 2019-08-02 PROCEDURE — 70450 CT HEAD/BRAIN W/O DYE: CPT | Mod: 26

## 2019-08-02 PROCEDURE — 99284 EMERGENCY DEPT VISIT MOD MDM: CPT

## 2019-08-02 PROCEDURE — 73130 X-RAY EXAM OF HAND: CPT | Mod: 26,LT

## 2019-08-02 RX ORDER — TETANUS TOXOID, REDUCED DIPHTHERIA TOXOID AND ACELLULAR PERTUSSIS VACCINE, ADSORBED 5; 2.5; 8; 8; 2.5 [IU]/.5ML; [IU]/.5ML; UG/.5ML; UG/.5ML; UG/.5ML
0.5 SUSPENSION INTRAMUSCULAR ONCE
Refills: 0 | Status: COMPLETED | OUTPATIENT
Start: 2019-08-02 | End: 2019-08-02

## 2019-08-02 RX ADMIN — TETANUS TOXOID, REDUCED DIPHTHERIA TOXOID AND ACELLULAR PERTUSSIS VACCINE, ADSORBED 0.5 MILLILITER(S): 5; 2.5; 8; 8; 2.5 SUSPENSION INTRAMUSCULAR at 23:30

## 2019-08-02 NOTE — ED PROVIDER NOTE - PMH
Angina pectoris    Anxiety and depression    Bipolar depression    CAD (coronary artery disease)    Carotid artery stenosis  " 45 percent"  COPD (chronic obstructive pulmonary disease)    Cystic breast  b/l  DDD (degenerative disc disease), lumbar    DM (diabetes mellitus), type 1    Essential hypertension    ETOH abuse    GERD (gastroesophageal reflux disease)    Hiatal hernia with GERD  hiatal hernia repaired  History of MRSA infection  left lower extremity incisional area 2015  Hyperlipidemia, unspecified hyperlipidemia type    Hypothyroidism    Lumbar herniated disc    Osteoarthritis of spine, unspecified spinal osteoarthritis complication status, unspecified spinal region    Overactive bladder    PAD (peripheral artery disease)    Shoulder disorder  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 Decreased ROM.  Spinal stenosis of lumbar region    Substance abuse    Transient cerebral ischemia, unspecified type    Urinary incontinence, unspecified type

## 2019-08-02 NOTE — ED PROVIDER NOTE - ATTENDING CONTRIBUTION TO CARE
Pt eval, treatment and plan contemporarily with Acp Kristian and resident Ahsan. Agree with notes. Christine WILSON Pt eval, treatment and plan contemporarily with Sanjana Townsend. Agree with notes. Christine WILSON

## 2019-08-02 NOTE — ED PROVIDER NOTE - SKIN, MLM
Skin normal color for race, warm, dry. No evidence of rash. +left forearm with multiple superficial skin tears and hematoma. +hematoma right hand and left elbow. +skin tear nose. +hematoma left forehead. +abrasions to chin.

## 2019-08-02 NOTE — ED PROVIDER NOTE - CLINICAL SUMMARY MEDICAL DECISION MAKING FREE TEXT BOX
Plan for CT head, x-rays, and reassess. Plan for CT head, x-rays, and reassess.    No acute injury.  Patient to be d/c

## 2019-08-02 NOTE — ED PROVIDER NOTE - MUSCULOSKELETAL, MLM
Spine appears normal, range of motion is not limited, no muscle or joint tenderness. 5/5 strength bilateral UE/LE

## 2019-08-02 NOTE — ED PROVIDER NOTE - NSFOLLOWUPINSTRUCTIONS_ED_ALL_ED_FT
Abrasion    WHAT YOU NEED TO KNOW:    An abrasion is a scrape on your skin. It happens when your skin rubs against a rough surface. Some examples of an abrasion include rug burn, a skinned elbow, or road rash. Abrasions can be many shapes and sizes. The wound may hurt, bleed, bruise, or swell.     DISCHARGE INSTRUCTIONS:    Return to the emergency department if:     The bleeding does not stop after 10 minutes of firm pressure.      You cannot rinse one or more foreign objects out of your wound.      You have red streaks on your skin coming from your wound.    Contact your healthcare provider if:     You have a fever or chills.       Your abrasion is red, warm, swollen, or draining pus.      You have questions or concerns about your condition or care.    Care for your abrasion:     Wash your hands and dry them with a clean towel.      Press a clean cloth against your wound to stop any bleeding.      Rinse your wound with a lot of clean water. Do not use harsh soap, alcohol, or iodine solutions.      Use a clean, wet cloth to remove any objects, such as small pieces of rocks or dirt.      Rub antibiotic ointment on your wound. This may help prevent infection and help your wound heal.      Cover the wound with a non-stick bandage. Change the bandage daily, and if gets wet or dirty.     Follow up with your healthcare provider as directed: Write down your questions so you remember to ask them during your visits.    Head Injury    WHAT YOU NEED TO KNOW:    A head injury is most often caused by a blow to the head. This may occur from a fall, bicycle injury, sports injury, being struck in the head, or a motor vehicle accident.     DISCHARGE INSTRUCTIONS:    Call 911 or have someone else call for any of the following:     You cannot be woken.      You have a seizure.      You stop responding to others or you faint.      You have blurry or double vision.      Your speech becomes slurred or confused.      You have arm or leg weakness, loss of feeling, or new problems with coordination.      Your pupils are larger than usual or one pupil is a different size than the other.       You have blood or clear fluid coming out of your ears or nose.    Return to the emergency department if:     You have repeated or forceful vomiting.      You feel confused.      Your headache gets worse or becomes severe.      You or someone caring for you notices that you are harder to wake than usual.    Contact your healthcare provider if:     Your symptoms last longer than 6 weeks after the injury.      You have questions or concerns about your condition or care.    Medicines:     Acetaminophen decreases pain. Acetaminophen is available without a doctor's order. Ask how much to take and how often to take it. Follow directions. Acetaminophen can cause liver damage if not taken correctly.      Take your medicine as directed. Contact your healthcare provider if you think your medicine is not helping or if you have side effects. Tell him or her if you are allergic to any medicine. Keep a list of the medicines, vitamins, and herbs you take. Include the amounts, and when and why you take them. Bring the list or the pill bottles to follow-up visits. Carry your medicine list with you in case of an emergency.    Self-care:     Rest or do quiet activities for 24 to 48 hours. Limit your time watching TV, using the computer, or doing tasks that require a lot of thinking. Slowly return to your normal activities as directed. Do not play sports or do activities that may cause you to get hit in the head. Ask your healthcare provider when you can return to sports.       Apply ice on your head for 15 to 20 minutes every hour or as directed. Use an ice pack, or put crushed ice in a plastic bag. Cover it with a towel before you apply it to your skin. Ice helps prevent tissue damage and decreases swelling and pain.       Have someone stay with you for 24 hours or as directed. This person can monitor you for complications and call 911. When you are awake the person should ask you a few questions to see if you are thinking clearly. An example would be to ask your name or your address.     Prevent another head injury:     Wear a helmet that fits properly. Do this when you play sports, or ride a bike, scooter, or skateboard. Helmets help decrease your risk of a serious head injury. Talk to your healthcare provider about other ways you can protect yourself if you play sports.      Wear your seat belt every time you are in a car. This helps to decrease your risk for a head injury if you are in a car accident.     Follow up with your healthcare provider as directed: Write down your questions so you remember to ask them during your visits.

## 2019-08-02 NOTE — ED PROVIDER NOTE - OBJECTIVE STATEMENT
57 y/o female with a PMHx of CAD on ASA and Plavix, COPD, DM, HTN, HLD, hypothyroidism, EtOH abuse, substance abuse presents to the ED s/p mechanical fall off curb today. +abrasions to left arm, right hand, and face. +hematoma to left forehead. Pt with hx spinal surgery resulting in chronic drop foot, which causes her frequent falls. Daughter contributes to history that pt abuses EtOH and crack cocaine, suspects that is cause of fall. Denies SOB. Denies any pain.

## 2019-08-02 NOTE — ED PROVIDER NOTE - PROGRESS NOTE DETAILS
Patient seen and evaluated.  Multiple skin tears with no repairable laceration.  wound care with irrigation and dressing performed.  No acute traumatic injury on imaging.  Patient ambulating without assistance -Antonette Townsend PA-C

## 2019-09-05 ENCOUNTER — APPOINTMENT (OUTPATIENT)
Dept: ORTHOPEDIC SURGERY | Facility: CLINIC | Age: 59
End: 2019-09-05
Payer: MEDICAID

## 2019-09-05 VITALS — DIASTOLIC BLOOD PRESSURE: 63 MMHG | SYSTOLIC BLOOD PRESSURE: 130 MMHG | HEART RATE: 77 BPM

## 2019-09-05 VITALS — WEIGHT: 138 LBS | BODY MASS INDEX: 24.45 KG/M2 | HEIGHT: 63 IN

## 2019-09-05 DIAGNOSIS — Z78.9 OTHER SPECIFIED HEALTH STATUS: ICD-10-CM

## 2019-09-05 DIAGNOSIS — Z87.09 PERSONAL HISTORY OF OTHER DISEASES OF THE RESPIRATORY SYSTEM: ICD-10-CM

## 2019-09-05 DIAGNOSIS — Z82.61 FAMILY HISTORY OF ARTHRITIS: ICD-10-CM

## 2019-09-05 DIAGNOSIS — Z56.0 UNEMPLOYMENT, UNSPECIFIED: ICD-10-CM

## 2019-09-05 DIAGNOSIS — Z87.39 PERSONAL HISTORY OF OTHER DISEASES OF THE MUSCULOSKELETAL SYSTEM AND CONNECTIVE TISSUE: ICD-10-CM

## 2019-09-05 DIAGNOSIS — Z86.69 PERSONAL HISTORY OF OTHER DISEASES OF THE NERVOUS SYSTEM AND SENSE ORGANS: ICD-10-CM

## 2019-09-05 PROCEDURE — 72100 X-RAY EXAM L-S SPINE 2/3 VWS: CPT

## 2019-09-05 PROCEDURE — 99204 OFFICE O/P NEW MOD 45 MIN: CPT

## 2019-09-05 SDOH — ECONOMIC STABILITY - INCOME SECURITY: UNEMPLOYMENT, UNSPECIFIED: Z56.0

## 2019-09-12 ENCOUNTER — APPOINTMENT (OUTPATIENT)
Dept: NEUROLOGY | Facility: CLINIC | Age: 59
End: 2019-09-12
Payer: MEDICAID

## 2019-09-12 DIAGNOSIS — M54.16 RADICULOPATHY, LUMBAR REGION: ICD-10-CM

## 2019-09-12 DIAGNOSIS — E11.42 TYPE 2 DIABETES MELLITUS WITH DIABETIC POLYNEUROPATHY: ICD-10-CM

## 2019-09-12 DIAGNOSIS — M21.372 FOOT DROP, LEFT FOOT: ICD-10-CM

## 2019-09-12 DIAGNOSIS — Z98.1 ARTHRODESIS STATUS: ICD-10-CM

## 2019-09-12 DIAGNOSIS — R51 HEADACHE: ICD-10-CM

## 2019-09-12 DIAGNOSIS — G57.31 LESION OF LATERAL POPLITEAL NERVE, RIGHT LOWER LIMB: ICD-10-CM

## 2019-09-12 PROCEDURE — 95885 MUSC TST DONE W/NERV TST LIM: CPT

## 2019-09-12 PROCEDURE — 95910 NRV CNDJ TEST 7-8 STUDIES: CPT

## 2019-09-12 RX ORDER — DOXYCYCLINE 100 MG/1
100 TABLET, FILM COATED ORAL
Qty: 14 | Refills: 0 | Status: ACTIVE | COMMUNITY
Start: 2019-03-26

## 2019-09-12 RX ORDER — NALOXONE HYDROCHLORIDE NASAL 4 MG/.1ML
4 SPRAY NASAL
Qty: 2 | Refills: 0 | Status: ACTIVE | COMMUNITY
Start: 2019-05-18

## 2019-09-12 NOTE — REVIEW OF SYSTEMS
[Memory Lapses or Loss] : memory loss [Hand Weakness] :  hand weakness [Leg Weakness] : leg weakness [Poor Coordination] : poor coordination [Numbness] : numbness [Tingling] : tingling [Abnormal Sensation] : an abnormal sensation [Hypersensitivity] : hypersensitivity [Dizziness] : dizziness [Difficulty Walking] : no difficulty walking [Frequent Falls] : frequent falls [Limping] : not limping [Joint Pain] : joint pain [Depression] : depression [Joint Stiffness] : joint stiffness [Limb Pain] : limb pain [Limb Swelling] : limb swelling [Skin Wound] : skin wound [Negative] : Heme/Lymph [de-identified] : left foot drop\par Frequent falls [FreeTextEntry9] : left foot weakness

## 2019-09-12 NOTE — HISTORY OF PRESENT ILLNESS
[FreeTextEntry1] : She is 58-year-old patient coming here for EMG/NCV studies of both lower extremities. For left footdrop and underlying diabetic neuropathy.

## 2019-09-12 NOTE — H&P ADULT - PROBLEM/PLAN-6
----- Message from Neo Levine sent at 2019  1:25 PM CDT -----  Contact: Wife - Snow Worrell  MRN: [id number]  : 1951  PCP: Alfonso Langford  Home Phone      [phone]  Work Phone      Not on file.  Mobile          [phone]      MESSAGE: BP medication has not been received -- checked with both [facility] & [facility] & neither on has a request for it -- please check into it & advise     Call Snow @ [phone]    PCP: Primit[facility]  
losartan (COZAAR) 25 MG tablet 90 tablet 1 8/26/2019 8/25/2020 --   Sig - Route: Take 1 tablet (25 mg total) by mouth once daily. - Oral   Sent to pharmacy as: losartan (COZAAR) 25 MG tablet   Class: Normal   Order: [id number]   Date/Time Signed: 8/26/2019 13:51       E-Prescribing Status: Receipt confirmed by pharmacy (8/26/2019  1:52 PM CDT)   Pharmacy     [facility] - [address]        Losartan rx called to [facility]    [facility]  
DISPLAY PLAN FREE TEXT

## 2019-09-12 NOTE — PROCEDURE
[FreeTextEntry1] : She is unable to complete the study of both lower extremities from the EMG/NCV studies.\par \par She refused needle study of the left lower extremity and paraspinal muscles.\par \par The study findings revealed \par 1.sensory motor axonal demyelinating peripheral neuropathy involving both lower extremities. \par 2. Superimposed left peroneal/sciatic neuropathy involving left lower extremity could not be ruled out .\par 3. Superimposed chronic right L4-5  and L5-S1 radiculopathy. Patient refused left lower extremity and paraspinal EMG. Clinical correlation recommended.

## 2019-09-12 NOTE — PHYSICAL EXAM
[General Appearance - Alert] : alert [General Appearance - In No Acute Distress] : in no acute distress [Affect] : the affect was normal [Impaired Insight] : insight and judgment were intact [Oriented To Time, Place, And Person] : oriented to person, place, and time [Person] : oriented to person [Short Term Intact] : short term memory impaired [Place] : oriented to place [Time] : oriented to time [Visual Intact] : visual attention was ~T not ~L decreased [Concentration Intact] : normal concentrating ability [Naming Objects] : no difficulty naming common objects [Writing A Sentence] : no difficulty writing a sentence [Repeating Phrases] : no difficulty repeating a phrase [Comprehension] : comprehension intact [Reading] : reading intact [Fluency] : fluency intact [Cranial Nerves Optic (II)] : visual acuity intact bilaterally,  visual fields full to confrontation, pupils equal round and reactive to light [Past History] : adequate knowledge of personal past history [Cranial Nerves Trigeminal (V)] : facial sensation intact symmetrically [Cranial Nerves Oculomotor (III)] : extraocular motion intact [Cranial Nerves Facial (VII)] : face symmetrical [Cranial Nerves Vestibulocochlear (VIII)] : hearing was intact bilaterally [Cranial Nerves Glossopharyngeal (IX)] : tongue and palate midline [Cranial Nerves Accessory (XI - Cranial And Spinal)] : head turning and shoulder shrug symmetric [Cranial Nerves Hypoglossal (XII)] : there was no tongue deviation with protrusion [No Muscle Atrophy] : normal bulk in all four extremities [Motor Strength] : muscle strength was normal in all four extremities [Motor Strength Lower Extremities Left] : there was weakness of the left lower extremity [Limited Balance] : the patient's balance was impaired [Sensation Tactile Decrease] : light touch was intact [Past-pointing] : there was no past-pointing [Tremor] : no tremor present [1+] : Patella right 1+ [Plantar Reflex Right Only] : normal on the right [0] : Ankle jerk right 0 [Plantar Reflex Left Only] : normal on the left [___] : absent on the right [___] : absent on the left [FreeTextEntry4] : MMSE scores 27/30 [FreeTextEntry7] : Distal sensory loss [FreeTextEntry6] : left foot drop [FreeTextEntry8] : Unsteady and left foot drop with weakness [PERRL With Normal Accommodation] : pupils were equal in size, round, reactive to light, with normal accommodation [Sclera] : the sclera and conjunctiva were normal [Oropharynx] : the oropharynx was normal [Outer Ear] : the ears and nose were normal in appearance [Extraocular Movements] : extraocular movements were intact [Neck Cervical Mass (___cm)] : no neck mass was observed [Neck Appearance] : the appearance of the neck was normal [Thyroid Nodule] : there were no palpable thyroid nodules [Jugular Venous Distention Increased] : there was no jugular-venous distention [Thyroid Diffuse Enlargement] : the thyroid was not enlarged [Heart Rate And Rhythm] : heart rate was normal and rhythm regular [Auscultation Breath Sounds / Voice Sounds] : lungs were clear to auscultation bilaterally [Heart Sounds Gallop] : no gallops [Heart Sounds] : normal S1 and S2 [Murmurs] : no murmurs [Full Pulse] : the pedal pulses are present [Heart Sounds Pericardial Friction Rub] : no pericardial rub [Bowel Sounds] : normal bowel sounds [Edema] : there was no peripheral edema [Abdomen Soft] : soft [Abdomen Tenderness] : non-tender [Abdomen Mass (___ Cm)] : no abdominal mass palpated [No CVA Tenderness] : no ~M costovertebral angle tenderness [No Spinal Tenderness] : no spinal tenderness [Motor Tone] : muscle strength and tone were normal [Nail Clubbing] : no clubbing  or cyanosis of the fingernails [] : no rash [Skin Turgor] : normal skin turgor [FreeTextEntry1] : bruises on UE noted

## 2019-09-12 NOTE — CONSULT LETTER
[Consult Letter:] : I had the pleasure of evaluating your patient, [unfilled]. [Dear  ___] : Dear  [unfilled], [Consult Closing:] : Thank you very much for allowing me to participate in the care of this patient.  If you have any questions, please do not hesitate to contact me. [Sincerely,] : Sincerely, [Please see my note below.] : Please see my note below. [DrDevin  ___] : Dr. VANG

## 2019-10-09 NOTE — PATIENT PROFILE ADULT. - BLOOD AVOIDANCE/RESTRICTIONS, PROFILE
none
pt pw sore throat  epigastric burning - viral symptoms   + exudate  left tonsil =-  abx  prescriebd labs wnl ivf givne -  pt feels much better  Patient to be discharged from ED. Any available test results were discussed with and printed  for patient.  Verbal instructions given, including instructions to return to ED immediately for any new, worsening, or concerning symptoms. Patient reports understanding of above with capacity and insight. Written discharge instructions additionally given, including follow-up plan.

## 2019-10-17 ENCOUNTER — OUTPATIENT (OUTPATIENT)
Dept: OUTPATIENT SERVICES | Facility: HOSPITAL | Age: 59
LOS: 1 days | End: 2019-10-17
Payer: MEDICAID

## 2019-10-17 VITALS
TEMPERATURE: 97 F | WEIGHT: 141.98 LBS | HEART RATE: 73 BPM | RESPIRATION RATE: 16 BRPM | SYSTOLIC BLOOD PRESSURE: 132 MMHG | DIASTOLIC BLOOD PRESSURE: 60 MMHG | HEIGHT: 63 IN | OXYGEN SATURATION: 100 %

## 2019-10-17 DIAGNOSIS — Z98.890 OTHER SPECIFIED POSTPROCEDURAL STATES: Chronic | ICD-10-CM

## 2019-10-17 DIAGNOSIS — Z98.62 PERIPHERAL VASCULAR ANGIOPLASTY STATUS: Chronic | ICD-10-CM

## 2019-10-17 DIAGNOSIS — Z41.1 ENCOUNTER FOR COSMETIC SURGERY: Chronic | ICD-10-CM

## 2019-10-17 DIAGNOSIS — Z90.49 ACQUIRED ABSENCE OF OTHER SPECIFIED PARTS OF DIGESTIVE TRACT: Chronic | ICD-10-CM

## 2019-10-17 DIAGNOSIS — Z95.828 PRESENCE OF OTHER VASCULAR IMPLANTS AND GRAFTS: Chronic | ICD-10-CM

## 2019-10-17 DIAGNOSIS — Z98.1 ARTHRODESIS STATUS: Chronic | ICD-10-CM

## 2019-10-17 DIAGNOSIS — Z01.818 ENCOUNTER FOR OTHER PREPROCEDURAL EXAMINATION: ICD-10-CM

## 2019-10-17 DIAGNOSIS — Z86.010 PERSONAL HISTORY OF COLONIC POLYPS: ICD-10-CM

## 2019-10-17 LAB
ANION GAP SERPL CALC-SCNC: 4 MMOL/L — LOW (ref 5–17)
BASOPHILS # BLD AUTO: 0.04 K/UL — SIGNIFICANT CHANGE UP (ref 0–0.2)
BASOPHILS NFR BLD AUTO: 0.4 % — SIGNIFICANT CHANGE UP (ref 0–2)
BUN SERPL-MCNC: 9 MG/DL — SIGNIFICANT CHANGE UP (ref 7–23)
CALCIUM SERPL-MCNC: 9.5 MG/DL — SIGNIFICANT CHANGE UP (ref 8.5–10.1)
CHLORIDE SERPL-SCNC: 106 MMOL/L — SIGNIFICANT CHANGE UP (ref 96–108)
CO2 SERPL-SCNC: 29 MMOL/L — SIGNIFICANT CHANGE UP (ref 22–31)
CREAT SERPL-MCNC: 0.84 MG/DL — SIGNIFICANT CHANGE UP (ref 0.5–1.3)
EOSINOPHIL # BLD AUTO: 0.13 K/UL — SIGNIFICANT CHANGE UP (ref 0–0.5)
EOSINOPHIL NFR BLD AUTO: 1.3 % — SIGNIFICANT CHANGE UP (ref 0–6)
GLUCOSE SERPL-MCNC: 51 MG/DL — LOW (ref 70–99)
HBA1C BLD-MCNC: 8.6 % — HIGH (ref 4–5.6)
HCT VFR BLD CALC: 40.5 % — SIGNIFICANT CHANGE UP (ref 34.5–45)
HGB BLD-MCNC: 13.1 G/DL — SIGNIFICANT CHANGE UP (ref 11.5–15.5)
IMM GRANULOCYTES NFR BLD AUTO: 0.3 % — SIGNIFICANT CHANGE UP (ref 0–1.5)
LYMPHOCYTES # BLD AUTO: 2.8 K/UL — SIGNIFICANT CHANGE UP (ref 1–3.3)
LYMPHOCYTES # BLD AUTO: 27.4 % — SIGNIFICANT CHANGE UP (ref 13–44)
MCHC RBC-ENTMCNC: 30.7 PG — SIGNIFICANT CHANGE UP (ref 27–34)
MCHC RBC-ENTMCNC: 32.3 GM/DL — SIGNIFICANT CHANGE UP (ref 32–36)
MCV RBC AUTO: 94.8 FL — SIGNIFICANT CHANGE UP (ref 80–100)
MONOCYTES # BLD AUTO: 0.87 K/UL — SIGNIFICANT CHANGE UP (ref 0–0.9)
MONOCYTES NFR BLD AUTO: 8.5 % — SIGNIFICANT CHANGE UP (ref 2–14)
NEUTROPHILS # BLD AUTO: 6.35 K/UL — SIGNIFICANT CHANGE UP (ref 1.8–7.4)
NEUTROPHILS NFR BLD AUTO: 62.1 % — SIGNIFICANT CHANGE UP (ref 43–77)
PLATELET # BLD AUTO: 299 K/UL — SIGNIFICANT CHANGE UP (ref 150–400)
POTASSIUM SERPL-MCNC: 3.7 MMOL/L — SIGNIFICANT CHANGE UP (ref 3.5–5.3)
POTASSIUM SERPL-SCNC: 3.7 MMOL/L — SIGNIFICANT CHANGE UP (ref 3.5–5.3)
RBC # BLD: 4.27 M/UL — SIGNIFICANT CHANGE UP (ref 3.8–5.2)
RBC # FLD: 12.7 % — SIGNIFICANT CHANGE UP (ref 10.3–14.5)
SODIUM SERPL-SCNC: 139 MMOL/L — SIGNIFICANT CHANGE UP (ref 135–145)
WBC # BLD: 10.22 K/UL — SIGNIFICANT CHANGE UP (ref 3.8–10.5)
WBC # FLD AUTO: 10.22 K/UL — SIGNIFICANT CHANGE UP (ref 3.8–10.5)

## 2019-10-17 PROCEDURE — 80048 BASIC METABOLIC PNL TOTAL CA: CPT

## 2019-10-17 PROCEDURE — 93010 ELECTROCARDIOGRAM REPORT: CPT

## 2019-10-17 PROCEDURE — 83036 HEMOGLOBIN GLYCOSYLATED A1C: CPT

## 2019-10-17 PROCEDURE — 93005 ELECTROCARDIOGRAM TRACING: CPT

## 2019-10-17 PROCEDURE — G0463: CPT | Mod: 25

## 2019-10-17 PROCEDURE — 36415 COLL VENOUS BLD VENIPUNCTURE: CPT

## 2019-10-17 PROCEDURE — 85025 COMPLETE CBC W/AUTO DIFF WBC: CPT

## 2019-10-17 RX ORDER — PANTOPRAZOLE SODIUM 20 MG/1
1 TABLET, DELAYED RELEASE ORAL
Qty: 0 | Refills: 0 | DISCHARGE

## 2019-10-17 RX ORDER — ISOSORBIDE MONONITRATE 60 MG/1
1 TABLET, EXTENDED RELEASE ORAL
Qty: 0 | Refills: 0 | DISCHARGE

## 2019-10-17 RX ORDER — ASPIRIN/CALCIUM CARB/MAGNESIUM 324 MG
1 TABLET ORAL
Qty: 0 | Refills: 0 | DISCHARGE

## 2019-10-17 RX ORDER — INSULIN GLARGINE 100 [IU]/ML
40 INJECTION, SOLUTION SUBCUTANEOUS
Qty: 0 | Refills: 0 | DISCHARGE

## 2019-10-17 RX ORDER — METOPROLOL TARTRATE 50 MG
1 TABLET ORAL
Qty: 0 | Refills: 0 | DISCHARGE

## 2019-10-17 RX ORDER — LISINOPRIL 2.5 MG/1
1 TABLET ORAL
Qty: 0 | Refills: 0 | DISCHARGE

## 2019-10-17 NOTE — H&P PST ADULT - ASSESSMENT
58 years old female present to PST prior to Colonoscopy with Dr. Bangura.    Plan  1. Expect a call from Endoscopy the day before your procedure between 11am and 3pm.  2. Follow the GI doctor's instructions for preparation  and day before procedure activities.  3. Follow the GI doctor's  instructions for medications.

## 2019-10-17 NOTE — H&P PST ADULT - NSICDXPASTSURGICALHX_GEN_ALL_CORE_FT
PAST SURGICAL HISTORY:  H/O angioplasty Right iliac artery. 5/12/2017    H/O arterial bypass of lower limb Femoral - Popliteal. 11/2014 Left lower side with stents    H/O hernia repair hiatal hernia - 2017, 2018    H/O rhinoplasty 1980    H/O ventral hernia repair 3/2017    History of incision and drainage of lower extremity incisional site x 5 . last done5/2015    History of laparoscopic cholecystectomy 2/2016    History of lumbar fusion with laminectomy 11/8/2018    S/P dilatation and curettage 1991

## 2019-10-17 NOTE — H&P PST ADULT - NSICDXFAMILYHX_GEN_ALL_CORE_FT
FAMILY HISTORY:  Father  Still living? No  Family history of alcoholism in father, Age at diagnosis: Age Unknown  Family history of heart disease, Age at diagnosis: Age Unknown    Mother  Still living? No  Family history of cardiovascular disease, Age at diagnosis: Age Unknown  Family history of stroke, Age at diagnosis: Age Unknown    Sibling  Still living? Yes, Estimated age: 61-70  Family history of asthma, Age at diagnosis: Age Unknown  Family history of epilepsy, Age at diagnosis: Age Unknown  Family history of epilepsy, Age at diagnosis: Age Unknown

## 2019-10-17 NOTE — H&P PST ADULT - NSANTHTIREDRD_ENT_A_CORE
Medication(s) Requested: vyvanse 30 mg q am # 30,xanax 1 mg tid prn # 60,zolpidem 5 mg q hs prn # 30 and escitalopram  Last office visit: 10/29 and appt 2/13  Last refill: vyvanse 12/31 and other two 12/28  Is the patient due for refill of this medication(s): Yes vyvanse fill date 1/30/19  PDMP review: Criteria met. Forwarded to Physician/MAU for signature.Enrico Bal      Regarding escitalopram ; dose had been increased to 20 mg daily at 10/29 appt.Looks like when refilled last it was incorrectly filled as 10 mg dose.Pt states feels a little \"off' since then.She also states she tought when hospitalized another MD lowered dose but I can not find any documentation to support that.Is it ok to go back to 20 mg daily dose?     Yes

## 2019-10-17 NOTE — H&P PST ADULT - HISTORY OF PRESENT ILLNESS
58 years old female with a history of colon polyps, precancerous. Last colonoscopy 2016. Denies gastrointestinal issues. Planned colonoscopy.

## 2019-10-18 DIAGNOSIS — Z01.818 ENCOUNTER FOR OTHER PREPROCEDURAL EXAMINATION: ICD-10-CM

## 2019-10-18 DIAGNOSIS — Z86.010 PERSONAL HISTORY OF COLONIC POLYPS: ICD-10-CM

## 2020-01-10 PROBLEM — R91.1 SOLITARY PULMONARY NODULE: Chronic | Status: ACTIVE | Noted: 2019-10-17

## 2020-01-10 PROBLEM — F10.10 ALCOHOL ABUSE, UNCOMPLICATED: Chronic | Status: ACTIVE | Noted: 2019-08-09

## 2020-01-10 PROBLEM — Z86.010 PERSONAL HISTORY OF COLONIC POLYPS: Chronic | Status: ACTIVE | Noted: 2019-10-17

## 2020-01-10 PROBLEM — Z87.19 PERSONAL HISTORY OF OTHER DISEASES OF THE DIGESTIVE SYSTEM: Chronic | Status: ACTIVE | Noted: 2019-10-17

## 2020-01-10 PROBLEM — Z86.73 PERSONAL HISTORY OF TRANSIENT ISCHEMIC ATTACK (TIA), AND CEREBRAL INFARCTION WITHOUT RESIDUAL DEFICITS: Chronic | Status: ACTIVE | Noted: 2019-10-17

## 2020-01-10 PROBLEM — E11.9 TYPE 2 DIABETES MELLITUS WITHOUT COMPLICATIONS: Chronic | Status: ACTIVE | Noted: 2019-10-17

## 2020-01-10 PROBLEM — F19.10 OTHER PSYCHOACTIVE SUBSTANCE ABUSE, UNCOMPLICATED: Chronic | Status: ACTIVE | Noted: 2019-08-09

## 2020-02-03 ENCOUNTER — APPOINTMENT (OUTPATIENT)
Dept: MAMMOGRAPHY | Facility: CLINIC | Age: 60
End: 2020-02-03
Payer: MEDICAID

## 2020-02-03 ENCOUNTER — OUTPATIENT (OUTPATIENT)
Dept: OUTPATIENT SERVICES | Facility: HOSPITAL | Age: 60
LOS: 1 days | End: 2020-02-03
Payer: MEDICAID

## 2020-02-03 DIAGNOSIS — Z98.1 ARTHRODESIS STATUS: Chronic | ICD-10-CM

## 2020-02-03 DIAGNOSIS — Z98.890 OTHER SPECIFIED POSTPROCEDURAL STATES: Chronic | ICD-10-CM

## 2020-02-03 DIAGNOSIS — Z95.828 PRESENCE OF OTHER VASCULAR IMPLANTS AND GRAFTS: Chronic | ICD-10-CM

## 2020-02-03 DIAGNOSIS — Z90.49 ACQUIRED ABSENCE OF OTHER SPECIFIED PARTS OF DIGESTIVE TRACT: Chronic | ICD-10-CM

## 2020-02-03 DIAGNOSIS — R92.2 INCONCLUSIVE MAMMOGRAM: ICD-10-CM

## 2020-02-03 DIAGNOSIS — Z98.62 PERIPHERAL VASCULAR ANGIOPLASTY STATUS: Chronic | ICD-10-CM

## 2020-02-03 DIAGNOSIS — Z00.8 ENCOUNTER FOR OTHER GENERAL EXAMINATION: ICD-10-CM

## 2020-02-03 DIAGNOSIS — Z41.1 ENCOUNTER FOR COSMETIC SURGERY: Chronic | ICD-10-CM

## 2020-02-03 PROCEDURE — 77066 DX MAMMO INCL CAD BI: CPT

## 2020-02-03 PROCEDURE — G0279: CPT

## 2020-02-03 PROCEDURE — 77066 DX MAMMO INCL CAD BI: CPT | Mod: 26

## 2020-02-03 PROCEDURE — G0279: CPT | Mod: 26

## 2020-06-11 NOTE — PROGRESS NOTE ADULT - ENMT
Orthopaedic Surgery Progress Note    Procedure: posterior cervical fusion C1-C2  Surgeon: Dr. Winston    No acute events overnight  Denies CP, SOB, N/V, numbness/tingling    Vital Signs Last 24 Hrs  T(C): 36.7 (11 Jun 2020 08:49), Max: 36.9 (10 Eugene 2020 16:55)  T(F): 98.1 (11 Jun 2020 08:49), Max: 98.5 (10 Eugene 2020 16:55)  HR: 90 (11 Jun 2020 08:49) (90 - 110)  BP: 137/79 (11 Jun 2020 08:49) (101/57 - 140/64)  BP(mean): 81 (10 Eugene 2020 16:16) (74 - 98)  RR: 13 (11 Jun 2020 08:49) (8 - 17)  SpO2: 93% (11 Jun 2020 08:49) (91% - 97%)    General: Pt Alert and oriented, NAD  DSG C/D/I neck prevena, hvx1  Skin warm and well perfused   Sensation: intact to bilateral upper extremities   Motor:  strength 5/5 bilateral upper extremities. biceps/triceps strength 5/5 bilateral upper extremities.         A/P: 64yMale POD#1 s/p PCF C1-2  - Stable  - Pain Control  - DVT ppx: scds   - Post op abx: vancomycin   - PT, WBS:  wbat   - f/u AM labs   - dispo: home today    Ortho Pager [phone] negative

## 2020-08-23 ENCOUNTER — INPATIENT (INPATIENT)
Facility: HOSPITAL | Age: 60
LOS: 28 days | Discharge: NURSING, REHAB OR LONG-TERM CARE | DRG: 720 | End: 2020-09-21
Attending: FAMILY MEDICINE | Admitting: INTERNAL MEDICINE
Payer: MEDICAID

## 2020-08-23 VITALS
HEIGHT: 63 IN | WEIGHT: 119.93 LBS | DIASTOLIC BLOOD PRESSURE: 49 MMHG | TEMPERATURE: 99 F | SYSTOLIC BLOOD PRESSURE: 95 MMHG | HEART RATE: 91 BPM | OXYGEN SATURATION: 100 % | RESPIRATION RATE: 16 BRPM

## 2020-08-23 DIAGNOSIS — I73.9 PERIPHERAL VASCULAR DISEASE, UNSPECIFIED: ICD-10-CM

## 2020-08-23 DIAGNOSIS — I10 ESSENTIAL (PRIMARY) HYPERTENSION: ICD-10-CM

## 2020-08-23 DIAGNOSIS — Z95.828 PRESENCE OF OTHER VASCULAR IMPLANTS AND GRAFTS: Chronic | ICD-10-CM

## 2020-08-23 DIAGNOSIS — I21.3 ST ELEVATION (STEMI) MYOCARDIAL INFARCTION OF UNSPECIFIED SITE: ICD-10-CM

## 2020-08-23 DIAGNOSIS — Z41.1 ENCOUNTER FOR COSMETIC SURGERY: Chronic | ICD-10-CM

## 2020-08-23 DIAGNOSIS — Z98.890 OTHER SPECIFIED POSTPROCEDURAL STATES: Chronic | ICD-10-CM

## 2020-08-23 DIAGNOSIS — Z98.62 PERIPHERAL VASCULAR ANGIOPLASTY STATUS: Chronic | ICD-10-CM

## 2020-08-23 DIAGNOSIS — F19.10 OTHER PSYCHOACTIVE SUBSTANCE ABUSE, UNCOMPLICATED: ICD-10-CM

## 2020-08-23 DIAGNOSIS — Z98.1 ARTHRODESIS STATUS: Chronic | ICD-10-CM

## 2020-08-23 DIAGNOSIS — Z90.49 ACQUIRED ABSENCE OF OTHER SPECIFIED PARTS OF DIGESTIVE TRACT: Chronic | ICD-10-CM

## 2020-08-23 LAB
ADD ON TEST-SPECIMEN IN LAB: SIGNIFICANT CHANGE UP
ALBUMIN SERPL ELPH-MCNC: 2 G/DL — LOW (ref 3.3–5)
ALP SERPL-CCNC: 212 U/L — HIGH (ref 40–120)
ALT FLD-CCNC: 57 U/L — SIGNIFICANT CHANGE UP (ref 12–78)
ANION GAP SERPL CALC-SCNC: 10 MMOL/L — SIGNIFICANT CHANGE UP (ref 5–17)
APPEARANCE UR: CLEAR — SIGNIFICANT CHANGE UP
APTT BLD: 26.8 SEC — LOW (ref 27.5–35.5)
AST SERPL-CCNC: 50 U/L — HIGH (ref 15–37)
BASOPHILS # BLD AUTO: 0 K/UL — SIGNIFICANT CHANGE UP (ref 0–0.2)
BASOPHILS NFR BLD AUTO: 0 % — SIGNIFICANT CHANGE UP (ref 0–2)
BILIRUB SERPL-MCNC: 0.5 MG/DL — SIGNIFICANT CHANGE UP (ref 0.2–1.2)
BILIRUB UR-MCNC: ABNORMAL
BUN SERPL-MCNC: 72 MG/DL — HIGH (ref 7–23)
CALCIUM SERPL-MCNC: 8.7 MG/DL — SIGNIFICANT CHANGE UP (ref 8.5–10.1)
CHLORIDE SERPL-SCNC: 96 MMOL/L — SIGNIFICANT CHANGE UP (ref 96–108)
CO2 SERPL-SCNC: 19 MMOL/L — LOW (ref 22–31)
COLOR SPEC: YELLOW — SIGNIFICANT CHANGE UP
CREAT SERPL-MCNC: 3.32 MG/DL — HIGH (ref 0.5–1.3)
DIFF PNL FLD: ABNORMAL
EOSINOPHIL # BLD AUTO: 0 K/UL — SIGNIFICANT CHANGE UP (ref 0–0.5)
EOSINOPHIL NFR BLD AUTO: 0 % — SIGNIFICANT CHANGE UP (ref 0–6)
GLUCOSE SERPL-MCNC: 239 MG/DL — HIGH (ref 70–99)
GLUCOSE UR QL: NEGATIVE MG/DL — SIGNIFICANT CHANGE UP
HCT VFR BLD CALC: 26.9 % — LOW (ref 34.5–45)
HCT VFR BLD CALC: 27.8 % — LOW (ref 34.5–45)
HGB BLD-MCNC: 9.2 G/DL — LOW (ref 11.5–15.5)
HGB BLD-MCNC: 9.5 G/DL — LOW (ref 11.5–15.5)
INR BLD: 1.23 RATIO — HIGH (ref 0.88–1.16)
KETONES UR-MCNC: ABNORMAL
LACTATE SERPL-SCNC: 1.8 MMOL/L — SIGNIFICANT CHANGE UP (ref 0.7–2)
LEUKOCYTE ESTERASE UR-ACNC: ABNORMAL
LYMPHOCYTES # BLD AUTO: 1.69 K/UL — SIGNIFICANT CHANGE UP (ref 1–3.3)
LYMPHOCYTES # BLD AUTO: 7 % — LOW (ref 13–44)
MCHC RBC-ENTMCNC: 29.4 PG — SIGNIFICANT CHANGE UP (ref 27–34)
MCHC RBC-ENTMCNC: 29.9 PG — SIGNIFICANT CHANGE UP (ref 27–34)
MCHC RBC-ENTMCNC: 34.2 GM/DL — SIGNIFICANT CHANGE UP (ref 32–36)
MCHC RBC-ENTMCNC: 34.2 GM/DL — SIGNIFICANT CHANGE UP (ref 32–36)
MCV RBC AUTO: 85.9 FL — SIGNIFICANT CHANGE UP (ref 80–100)
MCV RBC AUTO: 87.4 FL — SIGNIFICANT CHANGE UP (ref 80–100)
MONOCYTES # BLD AUTO: 1.21 K/UL — HIGH (ref 0–0.9)
MONOCYTES NFR BLD AUTO: 5 % — SIGNIFICANT CHANGE UP (ref 2–14)
NEUTROPHILS # BLD AUTO: 21.29 K/UL — HIGH (ref 1.8–7.4)
NEUTROPHILS NFR BLD AUTO: 87 % — HIGH (ref 43–77)
NITRITE UR-MCNC: NEGATIVE — SIGNIFICANT CHANGE UP
NRBC # BLD: SIGNIFICANT CHANGE UP /100 WBCS (ref 0–0)
PCP SPEC-MCNC: SIGNIFICANT CHANGE UP
PH UR: 5 — SIGNIFICANT CHANGE UP (ref 5–8)
PLATELET # BLD AUTO: 249 K/UL — SIGNIFICANT CHANGE UP (ref 150–400)
PLATELET # BLD AUTO: 253 K/UL — SIGNIFICANT CHANGE UP (ref 150–400)
POTASSIUM SERPL-MCNC: 5.6 MMOL/L — HIGH (ref 3.5–5.3)
POTASSIUM SERPL-SCNC: 5.6 MMOL/L — HIGH (ref 3.5–5.3)
PROT SERPL-MCNC: 7.2 GM/DL — SIGNIFICANT CHANGE UP (ref 6–8.3)
PROT UR-MCNC: 30 MG/DL
PROTHROM AB SERPL-ACNC: 14.1 SEC — HIGH (ref 10.6–13.6)
RBC # BLD: 3.13 M/UL — LOW (ref 3.8–5.2)
RBC # BLD: 3.18 M/UL — LOW (ref 3.8–5.2)
RBC # FLD: 14.1 % — SIGNIFICANT CHANGE UP (ref 10.3–14.5)
RBC # FLD: 14.5 % — SIGNIFICANT CHANGE UP (ref 10.3–14.5)
SARS-COV-2 RNA SPEC QL NAA+PROBE: SIGNIFICANT CHANGE UP
SODIUM SERPL-SCNC: 125 MMOL/L — LOW (ref 135–145)
SP GR SPEC: 1.01 — SIGNIFICANT CHANGE UP (ref 1.01–1.02)
TROPONIN I SERPL-MCNC: 1.66 NG/ML — HIGH (ref 0.01–0.04)
UROBILINOGEN FLD QL: 1 MG/DL
WBC # BLD: 24.19 K/UL — HIGH (ref 3.8–10.5)
WBC # BLD: 24.46 K/UL — HIGH (ref 3.8–10.5)
WBC # FLD AUTO: 24.19 K/UL — HIGH (ref 3.8–10.5)
WBC # FLD AUTO: 24.46 K/UL — HIGH (ref 3.8–10.5)

## 2020-08-23 PROCEDURE — 82272 OCCULT BLD FECES 1-3 TESTS: CPT

## 2020-08-23 PROCEDURE — 85025 COMPLETE CBC W/AUTO DIFF WBC: CPT

## 2020-08-23 PROCEDURE — 85610 PROTHROMBIN TIME: CPT

## 2020-08-23 PROCEDURE — 86901 BLOOD TYPING SEROLOGIC RH(D): CPT

## 2020-08-23 PROCEDURE — 87040 BLOOD CULTURE FOR BACTERIA: CPT

## 2020-08-23 PROCEDURE — 36430 TRANSFUSION BLD/BLD COMPNT: CPT

## 2020-08-23 PROCEDURE — 94668 MNPJ CHEST WALL SBSQ: CPT

## 2020-08-23 PROCEDURE — 85045 AUTOMATED RETICULOCYTE COUNT: CPT

## 2020-08-23 PROCEDURE — 93010 ELECTROCARDIOGRAM REPORT: CPT | Mod: 76

## 2020-08-23 PROCEDURE — U0003: CPT

## 2020-08-23 PROCEDURE — 93458 L HRT ARTERY/VENTRICLE ANGIO: CPT

## 2020-08-23 PROCEDURE — 92526 ORAL FUNCTION THERAPY: CPT | Mod: GN

## 2020-08-23 PROCEDURE — 87150 DNA/RNA AMPLIFIED PROBE: CPT

## 2020-08-23 PROCEDURE — 85027 COMPLETE CBC AUTOMATED: CPT

## 2020-08-23 PROCEDURE — 36415 COLL VENOUS BLD VENIPUNCTURE: CPT

## 2020-08-23 PROCEDURE — 83986 ASSAY PH BODY FLUID NOS: CPT

## 2020-08-23 PROCEDURE — 74019 RADEX ABDOMEN 2 VIEWS: CPT

## 2020-08-23 PROCEDURE — 36600 WITHDRAWAL OF ARTERIAL BLOOD: CPT

## 2020-08-23 PROCEDURE — 93458 L HRT ARTERY/VENTRICLE ANGIO: CPT | Mod: 26

## 2020-08-23 PROCEDURE — 80048 BASIC METABOLIC PNL TOTAL CA: CPT

## 2020-08-23 PROCEDURE — 82803 BLOOD GASES ANY COMBINATION: CPT

## 2020-08-23 PROCEDURE — 93970 EXTREMITY STUDY: CPT

## 2020-08-23 PROCEDURE — 74018 RADEX ABDOMEN 1 VIEW: CPT

## 2020-08-23 PROCEDURE — 94760 N-INVAS EAR/PLS OXIMETRY 1: CPT

## 2020-08-23 PROCEDURE — 80074 ACUTE HEPATITIS PANEL: CPT

## 2020-08-23 PROCEDURE — 84439 ASSAY OF FREE THYROXINE: CPT

## 2020-08-23 PROCEDURE — 83735 ASSAY OF MAGNESIUM: CPT

## 2020-08-23 PROCEDURE — 83615 LACTATE (LD) (LDH) ENZYME: CPT

## 2020-08-23 PROCEDURE — 97162 PT EVAL MOD COMPLEX 30 MIN: CPT | Mod: GP

## 2020-08-23 PROCEDURE — 74176 CT ABD & PELVIS W/O CONTRAST: CPT | Mod: 26

## 2020-08-23 PROCEDURE — 94002 VENT MGMT INPAT INIT DAY: CPT

## 2020-08-23 PROCEDURE — 84481 FREE ASSAY (FT-3): CPT

## 2020-08-23 PROCEDURE — 93005 ELECTROCARDIOGRAM TRACING: CPT

## 2020-08-23 PROCEDURE — 89051 BODY FLUID CELL COUNT: CPT

## 2020-08-23 PROCEDURE — C1769: CPT

## 2020-08-23 PROCEDURE — 80053 COMPREHEN METABOLIC PANEL: CPT

## 2020-08-23 PROCEDURE — 84100 ASSAY OF PHOSPHORUS: CPT

## 2020-08-23 PROCEDURE — C1887: CPT

## 2020-08-23 PROCEDURE — 82945 GLUCOSE OTHER FLUID: CPT

## 2020-08-23 PROCEDURE — 84484 ASSAY OF TROPONIN QUANT: CPT

## 2020-08-23 PROCEDURE — 86850 RBC ANTIBODY SCREEN: CPT

## 2020-08-23 PROCEDURE — 92610 EVALUATE SWALLOWING FUNCTION: CPT | Mod: GN

## 2020-08-23 PROCEDURE — 71045 X-RAY EXAM CHEST 1 VIEW: CPT | Mod: 26

## 2020-08-23 PROCEDURE — 82330 ASSAY OF CALCIUM: CPT

## 2020-08-23 PROCEDURE — 83540 ASSAY OF IRON: CPT

## 2020-08-23 PROCEDURE — 82550 ASSAY OF CK (CPK): CPT

## 2020-08-23 PROCEDURE — 93306 TTE W/DOPPLER COMPLETE: CPT

## 2020-08-23 PROCEDURE — 71250 CT THORAX DX C-: CPT

## 2020-08-23 PROCEDURE — 88305 TISSUE EXAM BY PATHOLOGIST: CPT

## 2020-08-23 PROCEDURE — P9016: CPT

## 2020-08-23 PROCEDURE — 88108 CYTOPATH CONCENTRATE TECH: CPT

## 2020-08-23 PROCEDURE — 97530 THERAPEUTIC ACTIVITIES: CPT | Mod: GP

## 2020-08-23 PROCEDURE — 87186 SC STD MICRODIL/AGAR DIL: CPT

## 2020-08-23 PROCEDURE — 86923 COMPATIBILITY TEST ELECTRIC: CPT

## 2020-08-23 PROCEDURE — 94640 AIRWAY INHALATION TREATMENT: CPT

## 2020-08-23 PROCEDURE — 87635 SARS-COV-2 COVID-19 AMP PRB: CPT

## 2020-08-23 PROCEDURE — 85730 THROMBOPLASTIN TIME PARTIAL: CPT

## 2020-08-23 PROCEDURE — C1894: CPT

## 2020-08-23 PROCEDURE — 82746 ASSAY OF FOLIC ACID SERUM: CPT

## 2020-08-23 PROCEDURE — 83880 ASSAY OF NATRIURETIC PEPTIDE: CPT

## 2020-08-23 PROCEDURE — 87086 URINE CULTURE/COLONY COUNT: CPT

## 2020-08-23 PROCEDURE — 82140 ASSAY OF AMMONIA: CPT

## 2020-08-23 PROCEDURE — 87493 C DIFF AMPLIFIED PROBE: CPT

## 2020-08-23 PROCEDURE — 70450 CT HEAD/BRAIN W/O DYE: CPT

## 2020-08-23 PROCEDURE — 31500 INSERT EMERGENCY AIRWAY: CPT

## 2020-08-23 PROCEDURE — 83970 ASSAY OF PARATHORMONE: CPT

## 2020-08-23 PROCEDURE — 99223 1ST HOSP IP/OBS HIGH 75: CPT

## 2020-08-23 PROCEDURE — 32555 ASPIRATE PLEURA W/ IMAGING: CPT | Mod: LT

## 2020-08-23 PROCEDURE — 84300 ASSAY OF URINE SODIUM: CPT

## 2020-08-23 PROCEDURE — 82310 ASSAY OF CALCIUM: CPT

## 2020-08-23 PROCEDURE — 82962 GLUCOSE BLOOD TEST: CPT

## 2020-08-23 PROCEDURE — 86900 BLOOD TYPING SEROLOGIC ABO: CPT

## 2020-08-23 PROCEDURE — 82570 ASSAY OF URINE CREATININE: CPT

## 2020-08-23 PROCEDURE — 87075 CULTR BACTERIA EXCEPT BLOOD: CPT

## 2020-08-23 PROCEDURE — 87102 FUNGUS ISOLATION CULTURE: CPT

## 2020-08-23 PROCEDURE — 84443 ASSAY THYROID STIM HORMONE: CPT

## 2020-08-23 PROCEDURE — 83935 ASSAY OF URINE OSMOLALITY: CPT

## 2020-08-23 PROCEDURE — 80069 RENAL FUNCTION PANEL: CPT

## 2020-08-23 PROCEDURE — 83605 ASSAY OF LACTIC ACID: CPT

## 2020-08-23 PROCEDURE — 94667 MNPJ CHEST WALL 1ST: CPT

## 2020-08-23 PROCEDURE — 82607 VITAMIN B-12: CPT

## 2020-08-23 PROCEDURE — 87070 CULTURE OTHR SPECIMN AEROBIC: CPT

## 2020-08-23 PROCEDURE — 86769 SARS-COV-2 COVID-19 ANTIBODY: CPT

## 2020-08-23 PROCEDURE — 74176 CT ABD & PELVIS W/O CONTRAST: CPT

## 2020-08-23 PROCEDURE — 99223 1ST HOSP IP/OBS HIGH 75: CPT | Mod: AI

## 2020-08-23 PROCEDURE — 87507 IADNA-DNA/RNA PROBE TQ 12-25: CPT

## 2020-08-23 PROCEDURE — 82306 VITAMIN D 25 HYDROXY: CPT

## 2020-08-23 PROCEDURE — 80307 DRUG TEST PRSMV CHEM ANLYZR: CPT

## 2020-08-23 PROCEDURE — 93931 UPPER EXTREMITY STUDY: CPT | Mod: LT

## 2020-08-23 PROCEDURE — 97116 GAIT TRAINING THERAPY: CPT | Mod: GP

## 2020-08-23 PROCEDURE — 83550 IRON BINDING TEST: CPT

## 2020-08-23 PROCEDURE — C1729: CPT

## 2020-08-23 PROCEDURE — 71045 X-RAY EXAM CHEST 1 VIEW: CPT

## 2020-08-23 PROCEDURE — 83036 HEMOGLOBIN GLYCOSYLATED A1C: CPT

## 2020-08-23 PROCEDURE — 81001 URINALYSIS AUTO W/SCOPE: CPT

## 2020-08-23 PROCEDURE — 76700 US EXAM ABDOM COMPLETE: CPT

## 2020-08-23 PROCEDURE — 80076 HEPATIC FUNCTION PANEL: CPT

## 2020-08-23 PROCEDURE — 82042 OTHER SOURCE ALBUMIN QUAN EA: CPT

## 2020-08-23 PROCEDURE — 80061 LIPID PANEL: CPT

## 2020-08-23 PROCEDURE — 84157 ASSAY OF PROTEIN OTHER: CPT

## 2020-08-23 PROCEDURE — 82728 ASSAY OF FERRITIN: CPT

## 2020-08-23 RX ORDER — ALBUTEROL 90 UG/1
2 AEROSOL, METERED ORAL EVERY 6 HOURS
Refills: 0 | Status: DISCONTINUED | OUTPATIENT
Start: 2020-08-23 | End: 2020-09-21

## 2020-08-23 RX ORDER — OXYCODONE HYDROCHLORIDE 5 MG/1
5 TABLET ORAL EVERY 4 HOURS
Refills: 0 | Status: DISCONTINUED | OUTPATIENT
Start: 2020-08-23 | End: 2020-08-23

## 2020-08-23 RX ORDER — SODIUM POLYSTYRENE SULFONATE 4.1 MEQ/G
15 POWDER, FOR SUSPENSION ORAL ONCE
Refills: 0 | Status: COMPLETED | OUTPATIENT
Start: 2020-08-23 | End: 2020-08-23

## 2020-08-23 RX ORDER — DEXTROSE 50 % IN WATER 50 %
25 SYRINGE (ML) INTRAVENOUS ONCE
Refills: 0 | Status: DISCONTINUED | OUTPATIENT
Start: 2020-08-23 | End: 2020-09-05

## 2020-08-23 RX ORDER — CEFTRIAXONE 500 MG/1
INJECTION, POWDER, FOR SOLUTION INTRAMUSCULAR; INTRAVENOUS
Refills: 0 | Status: DISCONTINUED | OUTPATIENT
Start: 2020-08-23 | End: 2020-08-24

## 2020-08-23 RX ORDER — DEXTROSE 50 % IN WATER 50 %
12.5 SYRINGE (ML) INTRAVENOUS ONCE
Refills: 0 | Status: DISCONTINUED | OUTPATIENT
Start: 2020-08-23 | End: 2020-09-05

## 2020-08-23 RX ORDER — SODIUM CHLORIDE 9 MG/ML
1000 INJECTION INTRAMUSCULAR; INTRAVENOUS; SUBCUTANEOUS
Refills: 0 | Status: DISCONTINUED | OUTPATIENT
Start: 2020-08-23 | End: 2020-08-25

## 2020-08-23 RX ORDER — ISOSORBIDE MONONITRATE 60 MG/1
60 TABLET, EXTENDED RELEASE ORAL DAILY
Refills: 0 | Status: DISCONTINUED | OUTPATIENT
Start: 2020-08-23 | End: 2020-08-27

## 2020-08-23 RX ORDER — SODIUM CHLORIDE 9 MG/ML
1000 INJECTION, SOLUTION INTRAVENOUS
Refills: 0 | Status: DISCONTINUED | OUTPATIENT
Start: 2020-08-23 | End: 2020-08-23

## 2020-08-23 RX ORDER — HEPARIN SODIUM 5000 [USP'U]/ML
5000 INJECTION INTRAVENOUS; SUBCUTANEOUS EVERY 8 HOURS
Refills: 0 | Status: DISCONTINUED | OUTPATIENT
Start: 2020-08-24 | End: 2020-09-11

## 2020-08-23 RX ORDER — ACETAMINOPHEN 500 MG
650 TABLET ORAL EVERY 4 HOURS
Refills: 0 | Status: DISCONTINUED | OUTPATIENT
Start: 2020-08-23 | End: 2020-09-21

## 2020-08-23 RX ORDER — ONDANSETRON 8 MG/1
4 TABLET, FILM COATED ORAL EVERY 6 HOURS
Refills: 0 | Status: DISCONTINUED | OUTPATIENT
Start: 2020-08-23 | End: 2020-09-21

## 2020-08-23 RX ORDER — CEFTRIAXONE 500 MG/1
1000 INJECTION, POWDER, FOR SOLUTION INTRAMUSCULAR; INTRAVENOUS EVERY 24 HOURS
Refills: 0 | Status: DISCONTINUED | OUTPATIENT
Start: 2020-08-24 | End: 2020-08-24

## 2020-08-23 RX ORDER — CEFTRIAXONE 500 MG/1
INJECTION, POWDER, FOR SOLUTION INTRAMUSCULAR; INTRAVENOUS
Refills: 0 | Status: DISCONTINUED | OUTPATIENT
Start: 2020-08-23 | End: 2020-08-23

## 2020-08-23 RX ORDER — CLOPIDOGREL BISULFATE 75 MG/1
75 TABLET, FILM COATED ORAL DAILY
Refills: 0 | Status: DISCONTINUED | OUTPATIENT
Start: 2020-08-23 | End: 2020-09-12

## 2020-08-23 RX ORDER — SODIUM CHLORIDE 9 MG/ML
1700 INJECTION INTRAMUSCULAR; INTRAVENOUS; SUBCUTANEOUS ONCE
Refills: 0 | Status: COMPLETED | OUTPATIENT
Start: 2020-08-23 | End: 2020-08-23

## 2020-08-23 RX ORDER — ATORVASTATIN CALCIUM 80 MG/1
80 TABLET, FILM COATED ORAL AT BEDTIME
Refills: 0 | Status: DISCONTINUED | OUTPATIENT
Start: 2020-08-23 | End: 2020-09-12

## 2020-08-23 RX ORDER — ASPIRIN/CALCIUM CARB/MAGNESIUM 324 MG
325 TABLET ORAL DAILY
Refills: 0 | Status: DISCONTINUED | OUTPATIENT
Start: 2020-08-23 | End: 2020-08-24

## 2020-08-23 RX ORDER — INSULIN GLARGINE 100 [IU]/ML
20 INJECTION, SOLUTION SUBCUTANEOUS AT BEDTIME
Refills: 0 | Status: DISCONTINUED | OUTPATIENT
Start: 2020-08-23 | End: 2020-08-29

## 2020-08-23 RX ORDER — GABAPENTIN 400 MG/1
600 CAPSULE ORAL THREE TIMES A DAY
Refills: 0 | Status: DISCONTINUED | OUTPATIENT
Start: 2020-08-23 | End: 2020-09-01

## 2020-08-23 RX ORDER — OXYCODONE HYDROCHLORIDE 5 MG/1
10 TABLET ORAL EVERY 4 HOURS
Refills: 0 | Status: DISCONTINUED | OUTPATIENT
Start: 2020-08-23 | End: 2020-08-24

## 2020-08-23 RX ORDER — GLUCAGON INJECTION, SOLUTION 0.5 MG/.1ML
1 INJECTION, SOLUTION SUBCUTANEOUS ONCE
Refills: 0 | Status: DISCONTINUED | OUTPATIENT
Start: 2020-08-23 | End: 2020-09-05

## 2020-08-23 RX ORDER — ARIPIPRAZOLE 15 MG/1
20 TABLET ORAL DAILY
Refills: 0 | Status: DISCONTINUED | OUTPATIENT
Start: 2020-08-23 | End: 2020-09-01

## 2020-08-23 RX ORDER — SENNA PLUS 8.6 MG/1
2 TABLET ORAL AT BEDTIME
Refills: 0 | Status: DISCONTINUED | OUTPATIENT
Start: 2020-08-23 | End: 2020-08-27

## 2020-08-23 RX ORDER — SODIUM CHLORIDE 9 MG/ML
1000 INJECTION, SOLUTION INTRAVENOUS
Refills: 0 | Status: DISCONTINUED | OUTPATIENT
Start: 2020-08-23 | End: 2020-09-21

## 2020-08-23 RX ORDER — INSULIN LISPRO 100/ML
6 VIAL (ML) SUBCUTANEOUS
Refills: 0 | Status: DISCONTINUED | OUTPATIENT
Start: 2020-08-23 | End: 2020-08-28

## 2020-08-23 RX ORDER — CEFTRIAXONE 500 MG/1
1000 INJECTION, POWDER, FOR SOLUTION INTRAMUSCULAR; INTRAVENOUS ONCE
Refills: 0 | Status: COMPLETED | OUTPATIENT
Start: 2020-08-23 | End: 2020-08-23

## 2020-08-23 RX ORDER — DEXTROSE 50 % IN WATER 50 %
15 SYRINGE (ML) INTRAVENOUS ONCE
Refills: 0 | Status: DISCONTINUED | OUTPATIENT
Start: 2020-08-23 | End: 2020-09-05

## 2020-08-23 RX ORDER — RANOLAZINE 500 MG/1
1000 TABLET, FILM COATED, EXTENDED RELEASE ORAL
Refills: 0 | Status: DISCONTINUED | OUTPATIENT
Start: 2020-08-23 | End: 2020-09-11

## 2020-08-23 RX ORDER — PANTOPRAZOLE SODIUM 20 MG/1
40 TABLET, DELAYED RELEASE ORAL
Refills: 0 | Status: DISCONTINUED | OUTPATIENT
Start: 2020-08-23 | End: 2020-08-30

## 2020-08-23 RX ORDER — LEVOTHYROXINE SODIUM 125 MCG
112 TABLET ORAL DAILY
Refills: 0 | Status: DISCONTINUED | OUTPATIENT
Start: 2020-08-23 | End: 2020-09-12

## 2020-08-23 RX ORDER — INSULIN LISPRO 100/ML
VIAL (ML) SUBCUTANEOUS
Refills: 0 | Status: DISCONTINUED | OUTPATIENT
Start: 2020-08-23 | End: 2020-08-30

## 2020-08-23 RX ORDER — INSULIN LISPRO 100/ML
VIAL (ML) SUBCUTANEOUS AT BEDTIME
Refills: 0 | Status: DISCONTINUED | OUTPATIENT
Start: 2020-08-23 | End: 2020-08-30

## 2020-08-23 RX ORDER — CARVEDILOL PHOSPHATE 80 MG/1
3.12 CAPSULE, EXTENDED RELEASE ORAL EVERY 12 HOURS
Refills: 0 | Status: DISCONTINUED | OUTPATIENT
Start: 2020-08-23 | End: 2020-08-27

## 2020-08-23 RX ADMIN — SODIUM CHLORIDE 1700 MILLILITER(S): 9 INJECTION INTRAMUSCULAR; INTRAVENOUS; SUBCUTANEOUS at 08:30

## 2020-08-23 RX ADMIN — Medication 2: at 18:29

## 2020-08-23 RX ADMIN — CEFTRIAXONE 1000 MILLIGRAM(S): 500 INJECTION, POWDER, FOR SOLUTION INTRAMUSCULAR; INTRAVENOUS at 14:08

## 2020-08-23 RX ADMIN — PANTOPRAZOLE SODIUM 40 MILLIGRAM(S): 20 TABLET, DELAYED RELEASE ORAL at 13:00

## 2020-08-23 RX ADMIN — RANOLAZINE 1000 MILLIGRAM(S): 500 TABLET, FILM COATED, EXTENDED RELEASE ORAL at 18:29

## 2020-08-23 RX ADMIN — ATORVASTATIN CALCIUM 80 MILLIGRAM(S): 80 TABLET, FILM COATED ORAL at 22:29

## 2020-08-23 RX ADMIN — CLOPIDOGREL BISULFATE 75 MILLIGRAM(S): 75 TABLET, FILM COATED ORAL at 13:00

## 2020-08-23 RX ADMIN — CARVEDILOL PHOSPHATE 3.12 MILLIGRAM(S): 80 CAPSULE, EXTENDED RELEASE ORAL at 18:31

## 2020-08-23 RX ADMIN — SODIUM CHLORIDE 100 MILLILITER(S): 9 INJECTION INTRAMUSCULAR; INTRAVENOUS; SUBCUTANEOUS at 12:30

## 2020-08-23 RX ADMIN — Medication 2: at 13:01

## 2020-08-23 RX ADMIN — GABAPENTIN 600 MILLIGRAM(S): 400 CAPSULE ORAL at 13:00

## 2020-08-23 RX ADMIN — SODIUM POLYSTYRENE SULFONATE 15 GRAM(S): 4.1 POWDER, FOR SUSPENSION ORAL at 12:33

## 2020-08-23 RX ADMIN — Medication 1 TABLET(S): at 13:00

## 2020-08-23 RX ADMIN — Medication 6 UNIT(S): at 18:30

## 2020-08-23 RX ADMIN — Medication 325 MILLIGRAM(S): at 13:00

## 2020-08-23 RX ADMIN — ARIPIPRAZOLE 20 MILLIGRAM(S): 15 TABLET ORAL at 13:00

## 2020-08-23 RX ADMIN — ISOSORBIDE MONONITRATE 60 MILLIGRAM(S): 60 TABLET, EXTENDED RELEASE ORAL at 13:00

## 2020-08-23 RX ADMIN — Medication 112 MICROGRAM(S): at 13:00

## 2020-08-23 RX ADMIN — GABAPENTIN 600 MILLIGRAM(S): 400 CAPSULE ORAL at 22:30

## 2020-08-23 NOTE — ED ADULT TRIAGE NOTE - CHIEF COMPLAINT QUOTE
Pt. to the ED C/O Fever , General Malaise and Abdominal Pain with SOB x 4-5 days. Pt. also reports stiff neck.  Afebrile at this time. Hx. of HTN, DM, and HLD and Chronic Pain.

## 2020-08-23 NOTE — H&P ADULT - NSHPLABSRESULTS_GEN_ALL_CORE
9.2    24.19 )-----------( 249      ( 23 Aug 2020 08:34 )             26.9     23 Aug 2020 08:34    125    |  96     |  72     ----------------------------<  239    5.6     |  19     |  3.32     Ca    8.7        23 Aug 2020 08:34    TPro  7.2    /  Alb  2.0    /  TBili  0.5    /  DBili  x      /  AST  50     /  ALT  57     /  AlkPhos  212    23 Aug 2020 08:34    LIVER FUNCTIONS - ( 23 Aug 2020 08:34 )  Alb: 2.0 g/dL / Pro: 7.2 gm/dL / ALK PHOS: 212 U/L / ALT: 57 U/L / AST: 50 U/L / GGT: x           PT/INR - ( 23 Aug 2020 08:34 )   PT: 14.1 sec;   INR: 1.23 ratio       PTT - ( 23 Aug 2020 08:34 )  PTT:26.8 sec    CARDIAC MARKERS ( 23 Aug 2020 08:34 )  1.660 ng/mL / x     / x     / x     / x        Urinalysis Basic - ( 23 Aug 2020 10:25 )  Color: Yellow / Appearance: Clear / S.010 / pH: x  Gluc: x / Ketone: Trace  / Bili: Small / Urobili: 1 mg/dL   Blood: x / Protein: 30 mg/dL / Nitrite: Negative   Leuk Esterase: Moderate / RBC: x / WBC x   Sq Epi: x / Non Sq Epi: x / Bacteria: x

## 2020-08-23 NOTE — H&P ADULT - ASSESSMENT
#STEMI  Admit to CCU  S/p card cath- occluded RCA  Cardio eval DR Palla  Aspirin, PLavix, increase Lipitor to 80mg, switch BB to coreg due to cocaine use  ECHO in AM  Lipid panel  Cont Imdur, Ranexa    #ARF with hyperkalemia  Unclear triggering events likely combination of prerenal from dehydration+/metformin use  Able to produce urine  Stat Kayexalate  IVF  Renal eval DR Billy  Will get renal imaging on the scan of the abdomen    #Leukocytosis, fever  Not septic on admission  Unclear source  Blood c/s send, will get UA urine c/s  CT abd/pelvis with PO contrast to r/o colitis or other intraabdominal source  Monitor leukocytosis  ID eval    #Anemia  No pro GI bleed  Will get iron studies, [facility]    #Diabetes  Non-complaint with insulin use at home  Start on Lantus +ISS  Hold Metformin  Adjust further based on FS    #PAD s/p bypass s/p stent  ON dual-antiplatelets    #Hypothyroidism/hyperlipidemia  Cont home meds    #COVID negative 8/23/20    #DVT proph- heparin SQ    #Dispo- inpatient admit. D/w pt, DR Palla and DR Billy

## 2020-08-23 NOTE — PATIENT PROFILE ADULT - PUBLIC BENEFITS
Add 34954 Cpt? (Important Note: In 2017 The Use Of 91560 Is Being Tracked By Cms To Determine Future Global Period Reimbursement For Global Periods): no
Detail Level: Detailed
no

## 2020-08-23 NOTE — CONSULT NOTE ADULT - SUBJECTIVE AND OBJECTIVE BOX
Patient is a 59y Female whom presented to the hospital with CAD, PAD s/p fem-pop bypass s/p RT iliac stent, diabetes, hypothyroidism, hyperlipidemia, current active smoker, uses cocaine last day PTA with renal evaluation of AMISH/hyperkalemia. Presnted with multiple non-specific complaints, including fevers on/off last 4-5 days, weakness, no appetite and reduced oral intake as well as abd discomfort. Here noted with ST elevations on presenting EKG, code STEMI called and pt underwent urgent angiogram showing occluded RCA being medically managed. NO complaints now, s/p NS x 1 liter.     PAST MEDICAL & SURGICAL HISTORY:  Lung nodule  Diabetes mellitus  History of TIAs  History of gallbladder disease: surgery  History of colon polyps: precancerous  Substance abuse: history of. last used 14 years ago.  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 Decreased ROM.  Angina pectoris  History of MRSA infection: left lower extremity incisional area   Anxiety and depression  Transient cerebral ischemia, unspecified type  Osteoarthritis of spine, unspecified spinal osteoarthritis complication status, unspecified spinal region  Lumbar herniated disc  Urinary incontinence, unspecified type  Overactive bladder  Hiatal hernia with GERD: hiatal hernia repaired  PAD (peripheral artery disease)  Hyperlipidemia, unspecified hyperlipidemia type  Essential hypertension  Hypothyroidism  COPD (chronic obstructive pulmonary disease)  CAD (coronary artery disease)  History of lumbar fusion: with laminectomy 2018  H/O hernia repair: hiatal hernia - 2018  S/P dilatation and curettage:   H/O rhinoplasty:   H/O angioplasty: Right iliac artery. 2017  H/O ventral hernia repair: 3/2017  History of incision and drainage: of lower extremity incisional site x 5 . last done2015  H/O arterial bypass of lower limb: Femoral - Popliteal. 2014 Left lower side with stents  History of laparoscopic cholecystectomy: 2016      MEDICATIONS  (STANDING):  ARIPiprazole 20 milliGRAM(s) Oral daily  aspirin 325 milliGRAM(s) Oral daily  atorvastatin 80 milliGRAM(s) Oral at bedtime  carvedilol 3.125 milliGRAM(s) Oral every 12 hours  cefTRIAXone Injectable.      cefTRIAXone Injectable. 1000 milliGRAM(s) IV Push once  clopidogrel Tablet 75 milliGRAM(s) Oral daily  dextrose 5%. 1000 milliLiter(s) (50 mL/Hr) IV Continuous <Continuous>  dextrose 50% Injectable 12.5 Gram(s) IV Push once  dextrose 50% Injectable 25 Gram(s) IV Push once  dextrose 50% Injectable 25 Gram(s) IV Push once  gabapentin 600 milliGRAM(s) Oral three times a day  insulin glargine Injectable (LANTUS) 20 Unit(s) SubCutaneous at bedtime  insulin lispro (HumaLOG) corrective regimen sliding scale   SubCutaneous three times a day before meals  insulin lispro (HumaLOG) corrective regimen sliding scale   SubCutaneous at bedtime  insulin lispro Injectable (HumaLOG) 6 Unit(s) SubCutaneous three times a day before meals  isosorbide   mononitrate ER Tablet (IMDUR) 60 milliGRAM(s) Oral daily  lactated ringers. 1000 milliLiter(s) (125 mL/Hr) IV Continuous <Continuous>  levothyroxine 112 MICROGram(s) Oral daily  multivitamin 1 Tablet(s) Oral daily  pantoprazole    Tablet 40 milliGRAM(s) Oral before breakfast  ranolazine 1000 milliGRAM(s) Oral two times a day  sodium chloride 0.9%. 1000 milliLiter(s) (100 mL/Hr) IV Continuous <Continuous>  sodium polystyrene sulfonate Suspension 15 Gram(s) Oral once    MEDICATIONS  (PRN):  acetaminophen   Tablet .. 650 milliGRAM(s) Oral every 4 hours PRN Mild Pain (1 - 3)  ALBUTerol    90 MICROgram(s) HFA Inhaler 2 Puff(s) Inhalation every 6 hours PRN Shortness of Breath and/or Wheezing  aluminum hydroxide/magnesium hydroxide/simethicone Suspension 30 milliLiter(s) Oral every 4 hours PRN Dyspepsia  dextrose 40% Gel 15 Gram(s) Oral once PRN Blood Glucose LESS THAN 70 milliGRAM(s)/deciliter  glucagon  Injectable 1 milliGRAM(s) IntraMuscular once PRN Glucose LESS THAN 70 milligrams/deciliter  ondansetron Injectable 4 milliGRAM(s) IV Push every 6 hours PRN Nausea  oxyCODONE    IR 5 milliGRAM(s) Oral every 4 hours PRN Moderate Pain (4 - 6)  oxyCODONE    IR 10 milliGRAM(s) Oral every 4 hours PRN Severe Pain (7 - 10)  senna 2 Tablet(s) Oral at bedtime PRN Constipation      Allergies    No Known Allergies    Intolerances        SOCIAL HISTORY:  no etoh/cigg now    FAMILY HISTORY:  Family history of asthma (Sibling): sister  Family history of epilepsy (Sibling, Sibling): 2 sisters  Family history of cardiovascular disease: mother  Family history of stroke: mother  Family history of heart disease: father  Family history of alcoholism in father      REVIEW OF SYSTEMS:    CONSTITUTIONAL: stable weakness, fevers or chills  EYES/ENT: No visual changes;  No vertigo or throat pain   NECK: No pain or stiffness  RESPIRATORY: No cough, wheezing, hemoptysis; No shortness of breath  CARDIOVASCULAR: No chest pain or palpitations  GASTROINTESTINAL: No abdominal or epigastric pain. No nausea, vomiting, or hematemesis; No diarrhea or constipation. No melena or hematochezia.  GENITOURINARY: No dysuria, frequency or hematuria  NEUROLOGICAL: No numbness or weakness  SKIN: No itching, burning, rashes, or lesions   All other review of systems is negative unless indicated above.      T(C): , Max: 37.4 (20 @ 08:00)  T(F): , Max: 99.4 (20 @ 08:00)  HR: 89 (20 @ 11:45)  BP: 125/58 (20 @ 11:45)  BP(mean): 75 (20 @ 11:45)  RR: 14 (20 @ 11:45)  SpO2: 100% (20 @ 11:45)  Wt(kg): --    Height (cm): 160.02 ( @ 07:42)  Weight (kg): 54.4 ( @ 07:42)  BMI (kg/m2): 21.2 ( @ 07:42)  BSA (m2): 1.56 ( @ 07:42)    PHYSICAL EXAM:    Constitutional: NAD, frail  HEENT: dry MMM, >then stated age  good aeration b/l  Cardiovascular: S1 and S2  Extremities: No peripheral edema  Neurological: A/O x 3,  : No Black  Skin: No rashes  Access: Not applicable        LABS:                        9.2    24.19 )-----------( 249      ( 23 Aug 2020 08:34 )             26.9     23 Aug 2020 08:34    125    |  96     |  72     ----------------------------<  239    5.6     |  19     |  3.32     Ca    8.7        23 Aug 2020 08:34    TPro  7.2    /  Alb  2.0    /  TBili  0.5    /  DBili  x      /  AST  50     /  ALT  57     /  AlkPhos  212    23 Aug 2020 08:34          Urine Studies:  Urinalysis Basic - ( 23 Aug 2020 10:25 )    Color: Yellow / Appearance: Clear / S.010 / pH: x  Gluc: x / Ketone: Trace  / Bili: Small / Urobili: 1 mg/dL   Blood: x / Protein: 30 mg/dL / Nitrite: Negative   Leuk Esterase: Moderate / RBC: 25-50 /HPF / WBC >50   Sq Epi: x / Non Sq Epi: Few / Bacteria: Many            RADIOLOGY & ADDITIONAL STUDIES:

## 2020-08-23 NOTE — CONSULT NOTE ADULT - ASSESSMENT
59 CAD, PAD s/p fem-pop bypass s/p RT iliac stent, diabetes, hypothyroidism, hyperlipidemia, current active smoker, uses cocaine last use yesterday, chronic back pain s/p lumbar surgery presented with multiple non-specific complaints, including fevers on/off last 4-5 days, weakness, no appetite and reduced oral intake, vague diffuse abd pains, one day of diarrhea. No cough, no SOB. SHe came in today for evaluation of weakness and found to have ST elevations on presenting EKG, code STEMI called and pt underwent urgent angiogram showing occluded RCA.     AMISH  -Suspect pre-renal, dehydration playing a role. Contrast may exacerbate  -IVF maintenance to start, 100/hr NS in place of LR (hyperkalemia and low Na)  -Urine lytes  -Renal imaging, monitor UOP  -NSAID avoidance    Hyponatremia'  -Suspect pre-renal, NS hydration  -urine lytes    Hyperkalemia  -medically treated, april hodgson  -Repeat pending    Thanks, will follow with you    d/c with RN staff, Dr Singh

## 2020-08-23 NOTE — ED PROVIDER NOTE - PMH
Angina pectoris    Anxiety and depression    Bipolar depression    CAD (coronary artery disease)    Carotid artery stenosis  " 45 percent"  COPD (chronic obstructive pulmonary disease)    Cystic breast  b/l  DDD (degenerative disc disease), lumbar    Diabetes mellitus    Essential hypertension    ETOH abuse  last drank "2 months ago"  GERD (gastroesophageal reflux disease)    Hiatal hernia with GERD  hiatal hernia repaired  History of colon polyps  precancerous  History of gallbladder disease  surgery  History of MRSA infection  left lower extremity incisional area 2015  History of TIAs    Hyperlipidemia, unspecified hyperlipidemia type    Hypothyroidism    Lumbar herniated disc    Lung nodule    Osteoarthritis of spine, unspecified spinal osteoarthritis complication status, unspecified spinal region    Overactive bladder    PAD (peripheral artery disease)    Shoulder disorder  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 Decreased ROM.  Spinal stenosis of lumbar region    Substance abuse  history of. last used 14 years ago.  Transient cerebral ischemia, unspecified type    Urinary incontinence, unspecified type

## 2020-08-23 NOTE — ED ADULT NURSE REASSESSMENT NOTE - NS ED NURSE REASSESS COMMENT FT1
Multiple attempts made  to obtain IV access by multiple RN. 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 phelbotomist contacted for blood collection. heike burkett rn obtained IV access to right hand

## 2020-08-23 NOTE — H&P ADULT - NSICDXPASTMEDICALHX_GEN_ALL_CORE_FT
PAST MEDICAL HISTORY:  Angina pectoris     Anxiety and depression     Bipolar depression     CAD (coronary artery disease)     Carotid artery stenosis " 45 percent"    COPD (chronic obstructive pulmonary disease)     Cystic breast b/l    DDD (degenerative disc disease), lumbar     Diabetes mellitus     Essential hypertension     ETOH abuse last drank "2 months ago"    GERD (gastroesophageal reflux disease)     Hiatal hernia with GERD hiatal hernia repaired    History of colon polyps precancerous    History of gallbladder disease surgery    History of MRSA infection left lower extremity incisional area 2015    History of TIAs     Hyperlipidemia, unspecified hyperlipidemia type     Hypothyroidism     Lumbar herniated disc     Lung nodule     Osteoarthritis of spine, unspecified spinal osteoarthritis complication status, unspecified spinal region     Overactive bladder     PAD (peripheral artery disease)     Shoulder disorder 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 Decreased ROM.    Spinal stenosis of lumbar region     Substance abuse history of. last used 14 years ago.    Transient cerebral ischemia, unspecified type     Urinary incontinence, unspecified type

## 2020-08-23 NOTE — ED ADULT NURSE NOTE - NSIMPLEMENTINTERV_GEN_ALL_ED
Implemented All Fall Risk Interventions:  [place] to call system. Call bell, personal items and telephone within reach. Instruct patient to call for assistance. Room bathroom lighting operational. Non-slip footwear when patient is off stretcher. Physically safe environment: no spills, clutter or unnecessary equipment. Stretcher in lowest position, wheels locked, appropriate side rails in place. Provide visual cue, wrist band, yellow gown, etc. Monitor gait and stability. Monitor for mental status changes and reorient to person, place, and time. Review medications for side effects contributing to fall risk. Reinforce activity limits and safety measures with patient and family.

## 2020-08-23 NOTE — H&P ADULT - HISTORY OF PRESENT ILLNESS
58yo/F with PMH CAD, PAD s/p fem-pop bypass s/p RT iliac stent, diabetes, hypothyroidism, hyperlipidemia, current active smoker, uses cocaine last use yesterday, chronic back pain s/p lumbar surgery presented with multiple non-specific complaints, including fevers on/off last 4-5 days, weakness, no appetite and reduced oral intake, vague diffuse abd pains, one day of diarrhea. No cough, no SOB. SHe came in today for evaluation of weakness and found to have ST elevations on presenting EKG, code STEMI called and pt underwent urgent angiogram showing occluded RCA. She denies chest pain at present.

## 2020-08-23 NOTE — ED PROVIDER NOTE - OBJECTIVE STATEMENT
59F hx DM, HTN, CAD, EtOH/cocaine here with 4-5 days of malaise, headache/backache, fever last week to 101 (but none more recently), occasional vague abdominal discomfort and diarrhea.  No chest pain/dyspnea. Today felt weaker -- to ED.  Here mildly hypotensive in mild/mod distress.  Initial ECG with ~1mm ST elevations in III/aVF (new compared with 10/2019). D/W Dr. Whitley -- ECG concerning despite patients vague symptoms.  Code STEMI initiated.

## 2020-08-23 NOTE — CONSULT NOTE ADULT - SUBJECTIVE AND OBJECTIVE BOX
CHIEF COMPLAINT: not feeling well     HPI:  60yo/F with PMH CAD, PAD s/p fem-pop bypass s/p RT iliac stent, diabetes, hypothyroidism, hyperlipidemia, current active smoker, uses cocaine last use yesterday, chronic back pain s/p lumbar surgery presented with multiple non-specific complaints, including fevers on/off last 4-5 days, weakness, no appetite and reduced oral intake, vague diffuse abd pains, one day of diarrhea. No cough, no SOB. SHe came in today for evaluation of weakness and found to have ST elevations on presenting EKG, code STEMI called and pt underwent urgent angiogram showing occluded RCA  recommended medical management for now as she does not  have chest pain and elevated BUN creatinine     he denies chest pain .       PAST MEDICAL & SURGICAL HISTORY:  Lung nodule  Diabetes mellitus  History of TIAs  History of gallbladder disease: surgery  History of colon polyps: precancerous  Substance abuse: history of. last used 14 years ago.  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 Decreased ROM.  Angina pectoris  History of MRSA infection: left lower extremity incisional area 2015  Anxiety and depression  Transient cerebral ischemia, unspecified type  Osteoarthritis of spine, unspecified spinal osteoarthritis complication status, unspecified spinal region  Lumbar herniated disc  Urinary incontinence, unspecified type  Overactive bladder  Hiatal hernia with GERD: hiatal hernia repaired  PAD (peripheral artery disease)  Hyperlipidemia, unspecified hyperlipidemia type  Essential hypertension  Hypothyroidism  COPD (chronic obstructive pulmonary disease)  CAD (coronary artery disease)  History of lumbar fusion: with laminectomy 11/8/2018  H/O hernia repair: hiatal hernia - 2017, 2018  S/P dilatation and curettage: 1991  H/O rhinoplasty: 1980  H/O angioplasty: Right iliac artery. 5/12/2017  H/O ventral hernia repair: 3/2017  History of incision and drainage: of lower extremity incisional site x 5 . last done5/2015  H/O arterial bypass of lower limb: Femoral - Popliteal. 11/2014 Left lower side with stents  History of laparoscopic cholecystectomy: 2/2016      Allergies    No Known Allergies    Intolerances        SOCIAL HISTORY: smoker   uses cocaine daily    FAMILY HISTORY:  Family history of asthma (Sibling): sister  Family history of epilepsy (Sibling, Sibling): 2 sisters  Family history of cardiovascular disease: mother  Family history of stroke: mother  Family history of heart disease: father  Family history of alcoholism in father      MEDICATIONS:  MEDICATIONS  (STANDING):  ARIPiprazole 20 milliGRAM(s) Oral daily  aspirin 325 milliGRAM(s) Oral daily  atorvastatin 80 milliGRAM(s) Oral at bedtime  carvedilol 3.125 milliGRAM(s) Oral every 12 hours  cefTRIAXone Injectable.      cefTRIAXone Injectable. 1000 milliGRAM(s) IV Push once  clopidogrel Tablet 75 milliGRAM(s) Oral daily  dextrose 5%. 1000 milliLiter(s) (50 mL/Hr) IV Continuous <Continuous>  dextrose 50% Injectable 12.5 Gram(s) IV Push once  dextrose 50% Injectable 25 Gram(s) IV Push once  dextrose 50% Injectable 25 Gram(s) IV Push once  gabapentin 600 milliGRAM(s) Oral three times a day  insulin glargine Injectable (LANTUS) 20 Unit(s) SubCutaneous at bedtime  insulin lispro (HumaLOG) corrective regimen sliding scale   SubCutaneous three times a day before meals  insulin lispro (HumaLOG) corrective regimen sliding scale   SubCutaneous at bedtime  insulin lispro Injectable (HumaLOG) 6 Unit(s) SubCutaneous three times a day before meals  isosorbide   mononitrate ER Tablet (IMDUR) 60 milliGRAM(s) Oral daily  levothyroxine 112 MICROGram(s) Oral daily  multivitamin 1 Tablet(s) Oral daily  pantoprazole    Tablet 40 milliGRAM(s) Oral before breakfast  ranolazine 1000 milliGRAM(s) Oral two times a day  sodium chloride 0.9%. 1000 milliLiter(s) (100 mL/Hr) IV Continuous <Continuous>    MEDICATIONS  (PRN):  acetaminophen   Tablet .. 650 milliGRAM(s) Oral every 4 hours PRN Mild Pain (1 - 3)  ALBUTerol    90 MICROgram(s) HFA Inhaler 2 Puff(s) Inhalation every 6 hours PRN Shortness of Breath and/or Wheezing  aluminum hydroxide/magnesium hydroxide/simethicone Suspension 30 milliLiter(s) Oral every 4 hours PRN Dyspepsia  dextrose 40% Gel 15 Gram(s) Oral once PRN Blood Glucose LESS THAN 70 milliGRAM(s)/deciliter  glucagon  Injectable 1 milliGRAM(s) IntraMuscular once PRN Glucose LESS THAN 70 milligrams/deciliter  ondansetron Injectable 4 milliGRAM(s) IV Push every 6 hours PRN Nausea  oxyCODONE    IR 5 milliGRAM(s) Oral every 4 hours PRN Moderate Pain (4 - 6)  oxyCODONE    IR 10 milliGRAM(s) Oral every 4 hours PRN Severe Pain (7 - 10)  senna 2 Tablet(s) Oral at bedtime PRN Constipation      REVIEW OF SYSTEMS:    fatigue not feeling well no chest pain   All other review of systems is negative unless indicated above    Vital Signs Last 24 Hrs  T(C): 36.3 (23 Aug 2020 12:00), Max: 37.4 (23 Aug 2020 08:00)  T(F): 97.3 (23 Aug 2020 12:00), Max: 99.4 (23 Aug 2020 08:00)  HR: 94 (23 Aug 2020 13:30) (83 - 94)  BP: 134/65 (23 Aug 2020 13:30) (95/49 - 134/71)  BP(mean): 83 (23 Aug 2020 13:30) (73 - 85)  RR: 15 (23 Aug 2020 13:30) (13 - 22)  SpO2: 82% (23 Aug 2020 13:30) (82% - 100%)    I&O's Summary      PHYSICAL EXAM:    Constitutional: NAD, awake and alert, well-developed  HEENT: PERR, EOMI,  No oral cyananosis.  Neck:  supple,  No JVD  Respiratory: Breath sounds are clear bilaterally, No wheezing, rales or rhonchi  Cardiovascular: S1 and S2, regular rate and rhythm, no Murmurs, gallops or rubs  Gastrointestinal: Bowel Sounds present, soft, nontender.   Extremities: No peripheral edema. No clubbing or cyanosis.  Vascular: 1+ peripheral pulses  Neurological: A/O x 3, no focal deficits  Musculoskeletal: no calf tenderness.  Skin: No rashes.      LABS: All Labs Reviewed:                        9.5    24.46 )-----------( 253      ( 23 Aug 2020 11:52 )             27.8                         9.2    24.19 )-----------( 249      ( 23 Aug 2020 08:34 )             26.9     23 Aug 2020 11:52    128    |  99     |  69     ----------------------------<  207    5.4     |  20     |  3.15   23 Aug 2020 08:34    125    |  96     |  72     ----------------------------<  239    5.6     |  19     |  3.32     Ca    8.4        23 Aug 2020 11:52  Ca    8.7        23 Aug 2020 08:34    TPro  7.2    /  Alb  2.0    /  TBili  0.5    /  DBili  x      /  AST  50     /  ALT  57     /  AlkPhos  212    23 Aug 2020 08:34    PT/INR - ( 23 Aug 2020 08:34 )   PT: 14.1 sec;   INR: 1.23 ratio         PTT - ( 23 Aug 2020 08:34 )  PTT:26.8 sec  CARDIAC MARKERS ( 23 Aug 2020 11:52 )  1.660 ng/mL / x     / x     / x     / x      CARDIAC MARKERS ( 23 Aug 2020 08:34 )  1.660 ng/mL / x     / x     / x     / x          Blood Culture:         RADIOLOGY/EKG:    NSR  ST elevation lead III AVF      < from: Cardiac Cath Lab - Adult (08.23.20 @ 09:33) >  ondition: Rest     Angiographic Findings     Cardiac Arteries and Lesion Findings    LMCA: Normal.    LAD: Abnormal.Moderate diffuse disease    LCx: Abnormal.Moderate diffuse diease    RCA:     Mid RCA: 100% stenosis.     Impression     Diagnostic Conclusions     Acute occlusion of the RCA of unknown duration. No PCI due to lack of   ischemic symptoms, sepsis, acute renal failure and satisfactory left   coronary anatomy.    Estimated Blood Loss:4ml.    < end of copied text >

## 2020-08-23 NOTE — H&P ADULT - NSHPPHYSICALEXAM_GEN_ALL_CORE
Vital Signs Last 24 Hrs  T(C): 36.3 (23 Aug 2020 10:44), Max: 37.4 (23 Aug 2020 08:00)  T(F): 97.3 (23 Aug 2020 10:44), Max: 99.4 (23 Aug 2020 08:00)  HR: 84 (23 Aug 2020 09:00) (83 - 91)  BP: 113/54 (23 Aug 2020 09:00) (95/49 - 113/54)  BP(mean): --  RR: 15 (23 Aug 2020 09:00) (14 - 16)  SpO2: 100% (23 Aug 2020 09:00) (100% - 100%)

## 2020-08-23 NOTE — ED PROVIDER NOTE - CLINICAL SUMMARY MEDICAL DECISION MAKING FREE TEXT BOX
Weakness, malaise, myalgias, chills w/concerning ECG (BETZAIDA in III/aVF which are new).   D/W Gutierrez -- activate STEMI. Labs/XR

## 2020-08-23 NOTE — H&P ADULT - NSICDXFAMILYHX_GEN_ALL_CORE_FT
FAMILY HISTORY:  Family history of alcoholism in father  Family history of cardiovascular disease, mother  Family history of heart disease, father  Family history of stroke, mother    Sibling  Still living? Yes, Estimated age: 61-70  Family history of asthma, Age at diagnosis: Age Unknown  Family history of epilepsy, Age at diagnosis: Age Unknown  Family history of epilepsy, Age at diagnosis: Age Unknown

## 2020-08-23 NOTE — ED PROVIDER NOTE - PSH
H/O angioplasty  Right iliac artery. 5/12/2017  H/O arterial bypass of lower limb  Femoral - Popliteal. 11/2014 Left lower side with stents  H/O hernia repair  hiatal hernia - 2017, 2018  H/O rhinoplasty  1980  H/O ventral hernia repair  3/2017  History of incision and drainage  of lower extremity incisional site x 5 . last done5/2015  History of laparoscopic cholecystectomy  2/2016  History of lumbar fusion  with laminectomy 11/8/2018  S/P dilatation and curettage  1991

## 2020-08-23 NOTE — ED ADULT NURSE NOTE - OBJECTIVE STATEMENT
pt presents to ED  from home, via EMS, pt alert and orientedx4, pt c/o general maliase, SOB abdomindal discomfort, fever stiff neck for the apst ew days. safety maintained will  continue to monitor Pt presents to ED from home via EMS, pt alert and orientedx4, VSS afebrile. pt c/o stiff neck back pain, malaise SOb fever for 4 days. pt denies chest pain dizziness weakness but admits to mild SOb. pt denies alcohol an substance abuse. pt deneis ELIZABETH hameed MD at bedside for assessment, pt to be evalauted for STEMi dye to abnormal EKG. Code stemi activated at  0815. neuros intact, safety maintaine will continue to montior

## 2020-08-24 LAB
A1C WITH ESTIMATED AVERAGE GLUCOSE RESULT: 8.1 % — HIGH (ref 4–5.6)
ANION GAP SERPL CALC-SCNC: 10 MMOL/L — SIGNIFICANT CHANGE UP (ref 5–17)
ANION GAP SERPL CALC-SCNC: 8 MMOL/L — SIGNIFICANT CHANGE UP (ref 5–17)
BASOPHILS # BLD AUTO: 0.05 K/UL — SIGNIFICANT CHANGE UP (ref 0–0.2)
BASOPHILS NFR BLD AUTO: 0.2 % — SIGNIFICANT CHANGE UP (ref 0–2)
BUN SERPL-MCNC: 58 MG/DL — HIGH (ref 7–23)
BUN SERPL-MCNC: 67 MG/DL — HIGH (ref 7–23)
CALCIUM SERPL-MCNC: 6.4 MG/DL — CRITICAL LOW (ref 8.5–10.1)
CALCIUM SERPL-MCNC: 8.1 MG/DL — LOW (ref 8.5–10.1)
CHLORIDE SERPL-SCNC: 104 MMOL/L — SIGNIFICANT CHANGE UP (ref 96–108)
CHLORIDE SERPL-SCNC: 106 MMOL/L — SIGNIFICANT CHANGE UP (ref 96–108)
CHOLEST SERPL-MCNC: 75 MG/DL — SIGNIFICANT CHANGE UP (ref 10–199)
CO2 SERPL-SCNC: 14 MMOL/L — LOW (ref 22–31)
CO2 SERPL-SCNC: 18 MMOL/L — LOW (ref 22–31)
CREAT SERPL-MCNC: 2.84 MG/DL — HIGH (ref 0.5–1.3)
CREAT SERPL-MCNC: 2.98 MG/DL — HIGH (ref 0.5–1.3)
E COLI DNA BLD POS QL NAA+NON-PROBE: SIGNIFICANT CHANGE UP
EOSINOPHIL # BLD AUTO: 0.11 K/UL — SIGNIFICANT CHANGE UP (ref 0–0.5)
EOSINOPHIL NFR BLD AUTO: 0.5 % — SIGNIFICANT CHANGE UP (ref 0–6)
ESTIMATED AVERAGE GLUCOSE: 186 MG/DL — HIGH (ref 68–114)
GLUCOSE SERPL-MCNC: 112 MG/DL — HIGH (ref 70–99)
GLUCOSE SERPL-MCNC: 186 MG/DL — HIGH (ref 70–99)
GRAM STN FLD: SIGNIFICANT CHANGE UP
HCT VFR BLD CALC: 22.5 % — LOW (ref 34.5–45)
HCT VFR BLD CALC: 25.2 % — LOW (ref 34.5–45)
HDLC SERPL-MCNC: 7 MG/DL — LOW
HGB BLD-MCNC: 7.5 G/DL — LOW (ref 11.5–15.5)
HGB BLD-MCNC: 8.6 G/DL — LOW (ref 11.5–15.5)
IMM GRANULOCYTES NFR BLD AUTO: 2.7 % — HIGH (ref 0–1.5)
LACTATE SERPL-SCNC: 1.9 MMOL/L — SIGNIFICANT CHANGE UP (ref 0.7–2)
LIPID PNL WITH DIRECT LDL SERPL: 20 MG/DL — SIGNIFICANT CHANGE UP
LYMPHOCYTES # BLD AUTO: 1.63 K/UL — SIGNIFICANT CHANGE UP (ref 1–3.3)
LYMPHOCYTES # BLD AUTO: 8 % — LOW (ref 13–44)
MCHC RBC-ENTMCNC: 29.7 PG — SIGNIFICANT CHANGE UP (ref 27–34)
MCHC RBC-ENTMCNC: 30 PG — SIGNIFICANT CHANGE UP (ref 27–34)
MCHC RBC-ENTMCNC: 33.3 GM/DL — SIGNIFICANT CHANGE UP (ref 32–36)
MCHC RBC-ENTMCNC: 34.1 GM/DL — SIGNIFICANT CHANGE UP (ref 32–36)
MCV RBC AUTO: 86.9 FL — SIGNIFICANT CHANGE UP (ref 80–100)
MCV RBC AUTO: 90 FL — SIGNIFICANT CHANGE UP (ref 80–100)
METHOD TYPE: SIGNIFICANT CHANGE UP
MONOCYTES # BLD AUTO: 1.6 K/UL — HIGH (ref 0–0.9)
MONOCYTES NFR BLD AUTO: 7.8 % — SIGNIFICANT CHANGE UP (ref 2–14)
NEUTROPHILS # BLD AUTO: 16.44 K/UL — HIGH (ref 1.8–7.4)
NEUTROPHILS NFR BLD AUTO: 80.8 % — HIGH (ref 43–77)
PLATELET # BLD AUTO: 216 K/UL — SIGNIFICANT CHANGE UP (ref 150–400)
PLATELET # BLD AUTO: 256 K/UL — SIGNIFICANT CHANGE UP (ref 150–400)
POTASSIUM SERPL-MCNC: 5 MMOL/L — SIGNIFICANT CHANGE UP (ref 3.5–5.3)
POTASSIUM SERPL-MCNC: 6.3 MMOL/L — CRITICAL HIGH (ref 3.5–5.3)
POTASSIUM SERPL-SCNC: 5 MMOL/L — SIGNIFICANT CHANGE UP (ref 3.5–5.3)
POTASSIUM SERPL-SCNC: 6.3 MMOL/L — CRITICAL HIGH (ref 3.5–5.3)
RBC # BLD: 2.5 M/UL — LOW (ref 3.8–5.2)
RBC # BLD: 2.9 M/UL — LOW (ref 3.8–5.2)
RBC # FLD: 14.6 % — HIGH (ref 10.3–14.5)
RBC # FLD: 15.2 % — HIGH (ref 10.3–14.5)
SARS-COV-2 IGG SERPL QL IA: NEGATIVE — SIGNIFICANT CHANGE UP
SARS-COV-2 IGM SERPL IA-ACNC: 0.08 INDEX — SIGNIFICANT CHANGE UP
SODIUM SERPL-SCNC: 128 MMOL/L — LOW (ref 135–145)
SODIUM SERPL-SCNC: 132 MMOL/L — LOW (ref 135–145)
SPECIMEN SOURCE: SIGNIFICANT CHANGE UP
SPECIMEN SOURCE: SIGNIFICANT CHANGE UP
TOTAL CHOLESTEROL/HDL RATIO MEASUREMENT: 10.8 RATIO — HIGH (ref 3.3–7.1)
TRIGL SERPL-MCNC: 242 MG/DL — HIGH (ref 10–149)
WBC # BLD: 20.39 K/UL — HIGH (ref 3.8–10.5)
WBC # BLD: 24.65 K/UL — HIGH (ref 3.8–10.5)
WBC # FLD AUTO: 20.39 K/UL — HIGH (ref 3.8–10.5)
WBC # FLD AUTO: 24.65 K/UL — HIGH (ref 3.8–10.5)

## 2020-08-24 PROCEDURE — 93010 ELECTROCARDIOGRAM REPORT: CPT

## 2020-08-24 PROCEDURE — 99233 SBSQ HOSP IP/OBS HIGH 50: CPT

## 2020-08-24 PROCEDURE — 99232 SBSQ HOSP IP/OBS MODERATE 35: CPT

## 2020-08-24 PROCEDURE — 93306 TTE W/DOPPLER COMPLETE: CPT | Mod: 26

## 2020-08-24 RX ORDER — ASPIRIN/CALCIUM CARB/MAGNESIUM 324 MG
81 TABLET ORAL DAILY
Refills: 0 | Status: DISCONTINUED | OUTPATIENT
Start: 2020-08-24 | End: 2020-09-12

## 2020-08-24 RX ORDER — DEXTROSE 50 % IN WATER 50 %
12.5 SYRINGE (ML) INTRAVENOUS ONCE
Refills: 0 | Status: COMPLETED | OUTPATIENT
Start: 2020-08-24 | End: 2020-08-24

## 2020-08-24 RX ORDER — CEFTRIAXONE 500 MG/1
2000 INJECTION, POWDER, FOR SOLUTION INTRAMUSCULAR; INTRAVENOUS EVERY 24 HOURS
Refills: 0 | Status: DISCONTINUED | OUTPATIENT
Start: 2020-08-24 | End: 2020-08-29

## 2020-08-24 RX ORDER — DEXTROSE 50 % IN WATER 50 %
15 SYRINGE (ML) INTRAVENOUS ONCE
Refills: 0 | Status: COMPLETED | OUTPATIENT
Start: 2020-08-24 | End: 2020-08-24

## 2020-08-24 RX ORDER — SODIUM CHLORIDE 9 MG/ML
500 INJECTION INTRAMUSCULAR; INTRAVENOUS; SUBCUTANEOUS ONCE
Refills: 0 | Status: COMPLETED | OUTPATIENT
Start: 2020-08-24 | End: 2020-08-24

## 2020-08-24 RX ORDER — CEFTRIAXONE 500 MG/1
2000 INJECTION, POWDER, FOR SOLUTION INTRAMUSCULAR; INTRAVENOUS EVERY 24 HOURS
Refills: 0 | Status: DISCONTINUED | OUTPATIENT
Start: 2020-08-24 | End: 2020-08-24

## 2020-08-24 RX ADMIN — SODIUM CHLORIDE 1000 MILLILITER(S): 9 INJECTION INTRAMUSCULAR; INTRAVENOUS; SUBCUTANEOUS at 22:30

## 2020-08-24 RX ADMIN — ATORVASTATIN CALCIUM 80 MILLIGRAM(S): 80 TABLET, FILM COATED ORAL at 21:13

## 2020-08-24 RX ADMIN — Medication 6 UNIT(S): at 11:07

## 2020-08-24 RX ADMIN — GABAPENTIN 600 MILLIGRAM(S): 400 CAPSULE ORAL at 21:13

## 2020-08-24 RX ADMIN — Medication 12.5 GRAM(S): at 17:32

## 2020-08-24 RX ADMIN — ARIPIPRAZOLE 20 MILLIGRAM(S): 15 TABLET ORAL at 13:02

## 2020-08-24 RX ADMIN — RANOLAZINE 1000 MILLIGRAM(S): 500 TABLET, FILM COATED, EXTENDED RELEASE ORAL at 06:22

## 2020-08-24 RX ADMIN — GABAPENTIN 600 MILLIGRAM(S): 400 CAPSULE ORAL at 13:08

## 2020-08-24 RX ADMIN — OXYCODONE HYDROCHLORIDE 10 MILLIGRAM(S): 5 TABLET ORAL at 13:04

## 2020-08-24 RX ADMIN — SODIUM CHLORIDE 1000 MILLILITER(S): 9 INJECTION INTRAMUSCULAR; INTRAVENOUS; SUBCUTANEOUS at 22:08

## 2020-08-24 RX ADMIN — HEPARIN SODIUM 5000 UNIT(S): 5000 INJECTION INTRAVENOUS; SUBCUTANEOUS at 06:21

## 2020-08-24 RX ADMIN — Medication 1 TABLET(S): at 11:02

## 2020-08-24 RX ADMIN — Medication 6: at 11:08

## 2020-08-24 RX ADMIN — HEPARIN SODIUM 5000 UNIT(S): 5000 INJECTION INTRAVENOUS; SUBCUTANEOUS at 21:13

## 2020-08-24 RX ADMIN — OXYCODONE HYDROCHLORIDE 10 MILLIGRAM(S): 5 TABLET ORAL at 11:03

## 2020-08-24 RX ADMIN — Medication 15 GRAM(S): at 17:04

## 2020-08-24 RX ADMIN — PANTOPRAZOLE SODIUM 40 MILLIGRAM(S): 20 TABLET, DELAYED RELEASE ORAL at 06:21

## 2020-08-24 RX ADMIN — Medication 15 GRAM(S): at 17:18

## 2020-08-24 RX ADMIN — HEPARIN SODIUM 5000 UNIT(S): 5000 INJECTION INTRAVENOUS; SUBCUTANEOUS at 13:07

## 2020-08-24 RX ADMIN — GABAPENTIN 600 MILLIGRAM(S): 400 CAPSULE ORAL at 06:21

## 2020-08-24 RX ADMIN — Medication 112 MICROGRAM(S): at 06:21

## 2020-08-24 RX ADMIN — ISOSORBIDE MONONITRATE 60 MILLIGRAM(S): 60 TABLET, EXTENDED RELEASE ORAL at 11:02

## 2020-08-24 RX ADMIN — Medication 325 MILLIGRAM(S): at 11:02

## 2020-08-24 RX ADMIN — CARVEDILOL PHOSPHATE 3.12 MILLIGRAM(S): 80 CAPSULE, EXTENDED RELEASE ORAL at 06:21

## 2020-08-24 RX ADMIN — CLOPIDOGREL BISULFATE 75 MILLIGRAM(S): 75 TABLET, FILM COATED ORAL at 11:03

## 2020-08-24 RX ADMIN — CEFTRIAXONE 1000 MILLIGRAM(S): 500 INJECTION, POWDER, FOR SOLUTION INTRAMUSCULAR; INTRAVENOUS at 11:07

## 2020-08-24 RX ADMIN — Medication 4: at 13:02

## 2020-08-24 RX ADMIN — RANOLAZINE 1000 MILLIGRAM(S): 500 TABLET, FILM COATED, EXTENDED RELEASE ORAL at 17:02

## 2020-08-24 RX ADMIN — CEFTRIAXONE 2000 MILLIGRAM(S): 500 INJECTION, POWDER, FOR SOLUTION INTRAMUSCULAR; INTRAVENOUS at 17:17

## 2020-08-24 RX ADMIN — Medication 6 UNIT(S): at 13:01

## 2020-08-24 RX ADMIN — CARVEDILOL PHOSPHATE 3.12 MILLIGRAM(S): 80 CAPSULE, EXTENDED RELEASE ORAL at 17:02

## 2020-08-24 NOTE — PROGRESS NOTE ADULT - SUBJECTIVE AND OBJECTIVE BOX
Seen at the bedside, no overnight events, c/o SOB with exertion due to COPD as per pt, denies chest pain/ pressure, palpitation or dizziness/ light headedness.     Vital Signs Last 24 Hrs  T(C): 37 (24 Aug 2020 06:40), Max: 38.1 (24 Aug 2020 00:35)  T(F): 98.6 (24 Aug 2020 06:40), Max: 100.5 (24 Aug 2020 00:35)  HR: 91 (24 Aug 2020 06:00) (87 - 103)  BP: 107/64 (24 Aug 2020 06:00) (99/41 - 143/51)  BP(mean): 73 (24 Aug 2020 06:00) (55 - 85)  RR: 17 (24 Aug 2020 06:00) (13 - 22)  SpO2: 100% (24 Aug 2020 06:00) (82% - 100%)    Physical exam:  Constitutional: well developed, well nourished, no deformities and no acute distress  Neurological: Alert & Oriented x 3, SCHNEIDER, no focal deficits  HEENT: NC/AT, PERRLA, EOMI,  Neck supple.  Respiratory: + rhonchi, no wheezing   Cardiovascular: (+) S1 & S2, RRR, No murmur/ rub/ gallop   Gastrointestinal: soft, Nontender, slightly distended, (+) BS  Genitourinary: voiding freely  Extremities: No pedal edema, No clubbing, No cyanosis, doppler pulses on B/L LE   Skin:  Rt. groin access site intact: soft, non-tender, no hematoma or bleeding     LABS:                        8.6    24.65 )-----------( 256      ( 24 Aug 2020 06:24 )             25.2     08-24    132<L>  |  104  |  67<H>  ----------------------------<  112<H>  5.0   |  18<L>  |  2.98<H>    Ca    8.1<L>      24 Aug 2020 06:24  TPro  7.2  /  Alb  2.0<L>  /  TBili  0.5  /  DBili  x   /  AST  50<H>  /  ALT  57  /  AlkPhos  212<H>  08-23  PT/INR - ( 23 Aug 2020 08:34 )   PT: 14.1 sec;   INR: 1.23 ratio    PTT - ( 23 Aug 2020 08:34 )  PTT:26.8 sec  CARDIAC MARKERS ( 23 Aug 2020 11:52 )  1.660 ng/mL / x     / x     / x     / x      CARDIAC MARKERS ( 23 Aug 2020 08:34 )  1.660 ng/mL / x     / x     / x     / x        Blood Culture: 08-23 @ 08:34  Organism Blood Culture PCR  Gram Stain Blood -- Gram Stain   Growth in aerobic bottle:  Gram Negative Rods  Specimen Source .Blood None  Culture-Blood --    RADIOLOGY/EKG:    Medications:  acetaminophen   Tablet .. 650 milliGRAM(s) Oral every 4 hours PRN  ALBUTerol    90 MICROgram(s) HFA Inhaler 2 Puff(s) Inhalation every 6 hours PRN  aluminum hydroxide/magnesium hydroxide/simethicone Suspension 30 milliLiter(s) Oral every 4 hours PRN  ARIPiprazole 20 milliGRAM(s) Oral daily  aspirin 325 milliGRAM(s) Oral daily  atorvastatin 80 milliGRAM(s) Oral at bedtime  carvedilol 3.125 milliGRAM(s) Oral every 12 hours  cefTRIAXone Injectable.      cefTRIAXone Injectable. 1000 milliGRAM(s) IV Push every 24 hours  clopidogrel Tablet 75 milliGRAM(s) Oral daily  dextrose 40% Gel 15 Gram(s) Oral once PRN  dextrose 5%. 1000 milliLiter(s) IV Continuous <Continuous>  dextrose 50% Injectable 12.5 Gram(s) IV Push once  dextrose 50% Injectable 25 Gram(s) IV Push once  dextrose 50% Injectable 25 Gram(s) IV Push once  gabapentin 600 milliGRAM(s) Oral three times a day  glucagon  Injectable 1 milliGRAM(s) IntraMuscular once PRN  heparin   Injectable 5000 Unit(s) SubCutaneous every 8 hours  insulin glargine Injectable (LANTUS) 20 Unit(s) SubCutaneous at bedtime  insulin lispro (HumaLOG) corrective regimen sliding scale   SubCutaneous three times a day before meals  insulin lispro (HumaLOG) corrective regimen sliding scale   SubCutaneous at bedtime  insulin lispro Injectable (HumaLOG) 6 Unit(s) SubCutaneous three times a day before meals  isosorbide   mononitrate ER Tablet (IMDUR) 60 milliGRAM(s) Oral daily  levothyroxine 112 MICROGram(s) Oral daily  multivitamin 1 Tablet(s) Oral daily  ondansetron Injectable 4 milliGRAM(s) IV Push every 6 hours PRN  oxyCODONE    IR 5 milliGRAM(s) Oral every 4 hours PRN  oxyCODONE    IR 10 milliGRAM(s) Oral every 4 hours PRN  pantoprazole    Tablet 40 milliGRAM(s) Oral before breakfast  ranolazine 1000 milliGRAM(s) Oral two times a day  senna 2 Tablet(s) Oral at bedtime PRN  sodium chloride 0.9%. 1000 milliLiter(s) IV Continuous <Continuous>    A/P:  60 yo/F with PMH CAD, PAD s/p fem-pop bypass s/p RT iliac stent, diabetes, hypothyroidism, hyperlipidemia, current active smoker, current cocaine user, chronic back pain s/p lumbar surgery presented with multiple non-specific complaints, including fevers on/off last 4-5 days, weakness, no appetite and reduced oral intake, vague diffuse abd pains, one day of diarrhea found to have IW STEMI, s/p LHC on 8/23 revealed 100% RCA disease, recommended medical management in setting of sepsis, acute kidney disease and lack of symptoms.   - CCU monitor  - continue ASA and Plavix   - continue BB  - continue   - continue statin  - post procedure, outcome and follow up care reviewed with patient/MD  - possible discharge home in am if medically stable  - follow up with MD in 4-7 days Seen at the bedside, no overnight events, c/o SOB with exertion due to COPD as per pt, denies chest pain/ pressure, palpitation or dizziness/ light headedness.     Vital Signs Last 24 Hrs  T(C): 37 (24 Aug 2020 06:40), Max: 38.1 (24 Aug 2020 00:35)  T(F): 98.6 (24 Aug 2020 06:40), Max: 100.5 (24 Aug 2020 00:35)  HR: 91 (24 Aug 2020 06:00) (87 - 103)  BP: 107/64 (24 Aug 2020 06:00) (99/41 - 143/51)  BP(mean): 73 (24 Aug 2020 06:00) (55 - 85)  RR: 17 (24 Aug 2020 06:00) (13 - 22)  SpO2: 100% (24 Aug 2020 06:00) (82% - 100%)    Physical exam:  Constitutional: well developed, well nourished, no deformities and no acute distress  Neurological: Alert & Oriented x 3, SCHNEIDER, no focal deficits  HEENT: NC/AT, PERRLA, EOMI,  Neck supple.  Respiratory: + rhonchi, no wheezing   Cardiovascular: (+) S1 & S2, RRR, No murmur/ rub/ gallop   Gastrointestinal: soft, Nontender, slightly distended, (+) BS  Genitourinary: voiding freely  Extremities: No pedal edema, No clubbing, No cyanosis, doppler pulses on B/L LE   Skin:  Rt. groin access site intact: soft, non-tender, no hematoma or bleeding     LABS:                        8.6    24.65 )-----------( 256      ( 24 Aug 2020 06:24 )             25.2     08-24    132<L>  |  104  |  67<H>  ----------------------------<  112<H>  5.0   |  18<L>  |  2.98<H>    Ca    8.1<L>      24 Aug 2020 06:24  TPro  7.2  /  Alb  2.0<L>  /  TBili  0.5  /  DBili  x   /  AST  50<H>  /  ALT  57  /  AlkPhos  212<H>  08-23  PT/INR - ( 23 Aug 2020 08:34 )   PT: 14.1 sec;   INR: 1.23 ratio    PTT - ( 23 Aug 2020 08:34 )  PTT:26.8 sec  CARDIAC MARKERS ( 23 Aug 2020 11:52 )  1.660 ng/mL / x     / x     / x     / x      CARDIAC MARKERS ( 23 Aug 2020 08:34 )  1.660 ng/mL / x     / x     / x     / x        Blood Culture: 08-23 @ 08:34  Organism Blood Culture PCR  Gram Stain Blood -- Gram Stain   Growth in aerobic bottle:  Gram Negative Rods  Specimen Source .Blood None  Culture-Blood --    RADIOLOGY/EKG:    < from: Cardiac Cath Lab - Adult (08.23.20 @ 09:33) >  Angiographic Findings     Cardiac Arteries and Lesion Findings    LMCA: Normal.    LAD: Abnormal.Moderate diffuse disease    LCx: Abnormal.Moderate diffuse diease    RCA:     Mid RCA: 100% stenosis.     Impression     Diagnostic Conclusions     Acute occlusion of the RCA of unknown duration. No PCI due to lack of   ischemic symptoms, sepsis, acute renal failure and satisfactory left   coronary anatomy.    < end of copied text >      Medications:  acetaminophen   Tablet .. 650 milliGRAM(s) Oral every 4 hours PRN  ALBUTerol    90 MICROgram(s) HFA Inhaler 2 Puff(s) Inhalation every 6 hours PRN  aluminum hydroxide/magnesium hydroxide/simethicone Suspension 30 milliLiter(s) Oral every 4 hours PRN  ARIPiprazole 20 milliGRAM(s) Oral daily  aspirin 325 milliGRAM(s) Oral daily  atorvastatin 80 milliGRAM(s) Oral at bedtime  carvedilol 3.125 milliGRAM(s) Oral every 12 hours  cefTRIAXone Injectable.      cefTRIAXone Injectable. 1000 milliGRAM(s) IV Push every 24 hours  clopidogrel Tablet 75 milliGRAM(s) Oral daily  dextrose 40% Gel 15 Gram(s) Oral once PRN  dextrose 5%. 1000 milliLiter(s) IV Continuous <Continuous>  dextrose 50% Injectable 12.5 Gram(s) IV Push once  dextrose 50% Injectable 25 Gram(s) IV Push once  dextrose 50% Injectable 25 Gram(s) IV Push once  gabapentin 600 milliGRAM(s) Oral three times a day  glucagon  Injectable 1 milliGRAM(s) IntraMuscular once PRN  heparin   Injectable 5000 Unit(s) SubCutaneous every 8 hours  insulin glargine Injectable (LANTUS) 20 Unit(s) SubCutaneous at bedtime  insulin lispro (HumaLOG) corrective regimen sliding scale   SubCutaneous three times a day before meals  insulin lispro (HumaLOG) corrective regimen sliding scale   SubCutaneous at bedtime  insulin lispro Injectable (HumaLOG) 6 Unit(s) SubCutaneous three times a day before meals  isosorbide   mononitrate ER Tablet (IMDUR) 60 milliGRAM(s) Oral daily  levothyroxine 112 MICROGram(s) Oral daily  multivitamin 1 Tablet(s) Oral daily  ondansetron Injectable 4 milliGRAM(s) IV Push every 6 hours PRN  oxyCODONE    IR 5 milliGRAM(s) Oral every 4 hours PRN  oxyCODONE    IR 10 milliGRAM(s) Oral every 4 hours PRN  pantoprazole    Tablet 40 milliGRAM(s) Oral before breakfast  ranolazine 1000 milliGRAM(s) Oral two times a day  senna 2 Tablet(s) Oral at bedtime PRN  sodium chloride 0.9%. 1000 milliLiter(s) IV Continuous <Continuous>    A/P:  60 yo/F with PMH CAD, PAD s/p fem-pop bypass s/p RT iliac stent, diabetes, hypothyroidism, hyperlipidemia, current active smoker, current cocaine user, chronic back pain s/p lumbar surgery presented with multiple non-specific complaints, including fevers on/off last 4-5 days, weakness, no appetite and reduced oral intake, vague diffuse abd pains, one day of diarrhea found to have IW STEMI, s/p LHC on 8/23 revealed 100% RCA disease, recommended medical management in setting of sepsis, acute renal failure and lack of symptoms.   - CCU monitor  - continue ASA and Plavix   - continue BB  - continue Isosorbide Mono  - continue high intensity statin   - continue other medical management by primary team   - follow up with cardiology

## 2020-08-24 NOTE — CHART NOTE - NSCHARTNOTEFT_GEN_A_CORE
"58yo/F with PMH CAD, PAD s/p fem-pop bypass s/p RT iliac stent, diabetes, hypothyroidism, hyperlipidemia, current active smoker, uses cocaine last use yesterday, chronic back pain s/p lumbar surgery presented with multiple non-specific complaints, including fevers on/off last 4-5 days, weakness, no appetite and reduced oral intake, vague diffuse abd pains, one day of diarrhea. No cough, no SOB. She came in today for evaluation of weakness and found to have ST elevations on presenting EKG, code STEMI called and pt underwent urgent angiogram showing occluded RCA. She denies chest pain at present".  Patient found to have E. coli urosepsis on Rocephin.    Patient noted by RN to have B/P/ 70 systolic while asleep but woke up to b/p 85 systolic  .  Patient had poor PO fluid intake since IV fluids were discontinued,    Vital Signs Last 24 Hrs  T(C): 37.2 (24 Aug 2020 16:56), Max: 38.1 (24 Aug 2020 00:35)  T(F): 98.9 (24 Aug 2020 16:56), Max: 100.5 (24 Aug 2020 00:35)  HR: 72 (24 Aug 2020 21:45) (72 - 97)  BP: 75/35 (24 Aug 2020 21:45) (75/35 - 130/60)  BP(mean): 45 (24 Aug 2020 21:45) (45 - 77)  RR: 16 (24 Aug 2020 21:45) (0 - 24)  SpO2: 100% (24 Aug 2020 21:45) (97% - 100%)    Patient is alert oriented X 3 and has o specific complaints  Given  500cc normal saline bolus with no significant response   Given a second bolus of 500 cc normal saline    Stat CBC with differential and serum lactate ad BMP ordered. "58yo/F with PMH CAD, PAD s/p fem-pop bypass s/p RT iliac stent, diabetes, hypothyroidism, hyperlipidemia, current active smoker, uses cocaine last use yesterday, chronic back pain s/p lumbar surgery presented with multiple non-specific complaints, including fevers on/off last 4-5 days, weakness, no appetite and reduced oral intake, vague diffuse abd pains, one day of diarrhea. No cough, no SOB. She came in today for evaluation of weakness and found to have ST elevations on presenting EKG, code STEMI called and pt underwent urgent angiogram showing occluded RCA. She denies chest pain at present".  Patient found to have E. coli urosepsis on Rocephin.    Patient noted by RN to have B/P/ 70 systolic while asleep but woke up to b/p 85 systolic  .  Patient had poor PO fluid intake since IV fluids were discontinued,    Vital Signs Last 24 Hrs  T(C): 37.2 (24 Aug 2020 16:56), Max: 38.1 (24 Aug 2020 00:35)  T(F): 98.9 (24 Aug 2020 16:56), Max: 100.5 (24 Aug 2020 00:35)  HR: 72 (24 Aug 2020 21:45) (72 - 97)  BP: 75/35 (24 Aug 2020 21:45) (75/35 - 130/60)  BP(mean): 45 (24 Aug 2020 21:45) (45 - 77)  RR: 16 (24 Aug 2020 21:45) (0 - 24)  SpO2: 100% (24 Aug 2020 21:45) (97% - 100%)    Patient is alert oriented X 3 and has no specific complaints  PERRLA @ 5mm    Lungs  no rales rubs or rhonchi  Heart  RR Rate 60min.   manual b/p 78/50  left arm     Given  500cc normal saline bolus with no significant response   Given a second bolus of 500 cc normal saline (in progress )   Stat CBC with differential , serum lactate ad BMP ordered. "60yo/F with PMH CAD, PAD s/p fem-pop bypass s/p RT iliac stent, diabetes, hypothyroidism, hyperlipidemia, current active smoker, uses cocaine last use yesterday, chronic back pain s/p lumbar surgery presented with multiple non-specific complaints, including fevers on/off last 4-5 days, weakness, no appetite and reduced oral intake, vague diffuse abd pains, one day of diarrhea. No cough, no SOB. She came in today for evaluation of weakness and found to have ST elevations on presenting EKG, code STEMI called and pt underwent urgent angiogram showing occluded RCA. She denies chest pain at present".  Patient found to have E. coli urosepsis on Rocephin.    Patient noted by RN to have B/P/ 70 systolic while asleep but woke up to b/p 85 systolic  .  Patient had poor PO fluid intake since IV fluids were discontinued yesterday     Vital Signs Last 24 Hrs  T(C): 37.2 (24 Aug 2020 16:56), Max: 38.1 (24 Aug 2020 00:35)  T(F): 98.9 (24 Aug 2020 16:56), Max: 100.5 (24 Aug 2020 00:35)  HR: 72 (24 Aug 2020 21:45) (72 - 97)  BP: 75/35 (24 Aug 2020 21:45) (75/35 - 130/60)  BP(mean): 45 (24 Aug 2020 21:45) (45 - 77)  RR: 16 (24 Aug 2020 21:45) (0 - 24)  SpO2: 100% (24 Aug 2020 21:45) (97% - 100%)    08-24    132<L>  |  104  |  67<H>  ----------------------------<  112<H>  5.0   |  18<L>  |  2.98<H>    Ca    8.1<L>      24 Aug 2020 06:24    TPro  7.2  /  Alb  2.0<L>  /  TBili  0.5  /  DBili  x   /  AST  50<H>  /  ALT  57  /  AlkPhos  212<H>  08-23                          8.6    24.65 )-----------( 256      ( 24 Aug 2020 06:24 )             25.2         Patient is alert oriented X 3 and has no specific complaints  PERRLA @ 5mm    Lungs  no rales rubs or rhonchi  Heart  RR Rate 60min.   manual b/p 78/50  left arm     Given  500cc normal saline bolus with no significant response   Given a second bolus of 500 cc normal saline (in progress )   Stat CBC with differential , serum lactate ad BMP ordered. "60yo/F with PMH CAD, PAD s/p fem-pop bypass s/p RT iliac stent, diabetes, hypothyroidism, hyperlipidemia, current active smoker, uses cocaine last use yesterday, chronic back pain s/p lumbar surgery presented with multiple non-specific complaints, including fevers on/off last 4-5 days, weakness, no appetite and reduced oral intake, vague diffuse abd pains, one day of diarrhea. No cough, no SOB. She came in today for evaluation of weakness and found to have ST elevations on presenting EKG, code STEMI called and pt underwent urgent angiogram showing occluded RCA. She denies chest pain at present".  Patient found to have E. coli urosepsis on Rocephin.    Patient noted by RN to have B/P/ 70 systolic while asleep but woke up to b/p 85 systolic  .  Patient had poor PO fluid intake since IV fluids were discontinued yesterday     Vital Signs Last 24 Hrs  T(C): 37.2 (24 Aug 2020 16:56), Max: 38.1 (24 Aug 2020 00:35)  T(F): 98.9 (24 Aug 2020 16:56), Max: 100.5 (24 Aug 2020 00:35)  HR: 72 (24 Aug 2020 21:45) (72 - 97)  BP: 75/35 (24 Aug 2020 21:45) (75/35 - 130/60)  BP(mean): 45 (24 Aug 2020 21:45) (45 - 77)  RR: 16 (24 Aug 2020 21:45) (0 - 24)  SpO2: 100% (24 Aug 2020 21:45) (97% - 100%)    08-24    132<L>  |  104  |  67<H>  ----------------------------<  112<H>  5.0   |  18<L>  |  2.98<H>    Ca    8.1<L>      24 Aug 2020 06:24    TPro  7.2  /  Alb  2.0<L>  /  TBili  0.5  /  DBili  x   /  AST  50<H>  /  ALT  57  /  AlkPhos  212<H>  08-23                          8.6    24.65 )-----------( 256      ( 24 Aug 2020 06:24 )             25.2     Ejection Fraction by 2D Echo   50 %   CXR  Clear       Patient is alert oriented X 3 and has no specific complaints  PERRLA @ 5mm    Lungs  no rales rubs or rhonchi  Heart  RR Rate 60min.   manual b/p 78/50  left arm     Given  500cc normal saline bolus with no significant response   Given a second bolus of 500 cc normal saline (in progress )   Stat CBC with differential , serum lactate ad BMP ordered.    repeat b/p aer 1 liter of NaCL 88 systolic    Continue Normal Saline  at 100 cc per hour "58yo/F with PMH CAD, PAD s/p fem-pop bypass s/p RT iliac stent, diabetes, hypothyroidism, hyperlipidemia, current active smoker, uses cocaine last use yesterday, chronic back pain s/p lumbar surgery presented with multiple non-specific complaints, including fevers on/off last 4-5 days, weakness, no appetite and reduced oral intake, vague diffuse abd pains, one day of diarrhea. No cough, no SOB. She came in today for evaluation of weakness and found to have ST elevations on presenting EKG, code STEMI called and pt underwent urgent angiogram showing occluded RCA. She denies chest pain at present".  Patient found to have E. coli urosepsis on Rocephin.    Patient noted by RN to have B/P/ 70 systolic while asleep but woke up to b/p 85 systolic  .  Patient had poor PO fluid intake since IV fluids were discontinued yesterday     Vital Signs Last 24 Hrs  T(C): 37.2 (24 Aug 2020 16:56), Max: 38.1 (24 Aug 2020 00:35)  T(F): 98.9 (24 Aug 2020 16:56), Max: 100.5 (24 Aug 2020 00:35)  HR: 72 (24 Aug 2020 21:45) (72 - 97)  BP: 75/35 (24 Aug 2020 21:45) (75/35 - 130/60)  BP(mean): 45 (24 Aug 2020 21:45) (45 - 77)  RR: 16 (24 Aug 2020 21:45) (0 - 24)  SpO2: 100% (24 Aug 2020 21:45) (97% - 100%)    08-24    132<L>  |  104  |  67<H>  ----------------------------<  112<H>  5.0   |  18<L>  |  2.98<H>    Ca    8.1<L>      24 Aug 2020 06:24    TPro  7.2  /  Alb  2.0<L>  /  TBili  0.5  /  DBili  x   /  AST  50<H>  /  ALT  57  /  AlkPhos  212<H>  08-23                          8.6    24.65 )-----------( 256      ( 24 Aug 2020 06:24 )             25.2     Ejection Fraction by 2D Echo   50 %   CXR  Clear       Patient is alert oriented X 3 and has no specific complaints  PERRLA @ 5mm    Lungs  no rales rubs or rhonchi  Heart  RR Rate 60min.   manual b/p 78/50  left arm     Given  500cc normal saline bolus with no significant response   Given a second bolus of 500 cc normal saline (in progress )   Stat CBC with differential , serum lactate ad BMP ordered.    repeat b/p aer 1 liter of NaCL 88 systolic    Continue Normal Saline  at 100 cc per hour    ADDENDUM   2320        Patient lost consciousness and  became hypotensive 64 systolic and bradycardic 35/min  o2 sat 90%   Code called , intensivist responded  patient resuscitated, intubated and transferred to the ICU   Dr. Gay called for intervention service    Dr. Eric called for Cardiology     No intervention indicated at this time   Cardiology will follow in ICU

## 2020-08-24 NOTE — PROGRESS NOTE ADULT - PROBLEM SELECTOR PLAN 1
without active chest pain , with total occlusion mid RCA, cont. ASA - reduce to 81 daily, plavix 75 daily, atorva 80 daily, await results of echo. ECG ordered today. without active chest pain , with total occlusion mid RCA, cont. ASA - reduce to 81 daily, plavix 75 daily, atorva 80 daily, await results of echo. ECG ordered today. fasting lipid panel ordered.

## 2020-08-24 NOTE — DIETITIAN INITIAL EVALUATION ADULT. - PERTINENT MEDS FT
MEDICATIONS  (STANDING):  ARIPiprazole 20 milliGRAM(s) Oral daily  aspirin 325 milliGRAM(s) Oral daily  atorvastatin 80 milliGRAM(s) Oral at bedtime  carvedilol 3.125 milliGRAM(s) Oral every 12 hours  cefTRIAXone Injectable.      cefTRIAXone Injectable. 1000 milliGRAM(s) IV Push every 24 hours  clopidogrel Tablet 75 milliGRAM(s) Oral daily  dextrose 5%. 1000 milliLiter(s) (50 mL/Hr) IV Continuous <Continuous>  dextrose 50% Injectable 12.5 Gram(s) IV Push once  dextrose 50% Injectable 25 Gram(s) IV Push once  dextrose 50% Injectable 25 Gram(s) IV Push once  gabapentin 600 milliGRAM(s) Oral three times a day  heparin   Injectable 5000 Unit(s) SubCutaneous every 8 hours  insulin glargine Injectable (LANTUS) 20 Unit(s) SubCutaneous at bedtime  insulin lispro (HumaLOG) corrective regimen sliding scale   SubCutaneous three times a day before meals  insulin lispro (HumaLOG) corrective regimen sliding scale   SubCutaneous at bedtime  insulin lispro Injectable (HumaLOG) 6 Unit(s) SubCutaneous three times a day before meals  isosorbide   mononitrate ER Tablet (IMDUR) 60 milliGRAM(s) Oral daily  levothyroxine 112 MICROGram(s) Oral daily  multivitamin 1 Tablet(s) Oral daily  pantoprazole    Tablet 40 milliGRAM(s) Oral before breakfast  ranolazine 1000 milliGRAM(s) Oral two times a day  sodium chloride 0.9%. 1000 milliLiter(s) (100 mL/Hr) IV Continuous <Continuous>    MEDICATIONS  (PRN):  acetaminophen   Tablet .. 650 milliGRAM(s) Oral every 4 hours PRN Mild Pain (1 - 3)  ALBUTerol    90 MICROgram(s) HFA Inhaler 2 Puff(s) Inhalation every 6 hours PRN Shortness of Breath and/or Wheezing  aluminum hydroxide/magnesium hydroxide/simethicone Suspension 30 milliLiter(s) Oral every 4 hours PRN Dyspepsia  dextrose 40% Gel 15 Gram(s) Oral once PRN Blood Glucose LESS THAN 70 milliGRAM(s)/deciliter  glucagon  Injectable 1 milliGRAM(s) IntraMuscular once PRN Glucose LESS THAN 70 milligrams/deciliter  ondansetron Injectable 4 milliGRAM(s) IV Push every 6 hours PRN Nausea  oxyCODONE    IR 5 milliGRAM(s) Oral every 4 hours PRN Moderate Pain (4 - 6)  oxyCODONE    IR 10 milliGRAM(s) Oral every 4 hours PRN Severe Pain (7 - 10)  senna 2 Tablet(s) Oral at bedtime PRN Constipation

## 2020-08-24 NOTE — DIETITIAN INITIAL EVALUATION ADULT. - NAME AND PHONE
Nirali Fragoso MA, RDN, CDN, [facility]  [phone] (office number)  [phone] (pager number)

## 2020-08-24 NOTE — DIETITIAN INITIAL EVALUATION ADULT. - PHYSICAL APPEARANCE
other (specify)/obese NFPE revealed no significant muscle/fat wasting.  no edema documented  BM (+) 8/22  jennifer score of 16; no PU documented.

## 2020-08-24 NOTE — CONSULT NOTE ADULT - SUBJECTIVE AND OBJECTIVE BOX
Patient is a 59y old  Female who presents with a chief complaint of abd pain, weakness (24 Aug 2020 12:00)    HPI:  60yo/F with PMH CAD, PAD s/p fem-pop bypass s/p RT iliac stent, diabetes, hypothyroidism, hyperlipidemia, current active smoker, uses cocaine last use   chronic back pain s/p lumbar surgery presented with multiple non-specific complaints, including fevers on/off last 4-5 days, weakness, no appetite and reduced oral intake, vague diffuse abd pains, one day of diarrhea. No cough, no SOB. She came in  for evaluation of weakness and found to have ST elevations on presenting EKG, code STEMI called and pt underwent urgent angiogram showing occluded RCA. She denies chest pain at present Also noted with positive UA, blood cx with Ecoli, CT abd/pelvis unremarkable, was started on IV rocephin.       PMH: as above  PSH: as above  Meds: per reconciliation sheet, noted below  MEDICATIONS  (STANDING):  ARIPiprazole 20 milliGRAM(s) Oral daily  aspirin  chewable 81 milliGRAM(s) Oral daily  atorvastatin 80 milliGRAM(s) Oral at bedtime  carvedilol 3.125 milliGRAM(s) Oral every 12 hours  cefTRIAXone   IVPB 2000 milliGRAM(s) IV Intermittent every 24 hours  clopidogrel Tablet 75 milliGRAM(s) Oral daily  dextrose 5%. 1000 milliLiter(s) (50 mL/Hr) IV Continuous <Continuous>  dextrose 50% Injectable 12.5 Gram(s) IV Push once  dextrose 50% Injectable 25 Gram(s) IV Push once  dextrose 50% Injectable 25 Gram(s) IV Push once  gabapentin 600 milliGRAM(s) Oral three times a day  heparin   Injectable 5000 Unit(s) SubCutaneous every 8 hours  insulin glargine Injectable (LANTUS) 20 Unit(s) SubCutaneous at bedtime  insulin lispro (HumaLOG) corrective regimen sliding scale   SubCutaneous three times a day before meals  insulin lispro (HumaLOG) corrective regimen sliding scale   SubCutaneous at bedtime  insulin lispro Injectable (HumaLOG) 6 Unit(s) SubCutaneous three times a day before meals  isosorbide   mononitrate ER Tablet (IMDUR) 60 milliGRAM(s) Oral daily  levothyroxine 112 MICROGram(s) Oral daily  multivitamin 1 Tablet(s) Oral daily  pantoprazole    Tablet 40 milliGRAM(s) Oral before breakfast  ranolazine 1000 milliGRAM(s) Oral two times a day  sodium chloride 0.9%. 1000 milliLiter(s) (100 mL/Hr) IV Continuous <Continuous>      Allergies    No Known Allergies    Intolerances      Social: no smoking, no alcohol, no illegal drugs; no recent travel, no exposure to TB  FAMILY HISTORY:  Family history of asthma (Sibling): sister  Family history of epilepsy (Sibling, Sibling): 2 sisters  Family history of cardiovascular disease: mother  Family history of stroke: mother  Family history of heart disease: father  Family history of alcoholism in father     no history of premature cardiovascular disease in first degree relatives    ROS: no HA, no dizziness, no sore throat, no blurry vision, no palpitations, no SOB, no cough, no abdominal pain, no diarrhea, no N/V, no leg pain, no claudication, no rash, no joint aches, no rectal pain or bleeding, no night sweats  All other systems reviewed and are negative    Vital Signs Last 24 Hrs  T(C): 36.9 (24 Aug 2020 12:00), Max: 38.1 (24 Aug 2020 00:35)  T(F): 98.4 (24 Aug 2020 12:00), Max: 100.5 (24 Aug 2020 00:35)  HR: 88 (24 Aug 2020 15:00) (85 - 103)  BP: 109/47 (24 Aug 2020 15:00) (88/55 - 143/51)  BP(mean): 63 (24 Aug 2020 15:00) (55 - 77)  RR: 15 (24 Aug 2020 15:00) (0 - 24)  SpO2: 100% (24 Aug 2020 15:00) (97% - 100%)  Daily     Daily Weight in k (24 Aug 2020 09:10)    PE:    Constitutional: frail looking  HEENT: NC/AT, EOMI, PERRLA, conjunctivae clear; ears and nose atraumatic; pharynx benign  Neck: supple; thyroid not palpable  Back: no tenderness  Respiratory: respiratory effort normal; clear to auscultation  Cardiovascular: S1S2 regular, no murmurs  Abdomen: soft, not tender, not distended, positive BS; liver and spleen WNL  Genitourinary: no suprapubic tenderness  Lymphatic: no LN palpable  Musculoskeletal: no muscle tenderness, no joint swelling or tenderness  Extremities: no pedal edema  Neurological/ Psychiatric:  moving all extremities  Skin: no rashes; no palpable lesions    Labs: all available labs reviewed                        8.6    24.65 )-----------( 256      ( 24 Aug 2020 06:24 )             25.2     08    132<L>  |  104  |  67<H>  ----------------------------<  112<H>  5.0   |  18<L>  |  2.98<H>    Ca    8.1<L>      24 Aug 2020 06:24    TPro  7.2  /  Alb  2.0<L>  /  TBili  0.5  /  DBili  x   /  AST  50<H>  /  ALT  57  /  AlkPhos  212<H>       LIVER FUNCTIONS - ( 23 Aug 2020 08:34 )  Alb: 2.0 g/dL / Pro: 7.2 gm/dL / ALK PHOS: 212 U/L / ALT: 57 U/L / AST: 50 U/L / GGT: x           Urinalysis Basic - ( 23 Aug 2020 10:25 )    Color: Yellow / Appearance: Clear / S.010 / pH: x  Gluc: x / Ketone: Trace  / Bili: Small / Urobili: 1 mg/dL   Blood: x / Protein: 30 mg/dL / Nitrite: Negative   Leuk Esterase: Moderate / RBC: 25-50 /HPF / WBC >50   Sq Epi: x / Non Sq Epi: Few / Bacteria: Many    Culture - Blood (20 @ 08:34)    Gram Stain:   Growth in aerobic bottle: Gram Negative Rods  Growth in anaerobic bottle: Gram Negative Rods    Specimen Source: .Blood None    Culture Results:   Growth in aerobic bottle: Gram Negative Rods  Growth in anaerobic bottle: Gram Negative Rods      Culture - Blood (20 @ 08:34)    -  Escherichia coli: Detec    Gram Stain:   Growth in aerobic bottle:  Gram Negative Rods    Specimen Source: .Blood None    Organism: Blood Culture PCR    Culture Results:   Growth in aerobic bottle:  Gram Negative Rods  "Due to technical problems, Proteus sp. will Not be reported as part of  the BCID panel until further notice"  ***Blood Panel PCR results on this specimen are available  approximately 3 hours after theGram stain result.***  Gram stain, PCR, and/or culture results may not always  correspond due to difference in methodologies.  ************************************************************  This PCR assay was performed using [employer].  The following targets are tested for: Enterococcus,  vancomycin resistant enterococci, Listeria monocytogenes,  coagulase negative staphylococci, S. aureus,  methicillin resistant S. aureus, Streptococcus agalactiae  (Group B), S. pneumoniae, S. pyogenes (Group A),  Acinetobacter baumannii, Enterobacter cloacae, E. coli,  Klebsiella oxytoca, K. pneumoniae, Proteus sp.,  Serratia marcescens, Haemophilus influenzae,  Neisseria meningitidis, Pseudomonas aeruginosa, Candida  albicans, C. glabrata, C krusei,C parapsilosis,  C. tropicalis and the KPC resistance gene.    Organism Identification: Blood Culture PCR    Method Type: PCR          Radiology: all available radiological tests reviewed  < from: CT Abdomen and Pelvis w/ Oral Cont (20 @ 16:41) >  EXAM:  CT ABDOMEN AND PELVIS OC                            PROCEDURE DATE:  2020          INTERPRETATION:  CLINICAL INFORMATION: Diffuse abdominal pain    COMPARISON: None.    PROCEDURE:  CT of the Abdomen and Pelvis was performed without intravenous contrast.  Intravenous contrast: None.  Oral contrast: positive contrast was administered.  Sagittal and coronal reformats were performed.    FINDINGS:  LOWER CHEST: Coronary artery calcifications. Clear lung bases.    LIVER: Within normal limits.  BILE DUCTS: Normal caliber.  GALLBLADDER: Cholecystectomy.  SPLEEN: Within normal limits.  PANCREAS: Within normal limits.  ADRENALS: Within normal limits.  KIDNEYS/URETERS: Within normal limits.    BLADDER: Within normal limits.  REPRODUCTIVE ORGANS: Uterus and adnexa within normal limits.    BOWEL: Mildly distended small bowel without transition point to suggest obstruction. No wall thickening or inflammatory change.  Appendix normal.  PERITONEUM: No ascites or pneumoperitoneum..  VESSELS: Severe diffuse atherosclerotic arterial calcification. Normal caliber aorta.  RETROPERITONEUM/LYMPH NODES: No lymphadenopathy.  ABDOMINAL WALL: Within normal limits.  BONES: L4-L5 interpedicular screws and connecting rods. Moderate anterolisthesis of L4 on L5. No acute bony abnormality.    IMPRESSION:  Mildly distended small bowel without evidence of obstruction.    < end of copied text >    Advanced directives addressed: full resuscitation

## 2020-08-24 NOTE — DIETITIAN INITIAL EVALUATION ADULT. - PERTINENT LABORATORY DATA
08-24 Na132 mmol/L<L> Glu 112 mg/dL<H> K+ 5.0 mmol/L Cr  2.98 mg/dL<H> BUN 67 mg/dL<H> Phos n/a   Alb n/a   PAB n/a

## 2020-08-24 NOTE — PROGRESS NOTE ADULT - ASSESSMENT
#STEMI  S/p card cath- occluded RCA  Cardio eval DR Palla  Aspirin, PLavix, increase Lipitor to 80mg, switch BB to coreg due to cocaine use  ECHO in AM  Lipid panel  Cont Imdur, Ranexa    #ARF with hyperkalemia  Unclear triggering events likely combination of prerenal from dehydration+/metformin use  Able to produce urine  Stat Kayexalate, hyperkalemia resolved  hyponatremia improving   IVF  Renal eval DR Billy      #Leukocytosis, fever  #Bacteremia, (+) BCx: E.coli, possibly 2/2 UTI, awaiting UCx  Not septic on admission  Unclear source  Blood c/s (+) for E.coli  UA (+), UCx pending  CT abd/pelvis with PO contrast to r/o colitis or other intraabdominal source  Monitor leukocytosis  ID eval    #Anemia  No pro GI bleed  Will get iron studies, [facility]    #Diabetes  Non-complaint with insulin use at home  Start on Lantus +ISS  Hold Metformin  Adjust further based on FS    #PAD s/p bypass s/p stent  ON dual-antiplatelets    #Hypothyroidism/hyperlipidemia  Cont home meds    #COVID negative 8/23/20    #DVT proph- heparin SQ    #Dispo- inpatient admit. D/w pt, DR Palla and DR Billy #STEMI  S/p card cath- occluded RCA  Cardio eval DR Palla appreciated  Aspirin, PLavix, increase Lipitor to 80mg, switch BB to coreg due to cocaine use  ECHO in AM  Lipid panel  Cont Imdur, Ranexa    #ARF with hyperkalemia  Unclear triggering events likely combination of prerenal from dehydration+/metformin use  Able to produce urine  Stat Kayexalate, hyperkalemia resolved  hyponatremia improving  Avoid NSAIDS   IVF- can hold for now as intake at goal, resume if decreased po or rising function as per renal  Renal eval DR Billy appreciated       #Leukocytosis, fever  #Bacteremia, (+) BCx: E.coli, possibly 2/2 UTI, awaiting UCx  Not septic on admission  Unclear source  Blood c/s (+) for E.coli  UA (+), UCx pending  CT abd/pelvis with PO contrast: Mildly distended small bowel without evidence of obstruction.  Monitor leukocytosis  ID eval    #Anemia  No pro GI bleed  Will get iron studies, [facility]    #Diabetes  Non-complaint with insulin use at home  Start on Lantus +ISS  Hold Metformin  Adjust further based on FS    #PAD s/p bypass s/p stent  ON dual-antiplatelets    #Hypothyroidism/hyperlipidemia  Cont home meds    #COVID negative 8/23/20    #DVT proph- heparin SQ    #Inadequate energy intake- Dietitian evaluation and recommendations  1) reinforce diet education and encourage strict adherence to therapeutic diet 2) monitor PO intake/tolerance 3) daily wt checks to track/trend changes #STEMI  S/p card cath- occluded RCA with medical management recommended.  case d/w dr parry  - continue aspirin, plavix, increase Lipitor to 80mg, switch BB to coreg due to cocaine use. no ACEI due to AMISH   ECHO in AM  Lipid panel  Cont Imdur, Ranexa    #ARF with hyperkalemia  Unclear triggering events likely combination of prerenal from dehydration+/metformin use  Able to produce urine  Stat Kayexalate, hyperkalemia resolved  hyponatremia improving  Avoid NSAIDS   IVF- can hold for now as intake at goal, resume if decreased po or rising function as per renal  Renal eval DR Billy appreciated       #Leukocytosis, fever  #Bacteremia, (+) BCx: E.coli, possibly 2/2 UTI, awaiting UCx  Not septic on admission  Unclear source  Blood c/s (+) for E.coli  UA (+), UCx pending  CT abd/pelvis with PO contrast: Mildly distended small bowel without evidence of obstruction.  Monitor leukocytosis  ID eval    #Anemia  No pro GI bleed  Will get iron studies, [facility]    #Diabetes  Non-complaint with insulin use at home  Start on Lantus +ISS  Hold Metformin  Adjust further based on FS    #PAD s/p bypass s/p stent  ON dual-antiplatelets    #Hypothyroidism/hyperlipidemia  Cont home meds    #COVID negative 8/23/20    #DVT proph- heparin SQ    #Inadequate energy intake- Dietitian evaluation and recommendations  1) reinforce diet education and encourage strict adherence to therapeutic diet 2) monitor PO intake/tolerance 3) daily wt checks to track/trend changes

## 2020-08-24 NOTE — PROGRESS NOTE ADULT - SUBJECTIVE AND OBJECTIVE BOX
60yo/F with PMH CAD, PAD s/p fem-pop bypass s/p RT iliac stent, diabetes,   hypothyroidism, hyperlipidemia, current active smoker, uses cocaine last use ,   chronic back pain s/p lumbar surgery presented  in AM with multiple non-specific complaints,  including fever, vague diffuse ab pain  & diarrhea.  ST elevations in inferior leads noted.  Urgent coronary angiogram showed occluded RCA, medical  management reccomended  as no active chest pain, pt was septic & Cr elevated.    20: no complaints overnight: no chest pain, palpitations, dyspnea.  Reviewed results of cardiac cath & plan for med mx.    MEDICATIONS:      INPATIENT  MEDICATIONS  (STANDING):  ARIPiprazole 20 milliGRAM(s) Oral daily  aspirin 325 milliGRAM(s) Oral daily  atorvastatin 80 milliGRAM(s) Oral at bedtime  carvedilol 3.125 milliGRAM(s) Oral every 12 hours  cefTRIAXone Injectable.      cefTRIAXone Injectable. 1000 milliGRAM(s) IV Push every 24 hours  clopidogrel Tablet 75 milliGRAM(s) Oral daily  dextrose 5%. 1000 milliLiter(s) (50 mL/Hr) IV Continuous <Continuous>  dextrose 50% Injectable 12.5 Gram(s) IV Push once  dextrose 50% Injectable 25 Gram(s) IV Push once  dextrose 50% Injectable 25 Gram(s) IV Push once  gabapentin 600 milliGRAM(s) Oral three times a day  heparin   Injectable 5000 Unit(s) SubCutaneous every 8 hours  insulin glargine Injectable (LANTUS) 20 Unit(s) SubCutaneous at bedtime  insulin lispro (HumaLOG) corrective regimen sliding scale   SubCutaneous three times a day before meals  insulin lispro (HumaLOG) corrective regimen sliding scale   SubCutaneous at bedtime  insulin lispro Injectable (HumaLOG) 6 Unit(s) SubCutaneous three times a day before meals  isosorbide   mononitrate ER Tablet (IMDUR) 60 milliGRAM(s) Oral daily  levothyroxine 112 MICROGram(s) Oral daily  multivitamin 1 Tablet(s) Oral daily  pantoprazole    Tablet 40 milliGRAM(s) Oral before breakfast  ranolazine 1000 milliGRAM(s) Oral two times a day  sodium chloride 0.9%. 1000 milliLiter(s) (100 mL/Hr) IV Continuous <Continuous>    Vital Signs Last 24 Hrs  T(C): 37 (24 Aug 2020 06:40), Max: 38.1 (24 Aug 2020 00:35)  T(F): 98.6 (24 Aug 2020 06:40), Max: 100.5 (24 Aug 2020 00:35)  HR: 86 (24 Aug 2020 11:00) (85 - 103)  BP: 106/49 (24 Aug 2020 11:00) (99/41 - 143/51)  BP(mean): 61 (24 Aug 2020 11:00) (55 - 85)  RR: 24 (24 Aug 2020 11:00) (14 - 24)  SpO2: 100% (24 Aug 2020 11:00) (82% - 100%)Daily     Daily Weight in k (24 Aug 2020 09:10)I&O's Summary    23 Aug 2020 07:01  -  24 Aug 2020 07:00  --------------------------------------------------------  IN: 300 mL / OUT: 850 mL / NET: -550 mL    24 Aug 2020 07:01  -  24 Aug 2020 12:04  --------------------------------------------------------  IN: 720 mL / OUT: 0 mL / NET: 720 mL    PHYSICAL EXAM:    Constitutional: NAD,   HEENT: PERR, EOMI,  No oral cyananosis.  Neck:  supple,  No JVD  Respiratory: Breath sounds are clear bilaterally, No wheezing, rales or rhonchi  Cardiovascular: S1 and S2, regular rate and rhythm, no Murmurs, gallops or rubs  Gastrointestinal: Bowel Sounds present, soft, nontender.   Extremities: No peripheral edema. No clubbing or cyanosis.  Vascular: 1+ peripheral pulses  Neurological: A/O x 3, no focal deficits        LABS: All Labs Reviewed:                        8.6    24.65 )-----------( 256      ( 24 Aug 2020 06:24 )             25.2                         9.5    24.46 )-----------( 253      ( 23 Aug 2020 11:52 )             27.8                         9.2    24.19 )-----------( 249      ( 23 Aug 2020 08:34 )             26.9     24 Aug 2020 06:24    132    |  104    |  67     ----------------------------<  112    5.0     |  18     |  2.98   23 Aug 2020 11:52    128    |  99     |  69     ----------------------------<  207    5.4     |  20     |  3.15   23 Aug 2020 08:34    125    |  96     |  72     ----------------------------<  239    5.6     |  19     |  3.32     Ca    8.1        24 Aug 2020 06:24  Ca    8.4        23 Aug 2020 11:52  Ca    8.7        23 Aug 2020 08:34    TPro  7.2    /  Alb  2.0    /  TBili  0.5    /  DBili  x      /  AST  50     /  ALT  57     /  AlkPhos  212    23 Aug 2020 08:34    PT/INR - ( 23 Aug 2020 08:34 )   PT: 14.1 sec;   INR: 1.23 ratio         PTT - ( 23 Aug 2020 08:34 )  PTT:26.8 sec  CARDIAC MARKERS ( 23 Aug 2020 11:52 )  1.660 ng/mL / x     / x     / x     / x      CARDIAC MARKERS ( 23 Aug 2020 08:34 )  1.660 ng/mL / x     / x     / x     / x          ===============================  EKG: NSR, ST elevations II, III, aVF on presentation, decreased toward baseline on subsequent ECGs    TELE: NSR, no arrhythmias  ==============================  ECHO:    pending.    ==============================  CATH:    < from: Cardiac Cath Lab - Adult (20 @ 09:33) >  ondition: Rest     Angiographic Findings     Cardiac Arteries and Lesion Findings    LMCA: Normal.    LAD: Abnormal.Moderate diffuse disease    LCx: Abnormal.Moderate diffuse diease    RCA:     Mid RCA: 100% stenosis.     Impression     Diagnostic Conclusions     Acute occlusion of the RCA of unknown duration. No PCI due to lack of   ischemic symptoms, sepsis, acute renal failure and satisfactory left   coronary anatomy.    Estimated Blood Loss:4ml.    No LV gram performed (likely d/t Cr 2-3 range)  ==============================  STRESS:

## 2020-08-24 NOTE — PROGRESS NOTE ADULT - ASSESSMENT
59 CAD, PAD s/p fem-pop bypass s/p RT iliac stent, diabetes, hypothyroidism, hyperlipidemia, current active smoker, uses cocaine last use yesterday, chronic back pain s/p lumbar surgery presented with multiple non-specific complaints, including fevers on/off last 4-5 days, weakness, no appetite and reduced oral intake, vague diffuse abd pains, one day of diarrhea. No cough, no SOB. SHe came in today for evaluation of weakness and found to have ST elevations on presenting EKG, code STEMI called and pt underwent urgent angiogram showing occluded RCA.     AMISH  -Suspect pre-renal, dehydration playing a role. Contrast may exacerbate but function trending down  -IVF can hold for now as intake at goal, resume if decreased po or rising function  -Urine lytes  -CT, no hydro  -NSAID avoidance    Hyponatremia'  -Suspect pre-renal, NS hydration completed 8/23  -optimize osmolar load, decrease excess FW    Hyperkalemia  -medically treated, april hodgson      d/c with RN staff

## 2020-08-24 NOTE — CONSULT NOTE ADULT - ASSESSMENT
60yo/F with PMH CAD, PAD s/p fem-pop bypass s/p RT iliac stent, diabetes, hypothyroidism, hyperlipidemia, current active smoker, uses cocaine last use 8/22  chronic back pain s/p lumbar surgery presented with multiple non-specific complaints, including fevers on/off last 4-5 days, weakness, no appetite and reduced oral intake, vague diffuse abd pains, one day of diarrhea. No cough, no SOB. She came in 8/23 for evaluation of weakness and found to have ST elevations on presenting EKG, code STEMI called and pt underwent urgent angiogram showing occluded RCA. She denies chest pain at present Also noted with positive UA, blood cx with Ecoli, CT abd/pelvis unremarkable, was started on IV rocephin.       1. sepsis with Ecoli. cystitis. leukocytosis. AMISH  - agree with rocephin, increase dose to 2gm daily  - imaging reviewed, source appears to be   - f/u urine cx f/u blood cx sensitivities   - monitor temps  - tolerating abx well so far; no side effects noted  - reason for abx use and side effects reviewed with patient  - supportive care  - fu cbc    2. other issues - care per medicine

## 2020-08-24 NOTE — PROGRESS NOTE ADULT - SUBJECTIVE AND OBJECTIVE BOX
58yo/F with PMH CAD, PAD s/p fem-pop bypass s/p RT iliac stent, diabetes, hypothyroidism, hyperlipidemia, current active smoker, uses cocaine last use yesterday, chronic back pain s/p lumbar surgery presented with multiple non-specific complaints, including fevers on/off last 4-5 days, weakness, no appetite and reduced oral intake, vague diffuse abd pains, one day of diarrhea. No cough, no SOB. SHe came in today for evaluation of weakness and found to have ST elevations on presenting EKG, code STEMI called and pt underwent urgent angiogram showing occluded RCA. She denies chest pain at present.      Review of Systems:  Other Review of Systems: All other review of systems negative, except as noted in HPI    - no acute events overnight, denies chest pain, SOB. complains that the hospital bed is uncomfortable    ICU Vital Signs Last 24 Hrs  T(C): 37 (24 Aug 2020 06:40), Max: 38.1 (24 Aug 2020 00:35)  T(F): 98.6 (24 Aug 2020 06:40), Max: 100.5 (24 Aug 2020 00:35)  HR: 91 (24 Aug 2020 06:00) (87 - 103)  BP: 107/64 (24 Aug 2020 06:00) (99/41 - 143/51)  BP(mean): 73 (24 Aug 2020 06:00) (55 - 85)  ABP: --  ABP(mean): --  RR: 17 (24 Aug 2020 06:00) (13 - 22)  SpO2: 100% (24 Aug 2020 06:00) (82% - 100%)        · Constitutional	Well-developed, well nourished  · ENMT	detailed exam  · ENMT Comments	dry mucous membranes  · Neck	No bruits; no thyromegaly or nodules  · Respiratory	Breath Sounds equal & clear to percussion & auscultation, no accessory muscle use  · Cardiovascular	Regular rate & rhythm, normal S1, S2; no murmurs, gallops or rubs; no S3, S4  · Gastrointestinal	detailed exam  · Gastrointestinal Comments	soft, slightly sensitive to palpation in the left abdomen  · Genitourinary	Normal genitalia; no lesions; no discharge  · Extremities	No cyanosis, clubbing or edema  · Neurological	Alert & oriented; no sensory, motor or coordination deficits, normal reflexes  · Skin	No lesions; no rash  · Musculoskeletal	No joint pain, swelling or deformity; no limitation of movement                            8.6    24.65 )-----------( 256      ( 24 Aug 2020 06:24 )             25.2   08    132<L>  |  104  |  67<H>  ----------------------------<  112<H>  5.0   |  18<L>  |  2.98<H>    Ca    8.1<L>      24 Aug 2020 06:24    TPro  7.2  /  Alb  2.0<L>  /  TBili  0.5  /  DBili  x   /  AST  50<H>  /  ALT  57  /  AlkPhos  212<H>          PT/INR - ( 23 Aug 2020 08:34 )   PT: 14.1 sec;   INR: 1.23 ratio      Urinalysis Basic - ( 23 Aug 2020 10:25 )    Color: Yellow / Appearance: Clear / S.010 / pH: x  Gluc: x / Ketone: Trace  / Bili: Small / Urobili: 1 mg/dL   Blood: x / Protein: 30 mg/dL / Nitrite: Negative   Leuk Esterase: Moderate / RBC: 25-50 /HPF / WBC >50   Sq Epi: x / Non Sq Epi: Few / Bacteria: Many         Culture - Blood (20 @ 08:34)    -  Escherichia coli: Detec    Gram Stain:   Growth in aerobic bottle:  Gram Negative Rods    < from: CT Abdomen and Pelvis w/ Oral Cont (20 @ 16:41) >    IMPRESSION:  Mildly distended small bowel without evidence of obstruction.    < end of copied text >

## 2020-08-24 NOTE — DIETITIAN INITIAL EVALUATION ADULT. - OTHER INFO
58yo/F with PMH CAD, PAD s/p fem-pop bypass s/p RT iliac stent, diabetes, hypothyroidism, hyperlipidemia, current active smoker, uses cocaine last use yesterday, chronic back pain s/p lumbar surgery presented with multiple non-specific complaints, including fevers on/off last 4-5 days, weakness, no appetite and reduced oral intake, vague diffuse abd pains, one day of diarrhea. No cough, no SOB. SHe came in today for evaluation of weakness and found to have ST elevations on presenting EKG, code STEMI called and pt underwent urgent angiogram showing occluded RCA. She denies chest pain at present. Pt with ARF with hyperkalemia.  leukocytosis.  Diabetes, non-compliant with insulin at home or diet at home.    *pt reports wt loss from 125# to 113# over the past 2 wks.  Pt with measured wt of 131# during admission.  Question accuracy of reported information.

## 2020-08-24 NOTE — PROGRESS NOTE ADULT - PROBLEM SELECTOR PLAN 3
s/p fem pop bypass , prior stent right    continue medication  encourage po medication  quit smoking

## 2020-08-24 NOTE — DIETITIAN INITIAL EVALUATION ADULT. - FACTORS AFF FOOD INTAKE
other (specify)/pt reports poor PO intake x 2 wks; eating 1/2 of 1 meal per day.  dislikes food options her ex- has been providing combined with decreased appetite.  ex- mostly providing high fat, high sodium foods.  d/w pt importance of heart healthy diet and provided written education.  pt verbalized understanding.  PO intake during admission much improved as pt greatly enjoys the hospitals food.

## 2020-08-24 NOTE — DIETITIAN INITIAL EVALUATION ADULT. - ADD RECOMMEND
1) reinforce diet education and encourage strict adherence to therapeutic diet 2) monitor PO intake/tolerance 3) daily wt checks to track/trend changes

## 2020-08-24 NOTE — PROGRESS NOTE ADULT - ATTENDING COMMENTS
patient seen and examined with PA student Zulay Todd was physically present for the key portions of the evaluation and management (E/M) service provided.  I agree with the above history, physical, and plan which I have reviewed and edited where appropriate.  - case d/w dr parry and medical management recommended.    - check ECHO today  - continue current cardiac meds  - AMISH - renal eval noted. improivng. hyponatremia improving  - e.coli bacteremia 2/2 [facility] urinary source. f/u ucx  ID input noted. continue ceftriaxone

## 2020-08-24 NOTE — PROGRESS NOTE ADULT - SUBJECTIVE AND OBJECTIVE BOX
Patient is a 59y Female who reports no complaints as new Taking PO now, good uop reported    REVIEW OF SYSTEMS:    CONSTITUTIONAL: stable weakness, fevers or chills  RESPIRATORY: No cough, wheezing, hemoptysis; No shortness of breath  CARDIOVASCULAR: No chest pain or palpitations  GENITOURINARY: No dysuria, frequency or hematuria  All other review of systems is negative unless indicated above.    MEDICATIONS  (STANDING):  ARIPiprazole 20 milliGRAM(s) Oral daily  aspirin 325 milliGRAM(s) Oral daily  atorvastatin 80 milliGRAM(s) Oral at bedtime  carvedilol 3.125 milliGRAM(s) Oral every 12 hours  cefTRIAXone Injectable.      cefTRIAXone Injectable. 1000 milliGRAM(s) IV Push every 24 hours  clopidogrel Tablet 75 milliGRAM(s) Oral daily  dextrose 5%. 1000 milliLiter(s) (50 mL/Hr) IV Continuous <Continuous>  dextrose 50% Injectable 12.5 Gram(s) IV Push once  dextrose 50% Injectable 25 Gram(s) IV Push once  dextrose 50% Injectable 25 Gram(s) IV Push once  gabapentin 600 milliGRAM(s) Oral three times a day  heparin   Injectable 5000 Unit(s) SubCutaneous every 8 hours  insulin glargine Injectable (LANTUS) 20 Unit(s) SubCutaneous at bedtime  insulin lispro (HumaLOG) corrective regimen sliding scale   SubCutaneous three times a day before meals  insulin lispro (HumaLOG) corrective regimen sliding scale   SubCutaneous at bedtime  insulin lispro Injectable (HumaLOG) 6 Unit(s) SubCutaneous three times a day before meals  isosorbide   mononitrate ER Tablet (IMDUR) 60 milliGRAM(s) Oral daily  levothyroxine 112 MICROGram(s) Oral daily  multivitamin 1 Tablet(s) Oral daily  pantoprazole    Tablet 40 milliGRAM(s) Oral before breakfast  ranolazine 1000 milliGRAM(s) Oral two times a day  sodium chloride 0.9%. 1000 milliLiter(s) (100 mL/Hr) IV Continuous <Continuous>    MEDICATIONS  (PRN):  acetaminophen   Tablet .. 650 milliGRAM(s) Oral every 4 hours PRN Mild Pain (1 - 3)  ALBUTerol    90 MICROgram(s) HFA Inhaler 2 Puff(s) Inhalation every 6 hours PRN Shortness of Breath and/or Wheezing  aluminum hydroxide/magnesium hydroxide/simethicone Suspension 30 milliLiter(s) Oral every 4 hours PRN Dyspepsia  dextrose 40% Gel 15 Gram(s) Oral once PRN Blood Glucose LESS THAN 70 milliGRAM(s)/deciliter  glucagon  Injectable 1 milliGRAM(s) IntraMuscular once PRN Glucose LESS THAN 70 milligrams/deciliter  ondansetron Injectable 4 milliGRAM(s) IV Push every 6 hours PRN Nausea  oxyCODONE    IR 5 milliGRAM(s) Oral every 4 hours PRN Moderate Pain (4 - 6)  oxyCODONE    IR 10 milliGRAM(s) Oral every 4 hours PRN Severe Pain (7 - 10)  senna 2 Tablet(s) Oral at bedtime PRN Constipation        T(C): , Max: 38.1 (20 @ 00:35)  T(F): , Max: 100.5 (20 @ 00:35)  HR: 91 (20 @ 06:00)  BP: 107/64 (20 @ 06:00)  BP(mean): 73 (20 @ 06:00)  RR: 17 (20 @ 06:00)  SpO2: 100% (20 @ 06:00)  Wt(kg): --     @ 07:01  -   @ 07:00  --------------------------------------------------------  IN: 300 mL / OUT: 850 mL / NET: -550 mL          PHYSICAL EXAM:    Constitutional: NAD, frail  HEENT: temp wasting, cachetic. >then stated age  dist BS  Cardiovascular: S1 and S2  Extremities: tr peripheral edema  Neurological: A/O x 3  : No Black  Skin: No rashes  Access: Not applicable        LABS:                        8.6    24.65 )-----------( 256      ( 24 Aug 2020 06:24 )             25.2     24 Aug 2020 06:24    132    |  104    |  67     ----------------------------<  112    5.0     |  18     |  2.98   23 Aug 2020 11:52    128    |  99     |  69     ----------------------------<  207    5.4     |  20     |  3.15   23 Aug 2020 08:34    125    |  96     |  72     ----------------------------<  239    5.6     |  19     |  3.32     Ca    8.1        24 Aug 2020 06:24  Ca    8.4        23 Aug 2020 11:52  Ca    8.7        23 Aug 2020 08:34    TPro  7.2    /  Alb  2.0    /  TBili  0.5    /  DBili  x      /  AST  50     /  ALT  57     /  AlkPhos  212    23 Aug 2020 08:34          Urine Studies:  Urinalysis Basic - ( 23 Aug 2020 10:25 )    Color: Yellow / Appearance: Clear / S.010 / pH: x  Gluc: x / Ketone: Trace  / Bili: Small / Urobili: 1 mg/dL   Blood: x / Protein: 30 mg/dL / Nitrite: Negative   Leuk Esterase: Moderate / RBC: 25-50 /HPF / WBC >50   Sq Epi: x / Non Sq Epi: Few / Bacteria: Many            RADIOLOGY & ADDITIONAL STUDIES:

## 2020-08-24 NOTE — PROVIDER CONTACT NOTE (CHANGE IN STATUS NOTIFICATION) - ASSESSMENT
Pt noted to have low BP 80/40's. baseline BP has been 120-130s/70 throughout day. Pt received coreg at 1700. Pt is lethargic but arousable to voice and answers questions appropriately although speech is garbled. NSR in 70's. SPO2 on room air 100%. .

## 2020-08-25 DIAGNOSIS — N39.0 URINARY TRACT INFECTION, SITE NOT SPECIFIED: ICD-10-CM

## 2020-08-25 DIAGNOSIS — N17.9 ACUTE KIDNEY FAILURE, UNSPECIFIED: ICD-10-CM

## 2020-08-25 DIAGNOSIS — E11.9 TYPE 2 DIABETES MELLITUS WITHOUT COMPLICATIONS: ICD-10-CM

## 2020-08-25 DIAGNOSIS — J96.01 ACUTE RESPIRATORY FAILURE WITH HYPOXIA: ICD-10-CM

## 2020-08-25 DIAGNOSIS — I46.9 CARDIAC ARREST, CAUSE UNSPECIFIED: ICD-10-CM

## 2020-08-25 DIAGNOSIS — A41.9 SEPSIS, UNSPECIFIED ORGANISM: ICD-10-CM

## 2020-08-25 DIAGNOSIS — R57.0 CARDIOGENIC SHOCK: ICD-10-CM

## 2020-08-25 LAB
-  AMIKACIN: SIGNIFICANT CHANGE UP
-  AMIKACIN: SIGNIFICANT CHANGE UP
-  AMOXICILLIN/CLAVULANIC ACID: SIGNIFICANT CHANGE UP
-  AMPICILLIN/SULBACTAM: SIGNIFICANT CHANGE UP
-  AMPICILLIN/SULBACTAM: SIGNIFICANT CHANGE UP
-  AMPICILLIN: SIGNIFICANT CHANGE UP
-  AMPICILLIN: SIGNIFICANT CHANGE UP
-  AZTREONAM: SIGNIFICANT CHANGE UP
-  AZTREONAM: SIGNIFICANT CHANGE UP
-  CEFAZOLIN: SIGNIFICANT CHANGE UP
-  CEFAZOLIN: SIGNIFICANT CHANGE UP
-  CEFEPIME: SIGNIFICANT CHANGE UP
-  CEFEPIME: SIGNIFICANT CHANGE UP
-  CEFOTAXIME: SIGNIFICANT CHANGE UP
-  CEFOXITIN: SIGNIFICANT CHANGE UP
-  CEFOXITIN: SIGNIFICANT CHANGE UP
-  CEFTAZIDIME: SIGNIFICANT CHANGE UP
-  CEFTRIAXONE: SIGNIFICANT CHANGE UP
-  CEFTRIAXONE: SIGNIFICANT CHANGE UP
-  CEFUROXIME: SIGNIFICANT CHANGE UP
-  CIPROFLOXACIN: SIGNIFICANT CHANGE UP
-  CIPROFLOXACIN: SIGNIFICANT CHANGE UP
-  ERTAPENEM: SIGNIFICANT CHANGE UP
-  ERTAPENEM: SIGNIFICANT CHANGE UP
-  GENTAMICIN: SIGNIFICANT CHANGE UP
-  GENTAMICIN: SIGNIFICANT CHANGE UP
-  IMIPENEM: SIGNIFICANT CHANGE UP
-  LEVOFLOXACIN: SIGNIFICANT CHANGE UP
-  LEVOFLOXACIN: SIGNIFICANT CHANGE UP
-  MEROPENEM: SIGNIFICANT CHANGE UP
-  MEROPENEM: SIGNIFICANT CHANGE UP
-  MINOCYCLINE: SIGNIFICANT CHANGE UP
-  NITROFURANTOIN: SIGNIFICANT CHANGE UP
-  PIPERACILLIN/TAZOBACTAM: SIGNIFICANT CHANGE UP
-  PIPERACILLIN/TAZOBACTAM: SIGNIFICANT CHANGE UP
-  TIGECYCLINE: SIGNIFICANT CHANGE UP
-  TOBRAMYCIN: SIGNIFICANT CHANGE UP
-  TOBRAMYCIN: SIGNIFICANT CHANGE UP
-  TRIMETHOPRIM/SULFAMETHOXAZOLE: SIGNIFICANT CHANGE UP
-  TRIMETHOPRIM/SULFAMETHOXAZOLE: SIGNIFICANT CHANGE UP
ALBUMIN SERPL ELPH-MCNC: 1.4 G/DL — LOW (ref 3.3–5)
ALBUMIN SERPL ELPH-MCNC: 1.5 G/DL — LOW (ref 3.3–5)
ALP SERPL-CCNC: 248 U/L — HIGH (ref 40–120)
ALP SERPL-CCNC: 252 U/L — HIGH (ref 40–120)
ALT FLD-CCNC: 76 U/L — SIGNIFICANT CHANGE UP (ref 12–78)
ALT FLD-CCNC: 87 U/L — HIGH (ref 12–78)
ANION GAP SERPL CALC-SCNC: 11 MMOL/L — SIGNIFICANT CHANGE UP (ref 5–17)
ANION GAP SERPL CALC-SCNC: 11 MMOL/L — SIGNIFICANT CHANGE UP (ref 5–17)
ANION GAP SERPL CALC-SCNC: 12 MMOL/L — SIGNIFICANT CHANGE UP (ref 5–17)
APPEARANCE UR: CLEAR — SIGNIFICANT CHANGE UP
AST SERPL-CCNC: 125 U/L — HIGH (ref 15–37)
AST SERPL-CCNC: 175 U/L — HIGH (ref 15–37)
BASE EXCESS BLDA CALC-SCNC: -12.3 MMOL/L — LOW (ref -2–2)
BILIRUB SERPL-MCNC: 0.3 MG/DL — SIGNIFICANT CHANGE UP (ref 0.2–1.2)
BILIRUB SERPL-MCNC: 0.3 MG/DL — SIGNIFICANT CHANGE UP (ref 0.2–1.2)
BILIRUB UR-MCNC: NEGATIVE — SIGNIFICANT CHANGE UP
BLOOD GAS COMMENTS ARTERIAL: SIGNIFICANT CHANGE UP
BUN SERPL-MCNC: 67 MG/DL — HIGH (ref 7–23)
CALCIUM SERPL-MCNC: 6.7 MG/DL — LOW (ref 8.5–10.1)
CALCIUM SERPL-MCNC: 6.8 MG/DL — LOW (ref 8.5–10.1)
CALCIUM SERPL-MCNC: 6.8 MG/DL — LOW (ref 8.5–10.1)
CHLORIDE SERPL-SCNC: 102 MMOL/L — SIGNIFICANT CHANGE UP (ref 96–108)
CHLORIDE SERPL-SCNC: 102 MMOL/L — SIGNIFICANT CHANGE UP (ref 96–108)
CHLORIDE SERPL-SCNC: 103 MMOL/L — SIGNIFICANT CHANGE UP (ref 96–108)
CHOLEST SERPL-MCNC: 65 MG/DL — SIGNIFICANT CHANGE UP (ref 10–199)
CO2 SERPL-SCNC: 14 MMOL/L — LOW (ref 22–31)
CO2 SERPL-SCNC: 16 MMOL/L — LOW (ref 22–31)
CO2 SERPL-SCNC: 16 MMOL/L — LOW (ref 22–31)
COLOR SPEC: YELLOW — SIGNIFICANT CHANGE UP
CREAT SERPL-MCNC: 3.2 MG/DL — HIGH (ref 0.5–1.3)
CREAT SERPL-MCNC: 3.3 MG/DL — HIGH (ref 0.5–1.3)
CREAT SERPL-MCNC: 3.43 MG/DL — HIGH (ref 0.5–1.3)
CULTURE RESULTS: SIGNIFICANT CHANGE UP
DIFF PNL FLD: ABNORMAL
FERRITIN SERPL-MCNC: 521 NG/ML — HIGH (ref 15–150)
GLUCOSE SERPL-MCNC: 139 MG/DL — HIGH (ref 70–99)
GLUCOSE SERPL-MCNC: 291 MG/DL — HIGH (ref 70–99)
GLUCOSE SERPL-MCNC: 310 MG/DL — HIGH (ref 70–99)
GLUCOSE UR QL: NEGATIVE MG/DL — SIGNIFICANT CHANGE UP
HCO3 BLDA-SCNC: 13 MMOL/L — LOW (ref 21–29)
HCT VFR BLD CALC: 22.2 % — LOW (ref 34.5–45)
HCT VFR BLD CALC: 22.8 % — LOW (ref 34.5–45)
HCT VFR BLD CALC: 26.5 % — LOW (ref 34.5–45)
HDLC SERPL-MCNC: 7 MG/DL — LOW
HGB BLD-MCNC: 7.7 G/DL — LOW (ref 11.5–15.5)
HGB BLD-MCNC: 7.7 G/DL — LOW (ref 11.5–15.5)
HGB BLD-MCNC: 8.9 G/DL — LOW (ref 11.5–15.5)
INR BLD: 1.38 RATIO — HIGH (ref 0.88–1.16)
IRON SATN MFR SERPL: 13 % — LOW (ref 14–50)
IRON SATN MFR SERPL: 23 UG/DL — LOW (ref 30–160)
KETONES UR-MCNC: ABNORMAL
LEUKOCYTE ESTERASE UR-ACNC: ABNORMAL
LIPID PNL WITH DIRECT LDL SERPL: 19 MG/DL — SIGNIFICANT CHANGE UP
MAGNESIUM SERPL-MCNC: 1.5 MG/DL — LOW (ref 1.6–2.6)
MAGNESIUM SERPL-MCNC: 2.1 MG/DL — SIGNIFICANT CHANGE UP (ref 1.6–2.6)
MCHC RBC-ENTMCNC: 29.8 PG — SIGNIFICANT CHANGE UP (ref 27–34)
MCHC RBC-ENTMCNC: 30 PG — SIGNIFICANT CHANGE UP (ref 27–34)
MCHC RBC-ENTMCNC: 30.1 PG — SIGNIFICANT CHANGE UP (ref 27–34)
MCHC RBC-ENTMCNC: 33.6 GM/DL — SIGNIFICANT CHANGE UP (ref 32–36)
MCHC RBC-ENTMCNC: 33.8 GM/DL — SIGNIFICANT CHANGE UP (ref 32–36)
MCHC RBC-ENTMCNC: 34.7 GM/DL — SIGNIFICANT CHANGE UP (ref 32–36)
MCV RBC AUTO: 86.7 FL — SIGNIFICANT CHANGE UP (ref 80–100)
MCV RBC AUTO: 88.6 FL — SIGNIFICANT CHANGE UP (ref 80–100)
MCV RBC AUTO: 88.7 FL — SIGNIFICANT CHANGE UP (ref 80–100)
METHOD TYPE: SIGNIFICANT CHANGE UP
METHOD TYPE: SIGNIFICANT CHANGE UP
NITRITE UR-MCNC: NEGATIVE — SIGNIFICANT CHANGE UP
ORGANISM # SPEC MICROSCOPIC CNT: SIGNIFICANT CHANGE UP
PCO2 BLDA: 29 MMHG — LOW (ref 32–46)
PH BLDA: 7.28 — LOW (ref 7.35–7.45)
PH UR: 6.5 — SIGNIFICANT CHANGE UP (ref 5–8)
PHOSPHATE SERPL-MCNC: 3.5 MG/DL — SIGNIFICANT CHANGE UP (ref 2.5–4.5)
PHOSPHATE SERPL-MCNC: 3.5 MG/DL — SIGNIFICANT CHANGE UP (ref 2.5–4.5)
PLATELET # BLD AUTO: 256 K/UL — SIGNIFICANT CHANGE UP (ref 150–400)
PLATELET # BLD AUTO: 290 K/UL — SIGNIFICANT CHANGE UP (ref 150–400)
PLATELET # BLD AUTO: 309 K/UL — SIGNIFICANT CHANGE UP (ref 150–400)
PO2 BLDA: 123 MMHG — HIGH (ref 74–108)
POTASSIUM SERPL-MCNC: 4.3 MMOL/L — SIGNIFICANT CHANGE UP (ref 3.5–5.3)
POTASSIUM SERPL-MCNC: 5 MMOL/L — SIGNIFICANT CHANGE UP (ref 3.5–5.3)
POTASSIUM SERPL-MCNC: 6.2 MMOL/L — CRITICAL HIGH (ref 3.5–5.3)
POTASSIUM SERPL-SCNC: 4.3 MMOL/L — SIGNIFICANT CHANGE UP (ref 3.5–5.3)
POTASSIUM SERPL-SCNC: 5 MMOL/L — SIGNIFICANT CHANGE UP (ref 3.5–5.3)
POTASSIUM SERPL-SCNC: 6.2 MMOL/L — CRITICAL HIGH (ref 3.5–5.3)
PROT SERPL-MCNC: 5.8 GM/DL — LOW (ref 6–8.3)
PROT SERPL-MCNC: 6 GM/DL — SIGNIFICANT CHANGE UP (ref 6–8.3)
PROT UR-MCNC: 100 MG/DL
PROTHROM AB SERPL-ACNC: 15.9 SEC — HIGH (ref 10.6–13.6)
RBC # BLD: 2.56 M/UL — LOW (ref 3.8–5.2)
RBC # BLD: 2.56 M/UL — LOW (ref 3.8–5.2)
RBC # BLD: 2.57 M/UL — LOW (ref 3.8–5.2)
RBC # BLD: 2.99 M/UL — LOW (ref 3.8–5.2)
RBC # FLD: 15 % — HIGH (ref 10.3–14.5)
RBC # FLD: 15.3 % — HIGH (ref 10.3–14.5)
RBC # FLD: 15.4 % — HIGH (ref 10.3–14.5)
RETICS #: 42 K/UL — SIGNIFICANT CHANGE UP (ref 25–125)
RETICS/RBC NFR: 1.6 % — SIGNIFICANT CHANGE UP (ref 0.5–2.5)
SAO2 % BLDA: 98 % — HIGH (ref 92–96)
SODIUM SERPL-SCNC: 127 MMOL/L — LOW (ref 135–145)
SODIUM SERPL-SCNC: 130 MMOL/L — LOW (ref 135–145)
SODIUM SERPL-SCNC: 130 MMOL/L — LOW (ref 135–145)
SP GR SPEC: 1.01 — SIGNIFICANT CHANGE UP (ref 1.01–1.02)
SPECIMEN SOURCE: SIGNIFICANT CHANGE UP
TIBC SERPL-MCNC: 175 UG/DL — LOW (ref 220–430)
TOTAL CHOLESTEROL/HDL RATIO MEASUREMENT: 8.7 RATIO — HIGH (ref 3.3–7.1)
TRIGL SERPL-MCNC: 195 MG/DL — HIGH (ref 10–149)
TROPONIN I SERPL-MCNC: 1.99 NG/ML — HIGH (ref 0.01–0.04)
UIBC SERPL-MCNC: 151 UG/DL — SIGNIFICANT CHANGE UP (ref 110–370)
UROBILINOGEN FLD QL: NEGATIVE MG/DL — SIGNIFICANT CHANGE UP
WBC # BLD: 22.3 K/UL — HIGH (ref 3.8–10.5)
WBC # BLD: 23.91 K/UL — HIGH (ref 3.8–10.5)
WBC # BLD: 29.74 K/UL — HIGH (ref 3.8–10.5)
WBC # FLD AUTO: 22.3 K/UL — HIGH (ref 3.8–10.5)
WBC # FLD AUTO: 23.91 K/UL — HIGH (ref 3.8–10.5)
WBC # FLD AUTO: 29.74 K/UL — HIGH (ref 3.8–10.5)

## 2020-08-25 PROCEDURE — 99233 SBSQ HOSP IP/OBS HIGH 50: CPT

## 2020-08-25 PROCEDURE — 99291 CRITICAL CARE FIRST HOUR: CPT

## 2020-08-25 PROCEDURE — 93010 ELECTROCARDIOGRAM REPORT: CPT

## 2020-08-25 PROCEDURE — 71045 X-RAY EXAM CHEST 1 VIEW: CPT | Mod: 26

## 2020-08-25 RX ORDER — SODIUM POLYSTYRENE SULFONATE 4.1 MEQ/G
30 POWDER, FOR SUSPENSION ORAL ONCE
Refills: 0 | Status: COMPLETED | OUTPATIENT
Start: 2020-08-25 | End: 2020-08-25

## 2020-08-25 RX ORDER — CHLORHEXIDINE GLUCONATE 213 G/1000ML
15 SOLUTION TOPICAL EVERY 12 HOURS
Refills: 0 | Status: DISCONTINUED | OUTPATIENT
Start: 2020-08-25 | End: 2020-08-25

## 2020-08-25 RX ORDER — EPINEPHRINE 0.3 MG/.3ML
1 INJECTION INTRAMUSCULAR; SUBCUTANEOUS ONCE
Refills: 0 | Status: COMPLETED | OUTPATIENT
Start: 2020-08-25 | End: 2020-08-25

## 2020-08-25 RX ORDER — MAGNESIUM SULFATE 500 MG/ML
2 VIAL (ML) INJECTION ONCE
Refills: 0 | Status: COMPLETED | OUTPATIENT
Start: 2020-08-25 | End: 2020-08-25

## 2020-08-25 RX ORDER — NOREPINEPHRINE BITARTRATE/D5W 8 MG/250ML
0.03 PLASTIC BAG, INJECTION (ML) INTRAVENOUS
Qty: 8 | Refills: 0 | Status: DISCONTINUED | OUTPATIENT
Start: 2020-08-25 | End: 2020-08-30

## 2020-08-25 RX ORDER — ETOMIDATE 2 MG/ML
20 INJECTION INTRAVENOUS ONCE
Refills: 0 | Status: COMPLETED | OUTPATIENT
Start: 2020-08-25 | End: 2020-08-25

## 2020-08-25 RX ORDER — MIDODRINE HYDROCHLORIDE 2.5 MG/1
10 TABLET ORAL EVERY 8 HOURS
Refills: 0 | Status: DISCONTINUED | OUTPATIENT
Start: 2020-08-25 | End: 2020-08-28

## 2020-08-25 RX ORDER — SODIUM BICARBONATE 1 MEQ/ML
0.07 SYRINGE (ML) INTRAVENOUS
Qty: 75 | Refills: 0 | Status: DISCONTINUED | OUTPATIENT
Start: 2020-08-25 | End: 2020-08-26

## 2020-08-25 RX ADMIN — Medication 112 MICROGRAM(S): at 05:55

## 2020-08-25 RX ADMIN — GABAPENTIN 600 MILLIGRAM(S): 400 CAPSULE ORAL at 05:55

## 2020-08-25 RX ADMIN — CARVEDILOL PHOSPHATE 3.12 MILLIGRAM(S): 80 CAPSULE, EXTENDED RELEASE ORAL at 05:55

## 2020-08-25 RX ADMIN — ATORVASTATIN CALCIUM 80 MILLIGRAM(S): 80 TABLET, FILM COATED ORAL at 21:54

## 2020-08-25 RX ADMIN — HEPARIN SODIUM 5000 UNIT(S): 5000 INJECTION INTRAVENOUS; SUBCUTANEOUS at 21:53

## 2020-08-25 RX ADMIN — Medication 1 TABLET(S): at 08:40

## 2020-08-25 RX ADMIN — SODIUM POLYSTYRENE SULFONATE 30 GRAM(S): 4.1 POWDER, FOR SUSPENSION ORAL at 04:54

## 2020-08-25 RX ADMIN — GABAPENTIN 600 MILLIGRAM(S): 400 CAPSULE ORAL at 14:19

## 2020-08-25 RX ADMIN — PANTOPRAZOLE SODIUM 40 MILLIGRAM(S): 20 TABLET, DELAYED RELEASE ORAL at 08:40

## 2020-08-25 RX ADMIN — Medication 6 UNIT(S): at 08:32

## 2020-08-25 RX ADMIN — Medication 6 UNIT(S): at 12:09

## 2020-08-25 RX ADMIN — RANOLAZINE 1000 MILLIGRAM(S): 500 TABLET, FILM COATED, EXTENDED RELEASE ORAL at 10:03

## 2020-08-25 RX ADMIN — CLOPIDOGREL BISULFATE 75 MILLIGRAM(S): 75 TABLET, FILM COATED ORAL at 08:40

## 2020-08-25 RX ADMIN — EPINEPHRINE 1 MILLIGRAM(S): 0.3 INJECTION INTRAMUSCULAR; SUBCUTANEOUS at 00:30

## 2020-08-25 RX ADMIN — HEPARIN SODIUM 5000 UNIT(S): 5000 INJECTION INTRAVENOUS; SUBCUTANEOUS at 14:19

## 2020-08-25 RX ADMIN — CEFTRIAXONE 2000 MILLIGRAM(S): 500 INJECTION, POWDER, FOR SOLUTION INTRAMUSCULAR; INTRAVENOUS at 17:07

## 2020-08-25 RX ADMIN — CHLORHEXIDINE GLUCONATE 15 MILLILITER(S): 213 SOLUTION TOPICAL at 05:55

## 2020-08-25 RX ADMIN — Medication 8: at 08:31

## 2020-08-25 RX ADMIN — ETOMIDATE 20 MILLIGRAM(S): 2 INJECTION INTRAVENOUS at 00:00

## 2020-08-25 RX ADMIN — HEPARIN SODIUM 5000 UNIT(S): 5000 INJECTION INTRAVENOUS; SUBCUTANEOUS at 05:55

## 2020-08-25 RX ADMIN — Medication 6: at 12:13

## 2020-08-25 RX ADMIN — INSULIN GLARGINE 20 UNIT(S): 100 INJECTION, SOLUTION SUBCUTANEOUS at 01:46

## 2020-08-25 RX ADMIN — INSULIN GLARGINE 20 UNIT(S): 100 INJECTION, SOLUTION SUBCUTANEOUS at 22:02

## 2020-08-25 RX ADMIN — Medication 50 GRAM(S): at 05:55

## 2020-08-25 RX ADMIN — Medication 3.06 MICROGRAM(S)/KG/MIN: at 18:45

## 2020-08-25 RX ADMIN — ARIPIPRAZOLE 20 MILLIGRAM(S): 15 TABLET ORAL at 10:02

## 2020-08-25 RX ADMIN — Medication 650 MILLIGRAM(S): at 12:16

## 2020-08-25 RX ADMIN — Medication 81 MILLIGRAM(S): at 08:40

## 2020-08-25 RX ADMIN — Medication 4: at 01:45

## 2020-08-25 RX ADMIN — MIDODRINE HYDROCHLORIDE 10 MILLIGRAM(S): 2.5 TABLET ORAL at 22:41

## 2020-08-25 RX ADMIN — Medication 50 MEQ/KG/HR: at 11:54

## 2020-08-25 RX ADMIN — GABAPENTIN 600 MILLIGRAM(S): 400 CAPSULE ORAL at 21:54

## 2020-08-25 NOTE — PROGRESS NOTE ADULT - SUBJECTIVE AND OBJECTIVE BOX
Date of service: 20 @ 11:57    pt seen and examined  events over past 24 hours noted  course c/b hypotension given fluids then code blue called d/t PEA arrest, given CPR/intubated/pressor support  extubated this am, feeling better, being weaned off pressors  afebrile    ROS: no fever or chills; denies dizziness, no HA, no SOB or cough, no abdominal pain, no diarrhea or constipation; no legs pain, no rashes    MEDICATIONS  (STANDING):  ARIPiprazole 20 milliGRAM(s) Oral daily  aspirin  chewable 81 milliGRAM(s) Oral daily  atorvastatin 80 milliGRAM(s) Oral at bedtime  carvedilol 3.125 milliGRAM(s) Oral every 12 hours  cefTRIAXone Injectable. 2000 milliGRAM(s) IV Push every 24 hours  clopidogrel Tablet 75 milliGRAM(s) Oral daily  dextrose 5%. 1000 milliLiter(s) (50 mL/Hr) IV Continuous <Continuous>  dextrose 50% Injectable 12.5 Gram(s) IV Push once  dextrose 50% Injectable 25 Gram(s) IV Push once  dextrose 50% Injectable 25 Gram(s) IV Push once  gabapentin 600 milliGRAM(s) Oral three times a day  heparin   Injectable 5000 Unit(s) SubCutaneous every 8 hours  insulin glargine Injectable (LANTUS) 20 Unit(s) SubCutaneous at bedtime  insulin lispro (HumaLOG) corrective regimen sliding scale   SubCutaneous three times a day before meals  insulin lispro (HumaLOG) corrective regimen sliding scale   SubCutaneous at bedtime  insulin lispro Injectable (HumaLOG) 6 Unit(s) SubCutaneous three times a day before meals  isosorbide   mononitrate ER Tablet (IMDUR) 60 milliGRAM(s) Oral daily  levothyroxine 112 MICROGram(s) Oral daily  multivitamin 1 Tablet(s) Oral daily  norepinephrine Infusion 0.03 MICROgram(s)/kG/Min (3.06 mL/Hr) IV Continuous <Continuous>  pantoprazole    Tablet 40 milliGRAM(s) Oral before breakfast  ranolazine 1000 milliGRAM(s) Oral two times a day  sodium bicarbonate  Infusion 0.069 mEq/kG/Hr (50 mL/Hr) IV Continuous <Continuous>      Vital Signs Last 24 Hrs  T(C): 36.2 (25 Aug 2020 10:20), Max: 37.2 (24 Aug 2020 16:56)  T(F): 97.1 (25 Aug 2020 10:20), Max: 98.9 (24 Aug 2020 16:56)  HR: 75 (25 Aug 2020 09:00) (39 - 95)  BP: 102/52 (25 Aug 2020 09:00) (64/30 - 130/60)  BP(mean): 65 (25 Aug 2020 09:00) (34 - 72)  RR: 16 (25 Aug 2020 09:) (0 - 18)  SpO2: 100% (25 Aug 2020 09:) (89% - 100%)      PE:  Constitutional: frail looking  HEENT: NC/AT, EOMI, PERRLA, conjunctivae clear; ears and nose atraumatic; pharynx benign  Neck: supple; thyroid not palpable  Back: no tenderness  Respiratory: respiratory effort normal; clear to auscultation  Cardiovascular: S1S2 regular, no murmurs  Abdomen: soft, not tender, not distended, positive BS; liver and spleen WNL  Genitourinary: no suprapubic tenderness  Lymphatic: no LN palpable  Musculoskeletal: no muscle tenderness, no joint swelling or tenderness  Extremities: no pedal edema  Neurological/ Psychiatric:  moving all extremities  Skin: no rashes; no palpable lesions    Labs: all available labs reviewed                        7.7    22.30 )-----------( 256      ( 25 Aug 2020 08:42 )             22.2     08-25    130<L>  |  103  |  67<H>  ----------------------------<  310<H>  5.0   |  16<L>  |  3.30<H>    Ca    6.7<L>      25 Aug 2020 08:42  Phos  3.5     08-25  Mg     1.5     08-25    TPro  6.0  /  Alb  1.4<L>  /  TBili  0.3  /  DBili  x   /  AST  125<H>  /  ALT  76  /  AlkPhos  252<H>  08-25      Urinalysis Basic - ( 23 Aug 2020 10:25 )    Color: Yellow / Appearance: Clear / S.010 / pH: x  Gluc: x / Ketone: Trace  / Bili: Small / Urobili: 1 mg/dL   Blood: x / Protein: 30 mg/dL / Nitrite: Negative   Leuk Esterase: Moderate / RBC: 25-50 /HPF / WBC >50   Sq Epi: x / Non Sq Epi: Few / Bacteria: Many    Culture - Urine (20 @ 10:25)    Specimen Source: .Urine Clean Catch (Midstream)    Culture Results:   >100,000 CFU/ml Escherichia coli    Culture - Blood (20 @ 08:34)    Gram Stain:   Growth in aerobic bottle: Gram Negative Rods  Growth in anaerobic bottle: Gram Negative Rods    Specimen Source: .Blood None    Culture Results:   Growth in aerobic and anaerobic bottles: Escherichia coli  See previous culture [id number]    Culture - Blood (20 @ 08:34)    -  Escherichia coli: Detec    Gram Stain:   Growth in aerobic bottle: Gram Negative Rods  Growth in anaerobic bottle: Gram Negative Rods    Specimen Source: .Blood None    Organism: Blood Culture PCR    Culture Results:   Growth in aerobic bottle: Gram Negative Rods  Growth in anaerobic bottle: Gram Negative Rods      Radiology: all available radiological tests reviewed      < from: CT Abdomen and Pelvis w/ Oral Cont (20 @ 16:41) >  EXAM:  CT ABDOMEN AND PELVIS OC                            PROCEDURE DATE:  2020          INTERPRETATION:  CLINICAL INFORMATION: Diffuse abdominal pain    COMPARISON: None.    PROCEDURE:  CT of the Abdomen and Pelvis was performed without intravenous contrast.  Intravenous contrast: None.  Oral contrast: positive contrast was administered.  Sagittal and coronal reformats were performed.    FINDINGS:  LOWER CHEST: Coronary artery calcifications. Clear lung bases.    LIVER: Within normal limits.  BILE DUCTS: Normal caliber.  GALLBLADDER: Cholecystectomy.  SPLEEN: Within normal limits.  PANCREAS: Within normal limits.  ADRENALS: Within normal limits.  KIDNEYS/URETERS: Within normal limits.    BLADDER: Within normal limits.  REPRODUCTIVE ORGANS: Uterus and adnexa within normal limits.    BOWEL: Mildly distended small bowel without transition point to suggest obstruction. No wall thickening or inflammatory change.  Appendix normal.  PERITONEUM: No ascites or pneumoperitoneum..  VESSELS: Severe diffuse atherosclerotic arterial calcification. Normal caliber aorta.  RETROPERITONEUM/LYMPH NODES: No lymphadenopathy.  ABDOMINAL WALL: Within normal limits.  BONES: L4-L5 interpedicular screws and connecting rods. Moderate anterolisthesis of L4 on L5. No acute bony abnormality.    IMPRESSION:  Mildly distended small bowel without evidence of obstruction.    < end of copied text >    Advanced directives addressed: full resuscitation

## 2020-08-25 NOTE — PROCEDURE NOTE - NSPOSTCAREGUIDE_GEN_A_CORE
Care for catheter as per unit/ICU protocols/Instructed patient/caregiver regarding signs and symptoms of infection
Instructed patient/caregiver regarding signs and symptoms of infection

## 2020-08-25 NOTE — CONSULT NOTE ADULT - PROBLEM SELECTOR RECOMMENDATION 7
Hold oral hypoglycemic meds  Regular Insulin Slide Scale  Finger sticks Q 6 hours  Low Carb 1800 Ovidio Diet when can tolorate  Hemoglobin A1c

## 2020-08-25 NOTE — PROVIDER CONTACT NOTE (CHANGE IN STATUS NOTIFICATION) - BACKGROUND
Pt became more difficult to arouse. Bradycardic into 30's, lost pulse, CPR inititated at 2321. See Code sheet for drugs administered. ROSC achieved at 2320

## 2020-08-25 NOTE — CONSULT NOTE ADULT - ASSESSMENT
59 year old female admitted with STEMI taken to cath lab which found 100% blockage of RCA but was determined to manage medically tonight is post cardiac arrest with ROSC and in cardiogenic shock now on Levophed infusion.     Critical Care time: 50 mins assessing presenting problems of acute illness that poses high probability of life threatening deterioration or end organ damage/dysfunction.  Medical decision making inculding Initiating plan of care, reviewing data, reviewing radiology,direct patient bedside evaluation and interpretation of vital signs, any necessary ventilator management , discusion with multidisciplinary team, discussing goals of care with patient/family, all non inclusive of procedures 59 year old female admitted with STEMI taken to cath lab which found 100% blockage of RCA but was determined to manage medically tonight is post cardiac arrest with ROSC and in cardiogenic shock now on Levophed infusion to maintain blood pressure.     Critical Care time: 50 mins assessing presenting problems of acute illness that poses high probability of life threatening deterioration or end organ damage/dysfunction.  Medical decision making inculding Initiating plan of care, reviewing data, reviewing radiology,direct patient bedside evaluation and interpretation of vital signs, any necessary ventilator management , discusion with multidisciplinary team, discussing goals of care with patient/family, all non inclusive of procedures

## 2020-08-25 NOTE — PROGRESS NOTE ADULT - PROBLEM SELECTOR PLAN 1
gram negative sepsis , s/p C P arrest , less likely primary cardiac origin  continue fluids ,  pressors  , weaning , continue antibiotics ,  monitor renal function , transfuse 1 unit PRBC, renal follow up

## 2020-08-25 NOTE — PROVIDER CONTACT NOTE (CHANGE IN STATUS NOTIFICATION) - SITUATION
Pt became more hypotensive 60/30 despite fluid boluses. Becoming newly bradycardic. Hospitalist and ICU PA' s called to bedside emergently.

## 2020-08-25 NOTE — PROGRESS NOTE ADULT - PROBLEM SELECTOR PLAN 2
without active chest pain , with total occlusion mid RCA, cont. ASA 81 mg , plavix 75 daily, atorvastatin  80 daily,

## 2020-08-25 NOTE — PROGRESS NOTE ADULT - ATTENDING COMMENTS
patient seen and examined with PA student Zulay Todd was physically present for the key portions of the evaluation and management (E/M) service provided.  I agree with the above history, physical, and plan which I have reviewed and edited where appropriate.  - pt with sudarshan and PEA overnight and intubated requiring pressor support. now extubated 8/25 AM and being weaned off pressors and to be followed again by hospitalist service as per dr mendez.   - monitor in CCU  - continue current cardiac meds. hold BB and ACEI due to Jonah patient seen and examined with PA student Zulay Todd was physically present for the key portions of the evaluation and management (E/M) service provided.  I agree with the above history, physical, and plan which I have reviewed and edited where appropriate.  - pt with sudarshan and PEA overnight and intubated requiring pressor support. now extubated 8/25 AM and being weaned off pressors and to be followed again by hospitalist service as per dr mendez.   - monitor in CCU  - continue current cardiac meds. hold BB and ACEI due to Jonah  - e.coli bacteremia. continue ceftriaxone.  ID following

## 2020-08-25 NOTE — CONSULT NOTE ADULT - PROBLEM SELECTOR RECOMMENDATION 3
s/p fem pop bypass , prior stent right    continue medication  encourage po medication  quit smoking a
levophed infusion to maintain MAP greater than 65  echocardiogram

## 2020-08-25 NOTE — PROGRESS NOTE ADULT - SUBJECTIVE AND OBJECTIVE BOX
60yo/F with PMH CAD, PAD s/p fem-pop bypass s/p RT iliac stent, diabetes,   hypothyroidism, hyperlipidemia, current active smoker, uses cocaine last use ,   chronic back pain s/p lumbar surgery presented  in AM with multiple non-specific complaints,  including fever, vague diffuse ab pain  & diarrhea.  ST elevations in inferior leads noted.  Urgent coronary angiogram showed occluded RCA, medical  management reccomended  as no active chest pain, pt was septic & Cr elevated.    : no complaints overnight: no chest pain, palpitations, dyspnea.  Reviewed results of cardiac cath & plan for med mx.    20 Events reviewed , patient was noted to be hypotensive bradycardic PEA  unresponsive episode subsequently resuscitated  patient intubated , on pressors , maintaining sbp blood cultures are positive for gram negative rods . patient hemoglobin dropped with     MEDICATIONS  (STANDING):  ARIPiprazole 20 milliGRAM(s) Oral daily  aspirin  chewable 81 milliGRAM(s) Oral daily  atorvastatin 80 milliGRAM(s) Oral at bedtime  carvedilol 3.125 milliGRAM(s) Oral every 12 hours  cefTRIAXone Injectable. 2000 milliGRAM(s) IV Push every 24 hours  chlorhexidine 0.12% Liquid 15 milliLiter(s) Oral Mucosa every 12 hours  clopidogrel Tablet 75 milliGRAM(s) Oral daily  dextrose 5%. 1000 milliLiter(s) (50 mL/Hr) IV Continuous <Continuous>  dextrose 50% Injectable 12.5 Gram(s) IV Push once  dextrose 50% Injectable 25 Gram(s) IV Push once  dextrose 50% Injectable 25 Gram(s) IV Push once  gabapentin 600 milliGRAM(s) Oral three times a day  heparin   Injectable 5000 Unit(s) SubCutaneous every 8 hours  insulin glargine Injectable (LANTUS) 20 Unit(s) SubCutaneous at bedtime  insulin lispro (HumaLOG) corrective regimen sliding scale   SubCutaneous three times a day before meals  insulin lispro (HumaLOG) corrective regimen sliding scale   SubCutaneous at bedtime  insulin lispro Injectable (HumaLOG) 6 Unit(s) SubCutaneous three times a day before meals  isosorbide   mononitrate ER Tablet (IMDUR) 60 milliGRAM(s) Oral daily  levothyroxine 112 MICROGram(s) Oral daily  multivitamin 1 Tablet(s) Oral daily  norepinephrine Infusion 0.03 MICROgram(s)/kG/Min (3.06 mL/Hr) IV Continuous <Continuous>  pantoprazole    Tablet 40 milliGRAM(s) Oral before breakfast  ranolazine 1000 milliGRAM(s) Oral two times a day  sodium chloride 0.9%. 1000 milliLiter(s) (100 mL/Hr) IV Continuous <Continuous>    MEDICATIONS  (PRN):  acetaminophen   Tablet .. 650 milliGRAM(s) Oral every 4 hours PRN Mild Pain (1 - 3)  ALBUTerol    90 MICROgram(s) HFA Inhaler 2 Puff(s) Inhalation every 6 hours PRN Shortness of Breath and/or Wheezing  aluminum hydroxide/magnesium hydroxide/simethicone Suspension 30 milliLiter(s) Oral every 4 hours PRN Dyspepsia  dextrose 40% Gel 15 Gram(s) Oral once PRN Blood Glucose LESS THAN 70 milliGRAM(s)/deciliter  glucagon  Injectable 1 milliGRAM(s) IntraMuscular once PRN Glucose LESS THAN 70 milligrams/deciliter  ondansetron Injectable 4 milliGRAM(s) IV Push every 6 hours PRN Nausea  oxyCODONE    IR 5 milliGRAM(s) Oral every 4 hours PRN Moderate Pain (4 - 6)  oxyCODONE    IR 10 milliGRAM(s) Oral every 4 hours PRN Severe Pain (7 - 10)  senna 2 Tablet(s) Oral at bedtime PRN Constipation      Vital Signs Last 24 Hrs  T(C): 36 (25 Aug 2020 06:50), Max: 37.2 (24 Aug 2020 16:56)  T(F): 96.8 (25 Aug 2020 06:50), Max: 98.9 (24 Aug 2020 16:56)  HR: 75 (25 Aug 2020 07:00) (39 - 95)  BP: 97/57 (25 Aug 2020 07:00) (64/30 - 130/60)  BP(mean): 65 (25 Aug 2020 07:00) (34 - 72)  RR: 13 (25 Aug 2020 07:00) (0 - 24)  SpO2: 98% (25 Aug 2020 07:00) (89% - 100%)    I&O's Summary    24 Aug 2020 07:01  -  25 Aug 2020 07:00  --------------------------------------------------------  IN: 1380 mL / OUT: 500 mL / NET: 880 mL      PHYSICAL EXAM:    Constitutional: NAD,  intubated awake   HEENT: PERR, EOMI,  No oral cyananosis.  Neck:  supple,  No JVD  Respiratory: Breath sounds are clear bilaterally, No wheezing, rales or rhonchi  Cardiovascular: S1 and S2, regular rate and rhythm, no Murmurs, gallops or rubs  Gastrointestinal: Bowel Sounds present, soft, nontender. right groin no hematoma , not tender   Extremities: No peripheral edema. No clubbing or cyanosis.  Vascular: 1+ peripheral pulses  Neurological: awake intubated  no focal deficits        LABS: All Labs Reviewed:                                       7.7    29.74 )-----------( 290      ( 25 Aug 2020 02:26 )             22.8     08-25    127<L>  |  102  |  67<H>  ----------------------------<  291<H>  6.2<HH>   |  14<L>  |  3.20<H>    Ca    6.8<L>      25 Aug 2020 02:26  Phos  3.5     08-25  Mg     1.5     -25    TPro  5.8<L>  /  Alb  1.5<L>  /  TBili  0.3  /  DBili  x   /  AST  175<H>  /  ALT  87<H>  /  AlkPhos  248<H>  0825    CARDIAC MARKERS ( 25 Aug 2020 02:26 )  1.990 ng/mL / x     / x     / x     / x      CARDIAC MARKERS ( 23 Aug 2020 11:52 )  1.660 ng/mL / x     / x     / x     / x      CARDIAC MARKERS ( 23 Aug 2020 08:34 )  1.660 ng/mL / x     / x     / x     / x          LIVER FUNCTIONS - ( 25 Aug 2020 02:26 )  Alb: 1.5 g/dL / Pro: 5.8 gm/dL / ALK PHOS: 248 U/L / ALT: 87 U/L / AST: 175 U/L / GGT: x           PT/INR - ( 25 Aug 2020 02:26 )   PT: 15.9 sec;   INR: 1.38 ratio         PTT - ( 23 Aug 2020 08:34 )  PTT:26.8 sec  Urinalysis Basic - ( 23 Aug 2020 10:25 )    Color: Yellow / Appearance: Clear / S.010 / pH: x  Gluc: x / Ketone: Trace  / Bili: Small / Urobili: 1 mg/dL   Blood: x / Protein: 30 mg/dL / Nitrite: Negative   Leuk Esterase: Moderate / RBC: 25-50 /HPF / WBC >50   Sq Epi: x / Non Sq Epi: Few / Bacteria: Many       Chol 75 LDL 20 HDL 7<L> Trig 242<H>  Culture Results:   >100,000 CFU/ml Escherichia coli (20 @ 10:25)  Culture Results:   Growth in aerobic bottle: Gram Negative Rods  Growth in anaerobic bottle: Gram Negative Rods  "Due to technical problems, Proteus sp. will Not be reported as part of  the BCID panel until further notice"  ***Blood Panel PCR results on this specimenare available  approximately 3 hours after the Gram stain result.***  Gram stain, PCR, and/or culture results may not always  correspond due to difference in methodologies.  ************************************************************  This PCR assaywas performed using [employer].  The following targets are tested for: Enterococcus,  vancomycin resistant enterococci, Listeria monocytogenes,  coagulase negative staphylococci, S. aureus,  methicillin resistant S. aureus, Streptococcus agalactiae  (Group B), S. pneumoniae, S. pyogenes (Group A),  Acinetobacter baumannii, Enterobacter cloacae, E. coli,  Klebsiella oxytoca, K. pneumoniae, Proteus sp.,  Serratia marcescens, Haemophilus influenzae,  Neisseria meningitidis, Pseudomonas aeruginosa, Candida  albicans, C. glabrata, C krusei, C parapsilosis,  C. tropicalis and the KPC resistance gene. (20 @ 08:34)  Culture Results:   Growth in aerobic bottle: Gram Negative Rods  Growth in anaerobic bottle: Gram Negative Rods (20 @ 08:34)        ===============================  EKG: NSR, ST elevations II, III, aVF on presentation, decreased toward baseline on subsequent ECGs    TELE: NSR, no arrhythmias  ==============================  ECHO:    pending.    ==============================  CATH:    < from: Cardiac Cath Lab - Adult (20 @ 09:33) >  ondition: Rest     Angiographic Findings     Cardiac Arteries and Lesion Findings    LMCA: Normal.    LAD: Abnormal.Moderate diffuse disease    LCx: Abnormal.Moderate diffuse diease    RCA:     Mid RCA: 100% stenosis.     Impression     Diagnostic Conclusions     Acute occlusion of the RCA of unknown duration. No PCI due to lack of   ischemic symptoms, sepsis, acute renal failure and satisfactory left   coronary anatomy.    Estimated Blood Loss:4ml.    No LV gram performed (likely d/t Cr 2-3 range)  ==============================  STRESS:      EKG  20 normal sinus rhythm ST elevation inferior leads  prolonged QT     < from: TTE Echo Complete w/o Contrast w/ Doppler (20 @ 14:14) >   Summary     Fibrocalcific changes noted to the mitral valve leaflets with preserved   leaflet excursion.   Mild (1+) mitral regurgitation is present.   EA reversal of the mitral inflow consistent with reduced compliance of the   left ventricle.   Fibrocalcific changes noted to the Aortic valve leaflets with preserved   leaflet excursion.   Normal appearing tricuspid valve structure and function.   Mild (1+) tricuspid valve regurgitation is present.   The left ventricle size is normal, moderate regional hypokinesis, and   minimally reduced function.   Estimated left ventricular ejection fraction is 50 %.   The right ventricle appears mildly dilated.   No evidence of pericardial effusion.     Signature    < end of copied text >

## 2020-08-25 NOTE — PROGRESS NOTE ADULT - SUBJECTIVE AND OBJECTIVE BOX
Hospital # 2  CCu # 2  Vent # 0    CC:  Respiratory Failure     HPI:    58 y/o female with CAD, PAD s/p fem-pop bypass s/p RT iliac stent, diabetes, hypothyroidism, hyperlipidemia, current active smoker, uses cocaine last use yesterday, chronic back pain s/p lumbar surgery admitted with UTI sepsis and E. coli bacteremia POA.  ECG revealed STEMI prompting code STEMI--pt underwent urgent angiogram showing occluded RCA--no inervention. Pt developed hypotension overnight and was given 1L IVF when she developed PEA and initiation of CPR.  Intubated and CVL placed for pressors.    :  Case d/w SAMIRA solo.  Pt seen and examined in CCU--vented--awake and alert off sedation.  Did well with SBT--extubated.  On Norepi gtt 0.2    PMH:  As above.     PSH:  As above.     FH: Non Contributory other than those listed in HPI    Social History:  As above     MEDICATIONS  (STANDING):  ARIPiprazole 20 milliGRAM(s) Oral daily  aspirin  chewable 81 milliGRAM(s) Oral daily  atorvastatin 80 milliGRAM(s) Oral at bedtime  carvedilol 3.125 milliGRAM(s) Oral every 12 hours  cefTRIAXone Injectable. 2000 milliGRAM(s) IV Push every 24 hours  chlorhexidine 0.12% Liquid 15 milliLiter(s) Oral Mucosa every 12 hours  clopidogrel Tablet 75 milliGRAM(s) Oral daily  dextrose 5%. 1000 milliLiter(s) (50 mL/Hr) IV Continuous <Continuous>  dextrose 50% Injectable 12.5 Gram(s) IV Push once  dextrose 50% Injectable 25 Gram(s) IV Push once  dextrose 50% Injectable 25 Gram(s) IV Push once  gabapentin 600 milliGRAM(s) Oral three times a day  heparin   Injectable 5000 Unit(s) SubCutaneous every 8 hours  insulin glargine Injectable (LANTUS) 20 Unit(s) SubCutaneous at bedtime  insulin lispro (HumaLOG) corrective regimen sliding scale   SubCutaneous three times a day before meals  insulin lispro (HumaLOG) corrective regimen sliding scale   SubCutaneous at bedtime  insulin lispro Injectable (HumaLOG) 6 Unit(s) SubCutaneous three times a day before meals  isosorbide   mononitrate ER Tablet (IMDUR) 60 milliGRAM(s) Oral daily  levothyroxine 112 MICROGram(s) Oral daily  multivitamin 1 Tablet(s) Oral daily  norepinephrine Infusion 0.03 MICROgram(s)/kG/Min (3.06 mL/Hr) IV Continuous <Continuous>  pantoprazole    Tablet 40 milliGRAM(s) Oral before breakfast  ranolazine 1000 milliGRAM(s) Oral two times a day  sodium chloride 0.9%. 1000 milliLiter(s) (100 mL/Hr) IV Continuous <Continuous>    MEDICATIONS  (PRN):  acetaminophen   Tablet .. 650 milliGRAM(s) Oral every 4 hours PRN Mild Pain (1 - 3)  ALBUTerol    90 MICROgram(s) HFA Inhaler 2 Puff(s) Inhalation every 6 hours PRN Shortness of Breath and/or Wheezing  aluminum hydroxide/magnesium hydroxide/simethicone Suspension 30 milliLiter(s) Oral every 4 hours PRN Dyspepsia  dextrose 40% Gel 15 Gram(s) Oral once PRN Blood Glucose LESS THAN 70 milliGRAM(s)/deciliter  glucagon  Injectable 1 milliGRAM(s) IntraMuscular once PRN Glucose LESS THAN 70 milligrams/deciliter  ondansetron Injectable 4 milliGRAM(s) IV Push every 6 hours PRN Nausea  oxyCODONE    IR 5 milliGRAM(s) Oral every 4 hours PRN Moderate Pain (4 - 6)  oxyCODONE    IR 10 milliGRAM(s) Oral every 4 hours PRN Severe Pain (7 - 10)  senna 2 Tablet(s) Oral at bedtime PRN Constipation      Allergies: NKDA    ROS:  SEE BELOW        ICU Vital Signs Last 24 Hrs  T(C): 36 (25 Aug 2020 06:50), Max: 37.2 (24 Aug 2020 16:56)  T(F): 96.8 (25 Aug 2020 06:50), Max: 98.9 (24 Aug 2020 16:56)  HR: 73 (25 Aug 2020 08:00) (39 - 95)  BP: 102/46 (25 Aug 2020 08:00) (64/30 - 130/60)  BP(mean): 59 (25 Aug 2020 08:00) (34 - 72)  ABP: --  ABP(mean): --  RR: 15 (25 Aug 2020 08:00) (0 - 24)  SpO2: 100% (25 Aug 2020 08:00) (89% - 100%)      Mode: standby      I&O's Summary    24 Aug 2020 07:01  -  25 Aug 2020 07:00  --------------------------------------------------------  IN: 1380 mL / OUT: 500 mL / NET: 880 mL        Physical Exam:  SEE BELOW                          7.7    22.30 )-----------( 256      ( 25 Aug 2020 08:42 )             22.2       08-25    130<L>  |  103  |  67<H>  ----------------------------<  310<H>  5.0   |  16<L>  |  3.30<H>    Ca    6.7<L>      25 Aug 2020 08:42  Phos  3.5     08-25  Mg     1.5     08-25    TPro  6.0  /  Alb  1.4<L>  /  TBili  0.3  /  DBili  x   /  AST  125<H>  /  ALT  76  /  AlkPhos  252<H>  08-25      CARDIAC MARKERS ( 25 Aug 2020 02:26 )  1.990 ng/mL / x     / x     / x     / x      CARDIAC MARKERS ( 23 Aug 2020 11:52 )  1.660 ng/mL / x     / x     / x     / x            ABG - ( 25 Aug 2020 00:50 )  pH, Arterial: 7.28  pH, Blood: x     /  pCO2: 29    /  pO2: 123   / HCO3: 13    / Base Excess: -12.3 /  SaO2: 98                  Urinalysis Basic - ( 23 Aug 2020 10:25 )    Color: Yellow / Appearance: Clear / S.010 / pH: x  Gluc: x / Ketone: Trace  / Bili: Small / Urobili: 1 mg/dL   Blood: x / Protein: 30 mg/dL / Nitrite: Negative   Leuk Esterase: Moderate / RBC: 25-50 /HPF / WBC >50   Sq Epi: x / Non Sq Epi: Few / Bacteria: Many        DVT Prophylaxis:                                                            Contraindication:     Advanced Directives:    Discussed with:    Visit Information:  Time spent excluding procedure:  45 cc mins    ** Time is exclusive of billed procedures and/or teaching and/or routine family updates.

## 2020-08-25 NOTE — PROGRESS NOTE ADULT - ASSESSMENT
60yo/F with PMH CAD, PAD s/p fem-pop bypass s/p RT iliac stent, diabetes, hypothyroidism, hyperlipidemia, current active smoker, uses cocaine last use 8/22  chronic back pain s/p lumbar surgery presented with multiple non-specific complaints, including fevers on/off last 4-5 days, weakness, no appetite and reduced oral intake, vague diffuse abd pains, one day of diarrhea. No cough, no SOB. She came in 8/23 for evaluation of weakness and found to have ST elevations on presenting EKG, code STEMI called and pt underwent urgent angiogram showing occluded RCA. She denies chest pain at present Also noted with positive UA, blood cx with Ecoli, CT abd/pelvis unremarkable, was started on IV rocephin.       1. sepsis with Ecoli. cystitis. leukocytosis. AMISH  - urine cx/blood cx growing Ecoli f/u sensitivities  - on rocephin 2gm daily #3  - continue with abx coverage   - monitor temps  - tolerating abx well so far; no side effects noted  - reason for abx use and side effects reviewed with patient  - supportive care  - fu cbc    2. other issues - s/p cardiac arrest/STEMI - cardiology/ICU following

## 2020-08-25 NOTE — PROGRESS NOTE ADULT - SUBJECTIVE AND OBJECTIVE BOX
60yo/F with PMH CAD, PAD s/p fem-pop bypass s/p RT iliac stent, diabetes, hypothyroidism, hyperlipidemia, current active smoker, uses cocaine last use yesterday, chronic back pain s/p lumbar surgery presented with multiple non-specific complaints, including fevers on/off last 4-5 days, weakness, no appetite and reduced oral intake, vague diffuse abd pains, one day of diarrhea. No cough, no SOB. SHe came in today for evaluation of weakness and found to have ST elevations on presenting EKG, code STEMI called and pt underwent urgent angiogram showing occluded RCA. She denies chest pain at present.      Review of Systems:  Other Review of Systems: All other review of systems negative, except as noted in HPI    -    ICU Vital Signs Last 24 Hrs  T(C): 36 (25 Aug 2020 06:50), Max: 37.2 (24 Aug 2020 16:56)  T(F): 96.8 (25 Aug 2020 06:50), Max: 98.9 (24 Aug 2020 16:56)  HR: 73 (25 Aug 2020 08:00) (39 - 95)  BP: 102/46 (25 Aug 2020 08:00) (64/30 - 130/60)  BP(mean): 59 (25 Aug 2020 08:00) (34 - 72)  ABP: --  ABP(mean): --  RR: 15 (25 Aug 2020 08:00) (0 - 24)  SpO2: 100% (25 Aug 2020 08:00) (89% - 100%)    · Constitutional	Well-developed, well nourished  · ENMT	detailed exam  · ENMT Comments	dry mucous membranes  · Neck	No bruits; no thyromegaly or nodules  · Respiratory	Breath Sounds equal & clear to percussion & auscultation, no accessory muscle use  · Cardiovascular	Regular rate & rhythm, normal S1, S2; no murmurs, gallops or rubs; no S3, S4  · Gastrointestinal	detailed exam  · Gastrointestinal Comments	soft, slightly sensitive to palpation in the left abdomen  · Genitourinary	Normal genitalia; no lesions; no discharge  · Extremities	No cyanosis, clubbing or edema  · Neurological	Alert & oriented; no sensory, motor or coordination deficits, normal reflexes  · Skin	No lesions; no rash  · Musculoskeletal	No joint pain, swelling or deformity; no limitation of movement                                      7.7    22.30 )-----------( 256      ( 25 Aug 2020 08:42 )             22.2       130<L>  |  103  |  67<H>  ----------------------------<  310<H>  5.0   |  16<L>  |  3.30<H>    Ca    6.7<L>      25 Aug 2020 08:42  Phos  3.5       Mg     1.5         TPro  6.0  /  Alb  1.4<L>  /  TBili  0.3  /  DBili  x   /  AST  125<H>  /  ALT  76  /  AlkPhos  252<H>        PT/INR - ( 23 Aug 2020 08:34 )   PT: 14.1 sec;   INR: 1.23 ratio      Urinalysis Basic - ( 23 Aug 2020 10:25 )    Color: Yellow / Appearance: Clear / S.010 / pH: x  Gluc: x / Ketone: Trace  / Bili: Small / Urobili: 1 mg/dL   Blood: x / Protein: 30 mg/dL / Nitrite: Negative   Leuk Esterase: Moderate / RBC: 25-50 /HPF / WBC >50   Sq Epi: x / Non Sq Epi: Few / Bacteria: Many         Culture - Blood (20 @ 08:34)    -  Escherichia coli: Detec    Gram Stain:   Growth in aerobic bottle:  Gram Negative Rods    < from: CT Abdomen and Pelvis w/ Oral Cont (20 @ 16:41) >    IMPRESSION:  Mildly distended small bowel without evidence of obstruction.    < end of copied text >        Culture - Urine (20 @ 10:25)    Specimen Source: .Urine Clean Catch (Midstream)    Culture Results:   >100,000 CFU/ml Escherichia coli 60yo/F with PMH CAD, PAD s/p fem-pop bypass s/p RT iliac stent, diabetes, hypothyroidism, hyperlipidemia, current active smoker, uses cocaine last use yesterday, chronic back pain s/p lumbar surgery presented with multiple non-specific complaints, including fevers on/off last 4-5 days, weakness, no appetite and reduced oral intake, vague diffuse abd pains, one day of diarrhea. No cough, no SOB. SHe came in today for evaluation of weakness and found to have ST elevations on presenting EKG, code STEMI called and pt underwent urgent angiogram showing occluded RCA. She denies chest pain at present.      Review of Systems:  Other Review of Systems: All other review of systems negative, except as noted in HPI    - overnight events noted and pt currently under intensivist care    ICU Vital Signs Last 24 Hrs  T(C): 36 (25 Aug 2020 06:50), Max: 37.2 (24 Aug 2020 16:56)  T(F): 96.8 (25 Aug 2020 06:50), Max: 98.9 (24 Aug 2020 16:56)  HR: 73 (25 Aug 2020 08:00) (39 - 95)  BP: 102/46 (25 Aug 2020 08:00) (64/30 - 130/60)  BP(mean): 59 (25 Aug 2020 08:00) (34 - 72)  ABP: --  ABP(mean): --  RR: 15 (25 Aug 2020 08:00) (0 - 24)  SpO2: 100% (25 Aug 2020 08:00) (89% - 100%)    · Constitutional	Well-developed, well nourished  · ENMT	detailed exam  · ENMT Comments	dry mucous membranes  · Neck	No bruits; no thyromegaly or nodules  · Respiratory	Breath Sounds equal & clear to percussion & auscultation, no accessory muscle use  · Cardiovascular	Regular rate & rhythm, normal S1, S2; no murmurs, gallops or rubs; no S3, S4  · Gastrointestinal	detailed exam  · Gastrointestinal Comments	soft, slightly sensitive to palpation in the left abdomen  · Genitourinary	Normal genitalia; no lesions; no discharge  · Extremities	No cyanosis, clubbing or edema  · Neurological	Alert & oriented; no sensory, motor or coordination deficits, normal reflexes  · Skin	No lesions; no rash  · Musculoskeletal	No joint pain, swelling or deformity; no limitation of movement                                      7.7    22.30 )-----------( 256      ( 25 Aug 2020 08:42 )             22.2       130<L>  |  103  |  67<H>  ----------------------------<  310<H>  5.0   |  16<L>  |  3.30<H>    Ca    6.7<L>      25 Aug 2020 08:42  Phos  3.5       Mg     1.5         TPro  6.0  /  Alb  1.4<L>  /  TBili  0.3  /  DBili  x   /  AST  125<H>  /  ALT  76  /  AlkPhos  252<H>        PT/INR - ( 23 Aug 2020 08:34 )   PT: 14.1 sec;   INR: 1.23 ratio      Urinalysis Basic - ( 23 Aug 2020 10:25 )    Color: Yellow / Appearance: Clear / S.010 / pH: x  Gluc: x / Ketone: Trace  / Bili: Small / Urobili: 1 mg/dL   Blood: x / Protein: 30 mg/dL / Nitrite: Negative   Leuk Esterase: Moderate / RBC: 25-50 /HPF / WBC >50   Sq Epi: x / Non Sq Epi: Few / Bacteria: Many         Culture - Blood (20 @ 08:34)    -  Escherichia coli: Detec    Gram Stain:   Growth in aerobic bottle:  Gram Negative Rods    < from: CT Abdomen and Pelvis w/ Oral Cont (20 @ 16:41) >    IMPRESSION:  Mildly distended small bowel without evidence of obstruction.    < end of copied text >        Culture - Urine (20 @ 10:25)    Specimen Source: .Urine Clean Catch (Midstream)    Culture Results:   >100,000 CFU/ml Escherichia coli

## 2020-08-25 NOTE — PROGRESS NOTE ADULT - ASSESSMENT
IMP:    60 y/o female with CAD, PAD s/p fem-pop bypass s/p RT iliac stent, diabetes, hypothyroidism, hyperlipidemia, current active smoker, uses cocaine last use yesterday, chronic back pain s/p lumbar surgery admitted with UTI sepsis and E. coli bacteremia POA.  ECG revealed STEMI prompting code STEMI--pt underwent urgent angiogram showing occluded RCA--no inervention. Pt developed hypotension overnight and was given 1L IVF when she developed PEA and initiation of CPR.  Intubated and CVL placed for pressors  Acute resp failure   Septic shock  AMISH--unclear etiology with baseline Cr 0.8    High risk for acute decompensation and deterioration including death     Plan:    Extubate  Actively wean pressors to MAP > 60  Cont with CTX (2)  FS with insulin coverage to keep FS < 180  Hold BP meds  Cont with ASA and clopidogrel   DVT prophy--SCD and sqhep    CCU care-- d/w ICU staff on multi disciplinary rounds

## 2020-08-25 NOTE — PROVIDER CONTACT NOTE (CHANGE IN STATUS NOTIFICATION) - ASSESSMENT
Pt intubated at 2330. Remained hypotnesive, norepinephrine drip initiated with improvement in BP.  (R) IJ TLC placed.

## 2020-08-25 NOTE — CONSULT NOTE ADULT - PROBLEM SELECTOR RECOMMENDATION 9
without active chest pain , with total occlusion mid RCA would continue medical management ,renal dysfunction  continue ASA PLAVIX STATIN  follow up echo ekg
-Patient currently on Full vent support  -titrate settings to maintain SaO2 >90%, or pH >7.25  -consider low titdal volume ventilation strategy w/ goal Tv 4-6 cc/kg of ideal body weight  -plateu pressure goal <30  -Peridex oral care and VAP prophylaxis with HOB 30 degrees   -aggressive chest PT and suctioning   -daily sedation vacation with spontaneous breathing trial if clinical condition warrants, discuss with respiratory therapy

## 2020-08-25 NOTE — CONSULT NOTE ADULT - PROBLEM SELECTOR RECOMMENDATION 4
advised the patient to quit smoking and cocaine
not on full anticoagulation  cardiology aware of cardiac arrest  no acute invasive intervention tonight  continue medical mangement with  aspirin 325 daily, palvix 75mg daily  statin , beta blocker

## 2020-08-25 NOTE — PROGRESS NOTE ADULT - ASSESSMENT
59 CAD, PAD s/p fem-pop bypass s/p RT iliac stent, diabetes, hypothyroidism, hyperlipidemia, current active smoker, uses cocaine last use yesterday, chronic back pain s/p lumbar surgery presented with multiple non-specific complaints, including fevers on/off last 4-5 days, weakness, no appetite and reduced oral intake, vague diffuse abd pains, one day of diarrhea. No cough, no SOB. SHe came in today for evaluation of weakness and found to have ST elevations on presenting EKG, code STEMI called and pt underwent urgent angiogram showing occluded RCA w no intervention with UTI sepsis and E. coli bacteremia POA.      AMISH  - Pre-renal, dehydration was playing a role now s/p cardiac arrest/PEA and hypotension - now extubated  expected Cr risen today - ATN component ?  - check Urine lytes  - Ct abd - no hydro   - NSAID avoidance    Metabolic Acidosis with HyperK   improved after SPS   start HCO3 gtt for now @50/hour      Hyponatremia - monitor on isotonic fluids   - ptimize osmolar load, avoid  excess FW    Hyperkalemia  -medically treated, sp kayex as above       d/w  CCU RN

## 2020-08-25 NOTE — PROGRESS NOTE ADULT - ASSESSMENT
Patient to be transferred under MICU service  	  #STEMI  S/p card cath- occluded RCA with medical management recommended.  case d/w dr parry  - continue aspirin, plavix, increase Lipitor to 80mg, switch BB to coreg due to cocaine use. no ACEI due to AMISH   ECHO in AM  Lipid panel  Cont Imdur, Ranexa    #ARF with hyperkalemia  Unclear triggering events likely combination of prerenal from dehydration+/metformin use  Able to produce urine  Stat Kayexalate, hyperkalemia resolved  hyponatremia improving  Avoid NSAIDS   IVF- can hold for now as intake at goal, resume if decreased po or rising function as per renal  Renal eval DR Billy appreciated       #Leukocytosis, fever  #Bacteremia, (+) BCx: E.coli, (+) UCx E. coli  Not septic on admission  Unclear source  Blood c/s (+) for E.coli  UA (+), UCx (+) for E coli  CT abd/pelvis with PO contrast: Mildly distended small bowel without evidence of obstruction.  Monitor leukocytosis  ID eval    #Anemia  No pro GI bleed  Will get iron studies, [facility]    #Diabetes  Non-complaint with insulin use at home  Start on Lantus +ISS  Hold Metformin  Adjust further based on FS    #PAD s/p bypass s/p stent  ON dual-antiplatelets    #Hypothyroidism/hyperlipidemia  Cont home meds    #COVID negative 8/23/20    #DVT proph- heparin SQ    #Inadequate energy intake- Dietitian evaluation and recommendations  1) reinforce diet education and encourage strict adherence to therapeutic diet 2) monitor PO intake/tolerance 3) daily wt checks to track/trend changes Patient to be transferred under MICU service and back to hospitalist service once off pressors. case d/w dr mendez   	  #STEMI/sudarshan hypotensive with PEA on 8/25  - pt extubated 8/25 being weaned off pressors - see intensivist note   S/p card cath- occluded RCA with medical management recommended.  case d/w dr parry  - continue aspirin, plavix, increase Lipitor to 80mg, switch BB to coreg due to cocaine use. no ACEI due to AMISH   ECHO in AM  Lipid panel  Cont Imdur, Ranexa    #ARF with hyperkalemia  Unclear triggering events likely combination of prerenal from dehydration+/metformin use  Able to produce urine  Stat Kayexalate, hyperkalemia resolved  hyponatremia improving  Avoid NSAIDS   IVF- can hold for now as intake at goal, resume if decreased po or rising function as per renal  Renal eval DR Billy appreciated       #Leukocytosis, fever  #Bacteremia, (+) BCx: E.coli, (+) UCx E. coli  Not septic on admission  Unclear source  Blood c/s (+) for E.coli  UA (+), UCx (+) for E coli  CT abd/pelvis with PO contrast: Mildly distended small bowel without evidence of obstruction.  Monitor leukocytosis  ID eval    #Anemia  No pro GI bleed  Will get iron studies, [facility]    #Diabetes  Non-complaint with insulin use at home  Start on Lantus +ISS  Hold Metformin  Adjust further based on FS    #PAD s/p bypass s/p stent  ON dual-antiplatelets    #Hypothyroidism/hyperlipidemia  Cont home meds    #COVID negative 8/23/20    #DVT proph- heparin SQ    #Inadequate energy intake- Dietitian evaluation and recommendations  1) reinforce diet education and encourage strict adherence to therapeutic diet 2) monitor PO intake/tolerance 3) daily wt checks to track/trend changes

## 2020-08-25 NOTE — PROGRESS NOTE ADULT - SUBJECTIVE AND OBJECTIVE BOX
Patient is a 59y Female who reports no complaints as new Taking PO now, good uop reported  event yest noted - intubated  after hypotension overnight and was given 1L IVF when she developed PEA and initiation of CPR.  Intubated and CVL placed for pressors.  now awake and extubated  c/o thirst       REVIEW OF SYSTEMS:    CONSTITUTIONAL: stable weakness, fevers or chills  RESPIRATORY: No cough, wheezing, hemoptysis; No shortness of breath  CARDIOVASCULAR: No chest pain or palpitations  GENITOURINARY: No dysuria, frequency or hematuria  All other review of systems is negative unless indicated above.    MEDICATIONS  (STANDING):  ARIPiprazole 20 milliGRAM(s) Oral daily  aspirin  chewable 81 milliGRAM(s) Oral daily  atorvastatin 80 milliGRAM(s) Oral at bedtime  carvedilol 3.125 milliGRAM(s) Oral every 12 hours  cefTRIAXone Injectable. 2000 milliGRAM(s) IV Push every 24 hours  chlorhexidine 0.12% Liquid 15 milliLiter(s) Oral Mucosa every 12 hours  clopidogrel Tablet 75 milliGRAM(s) Oral daily  dextrose 5%. 1000 milliLiter(s) (50 mL/Hr) IV Continuous <Continuous>  dextrose 50% Injectable 12.5 Gram(s) IV Push once  dextrose 50% Injectable 25 Gram(s) IV Push once  dextrose 50% Injectable 25 Gram(s) IV Push once  gabapentin 600 milliGRAM(s) Oral three times a day  heparin   Injectable 5000 Unit(s) SubCutaneous every 8 hours  insulin glargine Injectable (LANTUS) 20 Unit(s) SubCutaneous at bedtime  insulin lispro (HumaLOG) corrective regimen sliding scale   SubCutaneous three times a day before meals  insulin lispro (HumaLOG) corrective regimen sliding scale   SubCutaneous at bedtime  insulin lispro Injectable (HumaLOG) 6 Unit(s) SubCutaneous three times a day before meals  isosorbide   mononitrate ER Tablet (IMDUR) 60 milliGRAM(s) Oral daily  levothyroxine 112 MICROGram(s) Oral daily  multivitamin 1 Tablet(s) Oral daily  norepinephrine Infusion 0.03 MICROgram(s)/kG/Min (3.06 mL/Hr) IV Continuous <Continuous>  pantoprazole    Tablet 40 milliGRAM(s) Oral before breakfast  ranolazine 1000 milliGRAM(s) Oral two times a day  sodium chloride 0.9%. 1000 milliLiter(s) (100 mL/Hr) IV Continuous <Continuous>    MEDICATIONS  (PRN):  acetaminophen   Tablet .. 650 milliGRAM(s) Oral every 4 hours PRN Mild Pain (1 - 3)  ALBUTerol    90 MICROgram(s) HFA Inhaler 2 Puff(s) Inhalation every 6 hours PRN Shortness of Breath and/or Wheezing  aluminum hydroxide/magnesium hydroxide/simethicone Suspension 30 milliLiter(s) Oral every 4 hours PRN Dyspepsia  dextrose 40% Gel 15 Gram(s) Oral once PRN Blood Glucose LESS THAN 70 milliGRAM(s)/deciliter  glucagon  Injectable 1 milliGRAM(s) IntraMuscular once PRN Glucose LESS THAN 70 milligrams/deciliter  ondansetron Injectable 4 milliGRAM(s) IV Push every 6 hours PRN Nausea  oxyCODONE    IR 5 milliGRAM(s) Oral every 4 hours PRN Moderate Pain (4 - 6)  oxyCODONE    IR 10 milliGRAM(s) Oral every 4 hours PRN Severe Pain (7 - 10)  senna 2 Tablet(s) Oral at bedtime PRN Constipation      Vital Signs Last 24 Hrs  T(C): 36.2 (25 Aug 2020 10:20), Max: 37.2 (24 Aug 2020 16:56)  T(F): 97.1 (25 Aug 2020 10:20), Max: 98.9 (24 Aug 2020 16:56)  HR: 75 (25 Aug 2020 09:00) (39 - 95)  BP: 102/52 (25 Aug 2020 09:00) (64/30 - 130/60)  BP(mean): 65 (25 Aug 2020 09:00) (34 - 72)  RR: 16 (25 Aug 2020 09:00) (0 - 24)  SpO2: 100% (25 Aug 2020 09:00) (89% - 100%)  I&O's Detail    24 Aug 2020 07:01  -  25 Aug 2020 07:00  --------------------------------------------------------  IN:    IV PiggyBack: 50 mL    norepinephrine Infusion: 250 mL    Oral Fluid: 1080 mL  Total IN: 1380 mL    OUT:    Voided: 500 mL  Total OUT: 500 mL    Total NET: 880 mL      PHYSICAL EXAM:    Constitutional: NAD, frail  HEENT: temp wasting, cachetic. >then stated age  Resp:dist BS  Cardiovascular: S1 and S2  Extremities: tr peripheral edema  Neurological: A/O x 3  : No Black  Skin: No rashes  Access: Not applicable        LABS:               130    |  103    |  67     ----------------------------<  310       25 Aug 2020 08:42  5.0     |  16     |  3.30     127    |  102    |  67     ----------------------------<  291       25 Aug 2020 02:26  6.2     |  14     |  3.20     128    |  106    |  58     ----------------------------<  186       24 Aug 2020 22:41  6.3     |  14     |  2.84     Ca    6.7        25 Aug 2020 08:42  Ca    6.8        25 Aug 2020 02:26    Phos  3.5       25 Aug 2020 02:26    Mg     1.5       25 Aug 2020 02:26      Urine Studies:  Urinalysis Basic - ( 23 Aug 2020 10:25 )    Color: Yellow / Appearance: Clear / S.010 / pH: x  Gluc: x / Ketone: Trace  / Bili: Small / Urobili: 1 mg/dL   Blood: x / Protein: 30 mg/dL / Nitrite: Negative   Leuk Esterase: Moderate / RBC: 25-50 /HPF / WBC >50   Sq Epi: x / Non Sq Epi: Few / Bacteria: Many      RADIOLOGY & ADDITIONAL STUDIES:        Chest:  one view.    Comparison: 2020    AP radiograph of the chest demonstrates no evidence of infiltrate, pleural effusion or vascular congestion. No atelectasis is seen. ET and NG tubes placed in satisfactory position. RIGHT internal jugular venous catheter tip in distal SVC. The cardiac silhouette is normal in size. Osseous structures are intact.    Impression: No active pulmonary disease.ET and NG tubes placed in satisfactory position. RIGHT internal jugular venous catheter tip in distal SVC.

## 2020-08-25 NOTE — CONSULT NOTE ADULT - SUBJECTIVE AND OBJECTIVE BOX
59y  Female  No Known Allergies    CC; Patient is a 59y old  Female who presents with a chief complaint of abd pain, weakness     HPI:  59 year old female with PMH CAD, PAD s/p fem-pop bypass s/p RT iliac stent, diabetes, hypothyroidism, hyperlipidemia, current active smoker, uses cocaine daily, chronic back pain s/p lumbar surgery who presented To the ER with multiple non-specific complaints, including fevers, weakness, no appetite and reduced oral intake, vague diffuse abd pains, one day of diarrhea.  She was evaluated and found to have ST elevations on presenting EKG, code STEMI called and pt underwent urgent angiogram showing occluded RCA but no stents were placed and she has been being managed medically,     Tonight while the patient was in CCU on the medicine service post cardiac cath I was asked to assist the medicine PA with increasing hypotension. On my initial evaluation she was hypotensive after having received IV fluids but she was quickly becoming bradycardic with noted ST elevations on monitor. Given she was hypotensive and bradycardic atropine was about to be given while is was setting up for transvenous pacer wire and she lost palblale pulses and went into PEA cardiac arrest.     CPR and ACLS was established immediately and she was intubated for acute hypoxia and respiratory arrest. After one epienphrine and 3 mins of CPR she regained palbale pulses but was hypotensive with systolic pressurs in the 70's. She was given 0.5mg of epineperine which borught her BP to the     PAST MEDICAL & SURGICAL HISTORY:  Lung nodule  Diabetes mellitus  History of TIAs  History of gallbladder disease: surgery  History of colon polyps: precancerous  Substance abuse: history of. last used 14 years ago.  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 Decreased ROM.  Angina pectoris  History of MRSA infection: left lower extremity incisional area   Anxiety and depression  Transient cerebral ischemia, unspecified type  Osteoarthritis of spine, unspecified spinal osteoarthritis complication status, unspecified spinal region  Lumbar herniated disc  Urinary incontinence, unspecified type  Overactive bladder  Hiatal hernia with GERD: hiatal hernia repaired  PAD (peripheral artery disease)  Hyperlipidemia, unspecified hyperlipidemia type  Essential hypertension  Hypothyroidism  COPD (chronic obstructive pulmonary disease)  CAD (coronary artery disease)  History of lumbar fusion: with laminectomy 2018  H/O hernia repair: hiatal hernia - 2018  S/P dilatation and curettage:   H/O rhinoplasty:   H/O angioplasty: Right iliac artery. 2017  H/O ventral hernia repair: 3/2017  History of incision and drainage: of lower extremity incisional site x 5 . last done2015  H/O arterial bypass of lower limb: Femoral - Popliteal. 2014 Left lower side with stents  History of laparoscopic cholecystectomy: 2016    FAMILY HISTORY:  Family history of asthma (Sibling): sister  Family history of epilepsy (Sibling, Sibling): 2 sisters  Family history of cardiovascular disease: mother  Family history of stroke: mother  Family history of heart disease: father  Family history of alcoholism in father      Vitals   Vital Signs Last 24 Hrs  T(C): 37.2 (24 Aug 2020 16:56), Max: 37.2 (24 Aug 2020 16:56)  T(F): 98.9 (24 Aug 2020 16:56), Max: 98.9 (24 Aug 2020 16:56)  HR: 69 (24 Aug 2020 22:45) (69 - 95)  BP: 82/41 (24 Aug 2020 22:45) (75/35 - 130/60)  BP(mean): 50 (24 Aug 2020 22:45) (45 - 77)  RR: 16 (24 Aug 2020 22:45) (0 - 24)  SpO2: 100% (24 Aug 2020 22:45) (97% - 100%)  ABG - ( 25 Aug 2020 00:50 )  pH, Arterial: 7.28  pH, Blood: x     /  pCO2: 29    /  pO2: 123   / HCO3: 13    / Base Excess: -12.3 /  SaO2: 98          I&O's Summary  23 Aug 2020 07:  -  24 Aug 2020 07:00  --------------------------------------------------------  IN: 300 mL / OUT: 850 mL / NET: -550 mL    24 Aug 2020 07:  -  25 Aug 2020 02:19  --------------------------------------------------------  IN: 1080 mL / OUT: 500 mL / NET: 580 mL      LABS      128<L>  |  106  |  58<H>  ----------------------------<  186<H>  6.3<HH>   |  14<L>  |  2.84<H>  Ca    6.4<LL>      24 Aug 2020 22:41  TPro  7.2  /  Alb  2.0<L>  /  TBili  0.5  /  DBili  x   /  AST  50<H>  /  ALT  57  /  AlkPhos  212<H>                            7.5    20.39 )-----------( 216      ( 24 Aug 2020 22:41 )             22.5     PT/INR - ( 23 Aug 2020 08:34 )   PT: 14.1 sec;   INR: 1.23 ratio    PTT - ( 23 Aug 2020 08:34 )  PTT:26.8 sec  CARDIAC MARKERS ( 23 Aug 2020 11:52 )  1.660 ng/mL / x     / x     / x     / x      CARDIAC MARKERS ( 23 Aug 2020 08:34 )  1.660 ng/mL / x     / x     / x     / x        LIVER FUNCTIONS - ( 23 Aug 2020 08:34 )  Alb: 2.0 g/dL / Pro: 7.2 gm/dL / ALK PHOS: 212 U/L / ALT: 57 U/L / AST: 50 U/L / GGT: x           CAPILLARY BLOOD GLUCOSE  POCT Blood Glucose.: 315 mg/dL (25 Aug 2020 01:35)    Urinalysis Basic - ( 23 Aug 2020 10:25 )  Color: Yellow / Appearance: Clear / S.010 / pH: x  Gluc: x / Ketone: Trace  / Bili: Small / Urobili: 1 mg/dL   Blood: x / Protein: 30 mg/dL / Nitrite: Negative   Leuk Esterase: Moderate / RBC: 25-50 /HPF / WBC >50   Sq Epi: x / Non Sq Epi: Few / Bacteria: Many    VENT SETTINGS   Mode: AC/ CMV (Assist Control/ Continuous Mandatory Ventilation)  RR (machine): 16  TV (machine): 450  FiO2: 50  PEEP: 5  ITime: 1  PIP: 35    MEDICATIONS  (STANDING):  ARIPiprazole 20 milliGRAM(s) Oral daily  aspirin  chewable 81 milliGRAM(s) Oral daily  atorvastatin 80 milliGRAM(s) Oral at bedtime  carvedilol 3.125 milliGRAM(s) Oral every 12 hours  cefTRIAXone Injectable. 2000 milliGRAM(s) IV Push every 24 hours  chlorhexidine 0.12% Liquid 15 milliLiter(s) Oral Mucosa every 12 hours  clopidogrel Tablet 75 milliGRAM(s) Oral daily  dextrose 5%. 1000 milliLiter(s) (50 mL/Hr) IV Continuous <Continuous>  dextrose 50% Injectable 12.5 Gram(s) IV Push once  dextrose 50% Injectable 25 Gram(s) IV Push once  dextrose 50% Injectable 25 Gram(s) IV Push once  EPINEPHrine    0.1 mG/mL Injectable 1 milliGRAM(s) IV Push once  gabapentin 600 milliGRAM(s) Oral three times a day  heparin   Injectable 5000 Unit(s) SubCutaneous every 8 hours  insulin glargine Injectable (LANTUS) 20 Unit(s) SubCutaneous at bedtime  insulin lispro (HumaLOG) corrective regimen sliding scale   SubCutaneous three times a day before meals  insulin lispro (HumaLOG) corrective regimen sliding scale   SubCutaneous at bedtime  insulin lispro Injectable (HumaLOG) 6 Unit(s) SubCutaneous three times a day before meals  isosorbide   mononitrate ER Tablet (IMDUR) 60 milliGRAM(s) Oral daily  levothyroxine 112 MICROGram(s) Oral daily  multivitamin 1 Tablet(s) Oral daily  norepinephrine Infusion 0.03 MICROgram(s)/kG/Min (3.06 mL/Hr) IV Continuous <Continuous>  pantoprazole    Tablet 40 milliGRAM(s) Oral before breakfast  ranolazine 1000 milliGRAM(s) Oral two times a day  sodium chloride 0.9%. 1000 milliLiter(s) (100 mL/Hr) IV Continuous <Continuous>      REVIEW OF SYSTEMS:  Unable to obtain due to mechanical ventilation, sedation, poor mental status     Physicial Exam:     Constitutional: NAD, well-groomed, well-developed  HEENT: PERRLA, EOMI, no drainage or redness  Neck: No bruits; no thyromegaly or nodules,  No JVD  Back: Normal spine flexure, No CVA tenderness, No deformity or limitation of movement  Respiratory: Breath Sounds equal & clear to percussion & auscultation, no accessory muscle use  Cardiovascular: Regular rate & rhythm, normal S1, S2; no murmurs, gallops or rubs; no S3, S4  Gastrointestinal: Soft, non-tender, non distended no hepatosplenomegaly, normal bowel sounds  Extremities: No peripheral edema, No cyanosis, clubbing   Vascular: Equal and normal pulses: 2+ peripheral pulses throughout  Neurological: GCS:    A&O x 3; no sensory, motor  deficits, normal reflexes  Psychiatric: Normal mood, normal affect  Musculoskeletal: No joint pain, swelling or deformity; no limitation of movement  Skin: No rashes 59y  Female  No Known Allergies    CC; Patient is a 59y old  Female who presents with a chief complaint of abd pain, weakness     HPI:  59 year old female with PMH CAD, PAD s/p fem-pop bypass s/p RT iliac stent, diabetes, hypothyroidism, hyperlipidemia, current active smoker, uses cocaine daily, chronic back pain s/p lumbar surgery who presented To the ER with multiple non-specific complaints, including fevers, weakness, no appetite and reduced oral intake, vague diffuse abd pains, one day of diarrhea.  She was evaluated and found to have ST elevations on presenting EKG, code STEMI called and pt underwent urgent angiogram showing occluded RCA but no stents were placed and she has been being managed medically,     Tonight while the patient was in CCU on the medicine service post cardiac cath I was asked to assist the medicine PA with increasing hypotension. On my initial evaluation she was hypotensive after having received IV fluids but she was quickly becoming bradycardic with noted ST elevations on monitor. Given she was hypotensive and bradycardic atropine was about to be given while is was setting up for transvenous pacer wire and she lost palblale pulses and went into PEA cardiac arrest.     CPR and ACLS was established immediately and she was intubated for acute hypoxia and respiratory arrest. After one epienphrine and 3 mins of CPR she regained pal bale pulses but was hypotensive with systolic pressures in the 70's. She was given 0.5mg of epinephrine and started on levophed infusion to maintain mean arterial pressures greater than 65. Post arrest neurological assessment was performed when her systolic blood pressure had raised into the low 100's and found her to awake to verbal stimuli and follow commands by showing two fingers.      PAST MEDICAL & SURGICAL HISTORY:  Lung nodule  Diabetes mellitus  History of TIAs  History of gallbladder disease: surgery  History of colon polyps: precancerous  Substance abuse: history of. last used 14 years ago.  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 Decreased ROM.  Angina pectoris  History of MRSA infection: left lower extremity incisional area   Anxiety and depression  Transient cerebral ischemia, unspecified type  Osteoarthritis of spine, unspecified spinal osteoarthritis complication status, unspecified spinal region  Lumbar herniated disc  Urinary incontinence, unspecified type  Overactive bladder  Hiatal hernia with GERD: hiatal hernia repaired  PAD (peripheral artery disease)  Hyperlipidemia, unspecified hyperlipidemia type  Essential hypertension  Hypothyroidism  COPD (chronic obstructive pulmonary disease)  CAD (coronary artery disease)  History of lumbar fusion: with laminectomy 2018  H/O hernia repair: hiatal hernia - 2018  S/P dilatation and curettage:   H/O rhinoplasty:   H/O angioplasty: Right iliac artery. 2017  H/O ventral hernia repair: 3/2017  History of incision and drainage: of lower extremity incisional site x 5 . last done2015  H/O arterial bypass of lower limb: Femoral - Popliteal. 2014 Left lower side with stents  History of laparoscopic cholecystectomy: 2016    FAMILY HISTORY:  Family history of asthma (Sibling): sister  Family history of epilepsy (Sibling, Sibling): 2 sisters  Family history of cardiovascular disease: mother  Family history of stroke: mother  Family history of heart disease: father  Family history of alcoholism in father      Vitals   Vital Signs Last 24 Hrs  T(C): 37.2 (24 Aug 2020 16:56), Max: 37.2 (24 Aug 2020 16:56)  T(F): 98.9 (24 Aug 2020 16:56), Max: 98.9 (24 Aug 2020 16:56)  HR: 69 (24 Aug 2020 22:45) (69 - 95)  BP: 82/41 (24 Aug 2020 22:45) (75/35 - 130/60)  BP(mean): 50 (24 Aug 2020 22:45) (45 - 77)  RR: 16 (24 Aug 2020 22:45) (0 - 24)  SpO2: 100% (24 Aug 2020 22:45) (97% - 100%)  ABG - ( 25 Aug 2020 00:50 )  pH, Arterial: 7.28  pH, Blood: x     /  pCO2: 29    /  pO2: 123   / HCO3: 13    / Base Excess: -12.3 /  SaO2: 98          I&O's Summary  23 Aug 2020 07:01  -  24 Aug 2020 07:00  --------------------------------------------------------  IN: 300 mL / OUT: 850 mL / NET: -550 mL    24 Aug 2020 07:01  -  25 Aug 2020 02:19  --------------------------------------------------------  IN: 1080 mL / OUT: 500 mL / NET: 580 mL      LABS      128<L>  |  106  |  58<H>  ----------------------------<  186<H>  6.3<HH>   |  14<L>  |  2.84<H>  Ca    6.4<LL>      24 Aug 2020 22:41  TPro  7.2  /  Alb  2.0<L>  /  TBili  0.5  /  DBili  x   /  AST  50<H>  /  ALT  57  /  AlkPhos  212<H>                            7.5    20.39 )-----------( 216      ( 24 Aug 2020 22:41 )             22.5     PT/INR - ( 23 Aug 2020 08:34 )   PT: 14.1 sec;   INR: 1.23 ratio    PTT - ( 23 Aug 2020 08:34 )  PTT:26.8 sec  CARDIAC MARKERS ( 23 Aug 2020 11:52 )  1.660 ng/mL / x     / x     / x     / x      CARDIAC MARKERS ( 23 Aug 2020 08:34 )  1.660 ng/mL / x     / x     / x     / x        LIVER FUNCTIONS - ( 23 Aug 2020 08:34 )  Alb: 2.0 g/dL / Pro: 7.2 gm/dL / ALK PHOS: 212 U/L / ALT: 57 U/L / AST: 50 U/L / GGT: x           CAPILLARY BLOOD GLUCOSE  POCT Blood Glucose.: 315 mg/dL (25 Aug 2020 01:35)    Urinalysis Basic - ( 23 Aug 2020 10:25 )  Color: Yellow / Appearance: Clear / S.010 / pH: x  Gluc: x / Ketone: Trace  / Bili: Small / Urobili: 1 mg/dL   Blood: x / Protein: 30 mg/dL / Nitrite: Negative   Leuk Esterase: Moderate / RBC: 25-50 /HPF / WBC >50   Sq Epi: x / Non Sq Epi: Few / Bacteria: Many    VENT SETTINGS   Mode: AC/ CMV (Assist Control/ Continuous Mandatory Ventilation)  RR (machine): 16  TV (machine): 450  FiO2: 50  PEEP: 5  ITime: 1  PIP: 35    MEDICATIONS  (STANDING):  ARIPiprazole 20 milliGRAM(s) Oral daily  aspirin  chewable 81 milliGRAM(s) Oral daily  atorvastatin 80 milliGRAM(s) Oral at bedtime  carvedilol 3.125 milliGRAM(s) Oral every 12 hours  cefTRIAXone Injectable. 2000 milliGRAM(s) IV Push every 24 hours  chlorhexidine 0.12% Liquid 15 milliLiter(s) Oral Mucosa every 12 hours  clopidogrel Tablet 75 milliGRAM(s) Oral daily  dextrose 5%. 1000 milliLiter(s) (50 mL/Hr) IV Continuous <Continuous>  dextrose 50% Injectable 12.5 Gram(s) IV Push once  dextrose 50% Injectable 25 Gram(s) IV Push once  dextrose 50% Injectable 25 Gram(s) IV Push once  EPINEPHrine    0.1 mG/mL Injectable 1 milliGRAM(s) IV Push once  gabapentin 600 milliGRAM(s) Oral three times a day  heparin   Injectable 5000 Unit(s) SubCutaneous every 8 hours  insulin glargine Injectable (LANTUS) 20 Unit(s) SubCutaneous at bedtime  insulin lispro (HumaLOG) corrective regimen sliding scale   SubCutaneous three times a day before meals  insulin lispro (HumaLOG) corrective regimen sliding scale   SubCutaneous at bedtime  insulin lispro Injectable (HumaLOG) 6 Unit(s) SubCutaneous three times a day before meals  isosorbide   mononitrate ER Tablet (IMDUR) 60 milliGRAM(s) Oral daily  levothyroxine 112 MICROGram(s) Oral daily  multivitamin 1 Tablet(s) Oral daily  norepinephrine Infusion 0.03 MICROgram(s)/kG/Min (3.06 mL/Hr) IV Continuous <Continuous>  pantoprazole    Tablet 40 milliGRAM(s) Oral before breakfast  ranolazine 1000 milliGRAM(s) Oral two times a day  sodium chloride 0.9%. 1000 milliLiter(s) (100 mL/Hr) IV Continuous <Continuous>      REVIEW OF SYSTEMS:  Unable to obtain due to mechanical ventilation, sedation, poor mental status     Physicial Exam:     Constitutional: NAD, well-groomed, well-developed  HEENT: PERRLA, EOMI, no drainage or redness  Neck: No bruits; no thyromegaly or nodules,  No JVD  Back: Normal spine flexure, No CVA tenderness, No deformity or limitation of movement  Respiratory: Breath Sounds equal & clear to percussion & auscultation, no accessory muscle use  Cardiovascular: Regular rate & rhythm, normal S1, S2; no murmurs, gallops or rubs; no S3, S4  Gastrointestinal: Soft, non-tender, non distended no hepatosplenomegaly, normal bowel sounds  Extremities: No peripheral edema, No cyanosis, clubbing   Vascular: Equal and normal pulses: 2+ peripheral pulses throughout  Neurological: GCS:    A&O x 3; no sensory, motor  deficits, normal reflexes  Psychiatric: Normal mood, normal affect  Musculoskeletal: No joint pain, swelling or deformity; no limitation of movement  Skin: No rashes 59y  Female  No Known Allergies    CC; Patient is a 59y old  Female who presents with a chief complaint of abd pain, weakness     HPI:  59 year old female with PMH CAD, PAD s/p fem-pop bypass s/p RT iliac stent, diabetes, hypothyroidism, hyperlipidemia, current active smoker, uses cocaine daily, chronic back pain s/p lumbar surgery who presented To the ER with multiple non-specific complaints, including fevers, weakness, no appetite and reduced oral intake, vague diffuse abd pains, one day of diarrhea.  She was evaluated and found to have ST elevations on presenting EKG, code STEMI called and pt underwent urgent angiogram showing occluded RCA but no stents were placed and she has been being managed medically,     Tonight while the patient was in CCU on the medicine service post cardiac cath I was asked to assist the medicine PA with increasing hypotension. On my initial evaluation she was hypotensive after having received IV fluids but she was quickly becoming bradycardic with noted ST elevations on monitor. Given she was hypotensive and bradycardic atropine was about to be given while is was setting up for transvenous pacer wire and she lost palblale pulses and went into PEA cardiac arrest.     CPR and ACLS was established immediately and she was intubated for acute hypoxia with sats dropping into the low 70's with cardiopulmomary arrest. After one epinephrine and 3 mins of CPR ROSC was achieved and she regained pal bale pulses but was hypotensive with systolic pressures in the 70's. She was given 0.5mg of epinephrine and started on levophed infusion to maintain mean arterial pressures greater than 65. Post arrest neurological assessment was performed when her systolic blood pressure had raised into the low 100's and found her to awake to verbal stimuli and follow commands by showing two fingers, although she remains in shock state requiring levophed infusion to maintain blood pressure. Cardiology was notified of the cardiac arrest and has no plans for acute intervention at this time.       PAST MEDICAL & SURGICAL HISTORY:  Lung nodule  Diabetes mellitus  History of TIAs  History of gallbladder disease: surgery  History of colon polyps: precancerous  Substance abuse: history of. last used 14 years ago.  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 Decreased ROM.  Angina pectoris  History of MRSA infection: left lower extremity incisional area   Anxiety and depression  Transient cerebral ischemia, unspecified type  Osteoarthritis of spine, unspecified spinal osteoarthritis complication status, unspecified spinal region  Lumbar herniated disc  Urinary incontinence, unspecified type  Overactive bladder  Hiatal hernia with GERD: hiatal hernia repaired  PAD (peripheral artery disease)  Hyperlipidemia, unspecified hyperlipidemia type  Essential hypertension  Hypothyroidism  COPD (chronic obstructive pulmonary disease)  CAD (coronary artery disease)  History of lumbar fusion: with laminectomy 2018  H/O hernia repair: hiatal hernia - 2018  S/P dilatation and curettage:   H/O rhinoplasty:   H/O angioplasty: Right iliac artery. 2017  H/O ventral hernia repair: 3/2017  History of incision and drainage: of lower extremity incisional site x 5 . last done2015  H/O arterial bypass of lower limb: Femoral - Popliteal. 2014 Left lower side with stents  History of laparoscopic cholecystectomy: 2016    FAMILY HISTORY:  Family history of asthma (Sibling): sister  Family history of epilepsy (Sibling, Sibling): 2 sisters  Family history of cardiovascular disease: mother  Family history of stroke: mother  Family history of heart disease: father  Family history of alcoholism in father      Vitals   Vital Signs Last 24 Hrs  T(C): 37.2 (24 Aug 2020 16:56), Max: 37.2 (24 Aug 2020 16:56)  T(F): 98.9 (24 Aug 2020 16:56), Max: 98.9 (24 Aug 2020 16:56)  HR: 69 (24 Aug 2020 22:45) (69 - 95)  BP: 82/41 (24 Aug 2020 22:45) (75/35 - 130/60)  BP(mean): 50 (24 Aug 2020 22:45) (45 - 77)  RR: 16 (24 Aug 2020 22:45) (0 - 24)  SpO2: 100% (24 Aug 2020 22:45) (97% - 100%)  ABG - ( 25 Aug 2020 00:50 )  pH, Arterial: 7.28  pH, Blood: x     /  pCO2: 29    /  pO2: 123   / HCO3: 13    / Base Excess: -12.3 /  SaO2: 98          I&O's Summary  23 Aug 2020 07:01  -  24 Aug 2020 07:00  --------------------------------------------------------  IN: 300 mL / OUT: 850 mL / NET: -550 mL    24 Aug 2020 07:01  -  25 Aug 2020 02:19  --------------------------------------------------------  IN: 1080 mL / OUT: 500 mL / NET: 580 mL      LABS      128<L>  |  106  |  58<H>  ----------------------------<  186<H>  6.3<HH>   |  14<L>  |  2.84<H>  Ca    6.4<LL>      24 Aug 2020 22:41  TPro  7.2  /  Alb  2.0<L>  /  TBili  0.5  /  DBili  x   /  AST  50<H>  /  ALT  57  /  AlkPhos  212<H>                            7.5    20.39 )-----------( 216      ( 24 Aug 2020 22:41 )             22.5     PT/INR - ( 23 Aug 2020 08:34 )   PT: 14.1 sec;   INR: 1.23 ratio    PTT - ( 23 Aug 2020 08:34 )  PTT:26.8 sec  CARDIAC MARKERS ( 23 Aug 2020 11:52 )  1.660 ng/mL / x     / x     / x     / x      CARDIAC MARKERS ( 23 Aug 2020 08:34 )  1.660 ng/mL / x     / x     / x     / x        LIVER FUNCTIONS - ( 23 Aug 2020 08:34 )  Alb: 2.0 g/dL / Pro: 7.2 gm/dL / ALK PHOS: 212 U/L / ALT: 57 U/L / AST: 50 U/L / GGT: x           CAPILLARY BLOOD GLUCOSE  POCT Blood Glucose.: 315 mg/dL (25 Aug 2020 01:35)    Urinalysis Basic - ( 23 Aug 2020 10:25 )  Color: Yellow / Appearance: Clear / S.010 / pH: x  Gluc: x / Ketone: Trace  / Bili: Small / Urobili: 1 mg/dL   Blood: x / Protein: 30 mg/dL / Nitrite: Negative   Leuk Esterase: Moderate / RBC: 25-50 /HPF / WBC >50   Sq Epi: x / Non Sq Epi: Few / Bacteria: Many    VENT SETTINGS   Mode: AC/ CMV (Assist Control/ Continuous Mandatory Ventilation)  RR (machine): 16  TV (machine): 450  FiO2: 50  PEEP: 5  ITime: 1  PIP: 35    MEDICATIONS  (STANDING):  ARIPiprazole 20 milliGRAM(s) Oral daily  aspirin  chewable 81 milliGRAM(s) Oral daily  atorvastatin 80 milliGRAM(s) Oral at bedtime  carvedilol 3.125 milliGRAM(s) Oral every 12 hours  cefTRIAXone Injectable. 2000 milliGRAM(s) IV Push every 24 hours  chlorhexidine 0.12% Liquid 15 milliLiter(s) Oral Mucosa every 12 hours  clopidogrel Tablet 75 milliGRAM(s) Oral daily  dextrose 5%. 1000 milliLiter(s) (50 mL/Hr) IV Continuous <Continuous>  dextrose 50% Injectable 12.5 Gram(s) IV Push once  dextrose 50% Injectable 25 Gram(s) IV Push once  dextrose 50% Injectable 25 Gram(s) IV Push once  EPINEPHrine    0.1 mG/mL Injectable 1 milliGRAM(s) IV Push once  gabapentin 600 milliGRAM(s) Oral three times a day  heparin   Injectable 5000 Unit(s) SubCutaneous every 8 hours  insulin glargine Injectable (LANTUS) 20 Unit(s) SubCutaneous at bedtime  insulin lispro (HumaLOG) corrective regimen sliding scale   SubCutaneous three times a day before meals  insulin lispro (HumaLOG) corrective regimen sliding scale   SubCutaneous at bedtime  insulin lispro Injectable (HumaLOG) 6 Unit(s) SubCutaneous three times a day before meals  isosorbide   mononitrate ER Tablet (IMDUR) 60 milliGRAM(s) Oral daily  levothyroxine 112 MICROGram(s) Oral daily  multivitamin 1 Tablet(s) Oral daily  norepinephrine Infusion 0.03 MICROgram(s)/kG/Min (3.06 mL/Hr) IV Continuous <Continuous>  pantoprazole    Tablet 40 milliGRAM(s) Oral before breakfast  ranolazine 1000 milliGRAM(s) Oral two times a day  sodium chloride 0.9%. 1000 milliLiter(s) (100 mL/Hr) IV Continuous <Continuous>      REVIEW OF SYSTEMS:  Unable to obtain due to mechanical ventilation, sedation, poor mental status     Physicial Exam:     Pt is on mechanical ventilation and IV levophed infusion to maintain MAP   HEENT: PERRLA, EOMI, no drainage or redness  Neck: No bruits; no thyromegaly or nodules,  No JVD  Respiratory: Breath Sounds equal & clear to percussion & auscultation, no accessory muscle use  Cardiovascular: Regular rate & rhythm, normal S1, S2; no murmurs, gallops or rubs; no S3, S4  Gastrointestinal: Soft, non-tender, non distended no hepatosplenomegaly, normal bowel sounds  Extremities: No peripheral edema, No cyanosis, clubbing   Vascular: Equal and normal pulses: 2+ peripheral pulses throughout  Neurological:  A&O x 3; no sensory, motor  deficits, normal reflexes  Musculoskeletal: No joint pain, swelling or deformity; no limitation of movement  Skin: No rashes 59y  Female  No Known Allergies    CC; Patient is a 59y old  Female who presents with a chief complaint of abd pain, weakness     HPI:  59 year old female with PMH CAD, PAD s/p fem-pop bypass s/p RT iliac stent, diabetes, hypothyroidism, hyperlipidemia, current active smoker, uses cocaine daily, chronic back pain s/p lumbar surgery who presented To the ER with multiple non-specific complaints, including fevers, weakness, no appetite and reduced oral intake, vague diffuse abd pains, one day of diarrhea.  She was evaluated and found to have ST elevations on presenting EKG, code STEMI called and pt underwent urgent angiogram showing 100% occluded RCA with colaterals but no stents were placed and she has been being managed medically,     Tonight while the patient was in CCU on the medicine service post cardiac cath I was asked to assist the medicine PA with increasing hypotension. On my initial evaluation she was hypotensive after having received IV fluids but she was quickly becoming bradycardic with noted ST elevations on monitor. Given she was hypotensive and bradycardic atropine was about to be given while is was setting up for transvenous pacer wire and she lost palblale pulses and went into PEA cardiac arrest.     CPR and ACLS was established immediately and she was intubated for acute hypoxia with sats dropping into the low 70's with cardiopulmomary arrest. After one epinephrine and 3 mins of CPR ROSC was achieved and she regained pal bale pulses but was hypotensive with systolic pressures in the 70's. She was given 0.5mg of epinephrine and started on levophed infusion to maintain mean arterial pressures greater than 65. Post arrest neurological assessment was performed when her systolic blood pressure had raised into the low 100's and found her to awake to verbal stimuli and follow commands by showing two fingers, although she remains in shock state requiring levophed infusion to maintain blood pressure. Cardiology was notified of the cardiac arrest and has no plans for acute intervention at this time.       PAST MEDICAL & SURGICAL HISTORY:  Lung nodule  Diabetes mellitus  History of TIAs  History of gallbladder disease: surgery  History of colon polyps: precancerous  Substance abuse: history of. last used 14 years ago.  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 Decreased ROM.  Angina pectoris  History of MRSA infection: left lower extremity incisional area   Anxiety and depression  Transient cerebral ischemia, unspecified type  Osteoarthritis of spine, unspecified spinal osteoarthritis complication status, unspecified spinal region  Lumbar herniated disc  Urinary incontinence, unspecified type  Overactive bladder  Hiatal hernia with GERD: hiatal hernia repaired  PAD (peripheral artery disease)  Hyperlipidemia, unspecified hyperlipidemia type  Essential hypertension  Hypothyroidism  COPD (chronic obstructive pulmonary disease)  CAD (coronary artery disease)  History of lumbar fusion: with laminectomy 2018  H/O hernia repair: hiatal hernia - ,   S/P dilatation and curettage:   H/O rhinoplasty:   H/O angioplasty: Right iliac artery. 2017  H/O ventral hernia repair: 3/2017  History of incision and drainage: of lower extremity incisional site x 5 . last done2015  H/O arterial bypass of lower limb: Femoral - Popliteal. 2014 Left lower side with stents  History of laparoscopic cholecystectomy: 2016    FAMILY HISTORY:  Family history of asthma (Sibling): sister  Family history of epilepsy (Sibling, Sibling): 2 sisters  Family history of cardiovascular disease: mother  Family history of stroke: mother  Family history of heart disease: father  Family history of alcoholism in father      Vitals   Vital Signs Last 24 Hrs  T(C): 37.2 (24 Aug 2020 16:56), Max: 37.2 (24 Aug 2020 16:56)  T(F): 98.9 (24 Aug 2020 16:56), Max: 98.9 (24 Aug 2020 16:56)  HR: 69 (24 Aug 2020 22:45) (69 - 95)  BP: 82/41 (24 Aug 2020 22:45) (75/35 - 130/60)  BP(mean): 50 (24 Aug 2020 22:45) (45 - 77)  RR: 16 (24 Aug 2020 22:45) (0 - 24)  SpO2: 100% (24 Aug 2020 22:45) (97% - 100%)  ABG - ( 25 Aug 2020 00:50 )  pH, Arterial: 7.28  pH, Blood: x     /  pCO2: 29    /  pO2: 123   / HCO3: 13    / Base Excess: -12.3 /  SaO2: 98          I&O's Summary  23 Aug 2020 07:01  -  24 Aug 2020 07:00  --------------------------------------------------------  IN: 300 mL / OUT: 850 mL / NET: -550 mL    24 Aug 2020 07:01  -  25 Aug 2020 02:19  --------------------------------------------------------  IN: 1080 mL / OUT: 500 mL / NET: 580 mL      LABS      128<L>  |  106  |  58<H>  ----------------------------<  186<H>  6.3<HH>   |  14<L>  |  2.84<H>  Ca    6.4<LL>      24 Aug 2020 22:41  TPro  7.2  /  Alb  2.0<L>  /  TBili  0.5  /  DBili  x   /  AST  50<H>  /  ALT  57  /  AlkPhos  212<H>                            7.5    20.39 )-----------( 216      ( 24 Aug 2020 22:41 )             22.5     PT/INR - ( 23 Aug 2020 08:34 )   PT: 14.1 sec;   INR: 1.23 ratio    PTT - ( 23 Aug 2020 08:34 )  PTT:26.8 sec  CARDIAC MARKERS ( 23 Aug 2020 11:52 )  1.660 ng/mL / x     / x     / x     / x      CARDIAC MARKERS ( 23 Aug 2020 08:34 )  1.660 ng/mL / x     / x     / x     / x        LIVER FUNCTIONS - ( 23 Aug 2020 08:34 )  Alb: 2.0 g/dL / Pro: 7.2 gm/dL / ALK PHOS: 212 U/L / ALT: 57 U/L / AST: 50 U/L / GGT: x           CAPILLARY BLOOD GLUCOSE  POCT Blood Glucose.: 315 mg/dL (25 Aug 2020 01:35)    Urinalysis Basic - ( 23 Aug 2020 10:25 )  Color: Yellow / Appearance: Clear / S.010 / pH: x  Gluc: x / Ketone: Trace  / Bili: Small / Urobili: 1 mg/dL   Blood: x / Protein: 30 mg/dL / Nitrite: Negative   Leuk Esterase: Moderate / RBC: 25-50 /HPF / WBC >50   Sq Epi: x / Non Sq Epi: Few / Bacteria: Many    VENT SETTINGS   Mode: AC/ CMV (Assist Control/ Continuous Mandatory Ventilation)  RR (machine): 16  TV (machine): 450  FiO2: 50  PEEP: 5  ITime: 1  PIP: 35    MEDICATIONS  (STANDING):  ARIPiprazole 20 milliGRAM(s) Oral daily  aspirin  chewable 81 milliGRAM(s) Oral daily  atorvastatin 80 milliGRAM(s) Oral at bedtime  carvedilol 3.125 milliGRAM(s) Oral every 12 hours  cefTRIAXone Injectable. 2000 milliGRAM(s) IV Push every 24 hours  chlorhexidine 0.12% Liquid 15 milliLiter(s) Oral Mucosa every 12 hours  clopidogrel Tablet 75 milliGRAM(s) Oral daily  dextrose 5%. 1000 milliLiter(s) (50 mL/Hr) IV Continuous <Continuous>  dextrose 50% Injectable 12.5 Gram(s) IV Push once  dextrose 50% Injectable 25 Gram(s) IV Push once  dextrose 50% Injectable 25 Gram(s) IV Push once  EPINEPHrine    0.1 mG/mL Injectable 1 milliGRAM(s) IV Push once  gabapentin 600 milliGRAM(s) Oral three times a day  heparin   Injectable 5000 Unit(s) SubCutaneous every 8 hours  insulin glargine Injectable (LANTUS) 20 Unit(s) SubCutaneous at bedtime  insulin lispro (HumaLOG) corrective regimen sliding scale   SubCutaneous three times a day before meals  insulin lispro (HumaLOG) corrective regimen sliding scale   SubCutaneous at bedtime  insulin lispro Injectable (HumaLOG) 6 Unit(s) SubCutaneous three times a day before meals  isosorbide   mononitrate ER Tablet (IMDUR) 60 milliGRAM(s) Oral daily  levothyroxine 112 MICROGram(s) Oral daily  multivitamin 1 Tablet(s) Oral daily  norepinephrine Infusion 0.03 MICROgram(s)/kG/Min (3.06 mL/Hr) IV Continuous <Continuous>  pantoprazole    Tablet 40 milliGRAM(s) Oral before breakfast  ranolazine 1000 milliGRAM(s) Oral two times a day  sodium chloride 0.9%. 1000 milliLiter(s) (100 mL/Hr) IV Continuous <Continuous>      REVIEW OF SYSTEMS:  Unable to obtain due to mechanical ventilation, sedation, poor mental status     Physicial Exam:     Pt is on mechanical ventilation and IV levophed infusion to maintain MAP   HEENT: PERRLA, EOMI, no drainage or redness  Neck: No bruits; no thyromegaly or nodules,  No JVD  Respiratory: Breath Sounds equal & clear to percussion & auscultation, no accessory muscle use  Cardiovascular: Regular rate & rhythm, normal S1, S2; no murmurs, gallops or rubs; no S3, S4  Gastrointestinal: Soft, non-tender, non distended no hepatosplenomegaly, normal bowel sounds  Extremities: No peripheral edema, No cyanosis, clubbing   Vascular: Equal and normal pulses: 2+ peripheral pulses throughout  Neurological:  A&O x 3; no sensory, motor  deficits, normal reflexes  Musculoskeletal: No joint pain, swelling or deformity; no limitation of movement  Skin: No rashes

## 2020-08-25 NOTE — PROCEDURE NOTE - ADDITIONAL PROCEDURE DETAILS
This central line was placed in the setting of cardiopulmonary arrest with poor IV access and the need for Levophed infusion to maintain mean arterial pressures in shock state post arrest.
This patient while severely hypotensive and bradycardic went into PEA cardiac arrest with no palpable pulses or spontaneous respirations, with CPR established immediately she was emergently intubated.

## 2020-08-25 NOTE — CONSULT NOTE ADULT - PROBLEM SELECTOR RECOMMENDATION 2
controlled continue low dose BB
ROSC achived  mental status shows PT awake and following commands   Repeat EKG  Order echocardiogram  F/U and trend troponin levels  no need for modified hypothermia protocol  post arrest heart rate in the low to mid 70's   no need for pacing at this time will continue to monitor

## 2020-08-26 LAB
ALBUMIN SERPL ELPH-MCNC: 1.4 G/DL — LOW (ref 3.3–5)
ALP SERPL-CCNC: 229 U/L — HIGH (ref 40–120)
ALT FLD-CCNC: 54 U/L — SIGNIFICANT CHANGE UP (ref 12–78)
ANION GAP SERPL CALC-SCNC: 12 MMOL/L — SIGNIFICANT CHANGE UP (ref 5–17)
ANION GAP SERPL CALC-SCNC: 14 MMOL/L — SIGNIFICANT CHANGE UP (ref 5–17)
AST SERPL-CCNC: 58 U/L — HIGH (ref 15–37)
BASE EXCESS BLDA CALC-SCNC: -11.6 MMOL/L — LOW (ref -2–2)
BILIRUB SERPL-MCNC: 0.3 MG/DL — SIGNIFICANT CHANGE UP (ref 0.2–1.2)
BUN SERPL-MCNC: 70 MG/DL — HIGH (ref 7–23)
BUN SERPL-MCNC: 70 MG/DL — HIGH (ref 7–23)
CALCIUM SERPL-MCNC: 6.7 MG/DL — LOW (ref 8.5–10.1)
CALCIUM SERPL-MCNC: 6.7 MG/DL — LOW (ref 8.5–10.1)
CHLORIDE SERPL-SCNC: 104 MMOL/L — SIGNIFICANT CHANGE UP (ref 96–108)
CHLORIDE SERPL-SCNC: 104 MMOL/L — SIGNIFICANT CHANGE UP (ref 96–108)
CO2 SERPL-SCNC: 15 MMOL/L — LOW (ref 22–31)
CO2 SERPL-SCNC: 17 MMOL/L — LOW (ref 22–31)
CREAT ?TM UR-MCNC: 67 MG/DL — SIGNIFICANT CHANGE UP
CREAT SERPL-MCNC: 3.65 MG/DL — HIGH (ref 0.5–1.3)
CREAT SERPL-MCNC: 4.21 MG/DL — HIGH (ref 0.5–1.3)
GAS PNL BLDA: SIGNIFICANT CHANGE UP
GLUCOSE SERPL-MCNC: 106 MG/DL — HIGH (ref 70–99)
GLUCOSE SERPL-MCNC: 124 MG/DL — HIGH (ref 70–99)
HCO3 BLDA-SCNC: 13 MMOL/L — LOW (ref 21–29)
HCT VFR BLD CALC: 19.1 % — CRITICAL LOW (ref 34.5–45)
HCT VFR BLD CALC: 24.5 % — LOW (ref 34.5–45)
HGB BLD-MCNC: 6.3 G/DL — CRITICAL LOW (ref 11.5–15.5)
HGB BLD-MCNC: 8.4 G/DL — LOW (ref 11.5–15.5)
LACTATE SERPL-SCNC: 4 MMOL/L — CRITICAL HIGH (ref 0.7–2)
MAGNESIUM SERPL-MCNC: 2 MG/DL — SIGNIFICANT CHANGE UP (ref 1.6–2.6)
MCHC RBC-ENTMCNC: 29.6 PG — SIGNIFICANT CHANGE UP (ref 27–34)
MCHC RBC-ENTMCNC: 30.3 PG — SIGNIFICANT CHANGE UP (ref 27–34)
MCHC RBC-ENTMCNC: 33 GM/DL — SIGNIFICANT CHANGE UP (ref 32–36)
MCHC RBC-ENTMCNC: 34.3 GM/DL — SIGNIFICANT CHANGE UP (ref 32–36)
MCV RBC AUTO: 86.3 FL — SIGNIFICANT CHANGE UP (ref 80–100)
MCV RBC AUTO: 91.8 FL — SIGNIFICANT CHANGE UP (ref 80–100)
OSMOLALITY UR: 296 MOSM/KG — SIGNIFICANT CHANGE UP (ref 50–1200)
PCO2 BLDA: 25 MMHG — LOW (ref 32–46)
PH BLDA: 7.33 — LOW (ref 7.35–7.45)
PHOSPHATE SERPL-MCNC: 4.6 MG/DL — HIGH (ref 2.5–4.5)
PLATELET # BLD AUTO: 323 K/UL — SIGNIFICANT CHANGE UP (ref 150–400)
PLATELET # BLD AUTO: 352 K/UL — SIGNIFICANT CHANGE UP (ref 150–400)
PO2 BLDA: 67 MMHG — LOW (ref 74–108)
POTASSIUM SERPL-MCNC: 3.6 MMOL/L — SIGNIFICANT CHANGE UP (ref 3.5–5.3)
POTASSIUM SERPL-MCNC: 4 MMOL/L — SIGNIFICANT CHANGE UP (ref 3.5–5.3)
POTASSIUM SERPL-SCNC: 3.6 MMOL/L — SIGNIFICANT CHANGE UP (ref 3.5–5.3)
POTASSIUM SERPL-SCNC: 4 MMOL/L — SIGNIFICANT CHANGE UP (ref 3.5–5.3)
PROT SERPL-MCNC: 6 GM/DL — SIGNIFICANT CHANGE UP (ref 6–8.3)
RBC # BLD: 2.08 M/UL — LOW (ref 3.8–5.2)
RBC # BLD: 2.84 M/UL — LOW (ref 3.8–5.2)
RBC # FLD: 15 % — HIGH (ref 10.3–14.5)
RBC # FLD: 16 % — HIGH (ref 10.3–14.5)
SAO2 % BLDA: 92 % — SIGNIFICANT CHANGE UP (ref 92–96)
SODIUM SERPL-SCNC: 133 MMOL/L — LOW (ref 135–145)
SODIUM SERPL-SCNC: 133 MMOL/L — LOW (ref 135–145)
SODIUM UR-SCNC: 22 MMOL/L — SIGNIFICANT CHANGE UP
TROPONIN I SERPL-MCNC: 1.29 NG/ML — HIGH (ref 0.01–0.04)
WBC # BLD: 30.44 K/UL — HIGH (ref 3.8–10.5)
WBC # BLD: 35.76 K/UL — HIGH (ref 3.8–10.5)
WBC # FLD AUTO: 30.44 K/UL — HIGH (ref 3.8–10.5)
WBC # FLD AUTO: 35.76 K/UL — HIGH (ref 3.8–10.5)

## 2020-08-26 PROCEDURE — 99233 SBSQ HOSP IP/OBS HIGH 50: CPT

## 2020-08-26 PROCEDURE — 99291 CRITICAL CARE FIRST HOUR: CPT

## 2020-08-26 RX ORDER — SODIUM BICARBONATE 1 MEQ/ML
50 SYRINGE (ML) INTRAVENOUS ONCE
Refills: 0 | Status: COMPLETED | OUTPATIENT
Start: 2020-08-26 | End: 2020-08-26

## 2020-08-26 RX ORDER — DEXTROSE 50 % IN WATER 50 %
25 SYRINGE (ML) INTRAVENOUS ONCE
Refills: 0 | Status: COMPLETED | OUTPATIENT
Start: 2020-08-26 | End: 2020-08-26

## 2020-08-26 RX ORDER — DEXTROSE 50 % IN WATER 50 %
12.5 SYRINGE (ML) INTRAVENOUS ONCE
Refills: 0 | Status: COMPLETED | OUTPATIENT
Start: 2020-08-26 | End: 2020-08-26

## 2020-08-26 RX ORDER — SODIUM BICARBONATE 1 MEQ/ML
0.21 SYRINGE (ML) INTRAVENOUS
Qty: 150 | Refills: 0 | Status: DISCONTINUED | OUTPATIENT
Start: 2020-08-26 | End: 2020-08-30

## 2020-08-26 RX ORDER — VASOPRESSIN 20 [USP'U]/ML
0.04 INJECTION INTRAVENOUS
Qty: 50 | Refills: 0 | Status: DISCONTINUED | OUTPATIENT
Start: 2020-08-26 | End: 2020-08-29

## 2020-08-26 RX ADMIN — Medication 81 MILLIGRAM(S): at 13:27

## 2020-08-26 RX ADMIN — PANTOPRAZOLE SODIUM 40 MILLIGRAM(S): 20 TABLET, DELAYED RELEASE ORAL at 13:30

## 2020-08-26 RX ADMIN — HEPARIN SODIUM 5000 UNIT(S): 5000 INJECTION INTRAVENOUS; SUBCUTANEOUS at 06:45

## 2020-08-26 RX ADMIN — Medication 1 TABLET(S): at 13:30

## 2020-08-26 RX ADMIN — Medication 112 MICROGRAM(S): at 06:45

## 2020-08-26 RX ADMIN — HEPARIN SODIUM 5000 UNIT(S): 5000 INJECTION INTRAVENOUS; SUBCUTANEOUS at 13:30

## 2020-08-26 RX ADMIN — GABAPENTIN 600 MILLIGRAM(S): 400 CAPSULE ORAL at 13:30

## 2020-08-26 RX ADMIN — Medication 650 MILLIGRAM(S): at 07:29

## 2020-08-26 RX ADMIN — CLOPIDOGREL BISULFATE 75 MILLIGRAM(S): 75 TABLET, FILM COATED ORAL at 13:27

## 2020-08-26 RX ADMIN — MIDODRINE HYDROCHLORIDE 10 MILLIGRAM(S): 2.5 TABLET ORAL at 06:45

## 2020-08-26 RX ADMIN — VASOPRESSIN 2.4 UNIT(S)/MIN: 20 INJECTION INTRAVENOUS at 23:30

## 2020-08-26 RX ADMIN — MIDODRINE HYDROCHLORIDE 10 MILLIGRAM(S): 2.5 TABLET ORAL at 13:30

## 2020-08-26 RX ADMIN — GABAPENTIN 600 MILLIGRAM(S): 400 CAPSULE ORAL at 06:45

## 2020-08-26 RX ADMIN — Medication 650 MILLIGRAM(S): at 06:44

## 2020-08-26 RX ADMIN — Medication 12.5 GRAM(S): at 21:05

## 2020-08-26 RX ADMIN — Medication 50 MILLIEQUIVALENT(S): at 22:55

## 2020-08-26 RX ADMIN — Medication 25 GRAM(S): at 18:59

## 2020-08-26 RX ADMIN — ARIPIPRAZOLE 20 MILLIGRAM(S): 15 TABLET ORAL at 13:27

## 2020-08-26 NOTE — PROGRESS NOTE ADULT - ASSESSMENT
60yo/F with PMH CAD, PAD s/p fem-pop bypass s/p RT iliac stent, diabetes, hypothyroidism, hyperlipidemia, current active smoker, uses cocaine last use 8/22  chronic back pain s/p lumbar surgery presented with multiple non-specific complaints, including fevers on/off last 4-5 days, weakness, no appetite and reduced oral intake, vague diffuse abd pains, one day of diarrhea. No cough, no SOB. She came in 8/23 for evaluation of weakness and found to have ST elevations on presenting EKG, code STEMI called and pt underwent urgent angiogram showing occluded RCA. She denies chest pain at present Also noted with positive UA, blood cx with Ecoli, CT abd/pelvis unremarkable, was started on IV rocephin.       1. sepsis with Ecoli. cystitis. leukocytosis. AMISH  - urine cx/blood cx growing Ecoli; sensitivities reviewed   - on rocephin 2gm daily #4  - continue with abx coverage   - wbc ct elevated, ? reactive f/u cbc   - monitor temps  - tolerating abx well so far; no side effects noted  - reason for abx use and side effects reviewed with patient  - supportive care  - fu cbc    2. other issues - s/p cardiac arrest/STEMI - cardiology/ICU following 58yo/F with PMH CAD, PAD s/p fem-pop bypass s/p RT iliac stent, diabetes, hypothyroidism, hyperlipidemia, current active smoker, uses cocaine last use 8/22  chronic back pain s/p lumbar surgery presented with multiple non-specific complaints, including fevers on/off last 4-5 days, weakness, no appetite and reduced oral intake, vague diffuse abd pains, one day of diarrhea. No cough, no SOB. She came in 8/23 for evaluation of weakness and found to have ST elevations on presenting EKG, code STEMI called and pt underwent urgent angiogram showing occluded RCA. She denies chest pain at present Also noted with positive UA, blood cx with Ecoli, CT abd/pelvis unremarkable, was started on IV rocephin.       1. sepsis with Ecoli. cystitis. leukocytosis. AMISH  - urine cx/blood cx growing Ecoli; sensitivities reviewed   - on rocephin 2gm daily #4  - continue with abx coverage   - worsening leukocytosis - 30 ? reactive, consider repeat abd imaging if continues to increase  - monitor temps  - tolerating abx well so far; no side effects noted  - reason for abx use and side effects reviewed with patient  - supportive care  - fu cbc    2. other issues - s/p cardiac arrest/STEMI - cardiology/ICU following

## 2020-08-26 NOTE — PROGRESS NOTE ADULT - ASSESSMENT
59 year old female with PMH CAD, PAD s/p fem-pop bypass s/p RT iliac stent, diabetes, hypothyroidism, hyperlipidemia, current active smoker, uses cocaine daily, chronic back pain s/p lumbar surgery who presented To the ER with multiple non-specific complaints, including fevers, weakness, no appetite and reduced oral intake, vague diffuse abd pains. Admitted w/ STEMI, no intervention in cath lab medically manage. Complicated by cardiac arrest, shock and GNR bacteremia.     Plan:   Neuro: No active issues.   CV: Shock, likely septic. Titrating levophed to maintain MAP >65. Added PO  midodrine in attempt to wean off IV vasopressors. TTE w/ EF 50%. Blood cultures w/ E.coli.   - AMI, medically managed w/ ASA/ plavix. Coreg BID. Statin daily  Cardiology following   Resp: Acute hypoxic respiratory failure 2/2 cardiac arrest. Resolved Now extubated to NC. Tolerating supplemental O2  GI: cardiac diet  Renal: AMISH likely ATN due to hypoperfusion. Non Oliguric. Avoid Nephrotoxins. trend BUN/Crt.   - Metabolic acidosis, on sodium bicarb gtt. Trend BMP.   ID: Bacteremia, E.coli. On 2g Rocephin daily. Repeat blood cultures until cleared.   Endo: hypothyroid, s/w synthroid

## 2020-08-26 NOTE — PROGRESS NOTE ADULT - SUBJECTIVE AND OBJECTIVE BOX
59y year old Female admitted for Patient is a 59y old  Female who presents with a chief complaint of abd pain, weakness (26 Aug 2020 15:38)          Brief Hospital Course: 1 HPI element     ROS: 1 system      PMH:  ST elevation myocardial infarction (STEMI)  Family history of asthma (Sibling)  Family history of epilepsy (Sibling, Sibling)  Family history of cardiovascular disease  Family history of stroke  Family history of heart disease  Family history of alcoholism in father  Handoff  MEWS Score  Lung nodule  Diabetes mellitus  History of TIAs  History of TIA due to embolism  History of gallbladder disease  History of colon polyps  Substance abuse  ETOH abuse  Spinal stenosis of lumbar region  DDD (degenerative disc disease), lumbar  Cystic breast  GERD (gastroesophageal reflux disease)  Carotid artery stenosis  TIA (transient ischemic attack)  Bipolar depression  Shoulder disorder  Angina pectoris  History of MRSA infection  Anxiety and depression  Transient cerebral ischemia, unspecified type  Osteoarthritis, unspecified osteoarthritis type, unspecified site  Osteoarthritis of spine, unspecified spinal osteoarthritis complication status, unspecified spinal region  Lumbar herniated disc  Urinary incontinence, unspecified type  Overactive bladder  Gall bladder disease  Constipation, unspecified constipation type  Hiatal hernia with GERD  PAD (peripheral artery disease)  Hyperlipidemia, unspecified hyperlipidemia type  Essential hypertension  Hypothyroidism  DM (diabetes mellitus), type 1  COPD (chronic obstructive pulmonary disease)  CAD (coronary artery disease)  Substance abuse  ETOH abuse  Hypothyroidism  DM (diabetes mellitus), type 1  COPD (chronic obstructive pulmonary disease)  CAD (coronary artery disease)  STEMI (ST elevation myocardial infarction)  Septic shock  Diabetes  AMISH (acute kidney injury)  UTI (urinary tract infection)  Cardiogenic shock  Cardiac arrest  Acute respiratory failure with hypoxia  Substance abuse  PAD (peripheral artery disease)  Essential hypertension  STEMI (ST elevation myocardial infarction)  History of lumbar fusion  H/O hernia repair  S/P dilatation and curettage  H/O rhinoplasty  H/O angioplasty  H/O ventral hernia repair  History of incision and drainage  H/O arterial bypass of lower limb  History of laparoscopic cholecystectomy  General Malaise  90+  SysAdmin_VisitLink        MEDICATIONS  (STANDING):  ARIPiprazole 20 milliGRAM(s) Oral daily  aspirin  chewable 81 milliGRAM(s) Oral daily  atorvastatin 80 milliGRAM(s) Oral at bedtime  carvedilol 3.125 milliGRAM(s) Oral every 12 hours  cefTRIAXone Injectable. 2000 milliGRAM(s) IV Push every 24 hours  clopidogrel Tablet 75 milliGRAM(s) Oral daily  dextrose 5%. 1000 milliLiter(s) (50 mL/Hr) IV Continuous <Continuous>  dextrose 50% Injectable 12.5 Gram(s) IV Push once  dextrose 50% Injectable 25 Gram(s) IV Push once  dextrose 50% Injectable 25 Gram(s) IV Push once  gabapentin 600 milliGRAM(s) Oral three times a day  heparin   Injectable 5000 Unit(s) SubCutaneous every 8 hours  insulin glargine Injectable (LANTUS) 20 Unit(s) SubCutaneous at bedtime  insulin lispro (HumaLOG) corrective regimen sliding scale   SubCutaneous three times a day before meals  insulin lispro (HumaLOG) corrective regimen sliding scale   SubCutaneous at bedtime  insulin lispro Injectable (HumaLOG) 6 Unit(s) SubCutaneous three times a day before meals  isosorbide   mononitrate ER Tablet (IMDUR) 60 milliGRAM(s) Oral daily  levothyroxine 112 MICROGram(s) Oral daily  midodrine 10 milliGRAM(s) Oral every 8 hours  multivitamin 1 Tablet(s) Oral daily  norepinephrine Infusion 0.03 MICROgram(s)/kG/Min (3.06 mL/Hr) IV Continuous <Continuous>  pantoprazole    Tablet 40 milliGRAM(s) Oral before breakfast  ranolazine 1000 milliGRAM(s) Oral two times a day  sodium bicarbonate  Infusion 0.069 mEq/kG/Hr (50 mL/Hr) IV Continuous <Continuous>  sodium bicarbonate  Infusion 0.207 mEq/kG/Hr (75 mL/Hr) IV Continuous <Continuous>  sodium bicarbonate  Injectable 50 milliEquivalent(s) IV Push once  vasopressin Infusion 0.04 Unit(s)/Min (2.4 mL/Hr) IV Continuous <Continuous>    MEDICATIONS  (PRN):  acetaminophen   Tablet .. 650 milliGRAM(s) Oral every 4 hours PRN Mild Pain (1 - 3)  ALBUTerol    90 MICROgram(s) HFA Inhaler 2 Puff(s) Inhalation every 6 hours PRN Shortness of Breath and/or Wheezing  aluminum hydroxide/magnesium hydroxide/simethicone Suspension 30 milliLiter(s) Oral every 4 hours PRN Dyspepsia  dextrose 40% Gel 15 Gram(s) Oral once PRN Blood Glucose LESS THAN 70 milliGRAM(s)/deciliter  glucagon  Injectable 1 milliGRAM(s) IntraMuscular once PRN Glucose LESS THAN 70 milligrams/deciliter  ondansetron Injectable 4 milliGRAM(s) IV Push every 6 hours PRN Nausea  oxyCODONE    IR 5 milliGRAM(s) Oral every 4 hours PRN Moderate Pain (4 - 6)  oxyCODONE    IR 10 milliGRAM(s) Oral every 4 hours PRN Severe Pain (7 - 10)  senna 2 Tablet(s) Oral at bedtime PRN Constipation      I&O's Summary    25 Aug 2020 07:01  -  26 Aug 2020 07:00  --------------------------------------------------------  IN: 3143 mL / OUT: 301 mL / NET: 2842 mL    26 Aug 2020 07:  -  26 Aug 2020 23:27  --------------------------------------------------------  IN: 550 mL / OUT: 3 mL / NET: 547 mL      ICU Vital Signs Last 24 Hrs  T(C): 36.6 (26 Aug 2020 21:00), Max: 37.9 (26 Aug 2020 06:30)  T(F): 97.8 (26 Aug 2020 21:00), Max: 100.2 (26 Aug 2020 06:30)  HR: 75 (26 Aug 2020 22:30) (69 - 97)  BP: 78/60 (26 Aug 2020 22:30) (77/66 - 152/52)  BP(mean): 65 (26 Aug 2020 22:30) (35 - 96)  ABP: --  ABP(mean): --  RR: 14 (26 Aug 2020 22:30) (11 - 20)  SpO2: 99% (26 Aug 2020 22:30) (75% - 100%)      PHYSICAL EXAM:  General: NAD conversant  CV: s1s2 RRR no murmurs  pulm: Clear bilaterally   GI: soft NTND   Extremities: no periferal edema          133<L>  |  104  |  70<H>  ----------------------------<  124<H>  3.6   |  17<L>  |  3.65<H>    Ca    6.7<L>      26 Aug 2020 06:48  Phos  3.5       Mg     2.1     08-    TPro  6.0  /  Alb  1.4<L>  /  TBili  0.3  /  DBili  x   /  AST  58<H>  /  ALT  54  /  AlkPhos  229<H>                            8.4    30.44 )-----------( 352      ( 26 Aug 2020 06:48 )             24.5     ABG - ( 26 Aug 2020 22:50 )  pH, Arterial: 7.33  pH, Blood: x     /  pCO2: 25    /  pO2: 67    / HCO3: 13    / Base Excess: -11.6 /  SaO2: 92                CARDIAC MARKERS ( 26 Aug 2020 06:48 )  1.290 ng/mL / x     / x     / x     / x      CARDIAC MARKERS ( 25 Aug 2020 02:26 )  1.990 ng/mL / x     / x     / x     / x          Urinalysis Basic - ( 25 Aug 2020 22:55 )    Color: Yellow / Appearance: Clear / S.010 / pH: x  Gluc: x / Ketone: Trace  / Bili: Negative / Urobili: Negative mg/dL   Blood: x / Protein: 100 mg/dL / Nitrite: Negative   Leuk Esterase: Small / RBC: 0-2 /HPF / WBC Negative   Sq Epi: x / Non Sq Epi: Negative / Bacteria: Occasional             Assessment:  1.  2.  3.    Plan:    25 min spent face to face with patient 59y year old Female admitted for Patient is a 59y old  Female who presents with a chief complaint of abd pain, weakness (26 Aug 2020 15:38)      60 yo f with CAD PAD s/p LE bypass sx s/p R iliac stent, DM, hypothyroidism, HLD, smoker, daily cocaine use,  back pain s/p lumbar surgery initially presented with fevers, weakness, anorexia, diffuse pains, found to have NSTEMI s/p cath on  with distal RCA lesion, also ecoli bacteremia and UTI with septic shock requiring pressors, yesterday brief PEA arrest intubated for airway protection and extubated later that day, now patient with increasing pressor requirements and worsening mental status.           PMH:  ST elevation myocardial infarction (STEMI)  Family history of asthma (Sibling)  Family history of epilepsy (Sibling, Sibling)  Family history of cardiovascular disease  Family history of stroke  Family history of heart disease  Family history of alcoholism in father  Handoff  MEWS Score  Lung nodule  Diabetes mellitus  History of TIAs  History of TIA due to embolism  History of gallbladder disease  History of colon polyps  Substance abuse  ETOH abuse  Spinal stenosis of lumbar region  DDD (degenerative disc disease), lumbar  Cystic breast  GERD (gastroesophageal reflux disease)  Carotid artery stenosis  TIA (transient ischemic attack)  Bipolar depression  Shoulder disorder  Angina pectoris  History of MRSA infection  Anxiety and depression  Transient cerebral ischemia, unspecified type  Osteoarthritis, unspecified osteoarthritis type, unspecified site  Osteoarthritis of spine, unspecified spinal osteoarthritis complication status, unspecified spinal region  Lumbar herniated disc  Urinary incontinence, unspecified type  Overactive bladder  Gall bladder disease  Constipation, unspecified constipation type  Hiatal hernia with GERD  PAD (peripheral artery disease)  Hyperlipidemia, unspecified hyperlipidemia type  Essential hypertension  Hypothyroidism  DM (diabetes mellitus), type 1  COPD (chronic obstructive pulmonary disease)  CAD (coronary artery disease)  Substance abuse  ETOH abuse  Hypothyroidism  DM (diabetes mellitus), type 1  COPD (chronic obstructive pulmonary disease)  CAD (coronary artery disease)  STEMI (ST elevation myocardial infarction)  Septic shock  Diabetes  AMISH (acute kidney injury)  UTI (urinary tract infection)  Cardiogenic shock  Cardiac arrest  Acute respiratory failure with hypoxia  Substance abuse  PAD (peripheral artery disease)  Essential hypertension  STEMI (ST elevation myocardial infarction)  History of lumbar fusion  H/O hernia repair  S/P dilatation and curettage  H/O rhinoplasty  H/O angioplasty  H/O ventral hernia repair  History of incision and drainage  H/O arterial bypass of lower limb  History of laparoscopic cholecystectomy  General Malaise  90+  SysAdmin_VisitLink        MEDICATIONS  (STANDING):  ARIPiprazole 20 milliGRAM(s) Oral daily  aspirin  chewable 81 milliGRAM(s) Oral daily  atorvastatin 80 milliGRAM(s) Oral at bedtime  carvedilol 3.125 milliGRAM(s) Oral every 12 hours  cefTRIAXone Injectable. 2000 milliGRAM(s) IV Push every 24 hours  clopidogrel Tablet 75 milliGRAM(s) Oral daily  dextrose 5%. 1000 milliLiter(s) (50 mL/Hr) IV Continuous <Continuous>  dextrose 50% Injectable 12.5 Gram(s) IV Push once  dextrose 50% Injectable 25 Gram(s) IV Push once  dextrose 50% Injectable 25 Gram(s) IV Push once  gabapentin 600 milliGRAM(s) Oral three times a day  heparin   Injectable 5000 Unit(s) SubCutaneous every 8 hours  insulin glargine Injectable (LANTUS) 20 Unit(s) SubCutaneous at bedtime  insulin lispro (HumaLOG) corrective regimen sliding scale   SubCutaneous three times a day before meals  insulin lispro (HumaLOG) corrective regimen sliding scale   SubCutaneous at bedtime  insulin lispro Injectable (HumaLOG) 6 Unit(s) SubCutaneous three times a day before meals  isosorbide   mononitrate ER Tablet (IMDUR) 60 milliGRAM(s) Oral daily  levothyroxine 112 MICROGram(s) Oral daily  midodrine 10 milliGRAM(s) Oral every 8 hours  multivitamin 1 Tablet(s) Oral daily  norepinephrine Infusion 0.03 MICROgram(s)/kG/Min (3.06 mL/Hr) IV Continuous <Continuous>  pantoprazole    Tablet 40 milliGRAM(s) Oral before breakfast  ranolazine 1000 milliGRAM(s) Oral two times a day  sodium bicarbonate  Infusion 0.069 mEq/kG/Hr (50 mL/Hr) IV Continuous <Continuous>  sodium bicarbonate  Infusion 0.207 mEq/kG/Hr (75 mL/Hr) IV Continuous <Continuous>  sodium bicarbonate  Injectable 50 milliEquivalent(s) IV Push once  vasopressin Infusion 0.04 Unit(s)/Min (2.4 mL/Hr) IV Continuous <Continuous>    MEDICATIONS  (PRN):  acetaminophen   Tablet .. 650 milliGRAM(s) Oral every 4 hours PRN Mild Pain (1 - 3)  ALBUTerol    90 MICROgram(s) HFA Inhaler 2 Puff(s) Inhalation every 6 hours PRN Shortness of Breath and/or Wheezing  aluminum hydroxide/magnesium hydroxide/simethicone Suspension 30 milliLiter(s) Oral every 4 hours PRN Dyspepsia  dextrose 40% Gel 15 Gram(s) Oral once PRN Blood Glucose LESS THAN 70 milliGRAM(s)/deciliter  glucagon  Injectable 1 milliGRAM(s) IntraMuscular once PRN Glucose LESS THAN 70 milligrams/deciliter  ondansetron Injectable 4 milliGRAM(s) IV Push every 6 hours PRN Nausea  oxyCODONE    IR 5 milliGRAM(s) Oral every 4 hours PRN Moderate Pain (4 - 6)  oxyCODONE    IR 10 milliGRAM(s) Oral every 4 hours PRN Severe Pain (7 - 10)  senna 2 Tablet(s) Oral at bedtime PRN Constipation      I&O's Summary    25 Aug 2020 07:  -  26 Aug 2020 07:00  --------------------------------------------------------  IN: 3143 mL / OUT: 301 mL / NET: 2842 mL    26 Aug 2020 07:01  -  26 Aug 2020 23:27  --------------------------------------------------------  IN: 550 mL / OUT: 3 mL / NET: 547 mL      ICU Vital Signs Last 24 Hrs  T(C): 36.6 (26 Aug 2020 21:00), Max: 37.9 (26 Aug 2020 06:30)  T(F): 97.8 (26 Aug 2020 21:00), Max: 100.2 (26 Aug 2020 06:30)  HR: 75 (26 Aug 2020 22:30) (69 - 97)  BP: 78/60 (26 Aug 2020 22:30) (77/66 - 152/52)  BP(mean): 65 (26 Aug 2020 22:30) (35 - 96)  ABP: --  ABP(mean): --  RR: 14 (26 Aug 2020 22:30) (11 - 20)  SpO2: 99% (26 Aug 2020 22:30) (75% - 100%)      PHYSICAL EXAM:  General: lethargic   CV: s1s2 RRR no murmurs  pulm: air entry bilaterally  GI: soft, distended, diffusely tender, more so in the lower quadrants   Extremities: No edema, warm  Neuro: accusable to tactile, confused. non focal  pocus: type a pulm exam, IVC 1.75cm minimal variation. LV function appears okay on visual exam.           133<L>  |  104  |  70<H>  ----------------------------<  124<H>  3.6   |  17<L>  |  3.65<H>    Ca    6.7<L>      26 Aug 2020 06:48  Phos  3.5       Mg     2.1         TPro  6.0  /  Alb  1.4<L>  /  TBili  0.3  /  DBili  x   /  AST  58<H>  /  ALT  54  /  AlkPhos  229<H>                            8.4    30.44 )-----------( 352      ( 26 Aug 2020 06:48 )             24.5     ABG - ( 26 Aug 2020 22:50 )  pH, Arterial: 7.33  pH, Blood: x     /  pCO2: 25    /  pO2: 67    / HCO3: 13    / Base Excess: -11.6 /  SaO2: 92                CARDIAC MARKERS ( 26 Aug 2020 06:48 )  1.290 ng/mL / x     / x     / x     / x      CARDIAC MARKERS ( 25 Aug 2020 02:26 )  1.990 ng/mL / x     / x     / x     / x          Urinalysis Basic - ( 25 Aug 2020 22:55 )    Color: Yellow / Appearance: Clear / S.010 / pH: x  Gluc: x / Ketone: Trace  / Bili: Negative / Urobili: Negative mg/dL   Blood: x / Protein: 100 mg/dL / Nitrite: Negative   Leuk Esterase: Small / RBC: 0-2 /HPF / WBC Negative   Sq Epi: x / Non Sq Epi: Negative / Bacteria: Occasional             60 yo f with CAD PAD s/p LE bypass sx s/p R iliac stent, DM, hypothyroidism, HLD, smoker, daily cocaine use,  back pain s/p lumbar surgery initially presented with fevers, weakness, anorexia, diffuse pains being treated for STEMI, s/p cath on , Ecoli bactermia and UTI, septic shock, AMISH, now increasing pressor requirement and AMS, abdomen is tender and is now having diarrhea. CT scan  on  with mildly distended small  bowel without evidence of obstruction.  - stat ABG  - stat CBC, Chemistry, lactic acid  - CXR/Abd xray to r/o free air, CT scan abdomen/pelvis when more stable  - give 500 CC bolus  - add vasopressin to levophed and titrate to MAP 65  - place hughes, strict I/Os  - repeat ECHO  - check C diff  -  may need A line but limited as both radial arteries very small under ultrasound, recent right fem cath, left fem stent/bypass, increase risk of complications with axillary given PVD and dAPT.   - critically ill continue ICU care    CC time 50 min including time spent reviewing chart, ordering tests and treatments, discussing care with MDT, evaluating patient at bedside, not including proceudres. 59y year old Female admitted for Patient is a 59y old  Female who presents with a chief complaint of abd pain, weakness (26 Aug 2020 15:38)      58 yo f with CAD PAD s/p LE bypass sx s/p R iliac stent, DM, hypothyroidism, HLD, smoker, daily cocaine use,  back pain s/p lumbar surgery initially presented with fevers, weakness, anorexia, diffuse pains, found to have NSTEMI s/p cath on  with distal RCA lesion, also ecoli bacteremia and UTI with septic shock requiring pressors, yesterday brief PEA arrest intubated for airway protection and extubated later that day, now patient with increasing pressor requirements and worsening mental status.           PMH:  ST elevation myocardial infarction (STEMI)  Family history of asthma (Sibling)  Family history of epilepsy (Sibling, Sibling)  Family history of cardiovascular disease  Family history of stroke  Family history of heart disease  Family history of alcoholism in father  Handoff  MEWS Score  Lung nodule  Diabetes mellitus  History of TIAs  History of TIA due to embolism  History of gallbladder disease  History of colon polyps  Substance abuse  ETOH abuse  Spinal stenosis of lumbar region  DDD (degenerative disc disease), lumbar  Cystic breast  GERD (gastroesophageal reflux disease)  Carotid artery stenosis  TIA (transient ischemic attack)  Bipolar depression  Shoulder disorder  Angina pectoris  History of MRSA infection  Anxiety and depression  Transient cerebral ischemia, unspecified type  Osteoarthritis, unspecified osteoarthritis type, unspecified site  Osteoarthritis of spine, unspecified spinal osteoarthritis complication status, unspecified spinal region  Lumbar herniated disc  Urinary incontinence, unspecified type  Overactive bladder  Gall bladder disease  Constipation, unspecified constipation type  Hiatal hernia with GERD  PAD (peripheral artery disease)  Hyperlipidemia, unspecified hyperlipidemia type  Essential hypertension  Hypothyroidism  DM (diabetes mellitus), type 1  COPD (chronic obstructive pulmonary disease)  CAD (coronary artery disease)  Substance abuse  ETOH abuse  Hypothyroidism  DM (diabetes mellitus), type 1  COPD (chronic obstructive pulmonary disease)  CAD (coronary artery disease)  STEMI (ST elevation myocardial infarction)  Septic shock  Diabetes  AMISH (acute kidney injury)  UTI (urinary tract infection)  Cardiogenic shock  Cardiac arrest  Acute respiratory failure with hypoxia  Substance abuse  PAD (peripheral artery disease)  Essential hypertension  STEMI (ST elevation myocardial infarction)  History of lumbar fusion  H/O hernia repair  S/P dilatation and curettage  H/O rhinoplasty  H/O angioplasty  H/O ventral hernia repair  History of incision and drainage  H/O arterial bypass of lower limb  History of laparoscopic cholecystectomy  General Malaise  90+  SysAdmin_VisitLink        MEDICATIONS  (STANDING):  ARIPiprazole 20 milliGRAM(s) Oral daily  aspirin  chewable 81 milliGRAM(s) Oral daily  atorvastatin 80 milliGRAM(s) Oral at bedtime  carvedilol 3.125 milliGRAM(s) Oral every 12 hours  cefTRIAXone Injectable. 2000 milliGRAM(s) IV Push every 24 hours  clopidogrel Tablet 75 milliGRAM(s) Oral daily  dextrose 5%. 1000 milliLiter(s) (50 mL/Hr) IV Continuous <Continuous>  dextrose 50% Injectable 12.5 Gram(s) IV Push once  dextrose 50% Injectable 25 Gram(s) IV Push once  dextrose 50% Injectable 25 Gram(s) IV Push once  gabapentin 600 milliGRAM(s) Oral three times a day  heparin   Injectable 5000 Unit(s) SubCutaneous every 8 hours  insulin glargine Injectable (LANTUS) 20 Unit(s) SubCutaneous at bedtime  insulin lispro (HumaLOG) corrective regimen sliding scale   SubCutaneous three times a day before meals  insulin lispro (HumaLOG) corrective regimen sliding scale   SubCutaneous at bedtime  insulin lispro Injectable (HumaLOG) 6 Unit(s) SubCutaneous three times a day before meals  isosorbide   mononitrate ER Tablet (IMDUR) 60 milliGRAM(s) Oral daily  levothyroxine 112 MICROGram(s) Oral daily  midodrine 10 milliGRAM(s) Oral every 8 hours  multivitamin 1 Tablet(s) Oral daily  norepinephrine Infusion 0.03 MICROgram(s)/kG/Min (3.06 mL/Hr) IV Continuous <Continuous>  pantoprazole    Tablet 40 milliGRAM(s) Oral before breakfast  ranolazine 1000 milliGRAM(s) Oral two times a day  sodium bicarbonate  Infusion 0.069 mEq/kG/Hr (50 mL/Hr) IV Continuous <Continuous>  sodium bicarbonate  Infusion 0.207 mEq/kG/Hr (75 mL/Hr) IV Continuous <Continuous>  sodium bicarbonate  Injectable 50 milliEquivalent(s) IV Push once  vasopressin Infusion 0.04 Unit(s)/Min (2.4 mL/Hr) IV Continuous <Continuous>    MEDICATIONS  (PRN):  acetaminophen   Tablet .. 650 milliGRAM(s) Oral every 4 hours PRN Mild Pain (1 - 3)  ALBUTerol    90 MICROgram(s) HFA Inhaler 2 Puff(s) Inhalation every 6 hours PRN Shortness of Breath and/or Wheezing  aluminum hydroxide/magnesium hydroxide/simethicone Suspension 30 milliLiter(s) Oral every 4 hours PRN Dyspepsia  dextrose 40% Gel 15 Gram(s) Oral once PRN Blood Glucose LESS THAN 70 milliGRAM(s)/deciliter  glucagon  Injectable 1 milliGRAM(s) IntraMuscular once PRN Glucose LESS THAN 70 milligrams/deciliter  ondansetron Injectable 4 milliGRAM(s) IV Push every 6 hours PRN Nausea  oxyCODONE    IR 5 milliGRAM(s) Oral every 4 hours PRN Moderate Pain (4 - 6)  oxyCODONE    IR 10 milliGRAM(s) Oral every 4 hours PRN Severe Pain (7 - 10)  senna 2 Tablet(s) Oral at bedtime PRN Constipation      I&O's Summary    25 Aug 2020 07:  -  26 Aug 2020 07:00  --------------------------------------------------------  IN: 3143 mL / OUT: 301 mL / NET: 2842 mL    26 Aug 2020 07:01  -  26 Aug 2020 23:27  --------------------------------------------------------  IN: 550 mL / OUT: 3 mL / NET: 547 mL      ICU Vital Signs Last 24 Hrs  T(C): 36.6 (26 Aug 2020 21:00), Max: 37.9 (26 Aug 2020 06:30)  T(F): 97.8 (26 Aug 2020 21:00), Max: 100.2 (26 Aug 2020 06:30)  HR: 75 (26 Aug 2020 22:30) (69 - 97)  BP: 78/60 (26 Aug 2020 22:30) (77/66 - 152/52)  BP(mean): 65 (26 Aug 2020 22:30) (35 - 96)  ABP: --  ABP(mean): --  RR: 14 (26 Aug 2020 22:30) (11 - 20)  SpO2: 99% (26 Aug 2020 22:30) (75% - 100%)      PHYSICAL EXAM:  General: lethargic   CV: s1s2 RRR no murmurs  pulm: air entry bilaterally  GI: soft, distended, diffusely tender, more so in the lower quadrants   Extremities: No edema, warm  Neuro: accusable to tactile, confused. non focal  pocus: type a pulm exam, IVC 1.75cm minimal variation. LV function appears okay on visual exam.           133<L>  |  104  |  70<H>  ----------------------------<  124<H>  3.6   |  17<L>  |  3.65<H>    Ca    6.7<L>      26 Aug 2020 06:48  Phos  3.5       Mg     2.1         TPro  6.0  /  Alb  1.4<L>  /  TBili  0.3  /  DBili  x   /  AST  58<H>  /  ALT  54  /  AlkPhos  229<H>                            8.4    30.44 )-----------( 352      ( 26 Aug 2020 06:48 )             24.5     ABG - ( 26 Aug 2020 22:50 )  pH, Arterial: 7.33  pH, Blood: x     /  pCO2: 25    /  pO2: 67    / HCO3: 13    / Base Excess: -11.6 /  SaO2: 92                CARDIAC MARKERS ( 26 Aug 2020 06:48 )  1.290 ng/mL / x     / x     / x     / x      CARDIAC MARKERS ( 25 Aug 2020 02:26 )  1.990 ng/mL / x     / x     / x     / x          Urinalysis Basic - ( 25 Aug 2020 22:55 )    Color: Yellow / Appearance: Clear / S.010 / pH: x  Gluc: x / Ketone: Trace  / Bili: Negative / Urobili: Negative mg/dL   Blood: x / Protein: 100 mg/dL / Nitrite: Negative   Leuk Esterase: Small / RBC: 0-2 /HPF / WBC Negative   Sq Epi: x / Non Sq Epi: Negative / Bacteria: Occasional             58 yo f with CAD PAD s/p LE bypass sx s/p R iliac stent, DM, hypothyroidism, HLD, smoker, daily cocaine use,  back pain s/p lumbar surgery initially presented with fevers, weakness, anorexia, diffuse pains being treated for STEMI, s/p cath on , Ecoli bactermia and UTI, septic shock, AMISH, now increasing pressor requirement and AMS, abdomen is tender and is now having diarrhea. CT scan  on  with mildly distended small  bowel without evidence of obstruction.  - stat ABG  - stat CBC, Chemistry, Benjamín, lactic acid  - abdominal xray for now, CT scan abdomen/pelvis when more stable  - IVC 1.75cm, CVP 8-10,  give 500 CC bolus  - add vasopressin to levophed and titrate to MAP 65  - place hughes, strict I/Os  - repeat ECHO  - check C diff  -  may need A line but limited as both radial arteries very small under ultrasound, recent right fem cath, left fem stent/bypass, increase risk of complications with axillary given PVD and dAPT.   - critically ill continue ICU care    CC time 50 min including time spent reviewing chart, ordering tests and treatments, discussing care with MDT, evaluating patient at bedside, not including proceudres.

## 2020-08-26 NOTE — PROGRESS NOTE ADULT - SUBJECTIVE AND OBJECTIVE BOX
PCP:    REQUESTING PHYSICIAN:    REASON FOR CONSULT:    CHIEF COMPLAINT:    60yo/F with PMH CAD, PAD s/p fem-pop bypass s/p RT iliac stent, diabetes,   hypothyroidism, hyperlipidemia, current active smoker, uses cocaine last use 8/22,   chronic back pain s/p lumbar surgery presented 8/23 in AM with multiple non-specific complaints,  including fever, vague diffuse ab pain  & diarrhea.  ST elevations in inferior leads noted.  Urgent coronary angiogram showed occluded RCA, medical  management reccomended  as no active chest pain, pt was septic & Cr elevated.    8/24/'20: no complaints overnight: no chest pain, palpitations, dyspnea.  Reviewed results of cardiac cath & plan for med mx.    8/25/20 Events reviewed , patient was noted to be hypotensive bradycardic PEA  unresponsive episode subsequently resuscitated  patient intubated , on pressors , maintaining sbp blood cultures are positive for gram negative rods . patient hemoglobin dropped with   8/26/20 Pt awake but lethargic. She denies complaints today. Pressors continued    PAST MEDICAL & SURGICAL HISTORY:  Lung nodule  Diabetes mellitus  History of TIAs  History of gallbladder disease: surgery  History of colon polyps: precancerous  Substance abuse: history of. last used 14 years ago.  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 Decreased ROM.  Angina pectoris  History of MRSA infection: left lower extremity incisional area 2015  Anxiety and depression  Transient cerebral ischemia, unspecified type  Osteoarthritis of spine, unspecified spinal osteoarthritis complication status, unspecified spinal region  Lumbar herniated disc  Urinary incontinence, unspecified type  Overactive bladder  Hiatal hernia with GERD: hiatal hernia repaired  PAD (peripheral artery disease)  Hyperlipidemia, unspecified hyperlipidemia type  Essential hypertension  Hypothyroidism  COPD (chronic obstructive pulmonary disease)  CAD (coronary artery disease)  History of lumbar fusion: with laminectomy 11/8/2018  H/O hernia repair: hiatal hernia - 2017, 2018  S/P dilatation and curettage: 1991  H/O rhinoplasty: 1980  H/O angioplasty: Right iliac artery. 5/12/2017  H/O ventral hernia repair: 3/2017  History of incision and drainage: of lower extremity incisional site x 5 . last done5/2015  H/O arterial bypass of lower limb: Femoral - Popliteal. 11/2014 Left lower side with stents  History of laparoscopic cholecystectomy: 2/2016      SOCIAL HISTORY:    FAMILY HISTORY:  Family history of asthma (Sibling): sister  Family history of epilepsy (Sibling, Sibling): 2 sisters  Family history of cardiovascular disease: mother  Family history of stroke: mother  Family history of heart disease: father  Family history of alcoholism in father      ALLERGIES:  Allergies    No Known Allergies    Intolerances        MEDICATIONS:    MEDICATIONS  (STANDING):  ARIPiprazole 20 milliGRAM(s) Oral daily  aspirin  chewable 81 milliGRAM(s) Oral daily  atorvastatin 80 milliGRAM(s) Oral at bedtime  carvedilol 3.125 milliGRAM(s) Oral every 12 hours  cefTRIAXone Injectable. 2000 milliGRAM(s) IV Push every 24 hours  clopidogrel Tablet 75 milliGRAM(s) Oral daily  dextrose 5%. 1000 milliLiter(s) (50 mL/Hr) IV Continuous <Continuous>  dextrose 50% Injectable 12.5 Gram(s) IV Push once  dextrose 50% Injectable 25 Gram(s) IV Push once  dextrose 50% Injectable 25 Gram(s) IV Push once  gabapentin 600 milliGRAM(s) Oral three times a day  heparin   Injectable 5000 Unit(s) SubCutaneous every 8 hours  insulin glargine Injectable (LANTUS) 20 Unit(s) SubCutaneous at bedtime  insulin lispro (HumaLOG) corrective regimen sliding scale   SubCutaneous three times a day before meals  insulin lispro (HumaLOG) corrective regimen sliding scale   SubCutaneous at bedtime  insulin lispro Injectable (HumaLOG) 6 Unit(s) SubCutaneous three times a day before meals  isosorbide   mononitrate ER Tablet (IMDUR) 60 milliGRAM(s) Oral daily  levothyroxine 112 MICROGram(s) Oral daily  midodrine 10 milliGRAM(s) Oral every 8 hours  multivitamin 1 Tablet(s) Oral daily  norepinephrine Infusion 0.03 MICROgram(s)/kG/Min (3.06 mL/Hr) IV Continuous <Continuous>  pantoprazole    Tablet 40 milliGRAM(s) Oral before breakfast  ranolazine 1000 milliGRAM(s) Oral two times a day  sodium bicarbonate  Infusion 0.069 mEq/kG/Hr (50 mL/Hr) IV Continuous <Continuous>    MEDICATIONS  (PRN):  acetaminophen   Tablet .. 650 milliGRAM(s) Oral every 4 hours PRN Mild Pain (1 - 3)  ALBUTerol    90 MICROgram(s) HFA Inhaler 2 Puff(s) Inhalation every 6 hours PRN Shortness of Breath and/or Wheezing  aluminum hydroxide/magnesium hydroxide/simethicone Suspension 30 milliLiter(s) Oral every 4 hours PRN Dyspepsia  dextrose 40% Gel 15 Gram(s) Oral once PRN Blood Glucose LESS THAN 70 milliGRAM(s)/deciliter  glucagon  Injectable 1 milliGRAM(s) IntraMuscular once PRN Glucose LESS THAN 70 milligrams/deciliter  ondansetron Injectable 4 milliGRAM(s) IV Push every 6 hours PRN Nausea  oxyCODONE    IR 5 milliGRAM(s) Oral every 4 hours PRN Moderate Pain (4 - 6)  oxyCODONE    IR 10 milliGRAM(s) Oral every 4 hours PRN Severe Pain (7 - 10)  senna 2 Tablet(s) Oral at bedtime PRN Constipation        Vital Signs Last 24 Hrs  T(C): 36.2 (26 Aug 2020 11:45), Max: 37.9 (26 Aug 2020 06:30)  T(F): 97.1 (26 Aug 2020 11:45), Max: 100.2 (26 Aug 2020 06:30)  HR: 71 (26 Aug 2020 13:00) (69 - 95)  BP: 94/53 (26 Aug 2020 13:00) (79/58 - 133/52)  BP(mean): 59 (26 Aug 2020 13:00) (35 - 92)  RR: 15 (26 Aug 2020 13:00) (11 - 21)  SpO2: 98% (26 Aug 2020 13:00) (91% - 100%)Daily     Daily I&O's Summary    25 Aug 2020 07:01  -  26 Aug 2020 07:00  --------------------------------------------------------  IN: 3143 mL / OUT: 301 mL / NET: 2842 mL        PHYSICAL EXAM:    Constitutional: NAD, awake lethargic  HEENT: PERR, EOMI,  No oral cyananosis.  Neck:  supple,  No JVD  Respiratory: Breath sounds are clear bilaterally, No wheezing, rales or rhonchi  Cardiovascular: S1 and S2, regular rate and rhythm, no Murmurs, gallops or rubs  Gastrointestinal: Bowel Sounds present, soft, nontender.   Extremities: No peripheral edema. No clubbing or cyanosis.  Vascular: 2+ peripheral pulses  Neurological: A/O x 3, no focal deficits  Musculoskeletal: no calf tenderness.  Skin: No rashes.      LABS: All Labs Reviewed:                        8.4    30.44 )-----------( 352      ( 26 Aug 2020 06:48 )             24.5                         8.9    23.91 )-----------( 309      ( 25 Aug 2020 20:40 )             26.5                         7.7    22.30 )-----------( 256      ( 25 Aug 2020 08:42 )             22.2     26 Aug 2020 06:48    133    |  104    |  70     ----------------------------<  124    3.6     |  17     |  3.65   25 Aug 2020 20:40    130    |  102    |  67     ----------------------------<  139    4.3     |  16     |  3.43   25 Aug 2020 08:42    130    |  103    |  67     ----------------------------<  310    5.0     |  16     |  3.30     Ca    6.7        26 Aug 2020 06:48  Ca    6.8        25 Aug 2020 20:40  Ca    6.7        25 Aug 2020 08:42  Phos  3.5       26 Aug 2020 06:48  Phos  3.5       25 Aug 2020 20:40  Phos  3.5       25 Aug 2020 02:26  Mg     2.1       25 Aug 2020 20:40  Mg     1.5       25 Aug 2020 02:26    TPro  6.0    /  Alb  1.4    /  TBili  0.3    /  DBili  x      /  AST  58     /  ALT  54     /  AlkPhos  229    26 Aug 2020 06:48  TPro  6.0    /  Alb  1.4    /  TBili  0.3    /  DBili  x      /  AST  125    /  ALT  76     /  AlkPhos  252    25 Aug 2020 08:42  TPro  5.8    /  Alb  1.5    /  TBili  0.3    /  DBili  x      /  AST  175    /  ALT  87     /  AlkPhos  248    25 Aug 2020 02:26    PT/INR - ( 25 Aug 2020 02:26 )   PT: 15.9 sec;   INR: 1.38 ratio           CARDIAC MARKERS ( 26 Aug 2020 06:48 )  1.290 ng/mL / x     / x     / x     / x      CARDIAC MARKERS ( 25 Aug 2020 02:26 )  1.990 ng/mL / x     / x     / x     / x          Blood Culture: Organism Escherichia coli  Gram Stain Blood -- Gram Stain --  Specimen Source .Urine Clean Catch (Midstream)  Culture-Blood --    Organism Blood Culture PCR  Gram Stain Blood -- Gram Stain   Growth in aerobic bottle: Gram Negative Rods  Growth in anaerobic bottle: Gram Negative Rods  Specimen Source .Blood None  Culture-Blood --            RADIOLOGY/EKG:< from: 12 Lead ECG (08.25.20 @ 00:12) >  Diagnosis Line Normal sinus rhythm  ST elevation consider inferior injury or acute infarct  Prolonged QT  *** ** ** ** * ACUTE MI  ** ** ** **  Abnormal ECG  Confirmed by Amando Macdonald ([id number]) on 8/25/2020 12:17:31 PM    < end of copied text >        ECHO/CARDIAC CATHTERIZATION/STRESS TEST:  < from: TTE Echo Complete w/o Contrast w/ Doppler (08.24.20 @ 14:14) >   Impression     Summary     Fibrocalcific changes noted to the mitral valve leaflets with preserved   leaflet excursion.   Mild (1+) mitral regurgitation is present.   EA reversal of the mitral inflow consistent with reduced compliance of the   left ventricle.   Fibrocalcific changes noted to the Aortic valve leaflets with preserved   leaflet excursion.   Normal appearing tricuspid valve structure and function.   Mild (1+) tricuspid valve regurgitation is present.   The left ventricle size is normal, moderate regional hypokinesis, and   minimally reduced function.   Estimated left ventricular ejection fraction is 50 %.   The right ventricle appears mildly dilated.   No evidence of pericardial effusion.     Signature     ----------------------------------------------------------------   Electronically signed by Javier Argueta MD(Interpreting   physician) on 08/24/2020 05:51 PM   ----------------------------------------------------------------    < end of copied text >

## 2020-08-26 NOTE — PROGRESS NOTE ADULT - SUBJECTIVE AND OBJECTIVE BOX
Patient is a 59y old  Female who presents with a chief complaint of abd pain, weakness (25 Aug 2020 11:54)      BRIEF HOSPITAL COURSE:  59 year old female with PMH CAD, PAD s/p fem-pop bypass s/p RT iliac stent, diabetes, hypothyroidism, hyperlipidemia, current active smoker, uses cocaine daily, chronic back pain s/p lumbar surgery who presented To the ER with multiple non-specific complaints, including fevers, weakness, no appetite and reduced oral intake, vague diffuse abd pains. Found to ST elevations, taken to cath lab found to distal RCA lesion, no intervention was done. Yesterday overnight, became acutely hypotensive and bradycardic, suffered brief PEA arrest. ROSC achieved after 3 minutes. Intubated for airway protection post arrest. Started on levophed.     Events last 24 hours: Extubated today to NC. Remains on levophed. Added PO midodrine tonight. Remains on sodium bicarb infusion, repeat labs w/ slight improvement in metabolic acidosis. Crt slightly worsening.     PAST MEDICAL & SURGICAL HISTORY:  Lung nodule  Diabetes mellitus  History of TIAs  History of gallbladder disease: surgery  History of colon polyps: precancerous  Substance abuse: history of. last used 14 years ago.  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 Decreased ROM.  Angina pectoris  History of MRSA infection: left lower extremity incisional area   Anxiety and depression  Transient cerebral ischemia, unspecified type  Osteoarthritis of spine, unspecified spinal osteoarthritis complication status, unspecified spinal region  Lumbar herniated disc  Urinary incontinence, unspecified type  Overactive bladder  Hiatal hernia with GERD: hiatal hernia repaired  PAD (peripheral artery disease)  Hyperlipidemia, unspecified hyperlipidemia type  Essential hypertension  Hypothyroidism  COPD (chronic obstructive pulmonary disease)  CAD (coronary artery disease)  History of lumbar fusion: with laminectomy 2018  H/O hernia repair: hiatal hernia - ,   S/P dilatation and curettage:   H/O rhinoplasty:   H/O angioplasty: Right iliac artery. 2017  H/O ventral hernia repair: 3/2017  History of incision and drainage: of lower extremity incisional site x 5 . last done2015  H/O arterial bypass of lower limb: Femoral - Popliteal. 2014 Left lower side with stents  History of laparoscopic cholecystectomy: 2016      Review of Systems:  CONSTITUTIONAL: No fever, chills, or fatigue  EYES: No eye pain, visual disturbances, or discharge  ENMT:  No difficulty hearing, tinnitus, vertigo; No sinus or throat pain  NECK: No pain or stiffness  RESPIRATORY: No cough, wheezing, chills or hemoptysis; No shortness of breath  CARDIOVASCULAR: No chest pain, palpitations, dizziness, or leg swelling  GASTROINTESTINAL: No abdominal or epigastric pain. No nausea, vomiting, or hematemesis; No diarrhea or constipation. No melena or hematochezia.  GENITOURINARY: No dysuria, frequency, hematuria, or incontinence  NEUROLOGICAL: No headaches, memory loss, loss of strength, numbness, or tremors  SKIN: No itching, burning, rashes, or lesions   MUSCULOSKELETAL: No joint pain or swelling; No muscle, back, or extremity pain  PSYCHIATRIC: No depression, anxiety, mood swings, or difficulty sleeping      Medications:  cefTRIAXone Injectable. 2000 milliGRAM(s) IV Push every 24 hours  carvedilol 3.125 milliGRAM(s) Oral every 12 hours  isosorbide   mononitrate ER Tablet (IMDUR) 60 milliGRAM(s) Oral daily  midodrine 10 milliGRAM(s) Oral every 8 hours  norepinephrine Infusion 0.03 MICROgram(s)/kG/Min IV Continuous <Continuous>  ranolazine 1000 milliGRAM(s) Oral two times a day  ALBUTerol    90 MICROgram(s) HFA Inhaler 2 Puff(s) Inhalation every 6 hours PRN  acetaminophen   Tablet .. 650 milliGRAM(s) Oral every 4 hours PRN  ARIPiprazole 20 milliGRAM(s) Oral daily  gabapentin 600 milliGRAM(s) Oral three times a day  ondansetron Injectable 4 milliGRAM(s) IV Push every 6 hours PRN  oxyCODONE    IR 5 milliGRAM(s) Oral every 4 hours PRN  oxyCODONE    IR 10 milliGRAM(s) Oral every 4 hours PRN  aspirin  chewable 81 milliGRAM(s) Oral daily  clopidogrel Tablet 75 milliGRAM(s) Oral daily  heparin   Injectable 5000 Unit(s) SubCutaneous every 8 hours  aluminum hydroxide/magnesium hydroxide/simethicone Suspension 30 milliLiter(s) Oral every 4 hours PRN  pantoprazole    Tablet 40 milliGRAM(s) Oral before breakfast  senna 2 Tablet(s) Oral at bedtime PRN  atorvastatin 80 milliGRAM(s) Oral at bedtime  dextrose 40% Gel 15 Gram(s) Oral once PRN  dextrose 50% Injectable 12.5 Gram(s) IV Push once  dextrose 50% Injectable 25 Gram(s) IV Push once  dextrose 50% Injectable 25 Gram(s) IV Push once  glucagon  Injectable 1 milliGRAM(s) IntraMuscular once PRN  insulin glargine Injectable (LANTUS) 20 Unit(s) SubCutaneous at bedtime  insulin lispro (HumaLOG) corrective regimen sliding scale   SubCutaneous three times a day before meals  insulin lispro (HumaLOG) corrective regimen sliding scale   SubCutaneous at bedtime  insulin lispro Injectable (HumaLOG) 6 Unit(s) SubCutaneous three times a day before meals  levothyroxine 112 MICROGram(s) Oral daily  dextrose 5%. 1000 milliLiter(s) IV Continuous <Continuous>  multivitamin 1 Tablet(s) Oral daily  sodium bicarbonate  Infusion 0.069 mEq/kG/Hr IV Continuous <Continuous>            Mode: standby      ICU Vital Signs Last 24 Hrs  T(C): 37 (26 Aug 2020 00:38), Max: 37 (26 Aug 2020 00:38)  T(F): 98.6 (26 Aug 2020 00:38), Max: 98.6 (26 Aug 2020 00:38)  HR: 85 (26 Aug 2020 02:30) (72 - 85)  BP: 113/46 (26 Aug 2020 02:30) (85/51 - 133/52)  BP(mean): 62 (26 Aug 2020 02:30) (59 - 92)  RR: 13 (26 Aug 2020 02:30) (11 - 21)  SpO2: 94% (26 Aug 2020 02:30) (91% - 100%)      ABG - ( 25 Aug 2020 00:50 )  pH, Arterial: 7.28  pH, Blood: x     /  pCO2: 29    /  pO2: 123   / HCO3: 13    / Base Excess: -12.3 /  SaO2: 98                  I&O's Detail    24 Aug 2020 07:01  -  25 Aug 2020 07:00  --------------------------------------------------------  IN:    IV PiggyBack: 50 mL    norepinephrine Infusion: 250 mL    Oral Fluid: 1080 mL  Total IN: 1380 mL    OUT:    Voided: 500 mL  Total OUT: 500 mL    Total NET: 880 mL      25 Aug 2020 07:01  -  26 Aug 2020 03:02  --------------------------------------------------------  IN:    norepinephrine Infusion: 230 mL    Oral Fluid: 1240 mL    sodium bicarbonate  Infusion: 1000 mL  Total IN: 2470 mL    OUT:    Stool: 1 mL    Voided: 300 mL  Total OUT: 301 mL    Total NET: 2169 mL            LABS:                        8.9    23.91 )-----------( 309      ( 25 Aug 2020 20:40 )             26.5     08-25    130<L>  |  102  |  67<H>  ----------------------------<  139<H>  4.3   |  16<L>  |  3.43<H>    Ca    6.8<L>      25 Aug 2020 20:40  Phos  3.5     08-25  Mg     2.1     08-25    TPro  6.0  /  Alb  1.4<L>  /  TBili  0.3  /  DBili  x   /  AST  125<H>  /  ALT  76  /  AlkPhos  252<H>  08-25      CARDIAC MARKERS ( 25 Aug 2020 02:26 )  1.990 ng/mL / x     / x     / x     / x          CAPILLARY BLOOD GLUCOSE      POCT Blood Glucose.: 172 mg/dL (25 Aug 2020 22:00)    PT/INR - ( 25 Aug 2020 02:26 )   PT: 15.9 sec;   INR: 1.38 ratio           Urinalysis Basic - ( 25 Aug 2020 22:55 )    Color: Yellow / Appearance: Clear / S.010 / pH: x  Gluc: x / Ketone: Trace  / Bili: Negative / Urobili: Negative mg/dL   Blood: x / Protein: 100 mg/dL / Nitrite: Negative   Leuk Esterase: Small / RBC: 0-2 /HPF / WBC Negative   Sq Epi: x / Non Sq Epi: Negative / Bacteria: Occasional      CULTURES:  Culture Results:   >100,000 CFU/ml Escherichia coli (20 @ 10:25)  Culture Results:   Growth in aerobic and anaerobic bottles: Escherichia coli  "Due to technical problems, Proteus sp. will Not be reported as part of  the BCID panel until further notice"  ***Blood Panel PCR results on this specimen are available  approximately 3 hours after the Gram stain result.***  Gram stain, PCR, and/or culture results may not always  correspond due to difference in methodologies.  ************************************************************  This PCR assay was performed using [employer].  The following targets are tested for: Enterococcus,  vancomycin resistant enterococci, Listeria monocytogenes,  coagulase negative staphylococci, S. aureus,  methicillin resistant S. aureus, Streptococcus agalactiae  (Group B), S. pneumoniae, S.pyogenes (Group A),  Acinetobacter baumannii, Enterobacter cloacae, E. coli,  Klebsiella oxytoca, K. pneumoniae, Proteus sp.,  Serratia marcescens, Haemophilus influenzae,  Neisseria meningitidis, Pseudomonas aeruginosa, Candida  albicans, C. glabrata, C krusei, C parapsilosis,  C. tropicalis and the KPC resistance gene. (20 @ 08:34)  Culture Results:   Growth in aerobic and anaerobic bottles: Escherichia coli  See previous culture [id number] (20 @ 08:34)      Physical Examination:    General: No acute distress.  AAOX3.     HEENT: Pupils equal, reactive to light.  Symmetric.    PULM: Clear to auscultation bilaterally, no significant sputum production    CVS: Regular rate and rhythm, no murmurs, rubs, or gallops    ABD: Soft, nondistended, nontender, normoactive bowel sounds, no masses    EXT: No edema, nontender    SKIN: Warm and well perfused, no rashes noted.    NEURO: A&Ox3, strength 5/5 all extremities, cranial nerves grossly intact, no focal deficits    RADIOLOGY: < from: Xray Chest 1 View-PORTABLE IMMEDIATE (20 @ 06:31) >  EXAM:  XR CHEST PORTABLE IMMED 1V                            PROCEDURE DATE:  2020          INTERPRETATION:  History: Dyspnea intubation    Chest:  one view.    Comparison: 2020    AP radiograph of the chest demonstrates no evidence of infiltrate, pleural effusion or vascular congestion. No atelectasis is seen. ET and NG tubes placed in satisfactory position. RIGHT internal jugular venous catheter tip in distal SVC. The cardiac silhouette is normal in size. Osseous structures are intact.    Impression: No active pulmonary disease.ET and NG tubes placed in satisfactory position. RIGHT internal jugular venous catheter tip in distal SVC.        < end of copied text >      CRITICAL CARE TIME SPENT:  34 min  Evaluating/treating patient, reviewing data/labs/imaging, discussing case with multidisciplinary team, discussing plan/goals of care with patient/family. Non-inclusive of procedure time.

## 2020-08-26 NOTE — PROGRESS NOTE ADULT - PROBLEM SELECTOR PLAN 1
gram negative sepsis , s/p C P arrest ,   continue fluids , wean pressors  , continue antibiotics ,  monitor renal function , monitor HH

## 2020-08-26 NOTE — PROGRESS NOTE ADULT - SUBJECTIVE AND OBJECTIVE BOX
Patient is a 59y Female who reports no complaints as new Taking PO now, good uop reported  event yest noted - intubated  after hypotension overnight and was given 1L IVF when she developed PEA and initiation of CPR.  Intubated and CVL placed for pressors. now awake and extubated    today   c/o tremors   remains on pressors     REVIEW OF SYSTEMS:    CONSTITUTIONAL: stable weakness, fevers or chills  RESPIRATORY: No cough, wheezing, hemoptysis; No shortness of breath  CARDIOVASCULAR: No chest pain or palpitations  GENITOURINARY: No dysuria, frequency or hematuria  All other review of systems is negative unless indicated above.    MEDICATIONS  (STANDING):  ARIPiprazole 20 milliGRAM(s) Oral daily  aspirin  chewable 81 milliGRAM(s) Oral daily  atorvastatin 80 milliGRAM(s) Oral at bedtime  carvedilol 3.125 milliGRAM(s) Oral every 12 hours  cefTRIAXone Injectable. 2000 milliGRAM(s) IV Push every 24 hours  clopidogrel Tablet 75 milliGRAM(s) Oral daily  dextrose 5%. 1000 milliLiter(s) (50 mL/Hr) IV Continuous <Continuous>  dextrose 50% Injectable 12.5 Gram(s) IV Push once  dextrose 50% Injectable 25 Gram(s) IV Push once  dextrose 50% Injectable 25 Gram(s) IV Push once  gabapentin 600 milliGRAM(s) Oral three times a day  heparin   Injectable 5000 Unit(s) SubCutaneous every 8 hours  insulin glargine Injectable (LANTUS) 20 Unit(s) SubCutaneous at bedtime  insulin lispro (HumaLOG) corrective regimen sliding scale   SubCutaneous three times a day before meals  insulin lispro (HumaLOG) corrective regimen sliding scale   SubCutaneous at bedtime  insulin lispro Injectable (HumaLOG) 6 Unit(s) SubCutaneous three times a day before meals  isosorbide   mononitrate ER Tablet (IMDUR) 60 milliGRAM(s) Oral daily  levothyroxine 112 MICROGram(s) Oral daily  midodrine 10 milliGRAM(s) Oral every 8 hours  multivitamin 1 Tablet(s) Oral daily  norepinephrine Infusion 0.03 MICROgram(s)/kG/Min (3.06 mL/Hr) IV Continuous <Continuous>  pantoprazole    Tablet 40 milliGRAM(s) Oral before breakfast  ranolazine 1000 milliGRAM(s) Oral two times a day  sodium bicarbonate  Infusion 0.069 mEq/kG/Hr (50 mL/Hr) IV Continuous <Continuous>    MEDICATIONS  (PRN):  acetaminophen   Tablet .. 650 milliGRAM(s) Oral every 4 hours PRN Mild Pain (1 - 3)  ALBUTerol    90 MICROgram(s) HFA Inhaler 2 Puff(s) Inhalation every 6 hours PRN Shortness of Breath and/or Wheezing  aluminum hydroxide/magnesium hydroxide/simethicone Suspension 30 milliLiter(s) Oral every 4 hours PRN Dyspepsia  dextrose 40% Gel 15 Gram(s) Oral once PRN Blood Glucose LESS THAN 70 milliGRAM(s)/deciliter  glucagon  Injectable 1 milliGRAM(s) IntraMuscular once PRN Glucose LESS THAN 70 milligrams/deciliter  ondansetron Injectable 4 milliGRAM(s) IV Push every 6 hours PRN Nausea  oxyCODONE    IR 5 milliGRAM(s) Oral every 4 hours PRN Moderate Pain (4 - 6)  oxyCODONE    IR 10 milliGRAM(s) Oral every 4 hours PRN Severe Pain (7 - 10)  senna 2 Tablet(s) Oral at bedtime PRN Constipation      Vital Signs Last 24 Hrs  T(C): 37.9 (26 Aug 2020 06:30), Max: 37.9 (26 Aug 2020 06:30)  T(F): 100.2 (26 Aug 2020 06:30), Max: 100.2 (26 Aug 2020 06:30)  HR: 74 (26 Aug 2020 10:30) (72 - 95)  BP: 116/39 (26 Aug 2020 09:30) (79/58 - 133/52)  BP(mean): 59 (26 Aug 2020 09:30) (35 - 92)  RR: 12 (26 Aug 2020 10:30) (11 - 21)  SpO2: 100% (26 Aug 2020 10:30) (91% - 100%)    I&O's Detail    25 Aug 2020 07:01  -  26 Aug 2020 07:00  --------------------------------------------------------  IN:    norepinephrine Infusion: 463 mL    Oral Fluid: 1480 mL    sodium bicarbonate  Infusion: 1200 mL  Total IN: 3143 mL    OUT:    Stool: 1 mL    Voided: 300 mL  Total OUT: 301 mL    Total NET: 2842 mL        PHYSICAL EXAM:    Constitutional: NAD, frail  HEENT: temp wasting, cachetic. >then stated age  Resp:dist BS  Cardiovascular: S1 and S2  Extremities: tr peripheral edema  Neurological: A/O x 3  : No Black  Skin: No rashes  Access: Not applicable        LABS:               133    |  104    |  70     ----------------------------<  124       26 Aug 2020 06:48  3.6     |  17     |  3.65     130    |  102    |  67     ----------------------------<  139       25 Aug 2020 20:40  4.3     |  16     |  3.43     130    |  103    |  67     ----------------------------<  310       25 Aug 2020 08:42  5.0     |  16     |  3.30     Ca    6.7        26 Aug 2020 06:48  Ca    6.8        25 Aug 2020 20:40    Phos  3.5       26 Aug 2020 06:48  Phos  3.5       25 Aug 2020 20:40    Mg     2.1       25 Aug 2020 20:40  Mg     1.5       25 Aug 2020 02:26    TPro  6.0    /  Alb  1.4    /  TBili  0.3    /        26 Aug 2020 06:48  DBili  x      /  AST  58     /  ALT  54     /  AlkPhos  229      TPro  6.0    /  Alb  1.4    /  TBili  0.3    /        25 Aug 2020 08:42  DBili  x      /  AST  125    /  ALT  76     /  AlkPhos  252        Urine Studies:  Urinalysis Basic - ( 23 Aug 2020 10:25 )    Color: Yellow / Appearance: Clear / S.010 / pH: x  Gluc: x / Ketone: Trace  / Bili: Small / Urobili: 1 mg/dL   Blood: x / Protein: 30 mg/dL / Nitrite: Negative   Leuk Esterase: Moderate / RBC: 25-50 /HPF / WBC >50   Sq Epi: x / Non Sq Epi: Few / Bacteria: Many      RADIOLOGY & ADDITIONAL STUDIES:        Chest:  one view.    Comparison: 2020    AP radiograph of the chest demonstrates no evidence of infiltrate, pleural effusion or vascular congestion. No atelectasis is seen. ET and NG tubes placed in satisfactory position. RIGHT internal jugular venous catheter tip in distal SVC. The cardiac silhouette is normal in size. Osseous structures are intact.    Impression: No active pulmonary disease.ET and NG tubes placed in satisfactory position. RIGHT internal jugular venous catheter tip in distal SVC.

## 2020-08-26 NOTE — PROGRESS NOTE ADULT - SUBJECTIVE AND OBJECTIVE BOX
Hospital # 3  CCU # 3  Vent # 0--Extubated     CC:  Respiratory Failure     HPI:    58 y/o female with CAD, PAD s/p fem-pop bypass s/p RT iliac stent, diabetes, hypothyroidism, hyperlipidemia, current active smoker, uses cocaine last use yesterday, chronic back pain s/p lumbar surgery admitted with UTI sepsis and E. coli bacteremia POA.  ECG revealed STEMI prompting code STEMI--pt underwent urgent angiogram showing occluded RCA--no inervention. Pt developed hypotension overnight and was given 1L IVF when she developed PEA and initiation of CPR.  Intubated and CVL placed for pressors.    :  Case d/w SAMIRA solo.  Pt seen and examined in CCU--vented--awake and alert off sedation.  Did well with SBT--extubated.  On Norepi gtt 0.2    : Pt seen and examined in CCU--extubated.  No events overnight.  Remains on 0.2 Norepi gtt.  Started on midodrine 10 q8.  Awake.  E.coli S to CTX  WBC 30K.  Tm 100.2  C/O Abd pain but eating well wo issues    PMH:  As above.     PSH:  As above.     FH: Non Contributory other than those listed in HPI    Social History:  As above     MEDICATIONS  (STANDING):  ARIPiprazole 20 milliGRAM(s) Oral daily  aspirin  chewable 81 milliGRAM(s) Oral daily  atorvastatin 80 milliGRAM(s) Oral at bedtime  carvedilol 3.125 milliGRAM(s) Oral every 12 hours  cefTRIAXone Injectable. 2000 milliGRAM(s) IV Push every 24 hours  clopidogrel Tablet 75 milliGRAM(s) Oral daily  dextrose 5%. 1000 milliLiter(s) (50 mL/Hr) IV Continuous <Continuous>  dextrose 50% Injectable 12.5 Gram(s) IV Push once  dextrose 50% Injectable 25 Gram(s) IV Push once  dextrose 50% Injectable 25 Gram(s) IV Push once  gabapentin 600 milliGRAM(s) Oral three times a day  heparin   Injectable 5000 Unit(s) SubCutaneous every 8 hours  insulin glargine Injectable (LANTUS) 20 Unit(s) SubCutaneous at bedtime  insulin lispro (HumaLOG) corrective regimen sliding scale   SubCutaneous three times a day before meals  insulin lispro (HumaLOG) corrective regimen sliding scale   SubCutaneous at bedtime  insulin lispro Injectable (HumaLOG) 6 Unit(s) SubCutaneous three times a day before meals  isosorbide   mononitrate ER Tablet (IMDUR) 60 milliGRAM(s) Oral daily  levothyroxine 112 MICROGram(s) Oral daily  midodrine 10 milliGRAM(s) Oral every 8 hours  multivitamin 1 Tablet(s) Oral daily  norepinephrine Infusion 0.03 MICROgram(s)/kG/Min (3.06 mL/Hr) IV Continuous <Continuous>  pantoprazole    Tablet 40 milliGRAM(s) Oral before breakfast  ranolazine 1000 milliGRAM(s) Oral two times a day  sodium bicarbonate  Infusion 0.069 mEq/kG/Hr (50 mL/Hr) IV Continuous <Continuous>    MEDICATIONS  (PRN):  acetaminophen   Tablet .. 650 milliGRAM(s) Oral every 4 hours PRN Mild Pain (1 - 3)  ALBUTerol    90 MICROgram(s) HFA Inhaler 2 Puff(s) Inhalation every 6 hours PRN Shortness of Breath and/or Wheezing  aluminum hydroxide/magnesium hydroxide/simethicone Suspension 30 milliLiter(s) Oral every 4 hours PRN Dyspepsia  dextrose 40% Gel 15 Gram(s) Oral once PRN Blood Glucose LESS THAN 70 milliGRAM(s)/deciliter  glucagon  Injectable 1 milliGRAM(s) IntraMuscular once PRN Glucose LESS THAN 70 milligrams/deciliter  ondansetron Injectable 4 milliGRAM(s) IV Push every 6 hours PRN Nausea  oxyCODONE    IR 5 milliGRAM(s) Oral every 4 hours PRN Moderate Pain (4 - 6)  oxyCODONE    IR 10 milliGRAM(s) Oral every 4 hours PRN Severe Pain (7 - 10)  senna 2 Tablet(s) Oral at bedtime PRN Constipation      Allergies: NKDA    ROS:  SEE BELOW        ICU Vital Signs Last 24 Hrs  T(C): 37.9 (26 Aug 2020 06:30), Max: 37.9 (26 Aug 2020 06:30)  T(F): 100.2 (26 Aug 2020 06:30), Max: 100.2 (26 Aug 2020 06:30)  HR: 87 (26 Aug 2020 07:00) (72 - 95)  BP: 109/52 (26 Aug 2020 07:00) (79/58 - 133/52)  BP(mean): 65 (26 Aug 2020 07:00) (59 - 92)  ABP: --  ABP(mean): --  RR: 16 (26 Aug 2020 07:00) (11 - 21)  SpO2: 98% (26 Aug 2020 07:00) (91% - 100%)          I&O's Summary    25 Aug 2020 07:01  -  26 Aug 2020 07:00  --------------------------------------------------------  IN: 3143 mL / OUT: 301 mL / NET: 2842 mL        Physical Exam:  SEE BELOW                          8.4    30.44 )-----------( 352      ( 26 Aug 2020 06:48 )             24.5       08-26    133<L>  |  104  |  70<H>  ----------------------------<  124<H>  3.6   |  17<L>  |  3.65<H>    Ca    6.7<L>      26 Aug 2020 06:48  Phos  3.5     08-26  Mg     2.1     08-25    TPro  6.0  /  Alb  1.4<L>  /  TBili  0.3  /  DBili  x   /  AST  58<H>  /  ALT  54  /  AlkPhos  229<H>  08-26      CARDIAC MARKERS ( 26 Aug 2020 06:48 )  1.290 ng/mL / x     / x     / x     / x      CARDIAC MARKERS ( 25 Aug 2020 02:26 )  1.990 ng/mL / x     / x     / x     / x            ABG - ( 25 Aug 2020 00:50 )  pH, Arterial: 7.28  pH, Blood: x     /  pCO2: 29    /  pO2: 123   / HCO3: 13    / Base Excess: -12.3 /  SaO2: 98                  Urinalysis Basic - ( 25 Aug 2020 22:55 )    Color: Yellow / Appearance: Clear / S.010 / pH: x  Gluc: x / Ketone: Trace  / Bili: Negative / Urobili: Negative mg/dL   Blood: x / Protein: 100 mg/dL / Nitrite: Negative   Leuk Esterase: Small / RBC: 0-2 /HPF / WBC Negative   Sq Epi: x / Non Sq Epi: Negative / Bacteria: Occasional        DVT Prophylaxis:                                                            Contraindication:     Advanced Directives:    Discussed with:    Visit Information:  Time spent excluding procedure:  30 cc mins    ** Time is exclusive of billed procedures and/or teaching and/or routine family updates.

## 2020-08-26 NOTE — PROGRESS NOTE ADULT - ASSESSMENT
59 CAD, PAD s/p fem-pop bypass s/p RT iliac stent, diabetes, hypothyroidism, hyperlipidemia, current active smoker, uses cocaine last use yesterday, chronic back pain s/p lumbar surgery presented with multiple non-specific complaints, including fevers on/off last 4-5 days, weakness, no appetite and reduced oral intake, vague diffuse abd pains, one day of diarrhea. No cough, no SOB. SHe came in today for evaluation of weakness and found to have ST elevations on presenting EKG, code STEMI called and pt underwent urgent angiogram showing occluded RCA w no intervention with UTI sepsis and E. coli bacteremia POA.      AMISH w septic shock/UTI w STEMI and Code blue event w hypotension - remains on pressors  w expected Cr continues to rise today - ATN component likely   - Urine lytes pending   - check renal /bladder sono  - NSAID avoidance    Metabolic Acidosis with HyperK   improved after SPS    HCO3 gtt for now @50/hour      Hyponatremia   - isotonic fluids    Hyperkalemia  -medically treated, sp kayex as above     Shock    pressors per ICU     d/w CCU  RN    d/w  CCU RN

## 2020-08-26 NOTE — PROGRESS NOTE ADULT - ASSESSMENT
IMP:    60 y/o female with CAD, PAD s/p fem-pop bypass s/p RT iliac stent, diabetes, hypothyroidism, hyperlipidemia, current active smoker, uses cocaine last use yesterday, chronic back pain s/p lumbar surgery admitted with UTI sepsis and E. coli bacteremia POA.  ECG revealed STEMI prompting code STEMI--pt underwent urgent angiogram showing occluded RCA--no inervention. Pt developed hypotension overnight and was given 1L IVF when she developed PEA and initiation of CPR.  Intubated and CVL placed for pressors  Acute resp failure   Septic shock on pressors  AMISH--unclear etiology with baseline Cr 0.8--likely ATN from sepsis   Abd pain with essentially normal CT A/P    High risk for acute decompensation and deterioration including death     Plan:    Actively wean pressors to keep SBP > 90  Cont with CTX (3)  Midodrine--may increase to 15 q8 in next 24 hrs   FS with insulin coverage to keep FS < 180  Hold BP meds  Cont with ASA and clopidogrel   DVT prophy--SCD and sqhep    CCU care-- d/w ICU staff on multi disciplinary rounds and pt-- All concerns addressed including but not limited to diagnosis, treatment plan and overall prognosis

## 2020-08-26 NOTE — PROGRESS NOTE ADULT - SUBJECTIVE AND OBJECTIVE BOX
Date of service: 20 @ 10:01    pt seen and examined  laying in bed, nad  being weaned off pressors  afebrile    ROS: no fever or chills; denies dizziness, no HA, no SOB or cough, no abdominal pain, no diarrhea or constipation; no legs pain, no rashes    MEDICATIONS  (STANDING):  ARIPiprazole 20 milliGRAM(s) Oral daily  aspirin  chewable 81 milliGRAM(s) Oral daily  atorvastatin 80 milliGRAM(s) Oral at bedtime  carvedilol 3.125 milliGRAM(s) Oral every 12 hours  cefTRIAXone Injectable. 2000 milliGRAM(s) IV Push every 24 hours  clopidogrel Tablet 75 milliGRAM(s) Oral daily  dextrose 5%. 1000 milliLiter(s) (50 mL/Hr) IV Continuous <Continuous>  dextrose 50% Injectable 12.5 Gram(s) IV Push once  dextrose 50% Injectable 25 Gram(s) IV Push once  dextrose 50% Injectable 25 Gram(s) IV Push once  gabapentin 600 milliGRAM(s) Oral three times a day  heparin   Injectable 5000 Unit(s) SubCutaneous every 8 hours  insulin glargine Injectable (LANTUS) 20 Unit(s) SubCutaneous at bedtime  insulin lispro (HumaLOG) corrective regimen sliding scale   SubCutaneous three times a day before meals  insulin lispro (HumaLOG) corrective regimen sliding scale   SubCutaneous at bedtime  insulin lispro Injectable (HumaLOG) 6 Unit(s) SubCutaneous three times a day before meals  isosorbide   mononitrate ER Tablet (IMDUR) 60 milliGRAM(s) Oral daily  levothyroxine 112 MICROGram(s) Oral daily  midodrine 10 milliGRAM(s) Oral every 8 hours  multivitamin 1 Tablet(s) Oral daily  norepinephrine Infusion 0.03 MICROgram(s)/kG/Min (3.06 mL/Hr) IV Continuous <Continuous>  pantoprazole    Tablet 40 milliGRAM(s) Oral before breakfast  ranolazine 1000 milliGRAM(s) Oral two times a day  sodium bicarbonate  Infusion 0.069 mEq/kG/Hr (50 mL/Hr) IV Continuous <Continuous>    Vital Signs Last 24 Hrs  T(C): 37.9 (26 Aug 2020 06:30), Max: 37.9 (26 Aug 2020 06:30)  T(F): 100.2 (26 Aug 2020 06:30), Max: 100.2 (26 Aug 2020 06:30)  HR: 87 (26 Aug 2020 07:00) (72 - 95)  BP: 109/52 (26 Aug 2020 07:00) (79/58 - 133/52)  BP(mean): 65 (26 Aug 2020 07:00) (59 - 92)  RR: 16 (26 Aug 2020 07:00) (11 - 21)  SpO2: 98% (26 Aug 2020 07:00) (91% - 100%)    PE:  Constitutional: frail looking  HEENT: NC/AT, EOMI, PERRLA, conjunctivae clear; ears and nose atraumatic; pharynx benign  Neck: supple; thyroid not palpable  Back: no tenderness  Respiratory: respiratory effort normal; clear to auscultation  Cardiovascular: S1S2 regular, no murmurs  Abdomen: soft, not tender, not distended, positive BS; liver and spleen WNL  Genitourinary: no suprapubic tenderness  Lymphatic: no LN palpable  Musculoskeletal: no muscle tenderness, no joint swelling or tenderness  Extremities: no pedal edema  Neurological/ Psychiatric:  moving all extremities  Skin: no rashes; no palpable lesions    Labs: all available labs reviewed                                   8.4    30.44 )-----------( 352      ( 26 Aug 2020 06:48 )             24.5     08-26    133<L>  |  104  |  70<H>  ----------------------------<  124<H>  3.6   |  17<L>  |  3.65<H>    Ca    6.7<L>      26 Aug 2020 06:48  Phos  3.5     08-26  Mg     2.1     08-25    TPro  6.0  /  Alb  1.4<L>  /  TBili  0.3  /  DBili  x   /  AST  58<H>  /  ALT  54  /  AlkPhos  229<H>  08-26        Urinalysis Basic - ( 23 Aug 2020 10:25 )    Color: Yellow / Appearance: Clear / S.010 / pH: x  Gluc: x / Ketone: Trace  / Bili: Small / Urobili: 1 mg/dL   Blood: x / Protein: 30 mg/dL / Nitrite: Negative   Leuk Esterase: Moderate / RBC: 25-50 /HPF / WBC >50   Sq Epi: x / Non Sq Epi: Few / Bacteria: Many    Culture - Urine (20 @ 10:25)    Specimen Source: .Urine Clean Catch (Midstream)    Culture Results:   >100,000 CFU/ml Escherichia coli    Culture - Blood (20 @ 08:34)    Gram Stain:   Growth in aerobic bottle: Gram Negative Rods  Growth in anaerobic bottle: Gram Negative Rods    Specimen Source: .Blood None    Culture Results:   Growth in aerobic and anaerobic bottles: Escherichia coli  See previous culture [id number]    Culture - Blood (20 @ 08:34)    -  Escherichia coli: Detec    Gram Stain:   Growth in aerobic bottle: Gram Negative Rods  Growth in anaerobic bottle: Gram Negative Rods    Specimen Source: .Blood None    Organism: Blood Culture PCR    Culture Results:   Growth in aerobic bottle: Gram Negative Rods  Growth in anaerobic bottle: Gram Negative Rods      Radiology: all available radiological tests reviewed      < from: CT Abdomen and Pelvis w/ Oral Cont (20 @ 16:41) >  EXAM:  CT ABDOMEN AND PELVIS OC                            PROCEDURE DATE:  2020          INTERPRETATION:  CLINICAL INFORMATION: Diffuse abdominal pain    COMPARISON: None.    PROCEDURE:  CT of the Abdomen and Pelvis was performed without intravenous contrast.  Intravenous contrast: None.  Oral contrast: positive contrast was administered.  Sagittal and coronal reformats were performed.    FINDINGS:  LOWER CHEST: Coronary artery calcifications. Clear lung bases.    LIVER: Within normal limits.  BILE DUCTS: Normal caliber.  GALLBLADDER: Cholecystectomy.  SPLEEN: Within normal limits.  PANCREAS: Within normal limits.  ADRENALS: Within normal limits.  KIDNEYS/URETERS: Within normal limits.    BLADDER: Within normal limits.  REPRODUCTIVE ORGANS: Uterus and adnexa within normal limits.    BOWEL: Mildly distended small bowel without transition point to suggest obstruction. No wall thickening or inflammatory change.  Appendix normal.  PERITONEUM: No ascites or pneumoperitoneum..  VESSELS: Severe diffuse atherosclerotic arterial calcification. Normal caliber aorta.  RETROPERITONEUM/LYMPH NODES: No lymphadenopathy.  ABDOMINAL WALL: Within normal limits.  BONES: L4-L5 interpedicular screws and connecting rods. Moderate anterolisthesis of L4 on L5. No acute bony abnormality.    IMPRESSION:  Mildly distended small bowel without evidence of obstruction.    < end of copied text >    Advanced directives addressed: full resuscitation Date of service: 20 @ 10:01    pt seen and examined  c/o of abdominal discomfort  being weaned off pressors  afebrile    ROS: no fever or chills; denies dizziness, no HA, no SOB or cough, no abdominal pain, no diarrhea or constipation; no legs pain, no rashes    MEDICATIONS  (STANDING):  ARIPiprazole 20 milliGRAM(s) Oral daily  aspirin  chewable 81 milliGRAM(s) Oral daily  atorvastatin 80 milliGRAM(s) Oral at bedtime  carvedilol 3.125 milliGRAM(s) Oral every 12 hours  cefTRIAXone Injectable. 2000 milliGRAM(s) IV Push every 24 hours  clopidogrel Tablet 75 milliGRAM(s) Oral daily  dextrose 5%. 1000 milliLiter(s) (50 mL/Hr) IV Continuous <Continuous>  dextrose 50% Injectable 12.5 Gram(s) IV Push once  dextrose 50% Injectable 25 Gram(s) IV Push once  dextrose 50% Injectable 25 Gram(s) IV Push once  gabapentin 600 milliGRAM(s) Oral three times a day  heparin   Injectable 5000 Unit(s) SubCutaneous every 8 hours  insulin glargine Injectable (LANTUS) 20 Unit(s) SubCutaneous at bedtime  insulin lispro (HumaLOG) corrective regimen sliding scale   SubCutaneous three times a day before meals  insulin lispro (HumaLOG) corrective regimen sliding scale   SubCutaneous at bedtime  insulin lispro Injectable (HumaLOG) 6 Unit(s) SubCutaneous three times a day before meals  isosorbide   mononitrate ER Tablet (IMDUR) 60 milliGRAM(s) Oral daily  levothyroxine 112 MICROGram(s) Oral daily  midodrine 10 milliGRAM(s) Oral every 8 hours  multivitamin 1 Tablet(s) Oral daily  norepinephrine Infusion 0.03 MICROgram(s)/kG/Min (3.06 mL/Hr) IV Continuous <Continuous>  pantoprazole    Tablet 40 milliGRAM(s) Oral before breakfast  ranolazine 1000 milliGRAM(s) Oral two times a day  sodium bicarbonate  Infusion 0.069 mEq/kG/Hr (50 mL/Hr) IV Continuous <Continuous>    Vital Signs Last 24 Hrs  T(C): 37.9 (26 Aug 2020 06:30), Max: 37.9 (26 Aug 2020 06:30)  T(F): 100.2 (26 Aug 2020 06:30), Max: 100.2 (26 Aug 2020 06:30)  HR: 87 (26 Aug 2020 07:00) (72 - 95)  BP: 109/52 (26 Aug 2020 07:00) (79/58 - 133/52)  BP(mean): 65 (26 Aug 2020 07:00) (59 - 92)  RR: 16 (26 Aug 2020 07:00) (11 - 21)  SpO2: 98% (26 Aug 2020 07:00) (91% - 100%)    PE:  Constitutional: frail looking  HEENT: NC/AT, EOMI, PERRLA, conjunctivae clear; ears and nose atraumatic; pharynx benign  Neck: supple; thyroid not palpable  Back: no tenderness  Respiratory: respiratory effort normal; clear to auscultation  Cardiovascular: S1S2 regular, no murmurs  Abdomen: soft, not tender, not distended, positive BS; liver and spleen WNL  Genitourinary: no suprapubic tenderness  Lymphatic: no LN palpable  Musculoskeletal: no muscle tenderness, no joint swelling or tenderness  Extremities: no pedal edema  Neurological/ Psychiatric:  moving all extremities  Skin: no rashes; no palpable lesions    Labs: all available labs reviewed                                   8.4    30.44 )-----------( 352      ( 26 Aug 2020 06:48 )             24.5     08-26    133<L>  |  104  |  70<H>  ----------------------------<  124<H>  3.6   |  17<L>  |  3.65<H>    Ca    6.7<L>      26 Aug 2020 06:48  Phos  3.5     08-26  Mg     2.1     08-25    TPro  6.0  /  Alb  1.4<L>  /  TBili  0.3  /  DBili  x   /  AST  58<H>  /  ALT  54  /  AlkPhos  229<H>  08-26        Urinalysis Basic - ( 23 Aug 2020 10:25 )    Color: Yellow / Appearance: Clear / S.010 / pH: x  Gluc: x / Ketone: Trace  / Bili: Small / Urobili: 1 mg/dL   Blood: x / Protein: 30 mg/dL / Nitrite: Negative   Leuk Esterase: Moderate / RBC: 25-50 /HPF / WBC >50   Sq Epi: x / Non Sq Epi: Few / Bacteria: Many    Culture - Urine (20 @ 10:25)    Specimen Source: .Urine Clean Catch (Midstream)    Culture Results:   >100,000 CFU/ml Escherichia coli    Culture - Blood (20 @ 08:34)    Gram Stain:   Growth in aerobic bottle: Gram Negative Rods  Growth in anaerobic bottle: Gram Negative Rods    Specimen Source: .Blood None    Culture Results:   Growth in aerobic and anaerobic bottles: Escherichia coli  See previous culture [id number]    Culture - Blood (20 @ 08:34)    -  Escherichia coli: Detec    Gram Stain:   Growth in aerobic bottle: Gram Negative Rods  Growth in anaerobic bottle: Gram Negative Rods    Specimen Source: .Blood None    Organism: Blood Culture PCR    Culture Results:   Growth in aerobic bottle: Gram Negative Rods  Growth in anaerobic bottle: Gram Negative Rods      Radiology: all available radiological tests reviewed      < from: CT Abdomen and Pelvis w/ Oral Cont (20 @ 16:41) >  EXAM:  CT ABDOMEN AND PELVIS OC                            PROCEDURE DATE:  2020          INTERPRETATION:  CLINICAL INFORMATION: Diffuse abdominal pain    COMPARISON: None.    PROCEDURE:  CT of the Abdomen and Pelvis was performed without intravenous contrast.  Intravenous contrast: None.  Oral contrast: positive contrast was administered.  Sagittal and coronal reformats were performed.    FINDINGS:  LOWER CHEST: Coronary artery calcifications. Clear lung bases.    LIVER: Within normal limits.  BILE DUCTS: Normal caliber.  GALLBLADDER: Cholecystectomy.  SPLEEN: Within normal limits.  PANCREAS: Within normal limits.  ADRENALS: Within normal limits.  KIDNEYS/URETERS: Within normal limits.    BLADDER: Within normal limits.  REPRODUCTIVE ORGANS: Uterus and adnexa within normal limits.    BOWEL: Mildly distended small bowel without transition point to suggest obstruction. No wall thickening or inflammatory change.  Appendix normal.  PERITONEUM: No ascites or pneumoperitoneum..  VESSELS: Severe diffuse atherosclerotic arterial calcification. Normal caliber aorta.  RETROPERITONEUM/LYMPH NODES: No lymphadenopathy.  ABDOMINAL WALL: Within normal limits.  BONES: L4-L5 interpedicular screws and connecting rods. Moderate anterolisthesis of L4 on L5. No acute bony abnormality.    IMPRESSION:  Mildly distended small bowel without evidence of obstruction.    < end of copied text >    Advanced directives addressed: full resuscitation

## 2020-08-27 LAB
ANION GAP SERPL CALC-SCNC: 13 MMOL/L — SIGNIFICANT CHANGE UP (ref 5–17)
BUN SERPL-MCNC: 75 MG/DL — HIGH (ref 7–23)
C DIFF BY PCR RESULT: SIGNIFICANT CHANGE UP
C DIFF TOX GENS STL QL NAA+PROBE: SIGNIFICANT CHANGE UP
CALCIUM SERPL-MCNC: 6.8 MG/DL — LOW (ref 8.5–10.1)
CHLORIDE SERPL-SCNC: 101 MMOL/L — SIGNIFICANT CHANGE UP (ref 96–108)
CO2 SERPL-SCNC: 19 MMOL/L — LOW (ref 22–31)
CREAT SERPL-MCNC: 4.2 MG/DL — HIGH (ref 0.5–1.3)
GLUCOSE SERPL-MCNC: 105 MG/DL — HIGH (ref 70–99)
HCT VFR BLD CALC: 22.9 % — LOW (ref 34.5–45)
HCT VFR BLD CALC: 23.5 % — LOW (ref 34.5–45)
HGB BLD-MCNC: 7.9 G/DL — LOW (ref 11.5–15.5)
HGB BLD-MCNC: 7.9 G/DL — LOW (ref 11.5–15.5)
LACTATE SERPL-SCNC: 3.2 MMOL/L — HIGH (ref 0.7–2)
LACTATE SERPL-SCNC: 3.7 MMOL/L — HIGH (ref 0.7–2)
MAGNESIUM SERPL-MCNC: 1.9 MG/DL — SIGNIFICANT CHANGE UP (ref 1.6–2.6)
MCHC RBC-ENTMCNC: 29.2 PG — SIGNIFICANT CHANGE UP (ref 27–34)
MCHC RBC-ENTMCNC: 29.9 PG — SIGNIFICANT CHANGE UP (ref 27–34)
MCHC RBC-ENTMCNC: 33.6 GM/DL — SIGNIFICANT CHANGE UP (ref 32–36)
MCHC RBC-ENTMCNC: 34.5 GM/DL — SIGNIFICANT CHANGE UP (ref 32–36)
MCV RBC AUTO: 86.7 FL — SIGNIFICANT CHANGE UP (ref 80–100)
MCV RBC AUTO: 86.7 FL — SIGNIFICANT CHANGE UP (ref 80–100)
OB PNL STL: NEGATIVE — SIGNIFICANT CHANGE UP
PHOSPHATE SERPL-MCNC: 5.3 MG/DL — HIGH (ref 2.5–4.5)
PLATELET # BLD AUTO: 294 K/UL — SIGNIFICANT CHANGE UP (ref 150–400)
PLATELET # BLD AUTO: 307 K/UL — SIGNIFICANT CHANGE UP (ref 150–400)
POTASSIUM SERPL-MCNC: 3.8 MMOL/L — SIGNIFICANT CHANGE UP (ref 3.5–5.3)
POTASSIUM SERPL-SCNC: 3.8 MMOL/L — SIGNIFICANT CHANGE UP (ref 3.5–5.3)
RBC # BLD: 2.64 M/UL — LOW (ref 3.8–5.2)
RBC # BLD: 2.71 M/UL — LOW (ref 3.8–5.2)
RBC # FLD: 15.3 % — HIGH (ref 10.3–14.5)
RBC # FLD: 15.5 % — HIGH (ref 10.3–14.5)
SODIUM SERPL-SCNC: 133 MMOL/L — LOW (ref 135–145)
WBC # BLD: 38.88 K/UL — HIGH (ref 3.8–10.5)
WBC # BLD: 48.17 K/UL — CRITICAL HIGH (ref 3.8–10.5)
WBC # FLD AUTO: 38.88 K/UL — HIGH (ref 3.8–10.5)
WBC # FLD AUTO: 48.17 K/UL — CRITICAL HIGH (ref 3.8–10.5)

## 2020-08-27 PROCEDURE — 93306 TTE W/DOPPLER COMPLETE: CPT | Mod: 26

## 2020-08-27 PROCEDURE — 74176 CT ABD & PELVIS W/O CONTRAST: CPT | Mod: 26

## 2020-08-27 PROCEDURE — 99233 SBSQ HOSP IP/OBS HIGH 50: CPT

## 2020-08-27 PROCEDURE — 74018 RADEX ABDOMEN 1 VIEW: CPT | Mod: 26

## 2020-08-27 PROCEDURE — 70450 CT HEAD/BRAIN W/O DYE: CPT | Mod: 26

## 2020-08-27 PROCEDURE — 99291 CRITICAL CARE FIRST HOUR: CPT

## 2020-08-27 RX ORDER — HYDROMORPHONE HYDROCHLORIDE 2 MG/ML
1 INJECTION INTRAMUSCULAR; INTRAVENOUS; SUBCUTANEOUS EVERY 4 HOURS
Refills: 0 | Status: DISCONTINUED | OUTPATIENT
Start: 2020-08-27 | End: 2020-08-31

## 2020-08-27 RX ORDER — METRONIDAZOLE 500 MG
500 TABLET ORAL EVERY 8 HOURS
Refills: 0 | Status: DISCONTINUED | OUTPATIENT
Start: 2020-08-27 | End: 2020-08-29

## 2020-08-27 RX ORDER — HYDROCORTISONE 20 MG
100 TABLET ORAL EVERY 8 HOURS
Refills: 0 | Status: DISCONTINUED | OUTPATIENT
Start: 2020-08-27 | End: 2020-08-28

## 2020-08-27 RX ORDER — VANCOMYCIN HCL 1 G
500 VIAL (EA) INTRAVENOUS EVERY 6 HOURS
Refills: 0 | Status: DISCONTINUED | OUTPATIENT
Start: 2020-08-27 | End: 2020-08-27

## 2020-08-27 RX ORDER — VANCOMYCIN HCL 1 G
250 VIAL (EA) INTRAVENOUS EVERY 6 HOURS
Refills: 0 | Status: DISCONTINUED | OUTPATIENT
Start: 2020-08-27 | End: 2020-08-27

## 2020-08-27 RX ORDER — SODIUM CHLORIDE 9 MG/ML
500 INJECTION, SOLUTION INTRAVENOUS ONCE
Refills: 0 | Status: COMPLETED | OUTPATIENT
Start: 2020-08-27 | End: 2020-08-27

## 2020-08-27 RX ORDER — VANCOMYCIN HCL 1 G
750 VIAL (EA) INTRAVENOUS ONCE
Refills: 0 | Status: COMPLETED | OUTPATIENT
Start: 2020-08-27 | End: 2020-08-27

## 2020-08-27 RX ADMIN — Medication 3.06 MICROGRAM(S)/KG/MIN: at 11:16

## 2020-08-27 RX ADMIN — CLOPIDOGREL BISULFATE 75 MILLIGRAM(S): 75 TABLET, FILM COATED ORAL at 10:11

## 2020-08-27 RX ADMIN — Medication 75 MEQ/KG/HR: at 00:48

## 2020-08-27 RX ADMIN — HEPARIN SODIUM 5000 UNIT(S): 5000 INJECTION INTRAVENOUS; SUBCUTANEOUS at 21:36

## 2020-08-27 RX ADMIN — Medication 650 MILLIGRAM(S): at 17:04

## 2020-08-27 RX ADMIN — Medication 250 MILLIGRAM(S): at 02:13

## 2020-08-27 RX ADMIN — HEPARIN SODIUM 5000 UNIT(S): 5000 INJECTION INTRAVENOUS; SUBCUTANEOUS at 15:47

## 2020-08-27 RX ADMIN — HEPARIN SODIUM 5000 UNIT(S): 5000 INJECTION INTRAVENOUS; SUBCUTANEOUS at 05:50

## 2020-08-27 RX ADMIN — Medication 100 MILLIGRAM(S): at 21:33

## 2020-08-27 RX ADMIN — Medication 100 MILLIGRAM(S): at 15:47

## 2020-08-27 RX ADMIN — Medication 100 MILLIGRAM(S): at 02:09

## 2020-08-27 RX ADMIN — Medication 81 MILLIGRAM(S): at 10:11

## 2020-08-27 RX ADMIN — Medication 100 MILLIGRAM(S): at 18:07

## 2020-08-27 RX ADMIN — Medication 250 MILLIGRAM(S): at 13:40

## 2020-08-27 RX ADMIN — CEFTRIAXONE 2000 MILLIGRAM(S): 500 INJECTION, POWDER, FOR SOLUTION INTRAMUSCULAR; INTRAVENOUS at 02:08

## 2020-08-27 RX ADMIN — CEFTRIAXONE 2000 MILLIGRAM(S): 500 INJECTION, POWDER, FOR SOLUTION INTRAMUSCULAR; INTRAVENOUS at 21:33

## 2020-08-27 RX ADMIN — Medication 100 MILLIGRAM(S): at 02:13

## 2020-08-27 RX ADMIN — HYDROMORPHONE HYDROCHLORIDE 1 MILLIGRAM(S): 2 INJECTION INTRAMUSCULAR; INTRAVENOUS; SUBCUTANEOUS at 20:21

## 2020-08-27 RX ADMIN — Medication 100 MILLIGRAM(S): at 05:50

## 2020-08-27 RX ADMIN — VASOPRESSIN 2.4 UNIT(S)/MIN: 20 INJECTION INTRAVENOUS at 11:16

## 2020-08-27 RX ADMIN — Medication 100 MILLIGRAM(S): at 10:11

## 2020-08-27 RX ADMIN — Medication 75 MEQ/KG/HR: at 13:50

## 2020-08-27 RX ADMIN — PANTOPRAZOLE SODIUM 40 MILLIGRAM(S): 20 TABLET, DELAYED RELEASE ORAL at 10:11

## 2020-08-27 RX ADMIN — SODIUM CHLORIDE 1000 MILLILITER(S): 9 INJECTION, SOLUTION INTRAVENOUS at 00:29

## 2020-08-27 RX ADMIN — HYDROMORPHONE HYDROCHLORIDE 1 MILLIGRAM(S): 2 INJECTION INTRAMUSCULAR; INTRAVENOUS; SUBCUTANEOUS at 20:45

## 2020-08-27 RX ADMIN — Medication 3.06 MICROGRAM(S)/KG/MIN: at 00:10

## 2020-08-27 NOTE — PROGRESS NOTE ADULT - SUBJECTIVE AND OBJECTIVE BOX
60 y/o female with CAD, PAD s/p fem-pop bypass s/p RT iliac stent, diabetes, hypothyroidism, hyperlipidemia, current active smoker, uses cocaine last use yesterday, chronic back pain s/p lumbar surgery admitted with UTI sepsis and E. coli bacteremia POA.  ECG revealed STEMI prompting code STEMI--pt underwent urgent angiogram showing occluded RCA--no inervention. Pt developed hypotension overnight and was given 1L IVF when she developed PEA and initiation of CPR.  Intubated and CVL placed for pressors.    :  Case d/w SAMIRA solo.  Pt seen and examined in CCU--vented--awake and alert off sedation.  Did well with SBT--extubated.  On Norepi gtt 0.2    : Pt seen and examined in CCU--extubated.  No events overnight.  Remains on 0.2 Norepi gtt.  Started on midodrine 10 q8.  Awake.  E.coli S to CTX  WBC 30K.  Tm 100.2  C/O Abd pain but eating well wo issues    : worsening lactic acidosis overnight, now resolving, abd pain, CT A/P obtained showed colitis in sigmoid colon  WBC uprising concern for Cdiff colitis/Ischemic Colitis, surgery consulted  Started on Vanco PO    T(C): 36.4 (20 @ 05:39), Max: 36.7 (20 @ 00:49)  HR: 83 (20 @ 09:00) (69 - 97)  BP: 129/60 (20 @ 09:00) (77/66 - 152/52)  RR: 9 (20 @ 09:00) (9 - 20)  SpO2: 88% (20 @ 08:30) (75% - 100%)  Wt(kg): --    Gen: Awake, NAD  HEENT: NCAT, EOMI  Neck: Supple  CV: nml S1S2, RRR  Lungs: CTABL  Abd: Soft, diffusely tender, ND, BS+  Ext: No edema  Neuro: Non focal    CARDIAC MARKERS ( 26 Aug 2020 23:09 )  1.340 ng/mL / x     / x     / x     / x      CARDIAC MARKERS ( 26 Aug 2020 06:48 )  1.290 ng/mL / x     / x     / x     / x                                7.9    48.17 )-----------( 307      ( 27 Aug 2020 05:17 )             22.9     27 Aug 2020 05:17    133    |  101    |  75     ----------------------------<  105    3.8     |  19     |  4.20     Ca    6.8        27 Aug 2020 05:17  Phos  5.3       27 Aug 2020 05:17  Mg     1.9       27 Aug 2020 05:17    TPro  6.0    /  Alb  1.4    /  TBili  0.3    /  DBili  x      /  AST  58     /  ALT  54     /  AlkPhos  229    26 Aug 2020 06:48      CAPILLARY BLOOD GLUCOSE      POCT Blood Glucose.: 116 mg/dL (27 Aug 2020 10:23)  POCT Blood Glucose.: 132 mg/dL (27 Aug 2020 06:02)  POCT Blood Glucose.: 87 mg/dL (26 Aug 2020 22:17)  POCT Blood Glucose.: 101 mg/dL (26 Aug 2020 21:17)  POCT Blood Glucose.: 57 mg/dL (26 Aug 2020 20:50)  POCT Blood Glucose.: 67 mg/dL (26 Aug 2020 20:23)  POCT Blood Glucose.: 138 mg/dL (26 Aug 2020 19:26)  POCT Blood Glucose.: 205 mg/dL (26 Aug 2020 18:52)  POCT Blood Glucose.: 66 mg/dL (26 Aug 2020 18:29)  POCT Blood Glucose.: 72 mg/dL (26 Aug 2020 18:09)  POCT Blood Glucose.: 81 mg/dL (26 Aug 2020 13:25)    LIVER FUNCTIONS - ( 26 Aug 2020 06:48 )  Alb: 1.4 g/dL / Pro: 6.0 gm/dL / ALK PHOS: 229 U/L / ALT: 54 U/L / AST: 58 U/L / GGT: x           Urinalysis Basic - ( 25 Aug 2020 22:55 )    Color: Yellow / Appearance: Clear / S.010 / pH: x  Gluc: x / Ketone: Trace  / Bili: Negative / Urobili: Negative mg/dL   Blood: x / Protein: 100 mg/dL / Nitrite: Negative   Leuk Esterase: Small / RBC: 0-2 /HPF / WBC Negative   Sq Epi: x / Non Sq Epi: Negative / Bacteria: Occasional

## 2020-08-27 NOTE — PROGRESS NOTE ADULT - ASSESSMENT
58yo/F with PMH CAD, PAD s/p fem-pop bypass s/p RT iliac stent, diabetes, hypothyroidism, hyperlipidemia, current active smoker, uses cocaine last use 8/22  chronic back pain s/p lumbar surgery presented with multiple non-specific complaints, including fevers on/off last 4-5 days, weakness, no appetite and reduced oral intake, vague diffuse abd pains, one day of diarrhea. No cough, no SOB. She came in 8/23 for evaluation of weakness and found to have ST elevations on presenting EKG, code STEMI called and pt underwent urgent angiogram showing occluded RCA. She denies chest pain at present Also noted with positive UA, blood cx with Ecoli, CT abd/pelvis unremarkable, was started on IV rocephin.       1. septic shock with Ecoli. cystitis. leukocytosis. abdominal pain/ischemic colitis. AMISH  - urine cx/blood cx growing Ecoli; sensitivities reviewed   - on rocephin 2gm daily #5  - continue with IV flagyl [id number] #2  - continue with abx coverage   - worsening leukocytosis - 48, CT chest/abd/pelvis concerning for ischemic colitis, wbc ct could be reactive and steroids also contributing in acute rise   - C diff pcr (-) doubt Cdad, suggest dc oral vancomycin  - surgery evaluation   - monitor temps  - tolerating abx well so far; no side effects noted  - reason for abx use and side effects reviewed with patient  - supportive care  - fu cbc    2. other issues - s/p cardiac arrest/STEMI - cardiology/ICU following

## 2020-08-27 NOTE — PROGRESS NOTE ADULT - PROBLEM SELECTOR PLAN 2
No chest pain; occluded RCA of unknown chronicity; continue medical management with aspirin, Plavix, atorvastatin.

## 2020-08-27 NOTE — CONSULT NOTE ADULT - ASSESSMENT
65 yo female s/p MI, cardiac cath, respiratory failure requiring intubation. Currently extubated, but with lactic acidosis, abdominal pain, distension, diarrhea. C Diff negative. Likely Ischemic colitis evidenced on CT scan. Possible non occlusive mesenteric ischemic due to low flow state. Recommend transfusion 2 units. Wean levophed as tolerated. Continue IV abx. NPO. Pain management. Will follow. If no improvement, repeat imaging study.Will follow.

## 2020-08-27 NOTE — CONSULT NOTE ADULT - SUBJECTIVE AND OBJECTIVE BOX
60 y/o female with CAD, PAD s/p fem-pop bypass s/p RT iliac stent, diabetes, hypothyroidism, hyperlipidemia, current active smoker, uses cocaine last use yesterday, chronic back pain s/p lumbar surgery admitted with UTI sepsis and E. coli bacteremia POA.  ECG revealed STEMI prompting code STEMI--pt underwent urgent angiogram showing occluded RCA--no inervention. Pt developed hypotension overnight and was given 1L IVF when she developed PEA and initiation of CPR.  Intubated and CVL placed for pressors.    Course:  :  Case d/w SAMIRA solo.  Pt seen and examined in CCU--vented--awake and alert off sedation.  Did well with SBT--extubated.  On Norepi gtt 0.2    : Pt seen and examined in CCU--extubated.  No events overnight.  Remains on 0.2 Norepi gtt.  Started on midodrine 10 q8.  Awake.  E.coli S to CTX  WBC 30K.  Tm 100.2  C/O Abd pain but eating well wo issues    : worsening lactic acidosis overnight, now resolving, abd pain, CT A/P obtained showed colitis in sigmoid colon  WBC uprising concern for Cdiff colitis/Ischemic Colitis, surgery consulted  Started on Vanco PO    PAST MEDICAL & SURGICAL HISTORY:  Lung nodule  Diabetes mellitus  History of TIAs  History of gallbladder disease: surgery  History of colon polyps: precancerous  Substance abuse: history of. last used 14 years ago.  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 Decreased ROM.  Angina pectoris  History of MRSA infection: left lower extremity incisional area   Anxiety and depression  Transient cerebral ischemia, unspecified type  Osteoarthritis of spine, unspecified spinal osteoarthritis complication status, unspecified spinal region  Lumbar herniated disc  Urinary incontinence, unspecified type  Overactive bladder  Hiatal hernia with GERD: hiatal hernia repaired  PAD (peripheral artery disease)  Hyperlipidemia, unspecified hyperlipidemia type  Essential hypertension  Hypothyroidism  COPD (chronic obstructive pulmonary disease)  CAD (coronary artery disease)  History of lumbar fusion: with laminectomy 2018  H/O hernia repair: hiatal hernia - ,   S/P dilatation and curettage:   H/O rhinoplasty:   H/O angioplasty: Right iliac artery. 2017  H/O ventral hernia repair: 3/2017  History of incision and drainage: of lower extremity incisional site x 5 . last done2015  H/O arterial bypass of lower limb: Femoral - Popliteal. 2014 Left lower side with stents  History of laparoscopic cholecystectomy: 2016      Home Medications:  Abilify 20 mg oral tablet: 1 tab(s) orally once a day (17 Oct 2019 08:48)  acetaminophen 325 mg oral tablet: 2 tab(s) orally every 6 hours, As needed, Temp greater or equal to 38C (100.4F), Mild Pain (1 - 3) (17 Oct 2019 08:48)  aspirin 81 mg oral tablet: 1 tab(s) orally once a day. last dose 10/15/2019. (17 Oct 2019 08:48)  atorvastatin 40 mg oral tablet: 1 tab(s) orally once a day (17 Oct 2019 08:48)  Basaglar KwikPen 100 units/mL subcutaneous solution: 35 unit(s) subcutaneous once a day (at bedtime) (17 Oct 2019 08:48)  Chantix 1 mg oral tablet: 1 tab(s) orally 2 times a day (17 Oct 2019 08:48)  clopidogrel 75 mg oral tablet: 1 tab(s) orally once a day.  last dose 10/15/ 2019   (17 Oct 2019 08:48)  gabapentin 300 mg oral tablet: 2 cap(s) orally 3 times a day. Equals 600mg 3 times a day (17 Oct 2019 08:48)  HumaLOG KwikPen 100 units/mL subcutaneous solution: dose(s) subcutaneous 4 times a day (before meals and at bedtime) as per fingerstick result (17 Oct 2019 08:48)  Imdur 60 mg oral tablet, extended release: 1 tab(s) orally once a day (in the morning) (17 Oct 2019 08:48)  Incruse Ellipta: 1 puff(s) inhaled once a day (17 Oct 2019 08:48)  levothyroxine 112 mcg (0.112 mg) oral tablet: 1 tab(s) orally once a day (17 Oct 2019 08:48)  metFORMIN 500 mg oral tablet: 1 tab(s) orally 2 times a day (17 Oct 2019 08:48)  metoprolol tartrate 25 mg oral tablet: 1 tab(s) orally once a day (17 Oct 2019 08:48)  Multiple Vitamins oral capsule: 1 cap(s) orally once a day (17 Oct 2019 08:48)  Norco 10 mg-325 mg oral tablet: 1 tab(s) orally every 6 hours, As Needed for back pain. (17 Oct 2019 08:48)  pantoprazole 40 mg oral delayed release tablet: 1 tab(s) orally once a day (17 Oct 2019 08:48)  Ranexa 1000 mg oral tablet, extended release: 1 tab(s) orally 2 times a day (17 Oct 2019 08:48)  Ventolin HFA 90 mcg/inh inhalation aerosol: 2 puff(s) inhaled 4 times a day, As Needed (17 Oct 2019 08:48)      Vitals:  T(C): 35.9 ( @ 11:34), Max: 36.7 ( @ 00:49)  HR: 85 ( @ 12:00) (69 - 97)  BP: 143/67 ( @ 11:34) (77/66 - 152/52)  RR: 11 ( @ 12:00) (9 - 20)  SpO2: 99% ( @ 12:00) (75% - 100%)     @ 07:01  -   @ 07:00  --------------------------------------------------------  IN:    IV PiggyBack: 350 mL    Lactated Ringers IV Bolus: 500 mL    norepinephrine Infusion: 425 mL    sodium bicarbonate  Infusion: 600 mL    sodium bicarbonate  Infusion: 560 mL    vasopressin Infusion: 16 mL  Total IN: 2451 mL    OUT:    Indwelling Catheter - Urethral: 375 mL    Stool: 103 mL  Total OUT: 478 mL    Total NET: 1973 mL          Physical Exam:  Pt is AAOx3  General: Well developed, in no acute distress.   Chest: Lungs clear, no rales, no rhonchi, no wheezes.   Heart: RR, no murmurs, no rubs, no gallops.   Abdomen: Distended with diffuse abdominal tenderness   Back: Normal curvature, no tenderness.   Neuro: Physiological, no localizing findings.   Skin: Normal, no rashes, no lesions noted.   Extremities: Warm, well perfused, no edema, Pulses intact     @ 05:17                    7.9  CBC: 48.17>)-------(<307                     22.9                 133 | 101 | 75    CMP:  ----------------------< 105               3.8 | 19 | 4.20                      Ca:6.8  Phos:5.3  M.9               -|      |-        LFTs:  ------|-|-----             -|      |-   @ 00:40                    7.9  CBC: 38.88>)-------(<294                     23.5                 - | - | -    CMP:  ----------------------< -               - | - | -                      Ca:-  Phos:-  Mg:-               -|      |-        LFTs:  ------|-|-----             -|      |-   @ 23:09                    6.3  CBC: 35.76>)-------(<323                     19.1                 133 | 104 | 70    CMP:  ----------------------< 106               4.0 | 15 | 4.21                      Ca:6.7  Phos:4.6  M.0               -|      |-        LFTs:  ------|-|-----             -|      |-   @ 06:48                    8.4  CBC: 30.44>)-------(<352                     24.5                 133 | 104 | 70    CMP:  ----------------------< 124               3.6 | 17 | 3.65                      Ca:6.7  Phos:3.5  Mg:-               0.3|      |58        LFTs:  ------|229|-----             -|      |-      Culture - Urine (collected 20 @ 10:25)  Source: .Urine Clean Catch (Midstream)  Final Report (20 @ 17:56):    >100,000 CFU/ml Escherichia coli  Organism: Escherichia coli (20 @ 17:56)  Organism: Escherichia coli (20 @ 17:56)      -  Amikacin: S <=16      -  Amoxicillin/Clavulanic Acid: S <=8/4      -  Ampicillin: S <=8 These ampicillin results predict results for amoxicillin      -  Ampicillin/Sulbactam: S <=4/2 Enterobacter, Citrobacter, and Serratia may develop resistance during prolonged therapy (3-4 days)      -  Aztreonam: S <=4      -  Cefazolin: S <=2 ([id number]_[id number]) For uncomplicated UTI with K. pneumoniae, E. coli, or P. mirablis: PINA <=16 is sensitive and PINA >=32 is resistant. This also predicts results for oral agents cefaclor, cefdinir, cefpodoxime, cefprozil, cefuroxime axetil, cephalexin and locarbef for uncomplicated UTI. Note that some isolates may be susceptible to these agents while testing resistant to cefazolin.      -  Cefepime: S <=2      -  Cefotaxime: S <=2      -  Cefoxitin: R >16      -  Ceftazidime: S <=1      -  Ceftriaxone: S <=1 Enterobacter, Citrobacter, and Serratia may develop resistance during prolonged therapy      -  Cefuroxime: S <=4      -  Ciprofloxacin: S <=0.25      -  Ertapenem: S <=0.5      -  Gentamicin: S <=2      -  Levofloxacin: S <=0.5      -  Meropenem: S <=1      -  Minocycline: S <=4      -  Nitrofurantoin: S <=32 Should not be used to treat pyelonephritis      -  Piperacillin/Tazobactam: S <=8      -  Tigecycline: S <=2      -  Tobramycin: S <=2      -  Trimethoprim/Sulfamethoxazole: S <=0.5/9.5      Method Type: PINA    Culture - Blood (collected 20 @ 08:34)  Source: .Blood None  Gram Stain (20 @ 13:25):    Growth in aerobic bottle: Gram Negative Rods    Growth in anaerobic bottle: Gram Negative Rods  Final Report (20 @ 17:30):    Growth in aerobic and anaerobic bottles: Escherichia coli    See previous culture [id number]    Culture - Blood (collected 20 @ 08:34)  Source: .Blood None  Gram Stain (20 @ 17:39):    Growth in aerobic bottle: Gram Negative Rods    Growth in anaerobic bottle: Gram Negative Rods  Final Report (20 @ 17:28):    Growth in aerobic and anaerobic bottles: Escherichia coli    "Due to technical problems, Proteus sp. will Not be reported as part of    the BCID panel until further notice"    ***Blood Panel PCR results on this specimen are available    approximately 3 hours after the Gram stain result.***    Gram stain, PCR, and/or culture results may not always    correspond due to difference in methodologies.    ************************************************************    This PCR assay was performed using [employer].    The following targets are tested for: Enterococcus,    vancomycin resistant enterococci, Listeria monocytogenes,    coagulase negative staphylococci, S. aureus,    methicillin resistant S. aureus, Streptococcus agalactiae    (Group B), S. pneumoniae, S.pyogenes (Group A),    Acinetobacter baumannii, Enterobacter cloacae, E. coli,    Klebsiella oxytoca, K. pneumoniae, Proteus sp.,    Serratia marcescens, Haemophilus influenzae,    Neisseria meningitidis, Pseudomonas aeruginosa, Candida    albicans, C. glabrata, C krusei, C parapsilosis,    C. tropicalis and the KPC resistance gene.  Organism: Blood Culture PCR  Escherichia coli (20 @ 17:28)  Organism: Escherichia coli (20 @ 17:28)      -  Amikacin: S <=16      -  Ampicillin: S <=8 These ampicillin results predict results for amoxicillin      -  Ampicillin/Sulbactam: S <=4/2 Enterobacter, Citrobacter, and Serratia may develop resistance during prolonged therapy (3-4 days)      -  Aztreonam: S <=4      -  Cefazolin: S <=2 Enterobacter, Citrobacter, and Serratia may develop resistance during prolonged therapy (3-4 days)      -  Cefepime: S <=2      -  Cefoxitin: S <=8      -  Ceftriaxone: S <=1 Enterobacter, Citrobacter, and Serratia may develop resistance during prolonged therapy      -  Ciprofloxacin: S <=0.25      -  Ertapenem: S <=0.5      -  Gentamicin: S <=2      -  Imipenem: S <=1      -  Levofloxacin: S <=0.5      -  Meropenem: S <=1      -  Piperacillin/Tazobactam: S <=8      -  Tobramycin: S <=2      -  Trimethoprim/Sulfamethoxazole: S <=0.5/9.5      Method Type: PINA  Organism: Blood Culture PCR (20 @ 17:28)      -  Escherichia coli: Detec      Method Type: PCR      Current Inpatient Medications:  acetaminophen   Tablet .. 650 milliGRAM(s) Oral every 4 hours PRN  ALBUTerol    90 MICROgram(s) HFA Inhaler 2 Puff(s) Inhalation every 6 hours PRN  aluminum hydroxide/magnesium hydroxide/simethicone Suspension 30 milliLiter(s) Oral every 4 hours PRN  ARIPiprazole 20 milliGRAM(s) Oral daily  aspirin  chewable 81 milliGRAM(s) Oral daily  atorvastatin 80 milliGRAM(s) Oral at bedtime  cefTRIAXone Injectable. 2000 milliGRAM(s) IV Push every 24 hours  clopidogrel Tablet 75 milliGRAM(s) Oral daily  dextrose 40% Gel 15 Gram(s) Oral once PRN  dextrose 5%. 1000 milliLiter(s) (50 mL/Hr) IV Continuous <Continuous>  dextrose 50% Injectable 12.5 Gram(s) IV Push once  dextrose 50% Injectable 25 Gram(s) IV Push once  dextrose 50% Injectable 25 Gram(s) IV Push once  gabapentin 600 milliGRAM(s) Oral three times a day  glucagon  Injectable 1 milliGRAM(s) IntraMuscular once PRN  heparin   Injectable 5000 Unit(s) SubCutaneous every 8 hours  hydrocortisone sodium succinate Injectable 100 milliGRAM(s) IV Push every 8 hours  insulin glargine Injectable (LANTUS) 20 Unit(s) SubCutaneous at bedtime  insulin lispro (HumaLOG) corrective regimen sliding scale   SubCutaneous three times a day before meals  insulin lispro (HumaLOG) corrective regimen sliding scale   SubCutaneous at bedtime  insulin lispro Injectable (HumaLOG) 6 Unit(s) SubCutaneous three times a day before meals  levothyroxine 112 MICROGram(s) Oral daily  metroNIDAZOLE  IVPB 500 milliGRAM(s) IV Intermittent every 8 hours  midodrine 10 milliGRAM(s) Oral every 8 hours  multivitamin 1 Tablet(s) Oral daily  norepinephrine Infusion 0.03 MICROgram(s)/kG/Min (3.06 mL/Hr) IV Continuous <Continuous>  ondansetron Injectable 4 milliGRAM(s) IV Push every 6 hours PRN  oxyCODONE    IR 5 milliGRAM(s) Oral every 4 hours PRN  oxyCODONE    IR 10 milliGRAM(s) Oral every 4 hours PRN  pantoprazole    Tablet 40 milliGRAM(s) Oral before breakfast  ranolazine 1000 milliGRAM(s) Oral two times a day  sodium bicarbonate  Infusion 0.207 mEq/kG/Hr (75 mL/Hr) IV Continuous <Continuous>  vancomycin    Solution 250 milliGRAM(s) Oral every 6 hours  vasopressin Infusion 0.04 Unit(s)/Min (2.4 mL/Hr) IV Continuous <Continuous>

## 2020-08-27 NOTE — CONSULT NOTE ADULT - ATTENDING COMMENTS
Pt seen and examined earlier ~ 11:30 AM.  Late note entry due to emergent surgery on another patient.  Agree with assessment and plan per Dr. Stroud.    Consult for patient given concern for sepsis and CT scan demonstrating sigmoid colitis.  58 YO F who was admitted with Urosepsis and e. coli septicemia.  She was diagnosed with STEMI and underwent coronary angiogram.  She sustained PEA arrest and underwent CPR. afterwards has been put on vasopressors.  Pt reports abdominal pain after extubation and pressor requirements and worsening lactic acidosis have increased along with leukocytosis.  CT scan was performed which showed concern for sigmoid colitis.  C diff pending.    PMH, Meds, Allergies, SH, FH, ROS reviewed  in mild distress  Abd mild distension soft/ tender diffusely to deep palpation, no rebound no guarding    labs/imaging reviewed    imp: abdominal pain, abnormal CT scan,  --concerned about bowel ischemia, suspect Pt seen and examined earlier ~ 11:30 AM.  Late note entry due to emergent surgery on another patient.  Agree with assessment and plan per Dr. Stroud.    Consult for patient given concern for sepsis and CT scan demonstrating sigmoid colitis.  58 YO F who was admitted with Urosepsis and e. coli septicemia.  She was diagnosed with STEMI and underwent coronary angiogram.  She sustained PEA arrest and underwent CPR. afterwards has been put on vasopressors.  Pt reports abdominal pain after extubation and pressor requirements and worsening lactic acidosis have increased along with leukocytosis.  CT scan was performed which showed concern for sigmoid colitis.  C diff pending.    PMH, Meds, Allergies, SH, FH, ROS reviewed  in mild distress  Abd mild distension soft/ tender diffusely to deep palpation, no rebound no guarding    labs/imaging reviewed    imp: abdominal pain, abnormal CT scan,  --concerned about bowel ischemia, suspect low flow state.  Concerned that given patient's significant peripheral vascular disease, that process may not involve the sigmoid colon exclusively.  Recommend general surgery consult, will follow along as needed.

## 2020-08-27 NOTE — PROGRESS NOTE ADULT - SUBJECTIVE AND OBJECTIVE BOX
59y year old Female admitted for Patient is a 59y old  Female who presents with a chief complaint of abd pain, weakness (26 Aug 2020 23:27)          60 yo f with CAD PAD s/p LE bypass sx s/p R iliac stent, DM, hypothyroidism, HLD, smoker, daily cocaine use,  back pain s/p lumbar surgery admitted with STEMI, ecoil bacteremia/uti, septic shock, AMISH, yesterday had PEA arrest requiring intubation, now extubated, this evening with AMS and increase pressor requirements, she is more awake now, complaining of abdominal pain, no light headedness, dizziness, chest pain, back pain, shortness of breath or fever.     PMH:  ST elevation myocardial infarction (STEMI)  Family history of asthma (Sibling)  Family history of epilepsy (Sibling, Sibling)  Family history of cardiovascular disease  Family history of stroke  Family history of heart disease  Family history of alcoholism in father  Handoff  MEWS Score  Lung nodule  Diabetes mellitus  History of TIAs  History of TIA due to embolism  History of gallbladder disease  History of colon polyps  Substance abuse  ETOH abuse  Spinal stenosis of lumbar region  DDD (degenerative disc disease), lumbar  Cystic breast  GERD (gastroesophageal reflux disease)  Carotid artery stenosis  TIA (transient ischemic attack)  Bipolar depression  Shoulder disorder  Angina pectoris  History of MRSA infection  Anxiety and depression  Transient cerebral ischemia, unspecified type  Osteoarthritis, unspecified osteoarthritis type, unspecified site  Osteoarthritis of spine, unspecified spinal osteoarthritis complication status, unspecified spinal region  Lumbar herniated disc  Urinary incontinence, unspecified type  Overactive bladder  Gall bladder disease  Constipation, unspecified constipation type  Hiatal hernia with GERD  PAD (peripheral artery disease)  Hyperlipidemia, unspecified hyperlipidemia type  Essential hypertension  Hypothyroidism  DM (diabetes mellitus), type 1  COPD (chronic obstructive pulmonary disease)  CAD (coronary artery disease)  Substance abuse  ETOH abuse  Hypothyroidism  DM (diabetes mellitus), type 1  COPD (chronic obstructive pulmonary disease)  CAD (coronary artery disease)  STEMI (ST elevation myocardial infarction)  Septic shock  Diabetes  AMISH (acute kidney injury)  UTI (urinary tract infection)  Cardiogenic shock  Cardiac arrest  Acute respiratory failure with hypoxia  Substance abuse  PAD (peripheral artery disease)  Essential hypertension  STEMI (ST elevation myocardial infarction)  History of lumbar fusion  H/O hernia repair  S/P dilatation and curettage  H/O rhinoplasty  H/O angioplasty  H/O ventral hernia repair  History of incision and drainage  H/O arterial bypass of lower limb  History of laparoscopic cholecystectomy  General Malaise  90+  SysAdmin_VisitLink        MEDICATIONS  (STANDING):  ARIPiprazole 20 milliGRAM(s) Oral daily  aspirin  chewable 81 milliGRAM(s) Oral daily  atorvastatin 80 milliGRAM(s) Oral at bedtime  carvedilol 3.125 milliGRAM(s) Oral every 12 hours  cefTRIAXone Injectable. 2000 milliGRAM(s) IV Push every 24 hours  clopidogrel Tablet 75 milliGRAM(s) Oral daily  dextrose 5%. 1000 milliLiter(s) (50 mL/Hr) IV Continuous <Continuous>  dextrose 50% Injectable 12.5 Gram(s) IV Push once  dextrose 50% Injectable 25 Gram(s) IV Push once  dextrose 50% Injectable 25 Gram(s) IV Push once  gabapentin 600 milliGRAM(s) Oral three times a day  heparin   Injectable 5000 Unit(s) SubCutaneous every 8 hours  hydrocortisone sodium succinate Injectable 100 milliGRAM(s) IV Push every 8 hours  insulin glargine Injectable (LANTUS) 20 Unit(s) SubCutaneous at bedtime  insulin lispro (HumaLOG) corrective regimen sliding scale   SubCutaneous three times a day before meals  insulin lispro (HumaLOG) corrective regimen sliding scale   SubCutaneous at bedtime  insulin lispro Injectable (HumaLOG) 6 Unit(s) SubCutaneous three times a day before meals  isosorbide   mononitrate ER Tablet (IMDUR) 60 milliGRAM(s) Oral daily  levothyroxine 112 MICROGram(s) Oral daily  midodrine 10 milliGRAM(s) Oral every 8 hours  multivitamin 1 Tablet(s) Oral daily  norepinephrine Infusion 0.03 MICROgram(s)/kG/Min (3.06 mL/Hr) IV Continuous <Continuous>  pantoprazole    Tablet 40 milliGRAM(s) Oral before breakfast  ranolazine 1000 milliGRAM(s) Oral two times a day  sodium bicarbonate  Infusion 0.207 mEq/kG/Hr (75 mL/Hr) IV Continuous <Continuous>  vasopressin Infusion 0.04 Unit(s)/Min (2.4 mL/Hr) IV Continuous <Continuous>    MEDICATIONS  (PRN):  acetaminophen   Tablet .. 650 milliGRAM(s) Oral every 4 hours PRN Mild Pain (1 - 3)  ALBUTerol    90 MICROgram(s) HFA Inhaler 2 Puff(s) Inhalation every 6 hours PRN Shortness of Breath and/or Wheezing  aluminum hydroxide/magnesium hydroxide/simethicone Suspension 30 milliLiter(s) Oral every 4 hours PRN Dyspepsia  dextrose 40% Gel 15 Gram(s) Oral once PRN Blood Glucose LESS THAN 70 milliGRAM(s)/deciliter  glucagon  Injectable 1 milliGRAM(s) IntraMuscular once PRN Glucose LESS THAN 70 milligrams/deciliter  ondansetron Injectable 4 milliGRAM(s) IV Push every 6 hours PRN Nausea  oxyCODONE    IR 5 milliGRAM(s) Oral every 4 hours PRN Moderate Pain (4 - 6)  oxyCODONE    IR 10 milliGRAM(s) Oral every 4 hours PRN Severe Pain (7 - 10)  senna 2 Tablet(s) Oral at bedtime PRN Constipation      I&O's Summary    25 Aug 2020 07:  -  26 Aug 2020 07:00  --------------------------------------------------------  IN: 3143 mL / OUT: 301 mL / NET: 2842 mL    26 Aug 2020 07:  -  27 Aug 2020 01:23  --------------------------------------------------------  IN: 550 mL / OUT: 3 mL / NET: 547 mL      ICU Vital Signs Last 24 Hrs  T(C): 36.6 (26 Aug 2020 21:00), Max: 37.9 (26 Aug 2020 06:30)  T(F): 97.8 (26 Aug 2020 21:00), Max: 100.2 (26 Aug 2020 06:30)  HR: 75 (26 Aug 2020 22:30) (69 - 97)  BP: 78/60 (26 Aug 2020 22:30) (77/66 - 152/52)  BP(mean): 65 (26 Aug 2020 22:30) (35 - 96)  ABP: --  ABP(mean): --  RR: 14 (26 Aug 2020 22:30) (12 - 20)  SpO2: 99% (26 Aug 2020 22:30) (75% - 100%)      PHYSICAL EXAM:  General: much more awake compared to few hours ago  CV: s1s2 RRR   pulm: air entry bilaterally  GI: soft, distended, diffusely tender, more so in the lower quadrants   Extremities: No edema, warm  Neuro: awake, oriented to self and year, thought it was December non focal          133<L>  |  104  |  70<H>  ----------------------------<  106<H>  4.0   |  15<L>  |  4.21<H>    Ca    6.7<L>      26 Aug 2020 23:09  Phos  4.6       Mg     2.0         TPro  6.0  /  Alb  1.4<L>  /  TBili  0.3  /  DBili  x   /  AST  58<H>  /  ALT  54  /  AlkPhos  229<H>                            7.9    38.88 )-----------( 294      ( 27 Aug 2020 00:40 )             23.5     ABG - ( 26 Aug 2020 22:50 )  pH, Arterial: 7.33  pH, Blood: x     /  pCO2: 25    /  pO2: 67    / HCO3: 13    / Base Excess: -11.6 /  SaO2: 92                CARDIAC MARKERS ( 26 Aug 2020 23:09 )  1.340 ng/mL / x     / x     / x     / x      CARDIAC MARKERS ( 26 Aug 2020 06:48 )  1.290 ng/mL / x     / x     / x     / x      CARDIAC MARKERS ( 25 Aug 2020 02:26 )  1.990 ng/mL / x     / x     / x     / x          Urinalysis Basic - ( 25 Aug 2020 22:55 )    Color: Yellow / Appearance: Clear / S.010 / pH: x  Gluc: x / Ketone: Trace  / Bili: Negative / Urobili: Negative mg/dL   Blood: x / Protein: 100 mg/dL / Nitrite: Negative   Leuk Esterase: Small / RBC: 0-2 /HPF / WBC Negative   Sq Epi: x / Non Sq Epi: Negative / Bacteria: Occasional             60 yo f with CAD PAD s/p LE bypass sx s/p R iliac stent, DM, hypothyroidism, HLD, smoker, daily cocaine use,  back pain s/p lumbar surgery initially presented with fevers, weakness, anorexia, diffuse pains being treated for STEMI, s/p cath on , Ecoli bactermia and UTI, septic shock, AMISH, now increasing pressor requirement and AMS, abdomen is tender and is now having diarrhea.   - Vaso added to levophed, much more awake with better BP.   - start stress dose steroids   - ABG showed metabolic acidosis, respiratory alkalosis, pH 7.33. chemistry with worsening renal failure. given 2ams bicarb, concentration and rate of bicarb drip increased. Monitor Chemistry closely  - abdominal pain, mesenteric ischemia? septic emboli? C diff?, retroperitoneal bleed. stat CT abdomen pelvis now that more stable   - WBC now 38, lactate 4. give dose of Vanco,  check C diff, give dose of  flagyl as i dont think she can swallow PO vanco at this time.   - repeat ECHO  - critically ill continue ICU care    patient discussed with Dr Amanda GAFFNEY    CC time 45 min including time spent reviewing chart, ordering tests and treatments, discussing care with MDT, discussing care with patient, not including procedures 59y year old Female admitted for Patient is a 59y old  Female who presents with a chief complaint of abd pain, weakness (26 Aug 2020 23:27)          58 yo f with CAD PAD s/p LE bypass sx s/p R iliac stent, DM, hypothyroidism, HLD, smoker, daily cocaine use,  back pain s/p lumbar surgery admitted with STEMI, ecoil bacteremia/uti, septic shock, AMISH, yesterday had PEA arrest requiring intubation, now extubated, this evening with AMS and increase pressor requirements, she is more awake now, complaining of abdominal pain, no light headedness, dizziness, chest pain, back pain, shortness of breath or fever.     PMH:  ST elevation myocardial infarction (STEMI)  Family history of asthma (Sibling)  Family history of epilepsy (Sibling, Sibling)  Family history of cardiovascular disease  Family history of stroke  Family history of heart disease  Family history of alcoholism in father  Handoff  MEWS Score  Lung nodule  Diabetes mellitus  History of TIAs  History of TIA due to embolism  History of gallbladder disease  History of colon polyps  Substance abuse  ETOH abuse  Spinal stenosis of lumbar region  DDD (degenerative disc disease), lumbar  Cystic breast  GERD (gastroesophageal reflux disease)  Carotid artery stenosis  TIA (transient ischemic attack)  Bipolar depression  Shoulder disorder  Angina pectoris  History of MRSA infection  Anxiety and depression  Transient cerebral ischemia, unspecified type  Osteoarthritis, unspecified osteoarthritis type, unspecified site  Osteoarthritis of spine, unspecified spinal osteoarthritis complication status, unspecified spinal region  Lumbar herniated disc  Urinary incontinence, unspecified type  Overactive bladder  Gall bladder disease  Constipation, unspecified constipation type  Hiatal hernia with GERD  PAD (peripheral artery disease)  Hyperlipidemia, unspecified hyperlipidemia type  Essential hypertension  Hypothyroidism  DM (diabetes mellitus), type 1  COPD (chronic obstructive pulmonary disease)  CAD (coronary artery disease)  Substance abuse  ETOH abuse  Hypothyroidism  DM (diabetes mellitus), type 1  COPD (chronic obstructive pulmonary disease)  CAD (coronary artery disease)  STEMI (ST elevation myocardial infarction)  Septic shock  Diabetes  AMISH (acute kidney injury)  UTI (urinary tract infection)  Cardiogenic shock  Cardiac arrest  Acute respiratory failure with hypoxia  Substance abuse  PAD (peripheral artery disease)  Essential hypertension  STEMI (ST elevation myocardial infarction)  History of lumbar fusion  H/O hernia repair  S/P dilatation and curettage  H/O rhinoplasty  H/O angioplasty  H/O ventral hernia repair  History of incision and drainage  H/O arterial bypass of lower limb  History of laparoscopic cholecystectomy  General Malaise  90+  SysAdmin_VisitLink        MEDICATIONS  (STANDING):  ARIPiprazole 20 milliGRAM(s) Oral daily  aspirin  chewable 81 milliGRAM(s) Oral daily  atorvastatin 80 milliGRAM(s) Oral at bedtime  carvedilol 3.125 milliGRAM(s) Oral every 12 hours  cefTRIAXone Injectable. 2000 milliGRAM(s) IV Push every 24 hours  clopidogrel Tablet 75 milliGRAM(s) Oral daily  dextrose 5%. 1000 milliLiter(s) (50 mL/Hr) IV Continuous <Continuous>  dextrose 50% Injectable 12.5 Gram(s) IV Push once  dextrose 50% Injectable 25 Gram(s) IV Push once  dextrose 50% Injectable 25 Gram(s) IV Push once  gabapentin 600 milliGRAM(s) Oral three times a day  heparin   Injectable 5000 Unit(s) SubCutaneous every 8 hours  hydrocortisone sodium succinate Injectable 100 milliGRAM(s) IV Push every 8 hours  insulin glargine Injectable (LANTUS) 20 Unit(s) SubCutaneous at bedtime  insulin lispro (HumaLOG) corrective regimen sliding scale   SubCutaneous three times a day before meals  insulin lispro (HumaLOG) corrective regimen sliding scale   SubCutaneous at bedtime  insulin lispro Injectable (HumaLOG) 6 Unit(s) SubCutaneous three times a day before meals  isosorbide   mononitrate ER Tablet (IMDUR) 60 milliGRAM(s) Oral daily  levothyroxine 112 MICROGram(s) Oral daily  midodrine 10 milliGRAM(s) Oral every 8 hours  multivitamin 1 Tablet(s) Oral daily  norepinephrine Infusion 0.03 MICROgram(s)/kG/Min (3.06 mL/Hr) IV Continuous <Continuous>  pantoprazole    Tablet 40 milliGRAM(s) Oral before breakfast  ranolazine 1000 milliGRAM(s) Oral two times a day  sodium bicarbonate  Infusion 0.207 mEq/kG/Hr (75 mL/Hr) IV Continuous <Continuous>  vasopressin Infusion 0.04 Unit(s)/Min (2.4 mL/Hr) IV Continuous <Continuous>    MEDICATIONS  (PRN):  acetaminophen   Tablet .. 650 milliGRAM(s) Oral every 4 hours PRN Mild Pain (1 - 3)  ALBUTerol    90 MICROgram(s) HFA Inhaler 2 Puff(s) Inhalation every 6 hours PRN Shortness of Breath and/or Wheezing  aluminum hydroxide/magnesium hydroxide/simethicone Suspension 30 milliLiter(s) Oral every 4 hours PRN Dyspepsia  dextrose 40% Gel 15 Gram(s) Oral once PRN Blood Glucose LESS THAN 70 milliGRAM(s)/deciliter  glucagon  Injectable 1 milliGRAM(s) IntraMuscular once PRN Glucose LESS THAN 70 milligrams/deciliter  ondansetron Injectable 4 milliGRAM(s) IV Push every 6 hours PRN Nausea  oxyCODONE    IR 5 milliGRAM(s) Oral every 4 hours PRN Moderate Pain (4 - 6)  oxyCODONE    IR 10 milliGRAM(s) Oral every 4 hours PRN Severe Pain (7 - 10)  senna 2 Tablet(s) Oral at bedtime PRN Constipation      I&O's Summary    25 Aug 2020 07:  -  26 Aug 2020 07:00  --------------------------------------------------------  IN: 3143 mL / OUT: 301 mL / NET: 2842 mL    26 Aug 2020 07:  -  27 Aug 2020 01:23  --------------------------------------------------------  IN: 550 mL / OUT: 3 mL / NET: 547 mL      ICU Vital Signs Last 24 Hrs  T(C): 36.6 (26 Aug 2020 21:00), Max: 37.9 (26 Aug 2020 06:30)  T(F): 97.8 (26 Aug 2020 21:00), Max: 100.2 (26 Aug 2020 06:30)  HR: 75 (26 Aug 2020 22:30) (69 - 97)  BP: 78/60 (26 Aug 2020 22:30) (77/66 - 152/52)  BP(mean): 65 (26 Aug 2020 22:30) (35 - 96)  ABP: --  ABP(mean): --  RR: 14 (26 Aug 2020 22:30) (12 - 20)  SpO2: 99% (26 Aug 2020 22:30) (75% - 100%)      PHYSICAL EXAM:  General: much more awake compared to few hours ago  CV: s1s2 RRR   pulm: air entry bilaterally  GI: soft, distended, diffusely tender, more so in the lower quadrants   Extremities: No edema, warm  Neuro: awake, oriented to self and year, thought it was December non focal          133<L>  |  104  |  70<H>  ----------------------------<  106<H>  4.0   |  15<L>  |  4.21<H>    Ca    6.7<L>      26 Aug 2020 23:09  Phos  4.6       Mg     2.0         TPro  6.0  /  Alb  1.4<L>  /  TBili  0.3  /  DBili  x   /  AST  58<H>  /  ALT  54  /  AlkPhos  229<H>                            7.9    38.88 )-----------( 294      ( 27 Aug 2020 00:40 )             23.5     ABG - ( 26 Aug 2020 22:50 )  pH, Arterial: 7.33  pH, Blood: x     /  pCO2: 25    /  pO2: 67    / HCO3: 13    / Base Excess: -11.6 /  SaO2: 92                CARDIAC MARKERS ( 26 Aug 2020 23:09 )  1.340 ng/mL / x     / x     / x     / x      CARDIAC MARKERS ( 26 Aug 2020 06:48 )  1.290 ng/mL / x     / x     / x     / x      CARDIAC MARKERS ( 25 Aug 2020 02:26 )  1.990 ng/mL / x     / x     / x     / x          Urinalysis Basic - ( 25 Aug 2020 22:55 )    Color: Yellow / Appearance: Clear / S.010 / pH: x  Gluc: x / Ketone: Trace  / Bili: Negative / Urobili: Negative mg/dL   Blood: x / Protein: 100 mg/dL / Nitrite: Negative   Leuk Esterase: Small / RBC: 0-2 /HPF / WBC Negative   Sq Epi: x / Non Sq Epi: Negative / Bacteria: Occasional             58 yo f with CAD PAD s/p LE bypass sx s/p R iliac stent, DM, hypothyroidism, HLD, smoker, daily cocaine use,  back pain s/p lumbar surgery initially presented with fevers, weakness, anorexia, diffuse pains being treated for STEMI, s/p cath on , Ecoli bactermia and UTI, septic shock, AMISH, now increasing pressor requirement and AMS, abdomen is tender and is now having diarrhea.   - Vaso added to levophed, much more awake with better BP.   - start stress dose steroids   - ABG showed metabolic acidosis, respiratory alkalosis, pH 7.33. chemistry with worsening renal failure. given 2ams bicarb, concentration and rate of bicarb drip increased. Monitor Chemistry closely  - abdominal pain, mesenteric ischemia? septic emboli? C diff?, retroperitoneal bleed. stat CT abdomen pelvis now that more stable. no contrast given renal failure  - WBC now 38, lactate 4. give dose of Vanco,  check C diff, give dose of  flagyl as i dont think she can swallow PO vanco at this time.   - repeat ECHO  - critically ill continue ICU care    patient discussed with Dr Amanda GAFFNEY    CC time 45 min including time spent reviewing chart, ordering tests and treatments, discussing care with MDT, discussing care with patient, not including procedures

## 2020-08-27 NOTE — PROVIDER CONTACT NOTE (EICU) - RECOMMENDATIONS
Awaiting CT A/P which is pending w/o contrast to r/o hematoma (pt on ASA/Plavix)  Pt awaiting GALI, r/o cardiac vegitations   ?septic emoli/ischemia a possibility  dose of vanco, hydrocortisone 100 mg tid   Judiciously give additional fluids as needed     Discussed with PA at bedside    During wright evaluation, pt awake and appropriately responsive to questions. Abd exam with tenderness diffusely.   /45 (63) 96% on 2 L

## 2020-08-27 NOTE — PROGRESS NOTE ADULT - PROBLEM SELECTOR PLAN 1
E. coli bacteremia with shock requiring 2 vasopressors; continue antibiotics and hemodynamic support; no vegetation on TTE.  Further work-up of abdominal pain and marked leukocytosis as per primary service.

## 2020-08-27 NOTE — PROGRESS NOTE ADULT - SUBJECTIVE AND OBJECTIVE BOX
Date of service: 20 @ 14:03    pt seen and examined  noted with worsening abd pain/diarrhea concern for c diff started on flagyl/po vancomycin  repeat CT abdpelvis showed sigmoid colitis + elevated LA  started on stress dose steroids   afebrile    ROS: no fever or chills; denies dizziness, no HA, no SOB or cough, no abdominal pain, no diarrhea or constipation; no legs pain, no rashes    MEDICATIONS  (STANDING):  ARIPiprazole 20 milliGRAM(s) Oral daily  aspirin  chewable 81 milliGRAM(s) Oral daily  atorvastatin 80 milliGRAM(s) Oral at bedtime  cefTRIAXone Injectable. 2000 milliGRAM(s) IV Push every 24 hours  clopidogrel Tablet 75 milliGRAM(s) Oral daily  dextrose 5%. 1000 milliLiter(s) (50 mL/Hr) IV Continuous <Continuous>  dextrose 50% Injectable 12.5 Gram(s) IV Push once  dextrose 50% Injectable 25 Gram(s) IV Push once  dextrose 50% Injectable 25 Gram(s) IV Push once  gabapentin 600 milliGRAM(s) Oral three times a day  heparin   Injectable 5000 Unit(s) SubCutaneous every 8 hours  hydrocortisone sodium succinate Injectable 100 milliGRAM(s) IV Push every 8 hours  insulin glargine Injectable (LANTUS) 20 Unit(s) SubCutaneous at bedtime  insulin lispro (HumaLOG) corrective regimen sliding scale   SubCutaneous three times a day before meals  insulin lispro (HumaLOG) corrective regimen sliding scale   SubCutaneous at bedtime  insulin lispro Injectable (HumaLOG) 6 Unit(s) SubCutaneous three times a day before meals  levothyroxine 112 MICROGram(s) Oral daily  metroNIDAZOLE  IVPB 500 milliGRAM(s) IV Intermittent every 8 hours  midodrine 10 milliGRAM(s) Oral every 8 hours  multivitamin 1 Tablet(s) Oral daily  norepinephrine Infusion 0.03 MICROgram(s)/kG/Min (3.06 mL/Hr) IV Continuous <Continuous>  pantoprazole    Tablet 40 milliGRAM(s) Oral before breakfast  ranolazine 1000 milliGRAM(s) Oral two times a day  sodium bicarbonate  Infusion 0.207 mEq/kG/Hr (75 mL/Hr) IV Continuous <Continuous>  vancomycin    Solution 250 milliGRAM(s) Oral every 6 hours  vasopressin Infusion 0.04 Unit(s)/Min (2.4 mL/Hr) IV Continuous <Continuous>    Vital Signs Last 24 Hrs  T(C): 35.9 (27 Aug 2020 11:34), Max: 36.7 (27 Aug 2020 00:49)  T(F): 96.6 (27 Aug 2020 11:34), Max: 98 (27 Aug 2020 00:49)  HR: 85 (27 Aug 2020 13:30) (72 - 97)  BP: 156/69 (27 Aug 2020 13:30) (77/66 - 156/69)  BP(mean): 91 (27 Aug 2020 13:30) (48 - 96)  RR: 17 (27 Aug 2020 13:30) (9 - 20)  SpO2: 98% (27 Aug 2020 13:30) (75% - 100%)    PE:  Constitutional: frail looking  HEENT: NC/AT, EOMI, PERRLA, conjunctivae clear; ears and nose atraumatic; pharynx benign  Neck: supple; thyroid not palpable  Back: no tenderness  Respiratory: respiratory effort normal; clear to auscultation  Cardiovascular: S1S2 regular, no murmurs  Abdomen: soft,  tender, distended, positive BS; liver and spleen WNL  Genitourinary: no suprapubic tenderness  Lymphatic: no LN palpable  Musculoskeletal: no muscle tenderness, no joint swelling or tenderness  Extremities: no pedal edema  Neurological/ Psychiatric:  moving all extremities  Skin: no rashes; no palpable lesions    Labs: all available labs reviewed                                              7.9    48.17 )-----------( 307      ( 27 Aug 2020 05:17 )             22.9     08-27    133<L>  |  101  |  75<H>  ----------------------------<  105<H>  3.8   |  19<L>  |  4.20<H>    Ca    6.8<L>      27 Aug 2020 05:17  Phos  5.3     08-  Mg     1.9         TPro  6.0  /  Alb  1.4<L>  /  TBili  0.3  /  DBili  x   /  AST  58<H>  /  ALT  54  /  AlkPhos  229<H>  08          Urinalysis Basic - ( 23 Aug 2020 10:25 )    Color: Yellow / Appearance: Clear / S.010 / pH: x  Gluc: x / Ketone: Trace  / Bili: Small / Urobili: 1 mg/dL   Blood: x / Protein: 30 mg/dL / Nitrite: Negative   Leuk Esterase: Moderate / RBC: 25-50 /HPF / WBC >50   Sq Epi: x / Non Sq Epi: Few / Bacteria: Many    Culture - Urine (20 @ 10:25)    Specimen Source: .Urine Clean Catch (Midstream)    Culture Results:   >100,000 CFU/ml Escherichia coli    Culture - Blood (20 @ 08:34)    Gram Stain:   Growth in aerobic bottle: Gram Negative Rods  Growth in anaerobic bottle: Gram Negative Rods    Specimen Source: .Blood None    Culture Results:   Growth in aerobic and anaerobic bottles: Escherichia coli  See previous culture [id number]    Culture - Blood (20 @ 08:34)    -  Escherichia coli: Detec    Gram Stain:   Growth in aerobic bottle: Gram Negative Rods  Growth in anaerobic bottle: Gram Negative Rods    Specimen Source: .Blood None    Organism: Blood Culture PCR    Culture Results:   Growth in aerobic bottle: Gram Negative Rods  Growth in anaerobic bottle: Gram Negative Rods      Radiology: all available radiological tests reviewed    < from: CT Abdomen and Pelvis No Cont (20 @ 01:24) >  EXAM:  CT ABDOMEN AND PELVIS                            PROCEDURE DATE:  2020          INTERPRETATION:  CLINICAL INFORMATION: Status post catheterization today. Evaluate for retroperitoneal bleed.    COMPARISON: 2020    PROCEDURE:  CT ofthe Abdomen and Pelvis was performed without intravenous contrast.  Intravenous contrast: None.  Oral contrast: None.  Sagittal and coronal reformats were performed.    FINDINGS:  Evaluation of solid organs is limited without intravenous contrast.  Evaluation of the lower pelvis is limited as images do not extend beyond the proximal inguinal region.      LOWER CHEST: Small bilateral pleural effusions and associated subsegmental bibasilar atelectasis. Scattered calcified granulomas. Aortic and coronary artery atherosclerotic calcifications.    LIVER: Within normal limits.  BILE DUCTS: Stable mild CBD dilatation likely related to cholecystectomy.  GALLBLADDER: Cholecystectomy.  SPLEEN: Within normal limits.  PANCREAS: Within normal limits.  ADRENALS: Within normal limits.  KIDNEYS/URETERS: No hydronephrosis or obstructing stone.    BLADDER: Within normal limits.  REPRODUCTIVE ORGANS: Uterus and adnexa within normal limits.    BOWEL: Partially imaged rectal tube. Distal rectum/anus not included in the field-of-view. Wall thickening of the sigmoid colon. No bowel obstruction. Appendix is normal.  PERITONEUM: Small ascites. Presacral edema.  VESSELS: Atherosclerotic changes.  RETROPERITONEUM/LYMPH NODES: No lymphadenopathy. No definite retroperitoneal hematoma identified.  ABDOMINAL WALL: Anasarca. Inguinal regions not fully included in the field-of-view. Mild bilateral stranding in the visualized inguinal regions.  BONES: Degenerative changes of the spine. Grade 1 anterolisthesis at L4-L5. Posterior fixation hardware at L4-L5.    IMPRESSION:    1. No definite retroperitoneal hematoma identified.    2. Incomplete evaluation for inguinal region/thigh hematoma due to incomplete field-of-view.    3. Small bilateral pleural effusions. Small ascites. Anasarca.      4. Wall thickening of the sigmoid colon. Recommend clinical correlation for colitis.    < end of copied text >    < from: CT Abdomen and Pelvis w/ Oral Cont (20 @ 16:41) >  EXAM:  CT ABDOMEN AND PELVIS OC                            PROCEDURE DATE:  2020          INTERPRETATION:  CLINICAL INFORMATION: Diffuse abdominal pain    COMPARISON: None.    PROCEDURE:  CT of the Abdomen and Pelvis was performed without intravenous contrast.  Intravenous contrast: None.  Oral contrast: positive contrast was administered.  Sagittal and coronal reformats were performed.    FINDINGS:  LOWER CHEST: Coronary artery calcifications. Clear lung bases.    LIVER: Within normal limits.  BILE DUCTS: Normal caliber.  GALLBLADDER: Cholecystectomy.  SPLEEN: Within normal limits.  PANCREAS: Within normal limits.  ADRENALS: Within normal limits.  KIDNEYS/URETERS: Within normal limits.    BLADDER: Within normal limits.  REPRODUCTIVE ORGANS: Uterus and adnexa within normal limits.    BOWEL: Mildly distended small bowel without transition point to suggest obstruction. No wall thickening or inflammatory change.  Appendix normal.  PERITONEUM: No ascites or pneumoperitoneum..  VESSELS: Severe diffuse atherosclerotic arterial calcification. Normal caliber aorta.  RETROPERITONEUM/LYMPH NODES: No lymphadenopathy.  ABDOMINAL WALL: Within normal limits.  BONES: L4-L5 interpedicular screws and connecting rods. Moderate anterolisthesis of L4 on L5. No acute bony abnormality.    IMPRESSION:  Mildly distended small bowel without evidence of obstruction.    < end of copied text >    Advanced directives addressed: full resuscitation

## 2020-08-27 NOTE — PROVIDER CONTACT NOTE (EICU) - BACKGROUND
58 y/o female with extensive PMH including DM, +tobacco/cocaine abuse, CAD, PAD admitted on 8/23/20 who presented with fever, weakness, pains dxed with STEMI s/p cardiac cath and septic shock with E.coli bacteremia/UTI. Hospitalization further complicated with AMISH. Pt was just extubated 8/26. Currently on increasing norepinephrine gtt with addition of another vasopressor.

## 2020-08-27 NOTE — PROGRESS NOTE ADULT - ASSESSMENT
59 CAD, PAD s/p fem-pop bypass s/p RT iliac stent, diabetes, hypothyroidism, hyperlipidemia, current active smoker, uses cocaine last use yesterday, chronic back pain s/p lumbar surgery presented with multiple non-specific complaints, including fevers on/off last 4-5 days, weakness, no appetite and reduced oral intake, vague diffuse abd pains, one day of diarrhea. No cough, no SOB. SHe came in today for evaluation of weakness and found to have ST elevations on presenting EKG, code STEMI called and pt underwent urgent angiogram showing occluded RCA w no intervention with UTI sepsis and E. coli bacteremia POA.      AMISH w septic shock/UTI w STEMI and Code blue event w hypotension   Olguria and now w rising WBC and abd pains   concern for C diff vs ischemic bowel ?  Cr rising secondary to  ATN - plateuaed from last night   surgical consult today   no acute indication for HD at this time - continue to monitor     Metabolic Acidosis sec to above   HCO3 gtt to full strength increased - monitor      Shock    pressors per ICU     d/w CCU  RN 59 CAD, PAD s/p fem-pop bypass s/p RT iliac stent, diabetes, hypothyroidism, hyperlipidemia, current active smoker, uses cocaine last use yesterday, chronic back pain s/p lumbar surgery presented with multiple non-specific complaints, including fevers on/off last 4-5 days, weakness, no appetite and reduced oral intake, vague diffuse abd pains, one day of diarrhea. No cough, no SOB. SHe came in today for evaluation of weakness and found to have ST elevations on presenting EKG, code STEMI called and pt underwent urgent angiogram showing occluded RCA w no intervention with UTI sepsis and E. coli bacteremia POA.      AMISH w septic shock/UTI w STEMI and Code blue event w hypotension   Olguria and now w rising WBC and abd pains   concern for C diff vs ischemic bowel ?  Cr rising secondary to  ATN - plateuaed from last night   surgical consult today   no acute indication for HD at this time - continue to monitor     Lactic Metabolic Acidosis sec to above   HCO3 gtt to full strength increased - monitor      Hypocalcemia    corrected ca ok w low albumin     Shock    pressors per ICU     d/w CCU  RN

## 2020-08-27 NOTE — PROGRESS NOTE ADULT - SUBJECTIVE AND OBJECTIVE BOX
Patient is a 59y Female who reports no complaints as new Taking PO now, good uop reported  event yest noted - intubated  after hypotension overnight and was given 1L IVF when she developed PEA and initiation of CPR.  Intubated and CVL placed for pressors. now awake and extubated    events noted   wbc rising  c/o severe abd pains  diarrhea  Ct abd noted  remains on pressors  HCO3 gtt increased     REVIEW OF SYSTEMS:    CONSTITUTIONAL: stable weakness, fevers or chills  RESPIRATORY: No cough, wheezing, hemoptysis; No shortness of breath  CARDIOVASCULAR: No chest pain or palpitations  GENITOURINARY: No dysuria, frequency or hematuria  All other review of systems is negative unless indicated above.      MEDICATIONS  (STANDING):  ARIPiprazole 20 milliGRAM(s) Oral daily  aspirin  chewable 81 milliGRAM(s) Oral daily  atorvastatin 80 milliGRAM(s) Oral at bedtime  cefTRIAXone Injectable. 2000 milliGRAM(s) IV Push every 24 hours  clopidogrel Tablet 75 milliGRAM(s) Oral daily  dextrose 5%. 1000 milliLiter(s) (50 mL/Hr) IV Continuous <Continuous>  dextrose 50% Injectable 12.5 Gram(s) IV Push once  dextrose 50% Injectable 25 Gram(s) IV Push once  dextrose 50% Injectable 25 Gram(s) IV Push once  gabapentin 600 milliGRAM(s) Oral three times a day  heparin   Injectable 5000 Unit(s) SubCutaneous every 8 hours  hydrocortisone sodium succinate Injectable 100 milliGRAM(s) IV Push every 8 hours  insulin glargine Injectable (LANTUS) 20 Unit(s) SubCutaneous at bedtime  insulin lispro (HumaLOG) corrective regimen sliding scale   SubCutaneous three times a day before meals  insulin lispro (HumaLOG) corrective regimen sliding scale   SubCutaneous at bedtime  insulin lispro Injectable (HumaLOG) 6 Unit(s) SubCutaneous three times a day before meals  levothyroxine 112 MICROGram(s) Oral daily  metroNIDAZOLE  IVPB 500 milliGRAM(s) IV Intermittent every 8 hours  midodrine 10 milliGRAM(s) Oral every 8 hours  multivitamin 1 Tablet(s) Oral daily  norepinephrine Infusion 0.03 MICROgram(s)/kG/Min (3.06 mL/Hr) IV Continuous <Continuous>  pantoprazole    Tablet 40 milliGRAM(s) Oral before breakfast  ranolazine 1000 milliGRAM(s) Oral two times a day  sodium bicarbonate  Infusion 0.207 mEq/kG/Hr (75 mL/Hr) IV Continuous <Continuous>  vancomycin  Retention Enema 500 milliGRAM(s) Rectal every 6 hours  vasopressin Infusion 0.04 Unit(s)/Min (2.4 mL/Hr) IV Continuous <Continuous>    MEDICATIONS  (PRN):  acetaminophen   Tablet .. 650 milliGRAM(s) Oral every 4 hours PRN Mild Pain (1 - 3)  ALBUTerol    90 MICROgram(s) HFA Inhaler 2 Puff(s) Inhalation every 6 hours PRN Shortness of Breath and/or Wheezing  aluminum hydroxide/magnesium hydroxide/simethicone Suspension 30 milliLiter(s) Oral every 4 hours PRN Dyspepsia  dextrose 40% Gel 15 Gram(s) Oral once PRN Blood Glucose LESS THAN 70 milliGRAM(s)/deciliter  glucagon  Injectable 1 milliGRAM(s) IntraMuscular once PRN Glucose LESS THAN 70 milligrams/deciliter  ondansetron Injectable 4 milliGRAM(s) IV Push every 6 hours PRN Nausea  oxyCODONE    IR 5 milliGRAM(s) Oral every 4 hours PRN Moderate Pain (4 - 6)  oxyCODONE    IR 10 milliGRAM(s) Oral every 4 hours PRN Severe Pain (7 - 10)  senna 2 Tablet(s) Oral at bedtime PRN Constipation      Vital Signs Last 24 Hrs  T(C): 36.4 (27 Aug 2020 05:39), Max: 36.7 (27 Aug 2020 00:49)  T(F): 97.6 (27 Aug 2020 05:39), Max: 98 (27 Aug 2020 00:49)  HR: 83 (27 Aug 2020 09:00) (69 - 97)  BP: 129/60 (27 Aug 2020 09:00) (77/66 - 152/52)  BP(mean): 74 (27 Aug 2020 09:00) (48 - 96)  RR: 9 (27 Aug 2020 09:00) (9 - 20)  SpO2: 88% (27 Aug 2020 08:30) (75% - 100%)      I&O's Detail    26 Aug 2020 07:01  -  27 Aug 2020 07:00  --------------------------------------------------------  IN:    IV PiggyBack: 350 mL    Lactated Ringers IV Bolus: 500 mL    norepinephrine Infusion: 425 mL    sodium bicarbonate  Infusion: 600 mL    sodium bicarbonate  Infusion: 560 mL    vasopressin Infusion: 16 mL  Total IN: 2451 mL    OUT:    Indwelling Catheter - Urethral: 375 mL    Stool: 103 mL  Total OUT: 478 mL    Total NET: 1973 mL      PHYSICAL EXAM:    Constitutional: NAD, frail  HEENT: temp wasting, cachetic. >then stated age  Resp:dist BS  Cardiovascular: S1 and S2  Abd: distended, diffusely tender, + guarding   Extremities: tr peripheral edema  Neurological: A/O x 3  :  Black + w dark urine  Skin: No rashes  Access: Not applicable        LABS:                 133    |  101    |  75     ----------------------------<  105       27 Aug 2020 05:17  3.8     |  19     |  4.20     133    |  104    |  70     ----------------------------<  106       26 Aug 2020 23:09  4.0     |  15     |  4.21     133    |  104    |  70     ----------------------------<  124       26 Aug 2020 06:48  3.6     |  17     |  3.65     Ca    6.8        27 Aug 2020 05:17  Albumin, Serum: 1.4 g/dL (08.26.20 @ 06:48)    Ca    6.7        26 Aug 2020 23:09    Phos  5.3       27 Aug 2020 05:17  Phos  4.6       26 Aug 2020 23:09    Mg     1.9       27 Aug 2020 05:17  Mg     2.0       26 Aug 2020 23:09                            7.9    48.17 )-----------( 307      ( 27 Aug 2020 05:17 )             22.9                         7.9    38.88 )-----------( 294      ( 27 Aug 2020 00:40 )             23.5             Urinalysis (08.25.20 @ 22:55)    Glucose Qualitative, Urine: Negative mg/dL    Blood, Urine: Large    pH Urine: 6.5    Color: Yellow    Urine Appearance: Clear    Bilirubin: Negative    Ketone - Urine: Trace    Specific Gravity: 1.010    Protein, Urine: 100 mg/dL    Urobilinogen: Negative mg/dL    Nitrite: Negative    Leukocyte Esterase Concentration: Small    Urine Microscopic-Add On (NC) (08.25.20 @ 22:55)    Red Blood Cell - Urine: 0-2 /HPF    White Blood Cell - Urine: Negative    Epithelial Cells: Negative    Bacteria: Occasional      RADIOLOGY & ADDITIONAL STUDIES:        PROCEDURE:  CT ofthe Abdomen and Pelvis was performed without intravenous contrast.  Intravenous contrast: None.  Oral contrast: None.  Sagittal and coronal reformats were performed.    FINDINGS:  Evaluation of solid organs is limited without intravenous contrast.  Evaluation of the lower pelvis is limited as images do not extend beyond the proximal inguinal region.      LOWER CHEST: Small bilateral pleural effusions and associated subsegmental bibasilar atelectasis. Scattered calcified granulomas. Aortic and coronary artery atherosclerotic calcifications.    LIVER: Within normal limits.  BILE DUCTS: Stable mild CBD dilatation likely related to cholecystectomy.  GALLBLADDER: Cholecystectomy.  SPLEEN: Within normal limits.  PANCREAS: Within normal limits.  ADRENALS: Within normal limits.  KIDNEYS/URETERS: No hydronephrosis or obstructing stone.    BLADDER: Within normal limits.  REPRODUCTIVE ORGANS: Uterus and adnexa within normal limits.    BOWEL: Partially imaged rectal tube. Distal rectum/anus not included in the field-of-view. Wall thickening of the sigmoid colon. No bowel obstruction. Appendix is normal.  PERITONEUM: Small ascites. Presacral edema.  VESSELS: Atherosclerotic changes.  RETROPERITONEUM/LYMPH NODES: No lymphadenopathy. No definite retroperitoneal hematoma identified.  ABDOMINAL WALL: Anasarca. Inguinal regions not fully included in the field-of-view. Mild bilateral stranding in the visualized inguinal regions.  BONES: Degenerative changes of the spine. Grade 1 anterolisthesis at L4-L5. Posterior fixation hardware at L4-L5.    IMPRESSION:    1. No definite retroperitoneal hematoma identified.    2. Incomplete evaluation for inguinal region/thigh hematoma due to incomplete field-of-view.    3. Small bilateral pleural effusions. Small ascites. Anasarca.      4. Wall thickening of the sigmoid colon. Recommend clinical correlation for colitis.

## 2020-08-27 NOTE — CONSULT NOTE ADULT - SUBJECTIVE AND OBJECTIVE BOX
60 y/o female with CAD, PAD s/p fem-pop bypass s/p RT iliac stent, diabetes, hypothyroidism, hyperlipidemia, current active smoker, uses cocaine last use yesterday, chronic back pain s/p lumbar surgery admitted with UTI sepsis and E. coli bacteremia POA.  ECG revealed STEMI prompting code STEMI--pt underwent urgent angiogram showing occluded RCA--no inervention. Pt developed hypotension overnight and was given 1L IVF when she developed PEA and initiation of CPR.  Intubated and CVL placed for pressors. She was extubated the following day. She subsequently was found to have an E Coli UTI. She developed increasing abdominal pain and lactic acidosis. CT scan showed evidence of ischemic colitis. I have been asked to evaluate her further regarding her abdominal pain.    PAST MEDICAL & SURGICAL HISTORY:  Lung nodule  Diabetes mellitus  History of TIAs  History of gallbladder disease: surgery  History of colon polyps: precancerous  Substance abuse: history of. last used 14 years ago.  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 Decreased ROM.  Angina pectoris  History of MRSA infection: left lower extremity incisional area 2015  Anxiety and depression  Transient cerebral ischemia, unspecified type  Osteoarthritis of spine, unspecified spinal osteoarthritis complication status, unspecified spinal region  Lumbar herniated disc  Urinary incontinence, unspecified type  Overactive bladder  Hiatal hernia with GERD: hiatal hernia repaired  PAD (peripheral artery disease)  Hyperlipidemia, unspecified hyperlipidemia type  Essential hypertension  Hypothyroidism  COPD (chronic obstructive pulmonary disease)  CAD (coronary artery disease)  History of lumbar fusion: with laminectomy 11/8/2018  H/O hernia repair: hiatal hernia - 2017, 2018  S/P dilatation and curettage: 1991  H/O rhinoplasty: 1980  H/O angioplasty: Right iliac artery. 5/12/2017  H/O ventral hernia repair: 3/2017  History of incision and drainage: of lower extremity incisional site x 5 . last done5/2015  H/O arterial bypass of lower limb: Femoral - Popliteal. 11/2014 Left lower side with stents  History of laparoscopic cholecystectomy: 2/2016      Physical Exam    VSS  67 yo female WDWN NAD  skin- warm,dry  HEENT- membranes dry, pupils equal, sclerae anicteric  Neck- no JVD  Lungs- clear  Cor- RRR  Abd- distended, tympanitic + BS diffusely tender, mostly across lower abdomen with guarding and rebound  Ext- no edema    < from: CT Abdomen and Pelvis No Cont (08.27.20 @ 01:24) >  IMPRESSION:    1. No definite retroperitoneal hematoma identified.    2. Incomplete evaluation for inguinal region/thigh hematoma due to incomplete field-of-view.    3. Small bilateral pleural effusions. Small ascites. Anasarca.      4. Wall thickening of the sigmoid colon. Recommend clinical correlation for colitis.      < end of copied text >

## 2020-08-27 NOTE — PROGRESS NOTE ADULT - ASSESSMENT
Patient is 60yo female with CAD, PAD s/p fem-pop bypass s/p RT iliac stent, diabetes, hypothyroidism, hyperlipidemia, current active smoker, +cocaine use, chronic back pain s/p lumbar surgery admitted with UTI sepsis and E. coli bacteremia POA.  ECG revealed STEMI prompting code STEMI--pt underwent urgent angiogram showing occluded RCA--no intervention   Pt then eveloped hypotension overnight and was given 1L IVF when she developed PEA and initiation of CPR.  Intubated and CVL placed for pressors      Acute resp failure, resolved, extubated  Septic shock on pressors  AMISH  Rule out Cdiff colitis  Polysubstance abuse  Leukocytosis        PLAN:  - supp o2 as needed, MDI PRN  - Rocephin IV, E coli sensitive in BCx, UCx  - Vanco PO, Flagyl IV, check Cdiff,   - NPO  - follow up ID  - surgical eval  - Vasopressor support, Levo, vasop, stress dose steroids  - Bicarb drip  - follow up renal  - follow up cardio  - ASA, Plavix, Statin  - GI ppx  - DVT ppx, HSQ  - Monitor in CCU    Critical care time 35 minutes

## 2020-08-27 NOTE — CONSULT NOTE ADULT - ASSESSMENT
58yo F with multiple comorbidities admitted with STEMI, Bacteremia, UTI, AMISH now consulting colorectal surgery for worsening abdominal pain and CT scan showing sigmoid colitis      Plan:  Imaging reviewed and appreciated  Continue resuscitative efforts  ween off pressor support  Sigmoid colitis unlikely cause of patient presentation  Mesenteric ischemia due to low flow state suspected   Urgent General surgery consult recommended    Plan discussed with Dr. Araya

## 2020-08-27 NOTE — PROGRESS NOTE ADULT - SUBJECTIVE AND OBJECTIVE BOX
REASON FOR VISIT: CAD    HPI: 59 year old woman with multiple medical problems, including CAD, PAD (fem-pop bypass, iliac stent), diabetes, hypothyroidism, hyperlipidemia, smoker, cocaine use,   chronic back pain and past lumbar surgery admitted on 8/23 with multiple complaints (including fever, abd pain, diarrhea) -- found to have ST elevations on ECG with urgent cath revealing an occluded RCA (no PCI) -- subsequently diagnosed with E coli sepsis with shock; hospitalization complicated by PEA arrest.    8/24/'20: no complaints overnight: no chest pain, palpitations, dyspnea.Reviewed results of cardiac cath & plan for med mx.  8/25/20 Events reviewed , patient was noted to be hypotensive bradycardic PEA  unresponsive episode subsequently resuscitated  patient intubated , on pressors , maintaining sbp blood cultures are positive for gram negative rods . patient hemoglobin dropped with   8/26/20 Pt awake but lethargic. She denies complaints today. Pressors continued  8/27/20:  Feels sick but no dyspnea, angina; + abdominal pain.    MEDICATIONS  (STANDING):  ARIPiprazole 20 milliGRAM(s) Oral daily  aspirin  chewable 81 milliGRAM(s) Oral daily  atorvastatin 80 milliGRAM(s) Oral at bedtime  carvedilol 3.125 milliGRAM(s) Oral every 12 hours  cefTRIAXone Injectable. 2000 milliGRAM(s) IV Push every 24 hours  clopidogrel Tablet 75 milliGRAM(s) Oral daily  gabapentin 600 milliGRAM(s) Oral three times a day  heparin   Injectable 5000 Unit(s) SubCutaneous every 8 hours  hydrocortisone sodium succinate Injectable 100 milliGRAM(s) IV Push every 8 hours  insulin glargine Injectable (LANTUS) 20 Unit(s) SubCutaneous at bedtime  insulin lispro (HumaLOG) corrective regimen sliding scale   SubCutaneous three times a day before meals  insulin lispro (HumaLOG) corrective regimen sliding scale   SubCutaneous at bedtime  insulin lispro Injectable (HumaLOG) 6 Unit(s) SubCutaneous three times a day before meals  isosorbide   mononitrate ER Tablet (IMDUR) 60 milliGRAM(s) Oral daily  levothyroxine 112 MICROGram(s) Oral daily  metroNIDAZOLE  IVPB 500 milliGRAM(s) IV Intermittent every 8 hours  midodrine 10 milliGRAM(s) Oral every 8 hours  multivitamin 1 Tablet(s) Oral daily  norepinephrine Infusion 0.03 MICROgram(s)/kG/Min (3.06 mL/Hr) IV Continuous <Continuous>  pantoprazole    Tablet 40 milliGRAM(s) Oral before breakfast  ranolazine 1000 milliGRAM(s) Oral two times a day  sodium bicarbonate  Infusion 0.207 mEq/kG/Hr (75 mL/Hr) IV Continuous <Continuous>  vasopressin Infusion 0.04 Unit(s)/Min (2.4 mL/Hr) IV Continuous <Continuous>    MEDICATIONS  (PRN):  acetaminophen   Tablet .. 650 milliGRAM(s) Oral every 4 hours PRN Mild Pain (1 - 3)  ALBUTerol    90 MICROgram(s) HFA Inhaler 2 Puff(s) Inhalation every 6 hours PRN Shortness of Breath and/or Wheezing  aluminum hydroxide/magnesium hydroxide/simethicone Suspension 30 milliLiter(s) Oral every 4 hours PRN Dyspepsia  dextrose 40% Gel 15 Gram(s) Oral once PRN Blood Glucose LESS THAN 70 milliGRAM(s)/deciliter  glucagon  Injectable 1 milliGRAM(s) IntraMuscular once PRN Glucose LESS THAN 70 milligrams/deciliter  ondansetron Injectable 4 milliGRAM(s) IV Push every 6 hours PRN Nausea  oxyCODONE    IR 5 milliGRAM(s) Oral every 4 hours PRN Moderate Pain (4 - 6)  oxyCODONE    IR 10 milliGRAM(s) Oral every 4 hours PRN Severe Pain (7 - 10)  senna 2 Tablet(s) Oral at bedtime PRN Constipation    Vital Signs Last 24 Hrs  T(C): 36.4 (27 Aug 2020 05:39), Max: 36.7 (27 Aug 2020 00:49)  T(F): 97.6 (27 Aug 2020 05:39), Max: 98 (27 Aug 2020 00:49)  HR: 88 (27 Aug 2020 07:00) (69 - 97)  BP: 116/46 (27 Aug 2020 07:00) (77/66 - 152/52)  BP(mean): 57 (27 Aug 2020 07:00) (35 - 96)  RR: 16 (27 Aug 2020 07:00) (10 - 20)  SpO2: 93% (27 Aug 2020 07:00) (75% - 100%)    PHYSICAL EXAM:  Constitutional: Awake, interactive, no distress but ill-appearing  Respiratory: Breath sounds are clear bilaterally, No wheezing, rales or rhonchi  Cardiovascular: S1 and S2, regular rate and rhythm  Gastrointestinal: Abdomen is distended, + tender, + guarding  Extremities: No pedal edema.     LABS:   CARDIAC MARKERS ( 26 Aug 2020 23:09 ) 1.340 ng/mL / x     / x     / x     / x      CARDIAC MARKERS ( 26 Aug 2020 06:48 ) 1.290 ng/mL / x     / x     / x     / x                           7.9    48.17 )-----------( 307      ( 27 Aug 2020 05:17 )             22.9     133<L>  |  101  |  75<H>  ----------------------------<  105<H>  3.8   |  19<L>  |  4.20<H>    Ca    6.8<L>      27 Aug 2020 05:17  Phos  5.3     08-27  Mg     1.9     08-27    TPro  6.0  /  Alb  1.4<L>  /  TBili  0.3  /  DBili  x   /  AST  58<H>  /  ALT  54  /  AlkPhos  229<H>  08-26    TTE Echo Complete w/o Contrast w/ Doppler (08.24.20 @ 14:14) >   Fibrocalcific changes noted to the mitral valve leaflets with preserved leaflet excursion.   Mild (1+) mitral regurgitation is present.   EA reversal of the mitral inflow consistent with reduced compliance of the left ventricle.   Fibrocalcific changes noted to the Aortic valve leaflets with preserved leaflet excursion.   Normal appearing tricuspid valve structure and function.   Mild (1+) tricuspid valve regurgitation is present.   The left ventricle size is normal, moderate regional hypokinesis, and minimally reduced function. Estimated left ventricular ejection fraction is 50 %.   The right ventricle appears mildly dilated.   No evidence of pericardial effusion.    Cardiac Cath Lab - Adult (08.23.20 @ 09:33): Acute occlusion of the RCA of unknown duration. No PCI due to lack of ischemic symptoms, sepsis, acute renal failure and satisfactory left coronary anatomy.    Tele:

## 2020-08-28 LAB
ALBUMIN SERPL ELPH-MCNC: 1.2 G/DL — LOW (ref 3.3–5)
ALP SERPL-CCNC: 221 U/L — HIGH (ref 40–120)
ALT FLD-CCNC: 36 U/L — SIGNIFICANT CHANGE UP (ref 12–78)
ANION GAP SERPL CALC-SCNC: 12 MMOL/L — SIGNIFICANT CHANGE UP (ref 5–17)
AST SERPL-CCNC: 72 U/L — HIGH (ref 15–37)
BILIRUB SERPL-MCNC: 0.4 MG/DL — SIGNIFICANT CHANGE UP (ref 0.2–1.2)
BUN SERPL-MCNC: 78 MG/DL — HIGH (ref 7–23)
CALCIUM SERPL-MCNC: 6.6 MG/DL — LOW (ref 8.5–10.1)
CHLORIDE SERPL-SCNC: 96 MMOL/L — SIGNIFICANT CHANGE UP (ref 96–108)
CO2 SERPL-SCNC: 22 MMOL/L — SIGNIFICANT CHANGE UP (ref 22–31)
CREAT SERPL-MCNC: 4.89 MG/DL — HIGH (ref 0.5–1.3)
GLUCOSE SERPL-MCNC: 293 MG/DL — HIGH (ref 70–99)
HAV IGM SER-ACNC: SIGNIFICANT CHANGE UP
HBV CORE IGM SER-ACNC: SIGNIFICANT CHANGE UP
HBV SURFACE AG SER-ACNC: SIGNIFICANT CHANGE UP
HCT VFR BLD CALC: 33.5 % — LOW (ref 34.5–45)
HCV AB S/CO SERPL IA: 0.23 S/CO — SIGNIFICANT CHANGE UP (ref 0–0.99)
HCV AB SERPL-IMP: SIGNIFICANT CHANGE UP
HGB BLD-MCNC: 11.7 G/DL — SIGNIFICANT CHANGE UP (ref 11.5–15.5)
MCHC RBC-ENTMCNC: 29.2 PG — SIGNIFICANT CHANGE UP (ref 27–34)
MCHC RBC-ENTMCNC: 34.9 GM/DL — SIGNIFICANT CHANGE UP (ref 32–36)
MCV RBC AUTO: 83.5 FL — SIGNIFICANT CHANGE UP (ref 80–100)
PLATELET # BLD AUTO: 338 K/UL — SIGNIFICANT CHANGE UP (ref 150–400)
POTASSIUM SERPL-MCNC: 4.6 MMOL/L — SIGNIFICANT CHANGE UP (ref 3.5–5.3)
POTASSIUM SERPL-SCNC: 4.6 MMOL/L — SIGNIFICANT CHANGE UP (ref 3.5–5.3)
PROT SERPL-MCNC: 6 GM/DL — SIGNIFICANT CHANGE UP (ref 6–8.3)
RBC # BLD: 4.01 M/UL — SIGNIFICANT CHANGE UP (ref 3.8–5.2)
RBC # FLD: 16.5 % — HIGH (ref 10.3–14.5)
SODIUM SERPL-SCNC: 130 MMOL/L — LOW (ref 135–145)
WBC # BLD: 53.8 K/UL — CRITICAL HIGH (ref 3.8–10.5)
WBC # FLD AUTO: 53.8 K/UL — CRITICAL HIGH (ref 3.8–10.5)

## 2020-08-28 PROCEDURE — 99291 CRITICAL CARE FIRST HOUR: CPT

## 2020-08-28 PROCEDURE — 74019 RADEX ABDOMEN 2 VIEWS: CPT | Mod: 26

## 2020-08-28 PROCEDURE — 99233 SBSQ HOSP IP/OBS HIGH 50: CPT

## 2020-08-28 RX ORDER — MIDODRINE HYDROCHLORIDE 2.5 MG/1
15 TABLET ORAL EVERY 8 HOURS
Refills: 0 | Status: DISCONTINUED | OUTPATIENT
Start: 2020-08-28 | End: 2020-08-31

## 2020-08-28 RX ORDER — HYDROCORTISONE 20 MG
50 TABLET ORAL EVERY 12 HOURS
Refills: 0 | Status: DISCONTINUED | OUTPATIENT
Start: 2020-08-28 | End: 2020-08-31

## 2020-08-28 RX ADMIN — MIDODRINE HYDROCHLORIDE 5 MILLIGRAM(S): 2.5 TABLET ORAL at 23:27

## 2020-08-28 RX ADMIN — CEFTRIAXONE 2000 MILLIGRAM(S): 500 INJECTION, POWDER, FOR SOLUTION INTRAMUSCULAR; INTRAVENOUS at 21:19

## 2020-08-28 RX ADMIN — Medication 75 MEQ/KG/HR: at 05:38

## 2020-08-28 RX ADMIN — HEPARIN SODIUM 5000 UNIT(S): 5000 INJECTION INTRAVENOUS; SUBCUTANEOUS at 21:19

## 2020-08-28 RX ADMIN — Medication 6: at 08:02

## 2020-08-28 RX ADMIN — Medication 8: at 17:54

## 2020-08-28 RX ADMIN — ATORVASTATIN CALCIUM 80 MILLIGRAM(S): 80 TABLET, FILM COATED ORAL at 21:19

## 2020-08-28 RX ADMIN — Medication 8: at 11:11

## 2020-08-28 RX ADMIN — Medication 100 MILLIGRAM(S): at 10:50

## 2020-08-28 RX ADMIN — Medication 100 MILLIGRAM(S): at 17:51

## 2020-08-28 RX ADMIN — Medication 75 MEQ/KG/HR: at 21:48

## 2020-08-28 RX ADMIN — HEPARIN SODIUM 5000 UNIT(S): 5000 INJECTION INTRAVENOUS; SUBCUTANEOUS at 13:06

## 2020-08-28 RX ADMIN — HYDROMORPHONE HYDROCHLORIDE 1 MILLIGRAM(S): 2 INJECTION INTRAMUSCULAR; INTRAVENOUS; SUBCUTANEOUS at 05:50

## 2020-08-28 RX ADMIN — Medication 3.06 MICROGRAM(S)/KG/MIN: at 08:06

## 2020-08-28 RX ADMIN — RANOLAZINE 1000 MILLIGRAM(S): 500 TABLET, FILM COATED, EXTENDED RELEASE ORAL at 21:20

## 2020-08-28 RX ADMIN — HYDROMORPHONE HYDROCHLORIDE 1 MILLIGRAM(S): 2 INJECTION INTRAMUSCULAR; INTRAVENOUS; SUBCUTANEOUS at 05:39

## 2020-08-28 RX ADMIN — HEPARIN SODIUM 5000 UNIT(S): 5000 INJECTION INTRAVENOUS; SUBCUTANEOUS at 05:39

## 2020-08-28 RX ADMIN — Medication 50 MILLIGRAM(S): at 21:20

## 2020-08-28 RX ADMIN — GABAPENTIN 600 MILLIGRAM(S): 400 CAPSULE ORAL at 21:20

## 2020-08-28 RX ADMIN — INSULIN GLARGINE 20 UNIT(S): 100 INJECTION, SOLUTION SUBCUTANEOUS at 21:19

## 2020-08-28 RX ADMIN — Medication 2: at 21:19

## 2020-08-28 RX ADMIN — Medication 50 MILLIGRAM(S): at 10:43

## 2020-08-28 RX ADMIN — Medication 100 MILLIGRAM(S): at 05:39

## 2020-08-28 RX ADMIN — MIDODRINE HYDROCHLORIDE 10 MILLIGRAM(S): 2.5 TABLET ORAL at 21:20

## 2020-08-28 RX ADMIN — Medication 100 MILLIGRAM(S): at 02:09

## 2020-08-28 NOTE — PROGRESS NOTE ADULT - SUBJECTIVE AND OBJECTIVE BOX
60 y/o female with CAD, PAD s/p fem-pop bypass s/p RT iliac stent, diabetes, hypothyroidism, hyperlipidemia, current active smoker, uses cocaine last use yesterday, chronic back pain s/p lumbar surgery admitted with UTI sepsis and E. coli bacteremia POA.  ECG revealed STEMI prompting code STEMI--pt underwent urgent angiogram showing occluded RCA--no inervention. Pt developed hypotension overnight and was given 1L IVF when she developed PEA and initiation of CPR.  Intubated and CVL placed for pressors.    8/25:  Case d/w SAMIRA solo.  Pt seen and examined in CCU--vented--awake and alert off sedation.  Did well with SBT--extubated.  On Norepi gtt 0.2    8/26: Pt seen and examined in CCU--extubated.  No events overnight.  Remains on 0.2 Norepi gtt.  Started on midodrine 10 q8.  Awake.  E.coli S to CTX  WBC 30K.  Tm 100.2  C/O Abd pain but eating well wo issues    8/27: worsening lactic acidosis overnight, now resolving, abd pain, CT A/P obtained showed colitis in sigmoid colon  WBC uprising concern for Cdiff colitis/Ischemic Colitis, surgery consulted  Started on Vanco PO    8/28 Patent weaning of vasopressor support, Cdiff neg Vanco PO stopped  Surgery consul appreciated  Pt s/p 2u PRBC, overnight, HH 11.7  Abd exam, mildly improved, on IV Abx  Cr uprising, minimal UOP, will discuss with renal for RRT    T(C): 35.8 (08-28-20 @ 05:20), Max: 36.2 (08-27-20 @ 14:14)  HR: 87 (08-28-20 @ 09:00) (82 - 89)  BP: 102/46 (08-28-20 @ 09:00) (92/49 - 158/70)  RR: 7 (08-28-20 @ 09:00) (0 - 21)  SpO2: 97% (08-28-20 @ 09:00) (90% - 100%)  Wt(kg): --      Gen: Awake, NAD  HEENT: NCAT, EOMI  Neck: Supple  CV: nml S1S2, RRR  Lungs: CTABL  Abd: Soft, diffusely tender, ND, BS+  Ext: No edema  Neuro: Non focal        CARDIAC MARKERS ( 26 Aug 2020 23:09 )  1.340 ng/mL / x     / x     / x     / x                                11.7   53.80 )-----------( 338      ( 28 Aug 2020 06:40 )             33.5     28 Aug 2020 06:40    130    |  96     |  78     ----------------------------<  293    4.6     |  22     |  4.89     Ca    6.6        28 Aug 2020 06:40  Phos  5.3       27 Aug 2020 05:17  Mg     1.9       27 Aug 2020 05:17    TPro  6.0    /  Alb  1.2    /  TBili  0.4    /  DBili  x      /  AST  72     /  ALT  36     /  AlkPhos  221    28 Aug 2020 06:40      CAPILLARY BLOOD GLUCOSE      POCT Blood Glucose.: 246 mg/dL (27 Aug 2020 21:15)  POCT Blood Glucose.: 178 mg/dL (27 Aug 2020 17:21)  POCT Blood Glucose.: 116 mg/dL (27 Aug 2020 10:23)    LIVER FUNCTIONS - ( 28 Aug 2020 06:40 )  Alb: 1.2 g/dL / Pro: 6.0 gm/dL / ALK PHOS: 221 U/L / ALT: 36 U/L / AST: 72 U/L / GGT: x

## 2020-08-28 NOTE — PROGRESS NOTE ADULT - SUBJECTIVE AND OBJECTIVE BOX
Lethargic, less responsive today    VSS  - off vasopressin, Levo being weaned, Afeb    Lungs- clear  Cor- RRR  Abd- distended, + BS, soft diffusely tender, mostly lower abdomen with guarding  Ext- no edema

## 2020-08-28 NOTE — PROGRESS NOTE ADULT - ASSESSMENT
65 yo female s/p MI, cardiac cath, respiratory failure requiring intubation. Currently extubated, but with lactic acidosis, abdominal pain, distension, diarrhea. C Diff negative. Likely Ischemic colitis evidenced on CT scan. Possible non occlusive mesenteric ischemic due to low flow state. Hemodynamics improving following transfusion. Weaning pressors, but abdomen still very tender. Acute renal failure. ? need for dialysis. Check abdominal X Ray. Continue IV abx. CCU monitoring. Follow up CT if no further improvement in exam or WBC.

## 2020-08-28 NOTE — PROGRESS NOTE ADULT - PROBLEM SELECTOR PLAN 2
No chest pain; occluded RCA of unknown chronicity; continue medical management with aspirin, Plavix, atorvastatin. No further cardiac intervention indicated at this time.

## 2020-08-28 NOTE — PROGRESS NOTE ADULT - SUBJECTIVE AND OBJECTIVE BOX
Patient is a 59y Female who reports no complaints as new Taking PO now, good uop reported  event yest noted - intubated  after hypotension overnight and was given 1L IVF when she developed PEA and initiation of CPR.  Intubated and CVL placed for pressors. now awake and extubated    events noted   wbc rising   cdiff neg    remains on pressors     REVIEW OF SYSTEMS:    CONSTITUTIONAL: stable weakness, fevers or chills  RESPIRATORY: No cough, wheezing, hemoptysis; No shortness of breath  CARDIOVASCULAR: No chest pain or palpitations  GENITOURINARY: No dysuria, frequency or hematuria  All other review of systems is negative unless indicated above.    MEDICATIONS  (STANDING):  ARIPiprazole 20 milliGRAM(s) Oral daily  aspirin  chewable 81 milliGRAM(s) Oral daily  atorvastatin 80 milliGRAM(s) Oral at bedtime  cefTRIAXone Injectable. 2000 milliGRAM(s) IV Push every 24 hours  clopidogrel Tablet 75 milliGRAM(s) Oral daily  dextrose 5%. 1000 milliLiter(s) (50 mL/Hr) IV Continuous <Continuous>  dextrose 50% Injectable 12.5 Gram(s) IV Push once  dextrose 50% Injectable 25 Gram(s) IV Push once  dextrose 50% Injectable 25 Gram(s) IV Push once  gabapentin 600 milliGRAM(s) Oral three times a day  heparin   Injectable 5000 Unit(s) SubCutaneous every 8 hours  hydrocortisone sodium succinate Injectable 50 milliGRAM(s) IV Push every 12 hours  insulin glargine Injectable (LANTUS) 20 Unit(s) SubCutaneous at bedtime  insulin lispro (HumaLOG) corrective regimen sliding scale   SubCutaneous three times a day before meals  insulin lispro (HumaLOG) corrective regimen sliding scale   SubCutaneous at bedtime  levothyroxine 112 MICROGram(s) Oral daily  metroNIDAZOLE  IVPB 500 milliGRAM(s) IV Intermittent every 8 hours  midodrine 10 milliGRAM(s) Oral every 8 hours  multivitamin 1 Tablet(s) Oral daily  norepinephrine Infusion 0.03 MICROgram(s)/kG/Min (3.06 mL/Hr) IV Continuous <Continuous>  pantoprazole    Tablet 40 milliGRAM(s) Oral before breakfast  ranolazine 1000 milliGRAM(s) Oral two times a day  sodium bicarbonate  Infusion 0.207 mEq/kG/Hr (75 mL/Hr) IV Continuous <Continuous>  vasopressin Infusion 0.04 Unit(s)/Min (2.4 mL/Hr) IV Continuous <Continuous>    MEDICATIONS  (PRN):  acetaminophen   Tablet .. 650 milliGRAM(s) Oral every 4 hours PRN Mild Pain (1 - 3)  ALBUTerol    90 MICROgram(s) HFA Inhaler 2 Puff(s) Inhalation every 6 hours PRN Shortness of Breath and/or Wheezing  aluminum hydroxide/magnesium hydroxide/simethicone Suspension 30 milliLiter(s) Oral every 4 hours PRN Dyspepsia  dextrose 40% Gel 15 Gram(s) Oral once PRN Blood Glucose LESS THAN 70 milliGRAM(s)/deciliter  glucagon  Injectable 1 milliGRAM(s) IntraMuscular once PRN Glucose LESS THAN 70 milligrams/deciliter  HYDROmorphone  Injectable 1 milliGRAM(s) IV Push every 4 hours PRN Moderate Pain (4 - 6)  ondansetron Injectable 4 milliGRAM(s) IV Push every 6 hours PRN Nausea  oxyCODONE    IR 5 milliGRAM(s) Oral every 4 hours PRN Moderate Pain (4 - 6)  oxyCODONE    IR 10 milliGRAM(s) Oral every 4 hours PRN Severe Pain (7 - 10)      Vital Signs Last 24 Hrs  T(C): 35.8 (28 Aug 2020 05:20), Max: 36.2 (27 Aug 2020 14:14)  T(F): 96.5 (28 Aug 2020 05:20), Max: 97.1 (27 Aug 2020 14:14)  HR: 87 (28 Aug 2020 09:00) (82 - 89)  BP: 102/46 (28 Aug 2020 09:00) (92/49 - 158/70)  BP(mean): 59 (28 Aug 2020 09:00) (55 - 99)  RR: 7 (28 Aug 2020 09:00) (0 - 21)  SpO2: 97% (28 Aug 2020 09:00) (90% - 100%)    I&O's Detail    27 Aug 2020 07:01  -  28 Aug 2020 07:00  --------------------------------------------------------  IN:    IV PiggyBack: 200 mL    norepinephrine Infusion: 143 mL    Packed Red Blood Cells: 548 mL    sodium bicarbonate  Infusion: 750 mL    vasopressin Infusion: 20 mL  Total IN: 1661 mL    OUT:    Voided: 100 mL  Total OUT: 100 mL    Total NET: 1561 mL        PHYSICAL EXAM:    Constitutional: NAD, frail  HEENT: temp wasting, cachetic. >then stated age  Resp:dist BS  Cardiovascular: S1 and S2  Abd: distended, diffusely tender, + guarding   Extremities: tr peripheral edema  Neurological: A/O x 3  :  Black + w dark urine  Skin: No rashes  Access: Not applicable        LABS:                 130    |  96     |  78     ----------------------------<  293       28 Aug 2020 06:40  4.6     |  22     |  4.89     133    |  101    |  75     ----------------------------<  105       27 Aug 2020 05:17  3.8     |  19     |  4.20     133    |  104    |  70     ----------------------------<  106       26 Aug 2020 23:09  4.0     |  15     |  4.21     Ca    6.6        28 Aug 2020 06:40  Ca    6.8        27 Aug 2020 05:17    Phos  5.3       27 Aug 2020 05:17  Phos  4.6       26 Aug 2020 23:09    Mg     1.9       27 Aug 2020 05:17  Mg     2.0       26 Aug 2020 23:09                                       11.7   53.80 )-----------( 338      ( 28 Aug 2020 06:40 )             33.5                         7.9    48.17 )-----------( 307      ( 27 Aug 2020 05:17 )             22.9     Urinalysis (08.25.20 @ 22:55)    Glucose Qualitative, Urine: Negative mg/dL    Blood, Urine: Large    pH Urine: 6.5    Color: Yellow    Urine Appearance: Clear    Bilirubin: Negative    Ketone - Urine: Trace    Specific Gravity: 1.010    Protein, Urine: 100 mg/dL    Urobilinogen: Negative mg/dL    Nitrite: Negative    Leukocyte Esterase Concentration: Small    Urine Microscopic-Add On (NC) (08.25.20 @ 22:55)    Red Blood Cell - Urine: 0-2 /HPF    White Blood Cell - Urine: Negative    Epithelial Cells: Negative    Bacteria: Occasional      RADIOLOGY & ADDITIONAL STUDIES:        PROCEDURE:  CT ofthe Abdomen and Pelvis was performed without intravenous contrast.  Intravenous contrast: None.  Oral contrast: None.  Sagittal and coronal reformats were performed.    FINDINGS:  Evaluation of solid organs is limited without intravenous contrast.  Evaluation of the lower pelvis is limited as images do not extend beyond the proximal inguinal region.      LOWER CHEST: Small bilateral pleural effusions and associated subsegmental bibasilar atelectasis. Scattered calcified granulomas. Aortic and coronary artery atherosclerotic calcifications.    LIVER: Within normal limits.  BILE DUCTS: Stable mild CBD dilatation likely related to cholecystectomy.  GALLBLADDER: Cholecystectomy.  SPLEEN: Within normal limits.  PANCREAS: Within normal limits.  ADRENALS: Within normal limits.  KIDNEYS/URETERS: No hydronephrosis or obstructing stone.    BLADDER: Within normal limits.  REPRODUCTIVE ORGANS: Uterus and adnexa within normal limits.    BOWEL: Partially imaged rectal tube. Distal rectum/anus not included in the field-of-view. Wall thickening of the sigmoid colon. No bowel obstruction. Appendix is normal.  PERITONEUM: Small ascites. Presacral edema.  VESSELS: Atherosclerotic changes.  RETROPERITONEUM/LYMPH NODES: No lymphadenopathy. No definite retroperitoneal hematoma identified.  ABDOMINAL WALL: Anasarca. Inguinal regions not fully included in the field-of-view. Mild bilateral stranding in the visualized inguinal regions.  BONES: Degenerative changes of the spine. Grade 1 anterolisthesis at L4-L5. Posterior fixation hardware at L4-L5.    IMPRESSION:    1. No definite retroperitoneal hematoma identified.    2. Incomplete evaluation for inguinal region/thigh hematoma due to incomplete field-of-view.    3. Small bilateral pleural effusions. Small ascites. Anasarca.      4. Wall thickening of the sigmoid colon. Recommend clinical correlation for colitis.

## 2020-08-28 NOTE — PROGRESS NOTE ADULT - ASSESSMENT
Patient is 60yo female with CAD, PAD s/p fem-pop bypass s/p RT iliac stent, diabetes, hypothyroidism, hyperlipidemia, current active smoker, +cocaine use, chronic back pain s/p lumbar surgery admitted with UTI sepsis and E. coli bacteremia POA.  ECG revealed STEMI prompting code STEMI--pt underwent urgent angiogram showing occluded RCA--no intervention   Pt then eveloped hypotension overnight and was given 1L IVF when she developed PEA and initiation of CPR.  Intubated and CVL placed for pressors      Acute resp failure, resolved, extubated  Septic shock on pressors, resolving  AMISH  Ischemic Colitis  Polysubstance abuse  Leukocytosis        PLAN:  - supp o2 as needed, MDI PRN  - Rocephin IV, E coli sensitive in BCx, UCx  - Flagyl IV,    - NPO  - follow up ID  - surgical follow up  - Vasopressor support, Levo, stress dose steroids taper  - Bicarb drip  - follow up renal  - follow up cardio  - ASA, Plavix, Statin  - GI ppx  - DVT ppx, HSQ    Critical care time 35min

## 2020-08-28 NOTE — PROGRESS NOTE ADULT - ASSESSMENT
59 year old female with PMH CAD, PAD s/p fem-pop bypass s/p RT iliac stent, diabetes, hypothyroidism, hyperlipidemia, current active smoker, uses cocaine daily, chronic back pain s/p lumbar surgery who presented To the ER with multiple non-specific complaints, including fevers, weakness, no appetite and reduced oral intake, vague diffuse abd pains. Admitted w/ STEMI, no intervention in cath lab medically manage. Complicated by cardiac arrest, shock, AMISH  and GNR bacteremia. Further complicated by worsening abdominal pain and increasing WBC, due to possibly colitis vs ischemic colitis.     Plan:   Neuro: Encephalopathy, likely toxic metabolic due to sepsis and renal failure. Protecting her airway. CT head 8/27 negative for acute pathology,   CV: Shock, likely septic. Titrating levophed to maintain MAP >65. 10mg   Increased PO midodrine to 15mg q8hr. in attempt to wean off IV vasopressors. Stress dose steroids.  TTE w/ EF 50%, no signs of vegetations  Blood cultures w/ E.coli.   - AMI, medically managed w/ ASA/ plavix. Statin daily. Holding BB due to shock.  Cardiology following   Resp: Acute hypoxic respiratory failure 2/2 cardiac arrest. Resolved Now extubated to NC. Tolerating supplemental O2  GI: Colits vs ischemic colits, thickening of sigmoid colon seen on CT. Lactate 3., will repeat in am. Keep NPO. IV abx.  Surgery following,.   Renal: AMISH likely ATN due to hypoperfusion. Oliguric.  Only 50cc urine during day shift. Strict I&Os. Avoid Nephrotoxins. trend BUN/Crt. May end up requiring HD. Closely monitor lytes.  nephrology following   - Metabolic acidosis, on sodium bicarb gtt. Improving, should be able to d/c by am. Nephrology following.   ID: Bacteremia, E.coli. On 2g Rocephin daily. Repeat blood cultures until cleared. Started on flagyl for possible intra adnominal process C diff negative.   Endo: hypothyroid, s/w synthroid

## 2020-08-28 NOTE — PROGRESS NOTE ADULT - ASSESSMENT
60yo/F with PMH CAD, PAD s/p fem-pop bypass s/p RT iliac stent, diabetes, hypothyroidism, hyperlipidemia, current active smoker, uses cocaine last use 8/22  chronic back pain s/p lumbar surgery presented with multiple non-specific complaints, including fevers on/off last 4-5 days, weakness, no appetite and reduced oral intake, vague diffuse abd pains, one day of diarrhea. No cough, no SOB. She came in 8/23 for evaluation of weakness and found to have ST elevations on presenting EKG, code STEMI called and pt underwent urgent angiogram showing occluded RCA. She denies chest pain at present Also noted with positive UA, blood cx with Ecoli, CT abd/pelvis unremarkable, was started on IV rocephin.       1. septic shock with Ecoli. cystitis. leukocytosis. abdominal pain/ischemic colitis. worsening AMISH  - urine cx/blood cx growing Ecoli; sensitivities reviewed   - on rocephin 2gm daily #6  - continue with IV flagyl [id number] #3  - continue with abx coverage   - worsening leukocytosis - 53, CT chest/abd/pelvis concerning for ischemic colitis, wbc ct could be reactive and steroids also contributing in acute rise   - C diff pcr (-) doubt Cdad, oral vanco discontinued   - surgery evaluation noted  - monitor temps  - tolerating abx well so far; no side effects noted  - reason for abx use and side effects reviewed with patient  - supportive care  - fu cbc    2. other issues - s/p cardiac arrest/STEMI - cardiology/ICU following

## 2020-08-28 NOTE — PROGRESS NOTE ADULT - SUBJECTIVE AND OBJECTIVE BOX
Patient is a 59y old  Female who presents with a chief complaint of abd pain, weakness (28 Aug 2020 16:55)      BRIEF HOSPITAL COURSE:  59 year old female with PMH CAD, PAD s/p fem-pop bypass s/p RT iliac stent, diabetes, hypothyroidism, hyperlipidemia, current active smoker, uses cocaine daily, chronic back pain s/p lumbar surgery who presented To the ER with multiple non-specific complaints, including fevers, weakness, no appetite and reduced oral intake, vague diffuse abd pains. Found to ST elevations, taken to cath lab found to distal RCA lesion, no intervention was done. Yesterday overnight, became acutely hypotensive and bradycardic, suffered brief PEA arrest. ROSC achieved after 3 minutes. Intubated for airway protection post arrest. Further complicated by abdominale pain w/ increasing WBC and vasopressors requirements.     Events last 24 hours:  Remains levophed and sodium bicarb infusion. Oliguric renal failure, only 50cc during day shift. Tonight w/ AMS but had recently received Dilaudid Follows commands after being stimulated. protecting her airway.  WBC rising. Cdiff negative.     PAST MEDICAL & SURGICAL HISTORY:  Lung nodule  Diabetes mellitus  History of TIAs  History of gallbladder disease: surgery  History of colon polyps: precancerous  Substance abuse: history of. last used 14 years ago.  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 Decreased ROM.  Angina pectoris  History of MRSA infection: left lower extremity incisional area 2015  Anxiety and depression  Transient cerebral ischemia, unspecified type  Osteoarthritis of spine, unspecified spinal osteoarthritis complication status, unspecified spinal region  Lumbar herniated disc  Urinary incontinence, unspecified type  Overactive bladder  Hiatal hernia with GERD: hiatal hernia repaired  PAD (peripheral artery disease)  Hyperlipidemia, unspecified hyperlipidemia type  Essential hypertension  Hypothyroidism  COPD (chronic obstructive pulmonary disease)  CAD (coronary artery disease)  History of lumbar fusion: with laminectomy 11/8/2018  H/O hernia repair: hiatal hernia - 2017, 2018  S/P dilatation and curettage: 1991  H/O rhinoplasty: 1980  H/O angioplasty: Right iliac artery. 5/12/2017  H/O ventral hernia repair: 3/2017  History of incision and drainage: of lower extremity incisional site x 5 . last done5/2015  H/O arterial bypass of lower limb: Femoral - Popliteal. 11/2014 Left lower side with stents  History of laparoscopic cholecystectomy: 2/2016      Review of Systems:  Unable to obtain due to clinical condition.     Medications:  cefTRIAXone Injectable. 2000 milliGRAM(s) IV Push every 24 hours  metroNIDAZOLE  IVPB 500 milliGRAM(s) IV Intermittent every 8 hours  midodrine 10 milliGRAM(s) Oral every 8 hours  norepinephrine Infusion 0.03 MICROgram(s)/kG/Min IV Continuous <Continuous>  ranolazine 1000 milliGRAM(s) Oral two times a day  ALBUTerol    90 MICROgram(s) HFA Inhaler 2 Puff(s) Inhalation every 6 hours PRN  acetaminophen   Tablet .. 650 milliGRAM(s) Oral every 4 hours PRN  ARIPiprazole 20 milliGRAM(s) Oral daily  gabapentin 600 milliGRAM(s) Oral three times a day  HYDROmorphone  Injectable 1 milliGRAM(s) IV Push every 4 hours PRN  ondansetron Injectable 4 milliGRAM(s) IV Push every 6 hours PRN  oxyCODONE    IR 5 milliGRAM(s) Oral every 4 hours PRN  oxyCODONE    IR 10 milliGRAM(s) Oral every 4 hours PRN  aspirin  chewable 81 milliGRAM(s) Oral daily  clopidogrel Tablet 75 milliGRAM(s) Oral daily  heparin   Injectable 5000 Unit(s) SubCutaneous every 8 hours  aluminum hydroxide/magnesium hydroxide/simethicone Suspension 30 milliLiter(s) Oral every 4 hours PRN  pantoprazole    Tablet 40 milliGRAM(s) Oral before breakfast  atorvastatin 80 milliGRAM(s) Oral at bedtime  dextrose 40% Gel 15 Gram(s) Oral once PRN  dextrose 50% Injectable 12.5 Gram(s) IV Push once  dextrose 50% Injectable 25 Gram(s) IV Push once  dextrose 50% Injectable 25 Gram(s) IV Push once  glucagon  Injectable 1 milliGRAM(s) IntraMuscular once PRN  hydrocortisone sodium succinate Injectable 50 milliGRAM(s) IV Push every 12 hours  insulin glargine Injectable (LANTUS) 20 Unit(s) SubCutaneous at bedtime  insulin lispro (HumaLOG) corrective regimen sliding scale   SubCutaneous three times a day before meals  insulin lispro (HumaLOG) corrective regimen sliding scale   SubCutaneous at bedtime  levothyroxine 112 MICROGram(s) Oral daily  vasopressin Infusion 0.04 Unit(s)/Min IV Continuous <Continuous>    dextrose 5%. 1000 milliLiter(s) IV Continuous <Continuous>  multivitamin 1 Tablet(s) Oral daily  sodium bicarbonate  Infusion 0.207 mEq/kG/Hr IV Continuous <Continuous>                ICU Vital Signs Last 24 Hrs  T(C): 36.6 (28 Aug 2020 16:23), Max: 36.9 (28 Aug 2020 14:23)  T(F): 97.9 (28 Aug 2020 16:23), Max: 98.4 (28 Aug 2020 14:23)  HR: 82 (28 Aug 2020 21:00) (82 - 92)  BP: 128/59 (28 Aug 2020 21:00) (92/49 - 137/59)  BP(mean): 76 (28 Aug 2020 21:00) (55 - 79)  RR: 11 (28 Aug 2020 21:00) (0 - 19)  SpO2: 95% (28 Aug 2020 21:00) (93% - 99%)      ABG - ( 26 Aug 2020 22:50 )  pH, Arterial: 7.33  pH, Blood: x     /  pCO2: 25    /  pO2: 67    / HCO3: 13    / Base Excess: -11.6 /  SaO2: 92                  I&O's Detail    27 Aug 2020 07:01  -  28 Aug 2020 07:00  --------------------------------------------------------  IN:    IV PiggyBack: 200 mL    norepinephrine Infusion: 143 mL    Packed Red Blood Cells: 548 mL    sodium bicarbonate  Infusion: 750 mL    vasopressin Infusion: 20 mL  Total IN: 1661 mL    OUT:    Voided: 100 mL  Total OUT: 100 mL    Total NET: 1561 mL      28 Aug 2020 07:01  -  28 Aug 2020 22:21  --------------------------------------------------------  IN:    IV PiggyBack: 200 mL    norepinephrine Infusion: 140 mL    sodium bicarbonate  Infusion: 1150 mL  Total IN: 1490 mL    OUT:    Indwelling Catheter - Urethral: 75 mL    Stool: 100 mL    Voided: 30 mL  Total OUT: 205 mL    Total NET: 1285 mL            LABS:                        11.7   53.80 )-----------( 338      ( 28 Aug 2020 06:40 )             33.5     08-28    130<L>  |  96  |  78<H>  ----------------------------<  293<H>  4.6   |  22  |  4.89<H>    Ca    6.6<L>      28 Aug 2020 06:40  Phos  5.3     08-27  Mg     1.9     08-27    TPro  6.0  /  Alb  1.2<L>  /  TBili  0.4  /  DBili  x   /  AST  72<H>  /  ALT  36  /  AlkPhos  221<H>  08-28      CARDIAC MARKERS ( 26 Aug 2020 23:09 )  1.340 ng/mL / x     / x     / x     / x          CAPILLARY BLOOD GLUCOSE      POCT Blood Glucose.: 258 mg/dL (28 Aug 2020 21:01)        CULTURES:  C Diff by PCR Result: NotDetec (08-27-20 @ 06:00)  Culture Results:   No growth to date. (08-27-20 @ 02:00)  Culture Results:   >100,000 CFU/ml Escherichia coli (08-23-20 @ 10:25)  Culture Results:   Growth in aerobic and anaerobic bottles: Escherichia coli  "Due to technical problems, Proteus sp. will Not be reported as part of  the BCID panel until further notice"  ***Blood Panel PCR results on this specimen are available  approximately 3 hours after the Gram stain result.***  Gram stain, PCR, and/or culture results may not always  correspond due to difference in methodologies.  ************************************************************  This PCR assay was performed using [employer].  The following targets are tested for: Enterococcus,  vancomycin resistant enterococci, Listeria monocytogenes,  coagulase negative staphylococci, S. aureus,  methicillin resistant S. aureus, Streptococcus agalactiae  (Group B), S. pneumoniae, S.pyogenes (Group A),  Acinetobacter baumannii, Enterobacter cloacae, E. coli,  Klebsiella oxytoca, K. pneumoniae, Proteus sp.,  Serratia marcescens, Haemophilus influenzae,  Neisseria meningitidis, Pseudomonas aeruginosa, Candida  albicans, C. glabrata, C krusei, C parapsilosis,  C. tropicalis and the KPC resistance gene. (08-23-20 @ 08:34)  Culture Results:   Growth in aerobic and anaerobic bottles: Escherichia coli  See previous culture [id number] (08-23-20 @ 08:34)      Physical Examination:    General: Awake/alert. No acute distress. Lethargic.     HEENT: Pupils equal, reactive to light.  Symmetric.    PULM: Clear to auscultation bilaterally, no significant sputum production    CVS: Regular rate and rhythm, no murmurs, rubs, or gallops    ABD: Soft, nondistended, diffusly tender, normoactive bowel sounds, no masses    EXT: No edema, nontender    SKIN: Warm and well perfused, no rashes noted.    NEURO: Awake/alert, responds to verbal/nxious stimuli , strength 5/5 all extremities, no focal deficits    RADIOLOGY: < from: CT Abdomen and Pelvis No Cont (08.27.20 @ 01:24) >  EXAM:  CT ABDOMEN AND PELVIS                            PROCEDURE DATE:  08/27/2020          INTERPRETATION:  CLINICAL INFORMATION: Status post catheterization today. Evaluate for retroperitoneal bleed.    COMPARISON: 8/23/2020    PROCEDURE:  CT ofthe Abdomen and Pelvis was performed without intravenous contrast.  Intravenous contrast: None.  Oral contrast: None.  Sagittal and coronal reformats were performed.    FINDINGS:  Evaluation of solid organs is limited without intravenous contrast.  Evaluation of the lower pelvis is limited as images do not extend beyond the proximal inguinal region.      LOWER CHEST: Small bilateral pleural effusions and associated subsegmental bibasilar atelectasis. Scattered calcified granulomas. Aortic and coronary artery atherosclerotic calcifications.    LIVER: Within normal limits.  BILE DUCTS: Stable mild CBD dilatation likely related to cholecystectomy.  GALLBLADDER: Cholecystectomy.  SPLEEN: Within normal limits.  PANCREAS: Within normal limits.  ADRENALS: Within normal limits.  KIDNEYS/URETERS: No hydronephrosis or obstructing stone.    BLADDER: Within normal limits.  REPRODUCTIVE ORGANS: Uterus and adnexa within normal limits.    BOWEL: Partially imaged rectal tube. Distal rectum/anus not included in the field-of-view. Wall thickening of the sigmoid colon. No bowel obstruction. Appendix is normal.  PERITONEUM: Small ascites. Presacral edema.  VESSELS: Atherosclerotic changes.  RETROPERITONEUM/LYMPH NODES: No lymphadenopathy. No definite retroperitoneal hematoma identified.  ABDOMINAL WALL: Anasarca. Inguinal regions not fully included in the field-of-view. Mild bilateral stranding in the visualized inguinal regions.  BONES: Degenerative changes of the spine. Grade 1 anterolisthesis at L4-L5. Posterior fixation hardware at L4-L5.    IMPRESSION:    1. No definite retroperitoneal hematoma identified.    2. Incomplete evaluation for inguinal region/thigh hematoma due to incomplete field-of-view.    3. Small bilateral pleural effusions. Small ascites. Anasarca.      4. Wall thickening of the sigmoid colon. Recommend clinical correlation for colitis.          < end of copied text >    CRITICAL CARE TIME SPENT:  32 min  Evaluating/treating patient, reviewing data/labs/imaging, discussing case with multidisciplinary team, discussing plan/goals of care with patient/family. Non-inclusive of procedure time.

## 2020-08-28 NOTE — PROGRESS NOTE ADULT - ASSESSMENT
59 CAD, PAD s/p fem-pop bypass s/p RT iliac stent, diabetes, hypothyroidism, hyperlipidemia, current active smoker, uses cocaine last use yesterday, chronic back pain s/p lumbar surgery presented with multiple non-specific complaints, including fevers on/off last 4-5 days, weakness, no appetite and reduced oral intake, vague diffuse abd pains, one day of diarrhea. No cough, no SOB. SHe came in today for evaluation of weakness and found to have ST elevations on presenting EKG, code STEMI called and pt underwent urgent angiogram showing occluded RCA w no intervention with UTI sepsis and E. coli bacteremia POA.      AMISH w septic shock/UTI w STEMI and Code blue event w hypotension   C diff negative  - suspected ischemic bowel per Gen Surgery   Cr rising w olguric  uop   acid base/K is stable for now   surgical consult noted - supportive care / maintain perfusion w IVF and PRBC  no acute indication for HD at this time - renal status tenous - ressess daily     Lactic Metabolic Acidosis sec to above - improving   HCO3 gtt  - continue this for volume for now    monitor lactate     Hypocalcemia    corrected ca ok w low albumin     Ischemic Bowerl w Shock    pressors per ICU    maintain SBP    Agree with PRBC    Gen Surgery  evaluation     d/w Dr Beck [facility]   D/w daughter (Jelani) [phone] at length who was notified of the possible need for HD and could be temporary or longterm. Daughter was very upset to hear this but requested all measures to be taken if needed. Will keep pt updated.  d/w CCU  RN  Prognosis guarded     Dr Dennis covering weekend 59 CAD, PAD s/p fem-pop bypass s/p RT iliac stent, diabetes, hypothyroidism, hyperlipidemia, current active smoker, uses cocaine last use yesterday, chronic back pain s/p lumbar surgery presented with multiple non-specific complaints, including fevers on/off last 4-5 days, weakness, no appetite and reduced oral intake, vague diffuse abd pains, one day of diarrhea. No cough, no SOB. SHe came in today for evaluation of weakness and found to have ST elevations on presenting EKG, code STEMI called and pt underwent urgent angiogram showing occluded RCA w no intervention with UTI sepsis and E. coli bacteremia POA.      AMISH w septic shock/UTI w STEMI and Code blue event w hypotension   C diff negative  / Suspected ischemic bowel per Gen Surgery   Cr plateaued and then risen  w olguric uop   acid base/K is stable for now   surgical consult noted - supportive care / maintain perfusion w IVF and PRBC  no acute indication for HD today - renal status tenous - ressess daily     Lactic Metabolic Acidosis sec to above - improving   HCO3 gtt  - continue this for volume for now    monitor lactate     Hypocalcemia    corrected ca ok w low albumin     Ischemic Bowerl w Shock w Leokocytosis    pressors per ICU    maintain SBP    Agree with PRBC    Gen Surgery  evaluation    WBC sec to infection +/- steroids     d/w Dr Beck [facility]   D/w daughter (Jelani) [phone] at length who was notified of the possible need for HD and could be temporary or longterm. Daughter was very upset to hear this but requested all measures to be taken if needed. Will keep pt updated.  d/w CCU  RN  Prognosis guarded     Dr Dennis covering weekend

## 2020-08-28 NOTE — PROGRESS NOTE ADULT - SUBJECTIVE AND OBJECTIVE BOX
PCP:    REQUESTING PHYSICIAN:    REASON FOR CONSULT:    CHIEF COMPLAINT:    HPI:  HPI: 59 year old woman with multiple medical problems, including CAD, PAD (fem-pop bypass, iliac stent), diabetes, hypothyroidism, hyperlipidemia, smoker, cocaine use,   chronic back pain and past lumbar surgery admitted on  with multiple complaints (including fever, abd pain, diarrhea) -- found to have ST elevations on ECG with urgent cath revealing an occluded RCA (no PCI) -- subsequently diagnosed with E coli sepsis with shock; hospitalization complicated by PEA arrest.    : no complaints overnight: no chest pain, palpitations, dyspnea.Reviewed results of cardiac cath & plan for med mx.  20 Events reviewed , patient was noted to be hypotensive bradycardic PEA  unresponsive episode subsequently resuscitated  patient intubated , on pressors , maintaining sbp blood cultures are positive for gram negative rods . patient hemoglobin dropped with   20 Pt awake but lethargic. She denies complaints today. Pressors continued  20:  Feels sick but no dyspnea, angina; + abdominal pain.  20: Pressors continued. Pt lethargic        PAST MEDICAL & SURGICAL HISTORY:  Lung nodule  Diabetes mellitus  History of TIAs  History of gallbladder disease: surgery  History of colon polyps: precancerous  Substance abuse: history of. last used 14 years ago.  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 Decreased ROM.  Angina pectoris  History of MRSA infection: left lower extremity incisional area   Anxiety and depression  Transient cerebral ischemia, unspecified type  Osteoarthritis of spine, unspecified spinal osteoarthritis complication status, unspecified spinal region  Lumbar herniated disc  Urinary incontinence, unspecified type  Overactive bladder  Hiatal hernia with GERD: hiatal hernia repaired  PAD (peripheral artery disease)  Hyperlipidemia, unspecified hyperlipidemia type  Essential hypertension  Hypothyroidism  COPD (chronic obstructive pulmonary disease)  CAD (coronary artery disease)  History of lumbar fusion: with laminectomy 2018  H/O hernia repair: hiatal hernia - 2018  S/P dilatation and curettage:   H/O rhinoplasty:   H/O angioplasty: Right iliac artery. 2017  H/O ventral hernia repair: 3/2017  History of incision and drainage: of lower extremity incisional site x 5 . last done2015  H/O arterial bypass of lower limb: Femoral - Popliteal. 2014 Left lower side with stents  History of laparoscopic cholecystectomy: 2016      SOCIAL HISTORY:    FAMILY HISTORY:  Family history of asthma (Sibling): sister  Family history of epilepsy (Sibling, Sibling): 2 sisters  Family history of cardiovascular disease: mother  Family history of stroke: mother  Family history of heart disease: father  Family history of alcoholism in father      ALLERGIES:  Allergies    No Known Allergies    Intolerances        MEDICATIONS:    MEDICATIONS  (STANDING):  ARIPiprazole 20 milliGRAM(s) Oral daily  aspirin  chewable 81 milliGRAM(s) Oral daily  atorvastatin 80 milliGRAM(s) Oral at bedtime  cefTRIAXone Injectable. 2000 milliGRAM(s) IV Push every 24 hours  clopidogrel Tablet 75 milliGRAM(s) Oral daily  dextrose 5%. 1000 milliLiter(s) (50 mL/Hr) IV Continuous <Continuous>  dextrose 50% Injectable 12.5 Gram(s) IV Push once  dextrose 50% Injectable 25 Gram(s) IV Push once  dextrose 50% Injectable 25 Gram(s) IV Push once  gabapentin 600 milliGRAM(s) Oral three times a day  heparin   Injectable 5000 Unit(s) SubCutaneous every 8 hours  hydrocortisone sodium succinate Injectable 50 milliGRAM(s) IV Push every 12 hours  insulin glargine Injectable (LANTUS) 20 Unit(s) SubCutaneous at bedtime  insulin lispro (HumaLOG) corrective regimen sliding scale   SubCutaneous three times a day before meals  insulin lispro (HumaLOG) corrective regimen sliding scale   SubCutaneous at bedtime  levothyroxine 112 MICROGram(s) Oral daily  metroNIDAZOLE  IVPB 500 milliGRAM(s) IV Intermittent every 8 hours  midodrine 10 milliGRAM(s) Oral every 8 hours  multivitamin 1 Tablet(s) Oral daily  norepinephrine Infusion 0.03 MICROgram(s)/kG/Min (3.06 mL/Hr) IV Continuous <Continuous>  pantoprazole    Tablet 40 milliGRAM(s) Oral before breakfast  ranolazine 1000 milliGRAM(s) Oral two times a day  sodium bicarbonate  Infusion 0.207 mEq/kG/Hr (75 mL/Hr) IV Continuous <Continuous>  vasopressin Infusion 0.04 Unit(s)/Min (2.4 mL/Hr) IV Continuous <Continuous>    MEDICATIONS  (PRN):  acetaminophen   Tablet .. 650 milliGRAM(s) Oral every 4 hours PRN Mild Pain (1 - 3)  ALBUTerol    90 MICROgram(s) HFA Inhaler 2 Puff(s) Inhalation every 6 hours PRN Shortness of Breath and/or Wheezing  aluminum hydroxide/magnesium hydroxide/simethicone Suspension 30 milliLiter(s) Oral every 4 hours PRN Dyspepsia  dextrose 40% Gel 15 Gram(s) Oral once PRN Blood Glucose LESS THAN 70 milliGRAM(s)/deciliter  glucagon  Injectable 1 milliGRAM(s) IntraMuscular once PRN Glucose LESS THAN 70 milligrams/deciliter  HYDROmorphone  Injectable 1 milliGRAM(s) IV Push every 4 hours PRN Moderate Pain (4 - 6)  ondansetron Injectable 4 milliGRAM(s) IV Push every 6 hours PRN Nausea  oxyCODONE    IR 5 milliGRAM(s) Oral every 4 hours PRN Moderate Pain (4 - 6)  oxyCODONE    IR 10 milliGRAM(s) Oral every 4 hours PRN Severe Pain (7 - 10)      Vital Signs Last 24 Hrs  T(C): 36.6 (28 Aug 2020 16:23), Max: 36.9 (28 Aug 2020 14:23)  T(F): 97.9 (28 Aug 2020 16:23), Max: 98.4 (28 Aug 2020 14:23)  HR: 88 (28 Aug 2020 16:30) (82 - 90)  BP: 112/55 (28 Aug 2020 16:00) (92/49 - 158/70)  BP(mean): 68 (28 Aug 2020 16:00) (55 - 99)  RR: 19 (28 Aug 2020 16:30) (0 - 21)  SpO2: 96% (28 Aug 2020 16:30) (91% - 100%)Daily     Daily Weight in k.3 (28 Aug 2020 05:20)I&O's Summary    27 Aug 2020 07:01  -  28 Aug 2020 07:00  --------------------------------------------------------  IN: 1661 mL / OUT: 100 mL / NET: 1561 mL        PHYSICAL EXAM:    Constitutional: NAD, lethargic  HEENT: PERR, EOMI,  No oral cyananosis.  Neck:  supple,  No JVD  Respiratory: Breath sounds are clear bilaterally, No wheezing, rales or rhonchi  Cardiovascular: S1 and S2, regular rate and rhythm, no Murmurs, gallops or rubs  Gastrointestinal: Bowel Sounds present, soft, nontender.   Extremities: Edema  Vascular: 1+ peripheral pulses  Neurological: A/O x 3, no focal deficits  Musculoskeletal: no calf tenderness.  Skin: No rashes.      LABS: All Labs Reviewed:                        11.7   53.80 )-----------( 338      ( 28 Aug 2020 06:40 )             33.5                         7.9    48.17 )-----------( 307      ( 27 Aug 2020 05:17 )             22.9                         7.9    38.88 )-----------( 294      ( 27 Aug 2020 00:40 )             23.5     28 Aug 2020 06:40    130    |  96     |  78     ----------------------------<  293    4.6     |  22     |  4.89   27 Aug 2020 05:17    133    |  101    |  75     ----------------------------<  105    3.8     |  19     |  4.20   26 Aug 2020 23:09    133    |  104    |  70     ----------------------------<  106    4.0     |  15     |  4.21     Ca    6.6        28 Aug 2020 06:40  Ca    6.8        27 Aug 2020 05:17  Ca    6.7        26 Aug 2020 23:09  Phos  5.3       27 Aug 2020 05:17  Phos  4.6       26 Aug 2020 23:09  Phos  3.5       26 Aug 2020 06:48  Mg     1.9       27 Aug 2020 05:17  Mg     2.0       26 Aug 2020 23:09  Mg     2.1       25 Aug 2020 20:40    TPro  6.0    /  Alb  1.2    /  TBili  0.4    /  DBili  x      /  AST  72     /  ALT  36     /  AlkPhos  221    28 Aug 2020 06:40  TPro  6.0    /  Alb  1.4    /  TBili  0.3    /  DBili  x      /  AST  58     /  ALT  54     /  AlkPhos  229    26 Aug 2020 06:48      CARDIAC MARKERS ( 26 Aug 2020 23:09 )  1.340 ng/mL / x     / x     / x     / x          Blood Culture: Organism --  Gram Stain Blood -- Gram Stain --  Specimen Source .Blood None  Culture-Blood --            RADIOLOGY/EKG:< from: 12 Lead ECG (20 @ 00:12) >  Diagnosis Line Normal sinus rhythm  ST elevation consider inferior injury or acute infarct  Prolonged QT  *** ** ** ** * ACUTE MI  ** ** ** **  Abnormal ECG  Confirmed by Amando Macdonald ([id number]) on 2020 12:17:31 PM    < end of copied text >        ECHO/CARDIAC CATHTERIZATION/STRESS TEST:< from: Cardiac Cath Lab - Adult (20 @ 09:33) >  Impression     Diagnostic Conclusions     Acute occlusion of the RCA of unknown duration. No PCI due to lack of   ischemic symptoms, sepsis, acute renal failure and satisfactory left   coronary anatomy.    Estimated Blood Loss:4ml.     Signatures     ----------------------------------------------------------------   Electronically signed by Hermes Whitley MD(Performing   Physician) on 2020 09:48   ----------------------------------------------------------------    < end of copied text >

## 2020-08-28 NOTE — PROGRESS NOTE ADULT - SUBJECTIVE AND OBJECTIVE BOX
Date of service: 20 @ 12:51    pt seen and examined  laying in bed, has abd pain, loose stools  low temps noted  on pressor support   afebrile    ROS: no fever or chills; denies dizziness, no HA, no SOB or cough, no constipation; no legs pain, no rashes    MEDICATIONS  (STANDING):  ARIPiprazole 20 milliGRAM(s) Oral daily  aspirin  chewable 81 milliGRAM(s) Oral daily  atorvastatin 80 milliGRAM(s) Oral at bedtime  cefTRIAXone Injectable. 2000 milliGRAM(s) IV Push every 24 hours  clopidogrel Tablet 75 milliGRAM(s) Oral daily  dextrose 5%. 1000 milliLiter(s) (50 mL/Hr) IV Continuous <Continuous>  dextrose 50% Injectable 12.5 Gram(s) IV Push once  dextrose 50% Injectable 25 Gram(s) IV Push once  dextrose 50% Injectable 25 Gram(s) IV Push once  gabapentin 600 milliGRAM(s) Oral three times a day  heparin   Injectable 5000 Unit(s) SubCutaneous every 8 hours  hydrocortisone sodium succinate Injectable 50 milliGRAM(s) IV Push every 12 hours  insulin glargine Injectable (LANTUS) 20 Unit(s) SubCutaneous at bedtime  insulin lispro (HumaLOG) corrective regimen sliding scale   SubCutaneous three times a day before meals  insulin lispro (HumaLOG) corrective regimen sliding scale   SubCutaneous at bedtime  levothyroxine 112 MICROGram(s) Oral daily  metroNIDAZOLE  IVPB 500 milliGRAM(s) IV Intermittent every 8 hours  midodrine 10 milliGRAM(s) Oral every 8 hours  multivitamin 1 Tablet(s) Oral daily  norepinephrine Infusion 0.03 MICROgram(s)/kG/Min (3.06 mL/Hr) IV Continuous <Continuous>  pantoprazole    Tablet 40 milliGRAM(s) Oral before breakfast  ranolazine 1000 milliGRAM(s) Oral two times a day  sodium bicarbonate  Infusion 0.207 mEq/kG/Hr (75 mL/Hr) IV Continuous <Continuous>  vasopressin Infusion 0.04 Unit(s)/Min (2.4 mL/Hr) IV Continuous <Continuous>    Vital Signs Last 24 Hrs  T(C): 35.7 (28 Aug 2020 10:45), Max: 36.2 (27 Aug 2020 14:14)  T(F): 96.3 (28 Aug 2020 10:45), Max: 97.1 (27 Aug 2020 14:14)  HR: 90 (28 Aug 2020 12:30) (82 - 90)  BP: 110/48 (28 Aug 2020 12:30) (92/49 - 158/70)  BP(mean): 61 (28 Aug 2020 12:30) (55 - 99)  RR: 18 (28 Aug 2020 12:30) (0 - 21)  SpO2: 96% (28 Aug 2020 12:30) (91% - 100%)      PE:  Constitutional: frail looking  HEENT: NC/AT, EOMI, PERRLA, conjunctivae clear; ears and nose atraumatic; pharynx benign  Neck: supple; thyroid not palpable  Back: no tenderness  Respiratory: respiratory effort normal; clear to auscultation  Cardiovascular: S1S2 regular, no murmurs  Abdomen: soft,  tender, distended, positive BS; liver and spleen WNL  Genitourinary: no suprapubic tenderness  Lymphatic: no LN palpable  Musculoskeletal: no muscle tenderness, no joint swelling or tenderness  Extremities: no pedal edema  Neurological/ Psychiatric:  moving all extremities  Skin: no rashes; no palpable lesions    Labs: all available labs reviewed                                              11.7   53.80 )-----------( 338      ( 28 Aug 2020 06:40 )             33.5     08-28    130<L>  |  96  |  78<H>  ----------------------------<  293<H>  4.6   |  22  |  4.89<H>    Ca    6.6<L>      28 Aug 2020 06:40  Phos  5.3     08-  Mg     1.9     08-    TPro  6.0  /  Alb  1.2<L>  /  TBili  0.4  /  DBili  x   /  AST  72<H>  /  ALT  36  /  AlkPhos  221<H>  28      Urinalysis Basic - ( 23 Aug 2020 10:25 )    Color: Yellow / Appearance: Clear / S.010 / pH: x  Gluc: x / Ketone: Trace  / Bili: Small / Urobili: 1 mg/dL   Blood: x / Protein: 30 mg/dL / Nitrite: Negative   Leuk Esterase: Moderate / RBC: 25-50 /HPF / WBC >50   Sq Epi: x / Non Sq Epi: Few / Bacteria: Many    Culture - Urine (20 @ 10:25)    Specimen Source: .Urine Clean Catch (Midstream)    Culture Results:   >100,000 CFU/ml Escherichia coli    Culture - Blood (20 @ 08:34)    Gram Stain:   Growth in aerobic bottle: Gram Negative Rods  Growth in anaerobic bottle: Gram Negative Rods    Specimen Source: .Blood None    Culture Results:   Growth in aerobic and anaerobic bottles: Escherichia coli  See previous culture [id number]    Culture - Blood (20 @ 08:34)    -  Escherichia coli: Detec    Gram Stain:   Growth in aerobic bottle: Gram Negative Rods  Growth in anaerobic bottle: Gram Negative Rods    Specimen Source: .Blood None    Organism: Blood Culture PCR    Culture Results:   Growth in aerobic bottle: Gram Negative Rods  Growth in anaerobic bottle: Gram Negative Rods      Radiology: all available radiological tests reviewed    < from: CT Abdomen and Pelvis No Cont (20 @ 01:24) >  EXAM:  CT ABDOMEN AND PELVIS                            PROCEDURE DATE:  2020          INTERPRETATION:  CLINICAL INFORMATION: Status post catheterization today. Evaluate for retroperitoneal bleed.    COMPARISON: 2020    PROCEDURE:  CT ofthe Abdomen and Pelvis was performed without intravenous contrast.  Intravenous contrast: None.  Oral contrast: None.  Sagittal and coronal reformats were performed.    FINDINGS:  Evaluation of solid organs is limited without intravenous contrast.  Evaluation of the lower pelvis is limited as images do not extend beyond the proximal inguinal region.      LOWER CHEST: Small bilateral pleural effusions and associated subsegmental bibasilar atelectasis. Scattered calcified granulomas. Aortic and coronary artery atherosclerotic calcifications.    LIVER: Within normal limits.  BILE DUCTS: Stable mild CBD dilatation likely related to cholecystectomy.  GALLBLADDER: Cholecystectomy.  SPLEEN: Within normal limits.  PANCREAS: Within normal limits.  ADRENALS: Within normal limits.  KIDNEYS/URETERS: No hydronephrosis or obstructing stone.    BLADDER: Within normal limits.  REPRODUCTIVE ORGANS: Uterus and adnexa within normal limits.    BOWEL: Partially imaged rectal tube. Distal rectum/anus not included in the field-of-view. Wall thickening of the sigmoid colon. No bowel obstruction. Appendix is normal.  PERITONEUM: Small ascites. Presacral edema.  VESSELS: Atherosclerotic changes.  RETROPERITONEUM/LYMPH NODES: No lymphadenopathy. No definite retroperitoneal hematoma identified.  ABDOMINAL WALL: Anasarca. Inguinal regions not fully included in the field-of-view. Mild bilateral stranding in the visualized inguinal regions.  BONES: Degenerative changes of the spine. Grade 1 anterolisthesis at L4-L5. Posterior fixation hardware at L4-L5.    IMPRESSION:    1. No definite retroperitoneal hematoma identified.    2. Incomplete evaluation for inguinal region/thigh hematoma due to incomplete field-of-view.    3. Small bilateral pleural effusions. Small ascites. Anasarca.      4. Wall thickening of the sigmoid colon. Recommend clinical correlation for colitis.    < end of copied text >    < from: CT Abdomen and Pelvis w/ Oral Cont (20 @ 16:41) >  EXAM:  CT ABDOMEN AND PELVIS OC                            PROCEDURE DATE:  2020          INTERPRETATION:  CLINICAL INFORMATION: Diffuse abdominal pain    COMPARISON: None.    PROCEDURE:  CT of the Abdomen and Pelvis was performed without intravenous contrast.  Intravenous contrast: None.  Oral contrast: positive contrast was administered.  Sagittal and coronal reformats were performed.    FINDINGS:  LOWER CHEST: Coronary artery calcifications. Clear lung bases.    LIVER: Within normal limits.  BILE DUCTS: Normal caliber.  GALLBLADDER: Cholecystectomy.  SPLEEN: Within normal limits.  PANCREAS: Within normal limits.  ADRENALS: Within normal limits.  KIDNEYS/URETERS: Within normal limits.    BLADDER: Within normal limits.  REPRODUCTIVE ORGANS: Uterus and adnexa within normal limits.    BOWEL: Mildly distended small bowel without transition point to suggest obstruction. No wall thickening or inflammatory change.  Appendix normal.  PERITONEUM: No ascites or pneumoperitoneum..  VESSELS: Severe diffuse atherosclerotic arterial calcification. Normal caliber aorta.  RETROPERITONEUM/LYMPH NODES: No lymphadenopathy.  ABDOMINAL WALL: Within normal limits.  BONES: L4-L5 interpedicular screws and connecting rods. Moderate anterolisthesis of L4 on L5. No acute bony abnormality.    IMPRESSION:  Mildly distended small bowel without evidence of obstruction.    < end of copied text >    Advanced directives addressed: full resuscitation

## 2020-08-29 LAB
ALBUMIN SERPL ELPH-MCNC: 1.1 G/DL — LOW (ref 3.3–5)
ALP SERPL-CCNC: 189 U/L — HIGH (ref 40–120)
ALT FLD-CCNC: 28 U/L — SIGNIFICANT CHANGE UP (ref 12–78)
ANION GAP SERPL CALC-SCNC: 13 MMOL/L — SIGNIFICANT CHANGE UP (ref 5–17)
APTT BLD: 38.2 SEC — HIGH (ref 27.5–35.5)
AST SERPL-CCNC: 66 U/L — HIGH (ref 15–37)
BILIRUB SERPL-MCNC: 0.3 MG/DL — SIGNIFICANT CHANGE UP (ref 0.2–1.2)
BUN SERPL-MCNC: 90 MG/DL — HIGH (ref 7–23)
CALCIUM SERPL-MCNC: 6 MG/DL — CRITICAL LOW (ref 8.5–10.1)
CHLORIDE SERPL-SCNC: 93 MMOL/L — LOW (ref 96–108)
CO2 SERPL-SCNC: 25 MMOL/L — SIGNIFICANT CHANGE UP (ref 22–31)
CREAT SERPL-MCNC: 5.55 MG/DL — HIGH (ref 0.5–1.3)
GLUCOSE SERPL-MCNC: 288 MG/DL — HIGH (ref 70–99)
HCT VFR BLD CALC: 32.9 % — LOW (ref 34.5–45)
HGB BLD-MCNC: 11.8 G/DL — SIGNIFICANT CHANGE UP (ref 11.5–15.5)
INR BLD: 1.29 RATIO — HIGH (ref 0.88–1.16)
LACTATE SERPL-SCNC: 2 MMOL/L — SIGNIFICANT CHANGE UP (ref 0.7–2)
MCHC RBC-ENTMCNC: 29.4 PG — SIGNIFICANT CHANGE UP (ref 27–34)
MCHC RBC-ENTMCNC: 35.9 GM/DL — SIGNIFICANT CHANGE UP (ref 32–36)
MCV RBC AUTO: 81.8 FL — SIGNIFICANT CHANGE UP (ref 80–100)
PLATELET # BLD AUTO: 310 K/UL — SIGNIFICANT CHANGE UP (ref 150–400)
POTASSIUM SERPL-MCNC: 4.7 MMOL/L — SIGNIFICANT CHANGE UP (ref 3.5–5.3)
POTASSIUM SERPL-SCNC: 4.7 MMOL/L — SIGNIFICANT CHANGE UP (ref 3.5–5.3)
PROT SERPL-MCNC: 5.7 GM/DL — LOW (ref 6–8.3)
PROTHROM AB SERPL-ACNC: 14.8 SEC — HIGH (ref 10.6–13.6)
RBC # BLD: 4.02 M/UL — SIGNIFICANT CHANGE UP (ref 3.8–5.2)
RBC # FLD: 16 % — HIGH (ref 10.3–14.5)
SODIUM SERPL-SCNC: 131 MMOL/L — LOW (ref 135–145)
WBC # BLD: 54.03 K/UL — CRITICAL HIGH (ref 3.8–10.5)
WBC # FLD AUTO: 54.03 K/UL — CRITICAL HIGH (ref 3.8–10.5)

## 2020-08-29 PROCEDURE — 71045 X-RAY EXAM CHEST 1 VIEW: CPT | Mod: 26

## 2020-08-29 PROCEDURE — 99291 CRITICAL CARE FIRST HOUR: CPT

## 2020-08-29 PROCEDURE — 74176 CT ABD & PELVIS W/O CONTRAST: CPT | Mod: 26

## 2020-08-29 PROCEDURE — 99233 SBSQ HOSP IP/OBS HIGH 50: CPT

## 2020-08-29 RX ORDER — PIPERACILLIN AND TAZOBACTAM 4; .5 G/20ML; G/20ML
3.38 INJECTION, POWDER, LYOPHILIZED, FOR SOLUTION INTRAVENOUS EVERY 12 HOURS
Refills: 0 | Status: DISCONTINUED | OUTPATIENT
Start: 2020-08-29 | End: 2020-09-05

## 2020-08-29 RX ORDER — INSULIN GLARGINE 100 [IU]/ML
20 INJECTION, SOLUTION SUBCUTANEOUS
Refills: 0 | Status: DISCONTINUED | OUTPATIENT
Start: 2020-08-29 | End: 2020-08-31

## 2020-08-29 RX ORDER — INSULIN GLARGINE 100 [IU]/ML
20 INJECTION, SOLUTION SUBCUTANEOUS ONCE
Refills: 0 | Status: COMPLETED | OUTPATIENT
Start: 2020-08-29 | End: 2020-08-29

## 2020-08-29 RX ADMIN — Medication 3.06 MICROGRAM(S)/KG/MIN: at 06:06

## 2020-08-29 RX ADMIN — Medication 6: at 17:36

## 2020-08-29 RX ADMIN — PIPERACILLIN AND TAZOBACTAM 25 GRAM(S): 4; .5 INJECTION, POWDER, LYOPHILIZED, FOR SOLUTION INTRAVENOUS at 17:36

## 2020-08-29 RX ADMIN — INSULIN GLARGINE 20 UNIT(S): 100 INJECTION, SOLUTION SUBCUTANEOUS at 13:08

## 2020-08-29 RX ADMIN — Medication 75 MEQ/KG/HR: at 11:52

## 2020-08-29 RX ADMIN — Medication 6: at 13:08

## 2020-08-29 RX ADMIN — Medication 112 MICROGRAM(S): at 06:04

## 2020-08-29 RX ADMIN — Medication 8: at 09:13

## 2020-08-29 RX ADMIN — GABAPENTIN 600 MILLIGRAM(S): 400 CAPSULE ORAL at 06:05

## 2020-08-29 RX ADMIN — Medication 50 MILLIGRAM(S): at 21:16

## 2020-08-29 RX ADMIN — HEPARIN SODIUM 5000 UNIT(S): 5000 INJECTION INTRAVENOUS; SUBCUTANEOUS at 21:17

## 2020-08-29 RX ADMIN — Medication 50 MILLIGRAM(S): at 09:13

## 2020-08-29 RX ADMIN — Medication 100 MILLIGRAM(S): at 09:14

## 2020-08-29 RX ADMIN — MIDODRINE HYDROCHLORIDE 15 MILLIGRAM(S): 2.5 TABLET ORAL at 06:05

## 2020-08-29 RX ADMIN — HEPARIN SODIUM 5000 UNIT(S): 5000 INJECTION INTRAVENOUS; SUBCUTANEOUS at 13:10

## 2020-08-29 RX ADMIN — HEPARIN SODIUM 5000 UNIT(S): 5000 INJECTION INTRAVENOUS; SUBCUTANEOUS at 06:05

## 2020-08-29 RX ADMIN — Medication 100 MILLIGRAM(S): at 01:13

## 2020-08-29 RX ADMIN — INSULIN GLARGINE 20 UNIT(S): 100 INJECTION, SOLUTION SUBCUTANEOUS at 21:17

## 2020-08-29 RX ADMIN — Medication 2: at 21:18

## 2020-08-29 NOTE — PROGRESS NOTE ADULT - SUBJECTIVE AND OBJECTIVE BOX
EFREM [id number]        < from: CT Abdomen and Pelvis No Cont (08.29.20 @ 16:18) >  CT of the Abdomen and Pelvis was performed without intravenous contrast.  Intravenous contrast: None.  Oral contrast: None.  Sagittal and coronal reformats were performed.    FINDINGS:  LOWER CHEST: Moderate bilateral pleural effusions, slightly increased. Bibasilar atelectasis. Coronary artery calcifications. New patchy groundglass opacities, predominantly in the lingula but also in the right middle lobe and left lower lobe.    LIVER: Within normal limits.  BILE DUCTS: Normal caliber.  GALLBLADDER: Cholecystectomy.  SPLEEN: Within normal limits.  PANCREAS: Within normal limits.  ADRENALS: Within normal limits.  KIDNEYS/URETERS: Within normal limits.    BLADDER: Collapsed around a Black catheter balloon.  REPRODUCTIVE ORGANS: Uterus and adnexa within normal limits.    BOWEL: No bowel obstruction. Within the limitations of this examination performed without intravenous contrast, there is no wall thickening or inflammatory change. No pneumatosis. Appendix normal. Balloon tipped rectal catheter in the rectum.  PERITONEUM: Trace ascites. No pneumoperitoneum.  VESSELS: Severe atherosclerotic arterial calcification. Extensive calcified plaque in the celiac, superior mesenteric and inferior mesenteric arteries.  RETROPERITONEUM/LYMPH NODES: No lymphadenopathy.  ABDOMINAL WALL: Moderate generalized anasarca.  BONES: No acute bony abnormality. L4-L5 posterior fusion with interpedicular screws and connecting rods. Moderate anterolisthesis of L4 on L5.    IMPRESSION:  New multifocal groundglass opacities in both lower lungs suspectfor pneumonia.    Small amount of ascites, increased.    No evidence of bowel perforation.    Severe atherosclerotic arterial disease.    Mild increase in size of bilateral pleural effusions.    < end of copied text >

## 2020-08-29 NOTE — PROGRESS NOTE ADULT - SUBJECTIVE AND OBJECTIVE BOX
Patient is a 59y Female who reports no complaints as new Taking PO now, good uop reported  event yest noted - intubated  after hypotension overnight and was given 1L IVF when she developed PEA and initiation of CPR.  Intubated and CVL placed for pressors. now awake and extubated    events noted   wbc rising   cdiff neg    remains on pressors     8/29  off vasopressin  still on norepi drip  UO remains low   on IVF @ 7o ml/hr  not communicative      REVIEW OF SYSTEMS:    CONSTITUTIONAL: stable weakness, fevers or chills  RESPIRATORY: No cough, wheezing, hemoptysis; No shortness of breath  CARDIOVASCULAR: No chest pain or palpitations  GENITOURINARY: No dysuria, frequency or hematuria  All other review of systems is negative unless indicated above.    MEDICATIONS  (STANDING):  ARIPiprazole 20 milliGRAM(s) Oral daily  aspirin  chewable 81 milliGRAM(s) Oral daily  atorvastatin 80 milliGRAM(s) Oral at bedtime  cefTRIAXone Injectable. 2000 milliGRAM(s) IV Push every 24 hours  clopidogrel Tablet 75 milliGRAM(s) Oral daily  dextrose 5%. 1000 milliLiter(s) (50 mL/Hr) IV Continuous <Continuous>  dextrose 50% Injectable 12.5 Gram(s) IV Push once  dextrose 50% Injectable 25 Gram(s) IV Push once  dextrose 50% Injectable 25 Gram(s) IV Push once  gabapentin 600 milliGRAM(s) Oral three times a day  heparin   Injectable 5000 Unit(s) SubCutaneous every 8 hours  hydrocortisone sodium succinate Injectable 50 milliGRAM(s) IV Push every 12 hours  insulin glargine Injectable (LANTUS) 20 Unit(s) SubCutaneous two times a day  insulin glargine Injectable (LANTUS) 20 Unit(s) SubCutaneous once  insulin lispro (HumaLOG) corrective regimen sliding scale   SubCutaneous three times a day before meals  insulin lispro (HumaLOG) corrective regimen sliding scale   SubCutaneous at bedtime  levothyroxine 112 MICROGram(s) Oral daily  metroNIDAZOLE  IVPB 500 milliGRAM(s) IV Intermittent every 8 hours  midodrine 15 milliGRAM(s) Oral every 8 hours  multivitamin 1 Tablet(s) Oral daily  norepinephrine Infusion 0.03 MICROgram(s)/kG/Min (3.06 mL/Hr) IV Continuous <Continuous>  pantoprazole    Tablet 40 milliGRAM(s) Oral before breakfast  ranolazine 1000 milliGRAM(s) Oral two times a day  sodium bicarbonate  Infusion 0.207 mEq/kG/Hr (75 mL/Hr) IV Continuous <Continuous>    MEDICATIONS  (PRN):  acetaminophen   Tablet .. 650 milliGRAM(s) Oral every 4 hours PRN Mild Pain (1 - 3)  ALBUTerol    90 MICROgram(s) HFA Inhaler 2 Puff(s) Inhalation every 6 hours PRN Shortness of Breath and/or Wheezing  aluminum hydroxide/magnesium hydroxide/simethicone Suspension 30 milliLiter(s) Oral every 4 hours PRN Dyspepsia  dextrose 40% Gel 15 Gram(s) Oral once PRN Blood Glucose LESS THAN 70 milliGRAM(s)/deciliter  glucagon  Injectable 1 milliGRAM(s) IntraMuscular once PRN Glucose LESS THAN 70 milligrams/deciliter  HYDROmorphone  Injectable 1 milliGRAM(s) IV Push every 4 hours PRN Moderate Pain (4 - 6)  ondansetron Injectable 4 milliGRAM(s) IV Push every 6 hours PRN Nausea  oxyCODONE    IR 5 milliGRAM(s) Oral every 4 hours PRN Moderate Pain (4 - 6)  oxyCODONE    IR 10 milliGRAM(s) Oral every 4 hours PRN Severe Pain (7 - 10)      Vital Signs Last 24 Hrs  T(C): 36.2 (29 Aug 2020 06:20), Max: 36.9 (28 Aug 2020 14:23)  T(F): 97.2 (29 Aug 2020 06:20), Max: 98.4 (28 Aug 2020 14:23)  HR: 75 (29 Aug 2020 07:00) (75 - 92)  BP: 96/49 (29 Aug 2020 07:00) (96/49 - 134/55)  BP(mean): 58 (29 Aug 2020 07:00) (58 - 81)  RR: 6 (29 Aug 2020 07:00) (2 - 20)  SpO2: 95% (29 Aug 2020 07:00) (92% - 96%)    MEDICATIONS  (STANDING):  ARIPiprazole 20 milliGRAM(s) Oral daily  aspirin  chewable 81 milliGRAM(s) Oral daily  atorvastatin 80 milliGRAM(s) Oral at bedtime  cefTRIAXone Injectable. 2000 milliGRAM(s) IV Push every 24 hours  clopidogrel Tablet 75 milliGRAM(s) Oral daily  dextrose 5%. 1000 milliLiter(s) (50 mL/Hr) IV Continuous <Continuous>  dextrose 50% Injectable 12.5 Gram(s) IV Push once  dextrose 50% Injectable 25 Gram(s) IV Push once  dextrose 50% Injectable 25 Gram(s) IV Push once  gabapentin 600 milliGRAM(s) Oral three times a day  heparin   Injectable 5000 Unit(s) SubCutaneous every 8 hours  hydrocortisone sodium succinate Injectable 50 milliGRAM(s) IV Push every 12 hours  insulin glargine Injectable (LANTUS) 20 Unit(s) SubCutaneous two times a day  insulin glargine Injectable (LANTUS) 20 Unit(s) SubCutaneous once  insulin lispro (HumaLOG) corrective regimen sliding scale   SubCutaneous three times a day before meals  insulin lispro (HumaLOG) corrective regimen sliding scale   SubCutaneous at bedtime  levothyroxine 112 MICROGram(s) Oral daily  metroNIDAZOLE  IVPB 500 milliGRAM(s) IV Intermittent every 8 hours  midodrine 15 milliGRAM(s) Oral every 8 hours  multivitamin 1 Tablet(s) Oral daily  norepinephrine Infusion 0.03 MICROgram(s)/kG/Min (3.06 mL/Hr) IV Continuous <Continuous>  pantoprazole    Tablet 40 milliGRAM(s) Oral before breakfast  ranolazine 1000 milliGRAM(s) Oral two times a day  sodium bicarbonate  Infusion 0.207 mEq/kG/Hr (75 mL/Hr) IV Continuous <Continuous>    MEDICATIONS  (PRN):  acetaminophen   Tablet .. 650 milliGRAM(s) Oral every 4 hours PRN Mild Pain (1 - 3)  ALBUTerol    90 MICROgram(s) HFA Inhaler 2 Puff(s) Inhalation every 6 hours PRN Shortness of Breath and/or Wheezing  aluminum hydroxide/magnesium hydroxide/simethicone Suspension 30 milliLiter(s) Oral every 4 hours PRN Dyspepsia  dextrose 40% Gel 15 Gram(s) Oral once PRN Blood Glucose LESS THAN 70 milliGRAM(s)/deciliter  glucagon  Injectable 1 milliGRAM(s) IntraMuscular once PRN Glucose LESS THAN 70 milligrams/deciliter  HYDROmorphone  Injectable 1 milliGRAM(s) IV Push every 4 hours PRN Moderate Pain (4 - 6)  ondansetron Injectable 4 milliGRAM(s) IV Push every 6 hours PRN Nausea  oxyCODONE    IR 5 milliGRAM(s) Oral every 4 hours PRN Moderate Pain (4 - 6)  oxyCODONE    IR 10 milliGRAM(s) Oral every 4 hours PRN Severe Pain (7 - 10)      PHYSICAL EXAM:    Constitutional: NAD, frail  HEENT: temp wasting, cachetic. >then stated age  Resp:dist BS  Cardiovascular: S1 and S2  Abd:  tenderness, guarding  Extremities:  peripheral edema  Neurological: A/O x 3  :  Black + w dark urine  Skin: No rashes  Access: Not applicable        LABS:      131    |  93     |  90     ----------------------------<  288       29 Aug 2020 06:32  4.7     |  25     |  5.55     130    |  96     |  78     ----------------------------<  293       28 Aug 2020 06:40  4.6     |  22     |  4.89     133    |  101    |  75     ----------------------------<  105       27 Aug 2020 05:17  3.8     |  19     |  4.20     Ca    6.0        29 Aug 2020 06:32  Ca    6.6        28 Aug 2020 06:40  Ca    6.8        27 Aug 2020 05:17    Phos  5.3       27 Aug 2020 05:17  Phos  4.6       26 Aug 2020 23:09  Phos  3.5       26 Aug 2020 06:48    Mg     1.9       27 Aug 2020 05:17  Mg     2.0       26 Aug 2020 23:09  Mg     2.1       25 Aug 2020 20:40                                       11.8   54.03 )-----------( 310      ( 29 Aug 2020 06:32 )             32.9                         11.7   53.80 )-----------( 338      ( 28 Aug 2020 06:40 )             33.5                         7.9    48.17 )-----------( 307      ( 27 Aug 2020 05:17 )             22.9

## 2020-08-29 NOTE — PROGRESS NOTE ADULT - ASSESSMENT
Within the limits of this non contrast CT, there is no evidence of bowel perforation or pneumatosis to suggest bowel infarction. No bowel obstruction. No bowel wall thickening or inflammatory change. There is extensive calcification of mesenteric vessels however. May have ischemia from hypotension, pressors, low flow. Lung findings on CT are more impressive than previous scan. If patient is eventually dialyzed, would repeat scan with IV contrast. Otherwise, care as per critical care.

## 2020-08-29 NOTE — PROGRESS NOTE ADULT - ASSESSMENT
59 CAD, PAD s/p fem-pop bypass s/p RT iliac stent, diabetes, hypothyroidism, hyperlipidemia, current active smoker, uses cocaine last use yesterday, chronic back pain s/p lumbar surgery presented with multiple non-specific complaints, including fevers on/off last 4-5 days, weakness, no appetite and reduced oral intake, vague diffuse abd pains, one day of diarrhea. No cough, no SOB. SHe came in today for evaluation of weakness and found to have ST elevations on presenting EKG, code STEMI called and pt underwent urgent angiogram showing occluded RCA w no intervention with UTI sepsis and E. coli bacteremia POA.      AMISH w septic shock/UTI w STEMI and Code blue event w hypotension   C diff negative  / Suspected ischemic bowel per Gen Surgery   Cr plateaued and then risen  w olguric uop   acid base/K is stable for now   surgical consult noted - supportive care / maintain perfusion w IVF and PRBC  no acute indication for HD today - renal status tenous - ressess daily     Lactic Metabolic Acidosis sec to above - improving   HCO3 gtt  - continue this for volume for now    monitor lactate     Hypocalcemia    corrected ca ok w low albumin     Ischemic Bowerl w Shock w Leokocytosis    pressors per ICU    maintain SBP    Agree with PRBC    Gen Surgery  evaluation    WBC sec to infection +/- steroids     d/w Dr [facility]   D/w daughter (Jelani) [phone] at length who was notified of the possible need for HD and could be temporary or longterm. Daughter was very upset to hear this but requested all measures to be taken if needed. Will keep pt updated.  d/w CCU  RN  Prognosis guarded     Dr Dennis covering weekend     8/29  Condition remains same  critical care/ ID and nephrology input noted  creat slightly higher today but no clear indication for HD  low UO  will check cxr and evaluate for increase IVF rate

## 2020-08-29 NOTE — CHART NOTE - NSCHARTNOTEFT_GEN_A_CORE
repeat CT chest abd/pelvis reviewed, b/l lung infiltrates concern for pneumonia  no evidence of abd perforation, leukocytosis 54, remains on stress dose steroids/pressors  broaden abx coverage to IV zosyn, dc rocephin/flagyl  d/w Dr. Llanos

## 2020-08-29 NOTE — PROGRESS NOTE ADULT - PROBLEM SELECTOR PLAN 2
No chest pain; occluded RCA of unknown chronicity; continue medical management with aspirin, Plavix, atorvastatin. No further cardiac intervention indicated at this time. We will follow up prn.

## 2020-08-29 NOTE — PROGRESS NOTE ADULT - SUBJECTIVE AND OBJECTIVE BOX
PCP:    REQUESTING PHYSICIAN:    REASON FOR CONSULT:    CHIEF COMPLAINT:    HPI:  HPI: 59 year old woman with multiple medical problems, including CAD, PAD (fem-pop bypass, iliac stent), diabetes, hypothyroidism, hyperlipidemia, smoker, cocaine use,   chronic back pain and past lumbar surgery admitted on  with multiple complaints (including fever, abd pain, diarrhea) -- found to have ST elevations on ECG with urgent cath revealing an occluded RCA (no PCI) -- subsequently diagnosed with E coli sepsis with shock; hospitalization complicated by PEA arrest.    : no complaints overnight: no chest pain, palpitations, dyspnea.Reviewed results of cardiac cath & plan for med mx.  20 Events reviewed , patient was noted to be hypotensive bradycardic PEA  unresponsive episode subsequently resuscitated  patient intubated , on pressors , maintaining sbp blood cultures are positive for gram negative rods . patient hemoglobin dropped with   20 Pt awake but lethargic. She denies complaints today. Pressors continued  20:  Feels sick but no dyspnea, angina; + abdominal pain.  20: Pressors continued. Pt lethargic  20: Pt lethargic. NSR. Labs noted    PAST MEDICAL & SURGICAL HISTORY:  Lung nodule  Diabetes mellitus  History of TIAs  History of gallbladder disease: surgery  History of colon polyps: precancerous  Substance abuse: history of. last used 14 years ago.  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 Decreased ROM.  Angina pectoris  History of MRSA infection: left lower extremity incisional area   Anxiety and depression  Transient cerebral ischemia, unspecified type  Osteoarthritis of spine, unspecified spinal osteoarthritis complication status, unspecified spinal region  Lumbar herniated disc  Urinary incontinence, unspecified type  Overactive bladder  Hiatal hernia with GERD: hiatal hernia repaired  PAD (peripheral artery disease)  Hyperlipidemia, unspecified hyperlipidemia type  Essential hypertension  Hypothyroidism  COPD (chronic obstructive pulmonary disease)  CAD (coronary artery disease)  History of lumbar fusion: with laminectomy 2018  H/O hernia repair: hiatal hernia - 2018  S/P dilatation and curettage:   H/O rhinoplasty:   H/O angioplasty: Right iliac artery. 2017  H/O ventral hernia repair: 3/2017  History of incision and drainage: of lower extremity incisional site x 5 . last done2015  H/O arterial bypass of lower limb: Femoral - Popliteal. 2014 Left lower side with stents  History of laparoscopic cholecystectomy: 2016      SOCIAL HISTORY:    FAMILY HISTORY:  Family history of asthma (Sibling): sister  Family history of epilepsy (Sibling, Sibling): 2 sisters  Family history of cardiovascular disease: mother  Family history of stroke: mother  Family history of heart disease: father  Family history of alcoholism in father      ALLERGIES:  Allergies    No Known Allergies    Intolerances        MEDICATIONS:    MEDICATIONS  (STANDING):  ARIPiprazole 20 milliGRAM(s) Oral daily  aspirin  chewable 81 milliGRAM(s) Oral daily  atorvastatin 80 milliGRAM(s) Oral at bedtime  cefTRIAXone Injectable. 2000 milliGRAM(s) IV Push every 24 hours  clopidogrel Tablet 75 milliGRAM(s) Oral daily  dextrose 5%. 1000 milliLiter(s) (50 mL/Hr) IV Continuous <Continuous>  dextrose 50% Injectable 12.5 Gram(s) IV Push once  dextrose 50% Injectable 25 Gram(s) IV Push once  dextrose 50% Injectable 25 Gram(s) IV Push once  gabapentin 600 milliGRAM(s) Oral three times a day  heparin   Injectable 5000 Unit(s) SubCutaneous every 8 hours  hydrocortisone sodium succinate Injectable 50 milliGRAM(s) IV Push every 12 hours  insulin glargine Injectable (LANTUS) 20 Unit(s) SubCutaneous at bedtime  insulin lispro (HumaLOG) corrective regimen sliding scale   SubCutaneous three times a day before meals  insulin lispro (HumaLOG) corrective regimen sliding scale   SubCutaneous at bedtime  levothyroxine 112 MICROGram(s) Oral daily  metroNIDAZOLE  IVPB 500 milliGRAM(s) IV Intermittent every 8 hours  midodrine 15 milliGRAM(s) Oral every 8 hours  multivitamin 1 Tablet(s) Oral daily  norepinephrine Infusion 0.03 MICROgram(s)/kG/Min (3.06 mL/Hr) IV Continuous <Continuous>  pantoprazole    Tablet 40 milliGRAM(s) Oral before breakfast  ranolazine 1000 milliGRAM(s) Oral two times a day  sodium bicarbonate  Infusion 0.207 mEq/kG/Hr (75 mL/Hr) IV Continuous <Continuous>  vasopressin Infusion 0.04 Unit(s)/Min (2.4 mL/Hr) IV Continuous <Continuous>    MEDICATIONS  (PRN):  acetaminophen   Tablet .. 650 milliGRAM(s) Oral every 4 hours PRN Mild Pain (1 - 3)  ALBUTerol    90 MICROgram(s) HFA Inhaler 2 Puff(s) Inhalation every 6 hours PRN Shortness of Breath and/or Wheezing  aluminum hydroxide/magnesium hydroxide/simethicone Suspension 30 milliLiter(s) Oral every 4 hours PRN Dyspepsia  dextrose 40% Gel 15 Gram(s) Oral once PRN Blood Glucose LESS THAN 70 milliGRAM(s)/deciliter  glucagon  Injectable 1 milliGRAM(s) IntraMuscular once PRN Glucose LESS THAN 70 milligrams/deciliter  HYDROmorphone  Injectable 1 milliGRAM(s) IV Push every 4 hours PRN Moderate Pain (4 - 6)  ondansetron Injectable 4 milliGRAM(s) IV Push every 6 hours PRN Nausea  oxyCODONE    IR 5 milliGRAM(s) Oral every 4 hours PRN Moderate Pain (4 - 6)  oxyCODONE    IR 10 milliGRAM(s) Oral every 4 hours PRN Severe Pain (7 - 10)        Vital Signs Last 24 Hrs  T(C): 36.2 (29 Aug 2020 06:20), Max: 36.9 (28 Aug 2020 14:23)  T(F): 97.2 (29 Aug 2020 06:20), Max: 98.4 (28 Aug 2020 14:23)  HR: 75 (29 Aug 2020 07:00) (75 - 92)  BP: 96/49 (29 Aug 2020 07:00) (96/49 - 134/55)  BP(mean): 58 (29 Aug 2020 07:00) (58 - 81)  RR: 6 (29 Aug 2020 07:00) (2 - 20)  SpO2: 95% (29 Aug 2020 07:00) (92% - 98%)Daily     Daily Weight in k.7 (29 Aug 2020 06:20)I&O's Summary    28 Aug 2020 07:01  -  29 Aug 2020 07:00  --------------------------------------------------------  IN: 1590 mL / OUT: 285 mL / NET: 1305 mL        PHYSICAL EXAM:    Constitutional: NAD, lethargic  HEENT: PERR, EOMI,  No oral cyananosis.  Neck:  supple,  No JVD  Respiratory: Breath sounds are clear bilaterally, No wheezing, rales or rhonchi  Cardiovascular: S1 and S2, regular rate and rhythm, no Murmurs, gallops or rubs  Gastrointestinal: Bowel Sounds present, soft, nontender.   Extremities: trace edema  Vascular: reduced peripheral pulses  Neurological: lethargic  Musculoskeletal: no calf tenderness.  Skin: No rashes.      LABS: All Labs Reviewed:                        11.8   54.03 )-----------( 310      ( 29 Aug 2020 06:32 )             32.9                         11.7   53.80 )-----------( 338      ( 28 Aug 2020 06:40 )             33.5                         7.9    48.17 )-----------( 307      ( 27 Aug 2020 05:17 )             22.9     29 Aug 2020 06:32    131    |  93     |  90     ----------------------------<  288    4.7     |  25     |  5.55   28 Aug 2020 06:40    130    |  96     |  78     ----------------------------<  293    4.6     |  22     |  4.89   27 Aug 2020 05:17    133    |  101    |  75     ----------------------------<  105    3.8     |  19     |  4.20     Ca    6.0        29 Aug 2020 06:32  Ca    6.6        28 Aug 2020 06:40  Ca    6.8        27 Aug 2020 05:17  Phos  5.3       27 Aug 2020 05:17  Phos  4.6       26 Aug 2020 23:09  Mg     1.9       27 Aug 2020 05:17  Mg     2.0       26 Aug 2020 23:09    TPro  5.7    /  Alb  1.1    /  TBili  0.3    /  DBili  x      /  AST  66     /  ALT  28     /  AlkPhos  189    29 Aug 2020 06:32  TPro  6.0    /  Alb  1.2    /  TBili  0.4    /  DBili  x      /  AST  72     /  ALT  36     /  AlkPhos  221    28 Aug 2020 06:40    PT/INR - ( 29 Aug 2020 06:32 )   PT: 14.8 sec;   INR: 1.29 ratio         PTT - ( 29 Aug 2020 06:32 )  PTT:38.2 sec      Blood Culture: Organism --  Gram Stain Blood -- Gram Stain --  Specimen Source .Blood None  Culture-Blood --            RADIOLOGY/EKG:       ECHO/CARDIAC CATHTERIZATION/STRESS TEST:

## 2020-08-29 NOTE — PROGRESS NOTE ADULT - ASSESSMENT
IMP:    58 y/o female with CAD, PAD s/p fem-pop bypass s/p RT iliac stent, diabetes, hypothyroidism, hyperlipidemia, current active smoker, uses cocaine last use yesterday, chronic back pain s/p lumbar surgery admitted with UTI sepsis and E. coli bacteremia POA.  ECG revealed STEMI prompting code STEMI--pt underwent urgent angiogram showing occluded RCA--no inervention. Pt developed hypotension overnight and was given 1L IVF when she developed PEA and initiation of CPR.  Intubated and CVL placed for pressors  Acute resp failure   Septic shock on pressors  AMISH--unclear etiology with baseline Cr 0.8--likely ATN from sepsis.  NO emergent indication for RRT   Abd pain with thicken sigmoid colon--suspicious for ischemic colitis--normal lactate level  Toxic Metabolic Encephalopathy from sepsis   Hyperglycemia     High risk for acute decompensation and deterioration including death. Critically ill     Plan:    Actively wean pressors to keep SBP > 90  Cont with CTX (6).  IV flagyl (3)  Midodrine  Stress dose steroids  NPO  HOB > 30  Cont with Bicarb gtt   FS with insulin coverage to keep FS < 180.  Increase lantus to 20 u03--udhviz as needed  Cont with ASA and clopidogrel   DVT prophy--SCD and sqhep  Black for strict I+Os    CCU care-- d/w ICU staff on multi disciplinary rounds

## 2020-08-29 NOTE — PROGRESS NOTE ADULT - ASSESSMENT
Sepsi, acute renal failure, s/p MI, cardiac cath. Ischemic colitis. Rule out perforation. Plan CT scan abdomen/pelvis.

## 2020-08-29 NOTE — PROGRESS NOTE ADULT - SUBJECTIVE AND OBJECTIVE BOX
Hospital # 6  CCU # 6  Vent # 0--Extubated 8/25    CC:  Respiratory Failure     HPI:    58 y/o female with CAD, PAD s/p fem-pop bypass s/p RT iliac stent, diabetes, hypothyroidism, hyperlipidemia, current active smoker, uses cocaine last use yesterday, chronic back pain s/p lumbar surgery admitted with UTI sepsis and E. coli bacteremia POA.  ECG revealed STEMI prompting code STEMI--pt underwent urgent angiogram showing occluded RCA--no inervention. Pt developed hypotension overnight and was given 1L IVF when she developed PEA and initiation of CPR.  Intubated and CVL placed for pressors.    8/25:  Case d/w SAMIRA solo.  Pt seen and examined in CCU--vented--awake and alert off sedation.  Did well with SBT--extubated.  On Norepi gtt 0.2    8/26: Pt seen and examined in CCU--extubated.  No events overnight.  Remains on 0.2 Norepi gtt.  Started on midodrine 10 q8.  Awake.  E.coli S to CTX  WBC 30K.  Tm 100.2  C/O Abd pain but eating well wo issues    8/29:  Above noted.  Leukocytosis.  No fever.  Off vaso gtt.  Low dose norepi gtt.  FS > 300.  Poor U/O.  Encephalopathic.  Lactate 2    PMH:  As above.     PSH:  As above.     FH: Non Contributory other than those listed in HPI    Social History:  As above    MEDICATIONS  (STANDING):  ARIPiprazole 20 milliGRAM(s) Oral daily  aspirin  chewable 81 milliGRAM(s) Oral daily  atorvastatin 80 milliGRAM(s) Oral at bedtime  cefTRIAXone Injectable. 2000 milliGRAM(s) IV Push every 24 hours  clopidogrel Tablet 75 milliGRAM(s) Oral daily  dextrose 5%. 1000 milliLiter(s) (50 mL/Hr) IV Continuous <Continuous>  dextrose 50% Injectable 12.5 Gram(s) IV Push once  dextrose 50% Injectable 25 Gram(s) IV Push once  dextrose 50% Injectable 25 Gram(s) IV Push once  gabapentin 600 milliGRAM(s) Oral three times a day  heparin   Injectable 5000 Unit(s) SubCutaneous every 8 hours  hydrocortisone sodium succinate Injectable 50 milliGRAM(s) IV Push every 12 hours  insulin glargine Injectable (LANTUS) 20 Unit(s) SubCutaneous two times a day  insulin lispro (HumaLOG) corrective regimen sliding scale   SubCutaneous three times a day before meals  insulin lispro (HumaLOG) corrective regimen sliding scale   SubCutaneous at bedtime  levothyroxine 112 MICROGram(s) Oral daily  metroNIDAZOLE  IVPB 500 milliGRAM(s) IV Intermittent every 8 hours  midodrine 15 milliGRAM(s) Oral every 8 hours  multivitamin 1 Tablet(s) Oral daily  norepinephrine Infusion 0.03 MICROgram(s)/kG/Min (3.06 mL/Hr) IV Continuous <Continuous>  pantoprazole    Tablet 40 milliGRAM(s) Oral before breakfast  ranolazine 1000 milliGRAM(s) Oral two times a day  sodium bicarbonate  Infusion 0.207 mEq/kG/Hr (75 mL/Hr) IV Continuous <Continuous>  vasopressin Infusion 0.04 Unit(s)/Min (2.4 mL/Hr) IV Continuous <Continuous>    MEDICATIONS  (PRN):  acetaminophen   Tablet .. 650 milliGRAM(s) Oral every 4 hours PRN Mild Pain (1 - 3)  ALBUTerol    90 MICROgram(s) HFA Inhaler 2 Puff(s) Inhalation every 6 hours PRN Shortness of Breath and/or Wheezing  aluminum hydroxide/magnesium hydroxide/simethicone Suspension 30 milliLiter(s) Oral every 4 hours PRN Dyspepsia  dextrose 40% Gel 15 Gram(s) Oral once PRN Blood Glucose LESS THAN 70 milliGRAM(s)/deciliter  glucagon  Injectable 1 milliGRAM(s) IntraMuscular once PRN Glucose LESS THAN 70 milligrams/deciliter  HYDROmorphone  Injectable 1 milliGRAM(s) IV Push every 4 hours PRN Moderate Pain (4 - 6)  ondansetron Injectable 4 milliGRAM(s) IV Push every 6 hours PRN Nausea  oxyCODONE    IR 5 milliGRAM(s) Oral every 4 hours PRN Moderate Pain (4 - 6)  oxyCODONE    IR 10 milliGRAM(s) Oral every 4 hours PRN Severe Pain (7 - 10)      Allergies: NKDA    ROS:  SEE BELOW        ICU Vital Signs Last 24 Hrs  T(C): 36.2 (29 Aug 2020 06:20), Max: 36.9 (28 Aug 2020 14:23)  T(F): 97.2 (29 Aug 2020 06:20), Max: 98.4 (28 Aug 2020 14:23)  HR: 75 (29 Aug 2020 07:00) (75 - 92)  BP: 96/49 (29 Aug 2020 07:00) (96/49 - 134/55)  BP(mean): 58 (29 Aug 2020 07:00) (58 - 81)  ABP: --  ABP(mean): --  RR: 6 (29 Aug 2020 07:00) (2 - 20)  SpO2: 95% (29 Aug 2020 07:00) (92% - 98%)          I&O's Summary    28 Aug 2020 07:01  -  29 Aug 2020 07:00  --------------------------------------------------------  IN: 1590 mL / OUT: 285 mL / NET: 1305 mL        Physical Exam:  SEE BELOW                          11.8   54.03 )-----------( 310      ( 29 Aug 2020 06:32 )             32.9       08-29    131<L>  |  93<L>  |  90<H>  ----------------------------<  288<H>  4.7   |  25  |  5.55<H>    Ca    6.0<LL>      29 Aug 2020 06:32    TPro  5.7<L>  /  Alb  1.1<L>  /  TBili  0.3  /  DBili  x   /  AST  66<H>  /  ALT  28  /  AlkPhos  189<H>  08-29                    DVT Prophylaxis:                                                            Contraindication:     Advanced Directives:    Discussed with:    Visit Information:  Time spent excluding procedure:  30 cc mins    ** Time is exclusive of billed procedures and/or teaching and/or routine family updates.

## 2020-08-30 LAB
ANION GAP SERPL CALC-SCNC: 12 MMOL/L — SIGNIFICANT CHANGE UP (ref 5–17)
BUN SERPL-MCNC: 92 MG/DL — HIGH (ref 7–23)
CALCIUM SERPL-MCNC: 5.5 MG/DL — CRITICAL LOW (ref 8.5–10.1)
CHLORIDE SERPL-SCNC: 92 MMOL/L — LOW (ref 96–108)
CO2 SERPL-SCNC: 29 MMOL/L — SIGNIFICANT CHANGE UP (ref 22–31)
CREAT SERPL-MCNC: 5.76 MG/DL — HIGH (ref 0.5–1.3)
GLUCOSE SERPL-MCNC: 180 MG/DL — HIGH (ref 70–99)
HCT VFR BLD CALC: 33.9 % — LOW (ref 34.5–45)
HGB BLD-MCNC: 11.9 G/DL — SIGNIFICANT CHANGE UP (ref 11.5–15.5)
MAGNESIUM SERPL-MCNC: 2 MG/DL — SIGNIFICANT CHANGE UP (ref 1.6–2.6)
MCHC RBC-ENTMCNC: 28.6 PG — SIGNIFICANT CHANGE UP (ref 27–34)
MCHC RBC-ENTMCNC: 35.1 GM/DL — SIGNIFICANT CHANGE UP (ref 32–36)
MCV RBC AUTO: 81.5 FL — SIGNIFICANT CHANGE UP (ref 80–100)
PHOSPHATE SERPL-MCNC: 7.9 MG/DL — HIGH (ref 2.5–4.5)
PLATELET # BLD AUTO: 253 K/UL — SIGNIFICANT CHANGE UP (ref 150–400)
POTASSIUM SERPL-MCNC: 3.9 MMOL/L — SIGNIFICANT CHANGE UP (ref 3.5–5.3)
POTASSIUM SERPL-SCNC: 3.9 MMOL/L — SIGNIFICANT CHANGE UP (ref 3.5–5.3)
RBC # BLD: 4.16 M/UL — SIGNIFICANT CHANGE UP (ref 3.8–5.2)
RBC # FLD: 15.9 % — HIGH (ref 10.3–14.5)
SODIUM SERPL-SCNC: 133 MMOL/L — LOW (ref 135–145)
WBC # BLD: 44.92 K/UL — CRITICAL HIGH (ref 3.8–10.5)
WBC # FLD AUTO: 44.92 K/UL — CRITICAL HIGH (ref 3.8–10.5)

## 2020-08-30 PROCEDURE — 99291 CRITICAL CARE FIRST HOUR: CPT

## 2020-08-30 RX ORDER — CALCIUM GLUCONATE 100 MG/ML
1 VIAL (ML) INTRAVENOUS ONCE
Refills: 0 | Status: COMPLETED | OUTPATIENT
Start: 2020-08-30 | End: 2020-08-30

## 2020-08-30 RX ORDER — INSULIN LISPRO 100/ML
VIAL (ML) SUBCUTANEOUS EVERY 6 HOURS
Refills: 0 | Status: DISCONTINUED | OUTPATIENT
Start: 2020-08-30 | End: 2020-09-05

## 2020-08-30 RX ORDER — SODIUM CHLORIDE 9 MG/ML
1000 INJECTION INTRAMUSCULAR; INTRAVENOUS; SUBCUTANEOUS
Refills: 0 | Status: DISCONTINUED | OUTPATIENT
Start: 2020-08-30 | End: 2020-08-31

## 2020-08-30 RX ADMIN — Medication 50 GRAM(S): at 11:54

## 2020-08-30 RX ADMIN — SODIUM CHLORIDE 80 MILLILITER(S): 9 INJECTION INTRAMUSCULAR; INTRAVENOUS; SUBCUTANEOUS at 13:52

## 2020-08-30 RX ADMIN — Medication 2: at 08:30

## 2020-08-30 RX ADMIN — INSULIN GLARGINE 20 UNIT(S): 100 INJECTION, SOLUTION SUBCUTANEOUS at 09:09

## 2020-08-30 RX ADMIN — Medication 50 MILLIGRAM(S): at 23:26

## 2020-08-30 RX ADMIN — Medication 50 MILLIGRAM(S): at 11:57

## 2020-08-30 RX ADMIN — PIPERACILLIN AND TAZOBACTAM 25 GRAM(S): 4; .5 INJECTION, POWDER, LYOPHILIZED, FOR SOLUTION INTRAVENOUS at 17:38

## 2020-08-30 RX ADMIN — HEPARIN SODIUM 5000 UNIT(S): 5000 INJECTION INTRAVENOUS; SUBCUTANEOUS at 14:18

## 2020-08-30 RX ADMIN — HEPARIN SODIUM 5000 UNIT(S): 5000 INJECTION INTRAVENOUS; SUBCUTANEOUS at 06:40

## 2020-08-30 RX ADMIN — SODIUM CHLORIDE 80 MILLILITER(S): 9 INJECTION INTRAMUSCULAR; INTRAVENOUS; SUBCUTANEOUS at 23:27

## 2020-08-30 RX ADMIN — INSULIN GLARGINE 20 UNIT(S): 100 INJECTION, SOLUTION SUBCUTANEOUS at 23:26

## 2020-08-30 RX ADMIN — PIPERACILLIN AND TAZOBACTAM 25 GRAM(S): 4; .5 INJECTION, POWDER, LYOPHILIZED, FOR SOLUTION INTRAVENOUS at 06:41

## 2020-08-30 RX ADMIN — HEPARIN SODIUM 5000 UNIT(S): 5000 INJECTION INTRAVENOUS; SUBCUTANEOUS at 23:26

## 2020-08-30 RX ADMIN — Medication 75 MEQ/KG/HR: at 06:43

## 2020-08-30 NOTE — PROGRESS NOTE ADULT - SUBJECTIVE AND OBJECTIVE BOX
EFREM Coughlin      acetaminophen   Tablet .. 650 milliGRAM(s) Oral every 4 hours PRN  ALBUTerol    90 MICROgram(s) HFA Inhaler 2 Puff(s) Inhalation every 6 hours PRN  aluminum hydroxide/magnesium hydroxide/simethicone Suspension 30 milliLiter(s) Oral every 4 hours PRN  ARIPiprazole 20 milliGRAM(s) Oral daily  aspirin  chewable 81 milliGRAM(s) Oral daily  atorvastatin 80 milliGRAM(s) Oral at bedtime  clopidogrel Tablet 75 milliGRAM(s) Oral daily  dextrose 40% Gel 15 Gram(s) Oral once PRN  dextrose 5%. 1000 milliLiter(s) IV Continuous <Continuous>  dextrose 50% Injectable 12.5 Gram(s) IV Push once  dextrose 50% Injectable 25 Gram(s) IV Push once  dextrose 50% Injectable 25 Gram(s) IV Push once  gabapentin 600 milliGRAM(s) Oral three times a day  glucagon  Injectable 1 milliGRAM(s) IntraMuscular once PRN  heparin   Injectable 5000 Unit(s) SubCutaneous every 8 hours  hydrocortisone sodium succinate Injectable 50 milliGRAM(s) IV Push every 12 hours  HYDROmorphone  Injectable 1 milliGRAM(s) IV Push every 4 hours PRN  insulin glargine Injectable (LANTUS) 20 Unit(s) SubCutaneous two times a day  insulin lispro (HumaLOG) corrective regimen sliding scale   SubCutaneous every 6 hours  levothyroxine 112 MICROGram(s) Oral daily  midodrine 15 milliGRAM(s) Oral every 8 hours  multivitamin 1 Tablet(s) Oral daily  ondansetron Injectable 4 milliGRAM(s) IV Push every 6 hours PRN  oxyCODONE    IR 5 milliGRAM(s) Oral every 4 hours PRN  oxyCODONE    IR 10 milliGRAM(s) Oral every 4 hours PRN  piperacillin/tazobactam IVPB.. 3.375 Gram(s) IV Intermittent every 12 hours  ranolazine 1000 milliGRAM(s) Oral two times a day  sodium chloride 0.9%. 1000 milliLiter(s) IV Continuous <Continuous>        Physical Exam  T(C): 35.9 (08-30-20 @ 17:57), Max: 36.4 (08-30-20 @ 10:00)  HR: 85 (08-30-20 @ 20:00) (74 - 85)  BP: 137/59 (08-30-20 @ 20:00) (101/55 - 155/81)  RR: 9 (08-30-20 @ 20:00) (0 - 15)  SpO2: 95% (08-30-20 @ 20:00) (93% - 96%)  Wt(kg): --  Constitutional  More alert  Lungs-  clear  Cor-  RRR  Abd- distended, + BS, soft a little less tender      Ext-no edema

## 2020-08-30 NOTE — PROGRESS NOTE ADULT - SUBJECTIVE AND OBJECTIVE BOX
Date of service: 20 @ 12:36    pt seen and examined  off pressors, afebrile  abd less pain/tenderness  175 cc urine output    ROS: unable to obtain d/t medical condition      MEDICATIONS  (STANDING):  ARIPiprazole 20 milliGRAM(s) Oral daily  aspirin  chewable 81 milliGRAM(s) Oral daily  atorvastatin 80 milliGRAM(s) Oral at bedtime  clopidogrel Tablet 75 milliGRAM(s) Oral daily  dextrose 5%. 1000 milliLiter(s) (50 mL/Hr) IV Continuous <Continuous>  dextrose 50% Injectable 12.5 Gram(s) IV Push once  dextrose 50% Injectable 25 Gram(s) IV Push once  dextrose 50% Injectable 25 Gram(s) IV Push once  gabapentin 600 milliGRAM(s) Oral three times a day  heparin   Injectable 5000 Unit(s) SubCutaneous every 8 hours  hydrocortisone sodium succinate Injectable 50 milliGRAM(s) IV Push every 12 hours  insulin glargine Injectable (LANTUS) 20 Unit(s) SubCutaneous two times a day  insulin lispro (HumaLOG) corrective regimen sliding scale   SubCutaneous every 6 hours  levothyroxine 112 MICROGram(s) Oral daily  midodrine 15 milliGRAM(s) Oral every 8 hours  multivitamin 1 Tablet(s) Oral daily  piperacillin/tazobactam IVPB.. 3.375 Gram(s) IV Intermittent every 12 hours  ranolazine 1000 milliGRAM(s) Oral two times a day  sodium chloride 0.9%. 1000 milliLiter(s) (80 mL/Hr) IV Continuous <Continuous>    Vital Signs Last 24 Hrs  T(C): 36 (30 Aug 2020 05:00), Max: 36.2 (30 Aug 2020 00:43)  T(F): 96.8 (30 Aug 2020 05:00), Max: 97.2 (30 Aug 2020 00:43)  HR: 79 (30 Aug 2020 12:00) (74 - 82)  BP: 143/60 (30 Aug 2020 12:00) (100/53 - 146/61)  BP(mean): 80 (30 Aug 2020 12:00) (60 - 81)  RR: 10 (30 Aug 2020 12:00) (0 - 15)  SpO2: 94% (30 Aug 2020 12:00) (91% - 96%)    PE:  Constitutional: frail looking  HEENT: NC/AT, EOMI, PERRLA, conjunctivae clear; ears and nose atraumatic; pharynx benign  Neck: supple; thyroid not palpable  Back: no tenderness  Respiratory: respiratory effort normal; clear to auscultation  Cardiovascular: S1S2 regular, no murmurs  Abdomen: soft,  tender, distended, positive BS; liver and spleen WNL  Genitourinary: no suprapubic tenderness  Lymphatic: no LN palpable  Musculoskeletal: no muscle tenderness, no joint swelling or tenderness  Extremities: no pedal edema  Neurological/ Psychiatric:  moving all extremities  Skin: no rashes; no palpable lesions    Labs: all available labs reviewed                                              11.9   44.92 )-----------( 253      ( 30 Aug 2020 06:10 )             33.9     08    133<L>  |  92<L>  |  92<H>  ----------------------------<  180<H>  3.9   |  29  |  5.76<H>    Ca    5.5<LL>      30 Aug 2020 06:10  Phos  7.9       Mg     2.0         TPro  5.7<L>  /  Alb  1.1<L>  /  TBili  0.3  /  DBili  x   /  AST  66<H>  /  ALT  28  /  AlkPhos  189<H>              Urinalysis Basic - ( 23 Aug 2020 10:25 )    Color: Yellow / Appearance: Clear / S.010 / pH: x  Gluc: x / Ketone: Trace  / Bili: Small / Urobili: 1 mg/dL   Blood: x / Protein: 30 mg/dL / Nitrite: Negative   Leuk Esterase: Moderate / RBC: 25-50 /HPF / WBC >50   Sq Epi: x / Non Sq Epi: Few / Bacteria: Many    Culture - Urine (20 @ 10:25)    Specimen Source: .Urine Clean Catch (Midstream)    Culture Results:   >100,000 CFU/ml Escherichia coli    Culture - Blood (20 @ 08:34)    Gram Stain:   Growth in aerobic bottle: Gram Negative Rods  Growth in anaerobic bottle: Gram Negative Rods    Specimen Source: .Blood None    Culture Results:   Growth in aerobic and anaerobic bottles: Escherichia coli  See previous culture [id number]    Culture - Blood (20 @ 08:34)    -  Escherichia coli: Detec    Gram Stain:   Growth in aerobic bottle: Gram Negative Rods  Growth in anaerobic bottle: Gram Negative Rods    Specimen Source: .Blood None    Organism: Blood Culture PCR    Culture Results:   Growth in aerobic bottle: Gram Negative Rods  Growth in anaerobic bottle: Gram Negative Rods      Radiology: all available radiological tests reviewed    < from: CT Abdomen and Pelvis No Cont (20 @ 01:24) >  EXAM:  CT ABDOMEN AND PELVIS                            PROCEDURE DATE:  2020          INTERPRETATION:  CLINICAL INFORMATION: Status post catheterization today. Evaluate for retroperitoneal bleed.    COMPARISON: 2020    PROCEDURE:  CT ofthe Abdomen and Pelvis was performed without intravenous contrast.  Intravenous contrast: None.  Oral contrast: None.  Sagittal and coronal reformats were performed.    FINDINGS:  Evaluation of solid organs is limited without intravenous contrast.  Evaluation of the lower pelvis is limited as images do not extend beyond the proximal inguinal region.      LOWER CHEST: Small bilateral pleural effusions and associated subsegmental bibasilar atelectasis. Scattered calcified granulomas. Aortic and coronary artery atherosclerotic calcifications.    LIVER: Within normal limits.  BILE DUCTS: Stable mild CBD dilatation likely related to cholecystectomy.  GALLBLADDER: Cholecystectomy.  SPLEEN: Within normal limits.  PANCREAS: Within normal limits.  ADRENALS: Within normal limits.  KIDNEYS/URETERS: No hydronephrosis or obstructing stone.    BLADDER: Within normal limits.  REPRODUCTIVE ORGANS: Uterus and adnexa within normal limits.    BOWEL: Partially imaged rectal tube. Distal rectum/anus not included in the field-of-view. Wall thickening of the sigmoid colon. No bowel obstruction. Appendix is normal.  PERITONEUM: Small ascites. Presacral edema.  VESSELS: Atherosclerotic changes.  RETROPERITONEUM/LYMPH NODES: No lymphadenopathy. No definite retroperitoneal hematoma identified.  ABDOMINAL WALL: Anasarca. Inguinal regions not fully included in the field-of-view. Mild bilateral stranding in the visualized inguinal regions.  BONES: Degenerative changes of the spine. Grade 1 anterolisthesis at L4-L5. Posterior fixation hardware at L4-L5.    IMPRESSION:    1. No definite retroperitoneal hematoma identified.    2. Incomplete evaluation for inguinal region/thigh hematoma due to incomplete field-of-view.    3. Small bilateral pleural effusions. Small ascites. Anasarca.      4. Wall thickening of the sigmoid colon. Recommend clinical correlation for colitis.    < end of copied text >    < from: CT Abdomen and Pelvis w/ Oral Cont (20 @ 16:41) >  EXAM:  CT ABDOMEN AND PELVIS OC                            PROCEDURE DATE:  2020          INTERPRETATION:  CLINICAL INFORMATION: Diffuse abdominal pain    COMPARISON: None.    PROCEDURE:  CT of the Abdomen and Pelvis was performed without intravenous contrast.  Intravenous contrast: None.  Oral contrast: positive contrast was administered.  Sagittal and coronal reformats were performed.    FINDINGS:  LOWER CHEST: Coronary artery calcifications. Clear lung bases.    LIVER: Within normal limits.  BILE DUCTS: Normal caliber.  GALLBLADDER: Cholecystectomy.  SPLEEN: Within normal limits.  PANCREAS: Within normal limits.  ADRENALS: Within normal limits.  KIDNEYS/URETERS: Within normal limits.    BLADDER: Within normal limits.  REPRODUCTIVE ORGANS: Uterus and adnexa within normal limits.    BOWEL: Mildly distended small bowel without transition point to suggest obstruction. No wall thickening or inflammatory change.  Appendix normal.  PERITONEUM: No ascites or pneumoperitoneum..  VESSELS: Severe diffuse atherosclerotic arterial calcification. Normal caliber aorta.  RETROPERITONEUM/LYMPH NODES: No lymphadenopathy.  ABDOMINAL WALL: Within normal limits.  BONES: L4-L5 interpedicular screws and connecting rods. Moderate anterolisthesis of L4 on L5. No acute bony abnormality.    IMPRESSION:  Mildly distended small bowel without evidence of obstruction.    < end of copied text >    Advanced directives addressed: full resuscitation

## 2020-08-30 NOTE — PROGRESS NOTE ADULT - ASSESSMENT
IMP:    58 y/o female with CAD, PAD s/p fem-pop bypass s/p RT iliac stent, diabetes, hypothyroidism, hyperlipidemia, current active smoker, uses cocaine last use yesterday, chronic back pain s/p lumbar surgery admitted with UTI sepsis and E. coli bacteremia POA.  ECG revealed STEMI prompting code STEMI--pt underwent urgent angiogram showing occluded RCA--no inervention.   Pt developed hypotension overnight and was given 1L IVF when she developed PEA and initiation of CPR.  Intubated and CVL placed for pressors  Acute resp failure   Septic shock on pressors--now off  AMISH--unclear etiology with baseline Cr 0.8--likely ATN from sepsis.  NO emergent indication for RRT.  Improved U/O  Abd pain with thicken sigmoid colon--suspicious for ischemic colitis--normal lactate level  Toxic Metabolic Encephalopathy from sepsis   Hyperglycemia--better but still not well controlled    High risk for acute decompensation and deterioration including death. Critically ill     Plan:    Observe off pressors  CTX (6) and IV flagyl (3)--Stopped--Started on Pip/louis (2)  Midodrine  Stress dose steroids  NPO  HOB > 30  DC Bicarb gtt and start NS 50  FS with insulin coverage to keep FS < 180.  Increased lantus to 20 q12.  May need to readjust   Cont with ASA and clopidogrel   DVT prophy--SCD and sqhep  Black for strict I+Os    CCU care-- d/w ICU staff on multi disciplinary rounds

## 2020-08-30 NOTE — PROGRESS NOTE ADULT - COMMENTS
ROS o/w negative
Unobtainable due to clinical condition
Unobtainable due to clinical condition
ROS o/w negative

## 2020-08-30 NOTE — PROGRESS NOTE ADULT - MS EXT PE MLT D E PC
no cyanosis/no pedal edema
pedal edema/no cyanosis
Trace edema/no cyanosis
no pedal edema/no cyanosis

## 2020-08-30 NOTE — PROGRESS NOTE ADULT - ASSESSMENT
Hemodynamics improved. Off pressors. CT abdomen was unremarkable, non contrast scan. Abdominal pain still likely due to ischemia, secondary to hypotension, pressors, sepsis. No evidence of obstruction or bowel infarction on CT. Continue current care. Monitor renal function closely. Abx as per ID.

## 2020-08-30 NOTE — PROGRESS NOTE ADULT - NEUROLOGICAL DETAILS
Grossly non focal/alert and oriented x 3
Encephalopathy
Encephalopathy
alert and oriented x 3/Grossly non focal

## 2020-08-30 NOTE — PROGRESS NOTE ADULT - SUBJECTIVE AND OBJECTIVE BOX
Hospital # 7  CCU # 7  Vent # 0--Extubated 8/25    CC:  Respiratory Failure     HPI:    60 y/o female with CAD, PAD s/p fem-pop bypass s/p RT iliac stent, diabetes, hypothyroidism, hyperlipidemia, current active smoker, uses cocaine last use yesterday, chronic back pain s/p lumbar surgery admitted with UTI sepsis and E. coli bacteremia POA.  ECG revealed STEMI prompting code STEMI--pt underwent urgent angiogram showing occluded RCA--no inervention. Pt developed hypotension overnight and was given 1L IVF when she developed PEA and initiation of CPR.  Intubated and CVL placed for pressors.    8/25:  Case d/w SAMIRA solo.  Pt seen and examined in CCU--vented--awake and alert off sedation.  Did well with SBT--extubated.  On Norepi gtt 0.2    8/26: Pt seen and examined in CCU--extubated.  No events overnight.  Remains on 0.2 Norepi gtt.  Started on midodrine 10 q8.  Awake.  E.coli S to CTX  WBC 30K.  Tm 100.2  C/O Abd pain but eating well wo issues    8/29:  Above noted.  Leukocytosis.  No fever.  Off vaso gtt.  Low dose norepi gtt.  FS > 300.  Poor U/O.  Encephalopathic.  Lactate 2    8/30:  Abx changed to Pip/louis.  Abd less tender.  WBC down to 44K.  U/O better. Cr stable. Off all pressors.  Still lethargic.      PMH:  As above.     PSH:  As above.     FH: Non Contributory other than those listed in HPI    Social History:  Unobtainable due to clinical condition     MEDICATIONS  (STANDING):  ARIPiprazole 20 milliGRAM(s) Oral daily  aspirin  chewable 81 milliGRAM(s) Oral daily  atorvastatin 80 milliGRAM(s) Oral at bedtime  clopidogrel Tablet 75 milliGRAM(s) Oral daily  dextrose 5%. 1000 milliLiter(s) (50 mL/Hr) IV Continuous <Continuous>  dextrose 50% Injectable 12.5 Gram(s) IV Push once  dextrose 50% Injectable 25 Gram(s) IV Push once  dextrose 50% Injectable 25 Gram(s) IV Push once  gabapentin 600 milliGRAM(s) Oral three times a day  heparin   Injectable 5000 Unit(s) SubCutaneous every 8 hours  hydrocortisone sodium succinate Injectable 50 milliGRAM(s) IV Push every 12 hours  insulin glargine Injectable (LANTUS) 20 Unit(s) SubCutaneous two times a day  insulin lispro (HumaLOG) corrective regimen sliding scale   SubCutaneous every 6 hours  levothyroxine 112 MICROGram(s) Oral daily  midodrine 15 milliGRAM(s) Oral every 8 hours  multivitamin 1 Tablet(s) Oral daily  piperacillin/tazobactam IVPB.. 3.375 Gram(s) IV Intermittent every 12 hours  ranolazine 1000 milliGRAM(s) Oral two times a day  sodium chloride 0.9%. 1000 milliLiter(s) (50 mL/Hr) IV Continuous <Continuous>    MEDICATIONS  (PRN):  acetaminophen   Tablet .. 650 milliGRAM(s) Oral every 4 hours PRN Mild Pain (1 - 3)  ALBUTerol    90 MICROgram(s) HFA Inhaler 2 Puff(s) Inhalation every 6 hours PRN Shortness of Breath and/or Wheezing  aluminum hydroxide/magnesium hydroxide/simethicone Suspension 30 milliLiter(s) Oral every 4 hours PRN Dyspepsia  dextrose 40% Gel 15 Gram(s) Oral once PRN Blood Glucose LESS THAN 70 milliGRAM(s)/deciliter  glucagon  Injectable 1 milliGRAM(s) IntraMuscular once PRN Glucose LESS THAN 70 milligrams/deciliter  HYDROmorphone  Injectable 1 milliGRAM(s) IV Push every 4 hours PRN Moderate Pain (4 - 6)  ondansetron Injectable 4 milliGRAM(s) IV Push every 6 hours PRN Nausea  oxyCODONE    IR 5 milliGRAM(s) Oral every 4 hours PRN Moderate Pain (4 - 6)  oxyCODONE    IR 10 milliGRAM(s) Oral every 4 hours PRN Severe Pain (7 - 10)      Allergies: NKDA    ROS:  SEE BELOW        ICU Vital Signs Last 24 Hrs  T(C): 36 (30 Aug 2020 05:00), Max: 36.2 (30 Aug 2020 00:43)  T(F): 96.8 (30 Aug 2020 05:00), Max: 97.2 (30 Aug 2020 00:43)  HR: 74 (30 Aug 2020 07:00) (74 - 79)  BP: 121/52 (30 Aug 2020 07:00) (95/44 - 134/55)  BP(mean): 70 (30 Aug 2020 07:00) (56 - 76)  ABP: --  ABP(mean): --  RR: 10 (30 Aug 2020 07:00) (0 - 13)  SpO2: 96% (30 Aug 2020 07:00) (90% - 96%)          I&O's Summary    29 Aug 2020 07:01  -  30 Aug 2020 07:00  --------------------------------------------------------  IN: 1000 mL / OUT: 300 mL / NET: 700 mL        Physical Exam:  SEE BELOW                          11.9   44.92 )-----------( 253      ( 30 Aug 2020 06:10 )             33.9       08-30    133<L>  |  92<L>  |  92<H>  ----------------------------<  180<H>  3.9   |  29  |  5.76<H>    Ca    5.5<LL>      30 Aug 2020 06:10  Phos  7.9     08-30  Mg     2.0     08-30    TPro  5.7<L>  /  Alb  1.1<L>  /  TBili  0.3  /  DBili  x   /  AST  66<H>  /  ALT  28  /  AlkPhos  189<H>  08-29                    DVT Prophylaxis:                                                            Contraindication:     Advanced Directives:    Discussed with:    Visit Information:  Time spent excluding procedure:  30 cc mins    ** Time is exclusive of billed procedures and/or teaching and/or routine family updates.

## 2020-08-30 NOTE — PROGRESS NOTE ADULT - ASSESSMENT
58yo/F with PMH CAD, PAD s/p fem-pop bypass s/p RT iliac stent, diabetes, hypothyroidism, hyperlipidemia, current active smoker, uses cocaine last use 8/22  chronic back pain s/p lumbar surgery presented with multiple non-specific complaints, including fevers on/off last 4-5 days, weakness, no appetite and reduced oral intake, vague diffuse abd pains, one day of diarrhea. No cough, no SOB. She came in 8/23 for evaluation of weakness and found to have ST elevations on presenting EKG, code STEMI called and pt underwent urgent angiogram showing occluded RCA. She denies chest pain at present Also noted with positive UA, blood cx with Ecoli, CT abd/pelvis unremarkable, was started on IV rocephin.     1. sepsis Ecoli. cystitis. leukocytosis. abdominal pain/ischemic colitis. worsening AMISH  - repeat CT abd/pelvis reviewed, no abd perforation, wbc ct improving down to 44  - wbc ct could be reactive and steroids also contributing, f/u cbc  - urine cx/blood cx growing Ecoli; sensitivities reviewed   - s/p rocephin 2gm daily #6  - s/p IV flagyl [id number] #4  - antibiotics broadened to IV zosyn [id number] #2  - continue with IV abx coverage   - C diff pcr (-)   - surgery evaluation noted  - monitor temps  - tolerating abx well so far; no side effects noted  - reason for abx use and side effects reviewed with patient  - supportive care  - fu cbc    2. other issues - s/p cardiac arrest/STEMI - cardiology/ICU following

## 2020-08-31 LAB
ANION GAP SERPL CALC-SCNC: 13 MMOL/L — SIGNIFICANT CHANGE UP (ref 5–17)
BASE EXCESS BLDA CALC-SCNC: 2.1 MMOL/L — HIGH (ref -2–2)
BLOOD GAS COMMENTS ARTERIAL: SIGNIFICANT CHANGE UP
BUN SERPL-MCNC: 96 MG/DL — HIGH (ref 7–23)
CALCIUM SERPL-MCNC: 5.5 MG/DL — CRITICAL LOW (ref 8.5–10.1)
CHLORIDE SERPL-SCNC: 94 MMOL/L — LOW (ref 96–108)
CO2 SERPL-SCNC: 27 MMOL/L — SIGNIFICANT CHANGE UP (ref 22–31)
CREAT SERPL-MCNC: 5.77 MG/DL — HIGH (ref 0.5–1.3)
GAS PNL BLDA: SIGNIFICANT CHANGE UP
GLUCOSE SERPL-MCNC: 104 MG/DL — HIGH (ref 70–99)
HCO3 BLDA-SCNC: 26 MMOL/L — SIGNIFICANT CHANGE UP (ref 21–29)
HCT VFR BLD CALC: 35.5 % — SIGNIFICANT CHANGE UP (ref 34.5–45)
HGB BLD-MCNC: 12.3 G/DL — SIGNIFICANT CHANGE UP (ref 11.5–15.5)
MAGNESIUM SERPL-MCNC: 2.1 MG/DL — SIGNIFICANT CHANGE UP (ref 1.6–2.6)
MCHC RBC-ENTMCNC: 28.8 PG — SIGNIFICANT CHANGE UP (ref 27–34)
MCHC RBC-ENTMCNC: 34.6 GM/DL — SIGNIFICANT CHANGE UP (ref 32–36)
MCV RBC AUTO: 83.1 FL — SIGNIFICANT CHANGE UP (ref 80–100)
PCO2 BLDA: 37 MMHG — SIGNIFICANT CHANGE UP (ref 32–46)
PH BLDA: 7.46 — HIGH (ref 7.35–7.45)
PHOSPHATE SERPL-MCNC: 8.4 MG/DL — HIGH (ref 2.5–4.5)
PLATELET # BLD AUTO: 191 K/UL — SIGNIFICANT CHANGE UP (ref 150–400)
PO2 BLDA: 84 MMHG — SIGNIFICANT CHANGE UP (ref 74–108)
POTASSIUM SERPL-MCNC: 4 MMOL/L — SIGNIFICANT CHANGE UP (ref 3.5–5.3)
POTASSIUM SERPL-SCNC: 4 MMOL/L — SIGNIFICANT CHANGE UP (ref 3.5–5.3)
RBC # BLD: 4.27 M/UL — SIGNIFICANT CHANGE UP (ref 3.8–5.2)
RBC # FLD: 16.2 % — HIGH (ref 10.3–14.5)
SAO2 % BLDA: 96 % — SIGNIFICANT CHANGE UP (ref 92–96)
SODIUM SERPL-SCNC: 134 MMOL/L — LOW (ref 135–145)
WBC # BLD: 36.3 K/UL — HIGH (ref 3.8–10.5)
WBC # FLD AUTO: 36.3 K/UL — HIGH (ref 3.8–10.5)

## 2020-08-31 PROCEDURE — 74018 RADEX ABDOMEN 1 VIEW: CPT | Mod: 26

## 2020-08-31 PROCEDURE — 99291 CRITICAL CARE FIRST HOUR: CPT

## 2020-08-31 RX ORDER — FUROSEMIDE 40 MG
40 TABLET ORAL ONCE
Refills: 0 | Status: COMPLETED | OUTPATIENT
Start: 2020-08-31 | End: 2020-08-31

## 2020-08-31 RX ORDER — CALCIUM GLUCONATE 100 MG/ML
1 VIAL (ML) INTRAVENOUS ONCE
Refills: 0 | Status: COMPLETED | OUTPATIENT
Start: 2020-08-31 | End: 2020-08-31

## 2020-08-31 RX ADMIN — GABAPENTIN 600 MILLIGRAM(S): 400 CAPSULE ORAL at 21:29

## 2020-08-31 RX ADMIN — PIPERACILLIN AND TAZOBACTAM 25 GRAM(S): 4; .5 INJECTION, POWDER, LYOPHILIZED, FOR SOLUTION INTRAVENOUS at 06:24

## 2020-08-31 RX ADMIN — PIPERACILLIN AND TAZOBACTAM 25 GRAM(S): 4; .5 INJECTION, POWDER, LYOPHILIZED, FOR SOLUTION INTRAVENOUS at 17:16

## 2020-08-31 RX ADMIN — CLOPIDOGREL BISULFATE 75 MILLIGRAM(S): 75 TABLET, FILM COATED ORAL at 13:51

## 2020-08-31 RX ADMIN — HEPARIN SODIUM 5000 UNIT(S): 5000 INJECTION INTRAVENOUS; SUBCUTANEOUS at 21:29

## 2020-08-31 RX ADMIN — Medication 40 MILLIGRAM(S): at 11:12

## 2020-08-31 RX ADMIN — HEPARIN SODIUM 5000 UNIT(S): 5000 INJECTION INTRAVENOUS; SUBCUTANEOUS at 06:24

## 2020-08-31 RX ADMIN — GABAPENTIN 600 MILLIGRAM(S): 400 CAPSULE ORAL at 13:52

## 2020-08-31 RX ADMIN — ARIPIPRAZOLE 20 MILLIGRAM(S): 15 TABLET ORAL at 13:51

## 2020-08-31 RX ADMIN — ATORVASTATIN CALCIUM 80 MILLIGRAM(S): 80 TABLET, FILM COATED ORAL at 21:29

## 2020-08-31 RX ADMIN — HEPARIN SODIUM 5000 UNIT(S): 5000 INJECTION INTRAVENOUS; SUBCUTANEOUS at 13:51

## 2020-08-31 RX ADMIN — RANOLAZINE 1000 MILLIGRAM(S): 500 TABLET, FILM COATED, EXTENDED RELEASE ORAL at 13:50

## 2020-08-31 RX ADMIN — Medication 100 GRAM(S): at 11:55

## 2020-08-31 RX ADMIN — Medication 2: at 23:32

## 2020-08-31 RX ADMIN — Medication 81 MILLIGRAM(S): at 13:51

## 2020-08-31 NOTE — PROGRESS NOTE ADULT - ASSESSMENT
59 CAD, PAD s/p fem-pop bypass s/p RT iliac stent, diabetes, hypothyroidism, hyperlipidemia, current active smoker, uses cocaine last use yesterday, chronic back pain s/p lumbar surgery presented with multiple non-specific complaints found to have ST elevations on presenting EKG, code STEMI called and pt underwent urgent angiogram showing occluded RCA w no intervention with UTI sepsis and E. coli bacteremia POA w hospital course c/b code blue.     AMISH w septic shock/UTI w STEMI and Code blue event w hypotension   no acute indication for HD today - renal status tenous with recurrent AMISH now in setting of cardiac arrest  Non oliguric, volume overload progressing  Stop IVF  No issue with trial of Lasix x 1      Hypocalcemia    corrected ca ok w low albumin, optimize phos   Renal feeds if started, caacetate with feeds to optimize phos and thus ca++     Ischemic Bowerl w Shock w Leokocytosis   -Surgery/ICU  -Supportive care and plan per above teams    d/w Dr Saldana, RN staff and team  seen this AM, note now

## 2020-08-31 NOTE — PROGRESS NOTE ADULT - SUBJECTIVE AND OBJECTIVE BOX
Date of service: 20 @ 14:15    pt seen and examined  less abd pain  weak appearing  afebrile  increasing urine output    ROS: unable to obtain d/t medical condition    MEDICATIONS  (STANDING):  ARIPiprazole 20 milliGRAM(s) Oral daily  aspirin  chewable 81 milliGRAM(s) Oral daily  atorvastatin 80 milliGRAM(s) Oral at bedtime  clopidogrel Tablet 75 milliGRAM(s) Oral daily  dextrose 5%. 1000 milliLiter(s) (50 mL/Hr) IV Continuous <Continuous>  dextrose 50% Injectable 12.5 Gram(s) IV Push once  dextrose 50% Injectable 25 Gram(s) IV Push once  dextrose 50% Injectable 25 Gram(s) IV Push once  gabapentin 600 milliGRAM(s) Oral three times a day  heparin   Injectable 5000 Unit(s) SubCutaneous every 8 hours  insulin lispro (HumaLOG) corrective regimen sliding scale   SubCutaneous every 6 hours  levothyroxine 112 MICROGram(s) Oral daily  multivitamin 1 Tablet(s) Oral daily  piperacillin/tazobactam IVPB.. 3.375 Gram(s) IV Intermittent every 12 hours  ranolazine 1000 milliGRAM(s) Oral two times a day    Vital Signs Last 24 Hrs  T(C): 36.4 (31 Aug 2020 10:31), Max: 36.4 (31 Aug 2020 10:31)  T(F): 97.5 (31 Aug 2020 10:31), Max: 97.5 (31 Aug 2020 10:31)  HR: 86 (31 Aug 2020 11:00) (75 - 87)  BP: 136/53 (31 Aug 2020 11:00) (96/46 - 155/81)  BP(mean): 72 (31 Aug 2020 11:00) (59 - 99)  RR: 0 (31 Aug 2020 11:00) (0 - 14)  SpO2: 91% (31 Aug 2020 11:00) (91% - 98%)    PE:  Constitutional: frail looking  HEENT: NC/AT, EOMI, PERRLA, conjunctivae clear; ears and nose atraumatic; pharynx benign  Neck: supple; thyroid not palpable  Back: no tenderness  Respiratory: respiratory effort normal; clear to auscultation  Cardiovascular: S1S2 regular, no murmurs  Abdomen: soft,  tender, distended, positive BS; liver and spleen WNL  Genitourinary: no suprapubic tenderness  Lymphatic: no LN palpable  Musculoskeletal: no muscle tenderness, no joint swelling or tenderness  Extremities: no pedal edema  Neurological/ Psychiatric:  moving all extremities  Skin: no rashes; no palpable lesions    Labs: all available labs reviewed                                   12.3   36.30 )-----------( 191      ( 31 Aug 2020 06:22 )             35.5     08-31    134<L>  |  94<L>  |  96<H>  ----------------------------<  104<H>  4.0   |  27  |  5.77<H>    Ca    5.5<LL>      31 Aug 2020 06:22  Phos  8.4       Mg     2.1               Urinalysis Basic - ( 23 Aug 2020 10:25 )    Color: Yellow / Appearance: Clear / S.010 / pH: x  Gluc: x / Ketone: Trace  / Bili: Small / Urobili: 1 mg/dL   Blood: x / Protein: 30 mg/dL / Nitrite: Negative   Leuk Esterase: Moderate / RBC: 25-50 /HPF / WBC >50   Sq Epi: x / Non Sq Epi: Few / Bacteria: Many    Culture - Urine (20 @ 10:25)    Specimen Source: .Urine Clean Catch (Midstream)    Culture Results:   >100,000 CFU/ml Escherichia coli    Culture - Blood (20 @ 08:34)    Gram Stain:   Growth in aerobic bottle: Gram Negative Rods  Growth in anaerobic bottle: Gram Negative Rods    Specimen Source: .Blood None    Culture Results:   Growth in aerobic and anaerobic bottles: Escherichia coli  See previous culture [id number]    Culture - Blood (20 @ 08:34)    -  Escherichia coli: Detec    Gram Stain:   Growth in aerobic bottle: Gram Negative Rods  Growth in anaerobic bottle: Gram Negative Rods    Specimen Source: .Blood None    Organism: Blood Culture PCR    Culture Results:   Growth in aerobic bottle: Gram Negative Rods  Growth in anaerobic bottle: Gram Negative Rods      Radiology: all available radiological tests reviewed    < from: CT Abdomen and Pelvis No Cont (20 @ 01:24) >  EXAM:  CT ABDOMEN AND PELVIS                            PROCEDURE DATE:  2020          INTERPRETATION:  CLINICAL INFORMATION: Status post catheterization today. Evaluate for retroperitoneal bleed.    COMPARISON: 2020    PROCEDURE:  CT ofthe Abdomen and Pelvis was performed without intravenous contrast.  Intravenous contrast: None.  Oral contrast: None.  Sagittal and coronal reformats were performed.    FINDINGS:  Evaluation of solid organs is limited without intravenous contrast.  Evaluation of the lower pelvis is limited as images do not extend beyond the proximal inguinal region.      LOWER CHEST: Small bilateral pleural effusions and associated subsegmental bibasilar atelectasis. Scattered calcified granulomas. Aortic and coronary artery atherosclerotic calcifications.    LIVER: Within normal limits.  BILE DUCTS: Stable mild CBD dilatation likely related to cholecystectomy.  GALLBLADDER: Cholecystectomy.  SPLEEN: Within normal limits.  PANCREAS: Within normal limits.  ADRENALS: Within normal limits.  KIDNEYS/URETERS: No hydronephrosis or obstructing stone.    BLADDER: Within normal limits.  REPRODUCTIVE ORGANS: Uterus and adnexa within normal limits.    BOWEL: Partially imaged rectal tube. Distal rectum/anus not included in the field-of-view. Wall thickening of the sigmoid colon. No bowel obstruction. Appendix is normal.  PERITONEUM: Small ascites. Presacral edema.  VESSELS: Atherosclerotic changes.  RETROPERITONEUM/LYMPH NODES: No lymphadenopathy. No definite retroperitoneal hematoma identified.  ABDOMINAL WALL: Anasarca. Inguinal regions not fully included in the field-of-view. Mild bilateral stranding in the visualized inguinal regions.  BONES: Degenerative changes of the spine. Grade 1 anterolisthesis at L4-L5. Posterior fixation hardware at L4-L5.    IMPRESSION:    1. No definite retroperitoneal hematoma identified.    2. Incomplete evaluation for inguinal region/thigh hematoma due to incomplete field-of-view.    3. Small bilateral pleural effusions. Small ascites. Anasarca.      4. Wall thickening of the sigmoid colon. Recommend clinical correlation for colitis.    < end of copied text >    < from: CT Abdomen and Pelvis w/ Oral Cont (20 @ 16:41) >  EXAM:  CT ABDOMEN AND PELVIS OC                            PROCEDURE DATE:  2020          INTERPRETATION:  CLINICAL INFORMATION: Diffuse abdominal pain    COMPARISON: None.    PROCEDURE:  CT of the Abdomen and Pelvis was performed without intravenous contrast.  Intravenous contrast: None.  Oral contrast: positive contrast was administered.  Sagittal and coronal reformats were performed.    FINDINGS:  LOWER CHEST: Coronary artery calcifications. Clear lung bases.    LIVER: Within normal limits.  BILE DUCTS: Normal caliber.  GALLBLADDER: Cholecystectomy.  SPLEEN: Within normal limits.  PANCREAS: Within normal limits.  ADRENALS: Within normal limits.  KIDNEYS/URETERS: Within normal limits.    BLADDER: Within normal limits.  REPRODUCTIVE ORGANS: Uterus and adnexa within normal limits.    BOWEL: Mildly distended small bowel without transition point to suggest obstruction. No wall thickening or inflammatory change.  Appendix normal.  PERITONEUM: No ascites or pneumoperitoneum..  VESSELS: Severe diffuse atherosclerotic arterial calcification. Normal caliber aorta.  RETROPERITONEUM/LYMPH NODES: No lymphadenopathy.  ABDOMINAL WALL: Within normal limits.  BONES: L4-L5 interpedicular screws and connecting rods. Moderate anterolisthesis of L4 on L5. No acute bony abnormality.    IMPRESSION:  Mildly distended small bowel without evidence of obstruction.    < end of copied text >    Advanced directives addressed: full resuscitation

## 2020-08-31 NOTE — PROGRESS NOTE ADULT - SUBJECTIVE AND OBJECTIVE BOX
Patient is a 59y Female who remains confused, limited to no intake. On IVF. Making urine. Seen this AM, note now.     REVIEW OF SYSTEMS:    UTO, confusion    MEDICATIONS  (STANDING):  ARIPiprazole 20 milliGRAM(s) Oral daily  aspirin  chewable 81 milliGRAM(s) Oral daily  atorvastatin 80 milliGRAM(s) Oral at bedtime  clopidogrel Tablet 75 milliGRAM(s) Oral daily  dextrose 5%. 1000 milliLiter(s) (50 mL/Hr) IV Continuous <Continuous>  dextrose 50% Injectable 12.5 Gram(s) IV Push once  dextrose 50% Injectable 25 Gram(s) IV Push once  dextrose 50% Injectable 25 Gram(s) IV Push once  gabapentin 600 milliGRAM(s) Oral three times a day  heparin   Injectable 5000 Unit(s) SubCutaneous every 8 hours  insulin lispro (HumaLOG) corrective regimen sliding scale   SubCutaneous every 6 hours  levothyroxine 112 MICROGram(s) Oral daily  multivitamin 1 Tablet(s) Oral daily  piperacillin/tazobactam IVPB.. 3.375 Gram(s) IV Intermittent every 12 hours  ranolazine 1000 milliGRAM(s) Oral two times a day    MEDICATIONS  (PRN):  acetaminophen   Tablet .. 650 milliGRAM(s) Oral every 4 hours PRN Mild Pain (1 - 3)  ALBUTerol    90 MICROgram(s) HFA Inhaler 2 Puff(s) Inhalation every 6 hours PRN Shortness of Breath and/or Wheezing  aluminum hydroxide/magnesium hydroxide/simethicone Suspension 30 milliLiter(s) Oral every 4 hours PRN Dyspepsia  dextrose 40% Gel 15 Gram(s) Oral once PRN Blood Glucose LESS THAN 70 milliGRAM(s)/deciliter  glucagon  Injectable 1 milliGRAM(s) IntraMuscular once PRN Glucose LESS THAN 70 milligrams/deciliter  ondansetron Injectable 4 milliGRAM(s) IV Push every 6 hours PRN Nausea        T(C): , Max: 36.5 (08-31-20 @ 14:32)  T(F): , Max: 97.7 (08-31-20 @ 14:32)  HR: 79 (08-31-20 @ 21:00)  BP: 105/47 (08-31-20 @ 21:00)  BP(mean): 63 (08-31-20 @ 21:00)  RR: 0 (08-31-20 @ 21:00)  SpO2: 99% (08-31-20 @ 21:00)  Wt(kg): --    08-30 @ 07:01  -  08-31 @ 07:00  --------------------------------------------------------  IN: 920 mL / OUT: 700 mL / NET: 220 mL    08-31 @ 07:01  -  08-31 @ 22:15  --------------------------------------------------------  IN: 67 mL / OUT: 700 mL / NET: -633 mL          PHYSICAL EXAM:    Constitutional: frail, ill appearing  HEENT: anasrca  Respiratory: dist bs  Cardiovascular: S1 and S2, RRR  Gastrointestinal: BS+, soft, NT/ND  Extremities: ++ peripheral edema  Neurological: Alert  : Wayne  Skin: No rashes  Access: Not applicable        LABS:                        12.3   36.30 )-----------( 191      ( 31 Aug 2020 06:22 )             35.5     31 Aug 2020 06:22    134    |  94     |  96     ----------------------------<  104    4.0     |  27     |  5.77   30 Aug 2020 06:10    133    |  92     |  92     ----------------------------<  180    3.9     |  29     |  5.76   29 Aug 2020 06:32    131    |  93     |  90     ----------------------------<  288    4.7     |  25     |  5.55   28 Aug 2020 06:40    130    |  96     |  78     ----------------------------<  293    4.6     |  22     |  4.89     Ca    5.5        31 Aug 2020 06:22  Ca    5.5        30 Aug 2020 06:10  Ca    6.0        29 Aug 2020 06:32  Ca    6.6        28 Aug 2020 06:40  Phos  8.4       31 Aug 2020 06:22  Phos  7.9       30 Aug 2020 06:10  Mg     2.1       31 Aug 2020 06:22  Mg     2.0       30 Aug 2020 06:10    TPro  5.7    /  Alb  1.1    /  TBili  0.3    /  DBili  x      /  AST  66     /  ALT  28     /  AlkPhos  189    29 Aug 2020 06:32  TPro  6.0    /  Alb  1.2    /  TBili  0.4    /  DBili  x      /  AST  72     /  ALT  36     /  AlkPhos  221    28 Aug 2020 06:40          Urine Studies:          RADIOLOGY & ADDITIONAL STUDIES:

## 2020-08-31 NOTE — PROGRESS NOTE ADULT - SUBJECTIVE AND OBJECTIVE BOX
Events Overnight: remains lethargic, non focal, extremely weak    HPI:    58 y/o female with CAD, PAD s/p fem-pop bypass s/p RT iliac stent, diabetes, hypothyroidism, hyperlipidemia, current active smoker, admitted with UTI sepsis and E. coli bacteremia POA.  ECG revealed STEMI prompting code STEMI--pt underwent urgent angiogram showing occluded RCA--no inervention. Pt developed subsequently overnight and was given 1L IVF when she developed PEA and initiation of CPR.  Intubated and CVL placed for pressors.    blood cultures were positive for ecoli  now off pressors  developed AMISH with creatinine 5.7 hopefully is plateauing.  abdominal ct no acute pathology  remains on zosyn  8/27 negative head ct  leukocytosis slowly improving       MEDICATIONS  (STANDING):  ARIPiprazole 20 milliGRAM(s) Oral daily  aspirin  chewable 81 milliGRAM(s) Oral daily  atorvastatin 80 milliGRAM(s) Oral at bedtime  clopidogrel Tablet 75 milliGRAM(s) Oral daily  dextrose 5%. 1000 milliLiter(s) (50 mL/Hr) IV Continuous <Continuous>  dextrose 50% Injectable 12.5 Gram(s) IV Push once  dextrose 50% Injectable 25 Gram(s) IV Push once  dextrose 50% Injectable 25 Gram(s) IV Push once  furosemide   Injectable 40 milliGRAM(s) IV Push once  gabapentin 600 milliGRAM(s) Oral three times a day  heparin   Injectable 5000 Unit(s) SubCutaneous every 8 hours  insulin lispro (HumaLOG) corrective regimen sliding scale   SubCutaneous every 6 hours  levothyroxine 112 MICROGram(s) Oral daily  midodrine 15 milliGRAM(s) Oral every 8 hours  multivitamin 1 Tablet(s) Oral daily  piperacillin/tazobactam IVPB.. 3.375 Gram(s) IV Intermittent every 12 hours  ranolazine 1000 milliGRAM(s) Oral two times a day    MEDICATIONS  (PRN):  acetaminophen   Tablet .. 650 milliGRAM(s) Oral every 4 hours PRN Mild Pain (1 - 3)  ALBUTerol    90 MICROgram(s) HFA Inhaler 2 Puff(s) Inhalation every 6 hours PRN Shortness of Breath and/or Wheezing  aluminum hydroxide/magnesium hydroxide/simethicone Suspension 30 milliLiter(s) Oral every 4 hours PRN Dyspepsia  dextrose 40% Gel 15 Gram(s) Oral once PRN Blood Glucose LESS THAN 70 milliGRAM(s)/deciliter  glucagon  Injectable 1 milliGRAM(s) IntraMuscular once PRN Glucose LESS THAN 70 milligrams/deciliter  ondansetron Injectable 4 milliGRAM(s) IV Push every 6 hours PRN Nausea      ICU Vital Signs Last 24 Hrs  T(C): 36.2 (31 Aug 2020 05:08), Max: 36.2 (31 Aug 2020 05:08)  T(F): 97.2 (31 Aug 2020 05:08), Max: 97.2 (31 Aug 2020 05:08)  HR: 87 (31 Aug 2020 09:00) (75 - 87)  BP: 132/56 (31 Aug 2020 09:00) (96/46 - 155/81)  BP(mean): 73 (31 Aug 2020 09:00) (59 - 99)  ABP: --  ABP(mean): --  RR: 0 (31 Aug 2020 09:00) (0 - 14)  SpO2: 96% (31 Aug 2020 09:00) (93% - 98%)    I&O's Summary    30 Aug 2020 07:01  -  31 Aug 2020 07:00  --------------------------------------------------------  IN: 920 mL / OUT: 700 mL / NET: 220 mL                          12.3   36.30 )-----------( 191      ( 31 Aug 2020 06:22 )             35.5       08-31    134<L>  |  94<L>  |  96<H>  ----------------------------<  104<H>  4.0   |  27  |  5.77<H>    Ca    5.5<LL>      31 Aug 2020 06:22  Phos  8.4     08-31  Mg     2.1     08-31    DVT Prophylaxis:  Heparin subq

## 2020-08-31 NOTE — PROGRESS NOTE ADULT - ASSESSMENT
58 y/o female with CAD, PAD s/p fem-pop bypass s/p RT iliac stent, diabetes,   admitted with UTI sepsis and E. coli bacteremia POA.  ECG revealed STEMI prompting code STEMI--pt underwent urgent angiogram showing occluded RCA--no intervention.   Pt developed hypotension overnight and was given 1L IVF when she developed PEA and initiation of CPR.  Intubated and CVL placed for pressors  Acute resp failure  now extubated on nasal cannula  Septic shock on pressors--now off will titrate off stress steroids  AMISH-- -likely ATN from sepsis. arrest  NO emergent indication for RRT.  will d/c iv fluids and give dose of lasix  follow abdominal exam  Toxic Metabolic Encephalopathy from sepsis   Hyperglycemia-- will hopefully improved off stress steroids  d/c midodrine at this point  on zosyn for e. coli bacteremia    High risk for acute decompensation and deterioration including death. Critically ill     cont with ASA and clopidogrel   DVT prophy--SCD and sqhep  Black for strict I+Os    CCU care-- d/w ICU staff on multi disciplinary rounds

## 2020-08-31 NOTE — PROGRESS NOTE ADULT - ASSESSMENT
58yo/F with PMH CAD, PAD s/p fem-pop bypass s/p RT iliac stent, diabetes, hypothyroidism, hyperlipidemia, current active smoker, uses cocaine last use 8/22  chronic back pain s/p lumbar surgery presented with multiple non-specific complaints, including fevers on/off last 4-5 days, weakness, no appetite and reduced oral intake, vague diffuse abd pains, one day of diarrhea. No cough, no SOB. She came in 8/23 for evaluation of weakness and found to have ST elevations on presenting EKG, code STEMI called and pt underwent urgent angiogram showing occluded RCA. She denies chest pain at present Also noted with positive UA, blood cx with Ecoli, CT abd/pelvis unremarkable, was started on IV rocephin.     1. sepsis Ecoli. cystitis. leukocytosis. abdominal pain/ischemic colitis. worsening AMISH  - repeat CT abd/pelvis reviewed, no abd perforation, wbc ct improving down to 36  - urine cx/blood cx growing Ecoli; sensitivities reviewed   - s/p rocephin 2gm daily #6  - s/p IV flagyl [id number] #4  - antibiotics broadened to IV zosyn [id number] #3  - continue with IV abx coverage   - C diff pcr (-)   - surgery evaluation noted  - monitor temps  - tolerating abx well so far; no side effects noted  - reason for abx use and side effects reviewed with patient  - supportive care  - fu cbc    2. other issues - s/p cardiac arrest/STEMI - cardiology/ICU following 60yo/F with PMH CAD, PAD s/p fem-pop bypass s/p RT iliac stent, diabetes, hypothyroidism, hyperlipidemia, current active smoker, uses cocaine last use 8/22  chronic back pain s/p lumbar surgery presented with multiple non-specific complaints, including fevers on/off last 4-5 days, weakness, no appetite and reduced oral intake, vague diffuse abd pains, one day of diarrhea. No cough, no SOB. She came in 8/23 for evaluation of weakness and found to have ST elevations on presenting EKG, code STEMI called and pt underwent urgent angiogram showing occluded RCA. She denies chest pain at present Also noted with positive UA, blood cx with Ecoli, CT abd/pelvis unremarkable, was started on IV rocephin.     1. sepsis with Ecoli. cystitis. leukocytosis. abdominal pain/ischemic colitis. worsening AMISH  - repeat CT abd/pelvis reviewed, no abd perforation, wbc ct improving down to 36  - urine cx/blood cx growing Ecoli; sensitivities reviewed   - s/p rocephin 2gm daily #6  - s/p IV flagyl [id number] #4  - antibiotics broadened to IV zosyn [id number] #3  - continue with IV abx coverage   - C diff pcr (-)   - surgery evaluation noted  - monitor temps  - tolerating abx well so far; no side effects noted  - reason for abx use and side effects reviewed with patient  - supportive care  - fu cbc    2. other issues - s/p cardiac arrest/STEMI - cardiology/ICU following

## 2020-09-01 LAB
AMMONIA BLD-MCNC: 38 UMOL/L — HIGH (ref 11–32)
ANION GAP SERPL CALC-SCNC: 14 MMOL/L — SIGNIFICANT CHANGE UP (ref 5–17)
BUN SERPL-MCNC: 107 MG/DL — HIGH (ref 7–23)
CA-I BLD-SCNC: 0.68 MMOL/L — CRITICAL LOW (ref 1.12–1.3)
CALCIUM SERPL-MCNC: 5.6 MG/DL — CRITICAL LOW (ref 8.5–10.1)
CHLORIDE SERPL-SCNC: 97 MMOL/L — SIGNIFICANT CHANGE UP (ref 96–108)
CO2 SERPL-SCNC: 26 MMOL/L — SIGNIFICANT CHANGE UP (ref 22–31)
CREAT SERPL-MCNC: 5.9 MG/DL — HIGH (ref 0.5–1.3)
CULTURE RESULTS: SIGNIFICANT CHANGE UP
GLUCOSE SERPL-MCNC: 186 MG/DL — HIGH (ref 70–99)
HCT VFR BLD CALC: 32.9 % — LOW (ref 34.5–45)
HGB BLD-MCNC: 11 G/DL — LOW (ref 11.5–15.5)
MCHC RBC-ENTMCNC: 28.6 PG — SIGNIFICANT CHANGE UP (ref 27–34)
MCHC RBC-ENTMCNC: 33.4 GM/DL — SIGNIFICANT CHANGE UP (ref 32–36)
MCV RBC AUTO: 85.7 FL — SIGNIFICANT CHANGE UP (ref 80–100)
PLATELET # BLD AUTO: 149 K/UL — LOW (ref 150–400)
POTASSIUM SERPL-MCNC: 3.5 MMOL/L — SIGNIFICANT CHANGE UP (ref 3.5–5.3)
POTASSIUM SERPL-SCNC: 3.5 MMOL/L — SIGNIFICANT CHANGE UP (ref 3.5–5.3)
RBC # BLD: 3.84 M/UL — SIGNIFICANT CHANGE UP (ref 3.8–5.2)
RBC # FLD: 16.5 % — HIGH (ref 10.3–14.5)
SODIUM SERPL-SCNC: 137 MMOL/L — SIGNIFICANT CHANGE UP (ref 135–145)
SPECIMEN SOURCE: SIGNIFICANT CHANGE UP
WBC # BLD: 33.76 K/UL — HIGH (ref 3.8–10.5)
WBC # FLD AUTO: 33.76 K/UL — HIGH (ref 3.8–10.5)

## 2020-09-01 PROCEDURE — 99291 CRITICAL CARE FIRST HOUR: CPT

## 2020-09-01 RX ORDER — CALCIUM GLUCONATE 100 MG/ML
1 VIAL (ML) INTRAVENOUS ONCE
Refills: 0 | Status: COMPLETED | OUTPATIENT
Start: 2020-09-01 | End: 2020-09-01

## 2020-09-01 RX ORDER — MULTIVIT-MIN/FERROUS GLUCONATE 9 MG/15 ML
15 LIQUID (ML) ORAL DAILY
Refills: 0 | Status: DISCONTINUED | OUTPATIENT
Start: 2020-09-01 | End: 2020-09-06

## 2020-09-01 RX ADMIN — HEPARIN SODIUM 5000 UNIT(S): 5000 INJECTION INTRAVENOUS; SUBCUTANEOUS at 06:01

## 2020-09-01 RX ADMIN — GABAPENTIN 600 MILLIGRAM(S): 400 CAPSULE ORAL at 06:00

## 2020-09-01 RX ADMIN — Medication 112 MICROGRAM(S): at 06:00

## 2020-09-01 RX ADMIN — RANOLAZINE 1000 MILLIGRAM(S): 500 TABLET, FILM COATED, EXTENDED RELEASE ORAL at 09:31

## 2020-09-01 RX ADMIN — ARIPIPRAZOLE 20 MILLIGRAM(S): 15 TABLET ORAL at 09:31

## 2020-09-01 RX ADMIN — CLOPIDOGREL BISULFATE 75 MILLIGRAM(S): 75 TABLET, FILM COATED ORAL at 09:31

## 2020-09-01 RX ADMIN — Medication 8: at 23:14

## 2020-09-01 RX ADMIN — Medication 6: at 18:02

## 2020-09-01 RX ADMIN — Medication 50 GRAM(S): at 16:07

## 2020-09-01 RX ADMIN — Medication 1 TABLET(S): at 09:31

## 2020-09-01 RX ADMIN — Medication 2: at 06:14

## 2020-09-01 RX ADMIN — PIPERACILLIN AND TAZOBACTAM 25 GRAM(S): 4; .5 INJECTION, POWDER, LYOPHILIZED, FOR SOLUTION INTRAVENOUS at 21:27

## 2020-09-01 RX ADMIN — ATORVASTATIN CALCIUM 80 MILLIGRAM(S): 80 TABLET, FILM COATED ORAL at 21:27

## 2020-09-01 RX ADMIN — HEPARIN SODIUM 5000 UNIT(S): 5000 INJECTION INTRAVENOUS; SUBCUTANEOUS at 21:27

## 2020-09-01 RX ADMIN — PIPERACILLIN AND TAZOBACTAM 25 GRAM(S): 4; .5 INJECTION, POWDER, LYOPHILIZED, FOR SOLUTION INTRAVENOUS at 06:00

## 2020-09-01 RX ADMIN — Medication 6: at 13:45

## 2020-09-01 RX ADMIN — Medication 81 MILLIGRAM(S): at 09:31

## 2020-09-01 NOTE — PROGRESS NOTE ADULT - SUBJECTIVE AND OBJECTIVE BOX
Date of service: 20 @ 11:51    pt seen and examined  less abd pain, ms slightly improved  afebrile  + urine output with lasix     ROS: unable to obtain d/t medical condition    MEDICATIONS  (STANDING):  aspirin  chewable 81 milliGRAM(s) Oral daily  atorvastatin 80 milliGRAM(s) Oral at bedtime  clopidogrel Tablet 75 milliGRAM(s) Oral daily  dextrose 5%. 1000 milliLiter(s) (50 mL/Hr) IV Continuous <Continuous>  dextrose 50% Injectable 12.5 Gram(s) IV Push once  dextrose 50% Injectable 25 Gram(s) IV Push once  dextrose 50% Injectable 25 Gram(s) IV Push once  heparin   Injectable 5000 Unit(s) SubCutaneous every 8 hours  insulin lispro (HumaLOG) corrective regimen sliding scale   SubCutaneous every 6 hours  levothyroxine 112 MICROGram(s) Oral daily  multivitamin/minerals/iron Oral Solution (CENTRUM) 15 milliLiter(s) Enteral Tube daily  piperacillin/tazobactam IVPB.. 3.375 Gram(s) IV Intermittent every 12 hours  ranolazine 1000 milliGRAM(s) Oral two times a day    Vital Signs Last 24 Hrs  T(C): 36.4 (01 Sep 2020 10:09), Max: 36.6 (01 Sep 2020 06:34)  T(F): 97.5 (01 Sep 2020 10:09), Max: 97.8 (01 Sep 2020 06:34)  HR: 89 (01 Sep 2020 06:00) (78 - 91)  BP: 120/49 (01 Sep 2020 06:00) (100/52 - 133/58)  BP(mean): 65 (01 Sep 2020 06:00) (58 - 76)  RR: 8 (01 Sep 2020 06:00) (0 - 10)  SpO2: 99% (01 Sep 2020 06:00) (96% - 99%)    PE:  Constitutional: frail looking  HEENT: NC/AT, EOMI, PERRLA, conjunctivae clear; ears and nose atraumatic; pharynx benign  Neck: supple; thyroid not palpable  Back: no tenderness  Respiratory: respiratory effort normal; clear to auscultation  Cardiovascular: S1S2 regular, no murmurs  Abdomen: soft,  tender, distended, positive BS; liver and spleen WNL  Genitourinary: no suprapubic tenderness  Lymphatic: no LN palpable  Musculoskeletal: no muscle tenderness, no joint swelling or tenderness  Extremities: no pedal edema  Neurological/ Psychiatric:  moving all extremities  Skin: no rashes; no palpable lesions    Labs: all available labs reviewed                                   11.0   33.76 )-----------( 149      ( 01 Sep 2020 06:50 )             32.9         137  |  97  |  107<H>  ----------------------------<  186<H>  3.5   |  26  |  5.90<H>    Ca    5.6<LL>      01 Sep 2020 06:50  Phos  8.4       Mg     2.1             Urinalysis Basic - ( 23 Aug 2020 10:25 )    Color: Yellow / Appearance: Clear / S.010 / pH: x  Gluc: x / Ketone: Trace  / Bili: Small / Urobili: 1 mg/dL   Blood: x / Protein: 30 mg/dL / Nitrite: Negative   Leuk Esterase: Moderate / RBC: 25-50 /HPF / WBC >50   Sq Epi: x / Non Sq Epi: Few / Bacteria: Many    Culture - Urine (20 @ 10:25)    Specimen Source: .Urine Clean Catch (Midstream)    Culture Results:   >100,000 CFU/ml Escherichia coli    Culture - Blood (20 @ 08:34)    Gram Stain:   Growth in aerobic bottle: Gram Negative Rods  Growth in anaerobic bottle: Gram Negative Rods    Specimen Source: .Blood None    Culture Results:   Growth in aerobic and anaerobic bottles: Escherichia coli  See previous culture [id number]    Culture - Blood (20 @ 08:34)    -  Escherichia coli: Detec    Gram Stain:   Growth in aerobic bottle: Gram Negative Rods  Growth in anaerobic bottle: Gram Negative Rods    Specimen Source: .Blood None    Organism: Blood Culture PCR    Culture Results:   Growth in aerobic bottle: Gram Negative Rods  Growth in anaerobic bottle: Gram Negative Rods      Radiology: all available radiological tests reviewed    < from: CT Abdomen and Pelvis No Cont (20 @ 01:24) >  EXAM:  CT ABDOMEN AND PELVIS                            PROCEDURE DATE:  2020          INTERPRETATION:  CLINICAL INFORMATION: Status post catheterization today. Evaluate for retroperitoneal bleed.    COMPARISON: 2020    PROCEDURE:  CT ofthe Abdomen and Pelvis was performed without intravenous contrast.  Intravenous contrast: None.  Oral contrast: None.  Sagittal and coronal reformats were performed.    FINDINGS:  Evaluation of solid organs is limited without intravenous contrast.  Evaluation of the lower pelvis is limited as images do not extend beyond the proximal inguinal region.      LOWER CHEST: Small bilateral pleural effusions and associated subsegmental bibasilar atelectasis. Scattered calcified granulomas. Aortic and coronary artery atherosclerotic calcifications.    LIVER: Within normal limits.  BILE DUCTS: Stable mild CBD dilatation likely related to cholecystectomy.  GALLBLADDER: Cholecystectomy.  SPLEEN: Within normal limits.  PANCREAS: Within normal limits.  ADRENALS: Within normal limits.  KIDNEYS/URETERS: No hydronephrosis or obstructing stone.    BLADDER: Within normal limits.  REPRODUCTIVE ORGANS: Uterus and adnexa within normal limits.    BOWEL: Partially imaged rectal tube. Distal rectum/anus not included in the field-of-view. Wall thickening of the sigmoid colon. No bowel obstruction. Appendix is normal.  PERITONEUM: Small ascites. Presacral edema.  VESSELS: Atherosclerotic changes.  RETROPERITONEUM/LYMPH NODES: No lymphadenopathy. No definite retroperitoneal hematoma identified.  ABDOMINAL WALL: Anasarca. Inguinal regions not fully included in the field-of-view. Mild bilateral stranding in the visualized inguinal regions.  BONES: Degenerative changes of the spine. Grade 1 anterolisthesis at L4-L5. Posterior fixation hardware at L4-L5.    IMPRESSION:    1. No definite retroperitoneal hematoma identified.    2. Incomplete evaluation for inguinal region/thigh hematoma due to incomplete field-of-view.    3. Small bilateral pleural effusions. Small ascites. Anasarca.      4. Wall thickening of the sigmoid colon. Recommend clinical correlation for colitis.    < end of copied text >    < from: CT Abdomen and Pelvis w/ Oral Cont (20 @ 16:41) >  EXAM:  CT ABDOMEN AND PELVIS OC                            PROCEDURE DATE:  2020          INTERPRETATION:  CLINICAL INFORMATION: Diffuse abdominal pain    COMPARISON: None.    PROCEDURE:  CT of the Abdomen and Pelvis was performed without intravenous contrast.  Intravenous contrast: None.  Oral contrast: positive contrast was administered.  Sagittal and coronal reformats were performed.    FINDINGS:  LOWER CHEST: Coronary artery calcifications. Clear lung bases.    LIVER: Within normal limits.  BILE DUCTS: Normal caliber.  GALLBLADDER: Cholecystectomy.  SPLEEN: Within normal limits.  PANCREAS: Within normal limits.  ADRENALS: Within normal limits.  KIDNEYS/URETERS: Within normal limits.    BLADDER: Within normal limits.  REPRODUCTIVE ORGANS: Uterus and adnexa within normal limits.    BOWEL: Mildly distended small bowel without transition point to suggest obstruction. No wall thickening or inflammatory change.  Appendix normal.  PERITONEUM: No ascites or pneumoperitoneum..  VESSELS: Severe diffuse atherosclerotic arterial calcification. Normal caliber aorta.  RETROPERITONEUM/LYMPH NODES: No lymphadenopathy.  ABDOMINAL WALL: Within normal limits.  BONES: L4-L5 interpedicular screws and connecting rods. Moderate anterolisthesis of L4 on L5. No acute bony abnormality.    IMPRESSION:  Mildly distended small bowel without evidence of obstruction.    < end of copied text >    Advanced directives addressed: full resuscitation

## 2020-09-01 NOTE — PROGRESS NOTE ADULT - SUBJECTIVE AND OBJECTIVE BOX
Patient is a 59y Female who rhad no new events, making urine w lasix. More alert today per staff, knows name. Black out    MEDICATIONS  (STANDING):  ARIPiprazole 20 milliGRAM(s) Oral daily  aspirin  chewable 81 milliGRAM(s) Oral daily  atorvastatin 80 milliGRAM(s) Oral at bedtime  clopidogrel Tablet 75 milliGRAM(s) Oral daily  dextrose 5%. 1000 milliLiter(s) (50 mL/Hr) IV Continuous <Continuous>  dextrose 50% Injectable 12.5 Gram(s) IV Push once  dextrose 50% Injectable 25 Gram(s) IV Push once  dextrose 50% Injectable 25 Gram(s) IV Push once  gabapentin 600 milliGRAM(s) Oral three times a day  heparin   Injectable 5000 Unit(s) SubCutaneous every 8 hours  insulin lispro (HumaLOG) corrective regimen sliding scale   SubCutaneous every 6 hours  levothyroxine 112 MICROGram(s) Oral daily  multivitamin/minerals/iron Oral Solution (CENTRUM) 15 milliLiter(s) Enteral Tube daily  piperacillin/tazobactam IVPB.. 3.375 Gram(s) IV Intermittent every 12 hours  ranolazine 1000 milliGRAM(s) Oral two times a day    MEDICATIONS  (PRN):  acetaminophen   Tablet .. 650 milliGRAM(s) Oral every 4 hours PRN Mild Pain (1 - 3)  ALBUTerol    90 MICROgram(s) HFA Inhaler 2 Puff(s) Inhalation every 6 hours PRN Shortness of Breath and/or Wheezing  aluminum hydroxide/magnesium hydroxide/simethicone Suspension 30 milliLiter(s) Oral every 4 hours PRN Dyspepsia  dextrose 40% Gel 15 Gram(s) Oral once PRN Blood Glucose LESS THAN 70 milliGRAM(s)/deciliter  glucagon  Injectable 1 milliGRAM(s) IntraMuscular once PRN Glucose LESS THAN 70 milligrams/deciliter  ondansetron Injectable 4 milliGRAM(s) IV Push every 6 hours PRN Nausea        T(C): , Max: 36.6 (09-01-20 @ 06:34)  T(F): , Max: 97.8 (09-01-20 @ 06:34)  HR: 89 (09-01-20 @ 06:00)  BP: 120/49 (09-01-20 @ 06:00)  BP(mean): 65 (09-01-20 @ 06:00)  RR: 8 (09-01-20 @ 06:00)  SpO2: 99% (09-01-20 @ 06:00)  Wt(kg): --    08-31 @ 07:01  -  09-01 @ 07:00  --------------------------------------------------------  IN: 67 mL / OUT: 1050 mL / NET: -983 mL    09-01 @ 07:01  -  09-01 @ 10:28  --------------------------------------------------------  IN: 0 mL / OUT: 36 mL / NET: -36 mL          PHYSICAL EXAM:    Constitutional: frail  HEENT: dry MM  Neck: No LAD, No JVD  Respiratory: dist BS  Cardiovascular: S1 and S2  Extremities:  peripheral edema  Neurological: Alert  : No Black  Skin: No rashes  Access: Not applicable        LABS:                        11.0   33.76 )-----------( 149      ( 01 Sep 2020 06:50 )             32.9     01 Sep 2020 06:50    137    |  97     |  107    ----------------------------<  186    3.5     |  26     |  5.90   31 Aug 2020 06:22    134    |  94     |  96     ----------------------------<  104    4.0     |  27     |  5.77   30 Aug 2020 06:10    133    |  92     |  92     ----------------------------<  180    3.9     |  29     |  5.76   29 Aug 2020 06:32    131    |  93     |  90     ----------------------------<  288    4.7     |  25     |  5.55     Ca    5.6        01 Sep 2020 06:50  Ca    5.5        31 Aug 2020 06:22  Ca    5.5        30 Aug 2020 06:10  Ca    6.0        29 Aug 2020 06:32  Phos  8.4       31 Aug 2020 06:22  Phos  7.9       30 Aug 2020 06:10  Mg     2.1       31 Aug 2020 06:22  Mg     2.0       30 Aug 2020 06:10    TPro  5.7    /  Alb  1.1    /  TBili  0.3    /  DBili  x      /  AST  66     /  ALT  28     /  AlkPhos  189    29 Aug 2020 06:32          Urine Studies:          RADIOLOGY & ADDITIONAL STUDIES:

## 2020-09-01 NOTE — PROGRESS NOTE ADULT - ASSESSMENT
59 CAD, PAD s/p fem-pop bypass s/p RT iliac stent, diabetes, hypothyroidism, hyperlipidemia, current active smoker, uses cocaine last use yesterday, chronic back pain s/p lumbar surgery presented with multiple non-specific complaints found to have ST elevations on presenting EKG, code STEMI called and pt underwent urgent angiogram showing occluded RCA w no intervention with UTI sepsis and E. coli bacteremia POA w hospital course c/b code blue.     AMISH w septic shock/UTI w STEMI and Code blue event w hypotension   no acute indication for HD today - renal status tenous with recurrent AMISH now in setting of cardiac arrest, subtle rise in function today with lasix IV  Non oliguric, monitor labs/lytes  Improvement in mental status today from prior, continue to monitor    Hypocalcemia    corrected ca ok w low albumin, optimize phos   Renal feeds started   Start phoslo with feeds for elevated phos/low ca++    Ischemic Bowerl w Shock w Leokocytosis   -Surgery/ICU  -Supportive care and plan per above teams    d/w Dr Saldana, RN staff and team

## 2020-09-01 NOTE — PROGRESS NOTE ADULT - ASSESSMENT
58 y/o female with CAD, PAD s/p fem-pop bypass s/p RT iliac stent, diabetes, admitted with UTI sepsis and E. coli bacteremia POA.  ECG revealed STEMI prompting code STEMI--pt underwent urgent angiogram showing occluded RCA--no intervention.   Acute resp failure  now extubated on nasal cannula  Septic shock resolved-now off midodrine and steroids  AMISH-- -likely ATN from sepsis. arrest  NO emergent indication for RRT.  track bun creatinine  follow abdominal exam, tolerating feeds, ct was negative for pathology  Toxic Metabolic Encephalopathy from sepsis    on zosyn for e. coli bacteremia hopeully wbc will continue to decleine  metabolic encephalopathy will d/c gabapentin  High risk for acute decompensation and deterioration including death. Critically ill     cont with ASA and clopidogrel   DVT prophy--SCD and sqhep

## 2020-09-01 NOTE — PROGRESS NOTE ADULT - SUBJECTIVE AND OBJECTIVE BOX
Events Overnight: more awake than yesterday, bun, creatinine still increasing    HPI:     60 y/o female with CAD, PAD s/p fem-pop bypass s/p RT iliac stent, diabetes, hypothyroidism, hyperlipidemia, current active smoker, admitted with UTI sepsis and E. coli bacteremia POA.  ECG revealed STEMI prompting code STEMI--pt underwent urgent angiogram showing occluded RCA--no intervention. Pt developed subsequently overnight and was given 1L IVF when she developed PEA and initiation of CPR.  Intubated and CVL placed for pressors.    blood cultures were positive for ecoli likely urinary source  now off pressors, bp stable  developed AMISH with creatinine rising hopefully is plateauing.  abdominal ct no acute pathology  remains on zosyn  8/27 negative head ct  leukocytosis slowly improving     ROS cant obtain     MEDICATIONS  (STANDING):  aspirin  chewable 81 milliGRAM(s) Oral daily  atorvastatin 80 milliGRAM(s) Oral at bedtime  clopidogrel Tablet 75 milliGRAM(s) Oral daily  dextrose 5%. 1000 milliLiter(s) (50 mL/Hr) IV Continuous <Continuous>  dextrose 50% Injectable 12.5 Gram(s) IV Push once  dextrose 50% Injectable 25 Gram(s) IV Push once  dextrose 50% Injectable 25 Gram(s) IV Push once  heparin   Injectable 5000 Unit(s) SubCutaneous every 8 hours  insulin lispro (HumaLOG) corrective regimen sliding scale   SubCutaneous every 6 hours  levothyroxine 112 MICROGram(s) Oral daily  multivitamin/minerals/iron Oral Solution (CENTRUM) 15 milliLiter(s) Enteral Tube daily  piperacillin/tazobactam IVPB.. 3.375 Gram(s) IV Intermittent every 12 hours  ranolazine 1000 milliGRAM(s) Oral two times a day    MEDICATIONS  (PRN):  acetaminophen   Tablet .. 650 milliGRAM(s) Oral every 4 hours PRN Mild Pain (1 - 3)  ALBUTerol    90 MICROgram(s) HFA Inhaler 2 Puff(s) Inhalation every 6 hours PRN Shortness of Breath and/or Wheezing  aluminum hydroxide/magnesium hydroxide/simethicone Suspension 30 milliLiter(s) Oral every 4 hours PRN Dyspepsia  dextrose 40% Gel 15 Gram(s) Oral once PRN Blood Glucose LESS THAN 70 milliGRAM(s)/deciliter  glucagon  Injectable 1 milliGRAM(s) IntraMuscular once PRN Glucose LESS THAN 70 milligrams/deciliter  ondansetron Injectable 4 milliGRAM(s) IV Push every 6 hours PRN Nausea    ICU Vital Signs Last 24 Hrs  T(C): 36.4 (01 Sep 2020 10:09), Max: 36.6 (01 Sep 2020 06:34)  T(F): 97.5 (01 Sep 2020 10:09), Max: 97.8 (01 Sep 2020 06:34)  HR: 89 (01 Sep 2020 06:00) (78 - 91)  BP: 120/49 (01 Sep 2020 06:00) (100/52 - 133/58)  BP(mean): 65 (01 Sep 2020 06:00) (58 - 76)  ABP: --  ABP(mean): --  RR: 8 (01 Sep 2020 06:00) (0 - 10)  SpO2: 99% (01 Sep 2020 06:00) (96% - 99%)    I&O's Summary    31 Aug 2020 07:01  -  01 Sep 2020 07:00  --------------------------------------------------------  IN: 67 mL / OUT: 1050 mL / NET: -983 mL    01 Sep 2020 07:01  -  01 Sep 2020 11:01  --------------------------------------------------------  IN: 0 mL / OUT: 36 mL / NET: -36 mL                            11.0   33.76 )-----------( 149      ( 01 Sep 2020 06:50 )             32.9       09-01    137  |  97  |  107<H>  ----------------------------<  186<H>  3.5   |  26  |  5.90<H>    Ca    5.6<LL>      01 Sep 2020 06:50  Phos  8.4     08-31  Mg     2.1     08-31    ABG - ( 31 Aug 2020 10:53 )  pH, Arterial: 7.46  pH, Blood: x     /  pCO2: 37    /  pO2: 84    / HCO3: 26    / Base Excess: 2.1   /  SaO2: 96          DVT Prophylaxis:    Heparin subq                                                             Advanced Directives: Full Code

## 2020-09-02 LAB
ANION GAP SERPL CALC-SCNC: 12 MMOL/L — SIGNIFICANT CHANGE UP (ref 5–17)
BUN SERPL-MCNC: 116 MG/DL — HIGH (ref 7–23)
CALCIUM SERPL-MCNC: 5.9 MG/DL — CRITICAL LOW (ref 8.5–10.1)
CHLORIDE SERPL-SCNC: 98 MMOL/L — SIGNIFICANT CHANGE UP (ref 96–108)
CO2 SERPL-SCNC: 26 MMOL/L — SIGNIFICANT CHANGE UP (ref 22–31)
CREAT SERPL-MCNC: 5.65 MG/DL — HIGH (ref 0.5–1.3)
GLUCOSE SERPL-MCNC: 295 MG/DL — HIGH (ref 70–99)
HCT VFR BLD CALC: 30.9 % — LOW (ref 34.5–45)
HGB BLD-MCNC: 9.9 G/DL — LOW (ref 11.5–15.5)
MCHC RBC-ENTMCNC: 28.4 PG — SIGNIFICANT CHANGE UP (ref 27–34)
MCHC RBC-ENTMCNC: 32 GM/DL — SIGNIFICANT CHANGE UP (ref 32–36)
MCV RBC AUTO: 88.8 FL — SIGNIFICANT CHANGE UP (ref 80–100)
PLATELET # BLD AUTO: 131 K/UL — LOW (ref 150–400)
POTASSIUM SERPL-MCNC: 3.1 MMOL/L — LOW (ref 3.5–5.3)
POTASSIUM SERPL-SCNC: 3.1 MMOL/L — LOW (ref 3.5–5.3)
RBC # BLD: 3.48 M/UL — LOW (ref 3.8–5.2)
RBC # FLD: 16.7 % — HIGH (ref 10.3–14.5)
SODIUM SERPL-SCNC: 136 MMOL/L — SIGNIFICANT CHANGE UP (ref 135–145)
WBC # BLD: 23.86 K/UL — HIGH (ref 3.8–10.5)
WBC # FLD AUTO: 23.86 K/UL — HIGH (ref 3.8–10.5)

## 2020-09-02 PROCEDURE — 99291 CRITICAL CARE FIRST HOUR: CPT

## 2020-09-02 RX ORDER — INSULIN GLARGINE 100 [IU]/ML
25 INJECTION, SOLUTION SUBCUTANEOUS AT BEDTIME
Refills: 0 | Status: DISCONTINUED | OUTPATIENT
Start: 2020-09-02 | End: 2020-09-06

## 2020-09-02 RX ORDER — METOPROLOL TARTRATE 50 MG
25 TABLET ORAL DAILY
Refills: 0 | Status: DISCONTINUED | OUTPATIENT
Start: 2020-09-02 | End: 2020-09-11

## 2020-09-02 RX ORDER — INSULIN LISPRO 100/ML
5 VIAL (ML) SUBCUTANEOUS
Refills: 0 | Status: DISCONTINUED | OUTPATIENT
Start: 2020-09-02 | End: 2020-09-05

## 2020-09-02 RX ORDER — POTASSIUM CHLORIDE 20 MEQ
40 PACKET (EA) ORAL ONCE
Refills: 0 | Status: COMPLETED | OUTPATIENT
Start: 2020-09-02 | End: 2020-09-02

## 2020-09-02 RX ORDER — INSULIN GLARGINE 100 [IU]/ML
10 INJECTION, SOLUTION SUBCUTANEOUS AT BEDTIME
Refills: 0 | Status: DISCONTINUED | OUTPATIENT
Start: 2020-09-02 | End: 2020-09-02

## 2020-09-02 RX ORDER — METOPROLOL TARTRATE 50 MG
25 TABLET ORAL
Refills: 0 | Status: DISCONTINUED | OUTPATIENT
Start: 2020-09-02 | End: 2020-09-02

## 2020-09-02 RX ORDER — FUROSEMIDE 40 MG
20 TABLET ORAL ONCE
Refills: 0 | Status: COMPLETED | OUTPATIENT
Start: 2020-09-02 | End: 2020-09-02

## 2020-09-02 RX ADMIN — HEPARIN SODIUM 5000 UNIT(S): 5000 INJECTION INTRAVENOUS; SUBCUTANEOUS at 18:10

## 2020-09-02 RX ADMIN — PIPERACILLIN AND TAZOBACTAM 25 GRAM(S): 4; .5 INJECTION, POWDER, LYOPHILIZED, FOR SOLUTION INTRAVENOUS at 10:25

## 2020-09-02 RX ADMIN — PIPERACILLIN AND TAZOBACTAM 25 GRAM(S): 4; .5 INJECTION, POWDER, LYOPHILIZED, FOR SOLUTION INTRAVENOUS at 22:27

## 2020-09-02 RX ADMIN — ATORVASTATIN CALCIUM 80 MILLIGRAM(S): 80 TABLET, FILM COATED ORAL at 22:27

## 2020-09-02 RX ADMIN — Medication 112 MICROGRAM(S): at 05:21

## 2020-09-02 RX ADMIN — Medication 20 MILLIGRAM(S): at 11:33

## 2020-09-02 RX ADMIN — Medication 81 MILLIGRAM(S): at 10:24

## 2020-09-02 RX ADMIN — CLOPIDOGREL BISULFATE 75 MILLIGRAM(S): 75 TABLET, FILM COATED ORAL at 10:24

## 2020-09-02 RX ADMIN — Medication 10: at 11:36

## 2020-09-02 RX ADMIN — Medication 25 MILLIGRAM(S): at 11:34

## 2020-09-02 RX ADMIN — Medication 10: at 18:09

## 2020-09-02 RX ADMIN — HEPARIN SODIUM 5000 UNIT(S): 5000 INJECTION INTRAVENOUS; SUBCUTANEOUS at 05:21

## 2020-09-02 RX ADMIN — ONDANSETRON 4 MILLIGRAM(S): 8 TABLET, FILM COATED ORAL at 23:17

## 2020-09-02 RX ADMIN — Medication 8: at 05:27

## 2020-09-02 RX ADMIN — Medication 15 MILLILITER(S): at 10:24

## 2020-09-02 RX ADMIN — RANOLAZINE 1000 MILLIGRAM(S): 500 TABLET, FILM COATED, EXTENDED RELEASE ORAL at 22:27

## 2020-09-02 RX ADMIN — RANOLAZINE 1000 MILLIGRAM(S): 500 TABLET, FILM COATED, EXTENDED RELEASE ORAL at 10:25

## 2020-09-02 RX ADMIN — Medication 40 MILLIEQUIVALENT(S): at 11:34

## 2020-09-02 NOTE — SWALLOW BEDSIDE ASSESSMENT ADULT - CONSISTENCIES ADMINISTERED
thin liquid/puree/chewable not offered at this time 2/2 dysphagia profile, and to reduced mental status.

## 2020-09-02 NOTE — SWALLOW BEDSIDE ASSESSMENT ADULT - SLP GENERAL OBSERVATIONS
semi reclining in bed, sleepy but rousable and able to participate in eating routines.  Verbally interactive but voice is hypernasal and motor speech is imprecise, though intelligible.  Verbal utterances were not always coherent.

## 2020-09-02 NOTE — SWALLOW BEDSIDE ASSESSMENT ADULT - PHARYNGEAL PHASE
Mild latency of onset of pharyngeal swallow, mildly reduced laryngeal lift to observation.  NO overt s/s aspiration on thin and puree./Within functional limits

## 2020-09-02 NOTE — PROGRESS NOTE ADULT - ASSESSMENT
59 CAD, PAD s/p fem-pop bypass s/p RT iliac stent, diabetes, hypothyroidism, hyperlipidemia, current active smoker, uses cocaine last use yesterday, chronic back pain s/p lumbar surgery presented with multiple non-specific complaints found to have ST elevations on presenting EKG, code STEMI called and pt underwent urgent angiogram showing occluded RCA w no intervention with UTI sepsis and E. coli bacteremia POA w hospital course c/b code blue.     AMISH w septic shock/UTI w STEMI and Code blue event w hypotension   no acute indication for HD today - renal status tenous with recurrent AMISH now in setting of cardiac arrest  judicious use of lasix as Cr very labile  Non oliguric, monitor labs/lytes  Improvement in mental status today from prior, continue to monitor  no acute indication for HD today     Hypocalcemia    corrected Ca ok but with low ionized Ca - agree with PRN IV calcium gluconate   Calcum acetate phoslo with feeds for elevated phos/low ca++ when starting to eat    check PTH and Vit D     Ischemic Bowerl w Shock w Leokocytosis  - improving   -Surgery/ICU  -Supportive care and plan per above teams    d/w Dr Saldana earlier today , RN staff and team

## 2020-09-02 NOTE — PROGRESS NOTE ADULT - SUBJECTIVE AND OBJECTIVE BOX
** pt seen earlier today     Patient is a 59y Female who rhad no new events, making urine w lasix. More alert today per staff, knows name. Black out    no abd pains      MEDICATIONS  (STANDING):  aspirin  chewable 81 milliGRAM(s) Oral daily  atorvastatin 80 milliGRAM(s) Oral at bedtime  clopidogrel Tablet 75 milliGRAM(s) Oral daily  dextrose 5%. 1000 milliLiter(s) (50 mL/Hr) IV Continuous <Continuous>  dextrose 50% Injectable 12.5 Gram(s) IV Push once  dextrose 50% Injectable 25 Gram(s) IV Push once  dextrose 50% Injectable 25 Gram(s) IV Push once  heparin   Injectable 5000 Unit(s) SubCutaneous every 8 hours  insulin glargine Injectable (LANTUS) 10 Unit(s) SubCutaneous at bedtime  insulin lispro (HumaLOG) corrective regimen sliding scale   SubCutaneous every 6 hours  levothyroxine 112 MICROGram(s) Oral daily  metoprolol tartrate 25 milliGRAM(s) Oral daily  multivitamin/minerals/iron Oral Solution (CENTRUM) 15 milliLiter(s) Enteral Tube daily  piperacillin/tazobactam IVPB.. 3.375 Gram(s) IV Intermittent every 12 hours  ranolazine 1000 milliGRAM(s) Oral two times a day    MEDICATIONS  (PRN):  acetaminophen   Tablet .. 650 milliGRAM(s) Oral every 4 hours PRN Mild Pain (1 - 3)  ALBUTerol    90 MICROgram(s) HFA Inhaler 2 Puff(s) Inhalation every 6 hours PRN Shortness of Breath and/or Wheezing  aluminum hydroxide/magnesium hydroxide/simethicone Suspension 30 milliLiter(s) Oral every 4 hours PRN Dyspepsia  dextrose 40% Gel 15 Gram(s) Oral once PRN Blood Glucose LESS THAN 70 milliGRAM(s)/deciliter  glucagon  Injectable 1 milliGRAM(s) IntraMuscular once PRN Glucose LESS THAN 70 milligrams/deciliter  ondansetron Injectable 4 milliGRAM(s) IV Push every 6 hours PRN Nausea          Vital Signs Last 24 Hrs  T(C): 36.9 (02 Sep 2020 21:07), Max: 36.9 (02 Sep 2020 21:07)  T(F): 98.4 (02 Sep 2020 21:07), Max: 98.4 (02 Sep 2020 21:07)  HR: 93 (02 Sep 2020 18:00) (81 - 94)  BP: 149/65 (02 Sep 2020 18:00) (105/42 - 152/64)  BP(mean): 86 (02 Sep 2020 18:00) (57 - 86)  RR: 11 (02 Sep 2020 18:00) (0 - 18)  SpO2: 97% (02 Sep 2020 18:00) (97% - 100%)      I&O's Detail    01 Sep 2020 07:01  -  02 Sep 2020 07:00  --------------------------------------------------------  IN:    Nepro with Carb Steady: 821 mL  Total IN: 821 mL    OUT:    Indwelling Catheter - Urethral: 36 mL    Intermittent Catheterization - Urethral: 450 mL  Total OUT: 486 mL    Total NET: 335 mL      02 Sep 2020 07:01  -  02 Sep 2020 22:50  --------------------------------------------------------  IN:    Nepro with Carb Steady: 200 mL    Oral Fluid: 300 mL  Total IN: 500 mL    OUT:    Incontinent per Collection Ba mL  Total OUT: 350 mL    Total NET: 150 mL      PHYSICAL EXAM:    Constitutional: frail  HEENT: dry MM  Neck: No LAD, No JVD  Respiratory: dist BS  Cardiovascular: S1 and S2  Abd: soft, Nontender,less distended   Extremities:  peripheral edema  Neurological: Arousable   : No Black  Skin: No rashes  Access: Not applicable        LABS:                 136    |  98     |  116    ----------------------------<  295       02 Sep 2020 06:59  3.1     |  26     |  5.65     137    |  97     |  107    ----------------------------<  186       01 Sep 2020 06:50  3.5     |  26     |  5.90     134    |  94     |  96     ----------------------------<  104       31 Aug 2020 06:22  4.0     |  27     |  5.77     Ca    5.9        02 Sep 2020 06:59  Albumin, Serum: 1.1 g/dL (20 @ 06:32)    Calcium, Ionized: 0.68 mmol/L (20 @ 06:50)      Ca    5.6        01 Sep 2020 06:50    Phos  8.4       31 Aug 2020 06:22  Phos  7.9       30 Aug 2020 06:10    Mg     2.1       31 Aug 2020 06:22  Mg     2.0       30 Aug 2020 06:10                            9.9    23.86 )-----------( 131      ( 02 Sep 2020 06:59 )             30.9                         11.0   33.76 )-----------( 149      ( 01 Sep 2020 06:50 )             32.9         Urine Studies:  Urine Microscopic-Add On (NC) (20 @ 22:55)    Red Blood Cell - Urine: 0-2 /HPF    White Blood Cell - Urine: Negative    Bacteria: Occasional    Epithelial Cells: Negative            RADIOLOGY & ADDITIONAL STUDIES:

## 2020-09-02 NOTE — PROGRESS NOTE ADULT - SUBJECTIVE AND OBJECTIVE BOX
EFREM Coughlin      acetaminophen   Tablet .. 650 milliGRAM(s) Oral every 4 hours PRN  ALBUTerol    90 MICROgram(s) HFA Inhaler 2 Puff(s) Inhalation every 6 hours PRN  aluminum hydroxide/magnesium hydroxide/simethicone Suspension 30 milliLiter(s) Oral every 4 hours PRN  aspirin  chewable 81 milliGRAM(s) Oral daily  atorvastatin 80 milliGRAM(s) Oral at bedtime  clopidogrel Tablet 75 milliGRAM(s) Oral daily  dextrose 40% Gel 15 Gram(s) Oral once PRN  dextrose 5%. 1000 milliLiter(s) IV Continuous <Continuous>  dextrose 50% Injectable 12.5 Gram(s) IV Push once  dextrose 50% Injectable 25 Gram(s) IV Push once  dextrose 50% Injectable 25 Gram(s) IV Push once  glucagon  Injectable 1 milliGRAM(s) IntraMuscular once PRN  heparin   Injectable 5000 Unit(s) SubCutaneous every 8 hours  insulin glargine Injectable (LANTUS) 10 Unit(s) SubCutaneous at bedtime  insulin lispro (HumaLOG) corrective regimen sliding scale   SubCutaneous every 6 hours  levothyroxine 112 MICROGram(s) Oral daily  metoprolol tartrate 25 milliGRAM(s) Oral daily  multivitamin/minerals/iron Oral Solution (CENTRUM) 15 milliLiter(s) Enteral Tube daily  ondansetron Injectable 4 milliGRAM(s) IV Push every 6 hours PRN  piperacillin/tazobactam IVPB.. 3.375 Gram(s) IV Intermittent every 12 hours  ranolazine 1000 milliGRAM(s) Oral two times a day        Physical Exam  T(C): 36.4 (09-02-20 @ 18:20), Max: 36.4 (09-02-20 @ 18:20)  HR: 93 (09-02-20 @ 18:00) (81 - 94)  BP: 149/65 (09-02-20 @ 18:00) (105/42 - 152/64)  RR: 11 (09-02-20 @ 18:00) (0 - 18)  SpO2: 97% (09-02-20 @ 18:00) (97% - 100%)  Wt(kg): --  Constitutional  More awake and alert  Lungs-  clear  Cor-RRR    Abd-+ BS soft, mild distension, non tender      Ext-no edema

## 2020-09-02 NOTE — SWALLOW BEDSIDE ASSESSMENT ADULT - NS SPL SWALLOW CLINIC TRIAL FT
Pt presents with mild oral-pharyngeal dysphagia for modified texture diet: PUREE and thin Liquids.  Meds crushed in puree.   Full feed at this time.  Aspiration precautions.  GERD precautions.  Disc with Nsg.  Service will follow

## 2020-09-02 NOTE — PROGRESS NOTE ADULT - ASSESSMENT
58 y/o female with CAD, PAD s/p fem-pop bypass s/p RT iliac stent, diabetes, admitted with UTI sepsis and E. coli bacteremia POA.  ECG revealed STEMI prompting code STEMI--pt underwent urgent angiogram showing occluded RCA--no intervention.   Acute resp failure  now extubated on room air  Septic shock resolved-now off midodrine and steroids  AMISH-- -likely ATN from sepsis. arrest  NO emergent indication for RRT.  BUN, creatinine plateauing   tolerating feeds, ct was negative for pathology  Toxic Metabolic Encephalopathy from sepsis improving   on zosyn for e. coli bacteremia hopeully wbc will continue to decleine    High risk for acute decompensation and deterioration including death. Critically ill     cont with ASA and clopidogrel resume bblocker  DVT prophy--SCD and sqhep

## 2020-09-02 NOTE — SWALLOW BEDSIDE ASSESSMENT ADULT - ORAL PREPARATORY PHASE
Slow responses: orientation to eating routines; reduce doral aperture and weak lip seal on cup, but improved with trials and verbal cueing.  Lip seal on spoon/Within functional limits

## 2020-09-02 NOTE — PROGRESS NOTE ADULT - SUBJECTIVE AND OBJECTIVE BOX
Events Overnight: Patient more awake this am but remains extremely weak, 450 cc when straight cathed yesterday,     HPI:      58 y/o female with CAD, PAD s/p fem-pop bypass s/p RT iliac stent, diabetes, hypothyroidism, hyperlipidemia, current active smoker, admitted with UTI sepsis and E. coli bacteremia POA.  ECG revealed STEMI prompting code STEMI--pt underwent urgent angiogram showing occluded RCA--no intervention.   Patient developed septic shock, and required pressors, developed acute renal failure    blood cultures were positive for ecoli likely urinary source  now off pressors, bp stable  developed AMISH with creatinine slightly lower today hopefully plateauing  abdominal ct no acute pathology  remains on zosyn wbc is obtaining  leukocytosis slowly improving 23, c. dif was negative     ROS cant obtain       MEDICATIONS  (STANDING):  aspirin  chewable 81 milliGRAM(s) Oral daily  atorvastatin 80 milliGRAM(s) Oral at bedtime  clopidogrel Tablet 75 milliGRAM(s) Oral daily  dextrose 5%. 1000 milliLiter(s) (50 mL/Hr) IV Continuous <Continuous>  dextrose 50% Injectable 12.5 Gram(s) IV Push once  dextrose 50% Injectable 25 Gram(s) IV Push once  dextrose 50% Injectable 25 Gram(s) IV Push once  heparin   Injectable 5000 Unit(s) SubCutaneous every 8 hours  insulin glargine Injectable (LANTUS) 10 Unit(s) SubCutaneous at bedtime  insulin lispro (HumaLOG) corrective regimen sliding scale   SubCutaneous every 6 hours  levothyroxine 112 MICROGram(s) Oral daily  metoprolol tartrate 25 milliGRAM(s) Oral daily  multivitamin/minerals/iron Oral Solution (CENTRUM) 15 milliLiter(s) Enteral Tube daily  piperacillin/tazobactam IVPB.. 3.375 Gram(s) IV Intermittent every 12 hours  potassium chloride   Powder 40 milliEquivalent(s) Oral once  ranolazine 1000 milliGRAM(s) Oral two times a day    MEDICATIONS  (PRN):  acetaminophen   Tablet .. 650 milliGRAM(s) Oral every 4 hours PRN Mild Pain (1 - 3)  ALBUTerol    90 MICROgram(s) HFA Inhaler 2 Puff(s) Inhalation every 6 hours PRN Shortness of Breath and/or Wheezing  aluminum hydroxide/magnesium hydroxide/simethicone Suspension 30 milliLiter(s) Oral every 4 hours PRN Dyspepsia  dextrose 40% Gel 15 Gram(s) Oral once PRN Blood Glucose LESS THAN 70 milliGRAM(s)/deciliter  glucagon  Injectable 1 milliGRAM(s) IntraMuscular once PRN Glucose LESS THAN 70 milligrams/deciliter  ondansetron Injectable 4 milliGRAM(s) IV Push every 6 hours PRN Nausea      ICU Vital Signs Last 24 Hrs  T(C): 35.9 (02 Sep 2020 05:51), Max: 36.4 (01 Sep 2020 10:09)  T(F): 96.7 (02 Sep 2020 05:51), Max: 97.5 (01 Sep 2020 10:09)  HR: 91 (02 Sep 2020 06:00) (82 - 93)  BP: 131/47 (02 Sep 2020 06:00) (105/42 - 143/51)  BP(mean): 65 (02 Sep 2020 06:00) (57 - 78)  ABP: --  ABP(mean): --  RR: 9 (02 Sep 2020 06:00) (0 - 12)  SpO2: 100% (02 Sep 2020 06:00) (98% - 100%)    I&O's Summary    01 Sep 2020 07:01  -  02 Sep 2020 07:00  --------------------------------------------------------  IN: 821 mL / OUT: 486 mL / NET: 335 mL                            9.9    23.86 )-----------( 131      ( 02 Sep 2020 06:59 )             30.9     09-02    136  |  98  |  116<H>  ----------------------------<  295<H>  3.1<L>   |  26  |  5.65<H>    Ca    5.9<LL>      02 Sep 2020 06:59      ABG - ( 31 Aug 2020 10:53 )  pH, Arterial: 7.46  pH, Blood: x     /  pCO2: 37    /  pO2: 84    / HCO3: 26    / Base Excess: 2.1   /  SaO2: 96          DVT Prophylaxis:  Heparin subq                                                               Advanced Directives: Full Code

## 2020-09-02 NOTE — SWALLOW BEDSIDE ASSESSMENT ADULT - SWALLOW EVAL: DIAGNOSIS
mild oral-pharyngeal dysphagia for modified texture diet, 2/2 lethargy and lack of full dentition; PUREE and thin liquids.  meds crushed in puree.  Pt needs to be fed at this time.

## 2020-09-02 NOTE — SWALLOW BEDSIDE ASSESSMENT ADULT - ASR SWALLOW DENTITION
much missing dentition: and those present not in good condition.  pt states she has dentures at home, bt lips are midly hypotonic suggesting chronic lack of dentition.

## 2020-09-02 NOTE — SWALLOW BEDSIDE ASSESSMENT ADULT - SWALLOW EVAL: RECOMMENDED FEEDING/EATING TECHNIQUES
crush medication (when feasible)/alternate food with liquid/hard swallow w/ each bite or sip/allow for swallow between intakes/maintain upright posture during/after eating for 30 mins/small sips/bites

## 2020-09-02 NOTE — SWALLOW BEDSIDE ASSESSMENT ADULT - COMMENTS
58 y/o female with CAD, PAD s/p fem-pop bypass s/p RT iliac stent, diabetes, hypothyroidism, hyperlipidemia, current active smoker, admitted with UTI sepsis and E. coli bacteremia POA.  ECG revealed STEMI prompting code STEMI--pt underwent urgent angiogram showing occluded RCA--no inervention. Pt developed subsequently overnight and was given 1L IVF when she developed PEA and initiation of CPR.  Intubated and CVL placed for pressors.  has chronic back pain, active smoker and uses cocaine.     pt now extubated and Service is asked to evaluate pt for po intake.

## 2020-09-02 NOTE — SWALLOW BEDSIDE ASSESSMENT ADULT - ORAL PHASE
Within functional limits/immediate bolus formation but reduced ability to maintain cohesion even ofpuree bolus: overall though functional with AP pulsion via lingual pumping.  No overt debris left in oral cavity post swallow

## 2020-09-02 NOTE — PROGRESS NOTE ADULT - ASSESSMENT
60yo/F with PMH CAD, PAD s/p fem-pop bypass s/p RT iliac stent, diabetes, hypothyroidism, hyperlipidemia, current active smoker, uses cocaine last use 8/22  chronic back pain s/p lumbar surgery presented with multiple non-specific complaints, including fevers on/off last 4-5 days, weakness, no appetite and reduced oral intake, vague diffuse abd pains, one day of diarrhea. No cough, no SOB. She came in 8/23 for evaluation of weakness and found to have ST elevations on presenting EKG, code STEMI called and pt underwent urgent angiogram showing occluded RCA. She denies chest pain at present Also noted with positive UA, blood cx with Ecoli, CT abd/pelvis unremarkable, was started on IV rocephin.     1. sepsis. ischemic colitis. resolving leukocytosis. ecoli UTI/bacteremia s/p abx course. AMISH  - slowly improving, wbc ct down to 23  - urine cx/blood cx growing Ecoli; sensitivities reviewed   - on IV zosyn [id number] #5  - continue with IV abx coverage   - C diff pcr (-)   - surgery evaluation noted, repeat CT abd/pelvis reviewed   - s/p rocephin 2gm daily #6  - s/p IV flagyl [id number] #4  - monitor temps  - tolerating abx well so far; no side effects noted  - reason for abx use and side effects reviewed with patient  - supportive care  - fu cbc    2. other issues - care per CCU

## 2020-09-02 NOTE — PROGRESS NOTE ADULT - SUBJECTIVE AND OBJECTIVE BOX
Date of service: 20 @ 12:42    pt seen and examined  less abd pain  having bms  weak-appearing, alert, awake     ROS: unable to obtain d/t medical condition    MEDICATIONS  (STANDING):  aspirin  chewable 81 milliGRAM(s) Oral daily  atorvastatin 80 milliGRAM(s) Oral at bedtime  clopidogrel Tablet 75 milliGRAM(s) Oral daily  dextrose 5%. 1000 milliLiter(s) (50 mL/Hr) IV Continuous <Continuous>  dextrose 50% Injectable 12.5 Gram(s) IV Push once  dextrose 50% Injectable 25 Gram(s) IV Push once  dextrose 50% Injectable 25 Gram(s) IV Push once  heparin   Injectable 5000 Unit(s) SubCutaneous every 8 hours  insulin glargine Injectable (LANTUS) 10 Unit(s) SubCutaneous at bedtime  insulin lispro (HumaLOG) corrective regimen sliding scale   SubCutaneous every 6 hours  levothyroxine 112 MICROGram(s) Oral daily  metoprolol tartrate 25 milliGRAM(s) Oral daily  multivitamin/minerals/iron Oral Solution (CENTRUM) 15 milliLiter(s) Enteral Tube daily  piperacillin/tazobactam IVPB.. 3.375 Gram(s) IV Intermittent every 12 hours  ranolazine 1000 milliGRAM(s) Oral two times a day    Vital Signs Last 24 Hrs  T(C): 36.2 (02 Sep 2020 09:55), Max: 36.2 (02 Sep 2020 09:55)  T(F): 97.1 (02 Sep 2020 09:55), Max: 97.1 (02 Sep 2020 09:55)  HR: 85 (02 Sep 2020 10:00) (82 - 93)  BP: 113/48 (02 Sep 2020 10:00) (105/42 - 143/51)  BP(mean): 64 (02 Sep 2020 10:00) (57 - 78)  RR: 7 (02 Sep 2020 10:00) (0 - 12)  SpO2: 99% (02 Sep 2020 10:00) (99% - 100%)    PE:  Constitutional: frail looking  HEENT: NC/AT, EOMI, PERRLA, conjunctivae clear; ears and nose atraumatic; pharynx benign  Neck: supple; thyroid not palpable  Back: no tenderness  Respiratory: respiratory effort normal; clear to auscultation  Cardiovascular: S1S2 regular, no murmurs  Abdomen: soft,  tender, distended, positive BS; liver and spleen WNL  Genitourinary: no suprapubic tenderness  Lymphatic: no LN palpable  Musculoskeletal: no muscle tenderness, no joint swelling or tenderness  Extremities: no pedal edema  Neurological/ Psychiatric:  moving all extremities  Skin: no rashes; no palpable lesions    Labs: all available labs reviewed                         )-----------( 131      ( 02 Sep 2020 06:59 )             30.9     09-02    136  |  98  |  116<H>  ----------------------------<  295<H>  3.1<L>   |  26  |  5.65<H>    Ca    5.9<LL>      02 Sep 2020 06:59      Urinalysis Basic - ( 23 Aug 2020 10:25 )    Color: Yellow / Appearance: Clear / S.010 / pH: x  Gluc: x / Ketone: Trace  / Bili: Small / Urobili: 1 mg/dL   Blood: x / Protein: 30 mg/dL / Nitrite: Negative   Leuk Esterase: Moderate / RBC: 25-50 /HPF / WBC >50   Sq Epi: x / Non Sq Epi: Few / Bacteria: Many    Culture - Urine (20 @ 10:25)    Specimen Source: .Urine Clean Catch (Midstream)    Culture Results:   >100,000 CFU/ml Escherichia coli    Culture - Blood (20 @ 08:34)    Gram Stain:   Growth in aerobic bottle: Gram Negative Rods  Growth in anaerobic bottle: Gram Negative Rods    Specimen Source: .Blood None    Culture Results:   Growth in aerobic and anaerobic bottles: Escherichia coli  See previous culture [id number]    Culture - Blood (20 @ 08:34)    -  Escherichia coli: Detec    Gram Stain:   Growth in aerobic bottle: Gram Negative Rods  Growth in anaerobic bottle: Gram Negative Rods    Specimen Source: .Blood None    Organism: Blood Culture PCR    Culture Results:   Growth in aerobic bottle: Gram Negative Rods  Growth in anaerobic bottle: Gram Negative Rods      Radiology: all available radiological tests reviewed    < from: CT Abdomen and Pelvis No Cont (20 @ 01:24) >  EXAM:  CT ABDOMEN AND PELVIS                            PROCEDURE DATE:  2020          INTERPRETATION:  CLINICAL INFORMATION: Status post catheterization today. Evaluate for retroperitoneal bleed.    COMPARISON: 2020    PROCEDURE:  CT ofthe Abdomen and Pelvis was performed without intravenous contrast.  Intravenous contrast: None.  Oral contrast: None.  Sagittal and coronal reformats were performed.    FINDINGS:  Evaluation of solid organs is limited without intravenous contrast.  Evaluation of the lower pelvis is limited as images do not extend beyond the proximal inguinal region.      LOWER CHEST: Small bilateral pleural effusions and associated subsegmental bibasilar atelectasis. Scattered calcified granulomas. Aortic and coronary artery atherosclerotic calcifications.    LIVER: Within normal limits.  BILE DUCTS: Stable mild CBD dilatation likely related to cholecystectomy.  GALLBLADDER: Cholecystectomy.  SPLEEN: Within normal limits.  PANCREAS: Within normal limits.  ADRENALS: Within normal limits.  KIDNEYS/URETERS: No hydronephrosis or obstructing stone.    BLADDER: Within normal limits.  REPRODUCTIVE ORGANS: Uterus and adnexa within normal limits.    BOWEL: Partially imaged rectal tube. Distal rectum/anus not included in the field-of-view. Wall thickening of the sigmoid colon. No bowel obstruction. Appendix is normal.  PERITONEUM: Small ascites. Presacral edema.  VESSELS: Atherosclerotic changes.  RETROPERITONEUM/LYMPH NODES: No lymphadenopathy. No definite retroperitoneal hematoma identified.  ABDOMINAL WALL: Anasarca. Inguinal regions not fully included in the field-of-view. Mild bilateral stranding in the visualized inguinal regions.  BONES: Degenerative changes of the spine. Grade 1 anterolisthesis at L4-L5. Posterior fixation hardware at L4-L5.    IMPRESSION:    1. No definite retroperitoneal hematoma identified.    2. Incomplete evaluation for inguinal region/thigh hematoma due to incomplete field-of-view.    3. Small bilateral pleural effusions. Small ascites. Anasarca.      4. Wall thickening of the sigmoid colon. Recommend clinical correlation for colitis.    < end of copied text >    < from: CT Abdomen and Pelvis w/ Oral Cont (20 @ 16:41) >  EXAM:  CT ABDOMEN AND PELVIS OC                            PROCEDURE DATE:  2020          INTERPRETATION:  CLINICAL INFORMATION: Diffuse abdominal pain    COMPARISON: None.    PROCEDURE:  CT of the Abdomen and Pelvis was performed without intravenous contrast.  Intravenous contrast: None.  Oral contrast: positive contrast was administered.  Sagittal and coronal reformats were performed.    FINDINGS:  LOWER CHEST: Coronary artery calcifications. Clear lung bases.    LIVER: Within normal limits.  BILE DUCTS: Normal caliber.  GALLBLADDER: Cholecystectomy.  SPLEEN: Within normal limits.  PANCREAS: Within normal limits.  ADRENALS: Within normal limits.  KIDNEYS/URETERS: Within normal limits.    BLADDER: Within normal limits.  REPRODUCTIVE ORGANS: Uterus and adnexa within normal limits.    BOWEL: Mildly distended small bowel without transition point to suggest obstruction. No wall thickening or inflammatory change.  Appendix normal.  PERITONEUM: No ascites or pneumoperitoneum..  VESSELS: Severe diffuse atherosclerotic arterial calcification. Normal caliber aorta.  RETROPERITONEUM/LYMPH NODES: No lymphadenopathy.  ABDOMINAL WALL: Within normal limits.  BONES: L4-L5 interpedicular screws and connecting rods. Moderate anterolisthesis of L4 on L5. No acute bony abnormality.    IMPRESSION:  Mildly distended small bowel without evidence of obstruction.    < end of copied text >    Advanced directives addressed: full resuscitation

## 2020-09-03 LAB
24R-OH-CALCIDIOL SERPL-MCNC: 27.8 NG/ML — LOW (ref 30–80)
ANION GAP SERPL CALC-SCNC: 9 MMOL/L — SIGNIFICANT CHANGE UP (ref 5–17)
BUN SERPL-MCNC: 110 MG/DL — HIGH (ref 7–23)
CA-I BLD-SCNC: 0.76 MMOL/L — LOW (ref 1.12–1.3)
CALCIUM SERPL-MCNC: 5.4 MG/DL — CRITICAL LOW (ref 8.4–10.5)
CALCIUM SERPL-MCNC: 5.9 MG/DL — CRITICAL LOW (ref 8.5–10.1)
CHLORIDE SERPL-SCNC: 99 MMOL/L — SIGNIFICANT CHANGE UP (ref 96–108)
CO2 SERPL-SCNC: 27 MMOL/L — SIGNIFICANT CHANGE UP (ref 22–31)
CREAT SERPL-MCNC: 5.35 MG/DL — HIGH (ref 0.5–1.3)
GLUCOSE SERPL-MCNC: 404 MG/DL — HIGH (ref 70–99)
HCT VFR BLD CALC: 30.6 % — LOW (ref 34.5–45)
HGB BLD-MCNC: 9.6 G/DL — LOW (ref 11.5–15.5)
MAGNESIUM SERPL-MCNC: 1.8 MG/DL — SIGNIFICANT CHANGE UP (ref 1.6–2.6)
MCHC RBC-ENTMCNC: 28.3 PG — SIGNIFICANT CHANGE UP (ref 27–34)
MCHC RBC-ENTMCNC: 31.4 GM/DL — LOW (ref 32–36)
MCV RBC AUTO: 90.3 FL — SIGNIFICANT CHANGE UP (ref 80–100)
PLATELET # BLD AUTO: 129 K/UL — LOW (ref 150–400)
POTASSIUM SERPL-MCNC: 3.4 MMOL/L — LOW (ref 3.5–5.3)
POTASSIUM SERPL-SCNC: 3.4 MMOL/L — LOW (ref 3.5–5.3)
PTH-INTACT FLD-MCNC: 87 PG/ML — HIGH (ref 15–65)
RBC # BLD: 3.39 M/UL — LOW (ref 3.8–5.2)
RBC # FLD: 16.8 % — HIGH (ref 10.3–14.5)
SODIUM SERPL-SCNC: 135 MMOL/L — SIGNIFICANT CHANGE UP (ref 135–145)
WBC # BLD: 20.63 K/UL — HIGH (ref 3.8–10.5)
WBC # FLD AUTO: 20.63 K/UL — HIGH (ref 3.8–10.5)

## 2020-09-03 PROCEDURE — 99233 SBSQ HOSP IP/OBS HIGH 50: CPT

## 2020-09-03 RX ORDER — CALCIUM GLUCONATE 100 MG/ML
2 VIAL (ML) INTRAVENOUS ONCE
Refills: 0 | Status: COMPLETED | OUTPATIENT
Start: 2020-09-03 | End: 2020-09-03

## 2020-09-03 RX ORDER — POTASSIUM CHLORIDE 20 MEQ
20 PACKET (EA) ORAL ONCE
Refills: 0 | Status: COMPLETED | OUTPATIENT
Start: 2020-09-03 | End: 2020-09-03

## 2020-09-03 RX ADMIN — Medication 5 UNIT(S): at 12:49

## 2020-09-03 RX ADMIN — HEPARIN SODIUM 5000 UNIT(S): 5000 INJECTION INTRAVENOUS; SUBCUTANEOUS at 00:04

## 2020-09-03 RX ADMIN — Medication 15 MILLILITER(S): at 10:58

## 2020-09-03 RX ADMIN — CLOPIDOGREL BISULFATE 75 MILLIGRAM(S): 75 TABLET, FILM COATED ORAL at 10:59

## 2020-09-03 RX ADMIN — HEPARIN SODIUM 5000 UNIT(S): 5000 INJECTION INTRAVENOUS; SUBCUTANEOUS at 21:52

## 2020-09-03 RX ADMIN — HEPARIN SODIUM 5000 UNIT(S): 5000 INJECTION INTRAVENOUS; SUBCUTANEOUS at 06:10

## 2020-09-03 RX ADMIN — Medication 12: at 06:13

## 2020-09-03 RX ADMIN — Medication 100 GRAM(S): at 11:28

## 2020-09-03 RX ADMIN — Medication 25 MILLIGRAM(S): at 06:10

## 2020-09-03 RX ADMIN — Medication 20 MILLIEQUIVALENT(S): at 11:28

## 2020-09-03 RX ADMIN — Medication 81 MILLIGRAM(S): at 10:58

## 2020-09-03 RX ADMIN — ATORVASTATIN CALCIUM 80 MILLIGRAM(S): 80 TABLET, FILM COATED ORAL at 21:52

## 2020-09-03 RX ADMIN — PIPERACILLIN AND TAZOBACTAM 25 GRAM(S): 4; .5 INJECTION, POWDER, LYOPHILIZED, FOR SOLUTION INTRAVENOUS at 21:52

## 2020-09-03 RX ADMIN — Medication 8: at 12:49

## 2020-09-03 RX ADMIN — RANOLAZINE 1000 MILLIGRAM(S): 500 TABLET, FILM COATED, EXTENDED RELEASE ORAL at 10:58

## 2020-09-03 RX ADMIN — HEPARIN SODIUM 5000 UNIT(S): 5000 INJECTION INTRAVENOUS; SUBCUTANEOUS at 14:27

## 2020-09-03 RX ADMIN — Medication 6: at 16:55

## 2020-09-03 RX ADMIN — INSULIN GLARGINE 25 UNIT(S): 100 INJECTION, SOLUTION SUBCUTANEOUS at 00:14

## 2020-09-03 RX ADMIN — Medication 5 UNIT(S): at 16:53

## 2020-09-03 RX ADMIN — Medication 12: at 00:04

## 2020-09-03 RX ADMIN — PIPERACILLIN AND TAZOBACTAM 25 GRAM(S): 4; .5 INJECTION, POWDER, LYOPHILIZED, FOR SOLUTION INTRAVENOUS at 10:58

## 2020-09-03 RX ADMIN — Medication 112 MICROGRAM(S): at 06:10

## 2020-09-03 NOTE — PROGRESS NOTE ADULT - GASTROINTESTINAL DETAILS
soft/nontender/no guarding/no rebound tenderness
soft/nontender
nontender/soft
soft
no distention/soft/nontender
nontender/soft

## 2020-09-03 NOTE — PROGRESS NOTE ADULT - ASSESSMENT
59 CAD, PAD s/p fem-pop bypass s/p RT iliac stent, diabetes, hypothyroidism, hyperlipidemia, current active smoker, uses cocaine last use yesterday, chronic back pain s/p lumbar surgery presented with multiple non-specific complaints found to have ST elevations on presenting EKG, code STEMI called and pt underwent urgent angiogram showing occluded RCA w no intervention with UTI sepsis and E. coli bacteremia POA w hospital course c/b code blue.     AMISH w septic shock/UTI w STEMI and Code blue event w hypotension   Cr plateaued and making some urine  acid base and K stable  BUN stable  no acute indication for HD today - renal status tenous with recurrent AMISH   judicious use of lasix as Cr labile  - if using lasix may give IV albumin prior to      Hypocalcemia    corrected Ca ok but with low ionized Ca - agree with PRN IV calcium gluconate    Calcum acetate phoslo with feeds for elevated phos/low ca++ when starting to eat    increase this   await  PTH and Vit D    mag level ok     Ischemic Bowel w Shock w Leokocytosis  - improving   - Surgery/ICU  - Supportive care and plan per above teams  - if taking po then add supplements for low protein state

## 2020-09-03 NOTE — PROGRESS NOTE ADULT - SUBJECTIVE AND OBJECTIVE BOX
Patient is a 59y Female who had no new events, making some urine   on puree diet      MEDICATIONS  (STANDING):  aspirin  chewable 81 milliGRAM(s) Oral daily  atorvastatin 80 milliGRAM(s) Oral at bedtime  clopidogrel Tablet 75 milliGRAM(s) Oral daily  dextrose 5%. 1000 milliLiter(s) (50 mL/Hr) IV Continuous <Continuous>  dextrose 50% Injectable 12.5 Gram(s) IV Push once  dextrose 50% Injectable 25 Gram(s) IV Push once  dextrose 50% Injectable 25 Gram(s) IV Push once  heparin   Injectable 5000 Unit(s) SubCutaneous every 8 hours  insulin glargine Injectable (LANTUS) 25 Unit(s) SubCutaneous at bedtime  insulin lispro (HumaLOG) corrective regimen sliding scale   SubCutaneous every 6 hours  insulin lispro Injectable (HumaLOG) 5 Unit(s) SubCutaneous three times a day before meals  levothyroxine 112 MICROGram(s) Oral daily  metoprolol tartrate 25 milliGRAM(s) Oral daily  multivitamin/minerals/iron Oral Solution (CENTRUM) 15 milliLiter(s) Enteral Tube daily  piperacillin/tazobactam IVPB.. 3.375 Gram(s) IV Intermittent every 12 hours  ranolazine 1000 milliGRAM(s) Oral two times a day    MEDICATIONS  (PRN):  acetaminophen   Tablet .. 650 milliGRAM(s) Oral every 4 hours PRN Mild Pain (1 - 3)  ALBUTerol    90 MICROgram(s) HFA Inhaler 2 Puff(s) Inhalation every 6 hours PRN Shortness of Breath and/or Wheezing  aluminum hydroxide/magnesium hydroxide/simethicone Suspension 30 milliLiter(s) Oral every 4 hours PRN Dyspepsia  dextrose 40% Gel 15 Gram(s) Oral once PRN Blood Glucose LESS THAN 70 milliGRAM(s)/deciliter  glucagon  Injectable 1 milliGRAM(s) IntraMuscular once PRN Glucose LESS THAN 70 milligrams/deciliter  ondansetron Injectable 4 milliGRAM(s) IV Push every 6 hours PRN Nausea    Vital Signs Last 24 Hrs  T(C): 36 (03 Sep 2020 08:40), Max: 36.9 (02 Sep 2020 21:07)  T(F): 96.8 (03 Sep 2020 08:40), Max: 98.4 (02 Sep 2020 21:07)  HR: 77 (03 Sep 2020 10:00) (77 - 94)  BP: 107/45 (03 Sep 2020 10:00) (96/64 - 152/64)  BP(mean): 62 (03 Sep 2020 10:00) (53 - 87)  RR: 11 (03 Sep 2020 10:00) (0 - 18)  SpO2: 97% (03 Sep 2020 10:00) (88% - 100%)    I&O's Detail    02 Sep 2020 07:01  -  03 Sep 2020 07:00  --------------------------------------------------------  IN:    Nepro with Carb Steady: 200 mL    Oral Fluid: 650 mL  Total IN: 850 mL    OUT:    Incontinent per Collection Ba mL    Indwelling Catheter - Urethral: 350 mL    Rectal Tube: 300 mL  Total OUT: 1000 mL    Total NET: -150 mL        PHYSICAL EXAM:    Constitutional: frail  HEENT: dry MM  Neck: No LAD, No JVD  Respiratory: dist BS  Cardiovascular: S1 and S2  Abd: soft, Nontender,less distended   Extremities:  peripheral edema ++  Neurological: Arousable   : No Black  Skin: No rashes  Access: Not applicable        LABS:               135    |  99     |  110    ----------------------------<  404       03 Sep 2020 07:07  3.4     |  27     |  5.35     136    |  98     |  116    ----------------------------<  295       02 Sep 2020 06:59  3.1     |  26     |  5.65     137    |  97     |  107    ----------------------------<  186       01 Sep 2020 06:50  3.5     |  26     |  5.90     Ca    5.9        03 Sep 2020 07:07    Albumin, Serum: 1.1 g/dL (20 @ 06:32)    Phosphorus Level, Serum: 8.4 mg/dL (20 @ 06:22)      Phos  8.4       31 Aug 2020 06:22    Mg     1.8       03 Sep 2020 07:07        Urine Studies:  Urine Microscopic-Add On (NC) (20 @ 22:55)    Red Blood Cell - Urine: 0-2 /HPF    White Blood Cell - Urine: Negative    Bacteria: Occasional    Epithelial Cells: Negative    Urinalysis (20 @ 22:55)    Glucose Qualitative, Urine: Negative mg/dL    Blood, Urine: Large    pH Urine: 6.5    Color: Yellow    Urine Appearance: Clear    Bilirubin: Negative    Ketone - Urine: Trace    Specific Gravity: 1.010    Protein, Urine: 100 mg/dL    Urobilinogen: Negative mg/dL    Nitrite: Negative    Leukocyte Esterase Concentration: Small      RADIOLOGY & ADDITIONAL STUDIES:

## 2020-09-03 NOTE — PROGRESS NOTE ADULT - SUBJECTIVE AND OBJECTIVE BOX
Date of service: 20 @ 11:39    pt seen and examined  less abdominal pain  feels better, MS improving  afebrile     ROS: no fever or chills; denies dizziness, no HA, no SOB or cough, no constipation; no dysuria, no urinary frequency, no legs pain, no rashes    MEDICATIONS  (STANDING):  aspirin  chewable 81 milliGRAM(s) Oral daily  atorvastatin 80 milliGRAM(s) Oral at bedtime  clopidogrel Tablet 75 milliGRAM(s) Oral daily  dextrose 5%. 1000 milliLiter(s) (50 mL/Hr) IV Continuous <Continuous>  dextrose 50% Injectable 12.5 Gram(s) IV Push once  dextrose 50% Injectable 25 Gram(s) IV Push once  dextrose 50% Injectable 25 Gram(s) IV Push once  heparin   Injectable 5000 Unit(s) SubCutaneous every 8 hours  insulin glargine Injectable (LANTUS) 25 Unit(s) SubCutaneous at bedtime  insulin lispro (HumaLOG) corrective regimen sliding scale   SubCutaneous every 6 hours  insulin lispro Injectable (HumaLOG) 5 Unit(s) SubCutaneous three times a day before meals  levothyroxine 112 MICROGram(s) Oral daily  metoprolol tartrate 25 milliGRAM(s) Oral daily  multivitamin/minerals/iron Oral Solution (CENTRUM) 15 milliLiter(s) Enteral Tube daily  piperacillin/tazobactam IVPB.. 3.375 Gram(s) IV Intermittent every 12 hours  ranolazine 1000 milliGRAM(s) Oral two times a day    Vital Signs Last 24 Hrs  T(C): 36 (03 Sep 2020 08:40), Max: 36.9 (02 Sep 2020 21:07)  T(F): 96.8 (03 Sep 2020 08:40), Max: 98.4 (02 Sep 2020 21:07)  HR: 77 (03 Sep 2020 10:00) (77 - 94)  BP: 107/45 (03 Sep 2020 10:00) (96/64 - 152/64)  BP(mean): 62 (03 Sep 2020 10:00) (53 - 87)  RR: 11 (03 Sep 2020 10:00) (0 - 18)  SpO2: 97% (03 Sep 2020 10:00) (88% - 100%)      PE:  Constitutional: frail looking  HEENT: NC/AT, EOMI, PERRLA, conjunctivae clear; ears and nose atraumatic; pharynx benign  Neck: supple; thyroid not palpable  Back: no tenderness  Respiratory: respiratory effort normal; clear to auscultation  Cardiovascular: S1S2 regular, no murmurs  Abdomen: soft,  tender, distended, positive BS; liver and spleen WNL  Genitourinary: no suprapubic tenderness  Lymphatic: no LN palpable  Musculoskeletal: no muscle tenderness, no joint swelling or tenderness  Extremities: no pedal edema  Neurological/ Psychiatric:  moving all extremities  Skin: no rashes; no palpable lesions    Labs: all available labs reviewed                                   9.6    20.63 )-----------( 129      ( 03 Sep 2020 07:07 )             30.6     09-03    135  |  99  |  110<H>  ----------------------------<  404<H>  3.4<L>   |  27  |  5.35<H>    Ca    5.9<LL>      03 Sep 2020 07:07  Mg     1.8     09-03      Urinalysis Basic - ( 23 Aug 2020 10:25 )    Color: Yellow / Appearance: Clear / S.010 / pH: x  Gluc: x / Ketone: Trace  / Bili: Small / Urobili: 1 mg/dL   Blood: x / Protein: 30 mg/dL / Nitrite: Negative   Leuk Esterase: Moderate / RBC: 25-50 /HPF / WBC >50   Sq Epi: x / Non Sq Epi: Few / Bacteria: Many    Culture - Urine (20 @ 10:25)    Specimen Source: .Urine Clean Catch (Midstream)    Culture Results:   >100,000 CFU/ml Escherichia coli    Culture - Blood (20 @ 08:34)    Gram Stain:   Growth in aerobic bottle: Gram Negative Rods  Growth in anaerobic bottle: Gram Negative Rods    Specimen Source: .Blood None    Culture Results:   Growth in aerobic and anaerobic bottles: Escherichia coli  See previous culture [id number]    Culture - Blood (20 @ 08:34)    -  Escherichia coli: Detec    Gram Stain:   Growth in aerobic bottle: Gram Negative Rods  Growth in anaerobic bottle: Gram Negative Rods    Specimen Source: .Blood None    Organism: Blood Culture PCR    Culture Results:   Growth in aerobic bottle: Gram Negative Rods  Growth in anaerobic bottle: Gram Negative Rods      Radiology: all available radiological tests reviewed    < from: CT Abdomen and Pelvis No Cont (20 @ 01:24) >  EXAM:  CT ABDOMEN AND PELVIS                            PROCEDURE DATE:  2020          INTERPRETATION:  CLINICAL INFORMATION: Status post catheterization today. Evaluate for retroperitoneal bleed.    COMPARISON: 2020    PROCEDURE:  CT ofthe Abdomen and Pelvis was performed without intravenous contrast.  Intravenous contrast: None.  Oral contrast: None.  Sagittal and coronal reformats were performed.    FINDINGS:  Evaluation of solid organs is limited without intravenous contrast.  Evaluation of the lower pelvis is limited as images do not extend beyond the proximal inguinal region.      LOWER CHEST: Small bilateral pleural effusions and associated subsegmental bibasilar atelectasis. Scattered calcified granulomas. Aortic and coronary artery atherosclerotic calcifications.    LIVER: Within normal limits.  BILE DUCTS: Stable mild CBD dilatation likely related to cholecystectomy.  GALLBLADDER: Cholecystectomy.  SPLEEN: Within normal limits.  PANCREAS: Within normal limits.  ADRENALS: Within normal limits.  KIDNEYS/URETERS: No hydronephrosis or obstructing stone.    BLADDER: Within normal limits.  REPRODUCTIVE ORGANS: Uterus and adnexa within normal limits.    BOWEL: Partially imaged rectal tube. Distal rectum/anus not included in the field-of-view. Wall thickening of the sigmoid colon. No bowel obstruction. Appendix is normal.  PERITONEUM: Small ascites. Presacral edema.  VESSELS: Atherosclerotic changes.  RETROPERITONEUM/LYMPH NODES: No lymphadenopathy. No definite retroperitoneal hematoma identified.  ABDOMINAL WALL: Anasarca. Inguinal regions not fully included in the field-of-view. Mild bilateral stranding in the visualized inguinal regions.  BONES: Degenerative changes of the spine. Grade 1 anterolisthesis at L4-L5. Posterior fixation hardware at L4-L5.    IMPRESSION:    1. No definite retroperitoneal hematoma identified.    2. Incomplete evaluation for inguinal region/thigh hematoma due to incomplete field-of-view.    3. Small bilateral pleural effusions. Small ascites. Anasarca.      4. Wall thickening of the sigmoid colon. Recommend clinical correlation for colitis.    < end of copied text >    < from: CT Abdomen and Pelvis w/ Oral Cont (20 @ 16:41) >  EXAM:  CT ABDOMEN AND PELVIS OC                            PROCEDURE DATE:  2020          INTERPRETATION:  CLINICAL INFORMATION: Diffuse abdominal pain    COMPARISON: None.    PROCEDURE:  CT of the Abdomen and Pelvis was performed without intravenous contrast.  Intravenous contrast: None.  Oral contrast: positive contrast was administered.  Sagittal and coronal reformats were performed.    FINDINGS:  LOWER CHEST: Coronary artery calcifications. Clear lung bases.    LIVER: Within normal limits.  BILE DUCTS: Normal caliber.  GALLBLADDER: Cholecystectomy.  SPLEEN: Within normal limits.  PANCREAS: Within normal limits.  ADRENALS: Within normal limits.  KIDNEYS/URETERS: Within normal limits.    BLADDER: Within normal limits.  REPRODUCTIVE ORGANS: Uterus and adnexa within normal limits.    BOWEL: Mildly distended small bowel without transition point to suggest obstruction. No wall thickening or inflammatory change.  Appendix normal.  PERITONEUM: No ascites or pneumoperitoneum..  VESSELS: Severe diffuse atherosclerotic arterial calcification. Normal caliber aorta.  RETROPERITONEUM/LYMPH NODES: No lymphadenopathy.  ABDOMINAL WALL: Within normal limits.  BONES: L4-L5 interpedicular screws and connecting rods. Moderate anterolisthesis of L4 on L5. No acute bony abnormality.    IMPRESSION:  Mildly distended small bowel without evidence of obstruction.    < end of copied text >    Advanced directives addressed: full resuscitation

## 2020-09-03 NOTE — PROGRESS NOTE ADULT - SUBJECTIVE AND OBJECTIVE BOX
Events Overnight:  Patient had nose bleed yesterday, improved, extremely weak, creatinine 5 plateauing    HPI:    58 y/o female with CAD, PAD s/p fem-pop bypass s/p RT iliac stent, diabetes, hypothyroidism, hyperlipidemia, current active smoker, admitted with UTI sepsis and E. coli bacteremia POA.  ECG revealed STEMI prompting code STEMI--pt underwent urgent angiogram showing occluded RCA--no intervention.   Patient developed septic shock, and required pressors, developed acute renal failure    blood cultures were positive for e.coli  likely urinary source  now off pressors, bp stable  developed AMISH with creatinine slightly lower today hopefully plateauing  abdominal ct no acute pathology  remains on zosyn wbc is slowly improving  leukocytosis slowly improving 23, c. dif was negative     ROS cant obtain     MEDICATIONS  (STANDING):  aspirin  chewable 81 milliGRAM(s) Oral daily  atorvastatin 80 milliGRAM(s) Oral at bedtime  calcium gluconate IVPB 2 Gram(s) IV Intermittent once  clopidogrel Tablet 75 milliGRAM(s) Oral daily  dextrose 5%. 1000 milliLiter(s) (50 mL/Hr) IV Continuous <Continuous>  dextrose 50% Injectable 12.5 Gram(s) IV Push once  dextrose 50% Injectable 25 Gram(s) IV Push once  dextrose 50% Injectable 25 Gram(s) IV Push once  heparin   Injectable 5000 Unit(s) SubCutaneous every 8 hours  insulin glargine Injectable (LANTUS) 25 Unit(s) SubCutaneous at bedtime  insulin lispro (HumaLOG) corrective regimen sliding scale   SubCutaneous every 6 hours  insulin lispro Injectable (HumaLOG) 5 Unit(s) SubCutaneous three times a day before meals  levothyroxine 112 MICROGram(s) Oral daily  metoprolol tartrate 25 milliGRAM(s) Oral daily  multivitamin/minerals/iron Oral Solution (CENTRUM) 15 milliLiter(s) Enteral Tube daily  piperacillin/tazobactam IVPB.. 3.375 Gram(s) IV Intermittent every 12 hours  potassium chloride    Tablet ER 20 milliEquivalent(s) Oral once  ranolazine 1000 milliGRAM(s) Oral two times a day    MEDICATIONS  (PRN):  acetaminophen   Tablet .. 650 milliGRAM(s) Oral every 4 hours PRN Mild Pain (1 - 3)  ALBUTerol    90 MICROgram(s) HFA Inhaler 2 Puff(s) Inhalation every 6 hours PRN Shortness of Breath and/or Wheezing  aluminum hydroxide/magnesium hydroxide/simethicone Suspension 30 milliLiter(s) Oral every 4 hours PRN Dyspepsia  dextrose 40% Gel 15 Gram(s) Oral once PRN Blood Glucose LESS THAN 70 milliGRAM(s)/deciliter  glucagon  Injectable 1 milliGRAM(s) IntraMuscular once PRN Glucose LESS THAN 70 milligrams/deciliter  ondansetron Injectable 4 milliGRAM(s) IV Push every 6 hours PRN Nausea    ICU Vital Signs Last 24 Hrs  T(C): 36 (03 Sep 2020 08:40), Max: 36.9 (02 Sep 2020 21:07)  T(F): 96.8 (03 Sep 2020 08:40), Max: 98.4 (02 Sep 2020 21:07)  HR: 77 (03 Sep 2020 10:00) (77 - 94)  BP: 107/45 (03 Sep 2020 10:00) (96/64 - 152/64)  BP(mean): 62 (03 Sep 2020 10:00) (53 - 87)  ABP: --  ABP(mean): --  RR: 11 (03 Sep 2020 10:00) (0 - 18)  SpO2: 97% (03 Sep 2020 10:00) (88% - 100%)    I&O's Summary    02 Sep 2020 07:01  -  03 Sep 2020 07:00  --------------------------------------------------------  IN: 850 mL / OUT: 1000 mL / NET: -150 mL                        9.6    20.63 )-----------( 129      ( 03 Sep 2020 07:07 )             30.6     09-03    135  |  99  |  110<H>  ----------------------------<  404<H>  3.4<L>   |  27  |  5.35<H>    Ca    5.9<LL>      03 Sep 2020 07:07  Mg     1.8     09-03      DVT Prophylaxis:    Heparin subq                                                            Advanced Directives: Full Code

## 2020-09-03 NOTE — PROGRESS NOTE ADULT - ASSESSMENT
60yo/F with PMH CAD, PAD s/p fem-pop bypass s/p RT iliac stent, diabetes, hypothyroidism, hyperlipidemia, current active smoker, uses cocaine last use 8/22  chronic back pain s/p lumbar surgery presented with multiple non-specific complaints, including fevers on/off last 4-5 days, weakness, no appetite and reduced oral intake, vague diffuse abd pains, one day of diarrhea. No cough, no SOB. She came in 8/23 for evaluation of weakness and found to have ST elevations on presenting EKG, code STEMI called and pt underwent urgent angiogram showing occluded RCA. She denies chest pain at present Also noted with positive UA, blood cx with Ecoli, CT abd/pelvis unremarkable, was started on IV rocephin.     1. sepsis. ischemic colitis. resolving leukocytosis. ecoli UTI/bacteremia s/p abx course. AMISH  - slowly improving, wbc ct down to 20   - urine cx/blood cx growing Ecoli; sensitivities reviewed   - on IV zosyn [id number] #6  - continue with IV abx coverage   - C diff pcr (-)   - surgery evaluation noted, repeat CT abd/pelvis reviewed   - s/p rocephin 2gm daily #6  - s/p IV flagyl [id number] #4  - monitor temps  - tolerating abx well so far; no side effects noted  - reason for abx use and side effects reviewed with patient  - supportive care  - fu cbc    2. other issues - care per CCU

## 2020-09-03 NOTE — PROGRESS NOTE ADULT - ASSESSMENT
58 y/o female with CAD, PAD s/p fem-pop bypass s/p RT iliac stent, diabetes, admitted with UTI sepsis and E. coli bacteremia POA.  ECG revealed STEMI prompting code STEMI--pt underwent urgent angiogram showing occluded RCA--no intervention.   Acute resp failure  now extubated on room air  Septic shock resolved-now off midodrine and steroids  AMISH-- -likely ATN from sepsis. arrest  BUN, creatinine plateauing edematous, hopeflully renal function will improve  now tolerating po  Toxic Metabolic Encephalopathy from sepsis improving   on zosyn for e. coli bacteremia   wbc decreasing slowly    cont with ASA and clopidogrel resume bblocker

## 2020-09-04 LAB
ALBUMIN SERPL ELPH-MCNC: 1.2 G/DL — LOW (ref 3.3–5)
ANION GAP SERPL CALC-SCNC: 9 MMOL/L — SIGNIFICANT CHANGE UP (ref 5–17)
BUN SERPL-MCNC: 110 MG/DL — HIGH (ref 7–23)
CA-I BLD-SCNC: 0.85 MMOL/L — LOW (ref 1.12–1.3)
CALCIUM SERPL-MCNC: 6.4 MG/DL — CRITICAL LOW (ref 8.5–10.1)
CHLORIDE SERPL-SCNC: 99 MMOL/L — SIGNIFICANT CHANGE UP (ref 96–108)
CO2 SERPL-SCNC: 30 MMOL/L — SIGNIFICANT CHANGE UP (ref 22–31)
CREAT SERPL-MCNC: 5.25 MG/DL — HIGH (ref 0.5–1.3)
GLUCOSE SERPL-MCNC: 214 MG/DL — HIGH (ref 70–99)
HCT VFR BLD CALC: 30.1 % — LOW (ref 34.5–45)
HGB BLD-MCNC: 9.5 G/DL — LOW (ref 11.5–15.5)
MCHC RBC-ENTMCNC: 28.5 PG — SIGNIFICANT CHANGE UP (ref 27–34)
MCHC RBC-ENTMCNC: 31.6 GM/DL — LOW (ref 32–36)
MCV RBC AUTO: 90.4 FL — SIGNIFICANT CHANGE UP (ref 80–100)
PHOSPHATE SERPL-MCNC: 5.4 MG/DL — HIGH (ref 2.5–4.5)
PLATELET # BLD AUTO: 131 K/UL — LOW (ref 150–400)
POTASSIUM SERPL-MCNC: 3.6 MMOL/L — SIGNIFICANT CHANGE UP (ref 3.5–5.3)
POTASSIUM SERPL-SCNC: 3.6 MMOL/L — SIGNIFICANT CHANGE UP (ref 3.5–5.3)
RBC # BLD: 3.33 M/UL — LOW (ref 3.8–5.2)
RBC # FLD: 16.8 % — HIGH (ref 10.3–14.5)
SODIUM SERPL-SCNC: 138 MMOL/L — SIGNIFICANT CHANGE UP (ref 135–145)
WBC # BLD: 17.13 K/UL — HIGH (ref 3.8–10.5)
WBC # FLD AUTO: 17.13 K/UL — HIGH (ref 3.8–10.5)

## 2020-09-04 PROCEDURE — 99233 SBSQ HOSP IP/OBS HIGH 50: CPT

## 2020-09-04 RX ORDER — CALCIUM ACETATE 667 MG
667 TABLET ORAL
Refills: 0 | Status: DISCONTINUED | OUTPATIENT
Start: 2020-09-04 | End: 2020-09-12

## 2020-09-04 RX ORDER — CALCITRIOL 0.5 UG/1
0.25 CAPSULE ORAL DAILY
Refills: 0 | Status: DISCONTINUED | OUTPATIENT
Start: 2020-09-05 | End: 2020-09-21

## 2020-09-04 RX ORDER — CALCIUM GLUCONATE 100 MG/ML
2 VIAL (ML) INTRAVENOUS ONCE
Refills: 0 | Status: COMPLETED | OUTPATIENT
Start: 2020-09-04 | End: 2020-09-04

## 2020-09-04 RX ADMIN — CLOPIDOGREL BISULFATE 75 MILLIGRAM(S): 75 TABLET, FILM COATED ORAL at 09:24

## 2020-09-04 RX ADMIN — INSULIN GLARGINE 25 UNIT(S): 100 INJECTION, SOLUTION SUBCUTANEOUS at 00:17

## 2020-09-04 RX ADMIN — Medication 667 MILLIGRAM(S): at 17:09

## 2020-09-04 RX ADMIN — Medication 6: at 00:18

## 2020-09-04 RX ADMIN — Medication 81 MILLIGRAM(S): at 09:24

## 2020-09-04 RX ADMIN — Medication 25 MILLIGRAM(S): at 06:26

## 2020-09-04 RX ADMIN — HEPARIN SODIUM 5000 UNIT(S): 5000 INJECTION INTRAVENOUS; SUBCUTANEOUS at 06:26

## 2020-09-04 RX ADMIN — HEPARIN SODIUM 5000 UNIT(S): 5000 INJECTION INTRAVENOUS; SUBCUTANEOUS at 13:49

## 2020-09-04 RX ADMIN — RANOLAZINE 1000 MILLIGRAM(S): 500 TABLET, FILM COATED, EXTENDED RELEASE ORAL at 09:24

## 2020-09-04 RX ADMIN — Medication 5 UNIT(S): at 17:07

## 2020-09-04 RX ADMIN — Medication 5 UNIT(S): at 06:34

## 2020-09-04 RX ADMIN — ATORVASTATIN CALCIUM 80 MILLIGRAM(S): 80 TABLET, FILM COATED ORAL at 22:01

## 2020-09-04 RX ADMIN — INSULIN GLARGINE 25 UNIT(S): 100 INJECTION, SOLUTION SUBCUTANEOUS at 22:19

## 2020-09-04 RX ADMIN — PIPERACILLIN AND TAZOBACTAM 25 GRAM(S): 4; .5 INJECTION, POWDER, LYOPHILIZED, FOR SOLUTION INTRAVENOUS at 09:24

## 2020-09-04 RX ADMIN — Medication 15 MILLILITER(S): at 09:24

## 2020-09-04 RX ADMIN — Medication 2: at 11:02

## 2020-09-04 RX ADMIN — Medication 100 GRAM(S): at 12:00

## 2020-09-04 RX ADMIN — Medication 2: at 17:07

## 2020-09-04 RX ADMIN — Medication 5 UNIT(S): at 10:52

## 2020-09-04 RX ADMIN — Medication 112 MICROGRAM(S): at 06:26

## 2020-09-04 RX ADMIN — Medication 667 MILLIGRAM(S): at 11:33

## 2020-09-04 RX ADMIN — HEPARIN SODIUM 5000 UNIT(S): 5000 INJECTION INTRAVENOUS; SUBCUTANEOUS at 22:01

## 2020-09-04 RX ADMIN — PIPERACILLIN AND TAZOBACTAM 25 GRAM(S): 4; .5 INJECTION, POWDER, LYOPHILIZED, FOR SOLUTION INTRAVENOUS at 22:01

## 2020-09-04 RX ADMIN — Medication 6: at 06:33

## 2020-09-04 RX ADMIN — RANOLAZINE 1000 MILLIGRAM(S): 500 TABLET, FILM COATED, EXTENDED RELEASE ORAL at 22:01

## 2020-09-04 NOTE — PHYSICAL THERAPY INITIAL EVALUATION ADULT - PERTINENT HX OF CURRENT PROBLEM, REHAB EVAL
Pt presented on 8/23 with multiple non-specific complaints, including fevers on/off last 4-5 days, weakness, no appetite and reduced oral intake, vague diffuse abd pains, found to have ST elevations on presenting EKG, code STEMI called and pt underwent urgent angiogram showing occluded RCA.,S/p card cath- occluded RCA, S/p extubation, Sepsis Pt presented on 8/23 with multiple non-specific complaints, including fevers on/off last 4-5 days, weakness, no appetite and reduced oral intake, vague diffuse abd pains, found to have ST elevations on presenting EKG, code STEMI called and pt underwent urgent angiogram showing occluded RCA., occluded RCA, S/p extubation, Sepsis

## 2020-09-04 NOTE — PHYSICAL THERAPY INITIAL EVALUATION ADULT - IMPAIRMENTS FOUND, PT EVAL
cognitive impairment/muscle strength/aerobic capacity/endurance/gross motor/gait, locomotion, and balance

## 2020-09-04 NOTE — PROGRESS NOTE ADULT - ASSESSMENT
60 y/o female with CAD, PAD s/p fem-pop bypass s/p RT iliac stent, diabetes, admitted with UTI sepsis and E. coli bacteremia POA.  ECG revealed STEMI prompting code STEMI--pt underwent urgent angiogram showing occluded RCA--no intervention.   Acute resp failure  now extubated on room air  Septic shock resolved- bp stable, on zosyn for E. coli bacteremia  AMISH-- -likely ATN from sepsis. arrest  BUN, creatinine plateauing edematous, hopeflully renal function will improve  now tolerating po  Toxic Metabolic Encephalopathy from sepsis improving   cont with ASA and clopidogrel resume bblocker 60 y/o female with CAD, PAD s/p fem-pop bypass s/p RT iliac stent, diabetes, admitted with UTI sepsis and E. coli bacteremia POA.  ECG revealed STEMI prompting code STEMI--pt underwent urgent angiogram showing occluded RCA--no intervention.   Acute resp failure  now extubated on room air  Septic shock resolved- bp stable, on zosyn for E. coli bacteremia  AMISH-- -likely ATN from sepsis. arrest  BUN, creatinine plateauing edematous, hopeflully renal function will improve  now tolerating po  Toxic Metabolic Encephalopathy from sepsis improving   cont with ASA and clopidogrel resume bblocker  on sliding scale, and insulin for hyperglycemia which has improved  hypocalcemia, check ionized

## 2020-09-04 NOTE — PHYSICAL THERAPY INITIAL EVALUATION ADULT - GENERAL OBSERVATIONS, REHAB EVAL
Pt was found sitting up in charles chair in CCU with legs up, Pt appears lethargic but arousable, pt is willing to participate in PT/therex

## 2020-09-04 NOTE — PROGRESS NOTE ADULT - ASSESSMENT
59 CAD, PAD s/p fem-pop bypass s/p RT iliac stent, diabetes, hypothyroidism, hyperlipidemia, current active smoker, uses cocaine last use yesterday, chronic back pain s/p lumbar surgery presented with multiple non-specific complaints found to have ST elevations on presenting EKG, code STEMI called and pt underwent urgent angiogram showing occluded RCA w no intervention with UTI sepsis and E. coli bacteremia POA w hospital course c/b code blue.     AMISH w septic shock/UTI w STEMI and Code blue event w hypotension   Cr slowly trending down  and improved UOP   acid base and K stable  BUN stable  no acute indication for HD today - renal status tenous with recurrent AMISH   judicious use of lasix as Cr labile  - if using lasix may give IV albumin prior to      Hypocalcemia    corrected Ca ok but with low ionized Ca - agree with PRN IV calcium gluconate    Calcum acetate phoslo with feeds for elevated phos/low ca++ when starting to eat    increase this  Intact PTH: 87:   Vitamin D, 25-Hydroxy: 27.8  Magnesium, Serum: 1.8 mg/dL  add Vit D analogue     Ischemic Bowel w Shock w Leokocytosis  - improving   - Surgery/ICU  - Supportive care and plan per above teams  - if taking po then add protein supplements for low protein state

## 2020-09-04 NOTE — PROGRESS NOTE ADULT - SUBJECTIVE AND OBJECTIVE BOX
Patient is a 59y Female who had no new events,   poor appetite      MEDICATIONS  (STANDING):  aspirin  chewable 81 milliGRAM(s) Oral daily  atorvastatin 80 milliGRAM(s) Oral at bedtime  calcium acetate 667 milliGRAM(s) Oral three times a day with meals  clopidogrel Tablet 75 milliGRAM(s) Oral daily  dextrose 5%. 1000 milliLiter(s) (50 mL/Hr) IV Continuous <Continuous>  dextrose 50% Injectable 12.5 Gram(s) IV Push once  dextrose 50% Injectable 25 Gram(s) IV Push once  dextrose 50% Injectable 25 Gram(s) IV Push once  heparin   Injectable 5000 Unit(s) SubCutaneous every 8 hours  insulin glargine Injectable (LANTUS) 25 Unit(s) SubCutaneous at bedtime  insulin lispro (HumaLOG) corrective regimen sliding scale   SubCutaneous every 6 hours  insulin lispro Injectable (HumaLOG) 5 Unit(s) SubCutaneous three times a day before meals  levothyroxine 112 MICROGram(s) Oral daily  metoprolol tartrate 25 milliGRAM(s) Oral daily  multivitamin/minerals/iron Oral Solution (CENTRUM) 15 milliLiter(s) Enteral Tube daily  piperacillin/tazobactam IVPB.. 3.375 Gram(s) IV Intermittent every 12 hours  ranolazine 1000 milliGRAM(s) Oral two times a day    MEDICATIONS  (PRN):  acetaminophen   Tablet .. 650 milliGRAM(s) Oral every 4 hours PRN Mild Pain (1 - 3)  ALBUTerol    90 MICROgram(s) HFA Inhaler 2 Puff(s) Inhalation every 6 hours PRN Shortness of Breath and/or Wheezing  aluminum hydroxide/magnesium hydroxide/simethicone Suspension 30 milliLiter(s) Oral every 4 hours PRN Dyspepsia  dextrose 40% Gel 15 Gram(s) Oral once PRN Blood Glucose LESS THAN 70 milliGRAM(s)/deciliter  glucagon  Injectable 1 milliGRAM(s) IntraMuscular once PRN Glucose LESS THAN 70 milligrams/deciliter  ondansetron Injectable 4 milliGRAM(s) IV Push every 6 hours PRN Nausea    Vital Signs Last 24 Hrs  T(C): 36 (04 Sep 2020 18:31), Max: 36.2 (03 Sep 2020 21:36)  T(F): 96.8 (04 Sep 2020 18:31), Max: 97.2 (03 Sep 2020 21:36)  HR: 87 (04 Sep 2020 20:00) (74 - 87)  BP: 130/56 (04 Sep 2020 20:00) (101/37 - 143/86)  BP(mean): 75 (04 Sep 2020 20:00) (53 - 96)  RR: 13 (04 Sep 2020 20:00) (0 - 16)  SpO2: 94% (04 Sep 2020 20:00) (92% - 99%)      I&O's Detail    03 Sep 2020 07:01  -  04 Sep 2020 07:00  --------------------------------------------------------  IN:    Nepro with Carb Steady: 220 mL    Oral Fluid: 840 mL  Total IN: 1060 mL    OUT:    Incontinent per Collection Ba mL    Rectal Tube: 900 mL  Total OUT: 1550 mL    Total NET: -490 mL      04 Sep 2020 07:01  -  04 Sep 2020 21:26  --------------------------------------------------------  IN:    IV PiggyBack: 200 mL    Oral Fluid: 480 mL  Total IN: 680 mL    OUT:    Incontinent per Collection Ba mL    Rectal Tube: 200 mL  Total OUT: 700 mL    Total NET: -20 mL            PHYSICAL EXAM:    Constitutional: frail  HEENT: dry MM  Neck: No LAD, No JVD  Respiratory: dist BS  Cardiovascular: S1 and S2  Abd: soft, Nontender,less distended   Extremities:  peripheral edema ++  Neurological: Arousable   : No Black  Skin: No rashes  Access: Not applicable        LABS:               138    |  99     |  110    ----------------------------<  214       04 Sep 2020 06:25  3.6     |  30     |  5.25     135    |  99     |  110    ----------------------------<  404       03 Sep 2020 07:07  3.4     |  27     |  5.35     136    |  98     |  116    ----------------------------<  295       02 Sep 2020 06:59  3.1     |  26     |  5.65     Ca    6.4        04 Sep 2020 06:25  Albumin, Serum: 1.2 g/dL (20 @ 06:25)    Calcium, Ionized: 0.85 mmoL/L (20 @ 06:25)    Phos  5.4       04 Sep 2020 06:25          Mg     1.8       03 Sep 2020 07:07    TPro  x      /  Alb  1.2    /  TBili  x      /        04 Sep 2020 06:25  DBili  x      /  AST  x      /  ALT  x      /  AlkPhos  x          135    |  99     |  110    ----------------------------<  404       03 Sep 2020 07:07  3.4     |  27     |  5.35     136    |  98     |  116    ----------------------------<  295       02 Sep 2020 06:59  3.1     |  26     |  5.65     137    |  97     |  107    ----------------------------<  186       01 Sep 2020 06:50  3.5     |  26     |  5.90     Ca    5.9        03 Sep 2020 07:07    Albumin, Serum: 1.1 g/dL (20 @ 06:32)    Phosphorus Level, Serum: 8.4 mg/dL (20 @ 06:22)      Phos  8.4       31 Aug 2020 06:22    Mg     1.8       03 Sep 2020 07:07        Urine Studies:  Urine Microscopic-Add On (NC) (20 @ 22:55)    Red Blood Cell - Urine: 0-2 /HPF    White Blood Cell - Urine: Negative    Bacteria: Occasional    Epithelial Cells: Negative    Urinalysis (20 @ 22:55)    Glucose Qualitative, Urine: Negative mg/dL    Blood, Urine: Large    pH Urine: 6.5    Color: Yellow    Urine Appearance: Clear    Bilirubin: Negative    Ketone - Urine: Trace    Specific Gravity: 1.010    Protein, Urine: 100 mg/dL    Urobilinogen: Negative mg/dL    Nitrite: Negative    Leukocyte Esterase Concentration: Small      RADIOLOGY & ADDITIONAL STUDIES:

## 2020-09-04 NOTE — PROGRESS NOTE ADULT - MENTAL STATUS
extremely weak, following commands, weak voice
weak but not focal
lethargic, non focal
non focal, responsive, extremely weak
non focal lethargic

## 2020-09-04 NOTE — PROGRESS NOTE ADULT - CARDIOVASCULAR
Regular rate & rhythm, normal S1, S2; no murmurs, gallops or rubs; no S3, S4
detailed exam
detailed exam
Regular rate & rhythm, normal S1, S2; no murmurs, gallops or rubs; no S3, S4
detailed exam

## 2020-09-04 NOTE — PHYSICAL THERAPY INITIAL EVALUATION ADULT - DIAGNOSIS, PT EVAL
NSTEMI,  occluded RCA, Acute resp failure  now extubated on room air,Septic shock resolved-Toxic Metabolic Encephalopathy NSTEMI,  occluded RCA, No intervention, Acute resp failure  now extubated on room air,Septic shock resolved-Toxic Metabolic Encephalopathy

## 2020-09-04 NOTE — PROGRESS NOTE ADULT - SUBJECTIVE AND OBJECTIVE BOX
Events Overnight:  Patient a little stronger more alert    HPI:      58 y/o female with CAD, PAD s/p fem-pop bypass s/p RT iliac stent, diabetes, hypothyroidism, hyperlipidemia, current active smoker, admitted with UTI sepsis and E. coli bacteremia POA.  ECG revealed STEMI prompting code STEMI--pt underwent urgent angiogram showing occluded RCA--no intervention.   Patient developed septic shock, and required pressors, developed acute renal failure    blood cultures were positive for e.coli  likely urinary source day 7 antibiotics  now off pressors, bp stable  developed AMISH with creatinine 5.25 starting to improve  abdominal ct no acute pathology, has had slight abdominal pain, ? ischemic colitis  remains on zosyn wbc is slowly improving  leukocytosis slowly improving 15 c. dif was negative     ROS  slight abdominal pain, no chest pain, no shortness of breath, slight diarrhea           weakness           no fever, no chills           no extremity pain  ROS otherwise negative     MEDICATIONS  (STANDING):  aspirin  chewable 81 milliGRAM(s) Oral daily  atorvastatin 80 milliGRAM(s) Oral at bedtime  clopidogrel Tablet 75 milliGRAM(s) Oral daily  dextrose 5%. 1000 milliLiter(s) (50 mL/Hr) IV Continuous <Continuous>  dextrose 50% Injectable 12.5 Gram(s) IV Push once  dextrose 50% Injectable 25 Gram(s) IV Push once  dextrose 50% Injectable 25 Gram(s) IV Push once  heparin   Injectable 5000 Unit(s) SubCutaneous every 8 hours  insulin glargine Injectable (LANTUS) 25 Unit(s) SubCutaneous at bedtime  insulin lispro (HumaLOG) corrective regimen sliding scale   SubCutaneous every 6 hours  insulin lispro Injectable (HumaLOG) 5 Unit(s) SubCutaneous three times a day before meals  levothyroxine 112 MICROGram(s) Oral daily  metoprolol tartrate 25 milliGRAM(s) Oral daily  multivitamin/minerals/iron Oral Solution (CENTRUM) 15 milliLiter(s) Enteral Tube daily  piperacillin/tazobactam IVPB.. 3.375 Gram(s) IV Intermittent every 12 hours  ranolazine 1000 milliGRAM(s) Oral two times a day    MEDICATIONS  (PRN):  acetaminophen   Tablet .. 650 milliGRAM(s) Oral every 4 hours PRN Mild Pain (1 - 3)  ALBUTerol    90 MICROgram(s) HFA Inhaler 2 Puff(s) Inhalation every 6 hours PRN Shortness of Breath and/or Wheezing  aluminum hydroxide/magnesium hydroxide/simethicone Suspension 30 milliLiter(s) Oral every 4 hours PRN Dyspepsia  dextrose 40% Gel 15 Gram(s) Oral once PRN Blood Glucose LESS THAN 70 milliGRAM(s)/deciliter  glucagon  Injectable 1 milliGRAM(s) IntraMuscular once PRN Glucose LESS THAN 70 milligrams/deciliter  ondansetron Injectable 4 milliGRAM(s) IV Push every 6 hours PRN Nausea    ICU Vital Signs Last 24 Hrs  T(C): 36.1 (04 Sep 2020 04:46), Max: 36.4 (03 Sep 2020 18:30)  T(F): 97 (04 Sep 2020 04:46), Max: 97.5 (03 Sep 2020 18:30)  HR: 79 (04 Sep 2020 06:00) (74 - 85)  BP: 115/47 (04 Sep 2020 06:00) (93/45 - 123/51)  BP(mean): 65 (04 Sep 2020 06:00) (51 - 74)  ABP: --  ABP(mean): --  RR: 11 (04 Sep 2020 06:00) (0 - 16)  SpO2: 96% (04 Sep 2020 06:00) (88% - 99%)      I&O's Summary    03 Sep 2020 07:01  -  04 Sep 2020 07:00  --------------------------------------------------------  IN: 1060 mL / OUT: 1550 mL / NET: -490 mL                          9.5    17.13 )-----------( 131      ( 04 Sep 2020 06:25 )             30.1     09-04    138  |  99  |  110<H>  ----------------------------<  214<H>  3.6   |  30  |  5.25<H>    Ca    6.4<LL>      04 Sep 2020 06:25  Phos  5.4     09-04  Mg     1.8     09-03    TPro  x   /  Alb  1.2<L>  /  TBili  x   /  DBili  x   /  AST  x   /  ALT  x   /  AlkPhos  x   09-04    DVT Prophylaxis:  Heparin subq                                                               Advanced Directives: Full Code

## 2020-09-04 NOTE — PHYSICAL THERAPY INITIAL EVALUATION ADULT - CRITERIA FOR SKILLED THERAPEUTIC INTERVENTIONS
impairments found/functional limitations in following categories/risk reduction/prevention/anticipated equipment needs at discharge/therapy frequency/rehab potential/predicted duration of therapy intervention/anticipated discharge recommendation

## 2020-09-05 LAB
ANION GAP SERPL CALC-SCNC: 10 MMOL/L — SIGNIFICANT CHANGE UP (ref 5–17)
BUN SERPL-MCNC: 107 MG/DL — HIGH (ref 7–23)
CALCIUM SERPL-MCNC: 6.9 MG/DL — LOW (ref 8.5–10.1)
CHLORIDE SERPL-SCNC: 100 MMOL/L — SIGNIFICANT CHANGE UP (ref 96–108)
CO2 SERPL-SCNC: 28 MMOL/L — SIGNIFICANT CHANGE UP (ref 22–31)
CREAT SERPL-MCNC: 4.84 MG/DL — HIGH (ref 0.5–1.3)
GLUCOSE SERPL-MCNC: 137 MG/DL — HIGH (ref 70–99)
HCT VFR BLD CALC: 30.9 % — LOW (ref 34.5–45)
HGB BLD-MCNC: 10 G/DL — LOW (ref 11.5–15.5)
MCHC RBC-ENTMCNC: 29 PG — SIGNIFICANT CHANGE UP (ref 27–34)
MCHC RBC-ENTMCNC: 32.4 GM/DL — SIGNIFICANT CHANGE UP (ref 32–36)
MCV RBC AUTO: 89.6 FL — SIGNIFICANT CHANGE UP (ref 80–100)
PLATELET # BLD AUTO: 138 K/UL — LOW (ref 150–400)
POTASSIUM SERPL-MCNC: 3.2 MMOL/L — LOW (ref 3.5–5.3)
POTASSIUM SERPL-SCNC: 3.2 MMOL/L — LOW (ref 3.5–5.3)
RBC # BLD: 3.45 M/UL — LOW (ref 3.8–5.2)
RBC # FLD: 16.8 % — HIGH (ref 10.3–14.5)
SODIUM SERPL-SCNC: 138 MMOL/L — SIGNIFICANT CHANGE UP (ref 135–145)
WBC # BLD: 16.65 K/UL — HIGH (ref 3.8–10.5)
WBC # FLD AUTO: 16.65 K/UL — HIGH (ref 3.8–10.5)

## 2020-09-05 PROCEDURE — 99233 SBSQ HOSP IP/OBS HIGH 50: CPT

## 2020-09-05 RX ORDER — GLUCAGON INJECTION, SOLUTION 0.5 MG/.1ML
1 INJECTION, SOLUTION SUBCUTANEOUS ONCE
Refills: 0 | Status: DISCONTINUED | OUTPATIENT
Start: 2020-09-05 | End: 2020-09-21

## 2020-09-05 RX ORDER — CEFUROXIME AXETIL 250 MG
500 TABLET ORAL EVERY 12 HOURS
Refills: 0 | Status: DISCONTINUED | OUTPATIENT
Start: 2020-09-05 | End: 2020-09-08

## 2020-09-05 RX ORDER — POTASSIUM CHLORIDE 20 MEQ
40 PACKET (EA) ORAL ONCE
Refills: 0 | Status: COMPLETED | OUTPATIENT
Start: 2020-09-05 | End: 2020-09-05

## 2020-09-05 RX ORDER — DEXTROSE 50 % IN WATER 50 %
25 SYRINGE (ML) INTRAVENOUS ONCE
Refills: 0 | Status: DISCONTINUED | OUTPATIENT
Start: 2020-09-05 | End: 2020-09-21

## 2020-09-05 RX ORDER — INSULIN LISPRO 100/ML
VIAL (ML) SUBCUTANEOUS AT BEDTIME
Refills: 0 | Status: DISCONTINUED | OUTPATIENT
Start: 2020-09-05 | End: 2020-09-21

## 2020-09-05 RX ORDER — DEXTROSE 50 % IN WATER 50 %
12.5 SYRINGE (ML) INTRAVENOUS ONCE
Refills: 0 | Status: DISCONTINUED | OUTPATIENT
Start: 2020-09-05 | End: 2020-09-21

## 2020-09-05 RX ORDER — DEXTROSE 50 % IN WATER 50 %
15 SYRINGE (ML) INTRAVENOUS ONCE
Refills: 0 | Status: DISCONTINUED | OUTPATIENT
Start: 2020-09-05 | End: 2020-09-21

## 2020-09-05 RX ORDER — INSULIN LISPRO 100/ML
VIAL (ML) SUBCUTANEOUS
Refills: 0 | Status: DISCONTINUED | OUTPATIENT
Start: 2020-09-05 | End: 2020-09-21

## 2020-09-05 RX ADMIN — HEPARIN SODIUM 5000 UNIT(S): 5000 INJECTION INTRAVENOUS; SUBCUTANEOUS at 05:46

## 2020-09-05 RX ADMIN — CLOPIDOGREL BISULFATE 75 MILLIGRAM(S): 75 TABLET, FILM COATED ORAL at 09:53

## 2020-09-05 RX ADMIN — Medication 500 MILLIGRAM(S): at 21:16

## 2020-09-05 RX ADMIN — Medication 667 MILLIGRAM(S): at 16:43

## 2020-09-05 RX ADMIN — HEPARIN SODIUM 5000 UNIT(S): 5000 INJECTION INTRAVENOUS; SUBCUTANEOUS at 21:16

## 2020-09-05 RX ADMIN — Medication 112 MICROGRAM(S): at 05:46

## 2020-09-05 RX ADMIN — Medication 25 MILLIGRAM(S): at 05:46

## 2020-09-05 RX ADMIN — Medication 2: at 16:48

## 2020-09-05 RX ADMIN — CALCITRIOL 0.25 MICROGRAM(S): 0.5 CAPSULE ORAL at 09:55

## 2020-09-05 RX ADMIN — HEPARIN SODIUM 5000 UNIT(S): 5000 INJECTION INTRAVENOUS; SUBCUTANEOUS at 16:43

## 2020-09-05 RX ADMIN — Medication 667 MILLIGRAM(S): at 09:53

## 2020-09-05 RX ADMIN — Medication 667 MILLIGRAM(S): at 11:34

## 2020-09-05 RX ADMIN — Medication 2: at 11:19

## 2020-09-05 RX ADMIN — ATORVASTATIN CALCIUM 80 MILLIGRAM(S): 80 TABLET, FILM COATED ORAL at 21:16

## 2020-09-05 RX ADMIN — RANOLAZINE 1000 MILLIGRAM(S): 500 TABLET, FILM COATED, EXTENDED RELEASE ORAL at 09:53

## 2020-09-05 RX ADMIN — Medication 40 MILLIEQUIVALENT(S): at 11:10

## 2020-09-05 RX ADMIN — PIPERACILLIN AND TAZOBACTAM 25 GRAM(S): 4; .5 INJECTION, POWDER, LYOPHILIZED, FOR SOLUTION INTRAVENOUS at 10:51

## 2020-09-05 RX ADMIN — RANOLAZINE 1000 MILLIGRAM(S): 500 TABLET, FILM COATED, EXTENDED RELEASE ORAL at 21:16

## 2020-09-05 RX ADMIN — Medication 15 MILLILITER(S): at 09:52

## 2020-09-05 RX ADMIN — Medication 81 MILLIGRAM(S): at 09:53

## 2020-09-05 NOTE — PROGRESS NOTE ADULT - ASSESSMENT
59 CAD, PAD s/p fem-pop bypass s/p RT iliac stent, diabetes, hypothyroidism, hyperlipidemia, current active smoker, uses cocaine last use yesterday, chronic back pain s/p lumbar surgery presented with multiple non-specific complaints found to have ST elevations on presenting EKG, code STEMI called and pt underwent urgent angiogram showing occluded RCA w no intervention with UTI sepsis and E. coli bacteremia POA w hospital course c/b code blue.     AMISH w septic shock/UTI w STEMI and Code blue event w hypotension   Cr slowly trending down  and improved UOP   acid base and K stable  BUN stable  no acute indication for HD today - renal status tenous with recurrent AMISH   judicious use of lasix as Cr labile  - if using lasix may give IV albumin prior to      Hypok sec to GI losses   replete today   Hypocalcemia - ca imrproving slighlty    corrected Ca ok but with low ionized Ca - agree with PRN IV calcium gluconate    Calcum acetate phoslo with feeds for elevated phos/low ca++ when starting to eat    increase this  Intact PTH: 87:   Vitamin D, 25-Hydroxy: 27.8  Magnesium, Serum: 1.8 mg/dL  added Vit D analogue     Ischemic Bowel w Shock w Leokocytosis  - improving   - Surgery/ICU  - Supportive care and plan per above teams  - if taking po then add protein supplements for low protein state

## 2020-09-05 NOTE — PROGRESS NOTE ADULT - SUBJECTIVE AND OBJECTIVE BOX
Patient is a 59y Female who had no new events,   appetite better   more awake and verbal today     MEDICATIONS  (STANDING):  aspirin  chewable 81 milliGRAM(s) Oral daily  atorvastatin 80 milliGRAM(s) Oral at bedtime  calcitriol   Capsule 0.25 MICROGram(s) Oral daily  calcium acetate 667 milliGRAM(s) Oral three times a day with meals  cefuroxime   Tablet 500 milliGRAM(s) Oral every 12 hours  clopidogrel Tablet 75 milliGRAM(s) Oral daily  dextrose 5%. 1000 milliLiter(s) (50 mL/Hr) IV Continuous <Continuous>  dextrose 50% Injectable 12.5 Gram(s) IV Push once  dextrose 50% Injectable 25 Gram(s) IV Push once  dextrose 50% Injectable 25 Gram(s) IV Push once  heparin   Injectable 5000 Unit(s) SubCutaneous every 8 hours  insulin glargine Injectable (LANTUS) 25 Unit(s) SubCutaneous at bedtime  insulin lispro (HumaLOG) corrective regimen sliding scale   SubCutaneous three times a day before meals  insulin lispro (HumaLOG) corrective regimen sliding scale   SubCutaneous at bedtime  levothyroxine 112 MICROGram(s) Oral daily  metoprolol tartrate 25 milliGRAM(s) Oral daily  multivitamin/minerals/iron Oral Solution (CENTRUM) 15 milliLiter(s) Enteral Tube daily  ranolazine 1000 milliGRAM(s) Oral two times a day    MEDICATIONS  (PRN):  acetaminophen   Tablet .. 650 milliGRAM(s) Oral every 4 hours PRN Mild Pain (1 - 3)  ALBUTerol    90 MICROgram(s) HFA Inhaler 2 Puff(s) Inhalation every 6 hours PRN Shortness of Breath and/or Wheezing  aluminum hydroxide/magnesium hydroxide/simethicone Suspension 30 milliLiter(s) Oral every 4 hours PRN Dyspepsia  dextrose 40% Gel 15 Gram(s) Oral once PRN Blood Glucose LESS THAN 70 milliGRAM(s)/deciliter  glucagon  Injectable 1 milliGRAM(s) IntraMuscular once PRN Glucose LESS THAN 70 milligrams/deciliter  ondansetron Injectable 4 milliGRAM(s) IV Push every 6 hours PRN Nausea        Vital Signs Last 24 Hrs  T(C): 35.9 (05 Sep 2020 13:27), Max: 36.3 (05 Sep 2020 00:45)  T(F): 96.6 (05 Sep 2020 13:27), Max: 97.4 (05 Sep 2020 00:45)  HR: 84 (05 Sep 2020 14:00) (76 - 87)  BP: 121/48 (05 Sep 2020 14:00) (104/47 - 140/59)  BP(mean): 68 (05 Sep 2020 14:00) (59 - 96)  RR: 13 (05 Sep 2020 14:00) (9 - 18)  SpO2: 100% (05 Sep 2020 10:00) (93% - 100%)    I&O's Detail    04 Sep 2020 07:01  -  05 Sep 2020 07:00  --------------------------------------------------------  IN:    IV PiggyBack: 200 mL    Oral Fluid: 480 mL  Total IN: 680 mL    OUT:    Incontinent per Collection Ba mL    Rectal Tube: 200 mL    Voided: 450 mL  Total OUT: 1150 mL    Total NET: -470 mL      05 Sep 2020 07:01  -  05 Sep 2020 16:57  --------------------------------------------------------  IN:    Oral Fluid: 1200 mL  Total IN: 1200 mL    OUT:    Incontinent per Collection Ba mL    Intermittent Catheterization - Urethral: 500 mL  Total OUT: 950 mL    Total NET: 250 mL        PHYSICAL EXAM:    Constitutional: frail  HEENT: dry MM  Neck: No LAD, No JVD  Respiratory: dist BS  Cardiovascular: S1 and S2  Abd: soft, Nontender,less distended   Extremities:  peripheral edema ++  Neurological: Arousable   : No Black  Skin: No rashes  Access: Not applicable        LABS:               138    |  100    |  107    ----------------------------<  137       05 Sep 2020 06:38  3.2     |  28     |  4.84     138    |  99     |  110    ----------------------------<  214       04 Sep 2020 06:25  3.6     |  30     |  5.25     135    |  99     |  110    ----------------------------<  404       03 Sep 2020 07:07  3.4     |  27     |  5.35     Ca    6.9        05 Sep 2020 06:38  Albumin, Serum: 1.2 g/dL (20 @ 06:25)  Calcium, Ionized: 0.85 mmoL/L (20 @ 06:25)                            10.0   16.65 )-----------( 138      ( 05 Sep 2020 06:38 )             30.9                         9.5    17.13 )-----------( 131      ( 04 Sep 2020 06:25 )             30.1         Urine Studies:  Urine Microscopic-Add On (NC) (20 @ 22:55)    Red Blood Cell - Urine: 0-2 /HPF    White Blood Cell - Urine: Negative    Bacteria: Occasional    Epithelial Cells: Negative    Urinalysis (20 @ 22:55)    Glucose Qualitative, Urine: Negative mg/dL    Blood, Urine: Large    pH Urine: 6.5    Color: Yellow    Urine Appearance: Clear    Bilirubin: Negative    Ketone - Urine: Trace    Specific Gravity: 1.010    Protein, Urine: 100 mg/dL    Urobilinogen: Negative mg/dL    Nitrite: Negative    Leukocyte Esterase Concentration: Small      RADIOLOGY & ADDITIONAL STUDIES:

## 2020-09-05 NOTE — PROGRESS NOTE ADULT - SUBJECTIVE AND OBJECTIVE BOX
58 y/o female with CAD, PAD s/p fem-pop bypass s/p RT iliac stent, diabetes, hypothyroidism, hyperlipidemia, current active smoker, admitted with UTI sepsis and E. coli bacteremia POA.  ECG revealed STEMI prompting code STEMI--pt underwent urgent angiogram showing occluded RCA--no intervention.   Patient developed septic shock, and required pressors, developed acute renal failure. Blood cultures were positive for E. Coli  likely  urinary source day 7 antibiotics- suspected source GI possible ischemic colitis.  Pt developed AMISH with creatinine 5.25 starting to improve. Downgraded from ICU    No c/o today; did not ambulate yet    Vital Signs Last 24 Hrs    T(F): 97.4 , Max: 97.4   HR: 85  (76 - 87)  BP: 121/90 (110/61 - 140/59)  BP(mean): 96  (64 - 96)  RR: 18  (8 - 18)  SpO2: 100%  (92% - 100%)                                10.0   16.65 )-----------( 138      ( 05 Sep 2020 06:38 )             30.9   09-05    138  |  100  |  107<H>  ----------------------------<  137<H>  3.2<L>   |  28  |  4.84<H>    Ca    6.9<L>      05 Sep 2020 06:38  Phos  5.4     09-04    TPro  x   /  Alb  1.2<L>  /  TBili  x   /  DBili  x   /  AST  x   /  ALT  x   /  AlkPhos  x   09-04

## 2020-09-05 NOTE — PROGRESS NOTE ADULT - ASSESSMENT
60 y/o female with CAD, PAD s/p fem-pop bypass s/p RT iliac stent, diabetes, admitted with UTI sepsis and E. coli bacteremia POA.  ECG revealed STEMI prompting code STEMI    STEMI  pt underwent urgent angiogram showing occluded RCA--no intervention.   asa, plavix, BB    * Septic shock resolved- bp stable, on zosyn for E. coli bacteremia  change to Ceftin for a total of 14 days    * AMISH-- -likely ATN from sepsis.   BUN, creatinine plateauing edematous,  renal function is improving    * Toxic Metabolic Encephalopathy from sepsis improving    * T2DM  on sliding scale, and insulin for hyperglycemia which has improved    PT eval, Rehab

## 2020-09-06 LAB
ALBUMIN SERPL ELPH-MCNC: 1.2 G/DL — LOW (ref 3.3–5)
ANION GAP SERPL CALC-SCNC: 9 MMOL/L — SIGNIFICANT CHANGE UP (ref 5–17)
BUN SERPL-MCNC: 92 MG/DL — HIGH (ref 7–23)
CALCIUM SERPL-MCNC: 7.3 MG/DL — LOW (ref 8.5–10.1)
CHLORIDE SERPL-SCNC: 102 MMOL/L — SIGNIFICANT CHANGE UP (ref 96–108)
CO2 SERPL-SCNC: 28 MMOL/L — SIGNIFICANT CHANGE UP (ref 22–31)
CREAT SERPL-MCNC: 4.6 MG/DL — HIGH (ref 0.5–1.3)
GLUCOSE SERPL-MCNC: 95 MG/DL — SIGNIFICANT CHANGE UP (ref 70–99)
PHOSPHATE SERPL-MCNC: 4.5 MG/DL — SIGNIFICANT CHANGE UP (ref 2.5–4.5)
POTASSIUM SERPL-MCNC: 3.5 MMOL/L — SIGNIFICANT CHANGE UP (ref 3.5–5.3)
POTASSIUM SERPL-SCNC: 3.5 MMOL/L — SIGNIFICANT CHANGE UP (ref 3.5–5.3)
SODIUM SERPL-SCNC: 139 MMOL/L — SIGNIFICANT CHANGE UP (ref 135–145)

## 2020-09-06 PROCEDURE — 99233 SBSQ HOSP IP/OBS HIGH 50: CPT

## 2020-09-06 RX ADMIN — HEPARIN SODIUM 5000 UNIT(S): 5000 INJECTION INTRAVENOUS; SUBCUTANEOUS at 12:57

## 2020-09-06 RX ADMIN — HEPARIN SODIUM 5000 UNIT(S): 5000 INJECTION INTRAVENOUS; SUBCUTANEOUS at 21:40

## 2020-09-06 RX ADMIN — Medication 25 MILLIGRAM(S): at 06:38

## 2020-09-06 RX ADMIN — Medication 4: at 12:56

## 2020-09-06 RX ADMIN — Medication 667 MILLIGRAM(S): at 09:13

## 2020-09-06 RX ADMIN — CLOPIDOGREL BISULFATE 75 MILLIGRAM(S): 75 TABLET, FILM COATED ORAL at 09:13

## 2020-09-06 RX ADMIN — Medication 12: at 17:12

## 2020-09-06 RX ADMIN — Medication 650 MILLIGRAM(S): at 21:42

## 2020-09-06 RX ADMIN — Medication 81 MILLIGRAM(S): at 09:13

## 2020-09-06 RX ADMIN — ATORVASTATIN CALCIUM 80 MILLIGRAM(S): 80 TABLET, FILM COATED ORAL at 21:39

## 2020-09-06 RX ADMIN — RANOLAZINE 1000 MILLIGRAM(S): 500 TABLET, FILM COATED, EXTENDED RELEASE ORAL at 09:13

## 2020-09-06 RX ADMIN — RANOLAZINE 1000 MILLIGRAM(S): 500 TABLET, FILM COATED, EXTENDED RELEASE ORAL at 21:40

## 2020-09-06 RX ADMIN — Medication 667 MILLIGRAM(S): at 12:57

## 2020-09-06 RX ADMIN — Medication 500 MILLIGRAM(S): at 21:40

## 2020-09-06 RX ADMIN — Medication 15 MILLILITER(S): at 09:13

## 2020-09-06 RX ADMIN — CALCITRIOL 0.25 MICROGRAM(S): 0.5 CAPSULE ORAL at 09:13

## 2020-09-06 RX ADMIN — HEPARIN SODIUM 5000 UNIT(S): 5000 INJECTION INTRAVENOUS; SUBCUTANEOUS at 06:38

## 2020-09-06 RX ADMIN — Medication 667 MILLIGRAM(S): at 17:12

## 2020-09-06 RX ADMIN — Medication 112 MICROGRAM(S): at 06:38

## 2020-09-06 RX ADMIN — Medication 500 MILLIGRAM(S): at 09:13

## 2020-09-06 RX ADMIN — Medication 650 MILLIGRAM(S): at 22:12

## 2020-09-06 NOTE — PROGRESS NOTE ADULT - SUBJECTIVE AND OBJECTIVE BOX
Patient is a 59y Female who had no new events,   appetite better   more awake and verbal today   straight cath > 3 times per CCU RN     MEDICATIONS  (STANDING):  aspirin  chewable 81 milliGRAM(s) Oral daily  atorvastatin 80 milliGRAM(s) Oral at bedtime  calcitriol   Capsule 0.25 MICROGram(s) Oral daily  calcium acetate 667 milliGRAM(s) Oral three times a day with meals  cefuroxime   Tablet 500 milliGRAM(s) Oral every 12 hours  clopidogrel Tablet 75 milliGRAM(s) Oral daily  dextrose 5%. 1000 milliLiter(s) (50 mL/Hr) IV Continuous <Continuous>  dextrose 50% Injectable 12.5 Gram(s) IV Push once  dextrose 50% Injectable 25 Gram(s) IV Push once  dextrose 50% Injectable 25 Gram(s) IV Push once  heparin   Injectable 5000 Unit(s) SubCutaneous every 8 hours  insulin lispro (HumaLOG) corrective regimen sliding scale   SubCutaneous three times a day before meals  insulin lispro (HumaLOG) corrective regimen sliding scale   SubCutaneous at bedtime  levothyroxine 112 MICROGram(s) Oral daily  metoprolol tartrate 25 milliGRAM(s) Oral daily  ranolazine 1000 milliGRAM(s) Oral two times a day    Vital Signs Last 24 Hrs  T(C): 36.3 (06 Sep 2020 15:25), Max: 36.4 (06 Sep 2020 12:05)  T(F): 97.4 (06 Sep 2020 15:25), Max: 97.5 (06 Sep 2020 12:05)  HR: 92 (06 Sep 2020 15:25) (77 - 93)  BP: 138/55 (06 Sep 2020 15:25) (96/40 - 138/55)  BP(mean): 71 (06 Sep 2020 10:00) (54 - 73)  RR: 19 (06 Sep 2020 15:25) (4 - 19)  SpO2: 95% (06 Sep 2020 15:25) (93% - 100%)    I&O's Detail    05 Sep 2020 07:01  -  06 Sep 2020 07:00  --------------------------------------------------------  IN:    Oral Fluid: 1680 mL  Total IN: 1680 mL    OUT:    Incontinent per Collection Ba mL    Intermittent Catheterization - Urethral: 500 mL    Rectal Tube: 500 mL    Stool: 150 mL  Total OUT: 2150 mL    Total NET: -470 mL        PHYSICAL EXAM:    Constitutional: frail  HEENT: dry MM  Neck: No LAD, No JVD  Respiratory: dist BS  Cardiovascular: S1 and S2  Abd: soft, Nontender,less distended   Extremities:  peripheral edema ++  Neurological: Arousable   : No Black  Skin: No rashes  Access: Not applicable        LABS:               139    |  102    |  92     ----------------------------<  95        06 Sep 2020 06:38  3.5     |  28     |  4.60     138    |  100    |  107    ----------------------------<  137       05 Sep 2020 06:38  3.2     |  28     |  4.84     138    |  99     |  110    ----------------------------<  214       04 Sep 2020 06:25  3.6     |  30     |  5.25     Ca    7.3        06 Sep 2020 06:38  Ca    6.9        05 Sep 2020 06:38    Phos  4.5       06 Sep 2020 06:38  Phos  5.4       04 Sep 2020 06:25    Mg     1.8       03 Sep 2020 07:07    TPro  x      /  Alb  1.2    /  TBili  x      /        06 Sep 2020 06:38  DBili  x      /  AST  x      /  ALT  x      /  AlkPhos  x        TPro  x      /  Alb  1.2    /  TBili  x      /        04 Sep 2020 06:25  DBili  x      /  AST  x      /  ALT  x      /  AlkPhos  x          138    |  100    |  107    ----------------------------<  137       05 Sep 2020 06:38  3.2     |  28     |  4.84     138    |  99     |  110    ----------------------------<  214       04 Sep 2020 06:25  3.6     |  30     |  5.25     135    |  99     |  110    ----------------------------<  404       03 Sep 2020 07:07  3.4     |  27     |  5.35     Ca    6.9        05 Sep 2020 06:38  Albumin, Serum: 1.2 g/dL (20 @ 06:25)  Calcium, Ionized: 0.85 mmoL/L (20 @ 06:25)                            10.0   16.65 )-----------( 138      ( 05 Sep 2020 06:38 )             30.9                         9.5    17.13 )-----------( 131      ( 04 Sep 2020 06:25 )             30.1         Urine Studies:  Urine Microscopic-Add On (NC) (20 @ 22:55)    Red Blood Cell - Urine: 0-2 /HPF    White Blood Cell - Urine: Negative    Bacteria: Occasional    Epithelial Cells: Negative    Urinalysis (20 @ 22:55)    Glucose Qualitative, Urine: Negative mg/dL    Blood, Urine: Large    pH Urine: 6.5    Color: Yellow    Urine Appearance: Clear    Bilirubin: Negative    Ketone - Urine: Trace    Specific Gravity: 1.010    Protein, Urine: 100 mg/dL    Urobilinogen: Negative mg/dL    Nitrite: Negative    Leukocyte Esterase Concentration: Small      RADIOLOGY & ADDITIONAL STUDIES:

## 2020-09-06 NOTE — PROGRESS NOTE ADULT - ASSESSMENT
60 y/o female with CAD, PAD s/p fem-pop bypass s/p RT iliac stent, diabetes, admitted with UTI sepsis and E. coli bacteremia POA.  ECG revealed STEMI prompting code STEMI    STEMI  pt underwent urgent angiogram showing occluded RCA--no intervention.   asa, plavix, BB    * Septic shock resolved- bp stable, on zosyn for E. coli bacteremia  change to Ceftin for a total of 14 days    * AMISH-- -likely ATN from sepsis.   BUN, creatinine plateauing edematous,  renal function is improving    * Toxic Metabolic Encephalopathy from sepsis improving    * T2DM  on sliding scale, and insulin for hyperglycemia which has improved  as of last need she has no need for BASS, dc Lantus    PT eval, Rehab

## 2020-09-06 NOTE — CHART NOTE - NSCHARTNOTEFT_GEN_A_CORE
58yo/F with PMH CAD, PAD s/p fem-pop bypass s/p RT iliac stent, diabetes, hypothyroidism, hyperlipidemia, current active smoker, uses cocaine one day before admision, chronic back pain s/p lumbar surgery presented with multiple non-specific complaints, including fevers on/off last 4-5 days, weakness, no appetite and reduced oral intake, vague diffuse abd pains, one day of diarrhea. No cough, no SOB. SHe came in today for evaluation of weakness and found to have ST elevations on presenting EKG, code STEMI called and pt underwent urgent angiogram showing occluded RCA. She denies chest pain at present. (23 Aug 2020 10:39)    Contacted by RN due to Oxygen saturations level of 80% on 10L nasal cannula, Patient was started on non-rebreather mask and saturation improved to 88-92%.    Subjective:  At the time we went to see the patient her Oxygen saturation was 96-99%.  Patient in no acute distress denied any SOB, during auscultation mild crackles were heard on RLL, all other lobes were clear.  VS stable.    MEDICATIONS  (STANDING):  aspirin  chewable 81 milliGRAM(s) Oral daily  atorvastatin 80 milliGRAM(s) Oral at bedtime  calcitriol   Capsule 0.25 MICROGram(s) Oral daily  calcium acetate 667 milliGRAM(s) Oral three times a day with meals  cefuroxime   Tablet 500 milliGRAM(s) Oral every 12 hours  clopidogrel Tablet 75 milliGRAM(s) Oral daily  dextrose 5%. 1000 milliLiter(s) (50 mL/Hr) IV Continuous <Continuous>  dextrose 50% Injectable 12.5 Gram(s) IV Push once  dextrose 50% Injectable 25 Gram(s) IV Push once  dextrose 50% Injectable 25 Gram(s) IV Push once  heparin   Injectable 5000 Unit(s) SubCutaneous every 8 hours  insulin lispro (HumaLOG) corrective regimen sliding scale   SubCutaneous three times a day before meals  insulin lispro (HumaLOG) corrective regimen sliding scale   SubCutaneous at bedtime  levothyroxine 112 MICROGram(s) Oral daily  metoprolol tartrate 25 milliGRAM(s) Oral daily  ranolazine 1000 milliGRAM(s) Oral two times a day    MEDICATIONS  (PRN):  acetaminophen   Tablet .. 650 milliGRAM(s) Oral every 4 hours PRN Mild Pain (1 - 3)  ALBUTerol    90 MICROgram(s) HFA Inhaler 2 Puff(s) Inhalation every 6 hours PRN Shortness of Breath and/or Wheezing  aluminum hydroxide/magnesium hydroxide/simethicone Suspension 30 milliLiter(s) Oral every 4 hours PRN Dyspepsia  dextrose 40% Gel 15 Gram(s) Oral once PRN Blood Glucose LESS THAN 70 milliGRAM(s)/deciliter  glucagon  Injectable 1 milliGRAM(s) IntraMuscular once PRN Glucose LESS THAN 70 milligrams/deciliter  ondansetron Injectable 4 milliGRAM(s) IV Push every 6 hours PRN Nausea    Vital Signs Last 24 Hrs  T(C): 36.3 (06 Sep 2020 15:25), Max: 36.4 (06 Sep 2020 12:05)  T(F): 97.4 (06 Sep 2020 15:25), Max: 97.5 (06 Sep 2020 12:05)  HR: 92 (06 Sep 2020 15:25) (77 - 93)  BP: 138/55 (06 Sep 2020 15:25) (96/40 - 138/55)  BP(mean): 71 (06 Sep 2020 10:00) (54 - 71)  RR: 19 (06 Sep 2020 15:25) (4 - 19)  SpO2: 95% (06 Sep 2020 15:25) (93% - 100%)    Physical Exam:  General: no acute distress, oriented x3  Lungs: mild crackles RLL, all other lobes clear  -Cardiac: regular rhytm, no murmurs, gallops or rubs    #Low SpO2   - Continue non-rebreather mask  - Continue monitoring O2

## 2020-09-06 NOTE — PROGRESS NOTE ADULT - ASSESSMENT
59 CAD, PAD s/p fem-pop bypass s/p RT iliac stent, diabetes, hypothyroidism, hyperlipidemia, current active smoker, uses cocaine last use yesterday, chronic back pain s/p lumbar surgery presented with multiple non-specific complaints found to have ST elevations on presenting EKG, code STEMI called and pt underwent urgent angiogram showing occluded RCA w no intervention with UTI sepsis and E. coli bacteremia POA w hospital course c/b code blue.     AMISH w septic shock/UTI w STEMI and Code blue event w hypotension   Cr slowly trending down  and improved UOP   required straight cath > 400 ml x 3 per CCU RN - [place]   acid base and K stable  BUN stable  no acute indication for HD today - renal status tenous with recurrent AMISH   judicious use of lasix as Cr labile  - if using lasix may give IV albumin prior to   daily labs     Hypocalcemia - ca imrproving slighlty     PRN IV calcium gluconate    Calcum acetate phoslo with feeds for elevated phos/low ca++ when starting to eat      Intact PTH: 87:   Vitamin D, 25-Hydroxy: 27.8  Magnesium, Serum: 1.8 mg/dL  Caclitriol - Vit D analogue     Ischemic Bowel w Shock w Leokocytosis  - improving   - Surgery/ICU  - Supportive care and plan per above teams  - if taking po then add protein supplements for low protein state

## 2020-09-06 NOTE — PROVIDER CONTACT NOTE (OTHER) - REASON
Kenzie Rob Admission Date: 1/23/2019 Daily Progress Note: 1/26/2019 
 
68 y.o.  male seen at Urgent Care, noted with hypoxia and referred to the ER. In the ER complained of productive cough with low grade fevers for 2 days. Influenza: negative and CXR with right base scarring-possible for pneumonia. He decided he wanted to go home. He left but returned with increased shortness of breath and wheezing. WBC was up to 17 from 13. Had recently been treated for bronchitis with Doxycycline. Was placed on Levaquin but changed to Zosyn with culture in November growing pseudomonas. He is not on home O2 and is followed in our office for underlying centrilobular emphysema, bronchiectasis and pulmonary nodule. CT then with right middle and lower lobe, new bronchial wall thickening and reticular nodular densities with hazy groundglass opacities in the left lower lobe as well as new linear stranding in the left lung apex. These changes were less significant than in November 2017. He uses a percussion vest twice a day most days and uses Albuterol nebs. 
  
Chronic Medical:  CAD, COPD and has nebs and home vest, HTN, migraines, GERD Subjective: Hypertensive at times. Currently on O2 at 2 lpm with O2 sat 95%. +cough which is occasionally productive of small amount of clear mucus. Current Facility-Administered Medications Medication Dose Route Frequency  sodium chloride (OCEAN) 0.65 % nasal squeeze bottle 2 Spray  2 Spray Both Nostrils Q4H  
 sodium chloride (NS) flush 5-40 mL  5-40 mL IntraVENous Q8H  
 sodium chloride (NS) flush 5-40 mL  5-40 mL IntraVENous PRN  
 insulin lispro (HUMALOG) injection   SubCUTAneous AC&HS  budesonide (PULMICORT) 500 mcg/2 ml nebulizer suspension  500 mcg Nebulization BID RT  
 methylPREDNISolone (PF) (Solu-MEDROL) injection 40 mg  40 mg IntraVENous Q12H Low oxygen saturation  enoxaparin (LOVENOX) injection 40 mg  40 mg SubCUTAneous Q24H  
 ondansetron (ZOFRAN) injection 4 mg  4 mg IntraVENous Q4H PRN  
 acetaminophen (TYLENOL) tablet 650 mg  650 mg Oral Q4H PRN  
 HYDROcodone-acetaminophen (NORCO) 5-325 mg per tablet 1 Tab  1 Tab Oral Q4H PRN  
 clopidogrel (PLAVIX) tablet 75 mg  75 mg Oral DAILY  gabapentin (NEURONTIN) capsule 300 mg  300 mg Oral TID  latanoprost (XALATAN) 0.005 % ophthalmic solution 1 Drop  1 Drop Both Eyes QHS  nitroglycerin (NITROSTAT) tablet 0.4 mg  0.4 mg SubLINGual Q5MIN PRN  
 tamsulosin (FLOMAX) capsule 0.4 mg  0.4 mg Oral DAILY  SUMAtriptan (IMITREX) tablet 50 mg  50 mg Oral Q2H PRN  
 fluticasone (FLONASE) 50 mcg/actuation nasal spray 2 Spray  2 Spray Both Nostrils DAILY  piperacillin-tazobactam (ZOSYN) 4.5 g in 0.9% sodium chloride (MBP/ADV) 100 mL  4.5 g IntraVENous Q8H  
 albuterol (PROVENTIL VENTOLIN) nebulizer solution 2.5 mg  2.5 mg Nebulization Q4H RT  
 albuterol (PROVENTIL VENTOLIN) nebulizer solution 2.5 mg  2.5 mg Nebulization Q4H PRN  
 guaiFENesin ER (MUCINEX) tablet 1,200 mg  1,200 mg Oral BID Objective:  
 
Vitals:  
 01/26/19 0711 01/26/19 0719 01/26/19 0723 01/26/19 0800 BP:  (!) 155/94 (!) 143/102 (!) 151/98 Pulse:  67  (!) 107 Resp:  20 Temp:  97.8 °F (36.6 °C) SpO2: 94% 95% Weight:      
Height:      
 
Intake and Output:  
01/24 1901 - 01/26 0700 In: 0543 [P.O.:840; I.V.:690] Out: 2175 [Urine:2175] 01/26 0701 - 01/26 1900 In: 620 [P.O.:620] Out: 225 [Urine:225] Physical Exam:  
Constitutional:  the patient is well developed and in no acute distress HEENT:  Sclera clear, pupils equal, oral mucosa moist 
Lungs: wheezing bilaterally. Wearing nasal cannula Cardiovascular:  RRR without M,G,R 
Abd/GI: soft and non-tender; with positive bowel sounds. Ext: warm without cyanosis. There is no lower leg edema. Musculoskeletal: moves all four extremities with equal strength Skin:  no jaundice or rashes, no wounds Neuro: no gross neuro deficits Musculoskeletal: can ambulate. No deformity Psychiatric: Calm. ROS: chronic congestion, dyspnea Lines: peripheral IV 
 
CHEST XRAY:  
1/23 LAB Recent Labs  
  01/25/19 
0349 01/23/19 
1347 WBC 11.8* 17.0*  
HGB 13.3* 15.4 HCT 41.0* 46.7  312 Recent Labs  
  01/25/19 
0349 01/23/19 
1347  134* K 3.8 5.1 * 99  
CO2 25 28 * 97 BUN 9 7* CREA 0.58* 0.77* ALB  --  3.1*  
SGOT  --  66* Assessment:  (Medical Decision Making) Hospital Problems  Date Reviewed: 1/26/2019 Codes Class Noted POA Pneumonia ICD-10-CM: J18.9 ICD-9-CM: 578  1/24/2019 Yes On Zosyn Plan per primary team is to transition to Augmentin * (Principal) Acute respiratory failure with hypoxia ([facility] ICD-10-CM: J96.01 
ICD-9-CM: 518.81  1/24/2019 Yes Remains on o2 at 2 lpm 
Home O2 arranged by  Sepsis ([facility] ICD-10-CM: A41.9 ICD-9-CM: 038.9, 995.91  1/24/2019 Yes Bronchiectasis ([facility] (Chronic) ICD-10-CM: J47.9 ICD-9-CM: 494.0  1/24/2019 Yes Chronic Has vest at home COPD with acute exacerbation ([facility] ICD-10-CM: J44.1 ICD-9-CM: 491.21  1/23/2019 Unknown Ongoing wheezing Currently on IV steroids Steroid taper per primary team 
  
 Dementia without behavioral disturbance ICD-10-CM: F03.90 ICD-9-CM: 294.20  10/9/2017 Yes Benign essential HTN (Chronic) ICD-10-CM: I10 
ICD-9-CM: 401.1  6/6/2017 Yes CAD (coronary artery disease) (Chronic) ICD-10-CM: I25.10 ICD-9-CM: 414.00  10/8/2013 Yes ASCAD of the native vessel Gastroesophageal reflux disease without esophagitis (Chronic) ICD-10-CM: K21.9 ICD-9-CM: 530.81  10/8/2013 Yes Plan:  (Medical Decision Making) --Day 3 zosyn Cultures neg WBC 17.0 >>>11.8 
 afebrile --Albuterol / pulmicort 
--mucinex 
--solumedrol 40 mg IV q 12 hours --flutter valve / Marcelene Earnest --Vest therapy - uses at home 
--Primary team planning discharge today. Will need office follow up in 2 weeks with CXR Trenton Resendiz NP Lungs:  Decreased BS with a few rhonchi Heart:  RRR with no Murmur/Rubs/Gallops Additional Comments:  Discharge planned by primary team. Needs steroid taper and office appt in 2 weeks as above. I have spoken with and examined the patient. I agree with the above assessment and plan as documented.  
 
Hansel Roblero MD

## 2020-09-06 NOTE — PROGRESS NOTE ADULT - SUBJECTIVE AND OBJECTIVE BOX
60 y/o female with CAD, PAD s/p fem-pop bypass s/p RT iliac stent, diabetes, hypothyroidism, hyperlipidemia, current active smoker, admitted with UTI sepsis and E. coli bacteremia POA.  ECG revealed STEMI prompting code STEMI--pt underwent urgent angiogram showing occluded RCA--no intervention.   Patient developed septic shock, and required pressors, developed acute renal failure. Blood cultures were positive for E. Coli  likely  urinary source day 7 antibiotics- suspected source GI possible ischemic colitis.  Pt developed AMISH with creatinine 5.25 starting to improve. Downgraded from ICU    No c/o today; did not ambulate yet; needs Lance lift        Vital Signs Last 24 Hrs  T(C): 36.4 (06 Sep 2020 12:05), Max: 36.4 (06 Sep 2020 12:05)  T(F): 97.5 (06 Sep 2020 12:05), Max: 97.5 (06 Sep 2020 12:05)  HR: 78 (06 Sep 2020 10:00) (77 - 91)  BP: 115/55 (06 Sep 2020 10:00) (96/40 - 140/54)  BP(mean): 71 (06 Sep 2020 10:00) (54 - 76)  RR: 6 (06 Sep 2020 10:00) (4 - 16)  SpO2: 100% (06 Sep 2020 08:00) (93% - 100%)                             10.0   16.65 )-----------( 138      ( 05 Sep 2020 06:38 )             30.9   09-06    139  |  102  |  92<H>  ----------------------------<  95  3.5   |  28  |  4.60<H>    Ca    7.3<L>      06 Sep 2020 06:38  Phos  4.5     09-06    TPro  x   /  Alb  1.2<L>  /  TBili  x   /  DBili  x   /  AST  x   /  ALT  x   /  AlkPhos  x   09-06

## 2020-09-07 LAB
ALBUMIN SERPL ELPH-MCNC: 1.2 G/DL — LOW (ref 3.3–5)
ANION GAP SERPL CALC-SCNC: 9 MMOL/L — SIGNIFICANT CHANGE UP (ref 5–17)
BUN SERPL-MCNC: 84 MG/DL — HIGH (ref 7–23)
CALCIUM SERPL-MCNC: 7.8 MG/DL — LOW (ref 8.5–10.1)
CHLORIDE SERPL-SCNC: 102 MMOL/L — SIGNIFICANT CHANGE UP (ref 96–108)
CO2 SERPL-SCNC: 26 MMOL/L — SIGNIFICANT CHANGE UP (ref 22–31)
CREAT SERPL-MCNC: 4.21 MG/DL — HIGH (ref 0.5–1.3)
GLUCOSE SERPL-MCNC: 66 MG/DL — LOW (ref 70–99)
HCT VFR BLD CALC: 30 % — LOW (ref 34.5–45)
HGB BLD-MCNC: 9.8 G/DL — LOW (ref 11.5–15.5)
MAGNESIUM SERPL-MCNC: 1.7 MG/DL — SIGNIFICANT CHANGE UP (ref 1.6–2.6)
MCHC RBC-ENTMCNC: 29 PG — SIGNIFICANT CHANGE UP (ref 27–34)
MCHC RBC-ENTMCNC: 32.7 GM/DL — SIGNIFICANT CHANGE UP (ref 32–36)
MCV RBC AUTO: 88.8 FL — SIGNIFICANT CHANGE UP (ref 80–100)
PHOSPHATE SERPL-MCNC: 4 MG/DL — SIGNIFICANT CHANGE UP (ref 2.5–4.5)
PLATELET # BLD AUTO: 170 K/UL — SIGNIFICANT CHANGE UP (ref 150–400)
POTASSIUM SERPL-MCNC: 3.5 MMOL/L — SIGNIFICANT CHANGE UP (ref 3.5–5.3)
POTASSIUM SERPL-SCNC: 3.5 MMOL/L — SIGNIFICANT CHANGE UP (ref 3.5–5.3)
RBC # BLD: 3.38 M/UL — LOW (ref 3.8–5.2)
RBC # FLD: 17 % — HIGH (ref 10.3–14.5)
SODIUM SERPL-SCNC: 137 MMOL/L — SIGNIFICANT CHANGE UP (ref 135–145)
WBC # BLD: 14.27 K/UL — HIGH (ref 3.8–10.5)
WBC # FLD AUTO: 14.27 K/UL — HIGH (ref 3.8–10.5)

## 2020-09-07 PROCEDURE — 93931 UPPER EXTREMITY STUDY: CPT | Mod: 26,LT

## 2020-09-07 PROCEDURE — 99233 SBSQ HOSP IP/OBS HIGH 50: CPT

## 2020-09-07 RX ORDER — SODIUM CHLORIDE 9 MG/ML
1000 INJECTION INTRAMUSCULAR; INTRAVENOUS; SUBCUTANEOUS ONCE
Refills: 0 | Status: COMPLETED | OUTPATIENT
Start: 2020-09-07 | End: 2020-09-07

## 2020-09-07 RX ORDER — LOPERAMIDE HCL 2 MG
2 TABLET ORAL EVERY 4 HOURS
Refills: 0 | Status: DISCONTINUED | OUTPATIENT
Start: 2020-09-07 | End: 2020-09-11

## 2020-09-07 RX ORDER — SODIUM CHLORIDE 9 MG/ML
500 INJECTION INTRAMUSCULAR; INTRAVENOUS; SUBCUTANEOUS ONCE
Refills: 0 | Status: COMPLETED | OUTPATIENT
Start: 2020-09-07 | End: 2020-09-07

## 2020-09-07 RX ORDER — LOPERAMIDE HCL 2 MG
2 TABLET ORAL DAILY
Refills: 0 | Status: DISCONTINUED | OUTPATIENT
Start: 2020-09-07 | End: 2020-09-07

## 2020-09-07 RX ADMIN — SODIUM CHLORIDE 1000 MILLILITER(S): 9 INJECTION INTRAMUSCULAR; INTRAVENOUS; SUBCUTANEOUS at 18:58

## 2020-09-07 RX ADMIN — Medication 650 MILLIGRAM(S): at 06:52

## 2020-09-07 RX ADMIN — Medication 112 MICROGRAM(S): at 06:22

## 2020-09-07 RX ADMIN — Medication 667 MILLIGRAM(S): at 09:35

## 2020-09-07 RX ADMIN — Medication 25 MILLIGRAM(S): at 09:34

## 2020-09-07 RX ADMIN — ATORVASTATIN CALCIUM 80 MILLIGRAM(S): 80 TABLET, FILM COATED ORAL at 22:13

## 2020-09-07 RX ADMIN — CLOPIDOGREL BISULFATE 75 MILLIGRAM(S): 75 TABLET, FILM COATED ORAL at 09:34

## 2020-09-07 RX ADMIN — Medication 2 MILLIGRAM(S): at 17:02

## 2020-09-07 RX ADMIN — Medication 667 MILLIGRAM(S): at 13:00

## 2020-09-07 RX ADMIN — Medication 500 MILLIGRAM(S): at 22:13

## 2020-09-07 RX ADMIN — Medication 650 MILLIGRAM(S): at 22:17

## 2020-09-07 RX ADMIN — Medication 81 MILLIGRAM(S): at 09:35

## 2020-09-07 RX ADMIN — Medication 650 MILLIGRAM(S): at 23:30

## 2020-09-07 RX ADMIN — Medication 2 MILLIGRAM(S): at 22:14

## 2020-09-07 RX ADMIN — HEPARIN SODIUM 5000 UNIT(S): 5000 INJECTION INTRAVENOUS; SUBCUTANEOUS at 22:13

## 2020-09-07 RX ADMIN — RANOLAZINE 1000 MILLIGRAM(S): 500 TABLET, FILM COATED, EXTENDED RELEASE ORAL at 22:13

## 2020-09-07 RX ADMIN — HEPARIN SODIUM 5000 UNIT(S): 5000 INJECTION INTRAVENOUS; SUBCUTANEOUS at 06:22

## 2020-09-07 RX ADMIN — HEPARIN SODIUM 5000 UNIT(S): 5000 INJECTION INTRAVENOUS; SUBCUTANEOUS at 13:00

## 2020-09-07 RX ADMIN — Medication 500 MILLIGRAM(S): at 09:34

## 2020-09-07 RX ADMIN — CALCITRIOL 0.25 MICROGRAM(S): 0.5 CAPSULE ORAL at 09:34

## 2020-09-07 RX ADMIN — SODIUM CHLORIDE 1000 MILLILITER(S): 9 INJECTION INTRAMUSCULAR; INTRAVENOUS; SUBCUTANEOUS at 16:18

## 2020-09-07 RX ADMIN — RANOLAZINE 1000 MILLIGRAM(S): 500 TABLET, FILM COATED, EXTENDED RELEASE ORAL at 09:34

## 2020-09-07 RX ADMIN — Medication 667 MILLIGRAM(S): at 17:02

## 2020-09-07 NOTE — PROVIDER CONTACT NOTE (OTHER) - ACTION/TREATMENT ORDERED:
Left  left for Dr. Patel.
pt seen at bedside by Dr. Sanchez. Saturation improved to 96%-99% Continue non-rebreather mask, continue to monitor oxygen saturation.
2mg Imodium PRN q 4 hours  GI Consult
Dr. Patel notified As per Dr. Patel give another 500 cc bolus and continue to monitor.

## 2020-09-07 NOTE — PROVIDER CONTACT NOTE (OTHER) - ASSESSMENT
pt a&ox4  ABD mildly distended  copious diarrhea pt a&ox4  ABD mildly distended, tender on light palpation  copious diarrhea

## 2020-09-07 NOTE — PROGRESS NOTE ADULT - ASSESSMENT
60 y/o female with CAD, PAD s/p fem-pop bypass s/p RT iliac stent, diabetes, admitted with UTI sepsis and E. coli bacteremia POA.  ECG revealed STEMI prompting code STEMI    STEMI  pt underwent urgent angiogram showing occluded RCA--no intervention.   Medical management  asa, plavix, BB    * S/P Septic shock resolved- bp stable, was on zosyn for E. coli bacteremia  changed to Ceftin for a total of 14 days    * AMISH-- -likely ATN from sepsis.   renal function is improving  Renal follow up appreciated    * Toxic Metabolic Encephalopathy from sepsis improving    * T2DM  on sliding scale, and insulin for hyperglycemia which has improved    PT eval, possibly to Rehab

## 2020-09-07 NOTE — PROGRESS NOTE ADULT - ASSESSMENT
59 CAD, PAD s/p fem-pop bypass s/p RT iliac stent, diabetes, hypothyroidism, hyperlipidemia, current active smoker, uses cocaine last use yesterday, chronic back pain s/p lumbar surgery presented with multiple non-specific complaints found to have ST elevations on presenting EKG, code STEMI called and pt underwent urgent angiogram showing occluded RCA w no intervention with UTI sepsis and E. coli bacteremia POA w hospital course c/b code blue.     AMISH w septic shock/UTI w STEMI and Code blue event w hypotension   Cr slowly trending down    urine retention - now w hughes   acid base and K stable  BUN stable  No acute indication for HD today - monitor daily for recovery      Hypocalcemia - Ca improving  w Calcum acetate with feeds for elevated phos/low ca++ when starting to eat   Intact PTH: 87:   Vitamin D, 25-Hydroxy: 27.8  Magnesium, Serum: 1.8 mg/dL  Calcitriol - Vit D analogue     Ischemic Bowel w Shock w Leokocytosis  - improving   - Supportive care and plan per above teams  - add protein supplements for low protein state

## 2020-09-07 NOTE — PROGRESS NOTE ADULT - SUBJECTIVE AND OBJECTIVE BOX
Patient is a 59y Female who had no new events,        Vital Signs Last 24 Hrs  T(C): 36.6 (07 Sep 2020 07:34), Max: 36.6 (07 Sep 2020 07:34)  T(F): 97.8 (07 Sep 2020 07:34), Max: 97.8 (07 Sep 2020 07:34)  HR: 95 (07 Sep 2020 07:34) (91 - 95)  BP: 115/54 (07 Sep 2020 07:34) (115/54 - 138/85)  BP(mean): --  RR: 16 (07 Sep 2020 07:34) (16 - 20)  SpO2: 95% (07 Sep 2020 12:14) (84% - 98%)      I&O's Detail    06 Sep 2020 07:01  -  07 Sep 2020 07:00  --------------------------------------------------------  IN:    Oral Fluid: 480 mL  Total IN: 480 mL    OUT:    Intermittent Catheterization - Urethral: 300 mL    Rectal Tube: 100 mL    Voided: 500 mL  Total OUT: 900 mL    Total NET: -420 mL      07 Sep 2020 07:01  -  07 Sep 2020 13:48  --------------------------------------------------------  IN:  Total IN: 0 mL    OUT:    Indwelling Catheter - Urethral: 600 mL  Total OUT: 600 mL    Total NET: -600 mL    PHYSICAL EXAM:    Constitutional: frail  HEENT: dry MM  Neck: No LAD, No JVD  Respiratory: dist BS  Cardiovascular: S1 and S2  Abd: soft, Nontender,less distended   Extremities:  peripheral edema ++  Neurological: Arousable   : No Black  Skin: No rashes  Access: Not applicable        LABS:             137    |  102    |  84     ----------------------------<  66        07 Sep 2020 08:05  3.5     |  26     |  4.21     139    |  102    |  92     ----------------------------<  95        06 Sep 2020 06:38  3.5     |  28     |  4.60     138    |  100    |  107    ----------------------------<  137       05 Sep 2020 06:38  3.2     |  28     |  4.84     Ca    7.8        07 Sep 2020 08:05  Ca    7.3        06 Sep 2020 06:38    Phos  4.0       07 Sep 2020 08:05  Phos  4.5       06 Sep 2020 06:38    Mg     1.7       07 Sep 2020 08:05                              9.8    14.27 )-----------( 170      ( 07 Sep 2020 08:05 )             30.0         Urine Studies:  Urine Microscopic-Add On (NC) (08.25.20 @ 22:55)    Red Blood Cell - Urine: 0-2 /HPF    White Blood Cell - Urine: Negative    Bacteria: Occasional    Epithelial Cells: Negative    Urinalysis (08.25.20 @ 22:55)    Glucose Qualitative, Urine: Negative mg/dL    Blood, Urine: Large    pH Urine: 6.5    Color: Yellow    Urine Appearance: Clear    Bilirubin: Negative    Ketone - Urine: Trace    Specific Gravity: 1.010    Protein, Urine: 100 mg/dL    Urobilinogen: Negative mg/dL    Nitrite: Negative    Leukocyte Esterase Concentration: Small      RADIOLOGY & ADDITIONAL STUDIES:

## 2020-09-07 NOTE — PROGRESS NOTE ADULT - SUBJECTIVE AND OBJECTIVE BOX
9.8    14.27 )-----------( 170      ( 07 Sep 2020 08:05 )             30.0     07 Sep 2020 08:05    137    |  102    |  84     ----------------------------<  66     3.5     |  26     |  4.21     Ca    7.8        07 Sep 2020 08:05  Phos  4.0       07 Sep 2020 08:05  Mg     1.7       07 Sep 2020 08:05    TPro  x      /  Alb  1.2    /  TBili  x      /  DBili  x      /  AST  x      /  ALT  x      /  AlkPhos  x      07 Sep 2020 08:05    LIVER FUNCTIONS - ( 07 Sep 2020 08:05 )  Alb: 1.2 g/dL / Pro: x     / ALK PHOS: x     / ALT: x     / AST: x     / GGT: x             CAPILLARY BLOOD GLUCOSE      POCT Blood Glucose.: 113 mg/dL (07 Sep 2020 12:24)  POCT Blood Glucose.: 83 mg/dL (07 Sep 2020 08:04)  POCT Blood Glucose.: 64 mg/dL (07 Sep 2020 07:34)  POCT Blood Glucose.: 72 mg/dL (06 Sep 2020 21:38)  POCT Blood Glucose.: 150 mg/dL (06 Sep 2020 18:29)  POCT Blood Glucose.: 447 mg/dL (06 Sep 2020 16:58)             58 y/o female with CAD, PAD s/p fem-pop bypass s/p RT iliac stent, diabetes, hypothyroidism, hyperlipidemia, current active smoker, admitted with UTI sepsis and E. coli bacteremia POA.  ECG revealed STEMI prompting code STEMI--pt underwent urgent angiogram showing occluded RCA--no intervention.   Patient developed septic shock, and required pressors, developed acute renal failure. Blood cultures were positive for E. Coli  likely  urinary source day 7 antibiotics- suspected source GI possible ischemic colitis.  Pt developed AMISH with creatinine 5.25 starting to improve. Downgraded from ICU  No c/o today; did not ambulate yet; needs Lance lift     09/07/20: Patient seen and examined. Weak and tired. Otherwise, stable. No new issues.          Vital Signs Last 24 Hrs  T(C): 36.6 (07 Sep 2020 07:34), Max: 36.6 (07 Sep 2020 07:34)  T(F): 97.8 (07 Sep 2020 07:34), Max: 97.8 (07 Sep 2020 07:34)  HR: 95 (07 Sep 2020 07:34) (91 - 95)  BP: 115/54 (07 Sep 2020 07:34) (115/54 - 138/85)  BP(mean): --  RR: 16 (07 Sep 2020 07:34) (16 - 20)  SpO2: 95% (07 Sep 2020 12:14) (84% - 98%)

## 2020-09-08 LAB
ALBUMIN SERPL ELPH-MCNC: 1.1 G/DL — LOW (ref 3.3–5)
ANION GAP SERPL CALC-SCNC: 8 MMOL/L — SIGNIFICANT CHANGE UP (ref 5–17)
BUN SERPL-MCNC: 79 MG/DL — HIGH (ref 7–23)
CALCIUM SERPL-MCNC: 7.8 MG/DL — LOW (ref 8.5–10.1)
CHLORIDE SERPL-SCNC: 107 MMOL/L — SIGNIFICANT CHANGE UP (ref 96–108)
CO2 SERPL-SCNC: 24 MMOL/L — SIGNIFICANT CHANGE UP (ref 22–31)
CREAT SERPL-MCNC: 3.97 MG/DL — HIGH (ref 0.5–1.3)
GLUCOSE SERPL-MCNC: 89 MG/DL — SIGNIFICANT CHANGE UP (ref 70–99)
HCT VFR BLD CALC: 27.3 % — LOW (ref 34.5–45)
HGB BLD-MCNC: 8.7 G/DL — LOW (ref 11.5–15.5)
MCHC RBC-ENTMCNC: 29.3 PG — SIGNIFICANT CHANGE UP (ref 27–34)
MCHC RBC-ENTMCNC: 31.9 GM/DL — LOW (ref 32–36)
MCV RBC AUTO: 91.9 FL — SIGNIFICANT CHANGE UP (ref 80–100)
PHOSPHATE SERPL-MCNC: 4.5 MG/DL — SIGNIFICANT CHANGE UP (ref 2.5–4.5)
PLATELET # BLD AUTO: 175 K/UL — SIGNIFICANT CHANGE UP (ref 150–400)
POTASSIUM SERPL-MCNC: 3.4 MMOL/L — LOW (ref 3.5–5.3)
POTASSIUM SERPL-SCNC: 3.4 MMOL/L — LOW (ref 3.5–5.3)
RBC # BLD: 2.97 M/UL — LOW (ref 3.8–5.2)
RBC # FLD: 17.2 % — HIGH (ref 10.3–14.5)
SARS-COV-2 RNA SPEC QL NAA+PROBE: SIGNIFICANT CHANGE UP
SODIUM SERPL-SCNC: 139 MMOL/L — SIGNIFICANT CHANGE UP (ref 135–145)
WBC # BLD: 11.57 K/UL — HIGH (ref 3.8–10.5)
WBC # FLD AUTO: 11.57 K/UL — HIGH (ref 3.8–10.5)

## 2020-09-08 PROCEDURE — 74176 CT ABD & PELVIS W/O CONTRAST: CPT | Mod: 26

## 2020-09-08 PROCEDURE — 99222 1ST HOSP IP/OBS MODERATE 55: CPT

## 2020-09-08 PROCEDURE — 99233 SBSQ HOSP IP/OBS HIGH 50: CPT

## 2020-09-08 RX ORDER — POTASSIUM CHLORIDE 20 MEQ
40 PACKET (EA) ORAL ONCE
Refills: 0 | Status: COMPLETED | OUTPATIENT
Start: 2020-09-08 | End: 2020-09-08

## 2020-09-08 RX ORDER — MORPHINE SULFATE 50 MG/1
2 CAPSULE, EXTENDED RELEASE ORAL EVERY 4 HOURS
Refills: 0 | Status: DISCONTINUED | OUTPATIENT
Start: 2020-09-08 | End: 2020-09-11

## 2020-09-08 RX ORDER — OXYCODONE AND ACETAMINOPHEN 5; 325 MG/1; MG/1
1 TABLET ORAL EVERY 6 HOURS
Refills: 0 | Status: DISCONTINUED | OUTPATIENT
Start: 2020-09-08 | End: 2020-09-15

## 2020-09-08 RX ADMIN — HEPARIN SODIUM 5000 UNIT(S): 5000 INJECTION INTRAVENOUS; SUBCUTANEOUS at 13:03

## 2020-09-08 RX ADMIN — HEPARIN SODIUM 5000 UNIT(S): 5000 INJECTION INTRAVENOUS; SUBCUTANEOUS at 06:08

## 2020-09-08 RX ADMIN — Medication 81 MILLIGRAM(S): at 09:05

## 2020-09-08 RX ADMIN — Medication 667 MILLIGRAM(S): at 09:05

## 2020-09-08 RX ADMIN — Medication 40 MILLIEQUIVALENT(S): at 14:48

## 2020-09-08 RX ADMIN — HEPARIN SODIUM 5000 UNIT(S): 5000 INJECTION INTRAVENOUS; SUBCUTANEOUS at 21:15

## 2020-09-08 RX ADMIN — RANOLAZINE 1000 MILLIGRAM(S): 500 TABLET, FILM COATED, EXTENDED RELEASE ORAL at 09:04

## 2020-09-08 RX ADMIN — Medication 667 MILLIGRAM(S): at 18:08

## 2020-09-08 RX ADMIN — Medication 20 MILLIGRAM(S): at 10:58

## 2020-09-08 RX ADMIN — Medication 2 MILLIGRAM(S): at 06:08

## 2020-09-08 RX ADMIN — Medication 25 MILLIGRAM(S): at 09:05

## 2020-09-08 RX ADMIN — Medication 20 MILLIGRAM(S): at 15:03

## 2020-09-08 RX ADMIN — OXYCODONE AND ACETAMINOPHEN 1 TABLET(S): 5; 325 TABLET ORAL at 18:09

## 2020-09-08 RX ADMIN — Medication 112 MICROGRAM(S): at 06:08

## 2020-09-08 RX ADMIN — CALCITRIOL 0.25 MICROGRAM(S): 0.5 CAPSULE ORAL at 09:05

## 2020-09-08 RX ADMIN — CLOPIDOGREL BISULFATE 75 MILLIGRAM(S): 75 TABLET, FILM COATED ORAL at 09:05

## 2020-09-08 RX ADMIN — Medication 2: at 12:28

## 2020-09-08 RX ADMIN — Medication 667 MILLIGRAM(S): at 13:03

## 2020-09-08 RX ADMIN — ONDANSETRON 4 MILLIGRAM(S): 8 TABLET, FILM COATED ORAL at 19:02

## 2020-09-08 RX ADMIN — Medication 500 MILLIGRAM(S): at 09:04

## 2020-09-08 NOTE — PROGRESS NOTE ADULT - SUBJECTIVE AND OBJECTIVE BOX
[facility] complaints and Diagnosis: ecoli  w/ septic shock/ ARF/ Diahrrea    Subjective: no complaints      REVIEW OF SYSTEMS:    CONSTITUTIONAL: No weakness, fevers or chills  EYES/ENT: No visual changes;  No vertigo or throat pain   NECK: No pain or stiffness  RESPIRATORY: No cough, wheezing, hemoptysis; No shortness of breath  CARDIOVASCULAR: No chest pain or palpitations  GASTROINTESTINAL: No abdominal or epigastric pain. No nausea, vomiting, or hematemesis; No diarrhea or constipation. No melena or hematochezia.  GENITOURINARY: No dysuria, frequency or hematuria  NEUROLOGICAL: No numbness or weakness  SKIN: No itching, burning, rashes, or lesions   All other review of systems is negative unless indicated above      Vital Signs Last 24 Hrs  T(C): 36.4 (08 Sep 2020 08:11), Max: 36.8 (07 Sep 2020 16:05)  T(F): 97.5 (08 Sep 2020 08:11), Max: 98.2 (07 Sep 2020 16:05)  HR: 87 (08 Sep 2020 08:11) (87 - 98)  BP: 110/51 (08 Sep 2020 08:11) (64/44 - 125/38)  BP(mean): --  RR: 16 (08 Sep 2020 08:11) (16 - 16)  SpO2: 97% (08 Sep 2020 08:11) (84% - 97%)    HEENT:   pupils equal and reactive, EOMI, no oropharyngeal lesions, erythema, exudates, oral thrush    NECK:   supple, no carotid bruits, no palpable lymph nodes, no thyromegaly    CV:  +S1, +S2, regular, no murmurs or rubs    RESP:   lungs clear to auscultation bilaterally, no wheezing, rales, rhonchi, good air entry bilaterally    BREAST:  not examined    GI:  abdomen soft, non-tender, non-distended, normal BS, no bruits, no abdominal masses, no palpable masses    RECTAL:  not examined    :  not examined    MSK:   normal muscle tone, no atrophy, no rigidity, no contractions    EXT:   no clubbing, no cyanosis, no edema, no calf pain, swelling or erythema    VASCULAR:  pulses equal and symmetric in the upper and lower extremities    NEURO:  AAOX3, no focal neurological deficits, follows all commands, able to move extremities spontaneously    SKIN:  no ulcers, lesions or rashes    MEDICATIONS  (STANDING):  aspirin  chewable 81 milliGRAM(s) Oral daily  atorvastatin 80 milliGRAM(s) Oral at bedtime  calcitriol   Capsule 0.25 MICROGram(s) Oral daily  calcium acetate 667 milliGRAM(s) Oral three times a day with meals  cefuroxime   Tablet 500 milliGRAM(s) Oral every 12 hours  clopidogrel Tablet 75 milliGRAM(s) Oral daily  dextrose 5%. 1000 milliLiter(s) (50 mL/Hr) IV Continuous <Continuous>  dextrose 50% Injectable 12.5 Gram(s) IV Push once  dextrose 50% Injectable 25 Gram(s) IV Push once  dextrose 50% Injectable 25 Gram(s) IV Push once  heparin   Injectable 5000 Unit(s) SubCutaneous every 8 hours  insulin lispro (HumaLOG) corrective regimen sliding scale   SubCutaneous three times a day before meals  insulin lispro (HumaLOG) corrective regimen sliding scale   SubCutaneous at bedtime  levothyroxine 112 MICROGram(s) Oral daily  metoprolol tartrate 25 milliGRAM(s) Oral daily  ranolazine 1000 milliGRAM(s) Oral two times a day    MEDICATIONS  (PRN):  acetaminophen   Tablet .. 650 milliGRAM(s) Oral every 4 hours PRN Mild Pain (1 - 3)  ALBUTerol    90 MICROgram(s) HFA Inhaler 2 Puff(s) Inhalation every 6 hours PRN Shortness of Breath and/or Wheezing  aluminum hydroxide/magnesium hydroxide/simethicone Suspension 30 milliLiter(s) Oral every 4 hours PRN Dyspepsia  dextrose 40% Gel 15 Gram(s) Oral once PRN Blood Glucose LESS THAN 70 milliGRAM(s)/deciliter  glucagon  Injectable 1 milliGRAM(s) IntraMuscular once PRN Glucose LESS THAN 70 milligrams/deciliter  loperamide 2 milliGRAM(s) Oral every 4 hours PRN Diarrhea  ondansetron Injectable 4 milliGRAM(s) IV Push every 6 hours PRN Nausea      07 Sep 2020 08:05    137    |  102    |  84     ----------------------------<  66     3.5     |  26     |  4.21     Ca    7.8        07 Sep 2020 08:05  Phos  4.0       07 Sep 2020 08:05  Mg     1.7       07 Sep 2020 08:05    TPro  x      /  Alb  1.2    /  TBili  x      /  DBili  x      /  AST  x      /  ALT  x      /  AlkPhos  x      07 Sep 2020 08:05  LIVER FUNCTIONS - ( 07 Sep 2020 08:05 )  Alb: 1.2 g/dL / Pro: x     / ALK PHOS: x     / ALT: x     / AST: x     / GGT: x         CBC Full  -  ( 08 Sep 2020 08:17 )  WBC Count : 11.57 K/uL  Hemoglobin : 8.7 g/dL  Hematocrit : 27.3 %  Platelet Count - Automated : 175 K/uL  Mean Cell Volume : 91.9 fl  Mean Cell Hemoglobin : 29.3 pg  Mean Cell Hemoglobin Concentration : 31.9 gm/dL            Assessment and Plan:      59 CAD, PAD s/p fem-pop bypass s/p RT iliac stent, diabetes, hypothyroidism, hyperlipidemia, current active smoker, uses cocaine last use yesterday, chronic back pain s/p lumbar surgery presented with multiple non-specific complaints found to have ST elevations on presenting EKG, code STEMI called and pt underwent urgent angiogram showing occluded RCA w no intervention with UTI sepsis and E. coli bacteremia POA w hospital course c/b code blue.   Patient is s/p Intubation / Extubation.     Acute respiratory failure  secondary to septic shock  -s/p intubation /extubation  -now stable respiratory wise    Septic shock secondary to Ecoli  bacteremia  -ecoli bactermia   -colitis on CT scan , possible bacteremia due to colitis    Daihrea  -bacterial colitis more likely   -no blood reported in stool, ishcemic colitis possible though but less likely  -c/w supportive care  -c.diff negative    AMISH w septic shock/UTI w STEMI and Code blue event w hypotension   Cr slowly trending down    urine retention - now w hughes   acid base and K stable  BUN stable  No acute indication for HD today - monitor daily for recovery      Hypocalcemia - Ca improving  w Calcum acetate with feeds for elevated phos/low ca++ when starting to eat   Intact PTH: 87  Vitamin D, 25-Hydroxy: 27.8  Magnesium, Serum: 1.8 mg/dL  Calcitriol - Vit D analogue     Ischemic Bowel w Shock w Leokocytosis  - improving   - Supportive care and plan per above teams  - add protein supplements for low protein state [facility] complaints and Diagnosis: ecoli  w/ septic shock/ ARF/ Diahrrea    Subjective: no complaints      REVIEW OF SYSTEMS:    CONSTITUTIONAL: No weakness, fevers or chills  EYES/ENT: No visual changes;  No vertigo or throat pain   NECK: No pain or stiffness  RESPIRATORY: No cough, wheezing, hemoptysis; No shortness of breath  CARDIOVASCULAR: No chest pain or palpitations  GASTROINTESTINAL: No abdominal or epigastric pain. No nausea, vomiting, or hematemesis; No diarrhea or constipation. No melena or hematochezia.  GENITOURINARY: No dysuria, frequency or hematuria  NEUROLOGICAL: No numbness or weakness  SKIN: No itching, burning, rashes, or lesions   All other review of systems is negative unless indicated above      Vital Signs Last 24 Hrs  T(C): 36.4 (08 Sep 2020 08:11), Max: 36.8 (07 Sep 2020 16:05)  T(F): 97.5 (08 Sep 2020 08:11), Max: 98.2 (07 Sep 2020 16:05)  HR: 87 (08 Sep 2020 08:11) (87 - 98)  BP: 110/51 (08 Sep 2020 08:11) (64/44 - 125/38)  BP(mean): --  RR: 16 (08 Sep 2020 08:11) (16 - 16)  SpO2: 97% (08 Sep 2020 08:11) (84% - 97%)    HEENT:   pupils equal and reactive, EOMI, no oropharyngeal lesions, erythema, exudates, oral thrush    NECK:   supple, no carotid bruits, no palpable lymph nodes, no thyromegaly    CV:  +S1, +S2, regular, no murmurs or rubs    RESP:   lungs clear to auscultation bilaterally, no wheezing, rales, rhonchi, good air entry bilaterally    BREAST:  not examined    GI:  abdomen soft, non-tender, non-distended, normal BS, no bruits, no abdominal masses, no palpable masses    RECTAL:  not examined    :  not examined    MSK:   normal muscle tone, no atrophy, no rigidity, no contractions    EXT:   no clubbing, no cyanosis, no edema, no calf pain, swelling or erythema    VASCULAR:  pulses equal and symmetric in the upper and lower extremities    NEURO:  AAOX3, no focal neurological deficits, follows all commands, able to move extremities spontaneously    SKIN:  no ulcers, lesions or rashes    MEDICATIONS  (STANDING):  aspirin  chewable 81 milliGRAM(s) Oral daily  atorvastatin 80 milliGRAM(s) Oral at bedtime  calcitriol   Capsule 0.25 MICROGram(s) Oral daily  calcium acetate 667 milliGRAM(s) Oral three times a day with meals  cefuroxime   Tablet 500 milliGRAM(s) Oral every 12 hours  clopidogrel Tablet 75 milliGRAM(s) Oral daily  dextrose 5%. 1000 milliLiter(s) (50 mL/Hr) IV Continuous <Continuous>  dextrose 50% Injectable 12.5 Gram(s) IV Push once  dextrose 50% Injectable 25 Gram(s) IV Push once  dextrose 50% Injectable 25 Gram(s) IV Push once  heparin   Injectable 5000 Unit(s) SubCutaneous every 8 hours  insulin lispro (HumaLOG) corrective regimen sliding scale   SubCutaneous three times a day before meals  insulin lispro (HumaLOG) corrective regimen sliding scale   SubCutaneous at bedtime  levothyroxine 112 MICROGram(s) Oral daily  metoprolol tartrate 25 milliGRAM(s) Oral daily  ranolazine 1000 milliGRAM(s) Oral two times a day    MEDICATIONS  (PRN):  acetaminophen   Tablet .. 650 milliGRAM(s) Oral every 4 hours PRN Mild Pain (1 - 3)  ALBUTerol    90 MICROgram(s) HFA Inhaler 2 Puff(s) Inhalation every 6 hours PRN Shortness of Breath and/or Wheezing  aluminum hydroxide/magnesium hydroxide/simethicone Suspension 30 milliLiter(s) Oral every 4 hours PRN Dyspepsia  dextrose 40% Gel 15 Gram(s) Oral once PRN Blood Glucose LESS THAN 70 milliGRAM(s)/deciliter  glucagon  Injectable 1 milliGRAM(s) IntraMuscular once PRN Glucose LESS THAN 70 milligrams/deciliter  loperamide 2 milliGRAM(s) Oral every 4 hours PRN Diarrhea  ondansetron Injectable 4 milliGRAM(s) IV Push every 6 hours PRN Nausea      07 Sep 2020 08:05    137    |  102    |  84     ----------------------------<  66     3.5     |  26     |  4.21     Ca    7.8        07 Sep 2020 08:05  Phos  4.0       07 Sep 2020 08:05  Mg     1.7       07 Sep 2020 08:05    TPro  x      /  Alb  1.2    /  TBili  x      /  DBili  x      /  AST  x      /  ALT  x      /  AlkPhos  x      07 Sep 2020 08:05  LIVER FUNCTIONS - ( 07 Sep 2020 08:05 )  Alb: 1.2 g/dL / Pro: x     / ALK PHOS: x     / ALT: x     / AST: x     / GGT: x         CBC Full  -  ( 08 Sep 2020 08:17 )  WBC Count : 11.57 K/uL  Hemoglobin : 8.7 g/dL  Hematocrit : 27.3 %  Platelet Count - Automated : 175 K/uL  Mean Cell Volume : 91.9 fl  Mean Cell Hemoglobin : 29.3 pg  Mean Cell Hemoglobin Concentration : 31.9 gm/dL            Assessment and Plan:      59 CAD, PAD s/p fem-pop bypass s/p RT iliac stent, diabetes, hypothyroidism, hyperlipidemia, current active smoker, uses cocaine last use yesterday, chronic back pain s/p lumbar surgery presented with multiple non-specific complaints found to have ST elevations on presenting EKG, code STEMI called and pt underwent urgent angiogram showing occluded RCA w no intervention with UTI sepsis and E. coli bacteremia POA w hospital course c/b code blue.   Patient is s/p Intubation / Extubation.     Acute respiratory failure  secondary to septic shock  -s/p intubation /extubation  -now stable respiratory wise    Septic shock secondary to Ecoli  bacteremia  -ecoli bactermia   -colitis on CT scan , possible bacteremia due to colitis    Daihrea  -bacterial colitis more likely   -no blood reported in stool, ishcemic colitis possible though but less likely  -c/w supportive care  -c.diff negative    Ground glass opacities on CT  -patient with increasing ground glass opacities suspicious for covid19  -Isolate patient and reswab  to rule out covid19      AMISH w septic shock  -cr improving 3.97 tdoay, encouraged oral hydration      Hypocalcemia - Ca improving  w Calcum acetate with feeds for elevated phos/low ca++ when starting to eat   Intact PTH: 87  Vitamin D, 25-Hydroxy: 27.8  Magnesium, Serum: 1.8 mg/dL  Calcitriol - Vit D analogue     Trial of void   -patient had hughes catheter palced in ICU  -to be removed today ; nursing to monitor voiding.

## 2020-09-08 NOTE — PROGRESS NOTE ADULT - ASSESSMENT
[age] CAD, PAD s/p fem-pop bypass s/p RT iliac stent, diabetes, hypothyroidism, hyperlipidemia, current active smoker, uses cocaine last use yesterday, chronic back pain s/p lumbar surgery presented with multiple non-specific complaints found to have ST elevations on presenting EKG, code STEMI called and pt underwent urgent angiogram showing occluded RCA w no intervention with UTI sepsis and E. coli bacteremia POA w hospital course c/b code blue.     AMISH w septic shock/UTI w STEMI and Code blue event w hypotension   Cr stable, trending down  urine retention, monitor  No acute indication for HD today, dioubt would require    Hypocalcemia - Ca improving  w Calcum acetate with feeds for elevated phos/low ca++ when starting to eat   Intact PTH: 87:   Vitamin D, 25-Hydroxy: 27.8  Magnesium, Serum: 1.8 mg/dL  Calcitriol - Vit D analogue   repeat ionized in AM if inpatient still    Ischemic Bowel w Shock w Leokocytosis  - improving   - Supportive care and plan per above teams    dc with staff

## 2020-09-08 NOTE — CONSULT NOTE ADULT - SUBJECTIVE AND OBJECTIVE BOX
Patient is a 59y old  Female who presents with a chief complaint of abd pain, weakness (08 Sep 2020 08:54)    HPI:  58yo/F with PMH CAD, PAD s/p fem-pop bypass s/p RT iliac stent, diabetes, hypothyroidism, hyperlipidemia, current active smoker, uses cocaine last use yesterday, chronic back pain s/p lumbar surgery presented with multiple non-specific complaints, including fevers on/off last 4-5 days, weakness, no appetite and reduced oral intake, vague diffuse abd pains, one day of diarrhea. No cough, no SOB. SHe came in today for evaluation of weakness and found to have ST elevations on presenting EKG, code STEMI called and pt underwent urgent angiogram showing occluded RCA. She denies chest pain at present. (23 Aug 2020 10:39)    9/8/2020-  Pt still with lower abdominal pain, reports worse over the past week.   Still with increased stools, up to 6 times daily.   C. diff test several days ago and was negative.     PAST MEDICAL & SURGICAL HISTORY:  Lung nodule  Diabetes mellitus  History of TIAs  History of gallbladder disease: surgery  History of colon polyps: precancerous  Substance abuse: history of. last used 14 years ago.  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 Decreased ROM.  Angina pectoris  History of MRSA infection: left lower extremity incisional area 2015  Anxiety and depression  Transient cerebral ischemia, unspecified type  Osteoarthritis of spine, unspecified spinal osteoarthritis complication status, unspecified spinal region  Lumbar herniated disc  Urinary incontinence, unspecified type  Overactive bladder  Hiatal hernia with GERD: hiatal hernia repaired  PAD (peripheral artery disease)  Hyperlipidemia, unspecified hyperlipidemia type  Essential hypertension  Hypothyroidism  COPD (chronic obstructive pulmonary disease)  CAD (coronary artery disease)  History of lumbar fusion: with laminectomy 11/8/2018  H/O hernia repair: hiatal hernia - 2017, 2018  S/P dilatation and curettage: 1991  H/O rhinoplasty: 1980  H/O angioplasty: Right iliac artery. 5/12/2017  H/O ventral hernia repair: 3/2017  History of incision and drainage: of lower extremity incisional site x 5 . last done5/2015  H/O arterial bypass of lower limb: Femoral - Popliteal. 11/2014 Left lower side with stents  History of laparoscopic cholecystectomy: 2/2016    MEDICATIONS  (STANDING):  aspirin  chewable 81 milliGRAM(s) Oral daily  atorvastatin 80 milliGRAM(s) Oral at bedtime  calcitriol   Capsule 0.25 MICROGram(s) Oral daily  calcium acetate 667 milliGRAM(s) Oral three times a day with meals  cefuroxime   Tablet 500 milliGRAM(s) Oral every 12 hours  clopidogrel Tablet 75 milliGRAM(s) Oral daily  dextrose 5%. 1000 milliLiter(s) (50 mL/Hr) IV Continuous <Continuous>  dextrose 50% Injectable 12.5 Gram(s) IV Push once  dextrose 50% Injectable 25 Gram(s) IV Push once  dextrose 50% Injectable 25 Gram(s) IV Push once  heparin   Injectable 5000 Unit(s) SubCutaneous every 8 hours  insulin lispro (HumaLOG) corrective regimen sliding scale   SubCutaneous three times a day before meals  insulin lispro (HumaLOG) corrective regimen sliding scale   SubCutaneous at bedtime  levothyroxine 112 MICROGram(s) Oral daily  metoprolol tartrate 25 milliGRAM(s) Oral daily  ranolazine 1000 milliGRAM(s) Oral two times a day    MEDICATIONS  (PRN):  acetaminophen   Tablet .. 650 milliGRAM(s) Oral every 4 hours PRN Mild Pain (1 - 3)  ALBUTerol    90 MICROgram(s) HFA Inhaler 2 Puff(s) Inhalation every 6 hours PRN Shortness of Breath and/or Wheezing  aluminum hydroxide/magnesium hydroxide/simethicone Suspension 30 milliLiter(s) Oral every 4 hours PRN Dyspepsia  dextrose 40% Gel 15 Gram(s) Oral once PRN Blood Glucose LESS THAN 70 milliGRAM(s)/deciliter  glucagon  Injectable 1 milliGRAM(s) IntraMuscular once PRN Glucose LESS THAN 70 milligrams/deciliter  loperamide 2 milliGRAM(s) Oral every 4 hours PRN Diarrhea  ondansetron Injectable 4 milliGRAM(s) IV Push every 6 hours PRN Nausea    Allergies    No Known Allergies    Intolerances      SOCIAL HISTORY:  FAMILY HISTORY:  Family history of asthma (Sibling): sister  Family history of epilepsy (Sibling, Sibling): 2 sisters  Family history of cardiovascular disease: mother  Family history of stroke: mother  Family history of heart disease: father  Family history of alcoholism in father    REVIEW OF SYSTEMS:  CONSTITUTIONAL: + weakness, no fevers or chills  EYES/ENT: No visual changes;  No vertigo or throat pain   NECK: No pain or stiffness  RESPIRATORY: No cough, wheezing, hemoptysis; No shortness of breath  CARDIOVASCULAR: No chest pain or palpitations  GASTROINTESTINAL: Per HPI.   GENITOURINARY: No dysuria, frequency or hematuria  NEUROLOGICAL: No numbness or weakness  SKIN: No itching, burning, rashes, or lesions   PSYCH: Normal mood and affect  All other review of systems is negative unless indicated above.  Vital Signs Last 24 Hrs  T(C): 36.4 (08 Sep 2020 08:11), Max: 36.8 (07 Sep 2020 16:05)  T(F): 97.5 (08 Sep 2020 08:11), Max: 98.2 (07 Sep 2020 16:05)  HR: 87 (08 Sep 2020 08:11) (87 - 98)  BP: 110/51 (08 Sep 2020 08:11) (64/44 - 125/38)  BP(mean): --  RR: 16 (08 Sep 2020 08:11) (16 - 16)  SpO2: 97% (08 Sep 2020 08:11) (84% - 97%)    PHYSICAL EXAM:  Constitutional: Middle aged female appears uncomfortable  HEENT: MMM  Neck: No LAD  Respiratory: CTAB  Cardiovascular: S1 and S2, RRR, no M/G/R  Gastrointestinal: + lower abdominal tenderness, ND, +BS  Extremities: No peripheral edema  Vascular: 2+ peripheral pulses  Neurological: A/O x 3, no focal deficits  Psychiatric: Normal mood, normal affect  Skin: No rashes    LABS:                        8.7    11.57 )-----------( 175      ( 08 Sep 2020 08:17 )             27.3     09-08    139  |  107  |  79<H>  ----------------------------<  89  3.4<L>   |  24  |  3.97<H>    Ca    7.8<L>      08 Sep 2020 08:17  Phos  4.5     09-08  Mg     1.7     09-07    TPro  x   /  Alb  1.1<L>  /  TBili  x   /  DBili  x   /  AST  x   /  ALT  x   /  AlkPhos  x   09-08      LIVER FUNCTIONS - ( 08 Sep 2020 08:17 )  Alb: 1.1 g/dL / Pro: x     / ALK PHOS: x     / ALT: x     / AST: x     / GGT: x             RADIOLOGY & ADDITIONAL STUDIES:

## 2020-09-08 NOTE — PROGRESS NOTE ADULT - ASSESSMENT
58yo/F with PMH CAD, PAD s/p fem-pop bypass s/p RT iliac stent, diabetes, hypothyroidism, hyperlipidemia, current active smoker, uses cocaine last use 8/22  chronic back pain s/p lumbar surgery presented with multiple non-specific complaints, including fevers on/off last 4-5 days, weakness, no appetite and reduced oral intake, vague diffuse abd pains, one day of diarrhea. No cough, no SOB. She came in 8/23 for evaluation of weakness and found to have ST elevations on presenting EKG, code STEMI called and pt underwent urgent angiogram showing occluded RCA. She denies chest pain at present Also noted with positive UA, blood cx with Ecoli, CT abd/pelvis unremarkable, was started on IV rocephin.     1. sepsis. ischemic colitis. resolving leukocytosis. ecoli UTI/bacteremia s/p abx course. AMISH  - slowly improving, wbc ct down to 11, GI eval noted   - urine cx/blood cx growing Ecoli; sensitivities reviewed   - completed 7 days IV zosyn [id number] --> 9/4   - s/p rocephin 2gm daily #6, s/p IV flagyl #4  - on po ceftin #4   - consider observe off abx  - C diff pcr (-)   - surgery evaluation noted, repeat CT abd/pelvis reviewed   - monitor temps  - tolerating abx well so far; no side effects noted  - reason for abx use and side effects reviewed with patient  - supportive care  - fu cbc    2. other issues - care per CCU

## 2020-09-08 NOTE — CONSULT NOTE ADULT - ASSESSMENT
58 y/o female with CAD, PAD s/p fem-pop bypass s/p RT iliac stent, diabetes, admitted with UTI sepsis and E. coli bacteremia POA.  ECG revealed STEMI prompting code STEMI s/p urgent angiogram showing occluded RCA.  On iv zosyn for e. coli bactermia now with persistent lower abdominal pain that has increased over the past several days.  Also with diarrhea up to 6 times daily.  C. Diff was negative several days ago.  WBC improving.     Impression:  Lower abdominal pain/diarrhea - can't rule out colitis - infectious vs. inflammatory vs. ischemic vs. IBS-D.     Plan:  IV ABX.   Start bentyl now.    If no improvement, consider resending stool studies.   Consider repeating ct scan-message left for Dr. Garcia to discuss.     Discussed with pt, Dr. Bean. 60 y/o female with CAD, PAD s/p fem-pop bypass s/p RT iliac stent, diabetes, admitted with UTI sepsis and E. coli bacteremia POA.  ECG revealed STEMI prompting code STEMI s/p urgent angiogram showing occluded RCA.  On iv zosyn for e. coli bactermia now with persistent lower abdominal pain that has increased over the past several days.  Also with diarrhea up to 6 times daily.  C. Diff was negative several days ago.  WBC improving.     Impression:  Lower abdominal pain/diarrhea - can't rule out colitis - infectious vs. inflammatory vs. ischemic vs. IBS-D.     Plan:  Start bentyl now.    Awaiting stool studies.  Repeat ct scan noted slightly increased sigmoid wall thickening also with new ground glass opacities in right middle and lower lobes  Spoke with Dr. Garcia, to rule out for COVID.   Eventual colonoscopy pending above.     Discussed with pt, Dr. Bean.

## 2020-09-08 NOTE — PROGRESS NOTE ADULT - SUBJECTIVE AND OBJECTIVE BOX
Patient is a 59y Female who reports no complaints as new. No cp or sob reported.    MEDICATIONS  (STANDING):  aspirin  chewable 81 milliGRAM(s) Oral daily  atorvastatin 80 milliGRAM(s) Oral at bedtime  calcitriol   Capsule 0.25 MICROGram(s) Oral daily  calcium acetate 667 milliGRAM(s) Oral three times a day with meals  clopidogrel Tablet 75 milliGRAM(s) Oral daily  dextrose 5%. 1000 milliLiter(s) (50 mL/Hr) IV Continuous <Continuous>  dextrose 50% Injectable 12.5 Gram(s) IV Push once  dextrose 50% Injectable 25 Gram(s) IV Push once  dextrose 50% Injectable 25 Gram(s) IV Push once  dicyclomine 20 milliGRAM(s) Oral three times a day before meals  heparin   Injectable 5000 Unit(s) SubCutaneous every 8 hours  insulin lispro (HumaLOG) corrective regimen sliding scale   SubCutaneous three times a day before meals  insulin lispro (HumaLOG) corrective regimen sliding scale   SubCutaneous at bedtime  levothyroxine 112 MICROGram(s) Oral daily  metoprolol tartrate 25 milliGRAM(s) Oral daily  potassium chloride    Tablet ER 40 milliEquivalent(s) Oral once  ranolazine 1000 milliGRAM(s) Oral two times a day    MEDICATIONS  (PRN):  acetaminophen   Tablet .. 650 milliGRAM(s) Oral every 4 hours PRN Mild Pain (1 - 3)  ALBUTerol    90 MICROgram(s) HFA Inhaler 2 Puff(s) Inhalation every 6 hours PRN Shortness of Breath and/or Wheezing  aluminum hydroxide/magnesium hydroxide/simethicone Suspension 30 milliLiter(s) Oral every 4 hours PRN Dyspepsia  dextrose 40% Gel 15 Gram(s) Oral once PRN Blood Glucose LESS THAN 70 milliGRAM(s)/deciliter  glucagon  Injectable 1 milliGRAM(s) IntraMuscular once PRN Glucose LESS THAN 70 milligrams/deciliter  loperamide 2 milliGRAM(s) Oral every 4 hours PRN Diarrhea  ondansetron Injectable 4 milliGRAM(s) IV Push every 6 hours PRN Nausea        T(C): , Max: 36.8 (09-07-20 @ 16:05)  T(F): , Max: 98.2 (09-07-20 @ 16:05)  HR: 87 (09-08-20 @ 08:11)  BP: 110/51 (09-08-20 @ 08:11)  BP(mean): --  RR: 16 (09-08-20 @ 08:11)  SpO2: 97% (09-08-20 @ 08:11)  Wt(kg): --    09-07 @ 07:01  -  09-08 @ 07:00  --------------------------------------------------------  IN: 0 mL / OUT: 1475 mL / NET: -1475 mL          PHYSICAL EXAM:    Constitutional: NAD, frail. >>then stated age  HEENT: dry MM  Neck: No LAD, No JVD  Respiratory: dist BS   Cardiovascular: S1 and S2  Extremities:  peripheral edema  Neurological: A/O x 3, no focal deficits  : No Black  Skin: No rashes  Access: Not applicable        LABS:                        8.7    11.57 )-----------( 175      ( 08 Sep 2020 08:17 )             27.3     08 Sep 2020 08:17    139    |  107    |  79     ----------------------------<  89     3.4     |  24     |  3.97   07 Sep 2020 08:05    137    |  102    |  84     ----------------------------<  66     3.5     |  26     |  4.21   06 Sep 2020 06:38    139    |  102    |  92     ----------------------------<  95     3.5     |  28     |  4.60   05 Sep 2020 06:38    138    |  100    |  107    ----------------------------<  137    3.2     |  28     |  4.84     Ca    7.8        08 Sep 2020 08:17  Ca    7.8        07 Sep 2020 08:05  Ca    7.3        06 Sep 2020 06:38  Ca    6.9        05 Sep 2020 06:38  Phos  4.5       08 Sep 2020 08:17  Phos  4.0       07 Sep 2020 08:05  Phos  4.5       06 Sep 2020 06:38  Mg     1.7       07 Sep 2020 08:05    TPro  x      /  Alb  1.1    /  TBili  x      /  DBili  x      /  AST  x      /  ALT  x      /  AlkPhos  x      08 Sep 2020 08:17  TPro  x      /  Alb  1.2    /  TBili  x      /  DBili  x      /  AST  x      /  ALT  x      /  AlkPhos  x      07 Sep 2020 08:05  TPro  x      /  Alb  1.2    /  TBili  x      /  DBili  x      /  AST  x      /  ALT  x      /  AlkPhos  x      06 Sep 2020 06:38          Urine Studies:          RADIOLOGY & ADDITIONAL STUDIES:

## 2020-09-08 NOTE — PROGRESS NOTE ADULT - SUBJECTIVE AND OBJECTIVE BOX
Date of service: 20 @ 12:33    pt seen and examined  having multiple loose stools  denies abd pain  feels better but weak    afebrile     ROS: no fever or chills; denies dizziness, no HA, no SOB or cough, no constipation; no dysuria, no urinary frequency, no legs pain, no rashes    MEDICATIONS  (STANDING):  aspirin  chewable 81 milliGRAM(s) Oral daily  atorvastatin 80 milliGRAM(s) Oral at bedtime  calcitriol   Capsule 0.25 MICROGram(s) Oral daily  calcium acetate 667 milliGRAM(s) Oral three times a day with meals  cefuroxime   Tablet 500 milliGRAM(s) Oral every 12 hours  clopidogrel Tablet 75 milliGRAM(s) Oral daily  dextrose 5%. 1000 milliLiter(s) (50 mL/Hr) IV Continuous <Continuous>  dextrose 50% Injectable 12.5 Gram(s) IV Push once  dextrose 50% Injectable 25 Gram(s) IV Push once  dextrose 50% Injectable 25 Gram(s) IV Push once  dicyclomine 20 milliGRAM(s) Oral three times a day before meals  heparin   Injectable 5000 Unit(s) SubCutaneous every 8 hours  insulin lispro (HumaLOG) corrective regimen sliding scale   SubCutaneous three times a day before meals  insulin lispro (HumaLOG) corrective regimen sliding scale   SubCutaneous at bedtime  levothyroxine 112 MICROGram(s) Oral daily  metoprolol tartrate 25 milliGRAM(s) Oral daily  ranolazine 1000 milliGRAM(s) Oral two times a day      Vital Signs Last 24 Hrs  T(C): 36.4 (08 Sep 2020 08:11), Max: 36.8 (07 Sep 2020 16:05)  T(F): 97.5 (08 Sep 2020 08:11), Max: 98.2 (07 Sep 2020 16:05)  HR: 87 (08 Sep 2020 08:11) (87 - 98)  BP: 110/51 (08 Sep 2020 08:11) (64/44 - 125/38)  BP(mean): --  RR: 16 (08 Sep 2020 08:11) (16 - 16)  SpO2: 97% (08 Sep 2020 08:11) (84% - 97%)    PE:  Constitutional: frail looking  HEENT: NC/AT, EOMI, PERRLA, conjunctivae clear; ears and nose atraumatic; pharynx benign  Neck: supple; thyroid not palpable  Back: no tenderness  Respiratory: respiratory effort normal; clear to auscultation  Cardiovascular: S1S2 regular, no murmurs  Abdomen: soft,  tender, distended, positive BS; liver and spleen WNL  Genitourinary: no suprapubic tenderness  Lymphatic: no LN palpable  Musculoskeletal: no muscle tenderness, no joint swelling or tenderness  Extremities: no pedal edema  Neurological/ Psychiatric:  moving all extremities  Skin: no rashes; no palpable lesions    Labs: all available labs reviewed                                   8.7    11.57 )-----------( 175      ( 08 Sep 2020 08:17 )             27.3     09-08    139  |  107  |  79<H>  ----------------------------<  89  3.4<L>   |  24  |  3.97<H>    Ca    7.8<L>      08 Sep 2020 08:17  Phos  4.5     09-08  Mg     1.7     09-07    TPro  x   /  Alb  1.1<L>  /  TBili  x   /  DBili  x   /  AST  x   /  ALT  x   /  AlkPhos  x   09-      Urinalysis Basic - ( 23 Aug 2020 10:25 )    Color: Yellow / Appearance: Clear / S.010 / pH: x  Gluc: x / Ketone: Trace  / Bili: Small / Urobili: 1 mg/dL   Blood: x / Protein: 30 mg/dL / Nitrite: Negative   Leuk Esterase: Moderate / RBC: 25-50 /HPF / WBC >50   Sq Epi: x / Non Sq Epi: Few / Bacteria: Many    Culture - Urine (20 @ 10:25)    Specimen Source: .Urine Clean Catch (Midstream)    Culture Results:   >100,000 CFU/ml Escherichia coli    Culture - Blood (20 @ 08:34)    Gram Stain:   Growth in aerobic bottle: Gram Negative Rods  Growth in anaerobic bottle: Gram Negative Rods    Specimen Source: .Blood None    Culture Results:   Growth in aerobic and anaerobic bottles: Escherichia coli  See previous culture [id number]    Culture - Blood (20 @ 08:34)    -  Escherichia coli: Detec    Gram Stain:   Growth in aerobic bottle: Gram Negative Rods  Growth in anaerobic bottle: Gram Negative Rods    Specimen Source: .Blood None    Organism: Blood Culture PCR    Culture Results:   Growth in aerobic bottle: Gram Negative Rods  Growth in anaerobic bottle: Gram Negative Rods      Radiology: all available radiological tests reviewed    < from: CT Abdomen and Pelvis No Cont (20 @ 01:24) >  EXAM:  CT ABDOMEN AND PELVIS                            PROCEDURE DATE:  2020          INTERPRETATION:  CLINICAL INFORMATION: Status post catheterization today. Evaluate for retroperitoneal bleed.    COMPARISON: 2020    PROCEDURE:  CT ofthe Abdomen and Pelvis was performed without intravenous contrast.  Intravenous contrast: None.  Oral contrast: None.  Sagittal and coronal reformats were performed.    FINDINGS:  Evaluation of solid organs is limited without intravenous contrast.  Evaluation of the lower pelvis is limited as images do not extend beyond the proximal inguinal region.      LOWER CHEST: Small bilateral pleural effusions and associated subsegmental bibasilar atelectasis. Scattered calcified granulomas. Aortic and coronary artery atherosclerotic calcifications.    LIVER: Within normal limits.  BILE DUCTS: Stable mild CBD dilatation likely related to cholecystectomy.  GALLBLADDER: Cholecystectomy.  SPLEEN: Within normal limits.  PANCREAS: Within normal limits.  ADRENALS: Within normal limits.  KIDNEYS/URETERS: No hydronephrosis or obstructing stone.    BLADDER: Within normal limits.  REPRODUCTIVE ORGANS: Uterus and adnexa within normal limits.    BOWEL: Partially imaged rectal tube. Distal rectum/anus not included in the field-of-view. Wall thickening of the sigmoid colon. No bowel obstruction. Appendix is normal.  PERITONEUM: Small ascites. Presacral edema.  VESSELS: Atherosclerotic changes.  RETROPERITONEUM/LYMPH NODES: No lymphadenopathy. No definite retroperitoneal hematoma identified.  ABDOMINAL WALL: Anasarca. Inguinal regions not fully included in the field-of-view. Mild bilateral stranding in the visualized inguinal regions.  BONES: Degenerative changes of the spine. Grade 1 anterolisthesis at L4-L5. Posterior fixation hardware at L4-L5.    IMPRESSION:    1. No definite retroperitoneal hematoma identified.    2. Incomplete evaluation for inguinal region/thigh hematoma due to incomplete field-of-view.    3. Small bilateral pleural effusions. Small ascites. Anasarca.      4. Wall thickening of the sigmoid colon. Recommend clinical correlation for colitis.    < end of copied text >    < from: CT Abdomen and Pelvis w/ Oral Cont (20 @ 16:41) >  EXAM:  CT ABDOMEN AND PELVIS OC                            PROCEDURE DATE:  2020          INTERPRETATION:  CLINICAL INFORMATION: Diffuse abdominal pain    COMPARISON: None.    PROCEDURE:  CT of the Abdomen and Pelvis was performed without intravenous contrast.  Intravenous contrast: None.  Oral contrast: positive contrast was administered.  Sagittal and coronal reformats were performed.    FINDINGS:  LOWER CHEST: Coronary artery calcifications. Clear lung bases.    LIVER: Within normal limits.  BILE DUCTS: Normal caliber.  GALLBLADDER: Cholecystectomy.  SPLEEN: Within normal limits.  PANCREAS: Within normal limits.  ADRENALS: Within normal limits.  KIDNEYS/URETERS: Within normal limits.    BLADDER: Within normal limits.  REPRODUCTIVE ORGANS: Uterus and adnexa within normal limits.    BOWEL: Mildly distended small bowel without transition point to suggest obstruction. No wall thickening or inflammatory change.  Appendix normal.  PERITONEUM: No ascites or pneumoperitoneum..  VESSELS: Severe diffuse atherosclerotic arterial calcification. Normal caliber aorta.  RETROPERITONEUM/LYMPH NODES: No lymphadenopathy.  ABDOMINAL WALL: Within normal limits.  BONES: L4-L5 interpedicular screws and connecting rods. Moderate anterolisthesis of L4 on L5. No acute bony abnormality.    IMPRESSION:  Mildly distended small bowel without evidence of obstruction.    < end of copied text >    Advanced directives addressed: full resuscitation

## 2020-09-09 LAB
ANION GAP SERPL CALC-SCNC: 9 MMOL/L — SIGNIFICANT CHANGE UP (ref 5–17)
BUN SERPL-MCNC: 79 MG/DL — HIGH (ref 7–23)
CALCIUM SERPL-MCNC: 8 MG/DL — LOW (ref 8.5–10.1)
CHLORIDE SERPL-SCNC: 107 MMOL/L — SIGNIFICANT CHANGE UP (ref 96–108)
CO2 SERPL-SCNC: 19 MMOL/L — LOW (ref 22–31)
CREAT SERPL-MCNC: 4.36 MG/DL — HIGH (ref 0.5–1.3)
CULTURE RESULTS: SIGNIFICANT CHANGE UP
GLUCOSE SERPL-MCNC: 83 MG/DL — SIGNIFICANT CHANGE UP (ref 70–99)
HCT VFR BLD CALC: 29.5 % — LOW (ref 34.5–45)
HGB BLD-MCNC: 9 G/DL — LOW (ref 11.5–15.5)
MCHC RBC-ENTMCNC: 28.5 PG — SIGNIFICANT CHANGE UP (ref 27–34)
MCHC RBC-ENTMCNC: 30.5 GM/DL — LOW (ref 32–36)
MCV RBC AUTO: 93.4 FL — SIGNIFICANT CHANGE UP (ref 80–100)
PLATELET # BLD AUTO: 162 K/UL — SIGNIFICANT CHANGE UP (ref 150–400)
POTASSIUM SERPL-MCNC: 5 MMOL/L — SIGNIFICANT CHANGE UP (ref 3.5–5.3)
POTASSIUM SERPL-SCNC: 5 MMOL/L — SIGNIFICANT CHANGE UP (ref 3.5–5.3)
RBC # BLD: 3.16 M/UL — LOW (ref 3.8–5.2)
RBC # FLD: 17.3 % — HIGH (ref 10.3–14.5)
SODIUM SERPL-SCNC: 135 MMOL/L — SIGNIFICANT CHANGE UP (ref 135–145)
SPECIMEN SOURCE: SIGNIFICANT CHANGE UP
WBC # BLD: 10.18 K/UL — SIGNIFICANT CHANGE UP (ref 3.8–10.5)
WBC # FLD AUTO: 10.18 K/UL — SIGNIFICANT CHANGE UP (ref 3.8–10.5)

## 2020-09-09 PROCEDURE — 99232 SBSQ HOSP IP/OBS MODERATE 35: CPT

## 2020-09-09 PROCEDURE — 99233 SBSQ HOSP IP/OBS HIGH 50: CPT

## 2020-09-09 PROCEDURE — 71250 CT THORAX DX C-: CPT | Mod: 26

## 2020-09-09 RX ORDER — MEROPENEM 1 G/30ML
500 INJECTION INTRAVENOUS EVERY 12 HOURS
Refills: 0 | Status: COMPLETED | OUTPATIENT
Start: 2020-09-10 | End: 2020-09-16

## 2020-09-09 RX ORDER — BUDESONIDE AND FORMOTEROL FUMARATE DIHYDRATE 160; 4.5 UG/1; UG/1
2 AEROSOL RESPIRATORY (INHALATION)
Refills: 0 | Status: DISCONTINUED | OUTPATIENT
Start: 2020-09-09 | End: 2020-09-21

## 2020-09-09 RX ORDER — ALBUTEROL 90 UG/1
2 AEROSOL, METERED ORAL EVERY 6 HOURS
Refills: 0 | Status: DISCONTINUED | OUTPATIENT
Start: 2020-09-09 | End: 2020-09-21

## 2020-09-09 RX ORDER — MEROPENEM 1 G/30ML
500 INJECTION INTRAVENOUS ONCE
Refills: 0 | Status: COMPLETED | OUTPATIENT
Start: 2020-09-09 | End: 2020-09-09

## 2020-09-09 RX ORDER — SODIUM CHLORIDE 9 MG/ML
1000 INJECTION INTRAMUSCULAR; INTRAVENOUS; SUBCUTANEOUS
Refills: 0 | Status: COMPLETED | OUTPATIENT
Start: 2020-09-09 | End: 2020-09-10

## 2020-09-09 RX ORDER — IPRATROPIUM BROMIDE 0.2 MG/ML
1 SOLUTION, NON-ORAL INHALATION EVERY 6 HOURS
Refills: 0 | Status: DISCONTINUED | OUTPATIENT
Start: 2020-09-09 | End: 2020-09-21

## 2020-09-09 RX ORDER — MEROPENEM 1 G/30ML
INJECTION INTRAVENOUS
Refills: 0 | Status: COMPLETED | OUTPATIENT
Start: 2020-09-09 | End: 2020-09-17

## 2020-09-09 RX ADMIN — CALCITRIOL 0.25 MICROGRAM(S): 0.5 CAPSULE ORAL at 09:46

## 2020-09-09 RX ADMIN — SODIUM CHLORIDE 70 MILLILITER(S): 9 INJECTION INTRAMUSCULAR; INTRAVENOUS; SUBCUTANEOUS at 13:42

## 2020-09-09 RX ADMIN — Medication 20 MILLIGRAM(S): at 06:13

## 2020-09-09 RX ADMIN — MORPHINE SULFATE 2 MILLIGRAM(S): 50 CAPSULE, EXTENDED RELEASE ORAL at 09:49

## 2020-09-09 RX ADMIN — MORPHINE SULFATE 2 MILLIGRAM(S): 50 CAPSULE, EXTENDED RELEASE ORAL at 10:10

## 2020-09-09 RX ADMIN — Medication 20 MILLIGRAM(S): at 17:15

## 2020-09-09 RX ADMIN — Medication 0: at 11:01

## 2020-09-09 RX ADMIN — Medication 81 MILLIGRAM(S): at 09:45

## 2020-09-09 RX ADMIN — ATORVASTATIN CALCIUM 80 MILLIGRAM(S): 80 TABLET, FILM COATED ORAL at 01:23

## 2020-09-09 RX ADMIN — Medication 2: at 17:18

## 2020-09-09 RX ADMIN — RANOLAZINE 1000 MILLIGRAM(S): 500 TABLET, FILM COATED, EXTENDED RELEASE ORAL at 01:22

## 2020-09-09 RX ADMIN — RANOLAZINE 1000 MILLIGRAM(S): 500 TABLET, FILM COATED, EXTENDED RELEASE ORAL at 09:47

## 2020-09-09 RX ADMIN — Medication 20 MILLIGRAM(S): at 11:01

## 2020-09-09 RX ADMIN — Medication 667 MILLIGRAM(S): at 08:06

## 2020-09-09 RX ADMIN — RANOLAZINE 1000 MILLIGRAM(S): 500 TABLET, FILM COATED, EXTENDED RELEASE ORAL at 22:21

## 2020-09-09 RX ADMIN — HEPARIN SODIUM 5000 UNIT(S): 5000 INJECTION INTRAVENOUS; SUBCUTANEOUS at 05:56

## 2020-09-09 RX ADMIN — Medication 667 MILLIGRAM(S): at 17:16

## 2020-09-09 RX ADMIN — HEPARIN SODIUM 5000 UNIT(S): 5000 INJECTION INTRAVENOUS; SUBCUTANEOUS at 13:37

## 2020-09-09 RX ADMIN — MEROPENEM 100 MILLIGRAM(S): 1 INJECTION INTRAVENOUS at 18:25

## 2020-09-09 RX ADMIN — Medication 667 MILLIGRAM(S): at 11:00

## 2020-09-09 RX ADMIN — CLOPIDOGREL BISULFATE 75 MILLIGRAM(S): 75 TABLET, FILM COATED ORAL at 09:46

## 2020-09-09 RX ADMIN — Medication 25 MILLIGRAM(S): at 09:47

## 2020-09-09 RX ADMIN — HEPARIN SODIUM 5000 UNIT(S): 5000 INJECTION INTRAVENOUS; SUBCUTANEOUS at 22:21

## 2020-09-09 RX ADMIN — Medication 112 MICROGRAM(S): at 05:57

## 2020-09-09 RX ADMIN — ALBUTEROL 2 PUFF(S): 90 AEROSOL, METERED ORAL at 21:11

## 2020-09-09 RX ADMIN — Medication 1 PUFF(S): at 21:11

## 2020-09-09 RX ADMIN — Medication 1200 MILLIGRAM(S): at 17:22

## 2020-09-09 RX ADMIN — ATORVASTATIN CALCIUM 80 MILLIGRAM(S): 80 TABLET, FILM COATED ORAL at 22:21

## 2020-09-09 RX ADMIN — BUDESONIDE AND FORMOTEROL FUMARATE DIHYDRATE 2 PUFF(S): 160; 4.5 AEROSOL RESPIRATORY (INHALATION) at 21:12

## 2020-09-09 NOTE — PROGRESS NOTE ADULT - SUBJECTIVE AND OBJECTIVE BOX
Cheif complaints and Diagnosis: respiratory failure/ large mucous plug in bronchus/ HAP    Subjective: patient with hypoxia 80's on room air. appears weak. says she feels weak      REVIEW OF SYSTEMS:    CONSTITUTIONAL: No weakness, fevers or chills  EYES/ENT: No visual changes;  No vertigo or throat pain   NECK: No pain or stiffness  RESPIRATORY: No cough, wheezing, hemoptysis; No shortness of breath  CARDIOVASCULAR: No chest pain or palpitations  GASTROINTESTINAL: No abdominal or epigastric pain. No nausea, vomiting, or hematemesis; No diarrhea or constipation. No melena or hematochezia.  GENITOURINARY: No dysuria, frequency or hematuria  NEUROLOGICAL: No numbness or weakness  SKIN: No itching, burning, rashes, or lesions   All other review of systems is negative unless indicated above      Vital Signs Last 24 Hrs  T(C): 36.8 (09 Sep 2020 16:44), Max: 36.8 (09 Sep 2020 08:39)  T(F): 98.2 (09 Sep 2020 16:44), Max: 98.2 (09 Sep 2020 08:39)  HR: 72 (09 Sep 2020 16:44) (72 - 80)  BP: 106/42 (09 Sep 2020 16:44) (104/37 - 115/42)  BP(mean): --  RR: 17 (09 Sep 2020 16:44) (16 - 17)  SpO2: 94% (09 Sep 2020 16:44) (94% - 98%)    HEENT:   pupils equal and reactive, EOMI, no oropharyngeal lesions, erythema, exudates, oral thrush    NECK:   supple, no carotid bruits, no palpable lymph nodes, no thyromegaly    CV:  +S1, +S2, regular, no murmurs or rubs    RESP:   lungs clear to auscultation bilaterally, no wheezing, rales, rhonchi, good air entry bilaterally    BREAST:  not examined    GI:  abdomen soft, non-tender, non-distended, normal BS, no bruits, no abdominal masses, no palpable masses    RECTAL:  not examined    :  not examined    MSK:   normal muscle tone, no atrophy, no rigidity, no contractions    EXT:   no clubbing, no cyanosis, no edema, no calf pain, swelling or erythema    VASCULAR:  pulses equal and symmetric in the upper and lower extremities    NEURO:  AAOX3, no focal neurological deficits, follows all commands, able to move extremities spontaneously    SKIN:  no ulcers, lesions or rashes    MEDICATIONS  (STANDING):  ALBUTerol    90 MICROgram(s) HFA Inhaler 2 Puff(s) Inhalation every 6 hours  aspirin  chewable 81 milliGRAM(s) Oral daily  atorvastatin 80 milliGRAM(s) Oral at bedtime  budesonide  80 MICROgram(s)/formoterol 4.5 MICROgram(s) Inhaler 2 Puff(s) Inhalation two times a day  calcitriol   Capsule 0.25 MICROGram(s) Oral daily  calcium acetate 667 milliGRAM(s) Oral three times a day with meals  clopidogrel Tablet 75 milliGRAM(s) Oral daily  dextrose 5%. 1000 milliLiter(s) (50 mL/Hr) IV Continuous <Continuous>  dextrose 50% Injectable 12.5 Gram(s) IV Push once  dextrose 50% Injectable 25 Gram(s) IV Push once  dextrose 50% Injectable 25 Gram(s) IV Push once  dicyclomine 20 milliGRAM(s) Oral three times a day before meals  guaiFENesin ER 1200 milliGRAM(s) Oral every 12 hours  heparin   Injectable 5000 Unit(s) SubCutaneous every 8 hours  insulin lispro (HumaLOG) corrective regimen sliding scale   SubCutaneous three times a day before meals  insulin lispro (HumaLOG) corrective regimen sliding scale   SubCutaneous at bedtime  ipratropium 17 MICROgram(s) HFA Inhaler 1 Puff(s) Inhalation every 6 hours  levothyroxine 112 MICROGram(s) Oral daily  metoprolol tartrate 25 milliGRAM(s) Oral daily  ranolazine 1000 milliGRAM(s) Oral two times a day  sodium chloride 0.9%. 1000 milliLiter(s) (70 mL/Hr) IV Continuous <Continuous>    MEDICATIONS  (PRN):  acetaminophen   Tablet .. 650 milliGRAM(s) Oral every 4 hours PRN Mild Pain (1 - 3)  ALBUTerol    90 MICROgram(s) HFA Inhaler 2 Puff(s) Inhalation every 6 hours PRN Shortness of Breath and/or Wheezing  aluminum hydroxide/magnesium hydroxide/simethicone Suspension 30 milliLiter(s) Oral every 4 hours PRN Dyspepsia  dextrose 40% Gel 15 Gram(s) Oral once PRN Blood Glucose LESS THAN 70 milliGRAM(s)/deciliter  glucagon  Injectable 1 milliGRAM(s) IntraMuscular once PRN Glucose LESS THAN 70 milligrams/deciliter  loperamide 2 milliGRAM(s) Oral every 4 hours PRN Diarrhea  morphine  - Injectable 2 milliGRAM(s) IV Push every 4 hours PRN Severe Pain (7 - 10)  ondansetron Injectable 4 milliGRAM(s) IV Push every 6 hours PRN Nausea  oxycodone    5 mG/acetaminophen 325 mG 1 Tablet(s) Oral every 6 hours PRN Moderate Pain (4 - 6)      09 Sep 2020 08:28    135    |  107    |  79     ----------------------------<  83     5.0     |  19     |  4.36     Ca    8.0        09 Sep 2020 08:28  Phos  4.5       08 Sep 2020 08:17    TPro  x      /  Alb  1.1    /  TBili  x      /  DBili  x      /  AST  x      /  ALT  x      /  AlkPhos  x      08 Sep 2020 08:17  LIVER FUNCTIONS - ( 08 Sep 2020 08:17 )  Alb: 1.1 g/dL / Pro: x     / ALK PHOS: x     / ALT: x     / AST: x     / GGT: x         CBC Full  -  ( 09 Sep 2020 10:15 )  WBC Count : 10.18 K/uL  Hemoglobin : 9.0 g/dL  Hematocrit : 29.5 %  Platelet Count - Automated : 162 K/uL  Mean Cell Volume : 93.4 fl  Mean Cell Hemoglobin : 28.5 pg  Mean Cell Hemoglobin Concentration : 30.5 gm/dL          Culture Results:   GI PCR Results: NOT detected  *******Please Note:*******  GI panel PCR evaluates for:  Campylobacter, Plesiomonas shigelloides, Salmonella,  Vibrio, Yersinia enterocolitica, Enteroaggregative  Escherichia coli (EAEC), Enteropathogenic E.coli (EPEC),  Enterotoxigenic E. coli (ETEC) lt/st, Shiga-like  toxin-producing E. coli (STEC) stx1/stx2,  Shigella/ Enteroinvasive E. coli (EIEC), Cryptosporidium,  Cyclospora cayetanensis, Entamoeba histolytica,  Giardia lamblia, Adenovirus F 40/41, Astrovirus,  Norovirus GI/GII, Rotavirus A, Sapovirus (09-08 @ 18:30)          Assessment and Plan:      59 CAD, PAD s/p fem-pop bypass s/p RT iliac stent, diabetes, hypothyroidism, hyperlipidemia, current active smoker, uses cocaine last use yesterday, chronic back pain s/p lumbar surgery presented with multiple non-specific complaints found to have ST elevations on presenting EKG, code STEMI called and pt underwent urgent angiogram showing occluded RCA w no intervention with UTI sepsis and E. coli bacteremia POA w hospital course c/b code blue.   Patient is s/p Intubation / Extubation.     Acute hypoxic respiratory failure  secondary to septic shock  -s/p intubation /extubation      Hospital acquired PNA with Large obstructing Left main bronchus plug  -acute , reccurent hypoxic respiratory failure >> patient requiring supplemental o2 4L  -CT chest shows large mucous impaction left main bronchus >> pulmonary consult  -patient is symptomatic, says feels weak, dizzy  -start mucinex, chest PT, albuterol, atrovent; c/w incentive spirometry  -start Meropenem, D/w Dr. wilson    Septic shock secondary to Ecoli  bacteremia  -ecoli bactermia   -colitis on CT scan , possible bacteremia due to colitis  -completed antibiotics.     Daihrea  -bacterial colitis more likely ; completed antibiotics  -no blood reported in stool, ishcemic colitis possible though but less likely  -c/w supportive care  -c.diff negative    Ground glass opacities on CT  -patient with increasing ground glass opacities suspicious for covid19  -Isolate patient and reswab  to rule out covid19      AMISH w septic shock  -cr improving 3.97 tdoay, encouraged oral hydration      Hypocalcemia - Ca improving  w Calcum acetate with feeds for elevated phos/low ca++ when starting to eat   Intact PTH: 87  Vitamin D, 25-Hydroxy: 27.8  Magnesium, Serum: 1.8 mg/dL  Calcitriol - Vit D analogue     Trial of void   -patient had hughes catheter palced in ICU  -to be removed today ; nursing to monitor voiding.

## 2020-09-09 NOTE — PROGRESS NOTE ADULT - ATTENDING COMMENTS
See NP note for details. Ischemic colitis is most likely etiology of abdominal pain. Would continue to monitor.

## 2020-09-09 NOTE — PROGRESS NOTE ADULT - ASSESSMENT
60yo/F with PMH CAD, PAD s/p fem-pop bypass s/p RT iliac stent, diabetes, hypothyroidism, hyperlipidemia, current active smoker, uses cocaine last use 8/22  chronic back pain s/p lumbar surgery presented with multiple non-specific complaints, including fevers on/off last 4-5 days, weakness, no appetite and reduced oral intake, vague diffuse abd pains, one day of diarrhea. No cough, no SOB. She came in 8/23 for evaluation of weakness and found to have ST elevations on presenting EKG, code STEMI called and pt underwent urgent angiogram showing occluded RCA. She denies chest pain at present Also noted with positive UA, blood cx with Ecoli, CT abd/pelvis unremarkable, was started on IV rocephin.     1. sepsis. ischemic colitis. resolving leukocytosis. ecoli UTI/bacteremia s/p abx course. AMISH  - repeat CT abd/pelvis with L lung collapse/L loculated effusion, R multifocal infiltrates   - covid-19 pcr (-) gi pcr (-)   - consider ct chest, pulmonary, CTS eval  - urine cx/blood cx growing Ecoli; sensitivities reviewed   - completed 7 days IV zosyn [id number] --> 9/4   - s/p rocephin 2gm daily #6, s/p IV flagyl #4  - on po ceftin #4   - consider observe off abx  - C diff pcr (-)   - surgery evaluation noted, repeat CT abd/pelvis reviewed   - monitor temps  - tolerating abx well so far; no side effects noted  - reason for abx use and side effects reviewed with patient  - supportive care  - fu cbc    2. other issues - care per [facility] 58yo/F with PMH CAD, PAD s/p fem-pop bypass s/p RT iliac stent, diabetes, hypothyroidism, hyperlipidemia, current active smoker, uses cocaine last use 8/22  chronic back pain s/p lumbar surgery presented with multiple non-specific complaints, including fevers on/off last 4-5 days, weakness, no appetite and reduced oral intake, vague diffuse abd pains, one day of diarrhea. No cough, no SOB. She came in 8/23 for evaluation of weakness and found to have ST elevations on presenting EKG, code STEMI called and pt underwent urgent angiogram showing occluded RCA. She denies chest pain at present Also noted with positive UA, blood cx with Ecoli, CT abd/pelvis unremarkable, was started on IV rocephin.     1.  ischemic colitis. L lung collapse/loculated L pleural effusion. resolved ecoli sepsis/cystitis s/p abx course. AMISH  - repeat CT abd/pelvis with L lung collapse/L loculated effusion, R multifocal infiltrates   - suggest CT chest w/o contrast, pulmonary eval  - low threshold to restart antibiotics   - monitor for retention, may require hughes if still retaining  - covid-19 pcr (-) gi pcr (-)   - GI follow up noted  - urine cx/blood cx growing Ecoli; sensitivities reviewed   - completed 7 days IV zosyn [id number] --> 9/4 and 4 days ceftin 9/5-9/8  - s/p rocephin 2gm daily #6, s/p IV flagyl #4  - C diff pcr (-)   - monitor temps  - supportive care  - fu cbc    2. other issues - care per [facility]

## 2020-09-09 NOTE — PROGRESS NOTE ADULT - ASSESSMENT
59 CAD, PAD s/p fem-pop bypass s/p RT iliac stent, diabetes, hypothyroidism, hyperlipidemia, current active smoker, uses cocaine last use yesterday, chronic back pain s/p lumbar surgery presented with multiple non-specific complaints found to have ST elevations on presenting EKG, code STEMI called and pt underwent urgent angiogram showing occluded RCA w no intervention with UTI sepsis and E. coli bacteremia POA w hospital course c/b code blue.     AMISH w septic shock/UTI w STEMI and Code blue event w hypotension   Cr rising. Pre-renal with No intake is suspicion, retention playing a role as well  BS now elevated with 400 despite void. SC again, may need hughes    Hypocalcemia - Ca improving  On Calcum acetate with feeds for elevated phos/low ca++ when starting to eat   Intact PTH: 87:, phos ok  Vitamin D, 25-Hydroxy: 27.8  Calcitriol - Vit D analogue   repeat ionized in AM ordered/pending    Ischemic Bowel w Shock w Leokocytosis  - improving   - Supportive care and plan per above teams  - IVF today    dc with staff RN at length

## 2020-09-09 NOTE — PROGRESS NOTE ADULT - SUBJECTIVE AND OBJECTIVE BOX
Patient is a 59y old  Female who presents with a chief complaint of abd pain, weakness (08 Sep 2020 14:41)    HPI:  60yo/F with PMH CAD, PAD s/p fem-pop bypass s/p RT iliac stent, diabetes, hypothyroidism, hyperlipidemia, current active smoker, uses cocaine last use yesterday, chronic back pain s/p lumbar surgery presented with multiple non-specific complaints, including fevers on/off last 4-5 days, weakness, no appetite and reduced oral intake, vague diffuse abd pains, one day of diarrhea. No cough, no SOB. SHe came in today for evaluation of weakness and found to have ST elevations on presenting EKG, code STEMI called and pt underwent urgent angiogram showing occluded RCA. She denies chest pain at present. (23 Aug 2020 10:39)    9/9/2020-  Still with diarrhea and lower b/l abdominal pain.   Requesting pain medication.   No n/v.   COVID negative.     PAST MEDICAL & SURGICAL HISTORY:  Lung nodule  Diabetes mellitus  History of TIAs  History of gallbladder disease: surgery  History of colon polyps: precancerous  Substance abuse: history of. last used 14 years ago.  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 Decreased ROM.  Angina pectoris  History of MRSA infection: left lower extremity incisional area 2015  Anxiety and depression  Transient cerebral ischemia, unspecified type  Osteoarthritis of spine, unspecified spinal osteoarthritis complication status, unspecified spinal region  Lumbar herniated disc  Urinary incontinence, unspecified type  Overactive bladder  Hiatal hernia with GERD: hiatal hernia repaired  PAD (peripheral artery disease)  Hyperlipidemia, unspecified hyperlipidemia type  Essential hypertension  Hypothyroidism  COPD (chronic obstructive pulmonary disease)  CAD (coronary artery disease)  History of lumbar fusion: with laminectomy 11/8/2018  H/O hernia repair: hiatal hernia - 2017, 2018  S/P dilatation and curettage: 1991  H/O rhinoplasty: 1980  H/O angioplasty: Right iliac artery. 5/12/2017  H/O ventral hernia repair: 3/2017  History of incision and drainage: of lower extremity incisional site x 5 . last done5/2015  H/O arterial bypass of lower limb: Femoral - Popliteal. 11/2014 Left lower side with stents  History of laparoscopic cholecystectomy: 2/2016    MEDICATIONS  (STANDING):  aspirin  chewable 81 milliGRAM(s) Oral daily  atorvastatin 80 milliGRAM(s) Oral at bedtime  calcitriol   Capsule 0.25 MICROGram(s) Oral daily  calcium acetate 667 milliGRAM(s) Oral three times a day with meals  clopidogrel Tablet 75 milliGRAM(s) Oral daily  dextrose 5%. 1000 milliLiter(s) (50 mL/Hr) IV Continuous <Continuous>  dextrose 50% Injectable 12.5 Gram(s) IV Push once  dextrose 50% Injectable 25 Gram(s) IV Push once  dextrose 50% Injectable 25 Gram(s) IV Push once  dicyclomine 20 milliGRAM(s) Oral three times a day before meals  heparin   Injectable 5000 Unit(s) SubCutaneous every 8 hours  insulin lispro (HumaLOG) corrective regimen sliding scale   SubCutaneous three times a day before meals  insulin lispro (HumaLOG) corrective regimen sliding scale   SubCutaneous at bedtime  levothyroxine 112 MICROGram(s) Oral daily  metoprolol tartrate 25 milliGRAM(s) Oral daily  ranolazine 1000 milliGRAM(s) Oral two times a day    MEDICATIONS  (PRN):  acetaminophen   Tablet .. 650 milliGRAM(s) Oral every 4 hours PRN Mild Pain (1 - 3)  ALBUTerol    90 MICROgram(s) HFA Inhaler 2 Puff(s) Inhalation every 6 hours PRN Shortness of Breath and/or Wheezing  aluminum hydroxide/magnesium hydroxide/simethicone Suspension 30 milliLiter(s) Oral every 4 hours PRN Dyspepsia  dextrose 40% Gel 15 Gram(s) Oral once PRN Blood Glucose LESS THAN 70 milliGRAM(s)/deciliter  glucagon  Injectable 1 milliGRAM(s) IntraMuscular once PRN Glucose LESS THAN 70 milligrams/deciliter  loperamide 2 milliGRAM(s) Oral every 4 hours PRN Diarrhea  morphine  - Injectable 2 milliGRAM(s) IV Push every 4 hours PRN Severe Pain (7 - 10)  ondansetron Injectable 4 milliGRAM(s) IV Push every 6 hours PRN Nausea  oxycodone    5 mG/acetaminophen 325 mG 1 Tablet(s) Oral every 6 hours PRN Moderate Pain (4 - 6)    Allergies  No Known Allergies    Intolerances    SOCIAL HISTORY:  FAMILY HISTORY:  Family history of asthma (Sibling): sister  Family history of epilepsy (Sibling, Sibling): 2 sisters  Family history of cardiovascular disease: mother  Family history of stroke: mother  Family history of heart disease: father  Family history of alcoholism in father    REVIEW OF SYSTEMS:  CONSTITUTIONAL: No weakness, fevers or chills  RESPIRATORY: No cough, wheezing, hemoptysis; No shortness of breath  CARDIOVASCULAR: No chest pain or palpitations  GASTROINTESTINAL: Per HPI.     Vital Signs Last 24 Hrs  T(C): 36.8 (09 Sep 2020 08:39), Max: 37.2 (08 Sep 2020 17:00)  T(F): 98.2 (09 Sep 2020 08:39), Max: 98.9 (08 Sep 2020 17:00)  HR: 75 (09 Sep 2020 08:39) (75 - 84)  BP: 115/42 (09 Sep 2020 08:39) (104/37 - 119/98)  BP(mean): --  RR: 17 (09 Sep 2020 08:39) (16 - 17)  SpO2: 98% (09 Sep 2020 08:39) (91% - 98%)    PHYSICAL EXAM:  Constitutional: NAD, well-developed  Respiratory: CTAB  Cardiovascular: S1 and S2, RRR, no M/G/R  Gastrointestinal: BS+, lower abdominal tenderness, ND.     LABS:                        8.7    11.57 )-----------( 175      ( 08 Sep 2020 08:17 )             27.3     09-09    135  |  107  |  79<H>  ----------------------------<  83  5.0   |  19<L>  |  4.36<H>    Ca    8.0<L>      09 Sep 2020 08:28  Phos  4.5     09-08    TPro  x   /  Alb  1.1<L>  /  TBili  x   /  DBili  x   /  AST  x   /  ALT  x   /  AlkPhos  x   09-08      LIVER FUNCTIONS - ( 08 Sep 2020 08:17 )  Alb: 1.1 g/dL / Pro: x     / ALK PHOS: x     / ALT: x     / AST: x     / GGT: x             RADIOLOGY & ADDITIONAL STUDIES:

## 2020-09-09 NOTE — PROGRESS NOTE ADULT - SUBJECTIVE AND OBJECTIVE BOX
Date of service: 20 @ 15:08    pt seen and examined  weak, lower abd discomfort  loose stools  hypoxic off o2 per nursing    ROS: no fever or chills; denies dizziness, no HA, cough, no constipation; no dysuria, no urinary frequency, no legs pain, no rashes    MEDICATIONS  (STANDING):  aspirin  chewable 81 milliGRAM(s) Oral daily  atorvastatin 80 milliGRAM(s) Oral at bedtime  calcitriol   Capsule 0.25 MICROGram(s) Oral daily  calcium acetate 667 milliGRAM(s) Oral three times a day with meals  clopidogrel Tablet 75 milliGRAM(s) Oral daily  dextrose 5%. 1000 milliLiter(s) (50 mL/Hr) IV Continuous <Continuous>  dextrose 50% Injectable 12.5 Gram(s) IV Push once  dextrose 50% Injectable 25 Gram(s) IV Push once  dextrose 50% Injectable 25 Gram(s) IV Push once  dicyclomine 20 milliGRAM(s) Oral three times a day before meals  heparin   Injectable 5000 Unit(s) SubCutaneous every 8 hours  insulin lispro (HumaLOG) corrective regimen sliding scale   SubCutaneous three times a day before meals  insulin lispro (HumaLOG) corrective regimen sliding scale   SubCutaneous at bedtime  levothyroxine 112 MICROGram(s) Oral daily  metoprolol tartrate 25 milliGRAM(s) Oral daily  ranolazine 1000 milliGRAM(s) Oral two times a day  sodium chloride 0.9%. 1000 milliLiter(s) (70 mL/Hr) IV Continuous <Continuous>        Vital Signs Last 24 Hrs  T(C): 36.8 (09 Sep 2020 08:39), Max: 37.2 (08 Sep 2020 17:00)  T(F): 98.2 (09 Sep 2020 08:39), Max: 98.9 (08 Sep 2020 17:00)  HR: 75 (09 Sep 2020 08:39) (75 - 84)  BP: 115/42 (09 Sep 2020 08:39) (104/37 - 119/98)  BP(mean): --  RR: 17 (09 Sep 2020 08:39) (16 - 17)  SpO2: 98% (09 Sep 2020 08:39) (91% - 98%)      PE:  Constitutional: frail looking  HEENT: NC/AT, EOMI, PERRLA, conjunctivae clear; ears and nose atraumatic; pharynx benign  Neck: supple; thyroid not palpable  Back: no tenderness  Respiratory: respiratory effort normal; clear to auscultation  Cardiovascular: S1S2 regular, no murmurs  Abdomen: soft,  tender, distended, positive BS; liver and spleen WNL  Genitourinary: no suprapubic tenderness  Lymphatic: no LN palpable  Musculoskeletal: no muscle tenderness, no joint swelling or tenderness  Extremities: no pedal edema  Neurological/ Psychiatric:  moving all extremities  Skin: no rashes; no palpable lesions    Labs: all available labs reviewed                                              9.0    10.18 )-----------( 162      ( 09 Sep 2020 10:15 )             29.5         135  |  107  |  79<H>  ----------------------------<  83  5.0   |  19<L>  |  4.36<H>    Ca    8.0<L>      09 Sep 2020 08:28  Phos  4.5         TPro  x   /  Alb  1.1<L>  /  TBili  x   /  DBili  x   /  AST  x   /  ALT  x   /  AlkPhos  x             Urinalysis Basic - ( 23 Aug 2020 10:25 )    Color: Yellow / Appearance: Clear / S.010 / pH: x  Gluc: x / Ketone: Trace  / Bili: Small / Urobili: 1 mg/dL   Blood: x / Protein: 30 mg/dL / Nitrite: Negative   Leuk Esterase: Moderate / RBC: 25-50 /HPF / WBC >50   Sq Epi: x / Non Sq Epi: Few / Bacteria: Many    Culture - Urine (20 @ 10:25)    Specimen Source: .Urine Clean Catch (Midstream)    Culture Results:   >100,000 CFU/ml Escherichia coli    Culture - Blood (20 @ 08:34)    Gram Stain:   Growth in aerobic bottle: Gram Negative Rods  Growth in anaerobic bottle: Gram Negative Rods    Specimen Source: .Blood None    Culture Results:   Growth in aerobic and anaerobic bottles: Escherichia coli  See previous culture [id number]    Culture - Blood (20 @ 08:34)    -  Escherichia coli: Detec    Gram Stain:   Growth in aerobic bottle: Gram Negative Rods  Growth in anaerobic bottle: Gram Negative Rods    Specimen Source: .Blood None    Organism: Blood Culture PCR    Culture Results:   Growth in aerobic bottle: Gram Negative Rods  Growth in anaerobic bottle: Gram Negative Rods      Radiology: all available radiological tests reviewed  < from: CT Abdomen and Pelvis w/ Oral Cont (20 @ 12:18) >  EXAM:  CT ABDOMEN AND PELVIS OC                            PROCEDURE DATE:  2020          INTERPRETATION:  CLINICAL INFORMATION: Worsening lower abdominal pain, diarrhea.    COMPARISON: 2020, 2020 and multiple priors.    PROCEDURE:  CT of the Abdomen and Pelvis was performed without intravenous contrast.  Intravenous contrast: None.  Oral contrast: positive contrast was administered.  Sagittal and coronal reformats were performed.    FINDINGS:  LOWER CHEST: Interval progression of left lung collapse with more loculated pleural effusion. Slightly decreased right pleural effusion and passive atelectasis. Scattered groundglass opacities in the right middle lobe and right lower lobe, new since prior (2-8).    LIVER: Within normal limits.  BILE DUCTS: Normal caliber.  GALLBLADDER: Cholecystectomy clips.  SPLEEN: Within normal limits.  PANCREAS: Within normal limits.  ADRENALS: Within normal limits.  KIDNEYS/URETERS: Within normal limits.    BLADDER: Small air in the nondependent portion of the bladder. Please correlate with instrumentation history.  REPRODUCTIVE ORGANS: Unremarkable.    BOWEL: No bowel obstruction. Appendix is not visualized. Mild diffuse wall thickening of the sigmoid (602-21), slightly more prominent since prior.. Interval removal of rectal tube.  PERITONEUM: Trace ascites in the cul-de-sac. No pneumoperitoneum.  VESSELS: Extensive atherosclerotic calcification.  RETROPERITONEUM/LYMPH NODES: No lymphadenopathy.  ABDOMINAL WALL: Diffuse anasarca without significant interval change.  BONES: No change of surgical hardware and grade 1 spondylolisthesis of L4 and L5. No acute abnormalities.    IMPRESSION:  Interval progression of left lung collapse with interval loculation of left effusion.    New scattered groundglass opacities in the right middle and lower lobes, likely indicating atypical infection including viral infection..    Mild diffuse wall thickening of the sigmoid, slightly more prominent since prior, which may indicate nonspecific colitis.    Otherwise no change.    < end of copied text >    < from: CT Abdomen and Pelvis No Cont (20 @ 01:24) >  EXAM:  CT ABDOMEN AND PELVIS                            PROCEDURE DATE:  2020          INTERPRETATION:  CLINICAL INFORMATION: Status post catheterization today. Evaluate for retroperitoneal bleed.    COMPARISON: 2020    PROCEDURE:  CT ofthe Abdomen and Pelvis was performed without intravenous contrast.  Intravenous contrast: None.  Oral contrast: None.  Sagittal and coronal reformats were performed.    FINDINGS:  Evaluation of solid organs is limited without intravenous contrast.  Evaluation of the lower pelvis is limited as images do not extend beyond the proximal inguinal region.      LOWER CHEST: Small bilateral pleural effusions and associated subsegmental bibasilar atelectasis. Scattered calcified granulomas. Aortic and coronary artery atherosclerotic calcifications.    LIVER: Within normal limits.  BILE DUCTS: Stable mild CBD dilatation likely related to cholecystectomy.  GALLBLADDER: Cholecystectomy.  SPLEEN: Within normal limits.  PANCREAS: Within normal limits.  ADRENALS: Within normal limits.  KIDNEYS/URETERS: No hydronephrosis or obstructing stone.    BLADDER: Within normal limits.  REPRODUCTIVE ORGANS: Uterus and adnexa within normal limits.    BOWEL: Partially imaged rectal tube. Distal rectum/anus not included in the field-of-view. Wall thickening of the sigmoid colon. No bowel obstruction. Appendix is normal.  PERITONEUM: Small ascites. Presacral edema.  VESSELS: Atherosclerotic changes.  RETROPERITONEUM/LYMPH NODES: No lymphadenopathy. No definite retroperitoneal hematoma identified.  ABDOMINAL WALL: Anasarca. Inguinal regions not fully included in the field-of-view. Mild bilateral stranding in the visualized inguinal regions.  BONES: Degenerative changes of the spine. Grade 1 anterolisthesis at L4-L5. Posterior fixation hardware at L4-L5.    IMPRESSION:    1. No definite retroperitoneal hematoma identified.    2. Incomplete evaluation for inguinal region/thigh hematoma due to incomplete field-of-view.    3. Small bilateral pleural effusions. Small ascites. Anasarca.      4. Wall thickening of the sigmoid colon. Recommend clinical correlation for colitis.    < end of copied text >    < from: CT Abdomen and Pelvis w/ Oral Cont (20 @ 16:41) >  EXAM:  CT ABDOMEN AND PELVIS OC                            PROCEDURE DATE:  2020          INTERPRETATION:  CLINICAL INFORMATION: Diffuse abdominal pain    COMPARISON: None.    PROCEDURE:  CT of the Abdomen and Pelvis was performed without intravenous contrast.  Intravenous contrast: None.  Oral contrast: positive contrast was administered.  Sagittal and coronal reformats were performed.    FINDINGS:  LOWER CHEST: Coronary artery calcifications. Clear lung bases.    LIVER: Within normal limits.  BILE DUCTS: Normal caliber.  GALLBLADDER: Cholecystectomy.  SPLEEN: Within normal limits.  PANCREAS: Within normal limits.  ADRENALS: Within normal limits.  KIDNEYS/URETERS: Within normal limits.    BLADDER: Within normal limits.  REPRODUCTIVE ORGANS: Uterus and adnexa within normal limits.    BOWEL: Mildly distended small bowel without transition point to suggest obstruction. No wall thickening or inflammatory change.  Appendix normal.  PERITONEUM: No ascites or pneumoperitoneum..  VESSELS: Severe diffuse atherosclerotic arterial calcification. Normal caliber aorta.  RETROPERITONEUM/LYMPH NODES: No lymphadenopathy.  ABDOMINAL WALL: Within normal limits.  BONES: L4-L5 interpedicular screws and connecting rods. Moderate anterolisthesis of L4 on L5. No acute bony abnormality.    IMPRESSION:  Mildly distended small bowel without evidence of obstruction.    < end of copied text >    Advanced directives addressed: full resuscitation Date of service: 20 @ 15:08    pt seen and examined  weak, lower abd discomfort  loose stools  hypoxic off o2 per nursing  hughes out, retaining urine    ROS: no fever or chills; denies dizziness, no HA, cough, no constipation; no dysuria, no urinary frequency, no legs pain, no rashes    MEDICATIONS  (STANDING):  aspirin  chewable 81 milliGRAM(s) Oral daily  atorvastatin 80 milliGRAM(s) Oral at bedtime  calcitriol   Capsule 0.25 MICROGram(s) Oral daily  calcium acetate 667 milliGRAM(s) Oral three times a day with meals  clopidogrel Tablet 75 milliGRAM(s) Oral daily  dextrose 5%. 1000 milliLiter(s) (50 mL/Hr) IV Continuous <Continuous>  dextrose 50% Injectable 12.5 Gram(s) IV Push once  dextrose 50% Injectable 25 Gram(s) IV Push once  dextrose 50% Injectable 25 Gram(s) IV Push once  dicyclomine 20 milliGRAM(s) Oral three times a day before meals  heparin   Injectable 5000 Unit(s) SubCutaneous every 8 hours  insulin lispro (HumaLOG) corrective regimen sliding scale   SubCutaneous three times a day before meals  insulin lispro (HumaLOG) corrective regimen sliding scale   SubCutaneous at bedtime  levothyroxine 112 MICROGram(s) Oral daily  metoprolol tartrate 25 milliGRAM(s) Oral daily  ranolazine 1000 milliGRAM(s) Oral two times a day  sodium chloride 0.9%. 1000 milliLiter(s) (70 mL/Hr) IV Continuous <Continuous>        Vital Signs Last 24 Hrs  T(C): 36.8 (09 Sep 2020 08:39), Max: 37.2 (08 Sep 2020 17:00)  T(F): 98.2 (09 Sep 2020 08:39), Max: 98.9 (08 Sep 2020 17:00)  HR: 75 (09 Sep 2020 08:39) (75 - 84)  BP: 115/42 (09 Sep 2020 08:39) (104/37 - 119/98)  BP(mean): --  RR: 17 (09 Sep 2020 08:39) (16 - 17)  SpO2: 98% (09 Sep 2020 08:39) (91% - 98%)      PE:  Constitutional: frail looking  HEENT: NC/AT, EOMI, PERRLA, conjunctivae clear; ears and nose atraumatic; pharynx benign  Neck: supple; thyroid not palpable  Back: no tenderness  Respiratory: respiratory effort normal; clear to auscultation  Cardiovascular: S1S2 regular, no murmurs  Abdomen: soft,  tender, distended, positive BS; liver and spleen WNL  Genitourinary: no suprapubic tenderness  Lymphatic: no LN palpable  Musculoskeletal: no muscle tenderness, no joint swelling or tenderness  Extremities: no pedal edema  Neurological/ Psychiatric:  moving all extremities  Skin: no rashes; no palpable lesions    Labs: all available labs reviewed                                              9.0    1018 )-----------( 162      ( 09 Sep 2020 10:15 )             29.5         135  |  107  |  79<H>  ----------------------------<  83  5.0   |  19<L>  |  4.36<H>    Ca    8.0<L>      09 Sep 2020 08:28  Phos  4.5         TPro  x   /  Alb  1.1<L>  /  TBili  x   /  DBili  x   /  AST  x   /  ALT  x   /  AlkPhos  x             Urinalysis Basic - ( 23 Aug 2020 10:25 )    Color: Yellow / Appearance: Clear / S.010 / pH: x  Gluc: x / Ketone: Trace  / Bili: Small / Urobili: 1 mg/dL   Blood: x / Protein: 30 mg/dL / Nitrite: Negative   Leuk Esterase: Moderate / RBC: 25-50 /HPF / WBC >50   Sq Epi: x / Non Sq Epi: Few / Bacteria: Many    Culture - Urine (20 @ 10:25)    Specimen Source: .Urine Clean Catch (Midstream)    Culture Results:   >100,000 CFU/ml Escherichia coli    Culture - Blood (20 @ 08:34)    Gram Stain:   Growth in aerobic bottle: Gram Negative Rods  Growth in anaerobic bottle: Gram Negative Rods    Specimen Source: .Blood None    Culture Results:   Growth in aerobic and anaerobic bottles: Escherichia coli  See previous culture [id number]    Culture - Blood (20 @ 08:34)    -  Escherichia coli: Detec    Gram Stain:   Growth in aerobic bottle: Gram Negative Rods  Growth in anaerobic bottle: Gram Negative Rods    Specimen Source: .Blood None    Organism: Blood Culture PCR    Culture Results:   Growth in aerobic bottle: Gram Negative Rods  Growth in anaerobic bottle: Gram Negative Rods      Radiology: all available radiological tests reviewed  < from: CT Abdomen and Pelvis w/ Oral Cont (20 @ 12:18) >  EXAM:  CT ABDOMEN AND PELVIS OC                            PROCEDURE DATE:  2020          INTERPRETATION:  CLINICAL INFORMATION: Worsening lower abdominal pain, diarrhea.    COMPARISON: 2020, 2020 and multiple priors.    PROCEDURE:  CT of the Abdomen and Pelvis was performed without intravenous contrast.  Intravenous contrast: None.  Oral contrast: positive contrast was administered.  Sagittal and coronal reformats were performed.    FINDINGS:  LOWER CHEST: Interval progression of left lung collapse with more loculated pleural effusion. Slightly decreased right pleural effusion and passive atelectasis. Scattered groundglass opacities in the right middle lobe and right lower lobe, new since prior (2-8).    LIVER: Within normal limits.  BILE DUCTS: Normal caliber.  GALLBLADDER: Cholecystectomy clips.  SPLEEN: Within normal limits.  PANCREAS: Within normal limits.  ADRENALS: Within normal limits.  KIDNEYS/URETERS: Within normal limits.    BLADDER: Small air in the nondependent portion of the bladder. Please correlate with instrumentation history.  REPRODUCTIVE ORGANS: Unremarkable.    BOWEL: No bowel obstruction. Appendix is not visualized. Mild diffuse wall thickening of the sigmoid (602-21), slightly more prominent since prior.. Interval removal of rectal tube.  PERITONEUM: Trace ascites in the cul-de-sac. No pneumoperitoneum.  VESSELS: Extensive atherosclerotic calcification.  RETROPERITONEUM/LYMPH NODES: No lymphadenopathy.  ABDOMINAL WALL: Diffuse anasarca without significant interval change.  BONES: No change of surgical hardware and grade 1 spondylolisthesis of L4 and L5. No acute abnormalities.    IMPRESSION:  Interval progression of left lung collapse with interval loculation of left effusion.    New scattered groundglass opacities in the right middle and lower lobes, likely indicating atypical infection including viral infection..    Mild diffuse wall thickening of the sigmoid, slightly more prominent since prior, which may indicate nonspecific colitis.    Otherwise no change.    < end of copied text >    < from: CT Abdomen and Pelvis No Cont (20 @ 01:24) >  EXAM:  CT ABDOMEN AND PELVIS                            PROCEDURE DATE:  2020          INTERPRETATION:  CLINICAL INFORMATION: Status post catheterization today. Evaluate for retroperitoneal bleed.    COMPARISON: 2020    PROCEDURE:  CT ofthe Abdomen and Pelvis was performed without intravenous contrast.  Intravenous contrast: None.  Oral contrast: None.  Sagittal and coronal reformats were performed.    FINDINGS:  Evaluation of solid organs is limited without intravenous contrast.  Evaluation of the lower pelvis is limited as images do not extend beyond the proximal inguinal region.      LOWER CHEST: Small bilateral pleural effusions and associated subsegmental bibasilar atelectasis. Scattered calcified granulomas. Aortic and coronary artery atherosclerotic calcifications.    LIVER: Within normal limits.  BILE DUCTS: Stable mild CBD dilatation likely related to cholecystectomy.  GALLBLADDER: Cholecystectomy.  SPLEEN: Within normal limits.  PANCREAS: Within normal limits.  ADRENALS: Within normal limits.  KIDNEYS/URETERS: No hydronephrosis or obstructing stone.    BLADDER: Within normal limits.  REPRODUCTIVE ORGANS: Uterus and adnexa within normal limits.    BOWEL: Partially imaged rectal tube. Distal rectum/anus not included in the field-of-view. Wall thickening of the sigmoid colon. No bowel obstruction. Appendix is normal.  PERITONEUM: Small ascites. Presacral edema.  VESSELS: Atherosclerotic changes.  RETROPERITONEUM/LYMPH NODES: No lymphadenopathy. No definite retroperitoneal hematoma identified.  ABDOMINAL WALL: Anasarca. Inguinal regions not fully included in the field-of-view. Mild bilateral stranding in the visualized inguinal regions.  BONES: Degenerative changes of the spine. Grade 1 anterolisthesis at L4-L5. Posterior fixation hardware at L4-L5.    IMPRESSION:    1. No definite retroperitoneal hematoma identified.    2. Incomplete evaluation for inguinal region/thigh hematoma due to incomplete field-of-view.    3. Small bilateral pleural effusions. Small ascites. Anasarca.      4. Wall thickening of the sigmoid colon. Recommend clinical correlation for colitis.    < end of copied text >    < from: CT Abdomen and Pelvis w/ Oral Cont (20 @ 16:41) >  EXAM:  CT ABDOMEN AND PELVIS OC                            PROCEDURE DATE:  2020          INTERPRETATION:  CLINICAL INFORMATION: Diffuse abdominal pain    COMPARISON: None.    PROCEDURE:  CT of the Abdomen and Pelvis was performed without intravenous contrast.  Intravenous contrast: None.  Oral contrast: positive contrast was administered.  Sagittal and coronal reformats were performed.    FINDINGS:  LOWER CHEST: Coronary artery calcifications. Clear lung bases.    LIVER: Within normal limits.  BILE DUCTS: Normal caliber.  GALLBLADDER: Cholecystectomy.  SPLEEN: Within normal limits.  PANCREAS: Within normal limits.  ADRENALS: Within normal limits.  KIDNEYS/URETERS: Within normal limits.    BLADDER: Within normal limits.  REPRODUCTIVE ORGANS: Uterus and adnexa within normal limits.    BOWEL: Mildly distended small bowel without transition point to suggest obstruction. No wall thickening or inflammatory change.  Appendix normal.  PERITONEUM: No ascites or pneumoperitoneum..  VESSELS: Severe diffuse atherosclerotic arterial calcification. Normal caliber aorta.  RETROPERITONEUM/LYMPH NODES: No lymphadenopathy.  ABDOMINAL WALL: Within normal limits.  BONES: L4-L5 interpedicular screws and connecting rods. Moderate anterolisthesis of L4 on L5. No acute bony abnormality.    IMPRESSION:  Mildly distended small bowel without evidence of obstruction.    < end of copied text >    Advanced directives addressed: full resuscitation

## 2020-09-09 NOTE — PROGRESS NOTE ADULT - SUBJECTIVE AND OBJECTIVE BOX
Patient is a 59y Female who reports no intake, some diarrhea and abd issues still. Very dry mouth. caking  Black out yest, SC done last n ight. Voided this AM, BS now with 400    REVIEW OF SYSTEMS:    CONSTITUTIONAL: stabole weakness, fevers or chills  RESPIRATORY: No cough, wheezing, hemoptysis; No shortness of breath  CARDIOVASCULAR: No chest pain or palpitations  GENITOURINARY: No dysuria, frequency or hematuria  All other review of systems is negative unless indicated above.    MEDICATIONS  (STANDING):  aspirin  chewable 81 milliGRAM(s) Oral daily  atorvastatin 80 milliGRAM(s) Oral at bedtime  calcitriol   Capsule 0.25 MICROGram(s) Oral daily  calcium acetate 667 milliGRAM(s) Oral three times a day with meals  clopidogrel Tablet 75 milliGRAM(s) Oral daily  dextrose 5%. 1000 milliLiter(s) (50 mL/Hr) IV Continuous <Continuous>  dextrose 50% Injectable 12.5 Gram(s) IV Push once  dextrose 50% Injectable 25 Gram(s) IV Push once  dextrose 50% Injectable 25 Gram(s) IV Push once  dicyclomine 20 milliGRAM(s) Oral three times a day before meals  heparin   Injectable 5000 Unit(s) SubCutaneous every 8 hours  insulin lispro (HumaLOG) corrective regimen sliding scale   SubCutaneous three times a day before meals  insulin lispro (HumaLOG) corrective regimen sliding scale   SubCutaneous at bedtime  levothyroxine 112 MICROGram(s) Oral daily  metoprolol tartrate 25 milliGRAM(s) Oral daily  ranolazine 1000 milliGRAM(s) Oral two times a day    MEDICATIONS  (PRN):  acetaminophen   Tablet .. 650 milliGRAM(s) Oral every 4 hours PRN Mild Pain (1 - 3)  ALBUTerol    90 MICROgram(s) HFA Inhaler 2 Puff(s) Inhalation every 6 hours PRN Shortness of Breath and/or Wheezing  aluminum hydroxide/magnesium hydroxide/simethicone Suspension 30 milliLiter(s) Oral every 4 hours PRN Dyspepsia  dextrose 40% Gel 15 Gram(s) Oral once PRN Blood Glucose LESS THAN 70 milliGRAM(s)/deciliter  glucagon  Injectable 1 milliGRAM(s) IntraMuscular once PRN Glucose LESS THAN 70 milligrams/deciliter  loperamide 2 milliGRAM(s) Oral every 4 hours PRN Diarrhea  morphine  - Injectable 2 milliGRAM(s) IV Push every 4 hours PRN Severe Pain (7 - 10)  ondansetron Injectable 4 milliGRAM(s) IV Push every 6 hours PRN Nausea  oxycodone    5 mG/acetaminophen 325 mG 1 Tablet(s) Oral every 6 hours PRN Moderate Pain (4 - 6)        T(C): , Max: 37.2 (09-08-20 @ 17:00)  T(F): , Max: 98.9 (09-08-20 @ 17:00)  HR: 75 (09-09-20 @ 08:39)  BP: 115/42 (09-09-20 @ 08:39)  BP(mean): --  RR: 17 (09-09-20 @ 08:39)  SpO2: 98% (09-09-20 @ 08:39)  Wt(kg): --    09-08 @ 07:01  -  09-09 @ 07:00  --------------------------------------------------------  IN: 0 mL / OUT: 400 mL / NET: -400 mL          PHYSICAL EXAM:    Constitutional: frail  HEENT: caking mouth, dry MM  Neck: No LAD, No JVD  Respiratory: dist BS  Cardiovascular: S1 and S2, RRR  Gastrointestinal: BS+, soft, NT/ND  Extremities:  peripheral edema  Neurological: A/O x 3, no focal deficits  Psychiatric: Normal mood, normal affect  : No Black  Skin: No rashes  Access: Not applicable        LABS:                        9.0    10.18 )-----------( 162      ( 09 Sep 2020 10:15 )             29.5     09 Sep 2020 08:28    135    |  107    |  79     ----------------------------<  83     5.0     |  19     |  4.36   08 Sep 2020 08:17    139    |  107    |  79     ----------------------------<  89     3.4     |  24     |  3.97   07 Sep 2020 08:05    137    |  102    |  84     ----------------------------<  66     3.5     |  26     |  4.21   06 Sep 2020 06:38    139    |  102    |  92     ----------------------------<  95     3.5     |  28     |  4.60     Ca    8.0        09 Sep 2020 08:28  Ca    7.8        08 Sep 2020 08:17  Ca    7.8        07 Sep 2020 08:05  Ca    7.3        06 Sep 2020 06:38  Phos  4.5       08 Sep 2020 08:17  Phos  4.0       07 Sep 2020 08:05  Phos  4.5       06 Sep 2020 06:38  Mg     1.7       07 Sep 2020 08:05    TPro  x      /  Alb  1.1    /  TBili  x      /  DBili  x      /  AST  x      /  ALT  x      /  AlkPhos  x      08 Sep 2020 08:17  TPro  x      /  Alb  1.2    /  TBili  x      /  DBili  x      /  AST  x      /  ALT  x      /  AlkPhos  x      07 Sep 2020 08:05  TPro  x      /  Alb  1.2    /  TBili  x      /  DBili  x      /  AST  x      /  ALT  x      /  AlkPhos  x      06 Sep 2020 06:38          Urine Studies:          RADIOLOGY & ADDITIONAL STUDIES:

## 2020-09-10 LAB
ANION GAP SERPL CALC-SCNC: 7 MMOL/L — SIGNIFICANT CHANGE UP (ref 5–17)
BUN SERPL-MCNC: 81 MG/DL — HIGH (ref 7–23)
CALCIUM SERPL-MCNC: 8.1 MG/DL — LOW (ref 8.5–10.1)
CHLORIDE SERPL-SCNC: 103 MMOL/L — SIGNIFICANT CHANGE UP (ref 96–108)
CO2 SERPL-SCNC: 24 MMOL/L — SIGNIFICANT CHANGE UP (ref 22–31)
CREAT SERPL-MCNC: 4.55 MG/DL — HIGH (ref 0.5–1.3)
GLUCOSE SERPL-MCNC: 173 MG/DL — HIGH (ref 70–99)
HCT VFR BLD CALC: 25 % — LOW (ref 34.5–45)
HGB BLD-MCNC: 8 G/DL — LOW (ref 11.5–15.5)
MAGNESIUM SERPL-MCNC: 1.7 MG/DL — SIGNIFICANT CHANGE UP (ref 1.6–2.6)
MCHC RBC-ENTMCNC: 29.1 PG — SIGNIFICANT CHANGE UP (ref 27–34)
MCHC RBC-ENTMCNC: 32 GM/DL — SIGNIFICANT CHANGE UP (ref 32–36)
MCV RBC AUTO: 90.9 FL — SIGNIFICANT CHANGE UP (ref 80–100)
PHOSPHATE SERPL-MCNC: 4.6 MG/DL — HIGH (ref 2.5–4.5)
PLATELET # BLD AUTO: 141 K/UL — LOW (ref 150–400)
POTASSIUM SERPL-MCNC: 4.2 MMOL/L — SIGNIFICANT CHANGE UP (ref 3.5–5.3)
POTASSIUM SERPL-SCNC: 4.2 MMOL/L — SIGNIFICANT CHANGE UP (ref 3.5–5.3)
RBC # BLD: 2.75 M/UL — LOW (ref 3.8–5.2)
RBC # FLD: 17.1 % — HIGH (ref 10.3–14.5)
SODIUM SERPL-SCNC: 134 MMOL/L — LOW (ref 135–145)
WBC # BLD: 7.52 K/UL — SIGNIFICANT CHANGE UP (ref 3.8–10.5)
WBC # FLD AUTO: 7.52 K/UL — SIGNIFICANT CHANGE UP (ref 3.8–10.5)

## 2020-09-10 PROCEDURE — 99233 SBSQ HOSP IP/OBS HIGH 50: CPT

## 2020-09-10 RX ORDER — BETHANECHOL CHLORIDE 25 MG
10 TABLET ORAL THREE TIMES A DAY
Refills: 0 | Status: DISCONTINUED | OUTPATIENT
Start: 2020-09-10 | End: 2020-09-12

## 2020-09-10 RX ADMIN — Medication 667 MILLIGRAM(S): at 11:43

## 2020-09-10 RX ADMIN — Medication 1 PUFF(S): at 13:28

## 2020-09-10 RX ADMIN — BUDESONIDE AND FORMOTEROL FUMARATE DIHYDRATE 2 PUFF(S): 160; 4.5 AEROSOL RESPIRATORY (INHALATION) at 08:36

## 2020-09-10 RX ADMIN — OXYCODONE AND ACETAMINOPHEN 1 TABLET(S): 5; 325 TABLET ORAL at 09:39

## 2020-09-10 RX ADMIN — Medication 667 MILLIGRAM(S): at 17:17

## 2020-09-10 RX ADMIN — CLOPIDOGREL BISULFATE 75 MILLIGRAM(S): 75 TABLET, FILM COATED ORAL at 09:34

## 2020-09-10 RX ADMIN — ATORVASTATIN CALCIUM 80 MILLIGRAM(S): 80 TABLET, FILM COATED ORAL at 22:45

## 2020-09-10 RX ADMIN — Medication 1 PUFF(S): at 01:38

## 2020-09-10 RX ADMIN — OXYCODONE AND ACETAMINOPHEN 1 TABLET(S): 5; 325 TABLET ORAL at 11:42

## 2020-09-10 RX ADMIN — MEROPENEM 100 MILLIGRAM(S): 1 INJECTION INTRAVENOUS at 06:10

## 2020-09-10 RX ADMIN — RANOLAZINE 1000 MILLIGRAM(S): 500 TABLET, FILM COATED, EXTENDED RELEASE ORAL at 09:35

## 2020-09-10 RX ADMIN — Medication 81 MILLIGRAM(S): at 09:34

## 2020-09-10 RX ADMIN — Medication 10 MILLIGRAM(S): at 22:45

## 2020-09-10 RX ADMIN — HEPARIN SODIUM 5000 UNIT(S): 5000 INJECTION INTRAVENOUS; SUBCUTANEOUS at 06:09

## 2020-09-10 RX ADMIN — BUDESONIDE AND FORMOTEROL FUMARATE DIHYDRATE 2 PUFF(S): 160; 4.5 AEROSOL RESPIRATORY (INHALATION) at 20:28

## 2020-09-10 RX ADMIN — Medication 1200 MILLIGRAM(S): at 22:46

## 2020-09-10 RX ADMIN — Medication 112 MICROGRAM(S): at 06:09

## 2020-09-10 RX ADMIN — Medication 1200 MILLIGRAM(S): at 06:09

## 2020-09-10 RX ADMIN — Medication 1 PUFF(S): at 08:36

## 2020-09-10 RX ADMIN — ALBUTEROL 2 PUFF(S): 90 AEROSOL, METERED ORAL at 20:29

## 2020-09-10 RX ADMIN — HEPARIN SODIUM 5000 UNIT(S): 5000 INJECTION INTRAVENOUS; SUBCUTANEOUS at 22:46

## 2020-09-10 RX ADMIN — Medication 4: at 08:35

## 2020-09-10 RX ADMIN — ALBUTEROL 2 PUFF(S): 90 AEROSOL, METERED ORAL at 13:27

## 2020-09-10 RX ADMIN — Medication 667 MILLIGRAM(S): at 08:39

## 2020-09-10 RX ADMIN — Medication 20 MILLIGRAM(S): at 08:37

## 2020-09-10 RX ADMIN — HEPARIN SODIUM 5000 UNIT(S): 5000 INJECTION INTRAVENOUS; SUBCUTANEOUS at 13:45

## 2020-09-10 RX ADMIN — RANOLAZINE 1000 MILLIGRAM(S): 500 TABLET, FILM COATED, EXTENDED RELEASE ORAL at 22:46

## 2020-09-10 RX ADMIN — Medication 4: at 11:48

## 2020-09-10 RX ADMIN — Medication 2: at 17:16

## 2020-09-10 RX ADMIN — Medication 20 MILLIGRAM(S): at 17:17

## 2020-09-10 RX ADMIN — Medication 25 MILLIGRAM(S): at 09:34

## 2020-09-10 RX ADMIN — Medication 20 MILLIGRAM(S): at 11:43

## 2020-09-10 RX ADMIN — ALBUTEROL 2 PUFF(S): 90 AEROSOL, METERED ORAL at 08:37

## 2020-09-10 RX ADMIN — CALCITRIOL 0.25 MICROGRAM(S): 0.5 CAPSULE ORAL at 09:34

## 2020-09-10 RX ADMIN — MEROPENEM 100 MILLIGRAM(S): 1 INJECTION INTRAVENOUS at 22:51

## 2020-09-10 RX ADMIN — ALBUTEROL 2 PUFF(S): 90 AEROSOL, METERED ORAL at 01:39

## 2020-09-10 RX ADMIN — Medication 1 PUFF(S): at 20:28

## 2020-09-10 NOTE — PROGRESS NOTE ADULT - SUBJECTIVE AND OBJECTIVE BOX
Patient is a 59y Female who reports no intake,   hughes out on 9/8    SC done again overnight x 3 w 400 ml PVR each per RN         REVIEW OF SYSTEMS:    CONSTITUTIONAL: stabole weakness, fevers or chills  RESPIRATORY: No cough, wheezing, hemoptysis; No shortness of breath  CARDIOVASCULAR: No chest pain or palpitations  GENITOURINARY: No dysuria, frequency or hematuria  All other review of systems is negative unless indicated above.      MEDICATIONS  (STANDING):  ALBUTerol    90 MICROgram(s) HFA Inhaler 2 Puff(s) Inhalation every 6 hours  aspirin  chewable 81 milliGRAM(s) Oral daily  atorvastatin 80 milliGRAM(s) Oral at bedtime  budesonide  80 MICROgram(s)/formoterol 4.5 MICROgram(s) Inhaler 2 Puff(s) Inhalation two times a day  calcitriol   Capsule 0.25 MICROGram(s) Oral daily  calcium acetate 667 milliGRAM(s) Oral three times a day with meals  clopidogrel Tablet 75 milliGRAM(s) Oral daily  dextrose 5%. 1000 milliLiter(s) (50 mL/Hr) IV Continuous <Continuous>  dextrose 50% Injectable 12.5 Gram(s) IV Push once  dextrose 50% Injectable 25 Gram(s) IV Push once  dextrose 50% Injectable 25 Gram(s) IV Push once  dicyclomine 20 milliGRAM(s) Oral three times a day before meals  guaiFENesin ER 1200 milliGRAM(s) Oral every 12 hours  heparin   Injectable 5000 Unit(s) SubCutaneous every 8 hours  insulin lispro (HumaLOG) corrective regimen sliding scale   SubCutaneous three times a day before meals  insulin lispro (HumaLOG) corrective regimen sliding scale   SubCutaneous at bedtime  ipratropium 17 MICROgram(s) HFA Inhaler 1 Puff(s) Inhalation every 6 hours  levothyroxine 112 MICROGram(s) Oral daily  meropenem  IVPB      meropenem  IVPB 500 milliGRAM(s) IV Intermittent every 12 hours  metoprolol tartrate 25 milliGRAM(s) Oral daily  ranolazine 1000 milliGRAM(s) Oral two times a day    MEDICATIONS  (PRN):  acetaminophen   Tablet .. 650 milliGRAM(s) Oral every 4 hours PRN Mild Pain (1 - 3)  ALBUTerol    90 MICROgram(s) HFA Inhaler 2 Puff(s) Inhalation every 6 hours PRN Shortness of Breath and/or Wheezing  aluminum hydroxide/magnesium hydroxide/simethicone Suspension 30 milliLiter(s) Oral every 4 hours PRN Dyspepsia  dextrose 40% Gel 15 Gram(s) Oral once PRN Blood Glucose LESS THAN 70 milliGRAM(s)/deciliter  glucagon  Injectable 1 milliGRAM(s) IntraMuscular once PRN Glucose LESS THAN 70 milligrams/deciliter  loperamide 2 milliGRAM(s) Oral every 4 hours PRN Diarrhea  morphine  - Injectable 2 milliGRAM(s) IV Push every 4 hours PRN Severe Pain (7 - 10)  ondansetron Injectable 4 milliGRAM(s) IV Push every 6 hours PRN Nausea  oxycodone    5 mG/acetaminophen 325 mG 1 Tablet(s) Oral every 6 hours PRN Moderate Pain (4 - 6)        Vital Signs Last 24 Hrs  T(C): 36.6 (10 Sep 2020 09:24), Max: 36.8 (09 Sep 2020 16:44)  T(F): 97.9 (10 Sep 2020 09:24), Max: 98.2 (09 Sep 2020 16:44)  HR: 81 (10 Sep 2020 13:38) (72 - 81)  BP: 118/65 (10 Sep 2020 09:24) (106/42 - 118/65)  BP(mean): --  RR: 15 (10 Sep 2020 09:24) (14 - 17)  SpO2: 93% (10 Sep 2020 13:38) (93% - 95%)    I&O's Detail    10 Sep 2020 07:01  -  10 Sep 2020 14:38  --------------------------------------------------------  IN:    Oral Fluid: 340 mL  Total IN: 340 mL    OUT:  Total OUT: 0 mL    Total NET: 340 mL            PHYSICAL EXAM:    Constitutional: frail  HEENT: dry MM  Neck: No LAD, No JVD  Respiratory: dist BS  Cardiovascular: S1 and S2, RRR  Gastrointestinal: BS+, soft, NT/ND  Extremities:  peripheral edema ++   Neurological: A/O x2  : No Hughes  Skin: No rashes  Access: Not applicable        LABS:               134    |  103    |  81     ----------------------------<  173       10 Sep 2020 10:04  4.2     |  24     |  4.55     135    |  107    |  79     ----------------------------<  83        09 Sep 2020 08:28  5.0     |  19     |  4.36     139    |  107    |  79     ----------------------------<  89        08 Sep 2020 08:17  3.4     |  24     |  3.97     Ca    8.1        10 Sep 2020 10:04  Ca    8.0        09 Sep 2020 08:28    Phos  4.6       10 Sep 2020 10:04  Phos  4.5       08 Sep 2020 08:17    Mg     1.7       10 Sep 2020 10:04    Albumin, Serum: 1.1 g/dL (09.08.20 @ 08:17)               8.0    7.52  )-----------( 141      ( 10 Sep 2020 10:04 )             25.0                         9.0    10.18 )-----------( 162      ( 09 Sep 2020 10:15 )             29.5     Urine Studies:          RADIOLOGY & ADDITIONAL STUDIES:

## 2020-09-10 NOTE — PROGRESS NOTE ADULT - SUBJECTIVE AND OBJECTIVE BOX
Date of service: 20 @ 15:08    pt seen and examined  poor po intake  weak appearing   lower abd pain  still being straight-cathed d/t urinary retention    ROS: no fever or chills; denies dizziness, no HA, no constipation; no dysuria, no urinary frequency, no legs pain, no rashes    MEDICATIONS  (STANDING):  aspirin  chewable 81 milliGRAM(s) Oral daily  atorvastatin 80 milliGRAM(s) Oral at bedtime  calcitriol   Capsule 0.25 MICROGram(s) Oral daily  calcium acetate 667 milliGRAM(s) Oral three times a day with meals  clopidogrel Tablet 75 milliGRAM(s) Oral daily  dextrose 5%. 1000 milliLiter(s) (50 mL/Hr) IV Continuous <Continuous>  dextrose 50% Injectable 12.5 Gram(s) IV Push once  dextrose 50% Injectable 25 Gram(s) IV Push once  dextrose 50% Injectable 25 Gram(s) IV Push once  dicyclomine 20 milliGRAM(s) Oral three times a day before meals  heparin   Injectable 5000 Unit(s) SubCutaneous every 8 hours  insulin lispro (HumaLOG) corrective regimen sliding scale   SubCutaneous three times a day before meals  insulin lispro (HumaLOG) corrective regimen sliding scale   SubCutaneous at bedtime  levothyroxine 112 MICROGram(s) Oral daily  metoprolol tartrate 25 milliGRAM(s) Oral daily  ranolazine 1000 milliGRAM(s) Oral two times a day  sodium chloride 0.9%. 1000 milliLiter(s) (70 mL/Hr) IV Continuous <Continuous>        Vital Signs Last 24 Hrs  T(C): 36.8 (09 Sep 2020 08:39), Max: 37.2 (08 Sep 2020 17:00)  T(F): 98.2 (09 Sep 2020 08:39), Max: 98.9 (08 Sep 2020 17:00)  HR: 75 (09 Sep 2020 08:39) (75 - 84)  BP: 115/42 (09 Sep 2020 08:39) (104/37 - 119/98)  BP(mean): --  RR: 17 (09 Sep 2020 08:39) (16 - 17)  SpO2: 98% (09 Sep 2020 08:39) (91% - 98%)      PE:  Constitutional: frail looking  HEENT: NC/AT, EOMI, PERRLA, conjunctivae clear; ears and nose atraumatic; pharynx benign  Neck: supple; thyroid not palpable  Back: no tenderness  Respiratory: respiratory effort normal; clear to auscultation  Cardiovascular: S1S2 regular, no murmurs  Abdomen: soft,  tender, distended, positive BS; liver and spleen WNL  Genitourinary: no suprapubic tenderness  Lymphatic: no LN palpable  Musculoskeletal: no muscle tenderness, no joint swelling or tenderness  Extremities: no pedal edema  Neurological/ Psychiatric:  moving all extremities  Skin: no rashes; no palpable lesions    Labs: all available labs reviewed                                   8.0    7.52  )-----------( 141      ( 10 Sep 2020 10:04 )             25.0     09-10    134<L>  |  103  |  81<H>  ----------------------------<  173<H>  4.2   |  24  |  4.55<H>    Ca    8.1<L>      10 Sep 2020 10:04  Phos  4.6     09-10  Mg     1.7     09-10          Urinalysis Basic - ( 23 Aug 2020 10:25 )    Color: Yellow / Appearance: Clear / S.010 / pH: x  Gluc: x / Ketone: Trace  / Bili: Small / Urobili: 1 mg/dL   Blood: x / Protein: 30 mg/dL / Nitrite: Negative   Leuk Esterase: Moderate / RBC: 25-50 /HPF / WBC >50   Sq Epi: x / Non Sq Epi: Few / Bacteria: Many    Culture - Urine (20 @ 10:25)    Specimen Source: .Urine Clean Catch (Midstream)    Culture Results:   >100,000 CFU/ml Escherichia coli    Culture - Blood (20 @ 08:34)    Gram Stain:   Growth in aerobic bottle: Gram Negative Rods  Growth in anaerobic bottle: Gram Negative Rods    Specimen Source: .Blood None    Culture Results:   Growth in aerobic and anaerobic bottles: Escherichia coli  See previous culture [id number]    Culture - Blood (20 @ 08:34)    -  Escherichia coli: Detec    Gram Stain:   Growth in aerobic bottle: Gram Negative Rods  Growth in anaerobic bottle: Gram Negative Rods    Specimen Source: .Blood None    Organism: Blood Culture PCR    Culture Results:   Growth in aerobic bottle: Gram Negative Rods  Growth in anaerobic bottle: Gram Negative Rods      Radiology: all available radiological tests reviewed  < from: CT Chest No Cont (20 @ 15:52) >  EXAM:  CT CHEST                            PROCEDURE DATE:  2020          INTERPRETATION:  CLINICAL INFORMATION: Chest pain    COMPARISON: CT chest 2019, CT abdomen 2020    PROCEDURE:  CT of the Chest was performed without intravenous contrast.  Sagittal and coronal reformats were performed.    FINDINGS:    LUNGS AND AIRWAYS: Mucosal impaction of the left mainstem bronchus. Near complete atelectasis of the left lower lobe. Significant segmental atelectasis in the lingula and anterior left upper lobe. Right basilar compressive atelectasis. Additional scattered peripheral groundglass opacities.  Paraseptal emphysema bilaterally.  PLEURA: No moderate to large left pleural effusion. Mild to moderate right pleural effusion.  MEDIASTINUM AND TATIANA: No lymphadenopathy.  VESSELS: Atherosclerotic changes of the aorta and coronary vasculature. No aortic aneurysm.  HEART: Heart size is normal. No pericardial effusion.  CHEST WALL AND LOWER NECK: Extensive anasarca.  VISUALIZED UPPER ABDOMEN: Prior cholecystectomy.  BONES: Old right lateral sixth and seventh rib fractures. Degenerative changes.    IMPRESSION:  Extensive mucosal impaction of the left mainstem bronchus extending into left upper lobe and left lower lobe segmental bronchi with near complete atelectasis left lower lobe and multifocal segmental atelectasis and/or pneumonia in the left upper lobe. Moderate to large left pleural effusion with a mild to moderate right pleural effusion with bibasilar compressive atelectasis.    Additional scattered groundglass opacities bilaterally.    < end of copied text >    < from: CT Abdomen and Pelvis w/ Oral Cont (20 @ 12:18) >  EXAM:  CT ABDOMEN AND PELVIS OC                            PROCEDURE DATE:  2020          INTERPRETATION:  CLINICAL INFORMATION: Worsening lower abdominal pain, diarrhea.    COMPARISON: 2020, 2020 and multiple priors.    PROCEDURE:  CT of the Abdomen and Pelvis was performed without intravenous contrast.  Intravenous contrast: None.  Oral contrast: positive contrast was administered.  Sagittal and coronal reformats were performed.    FINDINGS:  LOWER CHEST: Interval progression of left lung collapse with more loculated pleural effusion. Slightly decreased right pleural effusion and passive atelectasis. Scattered groundglass opacities in the right middle lobe and right lower lobe, new since prior (2-8).    LIVER: Within normal limits.  BILE DUCTS: Normal caliber.  GALLBLADDER: Cholecystectomy clips.  SPLEEN: Within normal limits.  PANCREAS: Within normal limits.  ADRENALS: Within normal limits.  KIDNEYS/URETERS: Within normal limits.    BLADDER: Small air in the nondependent portion of the bladder. Please correlate with instrumentation history.  REPRODUCTIVE ORGANS: Unremarkable.    BOWEL: No bowel obstruction. Appendix is not visualized. Mild diffuse wall thickening of the sigmoid (602-21), slightly more prominent since prior.. Interval removal of rectal tube.  PERITONEUM: Trace ascites in the cul-de-sac. No pneumoperitoneum.  VESSELS: Extensive atherosclerotic calcification.  RETROPERITONEUM/LYMPH NODES: No lymphadenopathy.  ABDOMINAL WALL: Diffuse anasarca without significant interval change.  BONES: No change of surgical hardware and grade 1 spondylolisthesis of L4 and L5. No acute abnormalities.    IMPRESSION:  Interval progression of left lung collapse with interval loculation of left effusion.    New scattered groundglass opacities in the right middle and lower lobes, likely indicating atypical infection including viral infection..    Mild diffuse wall thickening of the sigmoid, slightly more prominent since prior, which may indicate nonspecific colitis.    Otherwise no change.    < end of copied text >    < from: CT Abdomen and Pelvis No Cont (20 @ 01:24) >  EXAM:  CT ABDOMEN AND PELVIS                            PROCEDURE DATE:  2020          INTERPRETATION:  CLINICAL INFORMATION: Status post catheterization today. Evaluate for retroperitoneal bleed.    COMPARISON: 2020    PROCEDURE:  CT ofthe Abdomen and Pelvis was performed without intravenous contrast.  Intravenous contrast: None.  Oral contrast: None.  Sagittal and coronal reformats were performed.    FINDINGS:  Evaluation of solid organs is limited without intravenous contrast.  Evaluation of the lower pelvis is limited as images do not extend beyond the proximal inguinal region.      LOWER CHEST: Small bilateral pleural effusions and associated subsegmental bibasilar atelectasis. Scattered calcified granulomas. Aortic and coronary artery atherosclerotic calcifications.    LIVER: Within normal limits.  BILE DUCTS: Stable mild CBD dilatation likely related to cholecystectomy.  GALLBLADDER: Cholecystectomy.  SPLEEN: Within normal limits.  PANCREAS: Within normal limits.  ADRENALS: Within normal limits.  KIDNEYS/URETERS: No hydronephrosis or obstructing stone.    BLADDER: Within normal limits.  REPRODUCTIVE ORGANS: Uterus and adnexa within normal limits.    BOWEL: Partially imaged rectal tube. Distal rectum/anus not included in the field-of-view. Wall thickening of the sigmoid colon. No bowel obstruction. Appendix is normal.  PERITONEUM: Small ascites. Presacral edema.  VESSELS: Atherosclerotic changes.  RETROPERITONEUM/LYMPH NODES: No lymphadenopathy. No definite retroperitoneal hematoma identified.  ABDOMINAL WALL: Anasarca. Inguinal regions not fully included in the field-of-view. Mild bilateral stranding in the visualized inguinal regions.  BONES: Degenerative changes of the spine. Grade 1 anterolisthesis at L4-L5. Posterior fixation hardware at L4-L5.    IMPRESSION:    1. No definite retroperitoneal hematoma identified.    2. Incomplete evaluation for inguinal region/thigh hematoma due to incomplete field-of-view.    3. Small bilateral pleural effusions. Small ascites. Anasarca.      4. Wall thickening of the sigmoid colon. Recommend clinical correlation for colitis.    < end of copied text >    < from: CT Abdomen and Pelvis w/ Oral Cont (20 @ 16:41) >  EXAM:  CT ABDOMEN AND PELVIS OC                            PROCEDURE DATE:  2020          INTERPRETATION:  CLINICAL INFORMATION: Diffuse abdominal pain    COMPARISON: None.    PROCEDURE:  CT of the Abdomen and Pelvis was performed without intravenous contrast.  Intravenous contrast: None.  Oral contrast: positive contrast was administered.  Sagittal and coronal reformats were performed.    FINDINGS:  LOWER CHEST: Coronary artery calcifications. Clear lung bases.    LIVER: Within normal limits.  BILE DUCTS: Normal caliber.  GALLBLADDER: Cholecystectomy.  SPLEEN: Within normal limits.  PANCREAS: Within normal limits.  ADRENALS: Within normal limits.  KIDNEYS/URETERS: Within normal limits.    BLADDER: Within normal limits.  REPRODUCTIVE ORGANS: Uterus and adnexa within normal limits.    BOWEL: Mildly distended small bowel without transition point to suggest obstruction. No wall thickening or inflammatory change.  Appendix normal.  PERITONEUM: No ascites or pneumoperitoneum..  VESSELS: Severe diffuse atherosclerotic arterial calcification. Normal caliber aorta.  RETROPERITONEUM/LYMPH NODES: No lymphadenopathy.  ABDOMINAL WALL: Within normal limits.  BONES: L4-L5 interpedicular screws and connecting rods. Moderate anterolisthesis of L4 on L5. No acute bony abnormality.    IMPRESSION:  Mildly distended small bowel without evidence of obstruction.    < end of copied text >    Advanced directives addressed: full resuscitation

## 2020-09-10 NOTE — ED PROVIDER NOTE - NS ED MD DISPO DISCHARGE CCDA
PROVIDER:[TOKEN:[[id number]],FOLLOWUP:[1 week]],PROVIDER:[TOKEN:[[id number]],FOLLOWUP:[2 weeks]],PROVIDER:[TOKEN:[[id number]],FOLLOWUP:[2 weeks]] Patient/Caregiver provided printed discharge information.

## 2020-09-10 NOTE — PROGRESS NOTE ADULT - SUBJECTIVE AND OBJECTIVE BOX
Cheif complaints and Diagnosis:  respiratory failure/ ecoli bacteremia w/ sepsis/  Reccurent PNA, likley HAP/ mucous plugging    Subjective: patient feels weak, tired      REVIEW OF SYSTEMS:    CONSTITUTIONAL: No weakness, fevers or chills  EYES/ENT: No visual changes;  No vertigo or throat pain   NECK: No pain or stiffness  RESPIRATORY: No cough, wheezing, hemoptysis; No shortness of breath  CARDIOVASCULAR: No chest pain or palpitations  GASTROINTESTINAL: No abdominal or epigastric pain. No nausea, vomiting, or hematemesis; No diarrhea or constipation. No melena or hematochezia.  GENITOURINARY: No dysuria, frequency or hematuria  NEUROLOGICAL: No numbness or weakness  SKIN: No itching, burning, rashes, or lesions   All other review of systems is negative unless indicated above      Vital Signs Last 24 Hrs  T(C): 36.6 (10 Sep 2020 09:24), Max: 36.8 (09 Sep 2020 16:44)  T(F): 97.9 (10 Sep 2020 09:24), Max: 98.2 (09 Sep 2020 16:44)  HR: 81 (10 Sep 2020 13:38) (72 - 81)  BP: 118/65 (10 Sep 2020 09:24) (106/42 - 118/65)  BP(mean): --  RR: 15 (10 Sep 2020 09:24) (14 - 17)  SpO2: 93% (10 Sep 2020 13:38) (93% - 95%)    HEENT:   pupils equal and reactive, EOMI, no oropharyngeal lesions, erythema, exudates, oral thrush    NECK:   supple, no carotid bruits, no palpable lymph nodes, no thyromegaly    CV:  +S1, +S2, regular, no murmurs or rubs    RESP:   lungs clear to auscultation bilaterally, no wheezing, rales, rhonchi, good air entry bilaterally    BREAST:  not examined    GI:  abdomen soft, non-tender, non-distended, normal BS, no bruits, no abdominal masses, no palpable masses    RECTAL:  not examined    :  not examined    MSK:   normal muscle tone, no atrophy, no rigidity, no contractions    EXT:   no clubbing, no cyanosis, no edema, no calf pain, swelling or erythema    VASCULAR:  pulses equal and symmetric in the upper and lower extremities    NEURO:  AAOX3, no focal neurological deficits, follows all commands, able to move extremities spontaneously    SKIN:  no ulcers, lesions or rashes    MEDICATIONS  (STANDING):  ALBUTerol    90 MICROgram(s) HFA Inhaler 2 Puff(s) Inhalation every 6 hours  aspirin  chewable 81 milliGRAM(s) Oral daily  atorvastatin 80 milliGRAM(s) Oral at bedtime  bethanechol 10 milliGRAM(s) Oral three times a day  budesonide  80 MICROgram(s)/formoterol 4.5 MICROgram(s) Inhaler 2 Puff(s) Inhalation two times a day  calcitriol   Capsule 0.25 MICROGram(s) Oral daily  calcium acetate 667 milliGRAM(s) Oral three times a day with meals  clopidogrel Tablet 75 milliGRAM(s) Oral daily  dextrose 5%. 1000 milliLiter(s) (50 mL/Hr) IV Continuous <Continuous>  dextrose 50% Injectable 12.5 Gram(s) IV Push once  dextrose 50% Injectable 25 Gram(s) IV Push once  dextrose 50% Injectable 25 Gram(s) IV Push once  dicyclomine 20 milliGRAM(s) Oral three times a day before meals  guaiFENesin ER 1200 milliGRAM(s) Oral every 12 hours  heparin   Injectable 5000 Unit(s) SubCutaneous every 8 hours  insulin lispro (HumaLOG) corrective regimen sliding scale   SubCutaneous three times a day before meals  insulin lispro (HumaLOG) corrective regimen sliding scale   SubCutaneous at bedtime  ipratropium 17 MICROgram(s) HFA Inhaler 1 Puff(s) Inhalation every 6 hours  levothyroxine 112 MICROGram(s) Oral daily  meropenem  IVPB      meropenem  IVPB 500 milliGRAM(s) IV Intermittent every 12 hours  metoprolol tartrate 25 milliGRAM(s) Oral daily  ranolazine 1000 milliGRAM(s) Oral two times a day    MEDICATIONS  (PRN):  acetaminophen   Tablet .. 650 milliGRAM(s) Oral every 4 hours PRN Mild Pain (1 - 3)  ALBUTerol    90 MICROgram(s) HFA Inhaler 2 Puff(s) Inhalation every 6 hours PRN Shortness of Breath and/or Wheezing  aluminum hydroxide/magnesium hydroxide/simethicone Suspension 30 milliLiter(s) Oral every 4 hours PRN Dyspepsia  dextrose 40% Gel 15 Gram(s) Oral once PRN Blood Glucose LESS THAN 70 milliGRAM(s)/deciliter  glucagon  Injectable 1 milliGRAM(s) IntraMuscular once PRN Glucose LESS THAN 70 milligrams/deciliter  loperamide 2 milliGRAM(s) Oral every 4 hours PRN Diarrhea  morphine  - Injectable 2 milliGRAM(s) IV Push every 4 hours PRN Severe Pain (7 - 10)  ondansetron Injectable 4 milliGRAM(s) IV Push every 6 hours PRN Nausea  oxycodone    5 mG/acetaminophen 325 mG 1 Tablet(s) Oral every 6 hours PRN Moderate Pain (4 - 6)      10 Sep 2020 10:04    134    |  103    |  81     ----------------------------<  173    4.2     |  24     |  4.55     Ca    8.1        10 Sep 2020 10:04  Phos  4.6       10 Sep 2020 10:04  Mg     1.7       10 Sep 2020 10:04    CBC Full  -  ( 10 Sep 2020 10:04 )  WBC Count : 7.52 K/uL  Hemoglobin : 8.0 g/dL  Hematocrit : 25.0 %  Platelet Count - Automated : 141 K/uL  Mean Cell Volume : 90.9 fl  Mean Cell Hemoglobin : 29.1 pg  Mean Cell Hemoglobin Concentration : 32.0 gm/dL        Assessment and Plan:      59 CAD, PAD s/p fem-pop bypass s/p RT iliac stent, diabetes, hypothyroidism, hyperlipidemia, current active smoker, uses cocaine last use yesterday, chronic back pain s/p lumbar surgery presented with multiple non-specific complaints found to have ST elevations on presenting EKG, code STEMI called and pt underwent urgent angiogram showing occluded RCA w no intervention with UTI sepsis and E. coli bacteremia POA w hospital course c/b code blue.   Patient is s/p Intubation / Extubation.     Acute hypoxic respiratory failure  secondary to septic shock  -s/p intubation /extubation      Hospital acquired PNA with Large obstructing Left main bronchus plug  -acute , reccurent hypoxic respiratory failure >> patient requiring supplemental o2 4L  but stable not requiring excessive o2.  -CT chest shows large mucous impaction left main bronchus >> pulmonary consult  -patient is symptomatic, says feels weak, dizzy  -c/w mucinex, chest PT, albuterol, atrovent; c/w incentive spirometry  -c/w Meropenem, D/w Dr. wilson      Septic shock secondary to Ecoli  bacteremia  -ecoli bactermia   -colitis on CT scan , possible bacteremia due to colitis  -completed antibiotics.       STEMI  -Echo show hypokinesis on admission, troponins, st elevations  -Urgent coronary angiogram showed occluded RCA of [facility] chronicity  -cardiology f/u noted; recco to c/w asp plavix, llipitor.   -patient not improving, continued decline, with no energy, weakness, barely can ambulate, has no energy  to even get in chair >> d/w cardiology, will re-consult to eval and see if any intervention would be helpful?      Daihrea  -bacterial colitis more likely ; completed antibiotics  -no blood reported in stool, ishcemic colitis possible though but less likely  -c/w supportive care  -c.diff negative    Ground glass opacities on CT  -patient with increasing ground glass opacities suspicious for covid19  -Isolate patient and reswab  to rule out covid19      AMISH w septic shock  -cr improving 3.97 tdoay, encouraged oral hydration      Hypocalcemia - Ca improving  w Calcum acetate with feeds for elevated phos/low ca++ when starting to eat   Intact PTH: 87  Vitamin D, 25-Hydroxy: 27.8  Magnesium, Serum: 1.8 mg/dL  Calcitriol - Vit D analogue     Trial of void   -patient had hughes catheter palced in ICU  -to be removed today ; nursing to monitor voiding.

## 2020-09-10 NOTE — CONSULT NOTE ADULT - ASSESSMENT
58yo/F with PMH CAD, PAD s/p fem-pop bypass s/p RT iliac stent, diabetes, hypothyroidism, hyperlipidemia, current active smoker, uses cocaine, chronic back pain s/p lumbar surgery presented on 8/23/2020 with multiple non-specific complaints, including fevers, hospital course complicated by acute kidney failure, intubation, pressor requirement, diarrhea, and now  pneumonia with occlusion of left mainstem bronchus  1. hospital-acquired pneumonia, with large obstructing left mainstem plug:  - continue supplemental oxygen  - continue Mucinex  - continue chest  physiotherapy  - continue meropenem  - will need bronchoscopy if chest physiotherapy fails to clear secretions  - spoke to OR, it is booked and the only have space for add  on case.  Based on availability of OR, either myself or thoracic will need to do the bronchoscopy  - chest x-ray in the morning  - hold aspirin, Plavix tomorrow for possible bronchoscopy  - continue Symbicort  2. septic shock from E coli bacteremia: Status post antibiotics  3. Diarrhea: C diff negative.  GI following 60yo/F with PMH CAD, PAD s/p fem-pop bypass s/p RT iliac stent, diabetes, hypothyroidism, hyperlipidemia, current active smoker, uses cocaine, chronic back pain s/p lumbar surgery presented on 8/23/2020 with multiple non-specific complaints, including fevers, hospital course complicated by acute kidney failure, intubation, pressor requirement, diarrhea, and now  pneumonia with occlusion of left mainstem bronchus  1. hospital-acquired pneumonia, with large obstructing left mainstem plug:  - continue supplemental oxygen  - continue Mucinex  - continue chest  physiotherapy  - continue meropenem  - will need bronchoscopy if chest physiotherapy fails to clear secretions  - spoke to OR, it is booked and the only have space for add  on case.  Based on availability of OR, either myself or thoracic will need to do the bronchoscopy  - chest x-ray in the morning  - hold heparin, aspirin, Plavix tomorrow AM for possible bronchoscopy  - continue Symbicort  2. septic shock from E coli bacteremia: Status post antibiotics  3. Diarrhea: C diff negative.  GI following

## 2020-09-10 NOTE — CONSULT NOTE ADULT - SUBJECTIVE AND OBJECTIVE BOX
HPI: EFREM BRENNAN is a 58yo/F with PMH CAD, PAD s/p fem-pop bypass s/p RT iliac stent, diabetes, hypothyroidism, hyperlipidemia, current active smoker, uses cocaine, chronic back pain s/p lumbar surgery presented on 8/23/2020 with multiple non-specific complaints, including fevers on/off last 4-5 days, weakness, no appetite and reduced oral intake, vague diffuse abd pains, one day of diarrhea. No cough, no SOB. SHe came in today for evaluation of weakness and found to have ST elevations on presenting EKG, code STEMI called and pt underwent urgent angiogram showing occluded RCA. Patient developed septic shock, and required pressors, developed acute renal failure.  Blood cultures were positive for e.coli.  She was intubated for respiratory failure.  She was extubated on 8/25.  She was weaned off pressors transferred to the floors  she was found to have hypoxia to the 80s on room air.  She had a CT scan which revealed occlusion of the left mainstem bronchus    Past Medical History:   Lung nodule  Diabetes mellitus  History of TIAs  History of TIA due to embolism  History of gallbladder disease  History of colon polyps  Substance abuse  ETOH abuse  Spinal stenosis of lumbar region  DDD (degenerative disc disease), lumbar  Cystic breast  GERD (gastroesophageal reflux disease)  Carotid artery stenosis  TIA (transient ischemic attack)  Bipolar depression  Shoulder disorder  Angina pectoris  History of MRSA infection  Anxiety and depression  Transient cerebral ischemia, unspecified type  Osteoarthritis, unspecified osteoarthritis type, unspecified site  Osteoarthritis of spine, unspecified spinal osteoarthritis complication status, unspecified spinal region  Lumbar herniated disc  Urinary incontinence, unspecified type  Overactive bladder  Gall bladder disease  Constipation, unspecified constipation type  Hiatal hernia with GERD  PAD (peripheral artery disease)  Hyperlipidemia, unspecified hyperlipidemia type  Essential hypertension  Hypothyroidism  DM (diabetes mellitus), type 1  COPD (chronic obstructive pulmonary disease)  CAD (coronary artery disease)    Past Surgical History:   History of lumbar fusion  H/O hernia repair  S/P dilatation and curettage  H/O rhinoplasty  H/O angioplasty  H/O ventral hernia repair  History of incision and drainage  H/O arterial bypass of lower limb  History of laparoscopic cholecystectomy    Family History:    Family history of asthma (Sibling)  Family history of epilepsy (Sibling, Sibling)  Family history of cardiovascular disease  Family history of stroke  Family history of heart disease  Family history of alcoholism in father     Social History:  used cocaine the day prior to admission    Review of Systems:  All 10 systems reviewed in detailed and found to be negative with the exception of what has already been described above    Allergies:  No Known Allergies    Meds  MEDICATIONS  (STANDING):  ALBUTerol    90 MICROgram(s) HFA Inhaler 2 Puff(s) Inhalation every 6 hours  aspirin  chewable 81 milliGRAM(s) Oral daily  atorvastatin 80 milliGRAM(s) Oral at bedtime  bethanechol 10 milliGRAM(s) Oral three times a day  budesonide  80 MICROgram(s)/formoterol 4.5 MICROgram(s) Inhaler 2 Puff(s) Inhalation two times a day  calcitriol   Capsule 0.25 MICROGram(s) Oral daily  calcium acetate 667 milliGRAM(s) Oral three times a day with meals  clopidogrel Tablet 75 milliGRAM(s) Oral daily  dextrose 5%. 1000 milliLiter(s) (50 mL/Hr) IV Continuous <Continuous>  dextrose 50% Injectable 12.5 Gram(s) IV Push once  dextrose 50% Injectable 25 Gram(s) IV Push once  dextrose 50% Injectable 25 Gram(s) IV Push once  dicyclomine 20 milliGRAM(s) Oral three times a day before meals  guaiFENesin ER 1200 milliGRAM(s) Oral every 12 hours  heparin   Injectable 5000 Unit(s) SubCutaneous every 8 hours  insulin lispro (HumaLOG) corrective regimen sliding scale   SubCutaneous three times a day before meals  insulin lispro (HumaLOG) corrective regimen sliding scale   SubCutaneous at bedtime  ipratropium 17 MICROgram(s) HFA Inhaler 1 Puff(s) Inhalation every 6 hours  levothyroxine 112 MICROGram(s) Oral daily  meropenem  IVPB      meropenem  IVPB 500 milliGRAM(s) IV Intermittent every 12 hours  metoprolol tartrate 25 milliGRAM(s) Oral daily  ranolazine 1000 milliGRAM(s) Oral two times a day    MEDICATIONS  (PRN):  acetaminophen   Tablet .. 650 milliGRAM(s) Oral every 4 hours PRN Mild Pain (1 - 3)  ALBUTerol    90 MICROgram(s) HFA Inhaler 2 Puff(s) Inhalation every 6 hours PRN Shortness of Breath and/or Wheezing  aluminum hydroxide/magnesium hydroxide/simethicone Suspension 30 milliLiter(s) Oral every 4 hours PRN Dyspepsia  dextrose 40% Gel 15 Gram(s) Oral once PRN Blood Glucose LESS THAN 70 milliGRAM(s)/deciliter  glucagon  Injectable 1 milliGRAM(s) IntraMuscular once PRN Glucose LESS THAN 70 milligrams/deciliter  loperamide 2 milliGRAM(s) Oral every 4 hours PRN Diarrhea  morphine  - Injectable 2 milliGRAM(s) IV Push every 4 hours PRN Severe Pain (7 - 10)  ondansetron Injectable 4 milliGRAM(s) IV Push every 6 hours PRN Nausea  oxycodone    5 mG/acetaminophen 325 mG 1 Tablet(s) Oral every 6 hours PRN Moderate Pain (4 - 6)    Physical Exam  T(C): 36.6 (09-10-20 @ 09:24), Max: 36.6 (09-10-20 @ 09:24)  HR: 81 (09-10-20 @ 13:38) (72 - 81)  BP: 118/65 (09-10-20 @ 09:24) (115/44 - 118/65)  RR: 15 (09-10-20 @ 09:24) (14 - 15)  SpO2: 93% (09-10-20 @ 13:38) (93% - 95%)  Gen: Alert, oriented, no distress  HEENT: Anicteric sclera, + thrush  Cardio: Regular rhythm and rate, normal S1S2, no murmurs  Resp: Clear to auscultation bilaterally, no wheezing or rhonchi  GI: Nontender, nondistended, normoactive bowel sounds  Ext: No cyanosis, clubbing or edema  Neuro: Nonfocal    Labs:                        8.0    7.52  )-----------( 141      ( 10 Sep 2020 10:04 )             25.0     09-10    134<L>  |  103  |  81<H>  ----------------------------<  173<H>  4.2   |  24  |  4.55<H>    Ca    8.1<L>      10 Sep 2020 10:04  Phos  4.6     09-10  Mg     1.7     09-10    < from: CT Chest No Cont (09.09.20 @ 15:52) >  PROCEDURE DATE:  09/09/2020          INTERPRETATION:  CLINICAL INFORMATION: Chest pain    COMPARISON: CT chest 7/29/2019, CT abdomen 9/8/2020    PROCEDURE:  CT of the Chest was performed without intravenous contrast.  Sagittal and coronal reformats were performed.    FINDINGS:    LUNGS AND AIRWAYS: Mucosal impaction of the left mainstem bronchus. Near complete atelectasis of the left lower lobe. Significant segmental atelectasis in the lingula and anterior left upper lobe. Right basilar compressive atelectasis. Additional scattered peripheral groundglass opacities.  Paraseptal emphysema bilaterally.  PLEURA: No moderate to large left pleural effusion. Mild to moderate right pleural effusion.  MEDIASTINUM AND TATIANA: No lymphadenopathy.  VESSELS: Atherosclerotic changes of the aorta and coronary vasculature. No aortic aneurysm.  HEART: Heart size is normal. No pericardial effusion.  CHEST WALL AND LOWER NECK: Extensive anasarca.  VISUALIZED UPPER ABDOMEN: Prior cholecystectomy.  BONES: Old right lateral sixth and seventh rib fractures. Degenerative changes.    IMPRESSION:  Extensive mucosal impaction of the left mainstem bronchus extending into left upper lobe and left lower lobe segmental bronchi with near complete atelectasis left lower lobe and multifocal segmental atelectasis and/or pneumonia in the left upper lobe. Moderate to large left pleural effusion with a mild to moderate right pleural effusion with bibasilar compressive atelectasis.    Additional scattered groundglass opacities bilaterally.    < end of copied text >            EKG:    Echocardiogram:    CXR:    CT Scan: HPI: EFREM BRENNAN is a 60yo/F with PMH CAD, PAD s/p fem-pop bypass s/p RT iliac stent, diabetes, hypothyroidism, hyperlipidemia, current active smoker, uses cocaine, chronic back pain s/p lumbar surgery presented on 8/23/2020 with multiple non-specific complaints, including fevers on/off last 4-5 days, weakness, no appetite and reduced oral intake, vague diffuse abd pains, one day of diarrhea. No cough, no SOB. SHe came in today for evaluation of weakness and found to have ST elevations on presenting EKG, code STEMI called and pt underwent urgent angiogram showing occluded RCA. Patient developed septic shock, and required pressors, developed acute renal failure.  Blood cultures were positive for e.coli.  She was intubated for respiratory failure.  She was extubated on 8/25.  She was weaned off pressors transferred to the floors  she was found to have hypoxia to the 80s on room air.  She had a CT scan which revealed occlusion of the left mainstem bronchus  When seen this evening, patient states that her breathing has improved. feels like her breathing benefitted from chest physiotherapy    Past Medical History:   Lung nodule  Diabetes mellitus  History of TIAs  History of TIA due to embolism  History of gallbladder disease  History of colon polyps  Substance abuse  ETOH abuse  Spinal stenosis of lumbar region  DDD (degenerative disc disease), lumbar  Cystic breast  GERD (gastroesophageal reflux disease)  Carotid artery stenosis  TIA (transient ischemic attack)  Bipolar depression  Shoulder disorder  Angina pectoris  History of MRSA infection  Anxiety and depression  Transient cerebral ischemia, unspecified type  Osteoarthritis, unspecified osteoarthritis type, unspecified site  Osteoarthritis of spine, unspecified spinal osteoarthritis complication status, unspecified spinal region  Lumbar herniated disc  Urinary incontinence, unspecified type  Overactive bladder  Gall bladder disease  Constipation, unspecified constipation type  Hiatal hernia with GERD  PAD (peripheral artery disease)  Hyperlipidemia, unspecified hyperlipidemia type  Essential hypertension  Hypothyroidism  DM (diabetes mellitus), type 1  COPD (chronic obstructive pulmonary disease)  CAD (coronary artery disease)    Past Surgical History:   History of lumbar fusion  H/O hernia repair  S/P dilatation and curettage  H/O rhinoplasty  H/O angioplasty  H/O ventral hernia repair  History of incision and drainage  H/O arterial bypass of lower limb  History of laparoscopic cholecystectomy    Family History:    Family history of asthma (Sibling)  Family history of epilepsy (Sibling, Sibling)  Family history of cardiovascular disease  Family history of stroke  Family history of heart disease  Family history of alcoholism in father     Social History:  used cocaine the day prior to admission    Review of Systems:  All 10 systems reviewed in detailed and found to be negative with the exception of what has already been described above    Allergies:  No Known Allergies    Meds  MEDICATIONS  (STANDING):  ALBUTerol    90 MICROgram(s) HFA Inhaler 2 Puff(s) Inhalation every 6 hours  aspirin  chewable 81 milliGRAM(s) Oral daily  atorvastatin 80 milliGRAM(s) Oral at bedtime  bethanechol 10 milliGRAM(s) Oral three times a day  budesonide  80 MICROgram(s)/formoterol 4.5 MICROgram(s) Inhaler 2 Puff(s) Inhalation two times a day  calcitriol   Capsule 0.25 MICROGram(s) Oral daily  calcium acetate 667 milliGRAM(s) Oral three times a day with meals  clopidogrel Tablet 75 milliGRAM(s) Oral daily  dextrose 5%. 1000 milliLiter(s) (50 mL/Hr) IV Continuous <Continuous>  dextrose 50% Injectable 12.5 Gram(s) IV Push once  dextrose 50% Injectable 25 Gram(s) IV Push once  dextrose 50% Injectable 25 Gram(s) IV Push once  dicyclomine 20 milliGRAM(s) Oral three times a day before meals  guaiFENesin ER 1200 milliGRAM(s) Oral every 12 hours  heparin   Injectable 5000 Unit(s) SubCutaneous every 8 hours  insulin lispro (HumaLOG) corrective regimen sliding scale   SubCutaneous three times a day before meals  insulin lispro (HumaLOG) corrective regimen sliding scale   SubCutaneous at bedtime  ipratropium 17 MICROgram(s) HFA Inhaler 1 Puff(s) Inhalation every 6 hours  levothyroxine 112 MICROGram(s) Oral daily  meropenem  IVPB      meropenem  IVPB 500 milliGRAM(s) IV Intermittent every 12 hours  metoprolol tartrate 25 milliGRAM(s) Oral daily  ranolazine 1000 milliGRAM(s) Oral two times a day    MEDICATIONS  (PRN):  acetaminophen   Tablet .. 650 milliGRAM(s) Oral every 4 hours PRN Mild Pain (1 - 3)  ALBUTerol    90 MICROgram(s) HFA Inhaler 2 Puff(s) Inhalation every 6 hours PRN Shortness of Breath and/or Wheezing  aluminum hydroxide/magnesium hydroxide/simethicone Suspension 30 milliLiter(s) Oral every 4 hours PRN Dyspepsia  dextrose 40% Gel 15 Gram(s) Oral once PRN Blood Glucose LESS THAN 70 milliGRAM(s)/deciliter  glucagon  Injectable 1 milliGRAM(s) IntraMuscular once PRN Glucose LESS THAN 70 milligrams/deciliter  loperamide 2 milliGRAM(s) Oral every 4 hours PRN Diarrhea  morphine  - Injectable 2 milliGRAM(s) IV Push every 4 hours PRN Severe Pain (7 - 10)  ondansetron Injectable 4 milliGRAM(s) IV Push every 6 hours PRN Nausea  oxycodone    5 mG/acetaminophen 325 mG 1 Tablet(s) Oral every 6 hours PRN Moderate Pain (4 - 6)    Physical Exam  T(C): 36.6 (09-10-20 @ 09:24), Max: 36.6 (09-10-20 @ 09:24)  HR: 81 (09-10-20 @ 13:38) (72 - 81)  BP: 118/65 (09-10-20 @ 09:24) (115/44 - 118/65)  RR: 15 (09-10-20 @ 09:24) (14 - 15)  SpO2: 93% (09-10-20 @ 13:38) (93% - 95%)  Gen: Alert, oriented, no distress  HEENT: Anicteric sclera, + thrush, poor dentition  Cardio: Regular rhythm and rate, normal S1S2, no murmurs  Resp: decreased breath sounds  GI: Nontender, nondistended, normoactive bowel sounds  Ext: No cyanosis, clubbing or edema  Neuro: Nonfocal    Labs:                        8.0    7.52  )-----------( 141      ( 10 Sep 2020 10:04 )             25.0     09-10    134<L>  |  103  |  81<H>  ----------------------------<  173<H>  4.2   |  24  |  4.55<H>    Ca    8.1<L>      10 Sep 2020 10:04  Phos  4.6     09-10  Mg     1.7     09-10    < from: CT Chest No Cont (09.09.20 @ 15:52) >  PROCEDURE DATE:  09/09/2020          INTERPRETATION:  CLINICAL INFORMATION: Chest pain    COMPARISON: CT chest 7/29/2019, CT abdomen 9/8/2020    PROCEDURE:  CT of the Chest was performed without intravenous contrast.  Sagittal and coronal reformats were performed.    FINDINGS:    LUNGS AND AIRWAYS: Mucosal impaction of the left mainstem bronchus. Near complete atelectasis of the left lower lobe. Significant segmental atelectasis in the lingula and anterior left upper lobe. Right basilar compressive atelectasis. Additional scattered peripheral groundglass opacities.  Paraseptal emphysema bilaterally.  PLEURA: No moderate to large left pleural effusion. Mild to moderate right pleural effusion.  MEDIASTINUM AND TATIANA: No lymphadenopathy.  VESSELS: Atherosclerotic changes of the aorta and coronary vasculature. No aortic aneurysm.  HEART: Heart size is normal. No pericardial effusion.  CHEST WALL AND LOWER NECK: Extensive anasarca.  VISUALIZED UPPER ABDOMEN: Prior cholecystectomy.  BONES: Old right lateral sixth and seventh rib fractures. Degenerative changes.    IMPRESSION:  Extensive mucosal impaction of the left mainstem bronchus extending into left upper lobe and left lower lobe segmental bronchi with near complete atelectasis left lower lobe and multifocal segmental atelectasis and/or pneumonia in the left upper lobe. Moderate to large left pleural effusion with a mild to moderate right pleural effusion with bibasilar compressive atelectasis.    Additional scattered groundglass opacities bilaterally.    < end of copied text >            EKG:    Echocardiogram:    CXR:    CT Scan:

## 2020-09-10 NOTE — CONSULT NOTE ADULT - PROVIDER SPECIALTY LIST ADULT
[facility]
Colorectal Surgery
Gastroenterology
Infectious Disease
Nephrology
Pulmonology
Surgery
Cardiology

## 2020-09-10 NOTE — CONSULT NOTE ADULT - REASON FOR ADMISSION
abd pain, weakness

## 2020-09-10 NOTE — PROGRESS NOTE ADULT - ASSESSMENT
59 CAD, PAD s/p fem-pop bypass s/p RT iliac stent, diabetes, hypothyroidism, hyperlipidemia, current active smoker, uses cocaine last use yesterday, chronic back pain s/p lumbar surgery presented with multiple non-specific complaints found to have ST elevations on presenting EKG, code STEMI called and pt underwent urgent angiogram showing occluded RCA w no intervention with UTI sepsis and E. coli bacteremia POA w hospital course c/b code blue.     AMISH w septic shock/UTI w STEMI and Code blue event w hypotension   Cr was improving steadily till 9/8 - Hughes removed - now Cr continues to rise - despite IVF run overnight  required straight cath x 3 for PVR > 400 ml   recommend reinsert hughes for now due to rising Cr 3.9 now 4.55   no further IVF - pt is eating and drinking - more awake   add bethenechol for bladder retention     Hypocalcemia - Ca improving  On Calcum acetate with feeds for elevated phos/low ca++   Intact PTH: 87:, phos ok  Vitamin D, 25-Hydroxy: 27.8  Calcitriol - Vit D analogue     Ischemic Bowel w Shock w Leokocytosis  - improving   - Supportive care and plan per above teams  - diet as per Surgery     d/w Dr Garcia and RN

## 2020-09-11 DIAGNOSIS — R53.83 OTHER FATIGUE: ICD-10-CM

## 2020-09-11 LAB
ALBUMIN SERPL ELPH-MCNC: 1.1 G/DL — LOW (ref 3.3–5)
ANION GAP SERPL CALC-SCNC: 8 MMOL/L — SIGNIFICANT CHANGE UP (ref 5–17)
BUN SERPL-MCNC: 76 MG/DL — HIGH (ref 7–23)
CALCIUM SERPL-MCNC: 8.2 MG/DL — LOW (ref 8.5–10.1)
CHLORIDE SERPL-SCNC: 103 MMOL/L — SIGNIFICANT CHANGE UP (ref 96–108)
CO2 SERPL-SCNC: 23 MMOL/L — SIGNIFICANT CHANGE UP (ref 22–31)
CREAT SERPL-MCNC: 4.45 MG/DL — HIGH (ref 0.5–1.3)
GLUCOSE SERPL-MCNC: 122 MG/DL — HIGH (ref 70–99)
HCT VFR BLD CALC: 23.6 % — LOW (ref 34.5–45)
HGB BLD-MCNC: 7.5 G/DL — LOW (ref 11.5–15.5)
MCHC RBC-ENTMCNC: 29.1 PG — SIGNIFICANT CHANGE UP (ref 27–34)
MCHC RBC-ENTMCNC: 31.8 GM/DL — LOW (ref 32–36)
MCV RBC AUTO: 91.5 FL — SIGNIFICANT CHANGE UP (ref 80–100)
PHOSPHATE SERPL-MCNC: 4 MG/DL — SIGNIFICANT CHANGE UP (ref 2.5–4.5)
PLATELET # BLD AUTO: 154 K/UL — SIGNIFICANT CHANGE UP (ref 150–400)
POTASSIUM SERPL-MCNC: 4.2 MMOL/L — SIGNIFICANT CHANGE UP (ref 3.5–5.3)
POTASSIUM SERPL-SCNC: 4.2 MMOL/L — SIGNIFICANT CHANGE UP (ref 3.5–5.3)
RBC # BLD: 2.58 M/UL — LOW (ref 3.8–5.2)
RBC # FLD: 16.7 % — HIGH (ref 10.3–14.5)
SODIUM SERPL-SCNC: 134 MMOL/L — LOW (ref 135–145)
WBC # BLD: 6.54 K/UL — SIGNIFICANT CHANGE UP (ref 3.8–10.5)
WBC # FLD AUTO: 6.54 K/UL — SIGNIFICANT CHANGE UP (ref 3.8–10.5)

## 2020-09-11 PROCEDURE — 99233 SBSQ HOSP IP/OBS HIGH 50: CPT

## 2020-09-11 PROCEDURE — 71045 X-RAY EXAM CHEST 1 VIEW: CPT | Mod: 26

## 2020-09-11 RX ORDER — RANOLAZINE 500 MG/1
500 TABLET, FILM COATED, EXTENDED RELEASE ORAL
Refills: 0 | Status: DISCONTINUED | OUTPATIENT
Start: 2020-09-11 | End: 2020-09-21

## 2020-09-11 RX ORDER — METOPROLOL TARTRATE 50 MG
12.5 TABLET ORAL
Refills: 0 | Status: DISCONTINUED | OUTPATIENT
Start: 2020-09-11 | End: 2020-09-21

## 2020-09-11 RX ORDER — HEPARIN SODIUM 5000 [USP'U]/ML
5000 INJECTION INTRAVENOUS; SUBCUTANEOUS EVERY 12 HOURS
Refills: 0 | Status: DISCONTINUED | OUTPATIENT
Start: 2020-09-11 | End: 2020-09-13

## 2020-09-11 RX ORDER — LACTOBACILLUS ACIDOPHILUS 100MM CELL
1 CAPSULE ORAL
Refills: 0 | Status: DISCONTINUED | OUTPATIENT
Start: 2020-09-11 | End: 2020-09-21

## 2020-09-11 RX ORDER — FERROUS SULFATE 325(65) MG
325 TABLET ORAL DAILY
Refills: 0 | Status: DISCONTINUED | OUTPATIENT
Start: 2020-09-11 | End: 2020-09-21

## 2020-09-11 RX ADMIN — ATORVASTATIN CALCIUM 80 MILLIGRAM(S): 80 TABLET, FILM COATED ORAL at 21:45

## 2020-09-11 RX ADMIN — BUDESONIDE AND FORMOTEROL FUMARATE DIHYDRATE 2 PUFF(S): 160; 4.5 AEROSOL RESPIRATORY (INHALATION) at 08:46

## 2020-09-11 RX ADMIN — Medication 1 PUFF(S): at 08:46

## 2020-09-11 RX ADMIN — ALBUTEROL 2 PUFF(S): 90 AEROSOL, METERED ORAL at 20:46

## 2020-09-11 RX ADMIN — Medication 112 MICROGRAM(S): at 06:02

## 2020-09-11 RX ADMIN — ALBUTEROL 2 PUFF(S): 90 AEROSOL, METERED ORAL at 13:22

## 2020-09-11 RX ADMIN — Medication 10 MILLIGRAM(S): at 14:08

## 2020-09-11 RX ADMIN — Medication 10 MILLIGRAM(S): at 06:03

## 2020-09-11 RX ADMIN — Medication 2: at 11:44

## 2020-09-11 RX ADMIN — Medication 1 PUFF(S): at 20:46

## 2020-09-11 RX ADMIN — BUDESONIDE AND FORMOTEROL FUMARATE DIHYDRATE 2 PUFF(S): 160; 4.5 AEROSOL RESPIRATORY (INHALATION) at 20:46

## 2020-09-11 RX ADMIN — Medication 667 MILLIGRAM(S): at 17:17

## 2020-09-11 RX ADMIN — RANOLAZINE 500 MILLIGRAM(S): 500 TABLET, FILM COATED, EXTENDED RELEASE ORAL at 21:45

## 2020-09-11 RX ADMIN — HEPARIN SODIUM 5000 UNIT(S): 5000 INJECTION INTRAVENOUS; SUBCUTANEOUS at 21:45

## 2020-09-11 RX ADMIN — MEROPENEM 100 MILLIGRAM(S): 1 INJECTION INTRAVENOUS at 10:04

## 2020-09-11 RX ADMIN — Medication 10 MILLIGRAM(S): at 21:45

## 2020-09-11 RX ADMIN — Medication 81 MILLIGRAM(S): at 14:08

## 2020-09-11 RX ADMIN — Medication 20 MILLIGRAM(S): at 06:02

## 2020-09-11 RX ADMIN — Medication 20 MILLIGRAM(S): at 14:12

## 2020-09-11 RX ADMIN — Medication 1 PUFF(S): at 13:22

## 2020-09-11 RX ADMIN — ALBUTEROL 2 PUFF(S): 90 AEROSOL, METERED ORAL at 08:45

## 2020-09-11 RX ADMIN — RANOLAZINE 1000 MILLIGRAM(S): 500 TABLET, FILM COATED, EXTENDED RELEASE ORAL at 10:04

## 2020-09-11 RX ADMIN — MEROPENEM 100 MILLIGRAM(S): 1 INJECTION INTRAVENOUS at 21:50

## 2020-09-11 RX ADMIN — CLOPIDOGREL BISULFATE 75 MILLIGRAM(S): 75 TABLET, FILM COATED ORAL at 14:08

## 2020-09-11 RX ADMIN — Medication 12.5 MILLIGRAM(S): at 21:45

## 2020-09-11 RX ADMIN — Medication 1200 MILLIGRAM(S): at 21:45

## 2020-09-11 RX ADMIN — HEPARIN SODIUM 5000 UNIT(S): 5000 INJECTION INTRAVENOUS; SUBCUTANEOUS at 14:08

## 2020-09-11 NOTE — PROGRESS NOTE ADULT - ASSESSMENT
60yo/F with PMH CAD, PAD s/p fem-pop bypass s/p RT iliac stent, diabetes, hypothyroidism, hyperlipidemia, current active smoker, uses cocaine, chronic back pain s/p lumbar surgery presented on 8/23/2020 with multiple non-specific complaints, including fevers, hospital course complicated by acute kidney failure, intubation, pressor requirement, diarrhea, and now  pneumonia with occlusion of left mainstem bronchus  1. hospital-acquired pneumonia, with large obstructing left mainstem plug:  - continue supplemental oxygen  - continue Mucinex  - continue chest  physiotherapy  - continue meropenem  - continue albuterol, ipratropium  - will need bronchoscopy if chest physiotherapy fails to clear secretions but given improvement in CXR will defer for now  - chest x-ray reveals improved atelectasis and better visualization of left mainstem bronchus when compared to  film of CT scan from September 9, 2020  - resume aspirin, plavix, heparin, diet  - continue Symbicort  2. septic shock from E coli bacteremia: Status post antibiotics  3. Diarrhea: C diff negative.  GI following

## 2020-09-11 NOTE — PROGRESS NOTE ADULT - SUBJECTIVE AND OBJECTIVE BOX
Patient is a 59y Female who reports no complaints overnight. Reports eating better, hughes in and good UOP      MEDICATIONS  (STANDING):  ALBUTerol    90 MICROgram(s) HFA Inhaler 2 Puff(s) Inhalation every 6 hours  aspirin  chewable 81 milliGRAM(s) Oral daily  atorvastatin 80 milliGRAM(s) Oral at bedtime  bethanechol 10 milliGRAM(s) Oral three times a day  budesonide  80 MICROgram(s)/formoterol 4.5 MICROgram(s) Inhaler 2 Puff(s) Inhalation two times a day  calcitriol   Capsule 0.25 MICROGram(s) Oral daily  calcium acetate 667 milliGRAM(s) Oral three times a day with meals  clopidogrel Tablet 75 milliGRAM(s) Oral daily  dextrose 5%. 1000 milliLiter(s) (50 mL/Hr) IV Continuous <Continuous>  dextrose 50% Injectable 12.5 Gram(s) IV Push once  dextrose 50% Injectable 25 Gram(s) IV Push once  dextrose 50% Injectable 25 Gram(s) IV Push once  ferrous    sulfate 325 milliGRAM(s) Oral daily  guaiFENesin ER 1200 milliGRAM(s) Oral every 12 hours  heparin   Injectable 5000 Unit(s) SubCutaneous every 12 hours  insulin lispro (HumaLOG) corrective regimen sliding scale   SubCutaneous three times a day before meals  insulin lispro (HumaLOG) corrective regimen sliding scale   SubCutaneous at bedtime  ipratropium 17 MICROgram(s) HFA Inhaler 1 Puff(s) Inhalation every 6 hours  lactobacillus acidophilus 1 Tablet(s) Oral three times a day with meals  levothyroxine 112 MICROGram(s) Oral daily  meropenem  IVPB      meropenem  IVPB 500 milliGRAM(s) IV Intermittent every 12 hours  metoprolol tartrate 12.5 milliGRAM(s) Oral two times a day  ranolazine 500 milliGRAM(s) Oral two times a day    MEDICATIONS  (PRN):  acetaminophen   Tablet .. 650 milliGRAM(s) Oral every 4 hours PRN Mild Pain (1 - 3)  ALBUTerol    90 MICROgram(s) HFA Inhaler 2 Puff(s) Inhalation every 6 hours PRN Shortness of Breath and/or Wheezing  aluminum hydroxide/magnesium hydroxide/simethicone Suspension 30 milliLiter(s) Oral every 4 hours PRN Dyspepsia  dextrose 40% Gel 15 Gram(s) Oral once PRN Blood Glucose LESS THAN 70 milliGRAM(s)/deciliter  glucagon  Injectable 1 milliGRAM(s) IntraMuscular once PRN Glucose LESS THAN 70 milligrams/deciliter  ondansetron Injectable 4 milliGRAM(s) IV Push every 6 hours PRN Nausea  oxycodone    5 mG/acetaminophen 325 mG 1 Tablet(s) Oral every 6 hours PRN Moderate Pain (4 - 6)        T(C): , Max: 37 (09-11-20 @ 09:07)  T(F): , Max: 98.6 (09-11-20 @ 09:07)  HR: 87 (09-11-20 @ 20:53)  BP: 131/48 (09-11-20 @ 20:53)  BP(mean): --  RR: 18 (09-11-20 @ 20:53)  SpO2: 98% (09-11-20 @ 20:53)  Wt(kg): --    09-10 @ 07:01  -  09-11 @ 07:00  --------------------------------------------------------  IN: 340 mL / OUT: 1000 mL / NET: -660 mL    09-11 @ 07:01  -  09-11 @ 21:33  --------------------------------------------------------  IN: 410 mL / OUT: 600 mL / NET: -190 mL          PHYSICAL EXAM:    Constitutional: NAD, frail  HEENT: MMM  Neck: No LAD, No JVD  Respiratory: CTAB  Cardiovascular: S1 and S2  Gastrointestinal: soft  Extremities: peripheral edema  Neurological: A/O x 3, no focal deficits  Psychiatric: Normal mood, normal affect  : No Hughes  Skin: No rashes  Access: Not applicable        LABS:                        7.5    6.54  )-----------( 154      ( 11 Sep 2020 06:51 )             23.6     11 Sep 2020 06:51    134    |  103    |  76     ----------------------------<  122    4.2     |  23     |  4.45   10 Sep 2020 10:04    134    |  103    |  81     ----------------------------<  173    4.2     |  24     |  4.55   09 Sep 2020 08:28    135    |  107    |  79     ----------------------------<  83     5.0     |  19     |  4.36   08 Sep 2020 08:17    139    |  107    |  79     ----------------------------<  89     3.4     |  24     |  3.97     Ca    8.2        11 Sep 2020 06:51  Ca    8.1        10 Sep 2020 10:04  Ca    8.0        09 Sep 2020 08:28  Ca    7.8        08 Sep 2020 08:17  Phos  4.0       11 Sep 2020 06:51  Phos  4.6       10 Sep 2020 10:04  Phos  4.5       08 Sep 2020 08:17  Mg     1.7       10 Sep 2020 10:04    TPro  x      /  Alb  1.1    /  TBili  x      /  DBili  x      /  AST  x      /  ALT  x      /  AlkPhos  x      11 Sep 2020 06:51  TPro  x      /  Alb  1.1    /  TBili  x      /  DBili  x      /  AST  x      /  ALT  x      /  AlkPhos  x      08 Sep 2020 08:17          Urine Studies:          RADIOLOGY & ADDITIONAL STUDIES:

## 2020-09-11 NOTE — PROGRESS NOTE ADULT - ATTENDING COMMENTS
David Judge M.D.  Cardiology, [facility]  Cell:   Office: [phone] ([place])  ([facility]

## 2020-09-11 NOTE — PROGRESS NOTE ADULT - SUBJECTIVE AND OBJECTIVE BOX
Cheif complaints - weakness, dyspnea, cough, abdominal pain    60yo female with PMH CAD, PAD s/p fem-pop bypass s/p RT iliac stent, DM2, HLD, hypothyroidism, current active smoker, uses cocaine, chronic back pain s/p lumbar surgery presented on 8/23/2020 with multiple non-specific complaints, including fevers, weakness, poor po intake, diffuse abdominal pain, diarrhea. She found to have STEMI s/p LHC with RCA occlusion , E coli sepsis, cystitis, ischemic colitis.    hospital course complicated by acute kidney failure, intubation, pressor requirement, diarrhea, and now  pneumonia with occlusion of left mainstem bronchus    9/11 - pt seen and examined, chart reviewed, pt reports felling weak, + abdominal distension, + dyspnea, + poor po intake, cough, afebrile, denies HA, CP, dizziness, POC discussed    Review of system- Rest of the review of system are negative except mentioned in HPI  T(C): 36.7 (09-11-20 @ 15:47), Max: 37 (09-11-20 @ 09:07)  T(F): 98.1 (09-11-20 @ 15:47), Max: 98.6 (09-11-20 @ 09:07)  HR: 101 (09-11-20 @ 15:47) (68 - 101)  BP: 94/60 (09-11-20 @ 15:47) (94/60 - 114/66)  RR: 16 (09-11-20 @ 15:47) (16 - 16)  SpO2: 100% (09-11-20 @ 15:47) (95% - 100%)  Wt(kg): --    PHYSICAL EXAM:    Constitutional: NAD, awake and alert, well-developed  HEENT: PERR, EOMI, Normal Hearing, MMM  Neck: Soft and supple, No LAD, No JVD  Respiratory: Breath sounds decreased at bases, +  wheezing, +  rales ,+ rhonchi  Cardiovascular: S1 and S2, regular rate and rhythm, no Murmurs, gallops or rubs  Gastrointestinal: Bowel Sounds present, soft, + diffuse tenderness, + distended, no guarding, no rebound, + Black   Extremities: + LE + 2  peripheral edema  Vascular: 2+ peripheral pulses  Neurological: A/O x 3, no focal deficits  Musculoskeletal: 3/5 strength  lower extremities, 5/5 UE   Skin: No rashes  rectal : deferred, not indicated    MEDICATIONS  (STANDING):  ALBUTerol    90 MICROgram(s) HFA Inhaler 2 Puff(s) Inhalation every 6 hours  aspirin  chewable 81 milliGRAM(s) Oral daily  atorvastatin 80 milliGRAM(s) Oral at bedtime  bethanechol 10 milliGRAM(s) Oral three times a day  budesonide  80 MICROgram(s)/formoterol 4.5 MICROgram(s) Inhaler 2 Puff(s) Inhalation two times a day  calcitriol   Capsule 0.25 MICROGram(s) Oral daily  calcium acetate 667 milliGRAM(s) Oral three times a day with meals  clopidogrel Tablet 75 milliGRAM(s) Oral daily  dextrose 5%. 1000 milliLiter(s) (50 mL/Hr) IV Continuous <Continuous>  dextrose 50% Injectable 12.5 Gram(s) IV Push once  dextrose 50% Injectable 25 Gram(s) IV Push once  dextrose 50% Injectable 25 Gram(s) IV Push once  dicyclomine 20 milliGRAM(s) Oral three times a day before meals  guaiFENesin ER 1200 milliGRAM(s) Oral every 12 hours  heparin   Injectable 5000 Unit(s) SubCutaneous every 8 hours  insulin lispro (HumaLOG) corrective regimen sliding scale   SubCutaneous three times a day before meals  insulin lispro (HumaLOG) corrective regimen sliding scale   SubCutaneous at bedtime  ipratropium 17 MICROgram(s) HFA Inhaler 1 Puff(s) Inhalation every 6 hours  levothyroxine 112 MICROGram(s) Oral daily  meropenem  IVPB      meropenem  IVPB 500 milliGRAM(s) IV Intermittent every 12 hours  metoprolol tartrate 25 milliGRAM(s) Oral daily  ranolazine 1000 milliGRAM(s) Oral two times a day    MEDICATIONS  (PRN):  acetaminophen   Tablet .. 650 milliGRAM(s) Oral every 4 hours PRN Mild Pain (1 - 3)  ALBUTerol    90 MICROgram(s) HFA Inhaler 2 Puff(s) Inhalation every 6 hours PRN Shortness of Breath and/or Wheezing  aluminum hydroxide/magnesium hydroxide/simethicone Suspension 30 milliLiter(s) Oral every 4 hours PRN Dyspepsia  dextrose 40% Gel 15 Gram(s) Oral once PRN Blood Glucose LESS THAN 70 milliGRAM(s)/deciliter  glucagon  Injectable 1 milliGRAM(s) IntraMuscular once PRN Glucose LESS THAN 70 milligrams/deciliter  loperamide 2 milliGRAM(s) Oral every 4 hours PRN Diarrhea  morphine  - Injectable 2 milliGRAM(s) IV Push every 4 hours PRN Severe Pain (7 - 10)  ondansetron Injectable 4 milliGRAM(s) IV Push every 6 hours PRN Nausea  oxycodone    5 mG/acetaminophen 325 mG 1 Tablet(s) Oral every 6 hours PRN Moderate Pain (4 - 6)    09-11    134<L>  |  103  |  76<H>  ----------------------------<  122<H>  4.2   |  23  |  4.45<H>  Comprehensive Metabolic Panel (08.23.20 @ 08:34)    Creatinine, Serum: 3.32 mg/dL    Basic Metabolic Panel (10.17.19 @ 09:32)    Creatinine, Serum: 0.84 mg/dL  Vitamin D, 25-Hydroxy (09.03.20 @ 07:07)    Vitamin D, 25-Hydroxy: 27.8:     Ca    8.2<L>      11 Sep 2020 06:51  Phos  4.0     09-11  Mg     1.7     09-10    TPro  x   /  Alb  1.1<L>  /  TBili  x   /  DBili  x   /  AST  x   /  ALT  x   /  AlkPhos  x   09-11                          7.5    6.54  )-----------( 154      ( 11 Sep 2020 06:51 )             23.6   Iron with Total Binding Capacity in AM (08.25.20 @ 08:42)    Iron - Total Binding Capacity.: 175 ug/dL    % Saturation, Iron: 13 %    Iron Total, Serum: 23 ug/dL    Unsaturated Iron Binding Capacity: 151 ug/dL    Ferritin, Serum in AM (08.25.20 @ 08:42)    Ferritin, Serum: 521 ng/mL        LIVER FUNCTIONS - ( 11 Sep 2020 06:51 )  Alb: 1.1 g/dL / Pro: x     / ALK PHOS: x     / ALT: x     / AST: x     / GGT: x           Culture - Blood (08.27.20 @ 02:00)    Specimen Source: .Blood None    Culture Results:   No Growth Final    Culture - Urine (08.23.20 @ 10:25)    -  Cefazolin: S <=2 (MIC_CL_COM_ENTERIC_[id number]) For uncomplicated UTI with K. pneumoniae, E. coli, or P. mirablis: PINA <=16 is sensitive and PINA >=32 is resistant. This also predicts results for oral agents cefaclor, cefdinir, cefpodoxime, cefprozil, cefuroxime axetil, cephalexin and locarbef for uncomplicated UTI. Note that some isolates may be susceptible to these agents while testing resistant to cefazolin.    -  Aztreonam: S <=4    -  Ampicillin/Sulbactam: S <=4/2 Enterobacter, Citrobacter, and Serratia may develop resistance during prolonged therapy (3-4 days)    -  Ampicillin: S <=8 These ampicillin results predict results for amoxicillin    -  Meropenem: S <=1    -  Nitrofurantoin: S <=32 Should not be used to treat pyelonephritis    -  Ertapenem: S <=0.5    -  Gentamicin: S <=2    -  Levofloxacin: S <=0.5    -  Ciprofloxacin: S <=0.25    -  Ceftriaxone: S <=1 Enterobacter, Citrobacter, and Serratia may develop resistance during prolonged therapy    -  Cefuroxime: S <=4    -  Cefoxitin: R >16    -  Ceftazidime: S <=1    -  Cefepime: S <=2    -  Cefotaxime: S <=2    -  Trimethoprim/Sulfamethoxazole: S <=0.5/9.5    -  Minocycline: S <=4    -  Tobramycin: S <=2    -  Piperacillin/Tazobactam: S <=8    -  Tigecycline: S <=2    -  Amikacin: S <=16    -  Amoxicillin/Clavulanic Acid: S <=8/4    Specimen Source: .Urine Clean Catch (Midstream)    Culture Results:   >100,000 CFU/ml Escherichia coli    Organism Identification: Escherichia coli    Organism: Escherichia coli    Method Type: PINA  Culture - Blood (08.23.20 @ 08:34)    Gram Stain:   Growth in aerobic bottle: Gram Negative Rods  Growth in anaerobic bottle: Gram Negative Rods    -  Amikacin: S <=16    -  Ampicillin: S <=8 These ampicillin results predict results for amoxicillin    -  Ampicillin/Sulbactam: S <=4/2 Enterobacter, Citrobacter, and Serratia may develop resistance during prolonged therapy (3-4 days)    -  Aztreonam: S <=4    -  Cefazolin: S <=2 Enterobacter, Citrobacter, and Serratia may develop resistance during prolonged therapy (3-4 days)    -  Cefepime: S <=2    -  Cefoxitin: S <=8    -  Ceftriaxone: S <=1 Enterobacter, Citrobacter, and Serratia may develop resistance during prolonged therapy    -  Ciprofloxacin: S <=0.25    -  Gentamicin: S <=2    -  Imipenem: S <=1    -  Levofloxacin: S <=0.5    -  Ertapenem: S <=0.5    -  Meropenem: S <=1    -  Escherichia coli: Detec    -  Piperacillin/Tazobactam: S <=8    -  Tobramycin: S <=2    -  Trimethoprim/Sulfamethoxazole: S <=0.5/9.5    Specimen Source: .Blood None    Organism: Blood Culture PCR    Organism: Escherichia coli    Culture Results:   Growth in aerobic and anaerobic bottles: Escherichia coli  "Due to technical problems, Proteus sp. will Not be reported as part of  the BCID panel until further notice"  ***Blood Panel PCR results on this specimen are available  approximately 3 hours after the Gram stain result.***  Gram stain, PCR, and/or culture results may not always  correspond due to difference in methodologies.  ************************************************************  This PCR assay was performed using [employer].  The following targets are tested for: Enterococcus,  vancomycin resistant enterococci, Listeria monocytogenes,  coagulase negative staphylococci, S. aureus,  methicillin resistant S. aureus, Streptococcus agalactiae  (Group B), S. pneumoniae, S.pyogenes (Group A),  Acinetobacter baumannii, Enterobacter cloacae, E. coli,  Klebsiella oxytoca, K. pneumoniae, Proteus sp.,  Serratia marcescens, Haemophilus influenzae,  Neisseria meningitidis, Pseudomonas aeruginosa, Candida  albicans, C. glabrata, C krusei, C parapsilosis,  C. tropicalis and the KPC resistance gene.    Organism Identification: Blood Culture PCR  Escherichia coli    Method Type: PCR    Method Type: PINA        < from: 12 Lead ECG (08.25.20 @ 00:12) >  Ventricular Rate 68 BPM    Atrial Rate 68 BPM    P-R Interval 134 ms    QRS Duration 98 ms    Q-T Interval 474 ms    QTC Calculation(Bezet) 504 ms    P Axis 42 degrees    R Axis 77 degrees    T Axis 76 degrees    Diagnosis Line Normal sinus rhythm  ST elevation consider inferior injury or acute infarct  Prolonged QT  *** ** ** ** * ACUTE MI  ** ** ** **  Abnormal ECG    < end of copied text >  Troponin I, Serum: 1.990: High Sensitivity Troponin and new reference  range effective 7/6/2016 ng/mL (08.25.20 @ 02:26)    < from: TTE Echo Complete w/o Contrast w/ Doppler (08.27.20 @ 11:04) >   The left ventricle size is normal. The inferior wall appears severely   hypokinetic..   Estimated left ventricular ejection fraction is 50 %.   Paradoxical septal wall motion during inspiration is noted.    < end of copied text >  < from: Cardiac Cath Lab - Adult (08.23.20 @ 09:33) >   Mid RCA: 100% stenosis.     Impression     Diagnostic Conclusions     Acute occlusion of the RCA of unknown duration. No PCI due to lack of   ischemic symptoms, sepsis, acute renal failure and satisfactory left   coronary anatomy.      < end of copied text >    < from: Xray Chest 1 View- PORTABLE-Routine (09.11.20 @ 07:47) >  FINDINGS:  The lungs show LEFT greater than RIGHT haziness indicating LEFT greater than RIGHT pleural effusions and/or LEFT basilar airspace consolidation. Upper lobes clear. No pneumothorax.    The heart and mediastinum are within normal limits.    Visualized osseous structures are intact.        IMPRESSION:   LEFT greater than RIGHT/pleural effusions and/or a LEFT lower lobe retrocardiac airspace consolidation.      < end of copied text >  < from: CT Chest No Cont (09.09.20 @ 15:52) >  LUNGS AND AIRWAYS: Mucosal impaction of the left mainstem bronchus. Near complete atelectasis of the left lower lobe. Significant segmental atelectasis in the lingula and anterior left upper lobe. Right basilar compressive atelectasis. Additional scattered peripheral groundglass opacities.  Paraseptal emphysema bilaterally.  PLEURA: No moderate to large left pleural effusion. Mild to moderate right pleural effusion.  MEDIASTINUM AND TATIANA: No lymphadenopathy.  VESSELS: Atherosclerotic changes of the aorta and coronary vasculature. No aortic aneurysm.  HEART: Heart size is normal. No pericardial effusion.  CHEST WALL AND LOWER NECK: Extensive anasarca.  VISUALIZED UPPER ABDOMEN: Prior cholecystectomy.  BONES: Old right lateral sixth and seventh rib fractures. Degenerative changes.    IMPRESSION:  Extensive mucosal impaction of the left mainstem bronchus extending into left upper lobe and left lower lobe segmental bronchi with near complete atelectasis left lower lobe and multifocal segmental atelectasis and/or pneumonia in the left upper lobe. Moderate to large left pleural effusion with a mild to moderate right pleural effusion with bibasilar compressive atelectasis.    Additional scattered groundglass opacities bilaterally.    < from: CT Abdomen and Pelvis w/ Oral Cont (09.08.20 @ 12:18) >  LOWER CHEST: Interval progression of left lung collapse with more loculated pleural effusion. Slightly decreased right pleural effusion and passive atelectasis. Scattered groundglass opacities in the right middle lobe and right lower lobe, new since prior (2-8).    < end of copied text >    LIVER: Within normal limits.  BILE DUCTS: Normal caliber.  GALLBLADDER: Cholecystectomy clips.  SPLEEN: Within normal limits.  PANCREAS: Within normal limits.  ADRENALS: Within normal limits.  KIDNEYS/URETERS: Within normal limits.    BLADDER: Small air in the nondependent portion of the bladder. Please correlate with instrumentation history.  REPRODUCTIVE ORGANS: Unremarkable.    BOWEL: No bowel obstruction. Appendix is not visualized. Mild diffuse wall thickening of the sigmoid (602-21), slightly more prominent since prior.. Interval removal of rectal tube.  PERITONEUM: Trace ascites in the cul-de-sac. No pneumoperitoneum.  VESSELS: Extensive atherosclerotic calcification.  RETROPERITONEUM/LYMPH NODES: No lymphadenopathy.  ABDOMINAL WALL: Diffuse anasarca without significant interval change.  BONES: No change of surgical hardware and grade 1 spondylolisthesis of L4 and L5. No acute abnormalities.    IMPRESSION:  Interval progression of left lung collapse with interval loculation of left effusion.    New scattered groundglass opacities in the right middle and lower lobes, likely indicating atypical infection including viral infection..    Mild diffuse wall thickening of the sigmoid, slightly more prominent since prior, which may indicate nonspecific colitis.    Otherwise no change.    < end of copied text >    < from: US Duplex Arterial Upper Ext Ltd, Left (09.07.20 @ 20:34) >  IMPRESSION:  No occlusion no stenosis  Monophasic flow throughout the left upper extremity    < end of copied text >    < from: CT Head No Cont (08.27.20 @ 01:23) >  FINDINGS:  There is motion artifact present which degrades image quality.    There is no gross acute intracranial hemorrhage, mass effect from vasogenic edema or evidence of acute large vascular territory infarct. Stable mild chronic microvascular changes as manifested by patchy areas of low-attenuation in the bihemispheric white matter.    The ventricles, sulci and cisternal spaces are stable in size and configuration.  There is no midline shift or abnormal extra-axial fluid collection.    The calvarium is intact.  There are no osteoblasticor lytic calvarial or skull base lesions.  The paranasal sinuses and mastoid air cells are clear.    IMPRESSION:  Mildly motion degraded exam. Grossly stable exam without evidence of acute intracranial hemorrhage or vasogenic edema. Chronic findings as above.    < end of copied text >     MEDICATIONS  (STANDING):  ALBUTerol    90 MICROgram(s) HFA Inhaler 2 Puff(s) Inhalation every 6 hours  aspirin  chewable 81 milliGRAM(s) Oral daily  atorvastatin 80 milliGRAM(s) Oral at bedtime  bethanechol 10 milliGRAM(s) Oral three times a day  budesonide  80 MICROgram(s)/formoterol 4.5 MICROgram(s) Inhaler 2 Puff(s) Inhalation two times a day  calcitriol   Capsule 0.25 MICROGram(s) Oral daily  calcium acetate 667 milliGRAM(s) Oral three times a day with meals  clopidogrel Tablet 75 milliGRAM(s) Oral daily  dextrose 5%. 1000 milliLiter(s) (50 mL/Hr) IV Continuous <Continuous>  dextrose 50% Injectable 12.5 Gram(s) IV Push once  dextrose 50% Injectable 25 Gram(s) IV Push once  dextrose 50% Injectable 25 Gram(s) IV Push once  guaiFENesin ER 1200 milliGRAM(s) Oral every 12 hours  heparin   Injectable 5000 Unit(s) SubCutaneous every 8 hours  insulin lispro (HumaLOG) corrective regimen sliding scale   SubCutaneous three times a day before meals  insulin lispro (HumaLOG) corrective regimen sliding scale   SubCutaneous at bedtime  ipratropium 17 MICROgram(s) HFA Inhaler 1 Puff(s) Inhalation every 6 hours  levothyroxine 112 MICROGram(s) Oral daily  meropenem  IVPB      meropenem  IVPB 500 milliGRAM(s) IV Intermittent every 12 hours  metoprolol tartrate 25 milliGRAM(s) Oral daily    MEDICATIONS  (PRN):  acetaminophen   Tablet .. 650 milliGRAM(s) Oral every 4 hours PRN Mild Pain (1 - 3)  ALBUTerol    90 MICROgram(s) HFA Inhaler 2 Puff(s) Inhalation every 6 hours PRN Shortness of Breath and/or Wheezing  aluminum hydroxide/magnesium hydroxide/simethicone Suspension 30 milliLiter(s) Oral every 4 hours PRN Dyspepsia  dextrose 40% Gel 15 Gram(s) Oral once PRN Blood Glucose LESS THAN 70 milliGRAM(s)/deciliter  glucagon  Injectable 1 milliGRAM(s) IntraMuscular once PRN Glucose LESS THAN 70 milligrams/deciliter  ondansetron Injectable 4 milliGRAM(s) IV Push every 6 hours PRN Nausea  oxycodone    5 mG/acetaminophen 325 mG 1 Tablet(s) Oral every 6 hours PRN Moderate Pain (4 - 6) Cheif complaints - weakness, dyspnea, cough, abdominal pain    60yo female with PMH CAD, PAD s/p fem-pop bypass s/p RT iliac stent, DM2, HLD, hypothyroidism, current active smoker, uses cocaine, chronic back pain s/p lumbar surgery presented on 8/23/2020 with multiple non-specific complaints, including fevers, weakness, poor po intake, diffuse abdominal pain, diarrhea. She found to have STEMI s/p LHC with RCA occlusion , E coli sepsis, cystitis, ischemic colitis.    hospital course complicated by acute kidney failure, intubation, pressor requirement, diarrhea, and now  pneumonia with occlusion of left mainstem bronchus    9/11 - pt seen and examined, chart reviewed, pt reports felling weak, + abdominal distension, + dyspnea, + poor po intake, cough, afebrile, denies HA, CP, dizziness, POC discussed    Review of system- Rest of the review of system are negative except mentioned in HPI  T(C): 36.7 (09-11-20 @ 15:47), Max: 37 (09-11-20 @ 09:07)  T(F): 98.1 (09-11-20 @ 15:47), Max: 98.6 (09-11-20 @ 09:07)  HR: 101 (09-11-20 @ 15:47) (68 - 101)  BP: 94/60 (09-11-20 @ 15:47) (94/60 - 114/66)  RR: 16 (09-11-20 @ 15:47) (16 - 16)  SpO2: 100% (09-11-20 @ 15:47) (95% - 100%)  Wt(kg): --  CAPILLARY BLOOD GLUCOSE    A1C with Estimated Average Glucose (08.24.20 @ 06:24)    A1C with Estimated Average Glucose Result: 8.1  POCT Blood Glucose.: 122 mg/dL (11 Sep 2020 17:17)  POCT Blood Glucose.: 191 mg/dL (11 Sep 2020 11:42)  POCT Blood Glucose.: 140 mg/dL (11 Sep 2020 07:51)  POCT Blood Glucose.: 182 mg/dL (10 Sep 2020 22:35)      PHYSICAL EXAM:    Constitutional: NAD, awake and alert, well-developed  HEENT: PERR, EOMI, Normal Hearing, MMM  Neck: Soft and supple, No LAD, No JVD  Respiratory: Breath sounds decreased at bases, +  wheezing, +  rales ,+ rhonchi  Cardiovascular: S1 and S2, regular rate and rhythm, no Murmurs, gallops or rubs  Gastrointestinal: Bowel Sounds present, soft, + diffuse tenderness, + distended, no guarding, no rebound, + Black   Extremities: + LE + 2  peripheral edema  Vascular: 2+ peripheral pulses  Neurological: A/O x 3, no focal deficits  Musculoskeletal: 3/5 strength  lower extremities, 5/5 UE   Skin: No rashes  rectal : deferred, not indicated    MEDICATIONS  (STANDING):  ALBUTerol    90 MICROgram(s) HFA Inhaler 2 Puff(s) Inhalation every 6 hours  aspirin  chewable 81 milliGRAM(s) Oral daily  atorvastatin 80 milliGRAM(s) Oral at bedtime  bethanechol 10 milliGRAM(s) Oral three times a day  budesonide  80 MICROgram(s)/formoterol 4.5 MICROgram(s) Inhaler 2 Puff(s) Inhalation two times a day  calcitriol   Capsule 0.25 MICROGram(s) Oral daily  calcium acetate 667 milliGRAM(s) Oral three times a day with meals  clopidogrel Tablet 75 milliGRAM(s) Oral daily  dextrose 5%. 1000 milliLiter(s) (50 mL/Hr) IV Continuous <Continuous>  dextrose 50% Injectable 12.5 Gram(s) IV Push once  dextrose 50% Injectable 25 Gram(s) IV Push once  dextrose 50% Injectable 25 Gram(s) IV Push once  dicyclomine 20 milliGRAM(s) Oral three times a day before meals  guaiFENesin ER 1200 milliGRAM(s) Oral every 12 hours  heparin   Injectable 5000 Unit(s) SubCutaneous every 8 hours  insulin lispro (HumaLOG) corrective regimen sliding scale   SubCutaneous three times a day before meals  insulin lispro (HumaLOG) corrective regimen sliding scale   SubCutaneous at bedtime  ipratropium 17 MICROgram(s) HFA Inhaler 1 Puff(s) Inhalation every 6 hours  levothyroxine 112 MICROGram(s) Oral daily  meropenem  IVPB      meropenem  IVPB 500 milliGRAM(s) IV Intermittent every 12 hours  metoprolol tartrate 25 milliGRAM(s) Oral daily  ranolazine 1000 milliGRAM(s) Oral two times a day    MEDICATIONS  (PRN):  acetaminophen   Tablet .. 650 milliGRAM(s) Oral every 4 hours PRN Mild Pain (1 - 3)  ALBUTerol    90 MICROgram(s) HFA Inhaler 2 Puff(s) Inhalation every 6 hours PRN Shortness of Breath and/or Wheezing  aluminum hydroxide/magnesium hydroxide/simethicone Suspension 30 milliLiter(s) Oral every 4 hours PRN Dyspepsia  dextrose 40% Gel 15 Gram(s) Oral once PRN Blood Glucose LESS THAN 70 milliGRAM(s)/deciliter  glucagon  Injectable 1 milliGRAM(s) IntraMuscular once PRN Glucose LESS THAN 70 milligrams/deciliter  loperamide 2 milliGRAM(s) Oral every 4 hours PRN Diarrhea  morphine  - Injectable 2 milliGRAM(s) IV Push every 4 hours PRN Severe Pain (7 - 10)  ondansetron Injectable 4 milliGRAM(s) IV Push every 6 hours PRN Nausea  oxycodone    5 mG/acetaminophen 325 mG 1 Tablet(s) Oral every 6 hours PRN Moderate Pain (4 - 6)    09-11    134<L>  |  103  |  76<H>  ----------------------------<  122<H>  4.2   |  23  |  4.45<H>  Comprehensive Metabolic Panel (08.23.20 @ 08:34)    Creatinine, Serum: 3.32 mg/dL    Basic Metabolic Panel (10.17.19 @ 09:32)    Creatinine, Serum: 0.84 mg/dL  Vitamin D, 25-Hydroxy (09.03.20 @ 07:07)    Vitamin D, 25-Hydroxy: 27.8:     Ca    8.2<L>      11 Sep 2020 06:51  Phos  4.0     09-11  Mg     1.7     09-10    TPro  x   /  Alb  1.1<L>  /  TBili  x   /  DBili  x   /  AST  x   /  ALT  x   /  AlkPhos  x   09-11                          7.5    6.54  )-----------( 154      ( 11 Sep 2020 06:51 )             23.6   Iron with Total Binding Capacity in AM (08.25.20 @ 08:42)    Iron - Total Binding Capacity.: 175 ug/dL    % Saturation, Iron: 13 %    Iron Total, Serum: 23 ug/dL    Unsaturated Iron Binding Capacity: 151 ug/dL    Ferritin, Serum in AM (08.25.20 @ 08:42)    Ferritin, Serum: 521 ng/mL        LIVER FUNCTIONS - ( 11 Sep 2020 06:51 )  Alb: 1.1 g/dL / Pro: x     / ALK PHOS: x     / ALT: x     / AST: x     / GGT: x           Culture - Blood (08.27.20 @ 02:00)    Specimen Source: .Blood None    Culture Results:   No Growth Final    Culture - Urine (08.23.20 @ 10:25)    -  Cefazolin: S <=2 (MIC_CL_COM_ENTERIC_CEFAZU) For uncomplicated UTI with K. pneumoniae, E. coli, or P. mirablis: PINA <=16 is sensitive and PINA >=32 is resistant. This also predicts results for oral agents cefaclor, cefdinir, cefpodoxime, cefprozil, cefuroxime axetil, cephalexin and locarbef for uncomplicated UTI. Note that some isolates may be susceptible to these agents while testing resistant to cefazolin.    -  Aztreonam: S <=4    -  Ampicillin/Sulbactam: S <=4/2 Enterobacter, Citrobacter, and Serratia may develop resistance during prolonged therapy (3-4 days)    -  Ampicillin: S <=8 These ampicillin results predict results for amoxicillin    -  Meropenem: S <=1    -  Nitrofurantoin: S <=32 Should not be used to treat pyelonephritis    -  Ertapenem: S <=0.5    -  Gentamicin: S <=2    -  Levofloxacin: S <=0.5    -  Ciprofloxacin: S <=0.25    -  Ceftriaxone: S <=1 Enterobacter, Citrobacter, and Serratia may develop resistance during prolonged therapy    -  Cefuroxime: S <=4    -  Cefoxitin: R >16    -  Ceftazidime: S <=1    -  Cefepime: S <=2    -  Cefotaxime: S <=2    -  Trimethoprim/Sulfamethoxazole: S <=0.5/9.5    -  Minocycline: S <=4    -  Tobramycin: S <=2    -  Piperacillin/Tazobactam: S <=8    -  Tigecycline: S <=2    -  Amikacin: S <=16    -  Amoxicillin/Clavulanic Acid: S <=8/4    Specimen Source: .Urine Clean Catch (Midstream)    Culture Results:   >100,000 CFU/ml Escherichia coli    Organism Identification: Escherichia coli    Organism: Escherichia coli    Method Type: PINA  Culture - Blood (08.23.20 @ 08:34)    Gram Stain:   Growth in aerobic bottle: Gram Negative Rods  Growth in anaerobic bottle: Gram Negative Rods    -  Amikacin: S <=16    -  Ampicillin: S <=8 These ampicillin results predict results for amoxicillin    -  Ampicillin/Sulbactam: S <=4/2 Enterobacter, Citrobacter, and Serratia may develop resistance during prolonged therapy (3-4 days)    -  Aztreonam: S <=4    -  Cefazolin: S <=2 Enterobacter, Citrobacter, and Serratia may develop resistance during prolonged therapy (3-4 days)    -  Cefepime: S <=2    -  Cefoxitin: S <=8    -  Ceftriaxone: S <=1 Enterobacter, Citrobacter, and Serratia may develop resistance during prolonged therapy    -  Ciprofloxacin: S <=0.25    -  Gentamicin: S <=2    -  Imipenem: S <=1    -  Levofloxacin: S <=0.5    -  Ertapenem: S <=0.5    -  Meropenem: S <=1    -  Escherichia coli: Detec    -  Piperacillin/Tazobactam: S <=8    -  Tobramycin: S <=2    -  Trimethoprim/Sulfamethoxazole: S <=0.5/9.5    Specimen Source: .Blood None    Organism: Blood Culture PCR    Organism: Escherichia coli    Culture Results:   Growth in aerobic and anaerobic bottles: Escherichia coli  "Due to technical problems, Proteus sp. will Not be reported as part of  the BCID panel until further notice"  ***Blood Panel PCR results on this specimen are available  approximately 3 hours after the Gram stain result.***  Gram stain, PCR, and/or culture results may not always  correspond due to difference in methodologies.  ************************************************************  This PCR assay was performed using [employer].  The following targets are tested for: Enterococcus,  vancomycin resistant enterococci, Listeria monocytogenes,  coagulase negative staphylococci, S. aureus,  methicillin resistant S. aureus, Streptococcus agalactiae  (Group B), S. pneumoniae, S.pyogenes (Group A),  Acinetobacter baumannii, Enterobacter cloacae, E. coli,  Klebsiella oxytoca, K. pneumoniae, Proteus sp.,  Serratia marcescens, Haemophilus influenzae,  Neisseria meningitidis, Pseudomonas aeruginosa, Candida  albicans, C. glabrata, C krusei, C parapsilosis,  C. tropicalis and the KPC resistance gene.    Organism Identification: Blood Culture PCR  Escherichia coli    Method Type: PCR    Method Type: PINA        < from: 12 Lead ECG (08.25.20 @ 00:12) >  Ventricular Rate 68 BPM    Atrial Rate 68 BPM    P-R Interval 134 ms    QRS Duration 98 ms    Q-T Interval 474 ms    QTC Calculation(Bezet) 504 ms    P Axis 42 degrees    R Axis 77 degrees    T Axis 76 degrees    Diagnosis Line Normal sinus rhythm  ST elevation consider inferior injury or acute infarct  Prolonged QT  *** ** ** ** * ACUTE MI  ** ** ** **  Abnormal ECG    < end of copied text >  Troponin I, Serum: 1.990: High Sensitivity Troponin and new reference  range effective 7/6/2016 ng/mL (08.25.20 @ 02:26)    < from: TTE Echo Complete w/o Contrast w/ Doppler (08.27.20 @ 11:04) >   The left ventricle size is normal. The inferior wall appears severely   hypokinetic..   Estimated left ventricular ejection fraction is 50 %.   Paradoxical septal wall motion during inspiration is noted.    < end of copied text >  < from: Cardiac Cath Lab - Adult (08.23.20 @ 09:33) >   Mid RCA: 100% stenosis.     Impression     Diagnostic Conclusions     Acute occlusion of the RCA of unknown duration. No PCI due to lack of   ischemic symptoms, sepsis, acute renal failure and satisfactory left   coronary anatomy.      < end of copied text >    < from: Xray Chest 1 View- PORTABLE-Routine (09.11.20 @ 07:47) >  FINDINGS:  The lungs show LEFT greater than RIGHT haziness indicating LEFT greater than RIGHT pleural effusions and/or LEFT basilar airspace consolidation. Upper lobes clear. No pneumothorax.    The heart and mediastinum are within normal limits.    Visualized osseous structures are intact.        IMPRESSION:   LEFT greater than RIGHT/pleural effusions and/or a LEFT lower lobe retrocardiac airspace consolidation.      < end of copied text >  < from: CT Chest No Cont (09.09.20 @ 15:52) >  LUNGS AND AIRWAYS: Mucosal impaction of the left mainstem bronchus. Near complete atelectasis of the left lower lobe. Significant segmental atelectasis in the lingula and anterior left upper lobe. Right basilar compressive atelectasis. Additional scattered peripheral groundglass opacities.  Paraseptal emphysema bilaterally.  PLEURA: No moderate to large left pleural effusion. Mild to moderate right pleural effusion.  MEDIASTINUM AND TATIANA: No lymphadenopathy.  VESSELS: Atherosclerotic changes of the aorta and coronary vasculature. No aortic aneurysm.  HEART: Heart size is normal. No pericardial effusion.  CHEST WALL AND LOWER NECK: Extensive anasarca.  VISUALIZED UPPER ABDOMEN: Prior cholecystectomy.  BONES: Old right lateral sixth and seventh rib fractures. Degenerative changes.    IMPRESSION:  Extensive mucosal impaction of the left mainstem bronchus extending into left upper lobe and left lower lobe segmental bronchi with near complete atelectasis left lower lobe and multifocal segmental atelectasis and/or pneumonia in the left upper lobe. Moderate to large left pleural effusion with a mild to moderate right pleural effusion with bibasilar compressive atelectasis.    Additional scattered groundglass opacities bilaterally.    < from: CT Abdomen and Pelvis w/ Oral Cont (09.08.20 @ 12:18) >  LOWER CHEST: Interval progression of left lung collapse with more loculated pleural effusion. Slightly decreased right pleural effusion and passive atelectasis. Scattered groundglass opacities in the right middle lobe and right lower lobe, new since prior (2-8).    < end of copied text >    LIVER: Within normal limits.  BILE DUCTS: Normal caliber.  GALLBLADDER: Cholecystectomy clips.  SPLEEN: Within normal limits.  PANCREAS: Within normal limits.  ADRENALS: Within normal limits.  KIDNEYS/URETERS: Within normal limits.    BLADDER: Small air in the nondependent portion of the bladder. Please correlate with instrumentation history.  REPRODUCTIVE ORGANS: Unremarkable.    BOWEL: No bowel obstruction. Appendix is not visualized. Mild diffuse wall thickening of the sigmoid (602-21), slightly more prominent since prior.. Interval removal of rectal tube.  PERITONEUM: Trace ascites in the cul-de-sac. No pneumoperitoneum.  VESSELS: Extensive atherosclerotic calcification.  RETROPERITONEUM/LYMPH NODES: No lymphadenopathy.  ABDOMINAL WALL: Diffuse anasarca without significant interval change.  BONES: No change of surgical hardware and grade 1 spondylolisthesis of L4 and L5. No acute abnormalities.    IMPRESSION:  Interval progression of left lung collapse with interval loculation of left effusion.    New scattered groundglass opacities in the right middle and lower lobes, likely indicating atypical infection including viral infection..    Mild diffuse wall thickening of the sigmoid, slightly more prominent since prior, which may indicate nonspecific colitis.    Otherwise no change.    < end of copied text >    < from: US Duplex Arterial Upper Ext Ltd, Left (09.07.20 @ 20:34) >  IMPRESSION:  No occlusion no stenosis  Monophasic flow throughout the left upper extremity    < end of copied text >    < from: CT Head No Cont (08.27.20 @ 01:23) >  FINDINGS:  There is motion artifact present which degrades image quality.    There is no gross acute intracranial hemorrhage, mass effect from vasogenic edema or evidence of acute large vascular territory infarct. Stable mild chronic microvascular changes as manifested by patchy areas of low-attenuation in the bihemispheric white matter.    The ventricles, sulci and cisternal spaces are stable in size and configuration.  There is no midline shift or abnormal extra-axial fluid collection.    The calvarium is intact.  There are no osteoblasticor lytic calvarial or skull base lesions.  The paranasal sinuses and mastoid air cells are clear.    IMPRESSION:  Mildly motion degraded exam. Grossly stable exam without evidence of acute intracranial hemorrhage or vasogenic edema. Chronic findings as above.    < end of copied text >     MEDICATIONS  (STANDING):  ALBUTerol    90 MICROgram(s) HFA Inhaler 2 Puff(s) Inhalation every 6 hours  aspirin  chewable 81 milliGRAM(s) Oral daily  atorvastatin 80 milliGRAM(s) Oral at bedtime  bethanechol 10 milliGRAM(s) Oral three times a day  budesonide  80 MICROgram(s)/formoterol 4.5 MICROgram(s) Inhaler 2 Puff(s) Inhalation two times a day  calcitriol   Capsule 0.25 MICROGram(s) Oral daily  calcium acetate 667 milliGRAM(s) Oral three times a day with meals  clopidogrel Tablet 75 milliGRAM(s) Oral daily  dextrose 5%. 1000 milliLiter(s) (50 mL/Hr) IV Continuous <Continuous>  dextrose 50% Injectable 12.5 Gram(s) IV Push once  dextrose 50% Injectable 25 Gram(s) IV Push once  dextrose 50% Injectable 25 Gram(s) IV Push once  guaiFENesin ER 1200 milliGRAM(s) Oral every 12 hours  heparin   Injectable 5000 Unit(s) SubCutaneous every 8 hours  insulin lispro (HumaLOG) corrective regimen sliding scale   SubCutaneous three times a day before meals  insulin lispro (HumaLOG) corrective regimen sliding scale   SubCutaneous at bedtime  ipratropium 17 MICROgram(s) HFA Inhaler 1 Puff(s) Inhalation every 6 hours  levothyroxine 112 MICROGram(s) Oral daily  meropenem  IVPB      meropenem  IVPB 500 milliGRAM(s) IV Intermittent every 12 hours  metoprolol tartrate 25 milliGRAM(s) Oral daily    MEDICATIONS  (PRN):  acetaminophen   Tablet .. 650 milliGRAM(s) Oral every 4 hours PRN Mild Pain (1 - 3)  ALBUTerol    90 MICROgram(s) HFA Inhaler 2 Puff(s) Inhalation every 6 hours PRN Shortness of Breath and/or Wheezing  aluminum hydroxide/magnesium hydroxide/simethicone Suspension 30 milliLiter(s) Oral every 4 hours PRN Dyspepsia  dextrose 40% Gel 15 Gram(s) Oral once PRN Blood Glucose LESS THAN 70 milliGRAM(s)/deciliter  glucagon  Injectable 1 milliGRAM(s) IntraMuscular once PRN Glucose LESS THAN 70 milligrams/deciliter  ondansetron Injectable 4 milliGRAM(s) IV Push every 6 hours PRN Nausea  oxycodone    5 mG/acetaminophen 325 mG 1 Tablet(s) Oral every 6 hours PRN Moderate Pain (4 - 6)

## 2020-09-11 NOTE — PROGRESS NOTE ADULT - PROBLEM SELECTOR PLAN 1
Likely due to deconditioning from prolonged bedrest.  No cardiac signs/symptoms: no dyspnea, no chest pain.  Recheck Echo to reassess EF.  Check BNP. CXR to confirm no evidence of fluid overload. Likely due to deconditioning from prolonged bedrest.  No cardiac signs/symptoms: no dyspnea, no chest pain.  Recheck Echo to reassess EF.  Check BNP. CXR with Mild L pleural effusion.  considering pleurocentesis - OK to hold ASA & plavix as no stent was placed during cath - RCA thrombosis medically manageed

## 2020-09-11 NOTE — PROGRESS NOTE ADULT - SUBJECTIVE AND OBJECTIVE BOX
Subjective:  breathing about the same today  did not have chest pt    Review of Systems:  All 10 systems reviewed in detailed and found to be negative with the exception of what has already been described above    Allergies:  No Known Allergies    Meds  MEDICATIONS  (STANDING):  ALBUTerol    90 MICROgram(s) HFA Inhaler 2 Puff(s) Inhalation every 6 hours  aspirin  chewable 81 milliGRAM(s) Oral daily  atorvastatin 80 milliGRAM(s) Oral at bedtime  bethanechol 10 milliGRAM(s) Oral three times a day  budesonide  80 MICROgram(s)/formoterol 4.5 MICROgram(s) Inhaler 2 Puff(s) Inhalation two times a day  calcitriol   Capsule 0.25 MICROGram(s) Oral daily  calcium acetate 667 milliGRAM(s) Oral three times a day with meals  clopidogrel Tablet 75 milliGRAM(s) Oral daily  dextrose 5%. 1000 milliLiter(s) (50 mL/Hr) IV Continuous <Continuous>  dextrose 50% Injectable 12.5 Gram(s) IV Push once  dextrose 50% Injectable 25 Gram(s) IV Push once  dextrose 50% Injectable 25 Gram(s) IV Push once  dicyclomine 20 milliGRAM(s) Oral three times a day before meals  guaiFENesin ER 1200 milliGRAM(s) Oral every 12 hours  heparin   Injectable 5000 Unit(s) SubCutaneous every 8 hours  insulin lispro (HumaLOG) corrective regimen sliding scale   SubCutaneous three times a day before meals  insulin lispro (HumaLOG) corrective regimen sliding scale   SubCutaneous at bedtime  ipratropium 17 MICROgram(s) HFA Inhaler 1 Puff(s) Inhalation every 6 hours  levothyroxine 112 MICROGram(s) Oral daily  meropenem  IVPB      meropenem  IVPB 500 milliGRAM(s) IV Intermittent every 12 hours  metoprolol tartrate 25 milliGRAM(s) Oral daily  ranolazine 1000 milliGRAM(s) Oral two times a day    MEDICATIONS  (PRN):  acetaminophen   Tablet .. 650 milliGRAM(s) Oral every 4 hours PRN Mild Pain (1 - 3)  ALBUTerol    90 MICROgram(s) HFA Inhaler 2 Puff(s) Inhalation every 6 hours PRN Shortness of Breath and/or Wheezing  aluminum hydroxide/magnesium hydroxide/simethicone Suspension 30 milliLiter(s) Oral every 4 hours PRN Dyspepsia  dextrose 40% Gel 15 Gram(s) Oral once PRN Blood Glucose LESS THAN 70 milliGRAM(s)/deciliter  glucagon  Injectable 1 milliGRAM(s) IntraMuscular once PRN Glucose LESS THAN 70 milligrams/deciliter  loperamide 2 milliGRAM(s) Oral every 4 hours PRN Diarrhea  morphine  - Injectable 2 milliGRAM(s) IV Push every 4 hours PRN Severe Pain (7 - 10)  ondansetron Injectable 4 milliGRAM(s) IV Push every 6 hours PRN Nausea  oxycodone    5 mG/acetaminophen 325 mG 1 Tablet(s) Oral every 6 hours PRN Moderate Pain (4 - 6)    Physical Exam  T(C): 37 (09-11-20 @ 09:07), Max: 37 (09-11-20 @ 09:07)  HR: 88 (09-11-20 @ 09:07) (68 - 88)  BP: 109/59 (09-11-20 @ 09:07) (109/59 - 114/66)  RR: 16 (09-11-20 @ 09:07) (16 - 16)  SpO2: 100% (09-11-20 @ 09:07) (93% - 100%)  Gen: Alert, oriented, no distress  HEENT: Anicteric sclera, + thrush, poor dentition  Cardio: Regular rhythm and rate, normal S1S2, no murmurs  Resp: decreased breath sounds  GI: Nontender, nondistended, normoactive bowel sounds  Ext: No cyanosis, clubbing or edema  Neuro: Nonfocal    Labs:                        7.5    6.54  )-----------( 154      ( 11 Sep 2020 06:51 )             23.6     09-11    134<L>  |  103  |  76<H>  ----------------------------<  122<H>  4.2   |  23  |  4.45<H>    Ca    8.2<L>      11 Sep 2020 06:51  Phos  4.0     09-11  Mg     1.7     09-10    TPro  x   /  Alb  1.1<L>  /  TBili  x   /  DBili  x   /  AST  x   /  ALT  x   /  AlkPhos  x   09-11    < from: CT Chest No Cont (09.09.20 @ 15:52) >  PROCEDURE DATE:  09/09/2020          INTERPRETATION:  CLINICAL INFORMATION: Chest pain    COMPARISON: CT chest 7/29/2019, CT abdomen 9/8/2020    PROCEDURE:  CT of the Chest was performed without intravenous contrast.  Sagittal and coronal reformats were performed.    FINDINGS:    LUNGS AND AIRWAYS: Mucosal impaction of the left mainstem bronchus. Near complete atelectasis of the left lower lobe. Significant segmental atelectasis in the lingula and anterior left upper lobe. Right basilar compressive atelectasis. Additional scattered peripheral groundglass opacities.  Paraseptal emphysema bilaterally.  PLEURA: No moderate to large left pleural effusion. Mild to moderate right pleural effusion.  MEDIASTINUM AND TATIANA: No lymphadenopathy.  VESSELS: Atherosclerotic changes of the aorta and coronary vasculature. No aortic aneurysm.  HEART: Heart size is normal. No pericardial effusion.  CHEST WALL AND LOWER NECK: Extensive anasarca.  VISUALIZED UPPER ABDOMEN: Prior cholecystectomy.  BONES: Old right lateral sixth and seventh rib fractures. Degenerative changes.    IMPRESSION:  Extensive mucosal impaction of the left mainstem bronchus extending into left upper lobe and left lower lobe segmental bronchi with near complete atelectasis left lower lobe and multifocal segmental atelectasis and/or pneumonia in the left upper lobe. Moderate to large left pleural effusion with a mild to moderate right pleural effusion with bibasilar compressive atelectasis.    Additional scattered groundglass opacities bilaterally.    < end of copied text >     chest x-ray   09/11/2020: Reviewed with Radiology   when compared to  film from CT scan September 9, 2020, there is improved aeration of the left lung.  This is most likely secondary to improved atelectasis furthermore there is better visualization of the left mainstem bronchus.  Overall the suggest that the mucus plug and resultant atelectasis has improved since prior study

## 2020-09-11 NOTE — PROGRESS NOTE ADULT - PROBLEM SELECTOR PLAN 2
No chest pain; occluded RCA of unknown chronicity; continue medical management with aspirin, Plavix, atorvastatin. No further cardiac intervention indicated at this time.  Followup w/ cardiology as OP. No chest pain; occluded RCA of unknown chronicity; continue medical management with aspirin, Plavix  atorvastatin. No further cardiac intervention indicated at this time.

## 2020-09-11 NOTE — PROGRESS NOTE ADULT - SUBJECTIVE AND OBJECTIVE BOX
HPI: 59 year old woman with multiple medical problems, including CAD, PAD (fem-pop bypass, iliac stent), diabetes, hypothyroidism, hyperlipidemia, smoker, cocaine use,   chronic back pain and past lumbar surgery admitted on 8/23 with multiple complaints (including fever, abd pain, diarrhea) -- found to have ST elevations on ECG with urgent cath revealing an occluded RCA (no PCI) -- subsequently diagnosed with E coli sepsis with shock; hospitalization complicated by PEA arrest.    8/24/'20: no complaints overnight: no chest pain, palpitations, dyspnea.Reviewed results of cardiac cath & plan for med mx.  8/25/20 Events reviewed , patient was noted to be hypotensive bradycardic PEA  unresponsive episode subsequently resuscitated  patient intubated , on pressors , maintaining sbp blood cultures are positive for gram negative rods . patient hemoglobin dropped with   8/26/20 Pt awake but lethargic. She denies complaints today. Pressors continued  8/27/20:  Feels sick but no dyspnea, angina; + abdominal pain.  8/28/20: Pressors continued. Pt lethargic  8/29/20: Pt lethargic. NSR. Labs noted  9/11/20: reconsulted for persistent lethargy. pt denies  chest pain, dyspnea, palpitations or other cardiac symptoms.  Reports feeling tired & fatigued, but activites not limited by dyspnea.    MEDICATIONS:    INPATIENT  MEDICATIONS  (STANDING):  ALBUTerol    90 MICROgram(s) HFA Inhaler 2 Puff(s) Inhalation every 6 hours  aspirin  chewable 81 milliGRAM(s) Oral daily  atorvastatin 80 milliGRAM(s) Oral at bedtime  bethanechol 10 milliGRAM(s) Oral three times a day  budesonide  80 MICROgram(s)/formoterol 4.5 MICROgram(s) Inhaler 2 Puff(s) Inhalation two times a day  calcitriol   Capsule 0.25 MICROGram(s) Oral daily  calcium acetate 667 milliGRAM(s) Oral three times a day with meals  clopidogrel Tablet 75 milliGRAM(s) Oral daily  dextrose 5%. 1000 milliLiter(s) (50 mL/Hr) IV Continuous <Continuous>  dextrose 50% Injectable 12.5 Gram(s) IV Push once  dextrose 50% Injectable 25 Gram(s) IV Push once  dextrose 50% Injectable 25 Gram(s) IV Push once  ferrous    sulfate 325 milliGRAM(s) Oral daily  guaiFENesin ER 1200 milliGRAM(s) Oral every 12 hours  heparin   Injectable 5000 Unit(s) SubCutaneous every 12 hours  insulin lispro (HumaLOG) corrective regimen sliding scale   SubCutaneous three times a day before meals  insulin lispro (HumaLOG) corrective regimen sliding scale   SubCutaneous at bedtime  ipratropium 17 MICROgram(s) HFA Inhaler 1 Puff(s) Inhalation every 6 hours  lactobacillus acidophilus 1 Tablet(s) Oral three times a day with meals  levothyroxine 112 MICROGram(s) Oral daily  meropenem  IVPB      meropenem  IVPB 500 milliGRAM(s) IV Intermittent every 12 hours  metoprolol tartrate 25 milliGRAM(s) Oral daily  ranolazine 500 milliGRAM(s) Oral two times a day    MEDICATIONS  (PRN):  acetaminophen   Tablet .. 650 milliGRAM(s) Oral every 4 hours PRN Mild Pain (1 - 3)  ALBUTerol    90 MICROgram(s) HFA Inhaler 2 Puff(s) Inhalation every 6 hours PRN Shortness of Breath and/or Wheezing  aluminum hydroxide/magnesium hydroxide/simethicone Suspension 30 milliLiter(s) Oral every 4 hours PRN Dyspepsia  dextrose 40% Gel 15 Gram(s) Oral once PRN Blood Glucose LESS THAN 70 milliGRAM(s)/deciliter  glucagon  Injectable 1 milliGRAM(s) IntraMuscular once PRN Glucose LESS THAN 70 milligrams/deciliter  ondansetron Injectable 4 milliGRAM(s) IV Push every 6 hours PRN Nausea  oxycodone    5 mG/acetaminophen 325 mG 1 Tablet(s) Oral every 6 hours PRN Moderate Pain (4 - 6)      Vital Signs Last 24 Hrs  T(C): 36.7 (11 Sep 2020 15:47), Max: 37 (11 Sep 2020 09:07)  T(F): 98.1 (11 Sep 2020 15:47), Max: 98.6 (11 Sep 2020 09:07)  HR: 101 (11 Sep 2020 15:47) (68 - 101)  BP: 94/60 (11 Sep 2020 15:47) (94/60 - 114/66)  BP(mean): --  RR: 16 (11 Sep 2020 15:47) (16 - 16)  SpO2: 100% (11 Sep 2020 15:47) (95% - 100%)Daily       PHYSICAL EXAM:    Constitutional: NAD, feels tired  HEENT: PERR, EOMI,   Neck:  supple,  No JVD  Respiratory: Breath sounds are clear bilaterally, No wheezing, rales or rhonchi  Cardiovascular: S1 and S2, regular rate and rhythm, no Murmurs, gallops or rubs  Gastrointestinal: Bowel Sounds present, soft, nontender.   Extremities: No peripheral edema.   Vascular: 2+ peripheral pulses  Neurological: A/O x 3, no focal deficits        LABS: All Labs Reviewed:                        7.5    6.54  )-----------( 154      ( 11 Sep 2020 06:51 )             23.6                         8.0    7.52  )-----------( 141      ( 10 Sep 2020 10:04 )             25.0                         9.0    10.18 )-----------( 162      ( 09 Sep 2020 10:15 )             29.5     11 Sep 2020 06:51    134    |  103    |  76     ----------------------------<  122    4.2     |  23     |  4.45   10 Sep 2020 10:04    134    |  103    |  81     ----------------------------<  173    4.2     |  24     |  4.55   09 Sep 2020 08:28    135    |  107    |  79     ----------------------------<  83     5.0     |  19     |  4.36     Ca    8.2        11 Sep 2020 06:51  Ca    8.1        10 Sep 2020 10:04  Ca    8.0        09 Sep 2020 08:28  Phos  4.0       11 Sep 2020 06:51  Phos  4.6       10 Sep 2020 10:04  Mg     1.7       10 Sep 2020 10:04    TPro  x      /  Alb  1.1    /  TBili  x      /  DBili  x      /  AST  x      /  ALT  x      /  AlkPhos  x      11 Sep 2020 06:51    ===============================  EKG:  < from: 12 Lead ECG (08.25.20 @ 00:12) >  Diagnosis Line Normal sinus rhythm  ST elevation consider inferior injury or acute infarct  Prolonged QT  *** ** ** ** * ACUTE MI  ** ** ** **  Abnormal ECG  Confirmed by Amando Macdonald ([id number]) on 8/25/2020 12:17:31 PM    < end of copied text >  ==============================  ECHO:  < from: TTE Echo Complete w/o Contrast w/ Doppler (08.27.20 @ 11:04) >   Impression     Summary     The left ventricle size is normal. The inferior wall appears severely   hypokinetic..   Estimated left ventricular ejection fraction is 50 %.   Paradoxical septal wall motion during inspiration is noted.  ==============================  CATH:  < from: Cardiac Cath Lab - Adult (08.23.20 @ 09:33) >  ondition: Rest     Angiographic Findings     Cardiac Arteries and Lesion Findings    LMCA: Normal.    LAD: Abnormal.Moderate diffuse disease    LCx: Abnormal.Moderate diffuse diease    RCA:     Mid RCA: 100% stenosis.     Impression     Diagnostic Conclusions     Acute occlusion of the RCA of unknown duration. No PCI due to lack of   ischemic symptoms, sepsis, acute renal failure and satisfactory left   coronary anatomy.    Estimated Blood Loss:4ml.    No LV gram performed (likely d/t Cr 2-3 range)    ==============================

## 2020-09-11 NOTE — PROGRESS NOTE ADULT - ASSESSMENT
59 CAD, PAD s/p fem-pop bypass s/p RT iliac stent, diabetes, hypothyroidism, hyperlipidemia, current active smoker, uses cocaine last use yesterday, chronic back pain s/p lumbar surgery presented with multiple non-specific complaints found to have ST elevations on presenting EKG, code STEMI called and pt underwent urgent angiogram showing occluded RCA w no intervention with UTI sepsis and E. coli bacteremia POA w hospital course c/b code blue.     AMISH w septic shock/UTI w STEMI and Code blue event w hypotension   Cr was improving steadily till 9/8, trena with hughes out  monitor renal function with hughes in, off IVF  trend renal panel/lytes in AM with catheter  bethenechol for bladder retention     Hypocalcemia - Ca improving  On Calcum acetate with feeds for elevated phos/low ca++   Intact PTH: 87:, phos ok  Vitamin D, 25-Hydroxy: 27.8  Calcitriol - Vit D analogue     Ischemic Bowel w Shock w Leokocytosis  - improving   - Supportive care and plan per above teams  - diet as per Surgery     d/w team, RN staff  Dr Lema to cover the weekend

## 2020-09-11 NOTE — PROGRESS NOTE ADULT - ASSESSMENT
60yo female with PMH CAD, PAD s/p fem-pop bypass s/p RT iliac stent, DM2, HLD, hypothyroidism, current active smoker, uses cocaine, chronic back pain s/p lumbar surgery presented on 8/23/2020 with multiple non-specific complaints, including fevers, weakness, poor po intake, diffuse abdominal pain, diarrhea. She found to have STEMI s/p LHC with RCA occlusion , E coli sepsis, cystitis, ischemic colitis.    hospital course complicated by acute kidney failure, intubation, pressor requirement, diarrhea, and now  pneumonia with occlusion of left mainstem bronchus  Acute hypoxic respiratory failure  secondary to septic shock   Hospital acquired PNA with  Mucosal impaction of left main bronchus , complete atelectasis of LLL   COPD , Nicotine dependence   Bilateral pleural effusion L >R  COVID-19 ruled out   -s/p intubation /extubation  -acute , recurrent hypoxic respiratory failure >> patient requiring supplemental o2 3 L  -CT chest shows large mucous impaction left main bronchus >> pulmonary consult  -c/w mucinex, chest PT, albuterol, atrovent; c/w incentive spirometry  -c/w Meropenem, D/w Dr. wilson  - may benefit from pleural effusion drainage   Septic shock secondary to Ecoli  bacteremia, possible ischemic infectious colitis ,  diarrheal syndrome, resolved   -colitis on CT scan , possible bacteremia due to ischemic colitis   -completed antibiotics.  days IV zosyn [id number] --> 9/4 and 4 days ceftin 9/5-9/8,  s/p rocephin 2gm daily #6, s/p IV flagyl #4  - GI consult - Dr. Hammer, IV abx, recent colonoscopy unremarkable , GI PCR and C diff neg   STEMI due to 100 % occlusion of RCA   PAD s/p stents   -Echo show hypokinesis on admission, troponins, st elevations   -Urgent coronary angiogram showed occluded RCA of unknown chronicity  -cardiology f/u noted; c/w DAPT, statins ,LDL 19, on BB , ranexa 1000--> 500 bid due to renal dysfunction  - c/w metoproolol 25 daily   AMISH w septic shock/stemi/hypotension    Urinary retention s/p Black , failed trial of void 9/10  - bethanechol, d/c bentyl can cause urinary retention  -  s/p IV diuresis with worsening of renal function  - nephrology consult   Anemia normocytic , mixed type ACD and iron deficiency   - check B12, folate, add iron daily   - monitor  Hypocalcemia, vitamin D deficiency   w Calcum acetate with feeds for elevated phos/low ca++ when starting to eat   Intact PTH: 87, Vitamin D, 25-Hydroxy: 27.8, Magnesium, Serum: 1.8 mg/dL  Calcitriol - Vit D analogue   Hypothyroidism- c/w 112 mcg, check TSH, free t4      Advanced directives   [facility] daughter Antoinette [phone] updated 9/11/20    Full code 60yo female with PMH CAD, PAD s/p fem-pop bypass s/p RT iliac stent, DM2, HLD, hypothyroidism, current active smoker, uses cocaine, chronic back pain s/p lumbar surgery presented on 8/23/2020 with multiple non-specific complaints, including fevers, weakness, poor po intake, diffuse abdominal pain, diarrhea. She found to have STEMI s/p LHC with RCA occlusion , E coli sepsis, cystitis, ischemic colitis.    hospital course complicated by acute kidney failure, intubation, pressor requirement, diarrhea, and now  pneumonia with occlusion of left mainstem bronchus  Acute hypoxic respiratory failure  secondary to septic shock   Hospital acquired PNA with  Mucosal impaction of left main bronchus , complete atelectasis of LLL   COPD , Nicotine dependence   Bilateral pleural effusion L >R  COVID-19 ruled out   -s/p intubation /extubation  -acute , recurrent hypoxic respiratory failure >> patient requiring supplemental o2 3 L  -CT chest shows large mucous impaction left main bronchus >> pulmonary consult  -c/w mucinex, chest PT, albuterol, atrovent; c/w incentive spirometry  -c/w Meropenem, D/w Dr. wilson  - may benefit from pleural effusion drainage   Septic shock secondary to Ecoli  bacteremia, possible ischemic infectious colitis ,  diarrheal syndrome, resolved   -colitis on CT scan , possible bacteremia due to ischemic colitis   -completed antibiotics.  days IV zosyn [id number] --> 9/4 and 4 days ceftin 9/5-9/8,  s/p rocephin 2gm daily #6, s/p IV flagyl #4  - GI consult - Dr. Hammer, IV abx, recent colonoscopy unremarkable , GI PCR and C diff neg   STEMI due to 100 % occlusion of RCA   PAD s/p stents   -Echo show hypokinesis on admission, troponins, st elevations   -Urgent coronary angiogram showed occluded RCA of unknown chronicity  -cardiology f/u noted; c/w DAPT, statins ,LDL 19, on BB , ranexa 1000--> 500 bid due to renal dysfunction  - c/w metoproolol 25 daily   AMISH w septic shock/stemi/hypotension    Urinary retention s/p Black , failed trial of void 9/10  - bethanechol, d/c bentyl can cause urinary retention  -  s/p IV diuresis with worsening of renal function  - nephrology consult   Anemia normocytic , mixed type ACD and iron deficiency   - check B12, folate, add iron daily   - monitor  Hypocalcemia, vitamin D deficiency    Intact PTH: 87, Vitamin D, 25-Hydroxy: 27.8, Magnesium, Serum: 1.8 mg/dL  Calcitriol - Vit D analogue   Hypothyroidism- c/w 112 mcg, check TSH, free t4  DM2 with A1C 8.1 - FBG monitoring , ISS       Advanced directives   HCP daughter Antoinette [phone] updated 9/11/20    Full code

## 2020-09-12 LAB
ADD ON TEST-SPECIMEN IN LAB: SIGNIFICANT CHANGE UP
BASOPHILS # BLD AUTO: 0.06 K/UL — SIGNIFICANT CHANGE UP (ref 0–0.2)
BASOPHILS NFR BLD AUTO: 1.1 % — SIGNIFICANT CHANGE UP (ref 0–2)
CK SERPL-CCNC: 218 U/L — HIGH (ref 26–192)
EOSINOPHIL # BLD AUTO: 0.05 K/UL — SIGNIFICANT CHANGE UP (ref 0–0.5)
EOSINOPHIL NFR BLD AUTO: 0.9 % — SIGNIFICANT CHANGE UP (ref 0–6)
FERRITIN SERPL-MCNC: 428 NG/ML — HIGH (ref 15–150)
FOLATE SERPL-MCNC: 11.4 NG/ML — SIGNIFICANT CHANGE UP
HCT VFR BLD CALC: 26 % — LOW (ref 34.5–45)
HGB BLD-MCNC: 8.4 G/DL — LOW (ref 11.5–15.5)
IMM GRANULOCYTES NFR BLD AUTO: 0.9 % — SIGNIFICANT CHANGE UP (ref 0–1.5)
LYMPHOCYTES # BLD AUTO: 0.58 K/UL — LOW (ref 1–3.3)
LYMPHOCYTES # BLD AUTO: 10.5 % — LOW (ref 13–44)
MCHC RBC-ENTMCNC: 29.2 PG — SIGNIFICANT CHANGE UP (ref 27–34)
MCHC RBC-ENTMCNC: 32.3 GM/DL — SIGNIFICANT CHANGE UP (ref 32–36)
MCV RBC AUTO: 90.3 FL — SIGNIFICANT CHANGE UP (ref 80–100)
MONOCYTES # BLD AUTO: 0.67 K/UL — SIGNIFICANT CHANGE UP (ref 0–0.9)
MONOCYTES NFR BLD AUTO: 12.2 % — SIGNIFICANT CHANGE UP (ref 2–14)
NEUTROPHILS # BLD AUTO: 4.1 K/UL — SIGNIFICANT CHANGE UP (ref 1.8–7.4)
NEUTROPHILS NFR BLD AUTO: 74.4 % — SIGNIFICANT CHANGE UP (ref 43–77)
NT-PROBNP SERPL-SCNC: 8939 PG/ML — HIGH (ref 0–125)
NT-PROBNP SERPL-SCNC: 9249 PG/ML — HIGH (ref 0–125)
PLATELET # BLD AUTO: 177 K/UL — SIGNIFICANT CHANGE UP (ref 150–400)
RBC # BLD: 2.88 M/UL — LOW (ref 3.8–5.2)
RBC # BLD: 2.88 M/UL — LOW (ref 3.8–5.2)
RBC # FLD: 16.5 % — HIGH (ref 10.3–14.5)
RETICS #: 82.9 K/UL — SIGNIFICANT CHANGE UP (ref 25–125)
RETICS/RBC NFR: 2.9 % — HIGH (ref 0.5–2.5)
T3FREE SERPL-MCNC: 0.91 PG/ML — LOW (ref 1.8–4.6)
T4 FREE SERPL-MCNC: 0.76 NG/DL — SIGNIFICANT CHANGE UP (ref 0.76–1.46)
TROPONIN I SERPL-MCNC: 0.2 NG/ML — HIGH (ref 0.01–0.04)
TSH SERPL-MCNC: 22.9 UU/ML — HIGH (ref 0.34–4.82)
VIT B12 SERPL-MCNC: >2000 PG/ML — HIGH (ref 232–1245)
WBC # BLD: 5.51 K/UL — SIGNIFICANT CHANGE UP (ref 3.8–10.5)
WBC # FLD AUTO: 5.51 K/UL — SIGNIFICANT CHANGE UP (ref 3.8–10.5)

## 2020-09-12 PROCEDURE — 93306 TTE W/DOPPLER COMPLETE: CPT | Mod: 26

## 2020-09-12 PROCEDURE — 99233 SBSQ HOSP IP/OBS HIGH 50: CPT

## 2020-09-12 RX ORDER — ATORVASTATIN CALCIUM 80 MG/1
40 TABLET, FILM COATED ORAL AT BEDTIME
Refills: 0 | Status: DISCONTINUED | OUTPATIENT
Start: 2020-09-12 | End: 2020-09-21

## 2020-09-12 RX ORDER — LEVOTHYROXINE SODIUM 125 MCG
125 TABLET ORAL DAILY
Refills: 0 | Status: DISCONTINUED | OUTPATIENT
Start: 2020-09-12 | End: 2020-09-21

## 2020-09-12 RX ADMIN — CALCITRIOL 0.25 MICROGRAM(S): 0.5 CAPSULE ORAL at 10:16

## 2020-09-12 RX ADMIN — Medication 1200 MILLIGRAM(S): at 10:17

## 2020-09-12 RX ADMIN — Medication 12.5 MILLIGRAM(S): at 10:17

## 2020-09-12 RX ADMIN — Medication 12.5 MILLIGRAM(S): at 21:54

## 2020-09-12 RX ADMIN — HEPARIN SODIUM 5000 UNIT(S): 5000 INJECTION INTRAVENOUS; SUBCUTANEOUS at 10:16

## 2020-09-12 RX ADMIN — RANOLAZINE 500 MILLIGRAM(S): 500 TABLET, FILM COATED, EXTENDED RELEASE ORAL at 21:55

## 2020-09-12 RX ADMIN — Medication 325 MILLIGRAM(S): at 10:17

## 2020-09-12 RX ADMIN — ALBUTEROL 2 PUFF(S): 90 AEROSOL, METERED ORAL at 08:14

## 2020-09-12 RX ADMIN — RANOLAZINE 500 MILLIGRAM(S): 500 TABLET, FILM COATED, EXTENDED RELEASE ORAL at 10:17

## 2020-09-12 RX ADMIN — MEROPENEM 100 MILLIGRAM(S): 1 INJECTION INTRAVENOUS at 10:17

## 2020-09-12 RX ADMIN — ATORVASTATIN CALCIUM 40 MILLIGRAM(S): 80 TABLET, FILM COATED ORAL at 21:54

## 2020-09-12 RX ADMIN — ALBUTEROL 2 PUFF(S): 90 AEROSOL, METERED ORAL at 20:07

## 2020-09-12 RX ADMIN — Medication 1 PUFF(S): at 13:58

## 2020-09-12 RX ADMIN — Medication 1 TABLET(S): at 17:51

## 2020-09-12 RX ADMIN — OXYCODONE AND ACETAMINOPHEN 1 TABLET(S): 5; 325 TABLET ORAL at 22:30

## 2020-09-12 RX ADMIN — Medication 1 TABLET(S): at 12:39

## 2020-09-12 RX ADMIN — Medication 667 MILLIGRAM(S): at 12:39

## 2020-09-12 RX ADMIN — Medication 1 PUFF(S): at 20:11

## 2020-09-12 RX ADMIN — Medication 1 PUFF(S): at 08:15

## 2020-09-12 RX ADMIN — MEROPENEM 100 MILLIGRAM(S): 1 INJECTION INTRAVENOUS at 21:54

## 2020-09-12 RX ADMIN — Medication 1200 MILLIGRAM(S): at 21:54

## 2020-09-12 RX ADMIN — CLOPIDOGREL BISULFATE 75 MILLIGRAM(S): 75 TABLET, FILM COATED ORAL at 10:17

## 2020-09-12 RX ADMIN — BUDESONIDE AND FORMOTEROL FUMARATE DIHYDRATE 2 PUFF(S): 160; 4.5 AEROSOL RESPIRATORY (INHALATION) at 08:15

## 2020-09-12 RX ADMIN — Medication 667 MILLIGRAM(S): at 17:51

## 2020-09-12 RX ADMIN — Medication 1 PUFF(S): at 02:00

## 2020-09-12 RX ADMIN — Medication 81 MILLIGRAM(S): at 10:17

## 2020-09-12 RX ADMIN — OXYCODONE AND ACETAMINOPHEN 1 TABLET(S): 5; 325 TABLET ORAL at 22:04

## 2020-09-12 RX ADMIN — HEPARIN SODIUM 5000 UNIT(S): 5000 INJECTION INTRAVENOUS; SUBCUTANEOUS at 21:54

## 2020-09-12 RX ADMIN — ALBUTEROL 2 PUFF(S): 90 AEROSOL, METERED ORAL at 14:00

## 2020-09-12 RX ADMIN — BUDESONIDE AND FORMOTEROL FUMARATE DIHYDRATE 2 PUFF(S): 160; 4.5 AEROSOL RESPIRATORY (INHALATION) at 20:07

## 2020-09-12 NOTE — PROGRESS NOTE ADULT - ASSESSMENT
58yo female with PMH CAD, PAD s/p fem-pop bypass s/p RT iliac stent, DM2, HLD, hypothyroidism, current active smoker, uses cocaine, chronic back pain s/p lumbar surgery presented on 8/23/2020 with multiple non-specific complaints, including fevers, weakness, poor po intake, diffuse abdominal pain, diarrhea. She found to have STEMI s/p LHC with RCA occlusion , E coli sepsis, cystitis, ischemic colitis.    hospital course complicated by acute kidney failure, intubation, pressor requirement, diarrhea, and now  pneumonia with occlusion of left mainstem bronchus  Acute hypoxic respiratory failure  secondary to septic shock   Hospital acquired PNA with  Mucosal impaction of left main bronchus , complete atelectasis of LLL   COPD , Nicotine dependence   Bilateral pleural effusion L >R  COVID-19 ruled out   -s/p intubation /extubation  -acute , recurrent hypoxic respiratory failure >> patient requiring supplemental o2 3 L  -CT chest shows large mucous impaction left main bronchus >> pulmonary consult  -c/w mucinex, chest PT, albuterol, atrovent; c/w incentive spirometry  -c/w Meropenem, D/w Dr. Mesa  - may benefit from pleural effusion drainage   - d/w Dr. Hernandez cardiology - ok to Hold asa, plavix for thoraocentesis  Septic shock secondary to Ecoli  bacteremia, possible ischemic infectious colitis ,  diarrheal syndrome, resolved   Abdominal pain   -colitis on CT scan , possible bacteremia due to ischemic colitis   -completed antibiotics.  days IV zosyn [id number] --> 9/4 and 4 days ceftin 9/5-9/8,  s/p rocephin 2gm daily #6, s/p IV flagyl #4  - GI consult - Dr. Hammer, IV abx, recent colonoscopy unremarkable , GI PCR and C diff neg   - stop betanechol, bentyl , add probiotics, if lose bm persist will retest for c diff and GI PCR   - US abd 9/12- to evaluate ascites   STEMI due to 100 % occlusion of RCA , PAD s/p LE stents   -Echo show hypokinesis on admission, troponins, st elevations   -Urgent coronary angiogram showed occluded RCA of unknown chronicity  -cardiology f/u noted; c/w DAPT, statins 40 mg atorvastatin ,LDL 19, on BB , ranexa 1000--> 500 bid due to renal dysfunction  - c/w metoprolol 12.5 bid   AMISH w septic shock/stemi/hypotension    Urinary retention s/p Black , failed trial of void 9/10  - bethanechol, d/c bentyl can cause urinary retention  -  s/p IV diuresis with worsening of renal function  - nephrology consult   Anemia normocytic , mixed type ACD and iron deficiency   - check B12, folate, add iron daily ,  monitor  Hypocalcemia, vitamin D deficiency    Intact PTH: 87, Vitamin D, 25-Hydroxy: 27.8, Magnesium, Serum: 1.8 mg/dL, c/w Calcitriol - Vit D analogue   Hypothyroidism- home dose 112 mcg--> 125 mcg , increased due to elevated  TSH 22  DM2 with A1C 8.1 - FBG monitoring , ISS   Generalized weakness, multifactorial  anemia, uncontrolled hypothyroidism, deconditioning, anasarca, renal and respiratory failure, STEMi  - PT daily, OOB to chair daily, c/w vit D   - elevated CK with low LDL - > lower dose of statin atorvastatin 80--> 40 can cause weakness in elderly       Advanced directives   HCP daughter Antoinette [phone] updated 9/11/20    Full code

## 2020-09-12 NOTE — PROGRESS NOTE ADULT - SUBJECTIVE AND OBJECTIVE BOX
Cheif complaints - weakness, dyspnea, cough, abdominal pain    60yo female with PMH CAD, PAD s/p fem-pop bypass s/p RT iliac stent, DM2, HLD, hypothyroidism, current active smoker, uses cocaine, chronic back pain s/p lumbar surgery presented on 8/23/2020 with multiple non-specific complaints, including fevers, weakness, poor po intake, diffuse abdominal pain, diarrhea. She found to have STEMI s/p LHC with RCA occlusion , E coli sepsis, cystitis, ischemic colitis.    hospital course complicated by acute kidney failure, intubation, pressor requirement, diarrhea, and now  pneumonia with occlusion of left mainstem bronchus    9/11 - pt seen and examined, chart reviewed, pt reports felling weak, + abdominal distension, + dyspnea, + poor po intake, cough, afebrile, denies HA, CP, dizziness, POC discussed   9/12 - pt seen and examined, + lose bm, + diffuse abdominal discomfort, + gen weakness, afebrile, pOC discussed    Review of system- Rest of the review of system are negative except mentioned in HPI    T(C): 36.6 (09-12-20 @ 07:33), Max: 36.8 (09-11-20 @ 20:53)  T(F): 97.8 (09-12-20 @ 07:33), Max: 98.2 (09-11-20 @ 20:53)  HR: 88 (09-12-20 @ 07:33) (75 - 88)  BP: 122/47 (09-12-20 @ 07:33) (122/47 - 131/48)  RR: 18 (09-12-20 @ 07:33) (18 - 18)  SpO2: 100% (09-12-20 @ 07:33) (98% - 100%)  Wt(kg): --CAPILLARY BLOOD GLUCOSE    A1C with Estimated Average Glucose (08.24.20 @ 06:24)    A1C with Estimated Average Glucose Result: 8.1  CAPILLARY BLOOD GLUCOSE  POCT Blood Glucose.: 112 mg/dL (12 Sep 2020 12:37)  POCT Blood Glucose.: 80 mg/dL (12 Sep 2020 08:21)  POCT Blood Glucose.: 110 mg/dL (11 Sep 2020 21:17)  POCT Blood Glucose.: 122 mg/dL (11 Sep 2020 17:17)    PHYSICAL EXAM:    Constitutional: NAD, awake and alert, well-developed  HEENT: PERR, EOMI, Normal Hearing, MMM  Neck: Soft and supple, No LAD, No JVD  Respiratory: Breath sounds decreased at bases, +  wheezing, +  rales ,+ rhonchi  Cardiovascular: S1 and S2, regular rate and rhythm, no Murmurs, gallops or rubs  Gastrointestinal: Bowel Sounds present, soft, + diffuse tenderness, + distended, no guarding, no rebound, + Black   Extremities: + LE + 2  peripheral edema  Vascular: 2+ peripheral pulses  Neurological: A/O x 3, no focal deficits  Musculoskeletal: 3/5 strength  lower extremities, 5/5 UE   Skin: No rashes  rectal : deferred, not indicated    MEDICATIONS  (STANDING):  ALBUTerol    90 MICROgram(s) HFA Inhaler 2 Puff(s) Inhalation every 6 hours  aspirin  chewable 81 milliGRAM(s) Oral daily  atorvastatin 80 milliGRAM(s) Oral at bedtime  bethanechol 10 milliGRAM(s) Oral three times a day  budesonide  80 MICROgram(s)/formoterol 4.5 MICROgram(s) Inhaler 2 Puff(s) Inhalation two times a day  calcitriol   Capsule 0.25 MICROGram(s) Oral daily  calcium acetate 667 milliGRAM(s) Oral three times a day with meals  clopidogrel Tablet 75 milliGRAM(s) Oral daily  dextrose 5%. 1000 milliLiter(s) (50 mL/Hr) IV Continuous <Continuous>  dextrose 50% Injectable 12.5 Gram(s) IV Push once  dextrose 50% Injectable 25 Gram(s) IV Push once  dextrose 50% Injectable 25 Gram(s) IV Push once  dicyclomine 20 milliGRAM(s) Oral three times a day before meals  guaiFENesin ER 1200 milliGRAM(s) Oral every 12 hours  heparin   Injectable 5000 Unit(s) SubCutaneous every 8 hours  insulin lispro (HumaLOG) corrective regimen sliding scale   SubCutaneous three times a day before meals  insulin lispro (HumaLOG) corrective regimen sliding scale   SubCutaneous at bedtime  ipratropium 17 MICROgram(s) HFA Inhaler 1 Puff(s) Inhalation every 6 hours  levothyroxine 112 MICROGram(s) Oral daily  meropenem  IVPB      meropenem  IVPB 500 milliGRAM(s) IV Intermittent every 12 hours  metoprolol tartrate 25 milliGRAM(s) Oral daily  ranolazine 1000 milliGRAM(s) Oral two times a day    MEDICATIONS  (PRN):  acetaminophen   Tablet .. 650 milliGRAM(s) Oral every 4 hours PRN Mild Pain (1 - 3)  ALBUTerol    90 MICROgram(s) HFA Inhaler 2 Puff(s) Inhalation every 6 hours PRN Shortness of Breath and/or Wheezing  aluminum hydroxide/magnesium hydroxide/simethicone Suspension 30 milliLiter(s) Oral every 4 hours PRN Dyspepsia  dextrose 40% Gel 15 Gram(s) Oral once PRN Blood Glucose LESS THAN 70 milliGRAM(s)/deciliter  glucagon  Injectable 1 milliGRAM(s) IntraMuscular once PRN Glucose LESS THAN 70 milligrams/deciliter  loperamide 2 milliGRAM(s) Oral every 4 hours PRN Diarrhea  morphine  - Injectable 2 milliGRAM(s) IV Push every 4 hours PRN Severe Pain (7 - 10)  ondansetron Injectable 4 milliGRAM(s) IV Push every 6 hours PRN Nausea  oxycodone    5 mG/acetaminophen 325 mG 1 Tablet(s) Oral every 6 hours PRN Moderate Pain (4 - 6)  09-12    133<L>  |  103  |  72<H>  ----------------------------<  73  3.8   |  20<L>  |  4.41<H>    Ca    8.2<L>      12 Sep 2020 06:20  Phos  4.0     09-11    TPro  x   /  Alb  1.1<L>  /  TBili  x   /  DBili  x   /  AST  x   /  ALT  x   /  AlkPhos  x   09-11    Creatine Kinase, Serum in AM (09.12.20 @ 06:20)    Creatine Kinase, Serum: 218 U/L  Serum Pro-Brain Natriuretic Peptide (09.12.20 @ 06:20)    Serum Pro-Brain Natriuretic Peptide: 9249 pg/mL  CARDIAC MARKERS ( 12 Sep 2020 06:20 )  0.195 ng/mL / x     / 218 U/L / x     / x                                8.4    5.51  )-----------( 177      ( 12 Sep 2020 06:20 )             26.0           LIVER FUNCTIONS - ( 11 Sep 2020 06:51 )  Alb: 1.1 g/dL / Pro: x     / ALK PHOS: x     / ALT: x     / AST: x     / GGT: x                   09-11    134<L>  |  103  |  76<H>  ----------------------------<  122<H>  4.2   |  23  |  4.45<H>  Comprehensive Metabolic Panel (08.23.20 @ 08:34)    Creatinine, Serum: 3.32 mg/dL    Basic Metabolic Panel (10.17.19 @ 09:32)    Creatinine, Serum: 0.84 mg/dL  Vitamin D, 25-Hydroxy (09.03.20 @ 07:07)    Vitamin D, 25-Hydroxy: 27.8:     Ca    8.2<L>      11 Sep 2020 06:51  Phos  4.0     09-11  Mg     1.7     09-10    TPro  x   /  Alb  1.1<L>  /  TBili  x   /  DBili  x   /  AST  x   /  ALT  x   /  AlkPhos  x   09-11                          7.5    6.54  )-----------( 154      ( 11 Sep 2020 06:51 )             23.6   Iron with Total Binding Capacity in AM (08.25.20 @ 08:42)    Iron - Total Binding Capacity.: 175 ug/dL    % Saturation, Iron: 13 %    Iron Total, Serum: 23 ug/dL    Unsaturated Iron Binding Capacity: 151 ug/dL    Ferritin, Serum in AM (08.25.20 @ 08:42)    Ferritin, Serum: 521 ng/mL        LIVER FUNCTIONS - ( 11 Sep 2020 06:51 )  Alb: 1.1 g/dL / Pro: x     / ALK PHOS: x     / ALT: x     / AST: x     / GGT: x           Culture - Blood (08.27.20 @ 02:00)    Specimen Source: .Blood None    Culture Results:   No Growth Final    Culture - Urine (08.23.20 @ 10:25)    -  Cefazolin: S <=2 ([id number]) For uncomplicated UTI with K. pneumoniae, E. coli, or P. mirablis: PINA <=16 is sensitive and PINA >=32 is resistant. This also predicts results for oral agents cefaclor, cefdinir, cefpodoxime, cefprozil, cefuroxime axetil, cephalexin and locarbef for uncomplicated UTI. Note that some isolates may be susceptible to these agents while testing resistant to cefazolin.    -  Aztreonam: S <=4    -  Ampicillin/Sulbactam: S <=4/2 Enterobacter, Citrobacter, and Serratia may develop resistance during prolonged therapy (3-4 days)    -  Ampicillin: S <=8 These ampicillin results predict results for amoxicillin    -  Meropenem: S <=1    -  Nitrofurantoin: S <=32 Should not be used to treat pyelonephritis    -  Ertapenem: S <=0.5    -  Gentamicin: S <=2    -  Levofloxacin: S <=0.5    -  Ciprofloxacin: S <=0.25    -  Ceftriaxone: S <=1 Enterobacter, Citrobacter, and Serratia may develop resistance during prolonged therapy    -  Cefuroxime: S <=4    -  Cefoxitin: R >16    -  Ceftazidime: S <=1    -  Cefepime: S <=2    -  Cefotaxime: S <=2    -  Trimethoprim/Sulfamethoxazole: S <=0.5/9.5    -  Minocycline: S <=4    -  Tobramycin: S <=2    -  Piperacillin/Tazobactam: S <=8    -  Tigecycline: S <=2    -  Amikacin: S <=16    -  Amoxicillin/Clavulanic Acid: S <=8/4    Specimen Source: .Urine Clean Catch (Midstream)    Culture Results:   >100,000 CFU/ml Escherichia coli    Organism Identification: Escherichia coli    Organism: Escherichia coli    Method Type: PINA  Culture - Blood (08.23.20 @ 08:34)    Gram Stain:   Growth in aerobic bottle: Gram Negative Rods  Growth in anaerobic bottle: Gram Negative Rods    -  Amikacin: S <=16    -  Ampicillin: S <=8 These ampicillin results predict results for amoxicillin    -  Ampicillin/Sulbactam: S <=4/2 Enterobacter, Citrobacter, and Serratia may develop resistance during prolonged therapy (3-4 days)    -  Aztreonam: S <=4    -  Cefazolin: S <=2 Enterobacter, Citrobacter, and Serratia may develop resistance during prolonged therapy (3-4 days)    -  Cefepime: S <=2    -  Cefoxitin: S <=8    -  Ceftriaxone: S <=1 Enterobacter, Citrobacter, and Serratia may develop resistance during prolonged therapy    -  Ciprofloxacin: S <=0.25    -  Gentamicin: S <=2    -  Imipenem: S <=1    -  Levofloxacin: S <=0.5    -  Ertapenem: S <=0.5    -  Meropenem: S <=1    -  Escherichia coli: Detec    -  Piperacillin/Tazobactam: S <=8    -  Tobramycin: S <=2    -  Trimethoprim/Sulfamethoxazole: S <=0.5/9.5    Specimen Source: .Blood None    Organism: Blood Culture PCR    Organism: Escherichia coli    Culture Results:   Growth in aerobic and anaerobic bottles: Escherichia coli  "Due to technical problems, Proteus sp. will Not be reported as part of  the BCID panel until further notice"  ***Blood Panel PCR results on this specimen are available  approximately 3 hours after the Gram stain result.***  Gram stain, PCR, and/or culture results may not always  correspond due to difference in methodologies.  ************************************************************  This PCR assay was performed using [employer].  The following targets are tested for: Enterococcus,  vancomycin resistant enterococci, Listeria monocytogenes,  coagulase negative staphylococci, S. aureus,  methicillin resistant S. aureus, Streptococcus agalactiae  (Group B), S. pneumoniae, S.pyogenes (Group A),  Acinetobacter baumannii, Enterobacter cloacae, E. coli,  Klebsiella oxytoca, K. pneumoniae, Proteus sp.,  Serratia marcescens, Haemophilus influenzae,  Neisseria meningitidis, Pseudomonas aeruginosa, Candida  albicans, C. glabrata, C krusei, C parapsilosis,  C. tropicalis and the KPC resistance gene.    Organism Identification: Blood Culture PCR  Escherichia coli    Method Type: PCR    Method Type: PINA        < from: 12 Lead ECG (08.25.20 @ 00:12) >  Ventricular Rate 68 BPM    Atrial Rate 68 BPM    P-R Interval 134 ms    QRS Duration 98 ms    Q-T Interval 474 ms    QTC Calculation(Bezet) 504 ms    P Axis 42 degrees    R Axis 77 degrees    T Axis 76 degrees    Diagnosis Line Normal sinus rhythm  ST elevation consider inferior injury or acute infarct  Prolonged QT  *** ** ** ** * ACUTE MI  ** ** ** **  Abnormal ECG    < end of copied text >  Troponin I, Serum: 1.990: High Sensitivity Troponin and new reference  range effective 7/6/2016 ng/mL (08.25.20 @ 02:26)    < from: TTE Echo Complete w/o Contrast w/ Doppler (08.27.20 @ 11:04) >   The left ventricle size is normal. The inferior wall appears severely   hypokinetic..   Estimated left ventricular ejection fraction is 50 %.   Paradoxical septal wall motion during inspiration is noted.    < end of copied text >  < from: Cardiac Cath Lab - Adult (08.23.20 @ 09:33) >   Mid RCA: 100% stenosis.     Impression     Diagnostic Conclusions     Acute occlusion of the RCA of unknown duration. No PCI due to lack of   ischemic symptoms, sepsis, acute renal failure and satisfactory left   coronary anatomy.  < end of copied text >    < from: Xray Chest 1 View- PORTABLE-Routine (09.11.20 @ 07:47) >  FINDINGS:  The lungs show LEFT greater than RIGHT haziness indicating LEFT greater than RIGHT pleural effusions and/or LEFT basilar airspace consolidation. Upper lobes clear. No pneumothorax.    The heart and mediastinum are within normal limits.    Visualized osseous structures are intact.  IMPRESSION:   LEFT greater than RIGHT/pleural effusions and/or a LEFT lower lobe retrocardiac airspace consolidation.      < end of copied text >  < from: CT Chest No Cont (09.09.20 @ 15:52) >  LUNGS AND AIRWAYS: Mucosal impaction of the left mainstem bronchus. Near complete atelectasis of the left lower lobe. Significant segmental atelectasis in the lingula and anterior left upper lobe. Right basilar compressive atelectasis. Additional scattered peripheral groundglass opacities.  Paraseptal emphysema bilaterally.  PLEURA: No moderate to large left pleural effusion. Mild to moderate right pleural effusion.  MEDIASTINUM AND TATIANA: No lymphadenopathy.  VESSELS: Atherosclerotic changes of the aorta and coronary vasculature. No aortic aneurysm.  HEART: Heart size is normal. No pericardial effusion.  CHEST WALL AND LOWER NECK: Extensive anasarca.  VISUALIZED UPPER ABDOMEN: Prior cholecystectomy.  BONES: Old right lateral sixth and seventh rib fractures. Degenerative changes.    IMPRESSION:  Extensive mucosal impaction of the left mainstem bronchus extending into left upper lobe and left lower lobe segmental bronchi with near complete atelectasis left lower lobe and multifocal segmental atelectasis and/or pneumonia in the left upper lobe. Moderate to large left pleural effusion with a mild to moderate right pleural effusion with bibasilar compressive atelectasis.    Additional scattered groundglass opacities bilaterally.    < from: CT Abdomen and Pelvis w/ Oral Cont (09.08.20 @ 12:18) >  LOWER CHEST: Interval progression of left lung collapse with more loculated pleural effusion. Slightly decreased right pleural effusion and passive atelectasis. Scattered groundglass opacities in the right middle lobe and right lower lobe, new since prior (2-8).    < end of copied text >    LIVER: Within normal limits.  BILE DUCTS: Normal caliber.  GALLBLADDER: Cholecystectomy clips.  SPLEEN: Within normal limits.  PANCREAS: Within normal limits.  ADRENALS: Within normal limits.  KIDNEYS/URETERS: Within normal limits.    BLADDER: Small air in the nondependent portion of the bladder. Please correlate with instrumentation history.  REPRODUCTIVE ORGANS: Unremarkable.    BOWEL: No bowel obstruction. Appendix is not visualized. Mild diffuse wall thickening of the sigmoid (602-21), slightly more prominent since prior.. Interval removal of rectal tube.  PERITONEUM: Trace ascites in the cul-de-sac. No pneumoperitoneum.  VESSELS: Extensive atherosclerotic calcification.  RETROPERITONEUM/LYMPH NODES: No lymphadenopathy.  ABDOMINAL WALL: Diffuse anasarca without significant interval change.  BONES: No change of surgical hardware and grade 1 spondylolisthesis of L4 and L5. No acute abnormalities.    IMPRESSION:  Interval progression of left lung collapse with interval loculation of left effusion.    New scattered groundglass opacities in the right middle and lower lobes, likely indicating atypical infection including viral infection..    Mild diffuse wall thickening of the sigmoid, slightly more prominent since prior, which may indicate nonspecific colitis.    Otherwise no change.    < end of copied text >    < from: US Duplex Arterial Upper Ext Ltd, Left (09.07.20 @ 20:34) >  IMPRESSION:  No occlusion no stenosis  Monophasic flow throughout the left upper extremity    < end of copied text >    < from: CT Head No Cont (08.27.20 @ 01:23) >  FINDINGS:  There is motion artifact present which degrades image quality.    There is no gross acute intracranial hemorrhage, mass effect from vasogenic edema or evidence of acute large vascular territory infarct. Stable mild chronic microvascular changes as manifested by patchy areas of low-attenuation in the bihemispheric white matter.    The ventricles, sulci and cisternal spaces are stable in size and configuration.  There is no midline shift or abnormal extra-axial fluid collection.    The calvarium is intact.  There are no osteoblasticor lytic calvarial or skull base lesions.  The paranasal sinuses and mastoid air cells are clear.    IMPRESSION:  Mildly motion degraded exam. Grossly stable exam without evidence of acute intracranial hemorrhage or vasogenic edema. Chronic findings as above.    < end of copied text >     MEDICATIONS  (STANDING):  ALBUTerol    90 MICROgram(s) HFA Inhaler 2 Puff(s) Inhalation every 6 hours  aspirin  chewable 81 milliGRAM(s) Oral daily  atorvastatin 80 milliGRAM(s) Oral at bedtime  bethanechol 10 milliGRAM(s) Oral three times a day  budesonide  80 MICROgram(s)/formoterol 4.5 MICROgram(s) Inhaler 2 Puff(s) Inhalation two times a day  calcitriol   Capsule 0.25 MICROGram(s) Oral daily  calcium acetate 667 milliGRAM(s) Oral three times a day with meals  clopidogrel Tablet 75 milliGRAM(s) Oral daily  dextrose 5%. 1000 milliLiter(s) (50 mL/Hr) IV Continuous <Continuous>  dextrose 50% Injectable 12.5 Gram(s) IV Push once  dextrose 50% Injectable 25 Gram(s) IV Push once  dextrose 50% Injectable 25 Gram(s) IV Push once  guaiFENesin ER 1200 milliGRAM(s) Oral every 12 hours  heparin   Injectable 5000 Unit(s) SubCutaneous every 8 hours  insulin lispro (HumaLOG) corrective regimen sliding scale   SubCutaneous three times a day before meals  insulin lispro (HumaLOG) corrective regimen sliding scale   SubCutaneous at bedtime  ipratropium 17 MICROgram(s) HFA Inhaler 1 Puff(s) Inhalation every 6 hours  levothyroxine 112 MICROGram(s) Oral daily  meropenem  IVPB      meropenem  IVPB 500 milliGRAM(s) IV Intermittent every 12 hours  metoprolol tartrate 25 milliGRAM(s) Oral daily    MEDICATIONS  (PRN):  acetaminophen   Tablet .. 650 milliGRAM(s) Oral every 4 hours PRN Mild Pain (1 - 3)  ALBUTerol    90 MICROgram(s) HFA Inhaler 2 Puff(s) Inhalation every 6 hours PRN Shortness of Breath and/or Wheezing  aluminum hydroxide/magnesium hydroxide/simethicone Suspension 30 milliLiter(s) Oral every 4 hours PRN Dyspepsia  dextrose 40% Gel 15 Gram(s) Oral once PRN Blood Glucose LESS THAN 70 milliGRAM(s)/deciliter  glucagon  Injectable 1 milliGRAM(s) IntraMuscular once PRN Glucose LESS THAN 70 milligrams/deciliter  ondansetron Injectable 4 milliGRAM(s) IV Push every 6 hours PRN Nausea  oxycodone    5 mG/acetaminophen 325 mG 1 Tablet(s) Oral every 6 hours PRN Moderate Pain (4 - 6)

## 2020-09-12 NOTE — CHART NOTE - NSCHARTNOTEFT_GEN_A_CORE
Assessment:       60yo female with PMH CAD, PAD s/p fem-pop bypass s/p RT iliac stent, DM2, HLD, hypothyroidism, current active smoker, uses cocaine, chronic back pain s/p lumbar surgery presented on 8/23/2020 with multiple non-specific complaints, including fevers, weakness, poor po intake, diffuse abdominal pain, diarrhea. She found to have STEMI s/p LHC with RCA occlusion , E coli sepsis, cystitis, ischemic colitis.    hospital course complicated by acute kidney failure, intubation, pressor requirement, diarrhea, and now  pneumonia with occlusion of left mainstem bronchus  Pt s/p intubation/extubation. Septic shock.  STEMI. Normocytic Anemia.  DM2   A1c 8.1.  ISS.  FBG monitoring.   Consist CHO Diet, Renal restrictions    1500 ml FR.  Nepro tid  Swallow Assessment bedside on 9/2 recommends pureed diet.      However nursing reports pt has been on regular texture diet, with no issues.  Maintain aspiration precautions, observe pt while she is eating  Currently pt has very good PO intake.      Diet Prescription: Diet, Renal Restrictions:   For patients receiving Renal Replacement - No Protein Restr, No Conc K, No Conc Phos, Low Sodium  Consistent Carbohydrate Gestational Diabetic {3 Snacks} (CSTCHOGDM)  1500mL Fluid Restriction ([id number])  Supplement Feeding Modality:  Oral  Nepro Cans or Servings Per Day:  3       Frequency:  Three Times a day (09-11-20 @ 19:14)    labs reviewed:  Ketan Leyva.  Suspected DTI right heel  Edema      2+ left and right ankle  BM          9/12   brown liquid        Wt Hx:  Height (cm): 160.02 (08-23-20 @ 07:42)  Weight (kg): 54.4 (08-23-20 @ 07:42)  BMI (kg/m2): 21.2 (08-23-20 @ 07:42)    Pertinent Medications: MEDICATIONS  (STANDING):  ALBUTerol    90 MICROgram(s) HFA Inhaler 2 Puff(s) Inhalation every 6 hours  aspirin  chewable 81 milliGRAM(s) Oral daily  atorvastatin 80 milliGRAM(s) Oral at bedtime  bethanechol 10 milliGRAM(s) Oral three times a day  budesonide  80 MICROgram(s)/formoterol 4.5 MICROgram(s) Inhaler 2 Puff(s) Inhalation two times a day  calcitriol   Capsule 0.25 MICROGram(s) Oral daily  calcium acetate 667 milliGRAM(s) Oral three times a day with meals  clopidogrel Tablet 75 milliGRAM(s) Oral daily  dextrose 5%. 1000 milliLiter(s) (50 mL/Hr) IV Continuous <Continuous>  dextrose 50% Injectable 12.5 Gram(s) IV Push once  dextrose 50% Injectable 25 Gram(s) IV Push once  dextrose 50% Injectable 25 Gram(s) IV Push once  ferrous    sulfate 325 milliGRAM(s) Oral daily  guaiFENesin ER 1200 milliGRAM(s) Oral every 12 hours  heparin   Injectable 5000 Unit(s) SubCutaneous every 12 hours  insulin lispro (HumaLOG) corrective regimen sliding scale   SubCutaneous three times a day before meals  insulin lispro (HumaLOG) corrective regimen sliding scale   SubCutaneous at bedtime  ipratropium 17 MICROgram(s) HFA Inhaler 1 Puff(s) Inhalation every 6 hours  lactobacillus acidophilus 1 Tablet(s) Oral three times a day with meals  levothyroxine 125 MICROGram(s) Oral daily  meropenem  IVPB      meropenem  IVPB 500 milliGRAM(s) IV Intermittent every 12 hours  metoprolol tartrate 12.5 milliGRAM(s) Oral two times a day  ranolazine 500 milliGRAM(s) Oral two times a day    MEDICATIONS  (PRN):  acetaminophen   Tablet .. 650 milliGRAM(s) Oral every 4 hours PRN Mild Pain (1 - 3)  ALBUTerol    90 MICROgram(s) HFA Inhaler 2 Puff(s) Inhalation every 6 hours PRN Shortness of Breath and/or Wheezing  aluminum hydroxide/magnesium hydroxide/simethicone Suspension 30 milliLiter(s) Oral every 4 hours PRN Dyspepsia  dextrose 40% Gel 15 Gram(s) Oral once PRN Blood Glucose LESS THAN 70 milliGRAM(s)/deciliter  glucagon  Injectable 1 milliGRAM(s) IntraMuscular once PRN Glucose LESS THAN 70 milligrams/deciliter  ondansetron Injectable 4 milliGRAM(s) IV Push every 6 hours PRN Nausea  oxycodone    5 mG/acetaminophen 325 mG 1 Tablet(s) Oral every 6 hours PRN Moderate Pain (4 - 6)    Pertinent Labs: 09-11 Na134 mmol/L<L> Glu 122 mg/dL<H> K+ 4.2 mmol/L Cr  4.45 mg/dL<H> BUN 76 mg/dL<H> 09-11 Phos 4.0 mg/dL 09-11 Alb 1.1 g/dL<L> 08-25 Chol 65 mg/dL LDL 19 mg/dL HDL 7 mg/dL<L> Trig 195 mg/dL<H>     CAPILLARY BLOOD GLUCOSE      POCT Blood Glucose.: 112 mg/dL (12 Sep 2020 12:37)  POCT Blood Glucose.: 80 mg/dL (12 Sep 2020 08:21)  POCT Blood Glucose.: 110 mg/dL (11 Sep 2020 21:17)  POCT Blood Glucose.: 122 mg/dL (11 Sep 2020 17:17)    Assessment:   Estimated Energy Needs (calories/kg):  · Weight Used for Calculation  current  60Kg    Estimated Energy Needs (25-30 calories/kg):  · Weight  (lbs)  132.2 lb  · Weight (kg)  60 kg  · From (25cal/kg)  1500  · To (30cal/kg)  1800    Other Calculation:  · Other Calculation  Ht. 63 "     Wt. 59.6 Kg       23 BMI       IBW 52  Kg      Pt is at 115   %  IBW    Estimated Protein Needs (1.0-1.2 gm/kg):  · Weight  (lbs)  132.2  · Weight (kg)  60 kg  · From (1 g/kg)  60  · To (1.2 g/kg)  72    Estimated Fluid Needs (25-30 ml/kg):  · Weight  (lbs)  132.2  · Weight (kg)  60  · From (25 ml/kg)  1500  · To (30 ml/kg)  1800    Nutrient Intake:   Energy Intake:  · Energy Intake  Good (>75%)          Monitoring and Evaluation:   [x] PO intake/Nutr support infusion [ x ] Tolerance to Nutr [ x ] weights [ x ] labs[ x ] follow up per protocol  [ ] other:

## 2020-09-12 NOTE — PROGRESS NOTE ADULT - SUBJECTIVE AND OBJECTIVE BOX
NEPHROLOGY INTERVAL HPI/OVERNIGHT EVENTS:    Date of Service: 09-12-20 @ 20:08    Covering for Puma Billy /Jorje.  9/12 SY  Complains of poor appetite and abdominal discomfort.  Denies SOB.    HPI:  60yo/F with PMH CAD, PAD s/p fem-pop bypass s/p RT iliac stent, diabetes, hypothyroidism, hyperlipidemia, current active smoker, uses cocaine last use yesterday, chronic back pain s/p lumbar surgery presented with multiple non-specific complaints, including fevers on/off last 4-5 days, weakness, no appetite and reduced oral intake, vague diffuse abd pains, one day of diarrhea. No cough, no SOB. SHe came in today for evaluation of weakness and found to have ST elevations on presenting EKG, code STEMI called and pt underwent urgent angiogram showing occluded RCA. She denies chest pain at present. (23 Aug 2020 10:39)    MEDICATIONS  (STANDING):  ALBUTerol    90 MICROgram(s) HFA Inhaler 2 Puff(s) Inhalation every 6 hours  atorvastatin 40 milliGRAM(s) Oral at bedtime  budesonide  80 MICROgram(s)/formoterol 4.5 MICROgram(s) Inhaler 2 Puff(s) Inhalation two times a day  calcitriol   Capsule 0.25 MICROGram(s) Oral daily  calcium acetate 667 milliGRAM(s) Oral three times a day with meals  dextrose 5%. 1000 milliLiter(s) (50 mL/Hr) IV Continuous <Continuous>  dextrose 50% Injectable 12.5 Gram(s) IV Push once  dextrose 50% Injectable 25 Gram(s) IV Push once  dextrose 50% Injectable 25 Gram(s) IV Push once  ferrous    sulfate 325 milliGRAM(s) Oral daily  guaiFENesin ER 1200 milliGRAM(s) Oral every 12 hours  heparin   Injectable 5000 Unit(s) SubCutaneous every 12 hours  insulin lispro (HumaLOG) corrective regimen sliding scale   SubCutaneous three times a day before meals  insulin lispro (HumaLOG) corrective regimen sliding scale   SubCutaneous at bedtime  ipratropium 17 MICROgram(s) HFA Inhaler 1 Puff(s) Inhalation every 6 hours  lactobacillus acidophilus 1 Tablet(s) Oral three times a day with meals  levothyroxine 125 MICROGram(s) Oral daily  meropenem  IVPB      meropenem  IVPB 500 milliGRAM(s) IV Intermittent every 12 hours  metoprolol tartrate 12.5 milliGRAM(s) Oral two times a day  ranolazine 500 milliGRAM(s) Oral two times a day    MEDICATIONS  (PRN):  acetaminophen   Tablet .. 650 milliGRAM(s) Oral every 4 hours PRN Mild Pain (1 - 3)  ALBUTerol    90 MICROgram(s) HFA Inhaler 2 Puff(s) Inhalation every 6 hours PRN Shortness of Breath and/or Wheezing  aluminum hydroxide/magnesium hydroxide/simethicone Suspension 30 milliLiter(s) Oral every 4 hours PRN Dyspepsia  dextrose 40% Gel 15 Gram(s) Oral once PRN Blood Glucose LESS THAN 70 milliGRAM(s)/deciliter  glucagon  Injectable 1 milliGRAM(s) IntraMuscular once PRN Glucose LESS THAN 70 milligrams/deciliter  ondansetron Injectable 4 milliGRAM(s) IV Push every 6 hours PRN Nausea  oxycodone    5 mG/acetaminophen 325 mG 1 Tablet(s) Oral every 6 hours PRN Moderate Pain (4 - 6)    Vital Signs Last 24 Hrs  T(C): 36.4 (12 Sep 2020 17:46), Max: 36.8 (11 Sep 2020 20:53)  T(F): 97.6 (12 Sep 2020 17:46), Max: 98.2 (11 Sep 2020 20:53)  HR: 96 (12 Sep 2020 17:46) (75 - 96)  BP: 129/66 (12 Sep 2020 17:46) (122/47 - 131/48)  BP(mean): --  RR: 16 (12 Sep 2020 17:46) (16 - 18)  SpO2: 98% (12 Sep 2020 17:46) (98% - 100%)  Daily     Daily     09-11 @ 07:01  -  09-12 @ 07:00  --------------------------------------------------------  IN: 410 mL / OUT: 600 mL / NET: -190 mL    09-12 @ 07:01  -  09-12 @ 20:08  --------------------------------------------------------  IN: 50 mL / OUT: 0 mL / NET: 50 mL    PHYSICAL EXAM: Alert and comfortable  GENERAL: No distress  CHEST/LUNG: Decreased BS at bases.  HEART: S1S2 RRR  ABDOMEN: distended  EXTREMITIES: 1+ edema  SKIN:     LABS:                        8.4    5.51  )-----------( 177      ( 12 Sep 2020 06:20 )             26.0     09-12    133<L>  |  103  |  72<H>  ----------------------------<  73  3.8   |  20<L>  |  4.41<H>    Ca    8.2<L>      12 Sep 2020 06:20  Phos  4.0     09-11    TPro  x   /  Alb  1.1<L>  /  TBili  x   /  DBili  x   /  AST  x   /  ALT  x   /  AlkPhos  x   09-11                RADIOLOGY & ADDITIONAL TESTS:

## 2020-09-12 NOTE — PROGRESS NOTE ADULT - ASSESSMENT
59 CAD, PAD s/p fem-pop bypass s/p RT iliac stent, diabetes, hypothyroidism, hyperlipidemia, current active smoker, uses cocaine last use yesterday, chronic back pain s/p lumbar surgery presented with multiple non-specific complaints found to have ST elevations on presenting EKG, code STEMI called and pt underwent urgent angiogram showing occluded RCA w no intervention with UTI sepsis and E. coli bacteremia POA w hospital course c/b code blue.     AMISH with sepsis and STEMI : post urgent cath with occluded RCA-no intervention.  Creat remains elevated but steady.   Monitor off IVF and po intake.--noted with bilateral pleural effusion.  Monitor symptoms.  Likely some amount of third spacing with severe Hypoalbuminemia.  Corrected Calcium level acceptable.  D/c Ca acetate for now with decreased phos level as well.    Puma Billy/  Jorje will resume care 9/14.

## 2020-09-12 NOTE — PROVIDER CONTACT NOTE (CRITICAL VALUE NOTIFICATION) - ACTION/TREATMENT ORDERED:
No new orders . pt post cardiac catheterization .
Sophiexelate ordered
MD aware with no new orders.
Orders received.

## 2020-09-12 NOTE — PROGRESS NOTE ADULT - SUBJECTIVE AND OBJECTIVE BOX
EFREM BRENNAN  MRN: [id number]    S: 9/12/2020: Denies shortness of breath. Comfortable    PAST MEDICAL & SURGICAL HISTORY:  Lung nodule    Diabetes mellitus    History of TIAs    History of gallbladder disease  surgery    History of colon polyps  precancerous    Substance abuse  history of. last used 14 years ago.    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 Decreased ROM.    Angina pectoris    History of MRSA infection  left lower extremity incisional area 2015    Anxiety and depression    Transient cerebral ischemia, unspecified type    Osteoarthritis of spine, unspecified spinal osteoarthritis complication status, unspecified spinal region    Lumbar herniated disc    Urinary incontinence, unspecified type    Overactive bladder    Hiatal hernia with GERD  hiatal hernia repaired    PAD (peripheral artery disease)    Hyperlipidemia, unspecified hyperlipidemia type    Essential hypertension    Hypothyroidism    COPD (chronic obstructive pulmonary disease)    CAD (coronary artery disease)    History of lumbar fusion  with laminectomy 11/8/2018    H/O hernia repair  hiatal hernia - 2017, 2018    S/P dilatation and curettage  1991    H/O rhinoplasty  1980    H/O angioplasty  Right iliac artery. 5/12/2017    H/O ventral hernia repair  3/2017    History of incision and drainage  of lower extremity incisional site x 5 . last done5/2015    H/O arterial bypass of lower limb  Femoral - Popliteal. 11/2014 Left lower side with stents    History of laparoscopic cholecystectomy  2/2016        O: T(C): 36.6 (09-12-20 @ 07:33), Max: 36.8 (09-11-20 @ 20:53)  HR: 88 (09-12-20 @ 07:33) (71 - 101)  BP: 122/47 (09-12-20 @ 07:33) (94/60 - 131/48)  RR: 18 (09-12-20 @ 07:33) (16 - 18)  SpO2: 100% (09-12-20 @ 07:33) (95% - 100%)  Wt(kg): --    PHYSICAL EXAM:      GENERAL: comfortable    NEURO: awake/alert    NECK: supple    CHEST: course breath sounds    CARDIAC: RR    EXT: no edema      LABS:                        8.4    5.51  )-----------( 177      ( 12 Sep 2020 06:20 )             26.0       09-11    134<L>  |  103  |  76<H>  ----------------------------<  122<H>  4.2   |  23  |  4.45<H>    Ca    8.2<L>      11 Sep 2020 06:51  Phos  4.0     09-11    TPro  x   /  Alb  1.1<L>  /  TBili  x   /  DBili  x   /  AST  x   /  ALT  x   /  AlkPhos  x   09-11      RADIOLOGY:    EXAM:  XR CHEST PORTABLE ROUTINE 1V                        PROCEDURE DATE:  09/11/2020      INTERPRETATION:  Portable chest radiograph    CLINICAL INFORMATION:   Short of breath.    TECHNIQUE:  Portable  AP view of the chest was obtained.    COMPARISON: No previous examinations are available for review.    FINDINGS:  The lungs show LEFT greater than RIGHT haziness indicating LEFT greater than RIGHT pleural effusions and/or LEFT basilar airspace consolidation. Upper lobes clear. No pneumothorax.    The heart and mediastinum are within normal limits.    Visualized osseous structures are intact.      IMPRESSION:   LEFT greater than RIGHT/pleural effusions and/or a LEFT lower lobe retrocardiac airspace consolidation.    NNAMDI DE DIOS M.D., ATTENDING RADIOLOGIST      EXAM:  CT CHEST                        PROCEDURE DATE:  09/09/2020      INTERPRETATION:  CLINICAL INFORMATION: Chest pain    COMPARISON: CT chest 7/29/2019, CT abdomen 9/8/2020    PROCEDURE:  CT of the Chest was performed without intravenous contrast.  Sagittal and coronal reformats were performed.    FINDINGS:    LUNGS AND AIRWAYS: Mucosal impaction of the left mainstem bronchus. Near complete atelectasis of the left lower lobe. Significant segmental atelectasis in the lingula and anterior left upper lobe. Right basilar compressive atelectasis. Additional scattered peripheral groundglass opacities.  Paraseptal emphysema bilaterally.  PLEURA: No moderate to large left pleural effusion. Mild to moderate right pleural effusion.  MEDIASTINUM AND TATIANA: No lymphadenopathy.  VESSELS: Atherosclerotic changes of the aorta and coronary vasculature. No aortic aneurysm.  HEART: Heart size is normal. No pericardial effusion.  CHEST WALL AND LOWER NECK: Extensive anasarca.  VISUALIZED UPPER ABDOMEN: Prior cholecystectomy.  BONES: Old right lateral sixth and seventh rib fractures. Degenerative changes.    IMPRESSION:  Extensive mucosal impaction of the left mainstem bronchus extending into left upper lobe and left lower lobe segmental bronchi with near complete atelectasis left lower lobe and multifocal segmental atelectasis and/or pneumonia in the left upper lobe. Moderate to large left pleural effusion with a mild to moderate right pleural effusion with bibasilar compressive atelectasis.    Additional scattered groundglass opacities bilaterally.      VAHID HUERTA M.D., ATTENDING RADIOLOGIST        MEDICATIONS  (STANDING):  ALBUTerol    90 MICROgram(s) HFA Inhaler 2 Puff(s) Inhalation every 6 hours  aspirin  chewable 81 milliGRAM(s) Oral daily  atorvastatin 80 milliGRAM(s) Oral at bedtime  bethanechol 10 milliGRAM(s) Oral three times a day  budesonide  80 MICROgram(s)/formoterol 4.5 MICROgram(s) Inhaler 2 Puff(s) Inhalation two times a day  calcitriol   Capsule 0.25 MICROGram(s) Oral daily  calcium acetate 667 milliGRAM(s) Oral three times a day with meals  clopidogrel Tablet 75 milliGRAM(s) Oral daily  dextrose 5%. 1000 milliLiter(s) (50 mL/Hr) IV Continuous <Continuous>  dextrose 50% Injectable 12.5 Gram(s) IV Push once  dextrose 50% Injectable 25 Gram(s) IV Push once  dextrose 50% Injectable 25 Gram(s) IV Push once  ferrous    sulfate 325 milliGRAM(s) Oral daily  guaiFENesin ER 1200 milliGRAM(s) Oral every 12 hours  heparin   Injectable 5000 Unit(s) SubCutaneous every 12 hours  insulin lispro (HumaLOG) corrective regimen sliding scale   SubCutaneous three times a day before meals  insulin lispro (HumaLOG) corrective regimen sliding scale   SubCutaneous at bedtime  ipratropium 17 MICROgram(s) HFA Inhaler 1 Puff(s) Inhalation every 6 hours  lactobacillus acidophilus 1 Tablet(s) Oral three times a day with meals  levothyroxine 125 MICROGram(s) Oral daily  meropenem  IVPB      meropenem  IVPB 500 milliGRAM(s) IV Intermittent every 12 hours  metoprolol tartrate 12.5 milliGRAM(s) Oral two times a day  ranolazine 500 milliGRAM(s) Oral two times a day    MEDICATIONS  (PRN):  acetaminophen   Tablet .. 650 milliGRAM(s) Oral every 4 hours PRN Mild Pain (1 - 3)  ALBUTerol    90 MICROgram(s) HFA Inhaler 2 Puff(s) Inhalation every 6 hours PRN Shortness of Breath and/or Wheezing  aluminum hydroxide/magnesium hydroxide/simethicone Suspension 30 milliLiter(s) Oral every 4 hours PRN Dyspepsia  dextrose 40% Gel 15 Gram(s) Oral once PRN Blood Glucose LESS THAN 70 milliGRAM(s)/deciliter  glucagon  Injectable 1 milliGRAM(s) IntraMuscular once PRN Glucose LESS THAN 70 milligrams/deciliter  ondansetron Injectable 4 milliGRAM(s) IV Push every 6 hours PRN Nausea  oxycodone    5 mG/acetaminophen 325 mG 1 Tablet(s) Oral every 6 hours PRN Moderate Pain (4 - 6)        A/P: 1. Pneumonia - left mainstem bronchus occluded on CT    2. Bilateral pleural effusions.    PLAN: Continue chest PT; Mucinex. Meropenem per ID. Inhaled bronchodilators. Follow up chest imaging as clinically indicated. No plan for bronchoscopy at this time as CXR suggests some improvement. Consider thoracentesis if effusions persist..

## 2020-09-13 DIAGNOSIS — I20.8 OTHER FORMS OF ANGINA PECTORIS: ICD-10-CM

## 2020-09-13 DIAGNOSIS — I25.10 ATHEROSCLEROTIC HEART DISEASE OF NATIVE CORONARY ARTERY WITHOUT ANGINA PECTORIS: ICD-10-CM

## 2020-09-13 LAB
ANION GAP SERPL CALC-SCNC: 7 MMOL/L — SIGNIFICANT CHANGE UP (ref 5–17)
BUN SERPL-MCNC: 65 MG/DL — HIGH (ref 7–23)
CALCIUM SERPL-MCNC: 8.2 MG/DL — LOW (ref 8.5–10.1)
CHLORIDE SERPL-SCNC: 104 MMOL/L — SIGNIFICANT CHANGE UP (ref 96–108)
CK SERPL-CCNC: 477 U/L — HIGH (ref 26–192)
CO2 SERPL-SCNC: 24 MMOL/L — SIGNIFICANT CHANGE UP (ref 22–31)
CREAT SERPL-MCNC: 4.11 MG/DL — HIGH (ref 0.5–1.3)
GLUCOSE SERPL-MCNC: 84 MG/DL — SIGNIFICANT CHANGE UP (ref 70–99)
HCT VFR BLD CALC: 25.6 % — LOW (ref 34.5–45)
HGB BLD-MCNC: 8.1 G/DL — LOW (ref 11.5–15.5)
MAGNESIUM SERPL-MCNC: 1.5 MG/DL — LOW (ref 1.6–2.6)
MCHC RBC-ENTMCNC: 28.8 PG — SIGNIFICANT CHANGE UP (ref 27–34)
MCHC RBC-ENTMCNC: 31.6 GM/DL — LOW (ref 32–36)
MCV RBC AUTO: 91.1 FL — SIGNIFICANT CHANGE UP (ref 80–100)
NT-PROBNP SERPL-SCNC: 9192 PG/ML — HIGH (ref 0–125)
PHOSPHATE SERPL-MCNC: 4.2 MG/DL — SIGNIFICANT CHANGE UP (ref 2.5–4.5)
PLATELET # BLD AUTO: 197 K/UL — SIGNIFICANT CHANGE UP (ref 150–400)
POTASSIUM SERPL-MCNC: 3.5 MMOL/L — SIGNIFICANT CHANGE UP (ref 3.5–5.3)
POTASSIUM SERPL-SCNC: 3.5 MMOL/L — SIGNIFICANT CHANGE UP (ref 3.5–5.3)
RBC # BLD: 2.81 M/UL — LOW (ref 3.8–5.2)
RBC # FLD: 17.3 % — HIGH (ref 10.3–14.5)
SODIUM SERPL-SCNC: 135 MMOL/L — SIGNIFICANT CHANGE UP (ref 135–145)
WBC # BLD: 4.69 K/UL — SIGNIFICANT CHANGE UP (ref 3.8–10.5)
WBC # FLD AUTO: 4.69 K/UL — SIGNIFICANT CHANGE UP (ref 3.8–10.5)

## 2020-09-13 PROCEDURE — 99233 SBSQ HOSP IP/OBS HIGH 50: CPT

## 2020-09-13 PROCEDURE — 76700 US EXAM ABDOM COMPLETE: CPT | Mod: 26

## 2020-09-13 RX ORDER — HEPARIN SODIUM 5000 [USP'U]/ML
5000 INJECTION INTRAVENOUS; SUBCUTANEOUS ONCE
Refills: 0 | Status: COMPLETED | OUTPATIENT
Start: 2020-09-13 | End: 2020-09-13

## 2020-09-13 RX ORDER — MAGNESIUM SULFATE 500 MG/ML
1 VIAL (ML) INJECTION ONCE
Refills: 0 | Status: COMPLETED | OUTPATIENT
Start: 2020-09-13 | End: 2020-09-13

## 2020-09-13 RX ADMIN — Medication 1 PUFF(S): at 14:54

## 2020-09-13 RX ADMIN — OXYCODONE AND ACETAMINOPHEN 1 TABLET(S): 5; 325 TABLET ORAL at 12:22

## 2020-09-13 RX ADMIN — Medication 125 MICROGRAM(S): at 05:08

## 2020-09-13 RX ADMIN — BUDESONIDE AND FORMOTEROL FUMARATE DIHYDRATE 2 PUFF(S): 160; 4.5 AEROSOL RESPIRATORY (INHALATION) at 20:59

## 2020-09-13 RX ADMIN — CALCITRIOL 0.25 MICROGRAM(S): 0.5 CAPSULE ORAL at 11:59

## 2020-09-13 RX ADMIN — BUDESONIDE AND FORMOTEROL FUMARATE DIHYDRATE 2 PUFF(S): 160; 4.5 AEROSOL RESPIRATORY (INHALATION) at 08:57

## 2020-09-13 RX ADMIN — OXYCODONE AND ACETAMINOPHEN 1 TABLET(S): 5; 325 TABLET ORAL at 19:39

## 2020-09-13 RX ADMIN — Medication 1200 MILLIGRAM(S): at 21:34

## 2020-09-13 RX ADMIN — Medication 1 PUFF(S): at 08:57

## 2020-09-13 RX ADMIN — Medication 1 PUFF(S): at 21:02

## 2020-09-13 RX ADMIN — MEROPENEM 100 MILLIGRAM(S): 1 INJECTION INTRAVENOUS at 15:00

## 2020-09-13 RX ADMIN — Medication 12.5 MILLIGRAM(S): at 21:34

## 2020-09-13 RX ADMIN — RANOLAZINE 500 MILLIGRAM(S): 500 TABLET, FILM COATED, EXTENDED RELEASE ORAL at 12:09

## 2020-09-13 RX ADMIN — Medication 325 MILLIGRAM(S): at 12:08

## 2020-09-13 RX ADMIN — HEPARIN SODIUM 5000 UNIT(S): 5000 INJECTION INTRAVENOUS; SUBCUTANEOUS at 12:07

## 2020-09-13 RX ADMIN — Medication 100 GRAM(S): at 11:57

## 2020-09-13 RX ADMIN — RANOLAZINE 500 MILLIGRAM(S): 500 TABLET, FILM COATED, EXTENDED RELEASE ORAL at 21:34

## 2020-09-13 RX ADMIN — MEROPENEM 100 MILLIGRAM(S): 1 INJECTION INTRAVENOUS at 21:34

## 2020-09-13 RX ADMIN — Medication 12.5 MILLIGRAM(S): at 12:07

## 2020-09-13 RX ADMIN — Medication 1 TABLET(S): at 12:09

## 2020-09-13 RX ADMIN — ATORVASTATIN CALCIUM 40 MILLIGRAM(S): 80 TABLET, FILM COATED ORAL at 21:33

## 2020-09-13 RX ADMIN — ALBUTEROL 2 PUFF(S): 90 AEROSOL, METERED ORAL at 20:57

## 2020-09-13 RX ADMIN — Medication 1200 MILLIGRAM(S): at 12:08

## 2020-09-13 RX ADMIN — Medication 1 TABLET(S): at 18:07

## 2020-09-13 RX ADMIN — OXYCODONE AND ACETAMINOPHEN 1 TABLET(S): 5; 325 TABLET ORAL at 20:09

## 2020-09-13 RX ADMIN — HEPARIN SODIUM 5000 UNIT(S): 5000 INJECTION INTRAVENOUS; SUBCUTANEOUS at 21:34

## 2020-09-13 RX ADMIN — OXYCODONE AND ACETAMINOPHEN 1 TABLET(S): 5; 325 TABLET ORAL at 13:00

## 2020-09-13 RX ADMIN — Medication 1 TABLET(S): at 12:24

## 2020-09-13 RX ADMIN — ALBUTEROL 2 PUFF(S): 90 AEROSOL, METERED ORAL at 08:57

## 2020-09-13 RX ADMIN — ALBUTEROL 2 PUFF(S): 90 AEROSOL, METERED ORAL at 14:54

## 2020-09-13 NOTE — PROGRESS NOTE ADULT - SUBJECTIVE AND OBJECTIVE BOX
Cheif complaints - weakness, dyspnea, cough, abdominal pain    58yo female with PMH CAD, PAD s/p fem-pop bypass s/p RT iliac stent, DM2, HLD, hypothyroidism, current active smoker, uses cocaine, chronic back pain s/p lumbar surgery presented on 8/23/2020 with multiple non-specific complaints, including fevers, weakness, poor po intake, diffuse abdominal pain, diarrhea. She found to have STEMI s/p LHC with RCA occlusion , E coli sepsis, cystitis, ischemic colitis.    hospital course complicated by acute kidney failure, intubation, pressor requirement, diarrhea, and now  pneumonia with occlusion of left mainstem bronchus    9/11 - pt seen and examined, chart reviewed, pt reports felling weak, + abdominal distension, + dyspnea, + poor po intake, cough, afebrile, denies HA, CP, dizziness, POC discussed   9/12 - pt seen and examined, + lose bm, + diffuse abdominal discomfort, + gen weakness, afebrile, pOC discussed    9/13 - pt seen and examined, BM better, lower abdominal pain, + weakness, appetite improving , afebrile, POC discussed   Review of system- Rest of the review of system are negative except mentioned in HPI  T(C): 36.4 (09-13-20 @ 08:09), Max: 36.6 (09-12-20 @ 21:30)  T(F): 97.5 (09-13-20 @ 08:09), Max: 97.9 (09-12-20 @ 21:30)  HR: 92 (09-13-20 @ 14:53) (82 - 98)  BP: 131/51 (09-13-20 @ 08:09) (129/50 - 131/51)  RR: 18 (09-13-20 @ 08:09) (16 - 18)  SpO2: 94% (09-13-20 @ 14:53) (92% - 98%)  Wt(kg): --  A1C with Estimated Average Glucose (08.24.20 @ 06:24)    A1C with Estimated Average Glucose Result: 8.1  CAPILLARY BLOOD GLUCOSE  CAPILLARY BLOOD GLUCOSE      POCT Blood Glucose.: 129 mg/dL (13 Sep 2020 11:45)  POCT Blood Glucose.: 97 mg/dL (13 Sep 2020 08:06)  POCT Blood Glucose.: 108 mg/dL (12 Sep 2020 21:11)  POCT Blood Glucose.: 105 mg/dL (12 Sep 2020 16:59)    PHYSICAL EXAM:    Constitutional: NAD, awake and alert, well-developed  HEENT: PERR, EOMI, Normal Hearing, MMM  Neck: Soft and supple, No LAD, No JVD  Respiratory: Breath sounds decreased at bases, +  wheezing, +  rales ,+ rhonchi  Cardiovascular: S1 and S2, regular rate and rhythm, no Murmurs, gallops or rubs  Gastrointestinal: Bowel Sounds present, soft, + diffuse tenderness, + distended, no guarding, no rebound, + Black   Extremities: + LE + 2  peripheral edema  Vascular: 2+ peripheral pulses  Neurological: A/O x 3, no focal deficits  Musculoskeletal: 3/5 strength  lower extremities, 5/5 UE   Skin: No rashes  rectal : deferred, not indicated    MEDICATIONS  (STANDING):  ALBUTerol    90 MICROgram(s) HFA Inhaler 2 Puff(s) Inhalation every 6 hours  aspirin  chewable 81 milliGRAM(s) Oral daily  atorvastatin 80 milliGRAM(s) Oral at bedtime  bethanechol 10 milliGRAM(s) Oral three times a day  budesonide  80 MICROgram(s)/formoterol 4.5 MICROgram(s) Inhaler 2 Puff(s) Inhalation two times a day  calcitriol   Capsule 0.25 MICROGram(s) Oral daily  calcium acetate 667 milliGRAM(s) Oral three times a day with meals  clopidogrel Tablet 75 milliGRAM(s) Oral daily  dextrose 5%. 1000 milliLiter(s) (50 mL/Hr) IV Continuous <Continuous>  dextrose 50% Injectable 12.5 Gram(s) IV Push once  dextrose 50% Injectable 25 Gram(s) IV Push once  dextrose 50% Injectable 25 Gram(s) IV Push once  dicyclomine 20 milliGRAM(s) Oral three times a day before meals  guaiFENesin ER 1200 milliGRAM(s) Oral every 12 hours  heparin   Injectable 5000 Unit(s) SubCutaneous every 8 hours  insulin lispro (HumaLOG) corrective regimen sliding scale   SubCutaneous three times a day before meals  insulin lispro (HumaLOG) corrective regimen sliding scale   SubCutaneous at bedtime  ipratropium 17 MICROgram(s) HFA Inhaler 1 Puff(s) Inhalation every 6 hours  levothyroxine 112 MICROGram(s) Oral daily  meropenem  IVPB      meropenem  IVPB 500 milliGRAM(s) IV Intermittent every 12 hours  metoprolol tartrate 25 milliGRAM(s) Oral daily  ranolazine 1000 milliGRAM(s) Oral two times a day    MEDICATIONS  (PRN):  acetaminophen   Tablet .. 650 milliGRAM(s) Oral every 4 hours PRN Mild Pain (1 - 3)  ALBUTerol    90 MICROgram(s) HFA Inhaler 2 Puff(s) Inhalation every 6 hours PRN Shortness of Breath and/or Wheezing  aluminum hydroxide/magnesium hydroxide/simethicone Suspension 30 milliLiter(s) Oral every 4 hours PRN Dyspepsia  dextrose 40% Gel 15 Gram(s) Oral once PRN Blood Glucose LESS THAN 70 milliGRAM(s)/deciliter  glucagon  Injectable 1 milliGRAM(s) IntraMuscular once PRN Glucose LESS THAN 70 milligrams/deciliter  loperamide 2 milliGRAM(s) Oral every 4 hours PRN Diarrhea  morphine  - Injectable 2 milliGRAM(s) IV Push every 4 hours PRN Severe Pain (7 - 10)  ondansetron Injectable 4 milliGRAM(s) IV Push every 6 hours PRN Nausea  oxycodone    5 mG/acetaminophen 325 mG 1 Tablet(s) Oral every 6 hours PRN Moderate Pain (4 - 6)  09-13    135  |  104  |  65<H>  ----------------------------<  84  3.5   |  24  |  4.11<H>    Ca    8.2<L>      13 Sep 2020 06:37  Phos  4.2     09-13  Mg     1.5     09-13                       8.1    4.69  )-----------( 197      ( 13 Sep 2020 06:37 )             25.6   CARDIAC MARKERS ( 13 Sep 2020 06:37 )  x     / x     / 477 U/L / x     / x      CARDIAC MARKERS ( 12 Sep 2020 06:20 )  0.195 ng/mL / x     / 218 U/L / x     / x          09-12    133<L>  |  103  |  72<H>  ----------------------------<  73  3.8   |  20<L>  |  4.41<H>    Ca    8.2<L>      12 Sep 2020 06:20  Phos  4.0     09-11    TPro  x   /  Alb  1.1<L>  /  TBili  x   /  DBili  x   /  AST  x   /  ALT  x   /  AlkPhos  x   09-11    Creatine Kinase, Serum in AM (09.12.20 @ 06:20)    Creatine Kinase, Serum: 218 U/L  Serum Pro-Brain Natriuretic Peptide (09.12.20 @ 06:20)    Serum Pro-Brain Natriuretic Peptide: 9249 pg/mL  CARDIAC MARKERS ( 12 Sep 2020 06:20 )  0.195 ng/mL / x     / 218 U/L / x     / x                                8.4    5.51  )-----------( 177      ( 12 Sep 2020 06:20 )             26.0           LIVER FUNCTIONS - ( 11 Sep 2020 06:51 )  Alb: 1.1 g/dL / Pro: x     / ALK PHOS: x     / ALT: x     / AST: x     / GGT: x             Iron with Total Binding Capacity in AM (08.25.20 @ 08:42)    Iron - Total Binding Capacity.: 175 ug/dL    % Saturation, Iron: 13 %    Iron Total, Serum: 23 ug/dL    Unsaturated Iron Binding Capacity: 151 ug/dL    Ferritin, Serum in AM (08.25.20 @ 08:42)    Ferritin, Serum: 521 ng/mL        LIVER FUNCTIONS - ( 11 Sep 2020 06:51 )  Alb: 1.1 g/dL / Pro: x     / ALK PHOS: x     / ALT: x     / AST: x     / GGT: x           Culture - Blood (08.27.20 @ 02:00)    Specimen Source: .Blood None    Culture Results:   No Growth Final    Culture - Urine (08.23.20 @ 10:25)    -  Cefazolin: S <=2 ([id number]) For uncomplicated UTI with K. pneumoniae, E. coli, or P. mirablis: PINA <=16 is sensitive and PINA >=32 is resistant. This also predicts results for oral agents cefaclor, cefdinir, cefpodoxime, cefprozil, cefuroxime axetil, cephalexin and locarbef for uncomplicated UTI. Note that some isolates may be susceptible to these agents while testing resistant to cefazolin.    -  Aztreonam: S <=4    -  Ampicillin/Sulbactam: S <=4/2 Enterobacter, Citrobacter, and Serratia may develop resistance during prolonged therapy (3-4 days)    -  Ampicillin: S <=8 These ampicillin results predict results for amoxicillin    -  Meropenem: S <=1    -  Nitrofurantoin: S <=32 Should not be used to treat pyelonephritis    -  Ertapenem: S <=0.5    -  Gentamicin: S <=2    -  Levofloxacin: S <=0.5    -  Ciprofloxacin: S <=0.25    -  Ceftriaxone: S <=1 Enterobacter, Citrobacter, and Serratia may develop resistance during prolonged therapy    -  Cefuroxime: S <=4    -  Cefoxitin: R >16    -  Ceftazidime: S <=1    -  Cefepime: S <=2    -  Cefotaxime: S <=2    -  Trimethoprim/Sulfamethoxazole: S <=0.5/9.5    -  Minocycline: S <=4    -  Tobramycin: S <=2    -  Piperacillin/Tazobactam: S <=8    -  Tigecycline: S <=2    -  Amikacin: S <=16    -  Amoxicillin/Clavulanic Acid: S <=8/4    Specimen Source: .Urine Clean Catch (Midstream)    Culture Results:   >100,000 CFU/ml Escherichia coli    Organism Identification: Escherichia coli    Organism: Escherichia coli    Method Type: PINA  Culture - Blood (08.23.20 @ 08:34)    Gram Stain:   Growth in aerobic bottle: Gram Negative Rods  Growth in anaerobic bottle: Gram Negative Rods    -  Amikacin: S <=16    -  Ampicillin: S <=8 These ampicillin results predict results for amoxicillin    -  Ampicillin/Sulbactam: S <=4/2 Enterobacter, Citrobacter, and Serratia may develop resistance during prolonged therapy (3-4 days)    -  Aztreonam: S <=4    -  Cefazolin: S <=2 Enterobacter, Citrobacter, and Serratia may develop resistance during prolonged therapy (3-4 days)    -  Cefepime: S <=2    -  Cefoxitin: S <=8    -  Ceftriaxone: S <=1 Enterobacter, Citrobacter, and Serratia may develop resistance during prolonged therapy    -  Ciprofloxacin: S <=0.25    -  Gentamicin: S <=2    -  Imipenem: S <=1    -  Levofloxacin: S <=0.5    -  Ertapenem: S <=0.5    -  Meropenem: S <=1    -  Escherichia coli: Detec    -  Piperacillin/Tazobactam: S <=8    -  Tobramycin: S <=2    -  Trimethoprim/Sulfamethoxazole: S <=0.5/9.5    Specimen Source: .Blood None    Organism: Blood Culture PCR    Organism: Escherichia coli    Culture Results:   Growth in aerobic and anaerobic bottles: Escherichia coli  "Due to technical problems, Proteus sp. will Not be reported as part of  the BCID panel until further notice"  ***Blood Panel PCR results on this specimen are available  approximately 3 hours after the Gram stain result.***  Gram stain, PCR, and/or culture results may not always  correspond due to difference in methodologies.  ************************************************************  This PCR assay was performed using [employer].  The following targets are tested for: Enterococcus,  vancomycin resistant enterococci, Listeria monocytogenes,  coagulase negative staphylococci, S. aureus,  methicillin resistant S. aureus, Streptococcus agalactiae  (Group B), S. pneumoniae, S.pyogenes (Group A),  Acinetobacter baumannii, Enterobacter cloacae, E. coli,  Klebsiella oxytoca, K. pneumoniae, Proteus sp.,  Serratia marcescens, Haemophilus influenzae,  Neisseria meningitidis, Pseudomonas aeruginosa, Candida  albicans, C. glabrata, C krusei, C parapsilosis,  C. tropicalis and the KPC resistance gene.    Organism Identification: Blood Culture PCR  Escherichia coli    Method Type: PCR    Method Type: PINA        < from: 12 Lead ECG (08.25.20 @ 00:12) >  Ventricular Rate 68 BPM    Atrial Rate 68 BPM    P-R Interval 134 ms    QRS Duration 98 ms    Q-T Interval 474 ms    QTC Calculation(Bezet) 504 ms    P Axis 42 degrees    R Axis 77 degrees    T Axis 76 degrees    Diagnosis Line Normal sinus rhythm  ST elevation consider inferior injury or acute infarct  Prolonged QT  *** ** ** ** * ACUTE MI  ** ** ** **  Abnormal ECG    < end of copied text >    < from: US Abdomen Complete (US Abdomen Complete .) (09.13.20 @ 08:30) >  Liver: Within normal limits.  Bile ducts: Normal caliber. Common bile duct measures 7 mm.  Gallbladder: Removed.  Pancreas: Visualized portions are within normal limits.  Spleen: 12.7 cm. Within normal limits.  Right kidney: 10.5 cm. No hydronephrosis.  Left kidney: 11.0 cm.  No hydronephrosis. There is a 1.0 cm cyst.  Ascites: Trace amount of perihepatic ascites.  Aorta and IVC: Visualized portions are within normal limits.    There are bilateral pleural effusions.    IMPRESSION:  Cholecystectomy.    Trace perihepatic ascites.    Bilateral pleural effusions.    < end of copied text >  Troponin I, Serum: 1.990: High Sensitivity Troponin and new reference  range effective 7/6/2016 ng/mL (08.25.20 @ 02:26)    < from: TTE Echo Complete w/o Contrast w/ Doppler (08.27.20 @ 11:04) >   The left ventricle size is normal. The inferior wall appears severely   hypokinetic..   Estimated left ventricular ejection fraction is 50 %.   Paradoxical septal wall motion during inspiration is noted.    < end of copied text >  < from: Cardiac Cath Lab - Adult (08.23.20 @ 09:33) >   Mid RCA: 100% stenosis.     Impression     Diagnostic Conclusions     Acute occlusion of the RCA of unknown duration. No PCI due to lack of   ischemic symptoms, sepsis, acute renal failure and satisfactory left   coronary anatomy.  < end of copied text >    < from: Xray Chest 1 View- PORTABLE-Routine (09.11.20 @ 07:47) >  FINDINGS:  The lungs show LEFT greater than RIGHT haziness indicating LEFT greater than RIGHT pleural effusions and/or LEFT basilar airspace consolidation. Upper lobes clear. No pneumothorax.    The heart and mediastinum are within normal limits.    Visualized osseous structures are intact.  IMPRESSION:   LEFT greater than RIGHT/pleural effusions and/or a LEFT lower lobe retrocardiac airspace consolidation.      < end of copied text >  < from: CT Chest No Cont (09.09.20 @ 15:52) >  LUNGS AND AIRWAYS: Mucosal impaction of the left mainstem bronchus. Near complete atelectasis of the left lower lobe. Significant segmental atelectasis in the lingula and anterior left upper lobe. Right basilar compressive atelectasis. Additional scattered peripheral groundglass opacities.  Paraseptal emphysema bilaterally.  PLEURA: No moderate to large left pleural effusion. Mild to moderate right pleural effusion.  MEDIASTINUM AND TATIANA: No lymphadenopathy.  VESSELS: Atherosclerotic changes of the aorta and coronary vasculature. No aortic aneurysm.  HEART: Heart size is normal. No pericardial effusion.  CHEST WALL AND LOWER NECK: Extensive anasarca.  VISUALIZED UPPER ABDOMEN: Prior cholecystectomy.  BONES: Old right lateral sixth and seventh rib fractures. Degenerative changes.    IMPRESSION:  Extensive mucosal impaction of the left mainstem bronchus extending into left upper lobe and left lower lobe segmental bronchi with near complete atelectasis left lower lobe and multifocal segmental atelectasis and/or pneumonia in the left upper lobe. Moderate to large left pleural effusion with a mild to moderate right pleural effusion with bibasilar compressive atelectasis.    Additional scattered groundglass opacities bilaterally.    < from: CT Abdomen and Pelvis w/ Oral Cont (09.08.20 @ 12:18) >  LOWER CHEST: Interval progression of left lung collapse with more loculated pleural effusion. Slightly decreased right pleural effusion and passive atelectasis. Scattered groundglass opacities in the right middle lobe and right lower lobe, new since prior (2-8).    < end of copied text >    LIVER: Within normal limits.  BILE DUCTS: Normal caliber.  GALLBLADDER: Cholecystectomy clips.  SPLEEN: Within normal limits.  PANCREAS: Within normal limits.  ADRENALS: Within normal limits.  KIDNEYS/URETERS: Within normal limits.    BLADDER: Small air in the nondependent portion of the bladder. Please correlate with instrumentation history.  REPRODUCTIVE ORGANS: Unremarkable.    BOWEL: No bowel obstruction. Appendix is not visualized. Mild diffuse wall thickening of the sigmoid (602-21), slightly more prominent since prior.. Interval removal of rectal tube.  PERITONEUM: Trace ascites in the cul-de-sac. No pneumoperitoneum.  VESSELS: Extensive atherosclerotic calcification.  RETROPERITONEUM/LYMPH NODES: No lymphadenopathy.  ABDOMINAL WALL: Diffuse anasarca without significant interval change.  BONES: No change of surgical hardware and grade 1 spondylolisthesis of L4 and L5. No acute abnormalities.    IMPRESSION:  Interval progression of left lung collapse with interval loculation of left effusion.    New scattered groundglass opacities in the right middle and lower lobes, likely indicating atypical infection including viral infection..    Mild diffuse wall thickening of the sigmoid, slightly more prominent since prior, which may indicate nonspecific colitis.    Otherwise no change.    < end of copied text >    < from: US Duplex Arterial Upper Ext Ltd, Left (09.07.20 @ 20:34) >  IMPRESSION:  No occlusion no stenosis  Monophasic flow throughout the left upper extremity    < end of copied text >    < from: CT Head No Cont (08.27.20 @ 01:23) >  FINDINGS:  There is motion artifact present which degrades image quality.    There is no gross acute intracranial hemorrhage, mass effect from vasogenic edema or evidence of acute large vascular territory infarct. Stable mild chronic microvascular changes as manifested by patchy areas of low-attenuation in the bihemispheric white matter.    The ventricles, sulci and cisternal spaces are stable in size and configuration.  There is no midline shift or abnormal extra-axial fluid collection.    The calvarium is intact.  There are no osteoblasticor lytic calvarial or skull base lesions.  The paranasal sinuses and mastoid air cells are clear.    IMPRESSION:  Mildly motion degraded exam. Grossly stable exam without evidence of acute intracranial hemorrhage or vasogenic edema. Chronic findings as above.    < end of copied text >     MEDICATIONS  (STANDING):  ALBUTerol    90 MICROgram(s) HFA Inhaler 2 Puff(s) Inhalation every 6 hours  aspirin  chewable 81 milliGRAM(s) Oral daily  atorvastatin 80 milliGRAM(s) Oral at bedtime  bethanechol 10 milliGRAM(s) Oral three times a day  budesonide  80 MICROgram(s)/formoterol 4.5 MICROgram(s) Inhaler 2 Puff(s) Inhalation two times a day  calcitriol   Capsule 0.25 MICROGram(s) Oral daily  calcium acetate 667 milliGRAM(s) Oral three times a day with meals  clopidogrel Tablet 75 milliGRAM(s) Oral daily  dextrose 5%. 1000 milliLiter(s) (50 mL/Hr) IV Continuous <Continuous>  dextrose 50% Injectable 12.5 Gram(s) IV Push once  dextrose 50% Injectable 25 Gram(s) IV Push once  dextrose 50% Injectable 25 Gram(s) IV Push once  guaiFENesin ER 1200 milliGRAM(s) Oral every 12 hours  heparin   Injectable 5000 Unit(s) SubCutaneous every 8 hours  insulin lispro (HumaLOG) corrective regimen sliding scale   SubCutaneous three times a day before meals  insulin lispro (HumaLOG) corrective regimen sliding scale   SubCutaneous at bedtime  ipratropium 17 MICROgram(s) HFA Inhaler 1 Puff(s) Inhalation every 6 hours  levothyroxine 112 MICROGram(s) Oral daily  meropenem  IVPB      meropenem  IVPB 500 milliGRAM(s) IV Intermittent every 12 hours  metoprolol tartrate 25 milliGRAM(s) Oral daily    MEDICATIONS  (PRN):  acetaminophen   Tablet .. 650 milliGRAM(s) Oral every 4 hours PRN Mild Pain (1 - 3)  ALBUTerol    90 MICROgram(s) HFA Inhaler 2 Puff(s) Inhalation every 6 hours PRN Shortness of Breath and/or Wheezing  aluminum hydroxide/magnesium hydroxide/simethicone Suspension 30 milliLiter(s) Oral every 4 hours PRN Dyspepsia  dextrose 40% Gel 15 Gram(s) Oral once PRN Blood Glucose LESS THAN 70 milliGRAM(s)/deciliter  glucagon  Injectable 1 milliGRAM(s) IntraMuscular once PRN Glucose LESS THAN 70 milligrams/deciliter  ondansetron Injectable 4 milliGRAM(s) IV Push every 6 hours PRN Nausea  oxycodone    5 mG/acetaminophen 325 mG 1 Tablet(s) Oral every 6 hours PRN Moderate Pain (4 - 6)

## 2020-09-13 NOTE — PROGRESS NOTE ADULT - ATTENDING COMMENTS
I was physically present for key portions of this E&M service. I have reviewed the plan and discussed it with the NP/PA/Pt. and have edited the note where it is appropriate.

## 2020-09-13 NOTE — PROGRESS NOTE ADULT - SUBJECTIVE AND OBJECTIVE BOX
EFREM BRENNAN  MRN: [id number]    S: 9/13/2020: C/O abdominal discomfort; weakness. Denies feeling short of breath.     PAST MEDICAL & SURGICAL HISTORY:  Lung nodule    Diabetes mellitus    History of TIAs    History of gallbladder disease  surgery    History of colon polyps  precancerous    Substance abuse  history of. last used 14 years ago.    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 Decreased ROM.    Angina pectoris    History of MRSA infection  left lower extremity incisional area 2015    Anxiety and depression    Transient cerebral ischemia, unspecified type    Osteoarthritis of spine, unspecified spinal osteoarthritis complication status, unspecified spinal region    Lumbar herniated disc    Urinary incontinence, unspecified type    Overactive bladder    Hiatal hernia with GERD  hiatal hernia repaired    PAD (peripheral artery disease)    Hyperlipidemia, unspecified hyperlipidemia type    Essential hypertension    Hypothyroidism    COPD (chronic obstructive pulmonary disease)    CAD (coronary artery disease)    History of lumbar fusion  with laminectomy 11/8/2018    H/O hernia repair  hiatal hernia - 2017, 2018    S/P dilatation and curettage  1991    H/O rhinoplasty  1980    H/O angioplasty  Right iliac artery. 5/12/2017    H/O ventral hernia repair  3/2017    History of incision and drainage  of lower extremity incisional site x 5 . last done5/2015    H/O arterial bypass of lower limb  Femoral - Popliteal. 11/2014 Left lower side with stents    History of laparoscopic cholecystectomy  2/2016        O: T(C): 36.4 (09-13-20 @ 08:09), Max: 36.6 (09-12-20 @ 21:30)  HR: 90 (09-13-20 @ 08:58) (82 - 98)  BP: 131/51 (09-13-20 @ 08:09) (129/50 - 131/51)  RR: 18 (09-13-20 @ 08:09) (16 - 18)  SpO2: 94% (09-13-20 @ 08:58) (92% - 98%)  Wt(kg): --    PHYSICAL EXAM:      GENERAL: c/o abdominal discomfort. No acute distress    NEURO: awake / alert    NECK: no JVD    CHEST:  course breath sounds    CARDIAC: RR    ABD: soft. Normoactive B.S. No rebound or guarding. Diffusely tender    EXT: no edema    LABS:                          8.1    4.69  )-----------( 197      ( 13 Sep 2020 06:37 )             25.6       09-13    135  |  104  |  65<H>  ----------------------------<  84  3.5   |  24  |  4.11<H>    Ca    8.2<L>      13 Sep 2020 06:37  Phos  4.2     09-13  Mg     1.5     09-13    RADIOLOGY:      EXAM:  XR CHEST PORTABLE ROUTINE 1V                        PROCEDURE DATE:  09/11/2020      INTERPRETATION:  Portable chest radiograph    CLINICAL INFORMATION:   Short of breath.    TECHNIQUE:  Portable  AP view of the chest was obtained.    COMPARISON: No previous examinations are available for review.    FINDINGS:  The lungs show LEFT greater than RIGHT haziness indicating LEFT greater than RIGHT pleural effusions and/or LEFT basilar airspace consolidation. Upper lobes clear. No pneumothorax.    The heart and mediastinum are within normal limits.    Visualized osseous structures are intact.        IMPRESSION:   LEFT greater than RIGHT/pleural effusions and/or a LEFT lower lobe retrocardiac airspace consolidation.    NNAMDI DE DIOS M.D., ATTENDING RADIOLOGIST      MEDICATIONS  (STANDING):  ALBUTerol    90 MICROgram(s) HFA Inhaler 2 Puff(s) Inhalation every 6 hours  atorvastatin 40 milliGRAM(s) Oral at bedtime  budesonide  80 MICROgram(s)/formoterol 4.5 MICROgram(s) Inhaler 2 Puff(s) Inhalation two times a day  calcitriol   Capsule 0.25 MICROGram(s) Oral daily  dextrose 5%. 1000 milliLiter(s) (50 mL/Hr) IV Continuous <Continuous>  dextrose 50% Injectable 12.5 Gram(s) IV Push once  dextrose 50% Injectable 25 Gram(s) IV Push once  dextrose 50% Injectable 25 Gram(s) IV Push once  ferrous    sulfate 325 milliGRAM(s) Oral daily  guaiFENesin ER 1200 milliGRAM(s) Oral every 12 hours  heparin   Injectable 5000 Unit(s) SubCutaneous every 12 hours  insulin lispro (HumaLOG) corrective regimen sliding scale   SubCutaneous three times a day before meals  insulin lispro (HumaLOG) corrective regimen sliding scale   SubCutaneous at bedtime  ipratropium 17 MICROgram(s) HFA Inhaler 1 Puff(s) Inhalation every 6 hours  lactobacillus acidophilus 1 Tablet(s) Oral three times a day with meals  levothyroxine 125 MICROGram(s) Oral daily  magnesium sulfate  IVPB 1 Gram(s) IV Intermittent once  meropenem  IVPB      meropenem  IVPB 500 milliGRAM(s) IV Intermittent every 12 hours  metoprolol tartrate 12.5 milliGRAM(s) Oral two times a day  ranolazine 500 milliGRAM(s) Oral two times a day    MEDICATIONS  (PRN):  acetaminophen   Tablet .. 650 milliGRAM(s) Oral every 4 hours PRN Mild Pain (1 - 3)  ALBUTerol    90 MICROgram(s) HFA Inhaler 2 Puff(s) Inhalation every 6 hours PRN Shortness of Breath and/or Wheezing  aluminum hydroxide/magnesium hydroxide/simethicone Suspension 30 milliLiter(s) Oral every 4 hours PRN Dyspepsia  dextrose 40% Gel 15 Gram(s) Oral once PRN Blood Glucose LESS THAN 70 milliGRAM(s)/deciliter  glucagon  Injectable 1 milliGRAM(s) IntraMuscular once PRN Glucose LESS THAN 70 milligrams/deciliter  ondansetron Injectable 4 milliGRAM(s) IV Push every 6 hours PRN Nausea  oxycodone    5 mG/acetaminophen 325 mG 1 Tablet(s) Oral every 6 hours PRN Moderate Pain (4 - 6)        A/P: 1. Pneumonia - left mainstem bronchus occluded on CT    2. Bilateral pleural effusions.    3. Diffuse abdominal pain.    4. ASHD.     PLAN: Continue chest PT; Mucinex. Meropenem per ID. Inhaled bronchodilators and inhaled budesonide. Follow up chest imaging as clinically indicated. No plan for bronchoscopy at this time as CXR suggests some improvement. Consider thoracentesis if effusions persist.  Cardiology follow up, appreciated. GI eval per hospitalist team. DVT prophylaxis (on subq heparin).

## 2020-09-13 NOTE — PROGRESS NOTE ADULT - PROBLEM SELECTOR PLAN 1
- No chest pain; occluded RCA of unknown chronicity  - Continue medical management with Aspirin, Plavix, Atorvastatin, BB, Ranexa   - No further cardiac intervention indicated at this time. - No chest pain; occluded RCA of unknown chronicity  - Continue medical management with Aspirin, Plavix, Atorvastatin, BB  - No further cardiac intervention indicated at this time.

## 2020-09-13 NOTE — PROGRESS NOTE ADULT - ASSESSMENT
60yo female with PMH CAD, PAD s/p fem-pop bypass s/p RT iliac stent, DM2, HLD, hypothyroidism, current active smoker, uses cocaine, chronic back pain s/p lumbar surgery presented on 8/23/2020 with multiple non-specific complaints, including fevers, weakness, poor po intake, diffuse abdominal pain, diarrhea. She found to have STEMI s/p LHC with RCA occlusion , E coli sepsis, cystitis, ischemic colitis.    hospital course complicated by acute kidney failure, intubation, pressor requirement, diarrhea, and now  pneumonia with occlusion of left mainstem bronchus  Acute hypoxic respiratory failure  secondary to septic shock   Hospital acquired PNA with  Mucosal impaction of left main bronchus , complete atelectasis of LLL   COPD , Nicotine dependence   Bilateral pleural effusion L >R  COVID-19 ruled out   -s/p intubation /extubation  -acute , recurrent hypoxic respiratory failure >> patient requiring supplemental o2 3 L  -CT chest shows large mucous impaction left main bronchus >> pulmonary consult  -c/w mucinex, chest PT, albuterol, atrovent; c/w incentive spirometry  -c/w Meropenem, D/w Dr. Mesa  - may benefit from pleural effusion drainage   - d/w Dr. Hernandez cardiology - ok to Hold asa, plavix for thoracocentesis  - IR consult for Left thoracocentesis diagnostic and therapeutic , plan for Monday - NPO, hold heparin, ASA, plavix    Septic shock secondary to Ecoli  bacteremia, possible ischemic infectious colitis ,  diarrheal syndrome, resolved   Lower Abdominal pain   -colitis on CT scan , possible bacteremia due to ischemic colitis   -completed antibiotics.  days IV zosyn [id number] --> 9/4 and 4 days ceftin 9/5-9/8,  s/p rocephin 2gm daily #6, s/p IV flagyl #4  - GI consult - Dr. Hammer, IV abx, recent colonoscopy unremarkable , GI PCR and C diff neg   - stop betanechol, bentyl ( can cause abdominal cramps/lose bm)  , add probiotics, if lose bm persist will retest for c diff and GI PCR   - US abd 9/13 - trace of ascites, b/l pleural effusions`  STEMI due to 100 % occlusion of RCA , PAD s/p LE stents   -Echo show hypokinesis on admission, troponins, st elevations   -Urgent coronary angiogram showed occluded RCA of unknown chronicity  -cardiology f/u noted; c/w DAPT - help for thoracocentesis, restart as soon as or from IR prespective  - statins atorvastatin 80--> 40 mg atorvastatin ,LDL 19, due to weakness , elevated CK  -  ranexa 1000--> 500 bid due to renal dysfunction  - c/w metoprolol 12.5 bid    AMISH w septic shock/stemi/hypotension    Urinary retention s/p Black , failed trial of void 9/10  - bethanechol, d/c bentyl can cause urinary retention  -  s/p IV diuresis with worsening of renal function  - nephrology consult   Anemia normocytic , mixed type ACD and iron deficiency   - check B12, folate, add iron daily ,  monitor  Hypocalcemia, vitamin D deficiency    Intact PTH: 87, Vitamin D, 25-Hydroxy: 27.8, Magnesium, Serum: 1.8 mg/dL, c/w Calcitriol - Vit D analogue   Hypomagnesemia - replace, repeat in am  Hypothyroidism- home dose 112 mcg--> 125 mcg , increased due to elevated  TSH 22  DM2 with A1C 8.1 - FBG monitoring , ISS   Generalized weakness, multifactorial  anemia, uncontrolled hypothyroidism, deconditioning, anasarca, renal and respiratory failure, STEMi  - PT daily, OOB to chair daily, c/w vit D   - elevated CK with low LDL - > lower dose of statin atorvastatin 80--> 40 can cause weakness in elderly       Advanced directives   HCP daughter Antoinette [phone] updated 9/11/20 , 9/13   Full code    Dispo - IV abx, npo for left thoracocentesis for stefani, restart DAPT and DVT proph once of from IR perspective ,  daily OOB --> chair, patient will need ANA MARÍA due to weakness and deconditioning

## 2020-09-13 NOTE — PROGRESS NOTE ADULT - PROBLEM SELECTOR PLAN 2
Likely due to deconditioning from prolonged bedrest.  No cardiac signs/symptoms: no dyspnea, no chest pain.  - Echo EF 50-55% / BNP 9192 / CXR with Mild L pleural effusion. Plan for thoracentesis tm.  OK to hold ASA & Plavix as no stent was placed during cath - RCA thrombosis medically managed.

## 2020-09-13 NOTE — PROGRESS NOTE ADULT - SUBJECTIVE AND OBJECTIVE BOX
CHIEF COMPLAINT: fever, abd pain, diarrhea    HPI: 60 y/o female w/ PMHx significant for CAD, PAD (fem-pop bypass, iliac stent), diabetes, hypothyroidism, hyperlipidemia, smoker, cocaine use,   chronic back pain and past lumbar surgery admitted on 8/23 with multiple complaints (including fever, abd pain, diarrhea) -- found to have ST elevations on ECG with urgent cath revealing an occluded RCA (no PCI) -- subsequently diagnosed with E coli sepsis with shock; hospitalization complicated by PEA arrest.    8/24/20: no complaints overnight: no chest pain, palpitations, dyspnea. Reviewed results of cardiac cath & plan for med mx.  8/25/20 Events reviewed , patient was noted to be hypotensive bradycardic PEA  unresponsive episode subsequently resuscitated  patient intubated , on pressors , maintaining sbp blood cultures are positive for gram negative rods . patient hemoglobin dropped with   8/26/20 Pt awake but lethargic. She denies complaints today. Pressors continued  8/27/20:  Feels sick but no dyspnea, angina; + abdominal pain.  8/28/20: Pressors continued. Pt lethargic  8/29/20: Pt lethargic. NSR. Labs noted  9/11/20: reconsulted for persistent lethargy. pt denies chest pain, dyspnea, palpitations or other cardiac symptoms. Reports feeling tired & fatigued, but activites not limited by dyspnea.      HOME MEDICATIONS:  Abilify 20 mg oral tablet: 1 tab(s) orally once a day (17 Oct 2019 08:48)  acetaminophen 325 mg oral tablet: 2 tab(s) orally every 6 hours, As needed, Temp greater or equal to 38C (100.4F), Mild Pain (1 - 3) (17 Oct 2019 08:48)  aspirin 81 mg oral tablet: 1 tab(s) orally once a day. last dose 10/15/2019. (17 Oct 2019 08:48)  atorvastatin 40 mg oral tablet: 1 tab(s) orally once a day (17 Oct 2019 08:48)  Basaglar KwikPen 100 units/mL subcutaneous solution: 35 unit(s) subcutaneous once a day (at bedtime) (17 Oct 2019 08:48)  Chantix 1 mg oral tablet: 1 tab(s) orally 2 times a day (17 Oct 2019 08:48)  clopidogrel 75 mg oral tablet: 1 tab(s) orally once a day.  last dose 10/15/ 2019   (17 Oct 2019 08:48)  gabapentin 300 mg oral tablet: 2 cap(s) orally 3 times a day. Equals 600mg 3 times a day (17 Oct 2019 08:48)  HumaLOG KwikPen 100 units/mL subcutaneous solution: dose(s) subcutaneous 4 times a day (before meals and at bedtime) as per fingerstick result (17 Oct 2019 08:48)  Imdur 60 mg oral tablet, extended release: 1 tab(s) orally once a day (in the morning) (17 Oct 2019 08:48)  Incruse Ellipta: 1 puff(s) inhaled once a day (17 Oct 2019 08:48)  levothyroxine 112 mcg (0.112 mg) oral tablet: 1 tab(s) orally once a day (17 Oct 2019 08:48)  metFORMIN 500 mg oral tablet: 1 tab(s) orally 2 times a day (17 Oct 2019 08:48)  metoprolol tartrate 25 mg oral tablet: 1 tab(s) orally once a day (17 Oct 2019 08:48)  Multiple Vitamins oral capsule: 1 cap(s) orally once a day (17 Oct 2019 08:48)  Norco 10 mg-325 mg oral tablet: 1 tab(s) orally every 6 hours, As Needed for back pain. (17 Oct 2019 08:48)  pantoprazole 40 mg oral delayed release tablet: 1 tab(s) orally once a day (17 Oct 2019 08:48)  Ranexa 1000 mg oral tablet, extended release: 1 tab(s) orally 2 times a day (17 Oct 2019 08:48)  Ventolin HFA 90 mcg/inh inhalation aerosol: 2 puff(s) inhaled 4 times a day, As Needed (17 Oct 2019 08:48)    MEDICATIONS  (STANDING):  ALBUTerol    90 MICROgram(s) HFA Inhaler 2 Puff(s) Inhalation every 6 hours  atorvastatin 40 milliGRAM(s) Oral at bedtime  budesonide  80 MICROgram(s)/formoterol 4.5 MICROgram(s) Inhaler 2 Puff(s) Inhalation two times a day  calcitriol   Capsule 0.25 MICROGram(s) Oral daily  dextrose 5%. 1000 milliLiter(s) (50 mL/Hr) IV Continuous <Continuous>  dextrose 50% Injectable 12.5 Gram(s) IV Push once  dextrose 50% Injectable 25 Gram(s) IV Push once  dextrose 50% Injectable 25 Gram(s) IV Push once  ferrous    sulfate 325 milliGRAM(s) Oral daily  guaiFENesin ER 1200 milliGRAM(s) Oral every 12 hours  heparin   Injectable 5000 Unit(s) SubCutaneous every 12 hours  insulin lispro (HumaLOG) corrective regimen sliding scale   SubCutaneous three times a day before meals  insulin lispro (HumaLOG) corrective regimen sliding scale   SubCutaneous at bedtime  ipratropium 17 MICROgram(s) HFA Inhaler 1 Puff(s) Inhalation every 6 hours  lactobacillus acidophilus 1 Tablet(s) Oral three times a day with meals  levothyroxine 125 MICROGram(s) Oral daily  magnesium sulfate  IVPB 1 Gram(s) IV Intermittent once  meropenem  IVPB      meropenem  IVPB 500 milliGRAM(s) IV Intermittent every 12 hours  metoprolol tartrate 12.5 milliGRAM(s) Oral two times a day  ranolazine 500 milliGRAM(s) Oral two times a day    MEDICATIONS  (PRN):  acetaminophen   Tablet .. 650 milliGRAM(s) Oral every 4 hours PRN Mild Pain (1 - 3)  ALBUTerol    90 MICROgram(s) HFA Inhaler 2 Puff(s) Inhalation every 6 hours PRN Shortness of Breath and/or Wheezing  aluminum hydroxide/magnesium hydroxide/simethicone Suspension 30 milliLiter(s) Oral every 4 hours PRN Dyspepsia  dextrose 40% Gel 15 Gram(s) Oral once PRN Blood Glucose LESS THAN 70 milliGRAM(s)/deciliter  glucagon  Injectable 1 milliGRAM(s) IntraMuscular once PRN Glucose LESS THAN 70 milligrams/deciliter  ondansetron Injectable 4 milliGRAM(s) IV Push every 6 hours PRN Nausea  oxycodone    5 mG/acetaminophen 325 mG 1 Tablet(s) Oral every 6 hours PRN Moderate Pain (4 - 6)    PHYSICAL EXAM:  Constitutional: NAD, feels tired  HEENT: PERR, EOMI,   Neck:  supple,  No JVD  Respiratory: Breath sounds are clear bilaterally, No wheezing, rales or rhonchi  Cardiovascular: S1 and S2, regular rate and rhythm, no Murmurs, gallops or rubs  Gastrointestinal: Bowel Sounds present, soft, nontender.   Extremities: No peripheral edema.   Vascular: 2+ peripheral pulses  Neurological: A/O x 3, no focal deficits    LABS: All Labs Reviewed:                        8.1    4.69  )-----------( 197      ( 13 Sep 2020 06:37 )             25.6     09-13    135  |  104  |  65<H>  ----------------------------<  84  3.5   |  24  |  4.11<H>    Ca    8.2<L>      13 Sep 2020 06:37  Phos  4.2     09-13  Mg     1.5     09-13    CARDIAC MARKERS ( 13 Sep 2020 06:37 )  x     / x     / 477 U/L / x     / x      CARDIAC MARKERS ( 12 Sep 2020 06:20 )  0.195 ng/mL / x     / 218 U/L / x     / x        Serum Pro-Brain Natriuretic Peptide: 9192 pg/mL   RADIOLOGY:   < from: Xray Chest 1 View- PORTABLE-Routine (Xray Chest 1 View- PORTABLE-Routine in AM.) (09.11.20 @ 07:47) >  IMPRESSION:   LEFT greater than RIGHT/pleural effusions and/or a LEFT lower lobe retrocardiac airspace consolidation.  < end of copied text >    EKG:  < from: 12 Lead ECG (08.25.20 @ 00:12) >  Diagnosis Line Normal sinus rhythm  ST elevation consider inferior injury or acute infarct  Prolonged QT  *** ** ** ** * ACUTE MI  ** ** ** **      ECHO:  < from: TTE Echo Complete w/o Contrast w/ Doppler (08.27.20 @ 11:04) >   Impression     Summary     The left ventricle size is normal. The inferior wall appears severely   hypokinetic..   Estimated left ventricular ejection fraction is 50 %.   Paradoxical septal wall motion during inspiration is noted.      CATH:  < from: Cardiac Cath Lab - Adult (08.23.20 @ 09:33) >  LMCA: Normal.  LAD: Abnormal.Moderate diffuse disease  LCx: Abnormal.Moderate diffuse diease  RCA:  Mid RCA: 100% stenosis.  Impression  Diagnostic Conclusions  Acute occlusion of the RCA of unknown duration. No PCI due to lack of  ischemic symptoms, sepsis, acute renal failure and satisfactory left  coronary anatomy.  Estimated Blood Loss:4ml.  No LV gram performed (likely d/t Cr 2-3 range)           CHIEF COMPLAINT: fever, abd pain, diarrhea    HPI: 58 y/o female w/ PMHx significant for CAD, PAD (fem-pop bypass, iliac stent), diabetes, hypothyroidism, hyperlipidemia, smoker, cocaine use,   chronic back pain and past lumbar surgery admitted on 8/23 with multiple complaints (including fever, abd pain, diarrhea) -- found to have ST elevations on ECG with urgent cath revealing an occluded RCA (no PCI) -- subsequently diagnosed with E coli sepsis with shock; hospitalization complicated by PEA arrest.    9/13: c/o sob at rest and exertion, intermittent CP with inspiration       HOME MEDICATIONS:  Abilify 20 mg oral tablet: 1 tab(s) orally once a day (17 Oct 2019 08:48)  acetaminophen 325 mg oral tablet: 2 tab(s) orally every 6 hours, As needed, Temp greater or equal to 38C (100.4F), Mild Pain (1 - 3) (17 Oct 2019 08:48)  aspirin 81 mg oral tablet: 1 tab(s) orally once a day. last dose 10/15/2019. (17 Oct 2019 08:48)  atorvastatin 40 mg oral tablet: 1 tab(s) orally once a day (17 Oct 2019 08:48)  Basaglar KwikPen 100 units/mL subcutaneous solution: 35 unit(s) subcutaneous once a day (at bedtime) (17 Oct 2019 08:48)  Chantix 1 mg oral tablet: 1 tab(s) orally 2 times a day (17 Oct 2019 08:48)  clopidogrel 75 mg oral tablet: 1 tab(s) orally once a day.  last dose 10/15/ 2019   (17 Oct 2019 08:48)  gabapentin 300 mg oral tablet: 2 cap(s) orally 3 times a day. Equals 600mg 3 times a day (17 Oct 2019 08:48)  HumaLOG KwikPen 100 units/mL subcutaneous solution: dose(s) subcutaneous 4 times a day (before meals and at bedtime) as per fingerstick result (17 Oct 2019 08:48)  Imdur 60 mg oral tablet, extended release: 1 tab(s) orally once a day (in the morning) (17 Oct 2019 08:48)  Incruse Ellipta: 1 puff(s) inhaled once a day (17 Oct 2019 08:48)  levothyroxine 112 mcg (0.112 mg) oral tablet: 1 tab(s) orally once a day (17 Oct 2019 08:48)  metFORMIN 500 mg oral tablet: 1 tab(s) orally 2 times a day (17 Oct 2019 08:48)  metoprolol tartrate 25 mg oral tablet: 1 tab(s) orally once a day (17 Oct 2019 08:48)  Multiple Vitamins oral capsule: 1 cap(s) orally once a day (17 Oct 2019 08:48)  Norco 10 mg-325 mg oral tablet: 1 tab(s) orally every 6 hours, As Needed for back pain. (17 Oct 2019 08:48)  pantoprazole 40 mg oral delayed release tablet: 1 tab(s) orally once a day (17 Oct 2019 08:48)  Ranexa 1000 mg oral tablet, extended release: 1 tab(s) orally 2 times a day (17 Oct 2019 08:48)  Ventolin HFA 90 mcg/inh inhalation aerosol: 2 puff(s) inhaled 4 times a day, As Needed (17 Oct 2019 08:48)    MEDICATIONS  (STANDING):  ALBUTerol    90 MICROgram(s) HFA Inhaler 2 Puff(s) Inhalation every 6 hours  atorvastatin 40 milliGRAM(s) Oral at bedtime  budesonide  80 MICROgram(s)/formoterol 4.5 MICROgram(s) Inhaler 2 Puff(s) Inhalation two times a day  calcitriol   Capsule 0.25 MICROGram(s) Oral daily  dextrose 5%. 1000 milliLiter(s) (50 mL/Hr) IV Continuous <Continuous>  dextrose 50% Injectable 12.5 Gram(s) IV Push once  dextrose 50% Injectable 25 Gram(s) IV Push once  dextrose 50% Injectable 25 Gram(s) IV Push once  ferrous    sulfate 325 milliGRAM(s) Oral daily  guaiFENesin ER 1200 milliGRAM(s) Oral every 12 hours  heparin   Injectable 5000 Unit(s) SubCutaneous every 12 hours  insulin lispro (HumaLOG) corrective regimen sliding scale   SubCutaneous three times a day before meals  insulin lispro (HumaLOG) corrective regimen sliding scale   SubCutaneous at bedtime  ipratropium 17 MICROgram(s) HFA Inhaler 1 Puff(s) Inhalation every 6 hours  lactobacillus acidophilus 1 Tablet(s) Oral three times a day with meals  levothyroxine 125 MICROGram(s) Oral daily  magnesium sulfate  IVPB 1 Gram(s) IV Intermittent once  meropenem  IVPB      meropenem  IVPB 500 milliGRAM(s) IV Intermittent every 12 hours  metoprolol tartrate 12.5 milliGRAM(s) Oral two times a day  ranolazine 500 milliGRAM(s) Oral two times a day    MEDICATIONS  (PRN):  acetaminophen   Tablet .. 650 milliGRAM(s) Oral every 4 hours PRN Mild Pain (1 - 3)  ALBUTerol    90 MICROgram(s) HFA Inhaler 2 Puff(s) Inhalation every 6 hours PRN Shortness of Breath and/or Wheezing  aluminum hydroxide/magnesium hydroxide/simethicone Suspension 30 milliLiter(s) Oral every 4 hours PRN Dyspepsia  dextrose 40% Gel 15 Gram(s) Oral once PRN Blood Glucose LESS THAN 70 milliGRAM(s)/deciliter  glucagon  Injectable 1 milliGRAM(s) IntraMuscular once PRN Glucose LESS THAN 70 milligrams/deciliter  ondansetron Injectable 4 milliGRAM(s) IV Push every 6 hours PRN Nausea  oxycodone    5 mG/acetaminophen 325 mG 1 Tablet(s) Oral every 6 hours PRN Moderate Pain (4 - 6)    PHYSICAL EXAM:  Constitutional: NAD, feels tired  HEENT: PERR, EOMI,   Neck:  supple,  No JVD  Respiratory: Breath sounds are clear bilaterally, No wheezing, rales or rhonchi  Cardiovascular: S1 and S2, regular rate and rhythm, no Murmurs, gallops or rubs  Gastrointestinal: Bowel Sounds present, soft, nontender.   Extremities: No peripheral edema.   Vascular: 2+ peripheral pulses  Neurological: A/O x 3, no focal deficits    LABS: All Labs Reviewed:                        8.1    4.69  )-----------( 197      ( 13 Sep 2020 06:37 )             25.6     09-13    135  |  104  |  65<H>  ----------------------------<  84  3.5   |  24  |  4.11<H>    Ca    8.2<L>      13 Sep 2020 06:37  Phos  4.2     09-13  Mg     1.5     09-13    CARDIAC MARKERS ( 13 Sep 2020 06:37 )  x     / x     / 477 U/L / x     / x      CARDIAC MARKERS ( 12 Sep 2020 06:20 )  0.195 ng/mL / x     / 218 U/L / x     / x        Serum Pro-Brain Natriuretic Peptide: 9192 pg/mL   RADIOLOGY:   < from: Xray Chest 1 View- PORTABLE-Routine (Xray Chest 1 View- PORTABLE-Routine in AM.) (09.11.20 @ 07:47) >  IMPRESSION:   LEFT greater than RIGHT/pleural effusions and/or a LEFT lower lobe retrocardiac airspace consolidation.  < end of copied text >    EKG:  < from: 12 Lead ECG (08.25.20 @ 00:12) >  Diagnosis Line Normal sinus rhythm  ST elevation consider inferior injury or acute infarct  Prolonged QT  *** ** ** ** * ACUTE MI  ** ** ** **      ECHO:  < from: TTE Echo Complete w/o Contrast w/ Doppler (08.27.20 @ 11:04) >   Impression     Summary     The left ventricle size is normal. The inferior wall appears severely   hypokinetic..   Estimated left ventricular ejection fraction is 50 %.   Paradoxical septal wall motion during inspiration is noted.      CATH:  < from: Cardiac Cath Lab - Adult (08.23.20 @ 09:33) >  LMCA: Normal.  LAD: Abnormal.Moderate diffuse disease  LCx: Abnormal.Moderate diffuse diease  RCA:  Mid RCA: 100% stenosis.  Impression  Diagnostic Conclusions  Acute occlusion of the RCA of unknown duration. No PCI due to lack of  ischemic symptoms, sepsis, acute renal failure and satisfactory left  coronary anatomy.  Estimated Blood Loss:4ml.  No LV gram performed (likely d/t Cr 2-3 range)

## 2020-09-14 ENCOUNTER — RESULT REVIEW (OUTPATIENT)
Age: 60
End: 2020-09-14

## 2020-09-14 LAB
ALBUMIN FLD-MCNC: 0.5 G/DL — SIGNIFICANT CHANGE UP
ALBUMIN SERPL ELPH-MCNC: 1.2 G/DL — LOW (ref 3.3–5)
ALP SERPL-CCNC: 100 U/L — SIGNIFICANT CHANGE UP (ref 40–120)
ALT FLD-CCNC: 39 U/L — SIGNIFICANT CHANGE UP (ref 12–78)
ANION GAP SERPL CALC-SCNC: 7 MMOL/L — SIGNIFICANT CHANGE UP (ref 5–17)
AST SERPL-CCNC: 98 U/L — HIGH (ref 15–37)
B PERT IGG+IGM PNL SER: ABNORMAL
BILIRUB DIRECT SERPL-MCNC: 0.1 MG/DL — SIGNIFICANT CHANGE UP (ref 0–0.2)
BILIRUB INDIRECT FLD-MCNC: 0.3 MG/DL — SIGNIFICANT CHANGE UP (ref 0.2–1)
BILIRUB SERPL-MCNC: 0.4 MG/DL — SIGNIFICANT CHANGE UP (ref 0.2–1.2)
BUN SERPL-MCNC: 62 MG/DL — HIGH (ref 7–23)
CALCIUM SERPL-MCNC: 8.1 MG/DL — LOW (ref 8.5–10.1)
CHLORIDE SERPL-SCNC: 103 MMOL/L — SIGNIFICANT CHANGE UP (ref 96–108)
CK SERPL-CCNC: 683 U/L — HIGH (ref 26–192)
CO2 SERPL-SCNC: 22 MMOL/L — SIGNIFICANT CHANGE UP (ref 22–31)
COLOR FLD: SIGNIFICANT CHANGE UP
CREAT SERPL-MCNC: 3.85 MG/DL — HIGH (ref 0.5–1.3)
EOSINOPHIL # FLD: 1 % — SIGNIFICANT CHANGE UP
FLUID INTAKE SUBSTANCE CLASS: SIGNIFICANT CHANGE UP
FLUID SEGMENTED GRANULOCYTES: 12 % — SIGNIFICANT CHANGE UP
GLUCOSE FLD-MCNC: 84 MG/DL — SIGNIFICANT CHANGE UP
GLUCOSE SERPL-MCNC: 77 MG/DL — SIGNIFICANT CHANGE UP (ref 70–99)
GRAM STN FLD: SIGNIFICANT CHANGE UP
HCT VFR BLD CALC: 24.7 % — LOW (ref 34.5–45)
HGB BLD-MCNC: 8 G/DL — LOW (ref 11.5–15.5)
INR BLD: 1.31 RATIO — HIGH (ref 0.88–1.16)
LDH SERPL L TO P-CCNC: 125 U/L — SIGNIFICANT CHANGE UP
LDH SERPL L TO P-CCNC: 217 U/L — SIGNIFICANT CHANGE UP (ref 84–241)
LYMPHOCYTES # FLD: 10 % — SIGNIFICANT CHANGE UP
MCHC RBC-ENTMCNC: 29.3 PG — SIGNIFICANT CHANGE UP (ref 27–34)
MCHC RBC-ENTMCNC: 32.4 GM/DL — SIGNIFICANT CHANGE UP (ref 32–36)
MCV RBC AUTO: 90.5 FL — SIGNIFICANT CHANGE UP (ref 80–100)
MONOS+MACROS # FLD: 73 % — SIGNIFICANT CHANGE UP
OTHER CELLS FLD MANUAL: 4 % — SIGNIFICANT CHANGE UP
PH FLD: 7.41 — SIGNIFICANT CHANGE UP
PLATELET # BLD AUTO: 185 K/UL — SIGNIFICANT CHANGE UP (ref 150–400)
POTASSIUM SERPL-MCNC: 3.3 MMOL/L — LOW (ref 3.5–5.3)
POTASSIUM SERPL-SCNC: 3.3 MMOL/L — LOW (ref 3.5–5.3)
PROT FLD-MCNC: 1.4 G/DL — SIGNIFICANT CHANGE UP
PROT SERPL-MCNC: 5.4 GM/DL — LOW (ref 6–8.3)
PROTHROM AB SERPL-ACNC: 15.1 SEC — HIGH (ref 10.6–13.6)
RBC # BLD: 2.73 M/UL — LOW (ref 3.8–5.2)
RBC # FLD: 17.5 % — HIGH (ref 10.3–14.5)
RCV VOL RI: 4000 /UL — HIGH (ref 0–0)
SODIUM SERPL-SCNC: 132 MMOL/L — LOW (ref 135–145)
SPECIMEN SOURCE: SIGNIFICANT CHANGE UP
TOTAL NUCLEATED CELL COUNT, BODY FLUID: 318 /UL — SIGNIFICANT CHANGE UP
TUBE TYPE: SIGNIFICANT CHANGE UP
WBC # BLD: 4.59 K/UL — SIGNIFICANT CHANGE UP (ref 3.8–10.5)
WBC # FLD AUTO: 4.59 K/UL — SIGNIFICANT CHANGE UP (ref 3.8–10.5)

## 2020-09-14 PROCEDURE — 88305 TISSUE EXAM BY PATHOLOGIST: CPT | Mod: 26

## 2020-09-14 PROCEDURE — 71045 X-RAY EXAM CHEST 1 VIEW: CPT | Mod: 26

## 2020-09-14 PROCEDURE — 32555 ASPIRATE PLEURA W/ IMAGING: CPT | Mod: LT,59

## 2020-09-14 PROCEDURE — 99233 SBSQ HOSP IP/OBS HIGH 50: CPT

## 2020-09-14 PROCEDURE — 88108 CYTOPATH CONCENTRATE TECH: CPT | Mod: 26

## 2020-09-14 RX ORDER — FUROSEMIDE 40 MG
20 TABLET ORAL DAILY
Refills: 0 | Status: DISCONTINUED | OUTPATIENT
Start: 2020-09-14 | End: 2020-09-21

## 2020-09-14 RX ORDER — ASPIRIN/CALCIUM CARB/MAGNESIUM 324 MG
81 TABLET ORAL DAILY
Refills: 0 | Status: DISCONTINUED | OUTPATIENT
Start: 2020-09-14 | End: 2020-09-21

## 2020-09-14 RX ORDER — CLOPIDOGREL BISULFATE 75 MG/1
75 TABLET, FILM COATED ORAL DAILY
Refills: 0 | Status: DISCONTINUED | OUTPATIENT
Start: 2020-09-14 | End: 2020-09-21

## 2020-09-14 RX ORDER — HEPARIN SODIUM 5000 [USP'U]/ML
5000 INJECTION INTRAVENOUS; SUBCUTANEOUS EVERY 8 HOURS
Refills: 0 | Status: DISCONTINUED | OUTPATIENT
Start: 2020-09-14 | End: 2020-09-21

## 2020-09-14 RX ORDER — FLUCONAZOLE 150 MG/1
100 TABLET ORAL DAILY
Refills: 0 | Status: COMPLETED | OUTPATIENT
Start: 2020-09-14 | End: 2020-09-21

## 2020-09-14 RX ADMIN — Medication 20 MILLIGRAM(S): at 17:25

## 2020-09-14 RX ADMIN — Medication 1 TABLET(S): at 14:01

## 2020-09-14 RX ADMIN — Medication 1200 MILLIGRAM(S): at 09:54

## 2020-09-14 RX ADMIN — Medication 1200 MILLIGRAM(S): at 21:20

## 2020-09-14 RX ADMIN — OXYCODONE AND ACETAMINOPHEN 1 TABLET(S): 5; 325 TABLET ORAL at 02:45

## 2020-09-14 RX ADMIN — Medication 12.5 MILLIGRAM(S): at 09:53

## 2020-09-14 RX ADMIN — BUDESONIDE AND FORMOTEROL FUMARATE DIHYDRATE 2 PUFF(S): 160; 4.5 AEROSOL RESPIRATORY (INHALATION) at 08:14

## 2020-09-14 RX ADMIN — Medication 1 PUFF(S): at 08:14

## 2020-09-14 RX ADMIN — CALCITRIOL 0.25 MICROGRAM(S): 0.5 CAPSULE ORAL at 09:54

## 2020-09-14 RX ADMIN — ALBUTEROL 2 PUFF(S): 90 AEROSOL, METERED ORAL at 20:40

## 2020-09-14 RX ADMIN — ALBUTEROL 2 PUFF(S): 90 AEROSOL, METERED ORAL at 08:14

## 2020-09-14 RX ADMIN — BUDESONIDE AND FORMOTEROL FUMARATE DIHYDRATE 2 PUFF(S): 160; 4.5 AEROSOL RESPIRATORY (INHALATION) at 20:40

## 2020-09-14 RX ADMIN — RANOLAZINE 500 MILLIGRAM(S): 500 TABLET, FILM COATED, EXTENDED RELEASE ORAL at 09:54

## 2020-09-14 RX ADMIN — FLUCONAZOLE 100 MILLIGRAM(S): 150 TABLET ORAL at 21:36

## 2020-09-14 RX ADMIN — MEROPENEM 100 MILLIGRAM(S): 1 INJECTION INTRAVENOUS at 09:35

## 2020-09-14 RX ADMIN — OXYCODONE AND ACETAMINOPHEN 1 TABLET(S): 5; 325 TABLET ORAL at 17:25

## 2020-09-14 RX ADMIN — OXYCODONE AND ACETAMINOPHEN 1 TABLET(S): 5; 325 TABLET ORAL at 17:30

## 2020-09-14 RX ADMIN — ALBUTEROL 2 PUFF(S): 90 AEROSOL, METERED ORAL at 13:22

## 2020-09-14 RX ADMIN — Medication 1 PUFF(S): at 20:42

## 2020-09-14 RX ADMIN — Medication 325 MILLIGRAM(S): at 09:53

## 2020-09-14 RX ADMIN — HEPARIN SODIUM 5000 UNIT(S): 5000 INJECTION INTRAVENOUS; SUBCUTANEOUS at 21:20

## 2020-09-14 RX ADMIN — Medication 12.5 MILLIGRAM(S): at 21:20

## 2020-09-14 RX ADMIN — Medication 1 TABLET(S): at 17:25

## 2020-09-14 RX ADMIN — OXYCODONE AND ACETAMINOPHEN 1 TABLET(S): 5; 325 TABLET ORAL at 09:54

## 2020-09-14 RX ADMIN — OXYCODONE AND ACETAMINOPHEN 1 TABLET(S): 5; 325 TABLET ORAL at 01:58

## 2020-09-14 RX ADMIN — RANOLAZINE 500 MILLIGRAM(S): 500 TABLET, FILM COATED, EXTENDED RELEASE ORAL at 21:20

## 2020-09-14 RX ADMIN — Medication 1 PUFF(S): at 13:22

## 2020-09-14 RX ADMIN — ATORVASTATIN CALCIUM 40 MILLIGRAM(S): 80 TABLET, FILM COATED ORAL at 21:36

## 2020-09-14 RX ADMIN — OXYCODONE AND ACETAMINOPHEN 1 TABLET(S): 5; 325 TABLET ORAL at 10:00

## 2020-09-14 RX ADMIN — Medication 81 MILLIGRAM(S): at 14:33

## 2020-09-14 RX ADMIN — CLOPIDOGREL BISULFATE 75 MILLIGRAM(S): 75 TABLET, FILM COATED ORAL at 14:33

## 2020-09-14 NOTE — PROGRESS NOTE ADULT - SUBJECTIVE AND OBJECTIVE BOX
Subjective:  Still complains of abdominal pain  Breathing ok    Review of Systems:  All 10 systems reviewed in detailed and found to be negative with the exception of what has already been described above    Allergies:  No Known Allergies    Meds  MEDICATIONS  (STANDING):  ALBUTerol    90 MICROgram(s) HFA Inhaler 2 Puff(s) Inhalation every 6 hours  atorvastatin 40 milliGRAM(s) Oral at bedtime  budesonide  80 MICROgram(s)/formoterol 4.5 MICROgram(s) Inhaler 2 Puff(s) Inhalation two times a day  calcitriol   Capsule 0.25 MICROGram(s) Oral daily  dextrose 5%. 1000 milliLiter(s) (50 mL/Hr) IV Continuous <Continuous>  dextrose 50% Injectable 12.5 Gram(s) IV Push once  dextrose 50% Injectable 25 Gram(s) IV Push once  dextrose 50% Injectable 25 Gram(s) IV Push once  ferrous    sulfate 325 milliGRAM(s) Oral daily  guaiFENesin ER 1200 milliGRAM(s) Oral every 12 hours  insulin lispro (HumaLOG) corrective regimen sliding scale   SubCutaneous three times a day before meals  insulin lispro (HumaLOG) corrective regimen sliding scale   SubCutaneous at bedtime  ipratropium 17 MICROgram(s) HFA Inhaler 1 Puff(s) Inhalation every 6 hours  lactobacillus acidophilus 1 Tablet(s) Oral three times a day with meals  levothyroxine 125 MICROGram(s) Oral daily  meropenem  IVPB      meropenem  IVPB 500 milliGRAM(s) IV Intermittent every 12 hours  metoprolol tartrate 12.5 milliGRAM(s) Oral two times a day  ranolazine 500 milliGRAM(s) Oral two times a day    MEDICATIONS  (PRN):  acetaminophen   Tablet .. 650 milliGRAM(s) Oral every 4 hours PRN Mild Pain (1 - 3)  ALBUTerol    90 MICROgram(s) HFA Inhaler 2 Puff(s) Inhalation every 6 hours PRN Shortness of Breath and/or Wheezing  aluminum hydroxide/magnesium hydroxide/simethicone Suspension 30 milliLiter(s) Oral every 4 hours PRN Dyspepsia  dextrose 40% Gel 15 Gram(s) Oral once PRN Blood Glucose LESS THAN 70 milliGRAM(s)/deciliter  glucagon  Injectable 1 milliGRAM(s) IntraMuscular once PRN Glucose LESS THAN 70 milligrams/deciliter  ondansetron Injectable 4 milliGRAM(s) IV Push every 6 hours PRN Nausea  oxycodone    5 mG/acetaminophen 325 mG 1 Tablet(s) Oral every 6 hours PRN Moderate Pain (4 - 6)    Physical Exam  T(C): 36.7 (09-14-20 @ 08:42), Max: 36.7 (09-13-20 @ 21:30)  HR: 83 (09-14-20 @ 08:42) (83 - 94)  BP: 109/44 (09-14-20 @ 08:42) (108/52 - 123/51)  RR: 18 (09-14-20 @ 08:42) (18 - 18)  SpO2: 100% (09-14-20 @ 08:42) (90% - 100%)  Gen: Alert, oriented, no distress  HEENT: Anicteric sclera, + thrush, poor dentition  Cardio: Regular rhythm and rate, normal S1S2, no murmurs  Resp: decreased breath sounds  GI: slightly tender  Ext: No cyanosis, clubbing or edema  Neuro: Nonfocal    Labs:                        8.0    4.59  )-----------( 185      ( 14 Sep 2020 07:26 )             24.7     09-14    132<L>  |  103  |  62<H>  ----------------------------<  77  3.3<L>   |  22  |  3.85<H>    Ca    8.1<L>      14 Sep 2020 07:26  Phos  4.2     09-13  Mg     1.5     09-13    TPro  5.4<L>  /  Alb  1.2<L>  /  TBili  0.4  /  DBili  0.1  /  AST  98<H>  /  ALT  39  /  AlkPhos  100  09-14    PT/INR - ( 14 Sep 2020 07:26 )   PT: 15.1 sec;   INR: 1.31 ratio       < from: CT Chest No Cont (09.09.20 @ 15:52) >  PROCEDURE DATE:  09/09/2020          INTERPRETATION:  CLINICAL INFORMATION: Chest pain    COMPARISON: CT chest 7/29/2019, CT abdomen 9/8/2020    PROCEDURE:  CT of the Chest was performed without intravenous contrast.  Sagittal and coronal reformats were performed.    FINDINGS:    LUNGS AND AIRWAYS: Mucosal impaction of the left mainstem bronchus. Near complete atelectasis of the left lower lobe. Significant segmental atelectasis in the lingula and anterior left upper lobe. Right basilar compressive atelectasis. Additional scattered peripheral groundglass opacities.  Paraseptal emphysema bilaterally.  PLEURA: No moderate to large left pleural effusion. Mild to moderate right pleural effusion.  MEDIASTINUM AND TATIANA: No lymphadenopathy.  VESSELS: Atherosclerotic changes of the aorta and coronary vasculature. No aortic aneurysm.  HEART: Heart size is normal. No pericardial effusion.  CHEST WALL AND LOWER NECK: Extensive anasarca.  VISUALIZED UPPER ABDOMEN: Prior cholecystectomy.  BONES: Old right lateral sixth and seventh rib fractures. Degenerative changes.    IMPRESSION:  Extensive mucosal impaction of the left mainstem bronchus extending into left upper lobe and left lower lobe segmental bronchi with near complete atelectasis left lower lobe and multifocal segmental atelectasis and/or pneumonia in the left upper lobe. Moderate to large left pleural effusion with a mild to moderate right pleural effusion with bibasilar compressive atelectasis.    Additional scattered groundglass opacities bilaterally.    < end of copied text >     chest x-ray   09/11/2020: Reviewed with Radiology   when compared to  film from CT scan September 9, 2020, there is improved aeration of the left lung.  This is most likely secondary to improved atelectasis furthermore there is better visualization of the left mainstem bronchus.  Overall the suggest that the mucus plug and resultant atelectasis has improved since prior study

## 2020-09-14 NOTE — PROGRESS NOTE ADULT - ATTENDING COMMENTS
David Judge M.D.  Cardiology, [facility]  Cell:   Office: [phone] ([place])  ([facility]

## 2020-09-14 NOTE — PROGRESS NOTE ADULT - ASSESSMENT
60yo female with PMH CAD, PAD s/p fem-pop bypass s/p RT iliac stent, DM2, HLD, hypothyroidism, current active smoker, uses cocaine, chronic back pain s/p lumbar surgery presented on 8/23/2020 with multiple non-specific complaints, including fevers, weakness, poor po intake, diffuse abdominal pain, diarrhea. She found to have STEMI s/p LHC with RCA occlusion , E coli sepsis, cystitis, ischemic colitis.    hospital course complicated by acute kidney failure, intubation, pressor requirement, diarrhea, and now  pneumonia with occlusion of left mainstem bronchus    # Acute hypoxic respiratory failure  secondary to septic shock   Hospital acquired PNA with  Mucosal impaction of left main bronchus , complete atelectasis of LLL   COPD , Nicotine dependence   Bilateral pleural effusion L >R  COVID-19 ruled out   -s/p intubation /extubation  -acute , recurrent hypoxic respiratory failure >> patient requiring supplemental o2 3 L  -CT chest shows large mucous impaction left main bronchus >> pulmonary consult  -c/w mucinex, chest PT, albuterol, atrovent; c/w incentive spirometry  -c/w Meropenem, D/w Dr. Mesa  - may benefit from pleural effusion drainage   - d/w Dr. Hernandez cardiology - ok to Hold asa, plavix for thoracocentesis  - IR consult for Left thoracocentesis diagnostic and therapeutic , plan for Monday - NPO, hold heparin, ASA, plavix      #Septic shock secondary to Ecoli  bacteremia, possible ischemic infectious colitis ,  diarrheal syndrome, resolved   Lower Abdominal pain   -colitis on CT scan , possible bacteremia due to ischemic colitis   -completed antibiotics.  days IV zosyn [id number] --> 9/4 and 4 days ceftin 9/5-9/8,  s/p rocephin 2gm daily #6, s/p IV flagyl #4  - GI consult - Dr. Hammer, IV abx, recent colonoscopy unremarkable , GI PCR and C diff neg   - stop betanechol, bentyl ( can cause abdominal cramps/lose bm)  , add probiotics, if lose bm persist will retest for c diff and GI PCR   - US abd 9/13 - trace of ascites, b/l pleural effusions`    #STEMI due to 100 % occlusion of RCA , PAD s/p LE stents   -Echo show hypokinesis on admission, troponins, st elevations   -Urgent coronary angiogram showed occluded RCA of unknown chronicity  -cardiology f/u noted; c/w DAPT - help for thoracocentesis, restart as soon as or from IR prespective  - statins atorvastatin 80--> 40 mg atorvastatin ,LDL 19, due to weakness , elevated CK  -  ranexa 1000--> 500 bid due to renal dysfunction  - c/w metoprolol 12.5 bid      #AMISH w septic shock/stemi/hypotension    Urinary retention s/p Black , failed trial of void 9/10  - bethanechol, d/c bentyl can cause urinary retention  -  s/p IV diuresis with worsening of renal function  - nephrology consult     #Anemia normocytic , mixed type ACD and iron deficiency   - check B12, folate, add iron daily ,  monitor    #Hypocalcemia, vitamin D deficiency    Intact PTH: 87, Vitamin D, 25-Hydroxy: 27.8, Magnesium, Serum: 1.8 mg/dL, c/w Calcitriol - Vit D analogue     #Hypomagnesemia - replace, repeat in am    #Hypothyroidism- home dose 112 mcg--> 125 mcg , increased due to elevated  TSH 22    #DM2 with A1C 8.1 - FBG monitoring , ISS     #Generalized weakness, multifactorial  anemia, uncontrolled hypothyroidism, deconditioning, anasarca, renal and respiratory failure, STEMi  - PT daily, OOB to chair daily, c/w vit D   - elevated CK with low LDL - > lower dose of statin atorvastatin 80--> 40 can cause weakness in elderly       Advanced directives   HCP daughter Antoinette [phone] updated 9/11/20 , 9/13    Full code    Dispo - IV abx, npo for left thoracocentesis for stefani, restart DAPT and DVT proph once of from IR perspective ,  daily OOB --> chair, patient will need ANA MARÍA due to weakness and deconditioning    58yo female with PMH CAD, PAD s/p fem-pop bypass s/p RT iliac stent, DM2, HLD, hypothyroidism, current active smoker, uses cocaine, chronic back pain s/p lumbar surgery presented on 8/23/2020 with multiple non-specific complaints, including fevers, weakness, poor po intake, diffuse abdominal pain, diarrhea. She found to have STEMI s/p LHC with RCA occlusion , E coli sepsis, cystitis, ischemic colitis.    hospital course complicated by acute kidney failure, intubation, pressor requirement, diarrhea, and now  pneumonia with occlusion of left mainstem bronchus    # Acute hypoxic respiratory failure  secondary to septic shock   Hospital acquired PNA with  Mucosal impaction of left main bronchus , complete atelectasis of LLL   COPD , Nicotine dependence   Bilateral pleural effusion L >R  COVID-19 ruled out   -s/p intubation /extubation  -acute , recurrent hypoxic respiratory failure >> patient requiring supplemental o2 3 L  -CT chest shows large mucous impaction left main bronchus >> pulmonary consult  -c/w mucinex, chest PT, albuterol, atrovent; c/w incentive spirometry  -c/w Meropenem, D/w Dr. Mesa  - may benefit from pleural effusion drainage - thoracentesis 9/14  - d/w Dr. Hernandez cardiology - ok to Hold asa, plavix for thoracocentesis  - IR consult for Left thoracocentesis diagnostic and therapeutic , plan for Monday - NPO, hold heparin, ASA, plavix      #Septic shock secondary to Ecoli  bacteremia, possible ischemic infectious colitis ,  diarrheal syndrome, resolved   Lower Abdominal pain   -colitis on CT scan , possible bacteremia due to ischemic colitis   -completed antibiotics.  days IV zosyn [id number] --> 9/4 and 4 days ceftin 9/5-9/8,  s/p rocephin 2gm daily #6, s/p IV flagyl #4  - GI consult - Dr. Hammer, IV abx, recent colonoscopy unremarkable , GI PCR and C diff neg   - stop betanechol, bentyl ( can cause abdominal cramps/lose bm)  , add probiotics, if lose bm persist will retest for c diff and GI PCR   - US abd 9/13 - trace of ascites, b/l pleural effusions`    #STEMI due to 100 % occlusion of RCA , PAD s/p LE stents   -Echo show hypokinesis on admission, troponins, st elevations   -Urgent coronary angiogram showed occluded RCA of unknown chronicity  -cardiology f/u noted; c/w DAPT - help for thoracocentesis, restart as soon as or from IR prespective  - statins atorvastatin 80--> 40 mg atorvastatin ,LDL 19, due to weakness , elevated CK  -  ranexa 1000--> 500 bid due to renal dysfunction  - c/w metoprolol 12.5 bid      #AMISH w septic shock/stemi/hypotension    Urinary retention s/p Black , failed trial of void 9/10  - bethanechol, d/c bentyl can cause urinary retention  -  s/p IV diuresis with worsening of renal function  - nephrology consult     #Anemia normocytic , mixed type ACD and iron deficiency   - check B12, folate, add iron daily ,  monitor    #Hypocalcemia, vitamin D deficiency    Intact PTH: 87, Vitamin D, 25-Hydroxy: 27.8, Magnesium, Serum: 1.8 mg/dL, c/w Calcitriol - Vit D analogue     #Hypomagnesemia - replace, repeat in am    #Hypothyroidism- home dose 112 mcg--> 125 mcg , increased due to elevated  TSH 22    #DM2 with A1C 8.1 - FBG monitoring , ISS     #Generalized weakness, multifactorial  anemia, uncontrolled hypothyroidism, deconditioning, anasarca, renal and respiratory failure, STEMi  - PT daily, OOB to chair daily, c/w vit D   - elevated CK with low LDL - > lower dose of statin atorvastatin 80--> 40 can cause weakness in elderly       Advanced directives   HCP daughter Antoinette [phone] updated 9/11/20 , 9/13    Full code    Dispo - IV abx, npo for left thoracocentesis for stefani, restart DAPT and DVT proph once of from IR perspective ,  daily OOB --> chair, patient will need ANA MARÍA due to weakness and deconditioning

## 2020-09-14 NOTE — PROGRESS NOTE ADULT - SUBJECTIVE AND OBJECTIVE BOX
Patient is a 59y Female with no new events reported. Seen earlier, error note now      MEDICATIONS  (STANDING):  ALBUTerol    90 MICROgram(s) HFA Inhaler 2 Puff(s) Inhalation every 6 hours  aspirin  chewable 81 milliGRAM(s) Oral daily  atorvastatin 40 milliGRAM(s) Oral at bedtime  budesonide  80 MICROgram(s)/formoterol 4.5 MICROgram(s) Inhaler 2 Puff(s) Inhalation two times a day  calcitriol   Capsule 0.25 MICROGram(s) Oral daily  clopidogrel Tablet 75 milliGRAM(s) Oral daily  dextrose 5%. 1000 milliLiter(s) (50 mL/Hr) IV Continuous <Continuous>  dextrose 50% Injectable 12.5 Gram(s) IV Push once  dextrose 50% Injectable 25 Gram(s) IV Push once  dextrose 50% Injectable 25 Gram(s) IV Push once  ferrous    sulfate 325 milliGRAM(s) Oral daily  fluconAZOLE   Tablet 100 milliGRAM(s) Oral daily  furosemide    Tablet 20 milliGRAM(s) Oral daily  furosemide   Injectable 40 milliGRAM(s) IV Push once  guaiFENesin ER 1200 milliGRAM(s) Oral every 12 hours  heparin   Injectable 5000 Unit(s) SubCutaneous every 8 hours  insulin lispro (HumaLOG) corrective regimen sliding scale   SubCutaneous three times a day before meals  insulin lispro (HumaLOG) corrective regimen sliding scale   SubCutaneous at bedtime  ipratropium 17 MICROgram(s) HFA Inhaler 1 Puff(s) Inhalation every 6 hours  lactobacillus acidophilus 1 Tablet(s) Oral three times a day with meals  levothyroxine 125 MICROGram(s) Oral daily  meropenem  IVPB      meropenem  IVPB 500 milliGRAM(s) IV Intermittent every 12 hours  metoprolol tartrate 12.5 milliGRAM(s) Oral two times a day  potassium bicarbonate/potassium citrate 40 milliEquivalent(s) Oral once  ranolazine 500 milliGRAM(s) Oral two times a day    MEDICATIONS  (PRN):  acetaminophen   Tablet .. 650 milliGRAM(s) Oral every 4 hours PRN Mild Pain (1 - 3)  ALBUTerol    90 MICROgram(s) HFA Inhaler 2 Puff(s) Inhalation every 6 hours PRN Shortness of Breath and/or Wheezing  aluminum hydroxide/magnesium hydroxide/simethicone Suspension 30 milliLiter(s) Oral every 4 hours PRN Dyspepsia  dextrose 40% Gel 15 Gram(s) Oral once PRN Blood Glucose LESS THAN 70 milliGRAM(s)/deciliter  glucagon  Injectable 1 milliGRAM(s) IntraMuscular once PRN Glucose LESS THAN 70 milligrams/deciliter  ondansetron Injectable 4 milliGRAM(s) IV Push every 6 hours PRN Nausea  oxycodone    5 mG/acetaminophen 325 mG 1 Tablet(s) Oral every 6 hours PRN Moderate Pain (4 - 6)        T(C): , Max: 37 (09-15-20 @ 07:55)  T(F): , Max: 98.6 (09-15-20 @ 07:55)  HR: 96 (09-15-20 @ 09:35)  BP: 117/50 (09-15-20 @ 07:55)  BP(mean): --  RR: 18 (09-15-20 @ 07:55)  SpO2: 95% (09-15-20 @ 09:35)  Wt(kg): --    09-14 @ 07:01  -  09-15 @ 07:00  --------------------------------------------------------  IN: 0 mL / OUT: 1200 mL / NET: -1200 mL          PHYSICAL EXAM:    Constitutional: NAD, well-groomed, well-developed  HEENT: PERRLA, EOMI,  MMM  Neck: No LAD, No JVD  Respiratory: CTAB  Cardiovascular: S1 and S2, RRR  Gastrointestinal: BS+, soft, NT/ND  Extremities: No peripheral edema  Neurological: A/O x 3, no focal deficits  Psychiatric: Normal mood, normal affect  : No Black  Skin: No rashes  Access: Not applicable        LABS:                        8.6    6.50  )-----------( 229      ( 15 Sep 2020 06:25 )             26.9     15 Sep 2020 06:25    133    |  103    |  55     ----------------------------<  77     3.3     |  20     |  3.68   14 Sep 2020 07:26    132    |  103    |  62     ----------------------------<  77     3.3     |  22     |  3.85   13 Sep 2020 06:37    135    |  104    |  65     ----------------------------<  84     3.5     |  24     |  4.11   12 Sep 2020 06:20    133    |  103    |  72     ----------------------------<  73     3.8     |  20     |  4.41     Ca    7.9        15 Sep 2020 06:25  Ca    8.1        14 Sep 2020 07:26  Ca    8.2        13 Sep 2020 06:37  Ca    8.2        12 Sep 2020 06:20  Phos  4.2       13 Sep 2020 06:37  Mg     1.5       13 Sep 2020 06:37    TPro  5.4    /  Alb  1.2    /  TBili  0.5    /  DBili  x      /  AST  134    /  ALT  52     /  AlkPhos  115    15 Sep 2020 06:25  TPro  5.4    /  Alb  1.2    /  TBili  0.4    /  DBili  0.1    /  AST  98     /  ALT  39     /  AlkPhos  100    14 Sep 2020 07:26      Creatine Kinase, Serum: 867 U/L <H> [26 - 192] (09-15 @ 06:25)      Urine Studies:          RADIOLOGY & ADDITIONAL STUDIES:

## 2020-09-14 NOTE — PROGRESS NOTE ADULT - ATTENDING COMMENTS
patient seen and examined with PA student Stephen Calle was physically present for the key portions of the evaluation and management (E/M) service provided.  I agree with the above history, physical, and plan which I have reviewed and edited where appropriate.  - case d/w cardio  - s/p thoracentesis will f/u fluid analysis  - pulm following  - start lasix for LE edema r/o DVT check LE dopplers  - start diflucan for thrush

## 2020-09-14 NOTE — PROGRESS NOTE ADULT - PROBLEM SELECTOR PLAN 1
- No chest pain; occluded RCA of unknown chronicity  - Continue medical management with Aspirin, Plavix, Atorvastatin, BB  - No further cardiac intervention indicated at this time.

## 2020-09-14 NOTE — PROGRESS NOTE ADULT - ASSESSMENT
60yo/F with PMH CAD, PAD s/p fem-pop bypass s/p RT iliac stent, diabetes, hypothyroidism, hyperlipidemia, current active smoker, uses cocaine, chronic back pain s/p lumbar surgery presented on 8/23/2020 with multiple non-specific complaints, including fevers, hospital course complicated by acute kidney failure, intubation, pressor requirement, diarrhea, and now  pneumonia with occlusion of left mainstem bronchus, improved on most recent CXR  1. hospital-acquired pneumonia, with large obstructing left mainstem plug:  - continue supplemental oxygen  - continue Mucinex  - continue chest  physiotherapy  - continue meropenem  - continue albuterol, ipratropium  - will need bronchoscopy if chest physiotherapy fails to clear secretions but given improvement in CXR will defer for now  - chest x-ray on 9/11 reveals improved atelectasis and better visualization of left mainstem bronchus when compared to  film of CT scan from September 9, 2020  - continue Symbicort  2. septic shock from E coli bacteremia: Status post antibiotics  3. Diarrhea: C diff negative.  GI following  4. Abdominal pain: Had abdominal ultrasound.  GI following  5. Pleural effusions:   unable to diurese given creatinine elevation to 3.85. will need thoracentesis

## 2020-09-14 NOTE — PROGRESS NOTE ADULT - PROBLEM SELECTOR PROBLEM 1
STEMI (ST elevation myocardial infarction)
Fatigue
STEMI (ST elevation myocardial infarction)
STEMI (ST elevation myocardial infarction)
Septic shock

## 2020-09-14 NOTE — PROGRESS NOTE ADULT - PROBLEM SELECTOR PLAN 2
Likely due to deconditioning from prolonged bedrest.    TSH markedly elevated but T4 normal consider endo evaluation.  No cardiac signs/symptoms: no dyspnea, no chest pain.  - Echo EF 50-55% / BNP 9192 / CXR with Mild L pleural effusion. Thoracentesis today, restart plavix when safe from surgical standpoint (no stent was placed during cath - RCA thrombosis medically managed).

## 2020-09-14 NOTE — PROGRESS NOTE ADULT - SUBJECTIVE AND OBJECTIVE BOX
Cheif complaints - weakness, dyspnea, cough, abdominal pain    58yo female with PMH CAD, PAD s/p fem-pop bypass s/p RT iliac stent, DM2, HLD, hypothyroidism, current active smoker, uses cocaine, chronic back pain s/p lumbar surgery presented on 8/23/2020 with multiple non-specific complaints, including fevers, weakness, poor po intake, diffuse abdominal pain, diarrhea. She found to have STEMI s/p LHC with RCA occlusion , E coli sepsis, cystitis, ischemic colitis.    hospital course complicated by acute kidney failure, intubation, pressor requirement, diarrhea, and now  pneumonia with occlusion of left mainstem bronchus    9/11 - pt seen and examined, chart reviewed, pt reports felling weak, + abdominal distension, + dyspnea, + poor po intake, cough, afebrile, denies HA, CP, dizziness, POC discussed   9/12 - pt seen and examined, + lose bm, + diffuse abdominal discomfort, + gen weakness, afebrile, pOC discussed    9/13 - pt seen and examined, BM better, lower abdominal pain, + weakness, appetite improving , afebrile, POC discussed   9/14 - No new complaints     ROS:   All 10 systems reviewed and found to be negative with the exception of what has been described above.    Vital Signs Last 24 Hrs  T(C): 36.7 (14 Sep 2020 08:42), Max: 36.7 (13 Sep 2020 21:30)  T(F): 98 (14 Sep 2020 08:42), Max: 98 (13 Sep 2020 21:30)  HR: 83 (14 Sep 2020 08:42) (83 - 94)  BP: 109/44 (14 Sep 2020 08:42) (108/52 - 123/51)  BP(mean): --  RR: 18 (14 Sep 2020 08:42) (18 - 18)  SpO2: 100% (14 Sep 2020 08:42) (90% - 100%)    GEN: lying in bed, NAD  HEENT:   NC/AT, pupils equal and reactive, EOMI  CV:  +S1, +S2, RRR  RESP:   lungs clear to auscultation bilaterally, no wheeze, rales, rhonchi   BREAST:  not examined  GI:  abdomen soft, non-tender, non-distended, normoactive BS  RECTAL:  not examined  :  not examined  MSK:   normal muscle tone  EXT:  no edema  NEURO:  AAOX3, no focal neurological deficits  SKIN:  no rashes                              8.0    4.59  )-----------( 185      ( 14 Sep 2020 07:26 )             24.7     09-14    132<L>  |  103  |  62<H>  ----------------------------<  77  3.3<L>   |  22  |  3.85<H>    Ca    8.1<L>      14 Sep 2020 07:26  Phos  4.2     09-13  Mg     1.5     09-13    TPro  5.4<L>  /  Alb  1.2<L>  /  TBili  0.4  /  DBili  0.1  /  AST  98<H>  /  ALT  39  /  AlkPhos  100  09-14    CARDIAC MARKERS ( 14 Sep 2020 07:26 )  x     / x     / 683 U/L / x     / x      CARDIAC MARKERS ( 13 Sep 2020 06:37 )  x     / x     / 477 U/L / x     / x          LIVER FUNCTIONS - ( 14 Sep 2020 07:26 )  Alb: 1.2 g/dL / Pro: 5.4 gm/dL / ALK PHOS: 100 U/L / ALT: 39 U/L / AST: 98 U/L / GGT: x           PT/INR - ( 14 Sep 2020 07:26 )   PT: 15.1 sec;   INR: 1.31 ratio        < from: 12 Lead ECG (08.25.20 @ 00:12) >  Ventricular Rate 68 BPM    Atrial Rate 68 BPM    P-R Interval 134 ms    QRS Duration 98 ms    Q-T Interval 474 ms    QTC Calculation(Bezet) 504 ms    P Axis 42 degrees    R Axis 77 degrees    T Axis 76 degrees    Diagnosis Line Normal sinus rhythm  ST elevation consider inferior injury or acute infarct  Prolonged QT  *** ** ** ** * ACUTE MI  ** ** ** **  Abnormal ECG    < end of copied text >    < from: US Abdomen Complete (US Abdomen Complete .) (09.13.20 @ 08:30) >  Liver: Within normal limits.  Bile ducts: Normal caliber. Common bile duct measures 7 mm.  Gallbladder: Removed.  Pancreas: Visualized portions are within normal limits.  Spleen: 12.7 cm. Within normal limits.  Right kidney: 10.5 cm. No hydronephrosis.  Left kidney: 11.0 cm.  No hydronephrosis. There is a 1.0 cm cyst.  Ascites: Trace amount of perihepatic ascites.  Aorta and IVC: Visualized portions are within normal limits.    There are bilateral pleural effusions.    IMPRESSION:  Cholecystectomy.    Trace perihepatic ascites.    Bilateral pleural effusions.    < end of copied text >  Troponin I, Serum: 1.990: High Sensitivity Troponin and new reference  range effective 7/6/2016 ng/mL (08.25.20 @ 02:26)    < from: TTE Echo Complete w/o Contrast w/ Doppler (08.27.20 @ 11:04) >   The left ventricle size is normal. The inferior wall appears severely   hypokinetic..   Estimated left ventricular ejection fraction is 50 %.   Paradoxical septal wall motion during inspiration is noted.    < end of copied text >  < from: Cardiac Cath Lab - Adult (08.23.20 @ 09:33) >   Mid RCA: 100% stenosis.     Impression     Diagnostic Conclusions     Acute occlusion of the RCA of unknown duration. No PCI due to lack of   ischemic symptoms, sepsis, acute renal failure and satisfactory left   coronary anatomy.  < end of copied text >    < from: Xray Chest 1 View- PORTABLE-Routine (09.11.20 @ 07:47) >  FINDINGS:  The lungs show LEFT greater than RIGHT haziness indicating LEFT greater than RIGHT pleural effusions and/or LEFT basilar airspace consolidation. Upper lobes clear. No pneumothorax.    The heart and mediastinum are within normal limits.    Visualized osseous structures are intact.  IMPRESSION:   LEFT greater than RIGHT/pleural effusions and/or a LEFT lower lobe retrocardiac airspace consolidation.      < end of copied text >  < from: CT Chest No Cont (09.09.20 @ 15:52) >  LUNGS AND AIRWAYS: Mucosal impaction of the left mainstem bronchus. Near complete atelectasis of the left lower lobe. Significant segmental atelectasis in the lingula and anterior left upper lobe. Right basilar compressive atelectasis. Additional scattered peripheral groundglass opacities.  Paraseptal emphysema bilaterally.  PLEURA: No moderate to large left pleural effusion. Mild to moderate right pleural effusion.  MEDIASTINUM AND TATIANA: No lymphadenopathy.  VESSELS: Atherosclerotic changes of the aorta and coronary vasculature. No aortic aneurysm.  HEART: Heart size is normal. No pericardial effusion.  CHEST WALL AND LOWER NECK: Extensive anasarca.  VISUALIZED UPPER ABDOMEN: Prior cholecystectomy.  BONES: Old right lateral sixth and seventh rib fractures. Degenerative changes.    IMPRESSION:  Extensive mucosal impaction of the left mainstem bronchus extending into left upper lobe and left lower lobe segmental bronchi with near complete atelectasis left lower lobe and multifocal segmental atelectasis and/or pneumonia in the left upper lobe. Moderate to large left pleural effusion with a mild to moderate right pleural effusion with bibasilar compressive atelectasis.    Additional scattered groundglass opacities bilaterally.    < from: CT Abdomen and Pelvis w/ Oral Cont (09.08.20 @ 12:18) >  LOWER CHEST: Interval progression of left lung collapse with more loculated pleural effusion. Slightly decreased right pleural effusion and passive atelectasis. Scattered groundglass opacities in the right middle lobe and right lower lobe, new since prior (2-8).    < end of copied text >    LIVER: Within normal limits.  BILE DUCTS: Normal caliber.  GALLBLADDER: Cholecystectomy clips.  SPLEEN: Within normal limits.  PANCREAS: Within normal limits.  ADRENALS: Within normal limits.  KIDNEYS/URETERS: Within normal limits.    BLADDER: Small air in the nondependent portion of the bladder. Please correlate with instrumentation history.  REPRODUCTIVE ORGANS: Unremarkable.    BOWEL: No bowel obstruction. Appendix is not visualized. Mild diffuse wall thickening of the sigmoid (602-21), slightly more prominent since prior.. Interval removal of rectal tube.  PERITONEUM: Trace ascites in the cul-de-sac. No pneumoperitoneum.  VESSELS: Extensive atherosclerotic calcification.  RETROPERITONEUM/LYMPH NODES: No lymphadenopathy.  ABDOMINAL WALL: Diffuse anasarca without significant interval change.  BONES: No change of surgical hardware and grade 1 spondylolisthesis of L4 and L5. No acute abnormalities.    IMPRESSION:  Interval progression of left lung collapse with interval loculation of left effusion.    New scattered groundglass opacities in the right middle and lower lobes, likely indicating atypical infection including viral infection..    Mild diffuse wall thickening of the sigmoid, slightly more prominent since prior, which may indicate nonspecific colitis.    Otherwise no change.    < end of copied text >    < from: US Duplex Arterial Upper Ext Ltd, Left (09.07.20 @ 20:34) >  IMPRESSION:  No occlusion no stenosis  Monophasic flow throughout the left upper extremity    < end of copied text >    < from: CT Head No Cont (08.27.20 @ 01:23) >  FINDINGS:  There is motion artifact present which degrades image quality.    There is no gross acute intracranial hemorrhage, mass effect from vasogenic edema or evidence of acute large vascular territory infarct. Stable mild chronic microvascular changes as manifested by patchy areas of low-attenuation in the bihemispheric white matter.    The ventricles, sulci and cisternal spaces are stable in size and configuration.  There is no midline shift or abnormal extra-axial fluid collection.    The calvarium is intact.  There are no osteoblasticor lytic calvarial or skull base lesions.  The paranasal sinuses and mastoid air cells are clear.    IMPRESSION:  Mildly motion degraded exam. Grossly stable exam without evidence of acute intracranial hemorrhage or vasogenic edema. Chronic findings as above.    < end of copied text >

## 2020-09-14 NOTE — PROGRESS NOTE ADULT - PROBLEM SELECTOR PROBLEM 2
Fatigue
Essential hypertension
Fatigue
STEMI (ST elevation myocardial infarction)

## 2020-09-14 NOTE — PROGRESS NOTE ADULT - ASSESSMENT
59 CAD, PAD s/p fem-pop bypass s/p RT iliac stent, diabetes, hypothyroidism, hyperlipidemia, current active smoker, uses cocaine last use yesterday, chronic back pain s/p lumbar surgery presented with multiple non-specific complaints found to have ST elevations on presenting EKG, code STEMI called and pt underwent urgent angiogram showing occluded RCA w no intervention with UTI sepsis and E. coli bacteremia POA w hospital course c/b code blue.     AMISH w septic shock/UTI w STEMI and Code blue event w hypotension   Cr was improving steadily , monitor uop  optimize intake, very poor albumin    Hypocalcemia - Ca improving  On Calcum acetate with feeds for elevated phos/low ca++   Intact PTH: 87:, phos ok  Vitamin D, 25-Hydroxy: 27.8  Calcitriol - Vit D analogue     d/c with staff

## 2020-09-14 NOTE — PROGRESS NOTE ADULT - PROBLEM SELECTOR PLAN 4
- Cont. Ranexa
advised the patient to quit smoking and cocaine.
s/p fem pop bypass , prior stent right    continue medication  encourage po medication  quit smoking
s/p fem pop bypass , prior stent right    continue medication  encourage po medication  quit smoking
- Cont. Ranexa

## 2020-09-14 NOTE — PROGRESS NOTE ADULT - SUBJECTIVE AND OBJECTIVE BOX
58 y/o female w/ PMHx significant for CAD, PAD (fem-pop bypass, iliac stent), diabetes, hypothyroidism, hyperlipidemia, smoker, cocaine use,   chronic back pain and past lumbar surgery admitted on  with multiple complaints (including fever, abd pain, diarrhea) -- found to have ST elevations on ECG with urgent cath revealing an occluded RCA (no PCI) -- subsequently diagnosed with E coli sepsis with shock; hospitalization complicated by PEA arrest.    : c/o sob at rest and exertion, intermittent CP with inspiration   20: feels better s/p thoracocentesis but still fatigued & weak.  No  PND, orthopnea, LE edema      MEDICATIONS:      INPATIENT  MEDICATIONS  (STANDING):  ALBUTerol    90 MICROgram(s) HFA Inhaler 2 Puff(s) Inhalation every 6 hours  atorvastatin 40 milliGRAM(s) Oral at bedtime  budesonide  80 MICROgram(s)/formoterol 4.5 MICROgram(s) Inhaler 2 Puff(s) Inhalation two times a day  calcitriol   Capsule 0.25 MICROGram(s) Oral daily  dextrose 5%. 1000 milliLiter(s) (50 mL/Hr) IV Continuous <Continuous>  dextrose 50% Injectable 12.5 Gram(s) IV Push once  dextrose 50% Injectable 25 Gram(s) IV Push once  dextrose 50% Injectable 25 Gram(s) IV Push once  ferrous    sulfate 325 milliGRAM(s) Oral daily  guaiFENesin ER 1200 milliGRAM(s) Oral every 12 hours  insulin lispro (HumaLOG) corrective regimen sliding scale   SubCutaneous three times a day before meals  insulin lispro (HumaLOG) corrective regimen sliding scale   SubCutaneous at bedtime  ipratropium 17 MICROgram(s) HFA Inhaler 1 Puff(s) Inhalation every 6 hours  lactobacillus acidophilus 1 Tablet(s) Oral three times a day with meals  levothyroxine 125 MICROGram(s) Oral daily  meropenem  IVPB      meropenem  IVPB 500 milliGRAM(s) IV Intermittent every 12 hours  metoprolol tartrate 12.5 milliGRAM(s) Oral two times a day  ranolazine 500 milliGRAM(s) Oral two times a day    Vital Signs Last 24 Hrs  T(C): 36.7 (14 Sep 2020 08:42), Max: 36.7 (13 Sep 2020 21:30)  T(F): 98 (14 Sep 2020 08:42), Max: 98 (13 Sep 2020 21:30)  HR: 98 (14 Sep 2020 13:22) (83 - 98)  BP: 109/44 (14 Sep 2020 08:42) (108/52 - 123/51)  BP(mean): --  RR: 18 (14 Sep 2020 08:42) (18 - 18)  SpO2: 93% (14 Sep 2020 13:22) (90% - 100%)Daily     Daily Weight in k.9 (14 Sep 2020 05:00)I&O's Summary    PHYSICAL EXAM:    Constitutional: NAD, feels tired  HEENT: PERR, EOMI,   Neck:  supple,  No JVD  Respiratory: Breath sounds are clear bilaterally, No wheezing, rales or rhonchi  Cardiovascular: S1 and S2, regular rate and rhythm, no Murmurs, gallops or rubs  Gastrointestinal: Bowel Sounds present, soft, nontender.   Extremities: No peripheral edema.   Vascular: 2+ peripheral pulses  Neurological: A/O x 3, no focal deficits      LABS: All Labs Reviewed:                        8.0    4.59  )-----------( 185      ( 14 Sep 2020 07:26 )             24.7                         8.1    4.69  )-----------( 197      ( 13 Sep 2020 06:37 )             25.6                         8.4    5.51  )-----------( 177      ( 12 Sep 2020 06:20 )             26.0     14 Sep 2020 07:26    132    |  103    |  62     ----------------------------<  77     3.3     |  22     |  3.85   13 Sep 2020 06:37    135    |  104    |  65     ----------------------------<  84     3.5     |  24     |  4.11   12 Sep 2020 06:20    133    |  103    |  72     ----------------------------<  73     3.8     |  20     |  4.41     Ca    8.1        14 Sep 2020 07:26  Ca    8.2        13 Sep 2020 06:37  Ca    8.2        12 Sep 2020 06:20  Phos  4.2       13 Sep 2020 06:37  Mg     1.5       13 Sep 2020 06:37    TPro  5.4    /  Alb  1.2    /  TBili  0.4    /  DBili  0.1    /  AST  98     /  ALT  39     /  AlkPhos  100    14 Sep 2020 07:26    PT/INR - ( 14 Sep 2020 07:26 )   PT: 15.1 sec;   INR: 1.31 ratio       CARDIAC MARKERS ( 14 Sep 2020 07:26 )  x     / x     / 683 U/L / x     / x      CARDIAC MARKERS ( 13 Sep 2020 06:37 )  x     / x     / 477 U/L / x     / x        tThyroid Stimulating Hormone, Serum in AM (20 @ 06:20)   Thyroid Stimulating Hormone, Serum: 22.90 uU/mL   yrFree Thyroxine, Serum in AM (20 @ 06:20)   Free Thyroxine, Serum: 0.76 ng/dL       RADIOLOGY:   < from: Xray Chest 1 View- PORTABLE-Routine (Xray Chest 1 View- PORTABLE-Routine in AM.) (20 @ 07:47) >  IMPRESSION:   LEFT greater than RIGHT/pleural effusions and/or a LEFT lower lobe retrocardiac airspace consolidation.  < end of copied text >    EKG:  < from: 12 Lead ECG (20 @ 00:12) >  Diagnosis Line Normal sinus rhythm  ST elevation consider inferior injury or acute infarct  Prolonged QT  *** ** ** ** * ACUTE MI  ** ** ** **      ECHO:  < from: TTE Echo Complete w/o Contrast w/ Doppler (20 @ 11:04) >   Impression     Summary     The left ventricle size is normal. The inferior wall appears severely   hypokinetic..   Estimated left ventricular ejection fraction is 50 %.   Paradoxical septal wall motion during inspiration is noted.  Left Atrium   Normal appearing left atrium.   Estimated RAP:5 mmHg   RVSP:30 mmHg   EA reversal of the mitral inflow consistent with reduced compliance of the   left ventricle.     LImited echo Sep '20 normal EF, normal diastolic dysfunction    CATH:  < from: Cardiac Cath Lab - Adult (20 @ 09:33) >  LMCA: Normal.  LAD: Abnormal.Moderate diffuse disease  LCx: Abnormal.Moderate diffuse diease  RCA:  Mid RCA: 100% stenosis.  Impression  Diagnostic Conclusions  Acute occlusion of the RCA of unknown duration. No PCI due to lack of  ischemic symptoms, sepsis, acute renal failure and satisfactory left  coronary anatomy.  Estimated Blood Loss:4ml.  No LV gram performed (likely d/t Cr 2-3 range)

## 2020-09-15 LAB
ALBUMIN SERPL ELPH-MCNC: 1.2 G/DL — LOW (ref 3.3–5)
ALP SERPL-CCNC: 115 U/L — SIGNIFICANT CHANGE UP (ref 40–120)
ALT FLD-CCNC: 52 U/L — SIGNIFICANT CHANGE UP (ref 12–78)
ANION GAP SERPL CALC-SCNC: 10 MMOL/L — SIGNIFICANT CHANGE UP (ref 5–17)
AST SERPL-CCNC: 134 U/L — HIGH (ref 15–37)
BILIRUB SERPL-MCNC: 0.5 MG/DL — SIGNIFICANT CHANGE UP (ref 0.2–1.2)
BUN SERPL-MCNC: 55 MG/DL — HIGH (ref 7–23)
CALCIUM SERPL-MCNC: 7.9 MG/DL — LOW (ref 8.5–10.1)
CHLORIDE SERPL-SCNC: 103 MMOL/L — SIGNIFICANT CHANGE UP (ref 96–108)
CK SERPL-CCNC: 867 U/L — HIGH (ref 26–192)
CO2 SERPL-SCNC: 20 MMOL/L — LOW (ref 22–31)
CREAT SERPL-MCNC: 3.68 MG/DL — HIGH (ref 0.5–1.3)
GLUCOSE SERPL-MCNC: 77 MG/DL — SIGNIFICANT CHANGE UP (ref 70–99)
HCT VFR BLD CALC: 26.9 % — LOW (ref 34.5–45)
HGB BLD-MCNC: 8.6 G/DL — LOW (ref 11.5–15.5)
MCHC RBC-ENTMCNC: 29 PG — SIGNIFICANT CHANGE UP (ref 27–34)
MCHC RBC-ENTMCNC: 32 GM/DL — SIGNIFICANT CHANGE UP (ref 32–36)
MCV RBC AUTO: 90.6 FL — SIGNIFICANT CHANGE UP (ref 80–100)
PLATELET # BLD AUTO: 229 K/UL — SIGNIFICANT CHANGE UP (ref 150–400)
POTASSIUM SERPL-MCNC: 3.3 MMOL/L — LOW (ref 3.5–5.3)
POTASSIUM SERPL-SCNC: 3.3 MMOL/L — LOW (ref 3.5–5.3)
PROT SERPL-MCNC: 5.4 GM/DL — LOW (ref 6–8.3)
RBC # BLD: 2.97 M/UL — LOW (ref 3.8–5.2)
RBC # FLD: 17.8 % — HIGH (ref 10.3–14.5)
SODIUM SERPL-SCNC: 133 MMOL/L — LOW (ref 135–145)
WBC # BLD: 6.5 K/UL — SIGNIFICANT CHANGE UP (ref 3.8–10.5)
WBC # FLD AUTO: 6.5 K/UL — SIGNIFICANT CHANGE UP (ref 3.8–10.5)

## 2020-09-15 PROCEDURE — 99233 SBSQ HOSP IP/OBS HIGH 50: CPT

## 2020-09-15 PROCEDURE — 93970 EXTREMITY STUDY: CPT | Mod: 26

## 2020-09-15 RX ORDER — POTASSIUM BICARBONATE 978 MG/1
40 TABLET, EFFERVESCENT ORAL ONCE
Refills: 0 | Status: DISCONTINUED | OUTPATIENT
Start: 2020-09-15 | End: 2020-09-15

## 2020-09-15 RX ORDER — POTASSIUM CHLORIDE 20 MEQ
40 PACKET (EA) ORAL ONCE
Refills: 0 | Status: COMPLETED | OUTPATIENT
Start: 2020-09-15 | End: 2020-09-15

## 2020-09-15 RX ORDER — FUROSEMIDE 40 MG
40 TABLET ORAL ONCE
Refills: 0 | Status: COMPLETED | OUTPATIENT
Start: 2020-09-15 | End: 2020-09-15

## 2020-09-15 RX ORDER — POTASSIUM CHLORIDE 20 MEQ
20 PACKET (EA) ORAL ONCE
Refills: 0 | Status: COMPLETED | OUTPATIENT
Start: 2020-09-15 | End: 2020-09-15

## 2020-09-15 RX ADMIN — Medication 1 PUFF(S): at 09:33

## 2020-09-15 RX ADMIN — MEROPENEM 100 MILLIGRAM(S): 1 INJECTION INTRAVENOUS at 11:43

## 2020-09-15 RX ADMIN — ALBUTEROL 2 PUFF(S): 90 AEROSOL, METERED ORAL at 14:27

## 2020-09-15 RX ADMIN — HEPARIN SODIUM 5000 UNIT(S): 5000 INJECTION INTRAVENOUS; SUBCUTANEOUS at 05:18

## 2020-09-15 RX ADMIN — RANOLAZINE 500 MILLIGRAM(S): 500 TABLET, FILM COATED, EXTENDED RELEASE ORAL at 10:12

## 2020-09-15 RX ADMIN — RANOLAZINE 500 MILLIGRAM(S): 500 TABLET, FILM COATED, EXTENDED RELEASE ORAL at 22:23

## 2020-09-15 RX ADMIN — Medication 20 MILLIEQUIVALENT(S): at 10:10

## 2020-09-15 RX ADMIN — Medication 1200 MILLIGRAM(S): at 22:23

## 2020-09-15 RX ADMIN — CALCITRIOL 0.25 MICROGRAM(S): 0.5 CAPSULE ORAL at 10:14

## 2020-09-15 RX ADMIN — Medication 20 MILLIGRAM(S): at 10:09

## 2020-09-15 RX ADMIN — Medication 325 MILLIGRAM(S): at 10:10

## 2020-09-15 RX ADMIN — Medication 1 TABLET(S): at 10:09

## 2020-09-15 RX ADMIN — OXYCODONE AND ACETAMINOPHEN 1 TABLET(S): 5; 325 TABLET ORAL at 06:00

## 2020-09-15 RX ADMIN — Medication 1200 MILLIGRAM(S): at 10:09

## 2020-09-15 RX ADMIN — OXYCODONE AND ACETAMINOPHEN 1 TABLET(S): 5; 325 TABLET ORAL at 16:08

## 2020-09-15 RX ADMIN — ATORVASTATIN CALCIUM 40 MILLIGRAM(S): 80 TABLET, FILM COATED ORAL at 22:24

## 2020-09-15 RX ADMIN — FLUCONAZOLE 100 MILLIGRAM(S): 150 TABLET ORAL at 10:16

## 2020-09-15 RX ADMIN — MEROPENEM 100 MILLIGRAM(S): 1 INJECTION INTRAVENOUS at 22:22

## 2020-09-15 RX ADMIN — CLOPIDOGREL BISULFATE 75 MILLIGRAM(S): 75 TABLET, FILM COATED ORAL at 10:16

## 2020-09-15 RX ADMIN — OXYCODONE AND ACETAMINOPHEN 1 TABLET(S): 5; 325 TABLET ORAL at 05:18

## 2020-09-15 RX ADMIN — HEPARIN SODIUM 5000 UNIT(S): 5000 INJECTION INTRAVENOUS; SUBCUTANEOUS at 22:23

## 2020-09-15 RX ADMIN — Medication 81 MILLIGRAM(S): at 10:16

## 2020-09-15 RX ADMIN — Medication 40 MILLIEQUIVALENT(S): at 16:09

## 2020-09-15 RX ADMIN — Medication 125 MICROGRAM(S): at 05:18

## 2020-09-15 RX ADMIN — HEPARIN SODIUM 5000 UNIT(S): 5000 INJECTION INTRAVENOUS; SUBCUTANEOUS at 15:00

## 2020-09-15 RX ADMIN — OXYCODONE AND ACETAMINOPHEN 1 TABLET(S): 5; 325 TABLET ORAL at 16:38

## 2020-09-15 RX ADMIN — Medication 40 MILLIGRAM(S): at 16:08

## 2020-09-15 RX ADMIN — Medication 12.5 MILLIGRAM(S): at 22:23

## 2020-09-15 RX ADMIN — ALBUTEROL 2 PUFF(S): 90 AEROSOL, METERED ORAL at 09:33

## 2020-09-15 RX ADMIN — BUDESONIDE AND FORMOTEROL FUMARATE DIHYDRATE 2 PUFF(S): 160; 4.5 AEROSOL RESPIRATORY (INHALATION) at 09:33

## 2020-09-15 RX ADMIN — Medication 1 PUFF(S): at 14:27

## 2020-09-15 RX ADMIN — Medication 12.5 MILLIGRAM(S): at 10:14

## 2020-09-15 RX ADMIN — BUDESONIDE AND FORMOTEROL FUMARATE DIHYDRATE 2 PUFF(S): 160; 4.5 AEROSOL RESPIRATORY (INHALATION) at 20:06

## 2020-09-15 NOTE — PROGRESS NOTE ADULT - SUBJECTIVE AND OBJECTIVE BOX
Patient is a 59y Female who had no new events. s/p lasix    REVIEW OF SYSTEMS:    CONSTITUTIONAL: No weakness, fevers or chills  RESPIRATORY: No cough, wheezing, hemoptysis; No shortness of breath  CARDIOVASCULAR: No chest pain or palpitations  GENITOURINARY: No dysuria, frequency or hematuria  All other review of systems is negative unless indicated above.    MEDICATIONS  (STANDING):  ALBUTerol    90 MICROgram(s) HFA Inhaler 2 Puff(s) Inhalation every 6 hours  aspirin  chewable 81 milliGRAM(s) Oral daily  atorvastatin 40 milliGRAM(s) Oral at bedtime  budesonide  80 MICROgram(s)/formoterol 4.5 MICROgram(s) Inhaler 2 Puff(s) Inhalation two times a day  calcitriol   Capsule 0.25 MICROGram(s) Oral daily  clopidogrel Tablet 75 milliGRAM(s) Oral daily  dextrose 5%. 1000 milliLiter(s) (50 mL/Hr) IV Continuous <Continuous>  dextrose 50% Injectable 12.5 Gram(s) IV Push once  dextrose 50% Injectable 25 Gram(s) IV Push once  dextrose 50% Injectable 25 Gram(s) IV Push once  ferrous    sulfate 325 milliGRAM(s) Oral daily  fluconAZOLE   Tablet 100 milliGRAM(s) Oral daily  furosemide    Tablet 20 milliGRAM(s) Oral daily  guaiFENesin ER 1200 milliGRAM(s) Oral every 12 hours  heparin   Injectable 5000 Unit(s) SubCutaneous every 8 hours  insulin lispro (HumaLOG) corrective regimen sliding scale   SubCutaneous three times a day before meals  insulin lispro (HumaLOG) corrective regimen sliding scale   SubCutaneous at bedtime  ipratropium 17 MICROgram(s) HFA Inhaler 1 Puff(s) Inhalation every 6 hours  lactobacillus acidophilus 1 Tablet(s) Oral three times a day with meals  levothyroxine 125 MICROGram(s) Oral daily  meropenem  IVPB      meropenem  IVPB 500 milliGRAM(s) IV Intermittent every 12 hours  metoprolol tartrate 12.5 milliGRAM(s) Oral two times a day  ranolazine 500 milliGRAM(s) Oral two times a day    MEDICATIONS  (PRN):  acetaminophen   Tablet .. 650 milliGRAM(s) Oral every 4 hours PRN Mild Pain (1 - 3)  ALBUTerol    90 MICROgram(s) HFA Inhaler 2 Puff(s) Inhalation every 6 hours PRN Shortness of Breath and/or Wheezing  aluminum hydroxide/magnesium hydroxide/simethicone Suspension 30 milliLiter(s) Oral every 4 hours PRN Dyspepsia  dextrose 40% Gel 15 Gram(s) Oral once PRN Blood Glucose LESS THAN 70 milliGRAM(s)/deciliter  glucagon  Injectable 1 milliGRAM(s) IntraMuscular once PRN Glucose LESS THAN 70 milligrams/deciliter  ondansetron Injectable 4 milliGRAM(s) IV Push every 6 hours PRN Nausea  oxycodone    5 mG/acetaminophen 325 mG 1 Tablet(s) Oral every 6 hours PRN Moderate Pain (4 - 6)        T(C): , Max: 37 (09-15-20 @ 07:55)  T(F): , Max: 98.6 (09-15-20 @ 07:55)  HR: 96 (09-15-20 @ 09:35)  BP: 117/50 (09-15-20 @ 07:55)  BP(mean): --  RR: 18 (09-15-20 @ 07:55)  SpO2: 95% (09-15-20 @ 09:35)  Wt(kg): --    09-14 @ 07:01  -  09-15 @ 07:00  --------------------------------------------------------  IN: 0 mL / OUT: 1200 mL / NET: -1200 mL          PHYSICAL EXAM:    Constitutional: Nad  HEENT: dry MM  Neck: No LAD, No JVD  Respiratory: dist BS  Cardiovascular: S1 and S2  Extremities: ++ peripheral edema  Neurological: A/O x 3  : No Black  Skin: No rashes  Access: Not applicable        LABS:                        8.6    6.50  )-----------( 229      ( 15 Sep 2020 06:25 )             26.9     15 Sep 2020 06:25    133    |  103    |  55     ----------------------------<  77     3.3     |  20     |  3.68   14 Sep 2020 07:26    132    |  103    |  62     ----------------------------<  77     3.3     |  22     |  3.85   13 Sep 2020 06:37    135    |  104    |  65     ----------------------------<  84     3.5     |  24     |  4.11   12 Sep 2020 06:20    133    |  103    |  72     ----------------------------<  73     3.8     |  20     |  4.41     Ca    7.9        15 Sep 2020 06:25  Ca    8.1        14 Sep 2020 07:26  Ca    8.2        13 Sep 2020 06:37  Ca    8.2        12 Sep 2020 06:20  Phos  4.2       13 Sep 2020 06:37  Mg     1.5       13 Sep 2020 06:37    TPro  5.4    /  Alb  1.2    /  TBili  0.5    /  DBili  x      /  AST  134    /  ALT  52     /  AlkPhos  115    15 Sep 2020 06:25  TPro  5.4    /  Alb  1.2    /  TBili  0.4    /  DBili  0.1    /  AST  98     /  ALT  39     /  AlkPhos  100    14 Sep 2020 07:26      Creatine Kinase, Serum: 867 U/L <H> [26 - 192] (09-15 @ 06:25)      Urine Studies:          RADIOLOGY & ADDITIONAL STUDIES:

## 2020-09-15 NOTE — PROGRESS NOTE ADULT - ASSESSMENT
58yo female with PMH CAD, PAD s/p fem-pop bypass s/p RT iliac stent, DM2, HLD, hypothyroidism, current active smoker, uses cocaine, chronic back pain s/p lumbar surgery presented on 8/23/2020 with multiple non-specific complaints, including fevers, weakness, poor po intake, diffuse abdominal pain, diarrhea. She found to have STEMI s/p LHC with RCA occlusion , E coli sepsis, cystitis, ischemic colitis.    hospital course complicated by acute kidney failure, intubation, pressor requirement, diarrhea, and now  pneumonia with occlusion of left mainstem bronchus    # Acute hypoxic respiratory failure  secondary to septic shock   Hospital acquired PNA with  Mucosal impaction of left main bronchus , complete atelectasis of LLL   COPD , Nicotine dependence   Bilateral pleural effusion L >R  COVID-19 ruled out   -s/p intubation /extubation  -acute , recurrent hypoxic respiratory failure >> patient requiring supplemental o2 3 L  -CT chest shows large mucous impaction left main bronchus >> pulmonary consult  -c/w mucinex, chest PT, albuterol, atrovent; c/w incentive spirometry  -c/w Meropenem, D/w Dr. Mesa  - may benefit from pleural effusion drainage - thoracentesis 9/14  - d/w Dr. Hernandez cardiology - ok to Hold asa, plavix for thoracocentesis  - IR consult for Left thoracocentesis diagnostic and therapeutic , plan for Monday - NPO, hold heparin, ASA, plavix      #Septic shock secondary to Ecoli  bacteremia, possible ischemic infectious colitis ,  diarrheal syndrome, resolved   Lower Abdominal pain   -colitis on CT scan , possible bacteremia due to ischemic colitis   -completed antibiotics.  days IV zosyn [id number] --> 9/4 and 4 days ceftin 9/5-9/8,  s/p rocephin 2gm daily #6, s/p IV flagyl #4  - GI consult - Dr. Hammer, IV abx, recent colonoscopy unremarkable , GI PCR and C diff neg   - stop betanechol, bentyl ( can cause abdominal cramps/lose bm)  , add probiotics, if lose bm persist will retest for c diff and GI PCR   - US abd 9/13 - trace of ascites, b/l pleural effusions`    #STEMI due to 100 % occlusion of RCA , PAD s/p LE stents   -Echo show hypokinesis on admission, troponins, st elevations   -Urgent coronary angiogram showed occluded RCA of unknown chronicity  -cardiology f/u noted; c/w DAPT - help for thoracocentesis, restart as soon as or from IR prespective  - statins atorvastatin 80--> 40 mg atorvastatin ,LDL 19, due to weakness , elevated CK  -  ranexa 1000--> 500 bid due to renal dysfunction  - c/w metoprolol 12.5 bid      #AMISH w septic shock/stemi/hypotension    Urinary retention s/p Black , failed trial of void 9/10  - bethanechol, d/c bentyl can cause urinary retention  -  s/p IV diuresis with worsening of renal function  - nephrology consult     #Anemia normocytic , mixed type ACD and iron deficiency   - check B12, folate, add iron daily ,  monitor    #Hypocalcemia, vitamin D deficiency    Intact PTH: 87, Vitamin D, 25-Hydroxy: 27.8, Magnesium, Serum: 1.8 mg/dL, c/w Calcitriol - Vit D analogue     #Hypomagnesemia - replace, repeat in am    #Hypothyroidism- home dose 112 mcg--> 125 mcg , increased due to elevated  TSH 22    #DM2 with A1C 8.1 - FBG monitoring , ISS     #Generalized weakness, multifactorial  anemia, uncontrolled hypothyroidism, deconditioning, anasarca, renal and respiratory failure, STEMi  - PT daily, OOB to chair daily, c/w vit D   - elevated CK with low LDL - > lower dose of statin atorvastatin 80--> 40 can cause weakness in elderly       Advanced directives   HCP daughter Antoinette [phone] updated 9/11/20 , 9/13    Full code    Dispo - IV abx, npo for left thoracocentesis for stefani, restart DAPT and DVT proph once of from IR perspective ,  daily OOB --> chair, patient will need ANA MARÍA due to weakness and deconditioning    60yo female with PMH CAD, PAD s/p fem-pop bypass s/p RT iliac stent, DM2, HLD, hypothyroidism, current active smoker, uses cocaine, chronic back pain s/p lumbar surgery presented on 8/23/2020 with multiple non-specific complaints, including fevers, weakness, poor po intake, diffuse abdominal pain, diarrhea. She found to have STEMI s/p LHC with RCA occlusion , E coli sepsis, cystitis, ischemic colitis.    hospital course complicated by acute kidney failure, intubation, pressor requirement, diarrhea, and now  pneumonia with occlusion of left mainstem bronchus    # Acute hypoxic respiratory failure  secondary to septic shock   Hospital acquired PNA with  Mucosal impaction of left main bronchus , complete atelectasis of LLL   COPD , Nicotine dependence   Bilateral pleural effusion L >R  COVID-19 ruled out   -s/p intubation /extubation  -acute , recurrent hypoxic respiratory failure >> patient requiring supplemental o2 3 L  -CT chest shows large mucous impaction left main bronchus >> pulmonary followin g  -c/w mucinex, chest PT, albuterol, atrovent; c/w incentive spirometry  -c/w Meropenem, D/w Dr. Mesa  - may benefit from pleural effusion drainage - left thoracentesis 9/14  - d/w Dr. Hernandez cardiology - ok to Hold asa, plavix for thoracentesis - now resumed    #Septic shock secondary to Ecoli  bacteremia, possible ischemic infectious colitis ,  diarrheal syndrome, resolved   Lower Abdominal pain   -colitis on CT scan , possible bacteremia due to ischemic colitis   -completed antibiotics.  days IV zosyn [id number] --> 9/4 and 4 days ceftin 9/5-9/8,  s/p rocephin 2gm daily #6, s/p IV flagyl #4  - GI consult - Dr. Hammer, IV abx, recent colonoscopy unremarkable , GI PCR and C diff neg   - stop betanechol, bentyl ( can cause abdominal cramps/lose bm)  , add probiotics, if lose bm persist will retest for c diff and GI PCR   - US abd 9/13 - trace of ascites, b/l pleural effusions`    #STEMI due to 100 % occlusion of RCA , PAD s/p LE stents   -Echo show hypokinesis on admission, troponins, st elevations   -Urgent coronary angiogram showed occluded RCA of unknown chronicity  -cardiology f/u noted; c/w DAPT - help for thoracocentesis, restart as soon as or from IR prespective  - statins atorvastatin 80--> 40 mg atorvastatin ,LDL 19, due to weakness , elevated CK  -  ranexa 1000--> 500 bid due to renal dysfunction  - c/w metoprolol 12.5 bid      #AMISH w septic shock/stemi/hypotension    Urinary retention s/p Black , failed trial of void 9/10  - bethanechol, d/c bentyl can cause urinary retention  -  s/p IV diuresis with worsening of renal function  - nephrology consult     #Anemia normocytic , mixed type ACD and iron deficiency   - check B12, folate, add iron daily ,  monitor    #Hypocalcemia, vitamin D deficiency    Intact PTH: 87, Vitamin D, 25-Hydroxy: 27.8, Magnesium, Serum: 1.8 mg/dL, c/w Calcitriol - Vit D analogue     #Hypomagnesemia - replace, repeat in am    #Hypothyroidism- home dose 112 mcg--> 125 mcg , increased due to elevated  TSH 22    #DM2 with A1C 8.1 - FBG monitoring , ISS     #Generalized weakness, multifactorial  anemia, uncontrolled hypothyroidism, deconditioning, anasarca, renal and respiratory failure, STEMi  - PT daily, OOB to chair daily, c/w vit D   - elevated CK with low LDL - > lower dose of statin atorvastatin 80--> 40 can cause weakness in elderly       Advanced directives   HCP daughter Antoinette [phone] updated 9/11/20 , 9/13, 9/15    Full code    Dispo - IV abx, chair, patient will need ANA MARÍA due to weakness and deconditioning

## 2020-09-15 NOTE — PROGRESS NOTE ADULT - ATTENDING COMMENTS
patient seen and examined with PA student Stephen Calle was physically present for the key portions of the evaluation and management (E/M) service provided.  I agree with the above history, physical, and plan which I have reviewed and edited where appropriate.  - case d/w cardio  - s/p thoracentesis will f/u fluid analysis  - pulm following  - start lasix for LE edema r/o DVT check LE dopplers  - start diflucan for thrush patient seen and examined with PA student Stephen Calle was physically present for the key portions of the evaluation and management (E/M) service provided.  I agree with the above history, physical, and plan which I have reviewed and edited where appropriate.  - pulm janeen noted. now s/p thoracentesis - transudate as per lights criteria  - cardio and renal following.  will give dose of IV lasix and supplement K  - LE swelling - start lasix. LE dopplers negative 9/15  - started diflucan for thrush 9/14  - continue meropenem as per ID til 9/17 and then plan for rehab

## 2020-09-15 NOTE — PROGRESS NOTE ADULT - ASSESSMENT
59 CAD, PAD s/p fem-pop bypass s/p RT iliac stent, diabetes, hypothyroidism, hyperlipidemia, current active smoker, uses cocaine last use yesterday, chronic back pain s/p lumbar surgery presented with multiple non-specific complaints found to have ST elevations on presenting EKG, code STEMI called and pt underwent urgent angiogram showing occluded RCA w no intervention with UTI sepsis and E. coli bacteremia POA w hospital course c/b code blue.     AMISH w septic shock/UTI w STEMI and Code blue event w hypotension   Cr stable, despite diuretic dose  Can trial standing lasix to optimize extravascular volume, with scant intake and poor albumin doubt it will have signficant clinical improvement  -Add glucerna or nepro  monitor renal function with hughes   bethenechol for bladder retention     Hypokalemia  -Replete po    Hypocalcemia - Ca improving  On Calcum acetate with feeds for elevated phos/low ca++   Intact PTH: 87:, phos ok  Vitamin D, 25-Hydroxy: 27.8  Calcitriol - Vit D analogue     d/c with Rn staff, Dr Barnes

## 2020-09-15 NOTE — PROGRESS NOTE ADULT - SUBJECTIVE AND OBJECTIVE BOX
SUBJECTIVE     Patient breathing comfortably on the nasal canula   she says able to cough up secretions   feels weak       PAST MEDICAL & SURGICAL HISTORY:  Lung nodule    Diabetes mellitus    History of TIAs    History of gallbladder disease  surgery    History of colon polyps  precancerous    Substance abuse  history of. last used 14 years ago.    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 Decreased ROM.    Angina pectoris    History of MRSA infection  left lower extremity incisional area 2015    Anxiety and depression    Transient cerebral ischemia, unspecified type    Osteoarthritis of spine, unspecified spinal osteoarthritis complication status, unspecified spinal region    Lumbar herniated disc    Urinary incontinence, unspecified type    Overactive bladder    Hiatal hernia with GERD  hiatal hernia repaired    PAD (peripheral artery disease)    Hyperlipidemia, unspecified hyperlipidemia type    Essential hypertension    Hypothyroidism    COPD (chronic obstructive pulmonary disease)    CAD (coronary artery disease)    History of lumbar fusion  with laminectomy 11/8/2018    H/O hernia repair  hiatal hernia - 2017, 2018    S/P dilatation and curettage  1991    H/O rhinoplasty  1980    H/O angioplasty  Right iliac artery. 5/12/2017    H/O ventral hernia repair  3/2017    History of incision and drainage  of lower extremity incisional site x 5 . last done5/2015    H/O arterial bypass of lower limb  Femoral - Popliteal. 11/2014 Left lower side with stents    History of laparoscopic cholecystectomy  2/2016      OBJECTIVE   Vital Signs Last 24 Hrs  T(C): 37 (15 Sep 2020 07:55), Max: 37 (15 Sep 2020 07:55)  T(F): 98.6 (15 Sep 2020 07:55), Max: 98.6 (15 Sep 2020 07:55)  HR: 96 (15 Sep 2020 09:35) (85 - 98)  BP: 117/50 (15 Sep 2020 07:55) (117/50 - 128/49)  BP(mean): --  RR: 18 (15 Sep 2020 07:55) (18 - 18)  SpO2: 95% (15 Sep 2020 09:35) (93% - 100%)    Review of systems   as dictated in the history of present illness with the review of other systems non contributory     PHYSICAL EXAM:  Constitutional: , awake and alert, not in distress and using the 02 by the nasal canula   HEENT: Normo cephalic atraumatic  Neck: Soft and supple, No J.V.D   Respiratory: vesicular breathing ,transmitted breath sounds with no wheeze   Cardiovascular: S1 and S2, regular rate .   Gastrointestinal:  soft, nontender,   Extremities: No  edema or calf tenderness .  Neurological: No new  focal deficits.    MEDICATIONS  (STANDING):  ALBUTerol    90 MICROgram(s) HFA Inhaler 2 Puff(s) Inhalation every 6 hours  aspirin  chewable 81 milliGRAM(s) Oral daily  atorvastatin 40 milliGRAM(s) Oral at bedtime  budesonide  80 MICROgram(s)/formoterol 4.5 MICROgram(s) Inhaler 2 Puff(s) Inhalation two times a day  calcitriol   Capsule 0.25 MICROGram(s) Oral daily  clopidogrel Tablet 75 milliGRAM(s) Oral daily  dextrose 5%. 1000 milliLiter(s) (50 mL/Hr) IV Continuous <Continuous>  dextrose 50% Injectable 12.5 Gram(s) IV Push once  dextrose 50% Injectable 25 Gram(s) IV Push once  dextrose 50% Injectable 25 Gram(s) IV Push once  ferrous    sulfate 325 milliGRAM(s) Oral daily  fluconAZOLE   Tablet 100 milliGRAM(s) Oral daily  furosemide    Tablet 20 milliGRAM(s) Oral daily  guaiFENesin ER 1200 milliGRAM(s) Oral every 12 hours  heparin   Injectable 5000 Unit(s) SubCutaneous every 8 hours  insulin lispro (HumaLOG) corrective regimen sliding scale   SubCutaneous three times a day before meals  insulin lispro (HumaLOG) corrective regimen sliding scale   SubCutaneous at bedtime  ipratropium 17 MICROgram(s) HFA Inhaler 1 Puff(s) Inhalation every 6 hours  lactobacillus acidophilus 1 Tablet(s) Oral three times a day with meals  levothyroxine 125 MICROGram(s) Oral daily  meropenem  IVPB      meropenem  IVPB 500 milliGRAM(s) IV Intermittent every 12 hours  metoprolol tartrate 12.5 milliGRAM(s) Oral two times a day  ranolazine 500 milliGRAM(s) Oral two times a day                            8.6    6.50  )-----------( 229      ( 15 Sep 2020 06:25 )             26.9     09-15    133<L>  |  103  |  55<H>  ----------------------------<  77  3.3<L>   |  20<L>  |  3.68<H>    Ca    7.9<L>      15 Sep 2020 06:25    TPro  5.4<L>  /  Alb  1.2<L>  /  TBili  0.5  /  DBili  x   /  AST  134<H>  /  ALT  52  /  AlkPhos  115  09-15      Culture - Fungal, Body Fluid (collected 13 Sep 2020 11:00)  Source: .Body Fluid Pleural Fluid  Preliminary Report (15 Sep 2020 06:12):    Testing in progress    Culture - Body Fluid with Gram Stain (collected 13 Sep 2020 11:00)  Source: .Body Fluid Pleural Fluid  Gram Stain (14 Sep 2020 19:55):    No polymorphonuclear cells seen per low power field    No organisms seen    by cytocentrifuge  Preliminary Report (15 Sep 2020 10:53):    No growth         < from: Xray Chest 1 View- PORTABLE-Routine (Xray Chest 1 View- PORTABLE-Routine .) (09.14.20 @ 11:24) >  XAM:  XR CHEST PORTABLE ROUTINE 1V                            PROCEDURE DATE:  09/14/2020          INTERPRETATION:  Portable chest radiograph    CLINICAL INFORMATION:   LEFT effusion. Follow-up.    TECHNIQUE:  Portable  AP view of the chest was obtained.    COMPARISON: 9/11/2020 available for review.    FINDINGS:  The lungs are clear of airspace consolidations or effusions. No pneumothorax.    The heart and mediastinum are within normal limits.    There are healed RIGHT posterior sixth and seventh rib fractures.    IMPRESSION:   No evidence of active chest disease.      < end of copied text >

## 2020-09-15 NOTE — PROGRESS NOTE ADULT - ASSESSMENT
58yo/F with PMH CAD, PAD s/p fem-pop bypass s/p RT iliac stent, diabetes, hypothyroidism, hyperlipidemia, current active smoker, uses cocaine, chronic back pain s/p lumbar surgery presented on 8/23/2020 with multiple non-specific complaints, including fevers, hospital course complicated by acute kidney failure, intubation, pressor requirement, diarrhea, and now  pneumonia with occlusion of left mainstem bronchus, improved on most recent CXR  1. hospital-acquired pneumonia, with large obstructing left mainstem plug:  - patient follow up cxr shows improved areation of the left lung with out need for any intervention complete the antibiotic therapy as per the I.D   - copd by the history continue with the symbicort and use of bronchodilator as needed for the wheezing   2. septic shock from E coli bacteremia: Status post antibiotics  3. Diarrhea: C diff negative.  GI following  4. Abdominal pain: Had abdominal ultrasound.  GI following  5. Pleural effusions:  thoracentesis was done and fluid is transudative by the light criteria and continue to follow up with the cardiology and fluid cultures so far negative   follow up cxr done shows no active lung disease monitor renal function closely

## 2020-09-15 NOTE — PROGRESS NOTE ADULT - SUBJECTIVE AND OBJECTIVE BOX
Cheif complaints - weakness, dyspnea, cough, abdominal pain    58yo female with PMH CAD, PAD s/p fem-pop bypass s/p RT iliac stent, DM2, HLD, hypothyroidism, current active smoker, uses cocaine, chronic back pain s/p lumbar surgery presented on 8/23/2020 with multiple non-specific complaints, including fevers, weakness, poor po intake, diffuse abdominal pain, diarrhea. She found to have STEMI s/p LHC with RCA occlusion , E coli sepsis, cystitis, ischemic colitis.    hospital course complicated by acute kidney failure, intubation, pressor requirement, diarrhea, and now  pneumonia with occlusion of left mainstem bronchus    9/11 - pt seen and examined, chart reviewed, pt reports felling weak, + abdominal distension, + dyspnea, + poor po intake, cough, afebrile, denies HA, CP, dizziness, POC discussed   9/12 - pt seen and examined, + lose bm, + diffuse abdominal discomfort, + gen weakness, afebrile, pOC discussed    9/13 - pt seen and examined, BM better, lower abdominal pain, + weakness, appetite improving , afebrile, POC discussed   9/14 - No new complaints   9/15 - Now new complaints     ROS:   All 10 systems reviewed and found to be negative with the exception of what has been described above.    Vital Signs Last 24 Hrs  T(C): 37 (15 Sep 2020 07:55), Max: 37 (15 Sep 2020 07:55)  T(F): 98.6 (15 Sep 2020 07:55), Max: 98.6 (15 Sep 2020 07:55)  HR: 96 (15 Sep 2020 09:35) (85 - 97)  BP: 117/50 (15 Sep 2020 07:55) (117/50 - 128/49)  BP(mean): --  RR: 18 (15 Sep 2020 07:55) (18 - 18)  SpO2: 95% (15 Sep 2020 09:35) (95% - 100%)    GEN: lying in bed, NAD  HEENT:   NC/AT, pupils equal and reactive, EOMI  CV:  +S1, +S2, RRR  RESP:   lungs clear to auscultation bilaterally, no wheeze, rales, rhonchi   BREAST:  not examined  GI:  abdomen soft, non-tender, non-distended, normoactive BS  RECTAL:  not examined  :  not examined  MSK:   normal muscle tone  EXT:  no edema  NEURO:  AAOX3, no focal neurological deficits  SKIN:  no rashes                          8.6    6.50  )-----------( 229      ( 15 Sep 2020 06:25 )             26.9     09-15    133<L>  |  103  |  55<H>  ----------------------------<  77  3.3<L>   |  20<L>  |  3.68<H>    Ca    7.9<L>      15 Sep 2020 06:25    TPro  5.4<L>  /  Alb  1.2<L>  /  TBili  0.5  /  DBili  x   /  AST  134<H>  /  ALT  52  /  AlkPhos  115  09-15    CARDIAC MARKERS ( 15 Sep 2020 06:25 )  x     / x     / 867 U/L / x     / x      CARDIAC MARKERS ( 14 Sep 2020 07:26 )  x     / x     / 683 U/L / x     / x          LIVER FUNCTIONS - ( 15 Sep 2020 06:25 )  Alb: 1.2 g/dL / Pro: 5.4 gm/dL / ALK PHOS: 115 U/L / ALT: 52 U/L / AST: 134 U/L / GGT: x           PT/INR - ( 14 Sep 2020 07:26 )   PT: 15.1 sec;   INR: 1.31 ratio                   < from: 12 Lead ECG (08.25.20 @ 00:12) >  Ventricular Rate 68 BPM    Atrial Rate 68 BPM    P-R Interval 134 ms    QRS Duration 98 ms    Q-T Interval 474 ms    QTC Calculation(Bezet) 504 ms    P Axis 42 degrees    R Axis 77 degrees    T Axis 76 degrees    Diagnosis Line Normal sinus rhythm  ST elevation consider inferior injury or acute infarct  Prolonged QT  *** ** ** ** * ACUTE MI  ** ** ** **  Abnormal ECG    < end of copied text >    < from: US Abdomen Complete (US Abdomen Complete .) (09.13.20 @ 08:30) >  Liver: Within normal limits.  Bile ducts: Normal caliber. Common bile duct measures 7 mm.  Gallbladder: Removed.  Pancreas: Visualized portions are within normal limits.  Spleen: 12.7 cm. Within normal limits.  Right kidney: 10.5 cm. No hydronephrosis.  Left kidney: 11.0 cm.  No hydronephrosis. There is a 1.0 cm cyst.  Ascites: Trace amount of perihepatic ascites.  Aorta and IVC: Visualized portions are within normal limits.    There are bilateral pleural effusions.    IMPRESSION:  Cholecystectomy.    Trace perihepatic ascites.    Bilateral pleural effusions.    < end of copied text >  Troponin I, Serum: 1.990: High Sensitivity Troponin and new reference  range effective 7/6/2016 ng/mL (08.25.20 @ 02:26)    < from: TTE Echo Complete w/o Contrast w/ Doppler (08.27.20 @ 11:04) >   The left ventricle size is normal. The inferior wall appears severely   hypokinetic..   Estimated left ventricular ejection fraction is 50 %.   Paradoxical septal wall motion during inspiration is noted.    < end of copied text >  < from: Cardiac Cath Lab - Adult (08.23.20 @ 09:33) >   Mid RCA: 100% stenosis.     Impression     Diagnostic Conclusions     Acute occlusion of the RCA of unknown duration. No PCI due to lack of   ischemic symptoms, sepsis, acute renal failure and satisfactory left   coronary anatomy.  < end of copied text >    < from: Xray Chest 1 View- PORTABLE-Routine (09.11.20 @ 07:47) >  FINDINGS:  The lungs show LEFT greater than RIGHT haziness indicating LEFT greater than RIGHT pleural effusions and/or LEFT basilar airspace consolidation. Upper lobes clear. No pneumothorax.    The heart and mediastinum are within normal limits.    Visualized osseous structures are intact.  IMPRESSION:   LEFT greater than RIGHT/pleural effusions and/or a LEFT lower lobe retrocardiac airspace consolidation.      < end of copied text >  < from: CT Chest No Cont (09.09.20 @ 15:52) >  LUNGS AND AIRWAYS: Mucosal impaction of the left mainstem bronchus. Near complete atelectasis of the left lower lobe. Significant segmental atelectasis in the lingula and anterior left upper lobe. Right basilar compressive atelectasis. Additional scattered peripheral groundglass opacities.  Paraseptal emphysema bilaterally.  PLEURA: No moderate to large left pleural effusion. Mild to moderate right pleural effusion.  MEDIASTINUM AND TATIANA: No lymphadenopathy.  VESSELS: Atherosclerotic changes of the aorta and coronary vasculature. No aortic aneurysm.  HEART: Heart size is normal. No pericardial effusion.  CHEST WALL AND LOWER NECK: Extensive anasarca.  VISUALIZED UPPER ABDOMEN: Prior cholecystectomy.  BONES: Old right lateral sixth and seventh rib fractures. Degenerative changes.    IMPRESSION:  Extensive mucosal impaction of the left mainstem bronchus extending into left upper lobe and left lower lobe segmental bronchi with near complete atelectasis left lower lobe and multifocal segmental atelectasis and/or pneumonia in the left upper lobe. Moderate to large left pleural effusion with a mild to moderate right pleural effusion with bibasilar compressive atelectasis.    Additional scattered groundglass opacities bilaterally.    < from: CT Abdomen and Pelvis w/ Oral Cont (09.08.20 @ 12:18) >  LOWER CHEST: Interval progression of left lung collapse with more loculated pleural effusion. Slightly decreased right pleural effusion and passive atelectasis. Scattered groundglass opacities in the right middle lobe and right lower lobe, new since prior (2-8).    < end of copied text >    LIVER: Within normal limits.  BILE DUCTS: Normal caliber.  GALLBLADDER: Cholecystectomy clips.  SPLEEN: Within normal limits.  PANCREAS: Within normal limits.  ADRENALS: Within normal limits.  KIDNEYS/URETERS: Within normal limits.    BLADDER: Small air in the nondependent portion of the bladder. Please correlate with instrumentation history.  REPRODUCTIVE ORGANS: Unremarkable.    BOWEL: No bowel obstruction. Appendix is not visualized. Mild diffuse wall thickening of the sigmoid (602-21), slightly more prominent since prior.. Interval removal of rectal tube.  PERITONEUM: Trace ascites in the cul-de-sac. No pneumoperitoneum.  VESSELS: Extensive atherosclerotic calcification.  RETROPERITONEUM/LYMPH NODES: No lymphadenopathy.  ABDOMINAL WALL: Diffuse anasarca without significant interval change.  BONES: No change of surgical hardware and grade 1 spondylolisthesis of L4 and L5. No acute abnormalities.    IMPRESSION:  Interval progression of left lung collapse with interval loculation of left effusion.    New scattered groundglass opacities in the right middle and lower lobes, likely indicating atypical infection including viral infection..    Mild diffuse wall thickening of the sigmoid, slightly more prominent since prior, which may indicate nonspecific colitis.    Otherwise no change.    < end of copied text >    < from: US Duplex Arterial Upper Ext Ltd, Left (09.07.20 @ 20:34) >  IMPRESSION:  No occlusion no stenosis  Monophasic flow throughout the left upper extremity    < end of copied text >    < from: CT Head No Cont (08.27.20 @ 01:23) >  FINDINGS:  There is motion artifact present which degrades image quality.    There is no gross acute intracranial hemorrhage, mass effect from vasogenic edema or evidence of acute large vascular territory infarct. Stable mild chronic microvascular changes as manifested by patchy areas of low-attenuation in the bihemispheric white matter.    The ventricles, sulci and cisternal spaces are stable in size and configuration.  There is no midline shift or abnormal extra-axial fluid collection.    The calvarium is intact.  There are no osteoblasticor lytic calvarial or skull base lesions.  The paranasal sinuses and mastoid air cells are clear.    IMPRESSION:  Mildly motion degraded exam. Grossly stable exam without evidence of acute intracranial hemorrhage or vasogenic edema. Chronic findings as above.    < end of copied text >

## 2020-09-16 LAB
ANION GAP SERPL CALC-SCNC: 8 MMOL/L — SIGNIFICANT CHANGE UP (ref 5–17)
BUN SERPL-MCNC: 58 MG/DL — HIGH (ref 7–23)
CALCIUM SERPL-MCNC: 7.4 MG/DL — LOW (ref 8.5–10.1)
CHLORIDE SERPL-SCNC: 103 MMOL/L — SIGNIFICANT CHANGE UP (ref 96–108)
CO2 SERPL-SCNC: 23 MMOL/L — SIGNIFICANT CHANGE UP (ref 22–31)
CREAT SERPL-MCNC: 3.59 MG/DL — HIGH (ref 0.5–1.3)
GLUCOSE SERPL-MCNC: 72 MG/DL — SIGNIFICANT CHANGE UP (ref 70–99)
HCT VFR BLD CALC: 25.7 % — LOW (ref 34.5–45)
HGB BLD-MCNC: 8.3 G/DL — LOW (ref 11.5–15.5)
MCHC RBC-ENTMCNC: 29.3 PG — SIGNIFICANT CHANGE UP (ref 27–34)
MCHC RBC-ENTMCNC: 32.3 GM/DL — SIGNIFICANT CHANGE UP (ref 32–36)
MCV RBC AUTO: 90.8 FL — SIGNIFICANT CHANGE UP (ref 80–100)
PLATELET # BLD AUTO: 234 K/UL — SIGNIFICANT CHANGE UP (ref 150–400)
POTASSIUM SERPL-MCNC: 3.7 MMOL/L — SIGNIFICANT CHANGE UP (ref 3.5–5.3)
POTASSIUM SERPL-SCNC: 3.7 MMOL/L — SIGNIFICANT CHANGE UP (ref 3.5–5.3)
RBC # BLD: 2.83 M/UL — LOW (ref 3.8–5.2)
RBC # FLD: 17.6 % — HIGH (ref 10.3–14.5)
SARS-COV-2 RNA SPEC QL NAA+PROBE: SIGNIFICANT CHANGE UP
SODIUM SERPL-SCNC: 134 MMOL/L — LOW (ref 135–145)
WBC # BLD: 7.01 K/UL — SIGNIFICANT CHANGE UP (ref 3.8–10.5)
WBC # FLD AUTO: 7.01 K/UL — SIGNIFICANT CHANGE UP (ref 3.8–10.5)

## 2020-09-16 PROCEDURE — 99232 SBSQ HOSP IP/OBS MODERATE 35: CPT

## 2020-09-16 RX ADMIN — Medication 12.5 MILLIGRAM(S): at 21:07

## 2020-09-16 RX ADMIN — Medication 1 PUFF(S): at 08:13

## 2020-09-16 RX ADMIN — Medication 650 MILLIGRAM(S): at 05:15

## 2020-09-16 RX ADMIN — Medication 325 MILLIGRAM(S): at 10:43

## 2020-09-16 RX ADMIN — RANOLAZINE 500 MILLIGRAM(S): 500 TABLET, FILM COATED, EXTENDED RELEASE ORAL at 21:39

## 2020-09-16 RX ADMIN — Medication 1200 MILLIGRAM(S): at 21:07

## 2020-09-16 RX ADMIN — ALBUTEROL 2 PUFF(S): 90 AEROSOL, METERED ORAL at 19:46

## 2020-09-16 RX ADMIN — ALBUTEROL 2 PUFF(S): 90 AEROSOL, METERED ORAL at 08:14

## 2020-09-16 RX ADMIN — BUDESONIDE AND FORMOTEROL FUMARATE DIHYDRATE 2 PUFF(S): 160; 4.5 AEROSOL RESPIRATORY (INHALATION) at 08:15

## 2020-09-16 RX ADMIN — Medication 650 MILLIGRAM(S): at 22:22

## 2020-09-16 RX ADMIN — Medication 1 TABLET(S): at 14:24

## 2020-09-16 RX ADMIN — Medication 12.5 MILLIGRAM(S): at 10:45

## 2020-09-16 RX ADMIN — BUDESONIDE AND FORMOTEROL FUMARATE DIHYDRATE 2 PUFF(S): 160; 4.5 AEROSOL RESPIRATORY (INHALATION) at 19:45

## 2020-09-16 RX ADMIN — Medication 650 MILLIGRAM(S): at 20:54

## 2020-09-16 RX ADMIN — Medication 1 PUFF(S): at 13:47

## 2020-09-16 RX ADMIN — Medication 1 TABLET(S): at 17:43

## 2020-09-16 RX ADMIN — ALBUTEROL 2 PUFF(S): 90 AEROSOL, METERED ORAL at 13:46

## 2020-09-16 RX ADMIN — Medication 1200 MILLIGRAM(S): at 10:45

## 2020-09-16 RX ADMIN — MEROPENEM 100 MILLIGRAM(S): 1 INJECTION INTRAVENOUS at 21:39

## 2020-09-16 RX ADMIN — Medication 1 TABLET(S): at 10:45

## 2020-09-16 RX ADMIN — MEROPENEM 100 MILLIGRAM(S): 1 INJECTION INTRAVENOUS at 10:45

## 2020-09-16 RX ADMIN — RANOLAZINE 500 MILLIGRAM(S): 500 TABLET, FILM COATED, EXTENDED RELEASE ORAL at 10:46

## 2020-09-16 RX ADMIN — Medication 1 PUFF(S): at 19:43

## 2020-09-16 RX ADMIN — ATORVASTATIN CALCIUM 40 MILLIGRAM(S): 80 TABLET, FILM COATED ORAL at 21:39

## 2020-09-16 RX ADMIN — CALCITRIOL 0.25 MICROGRAM(S): 0.5 CAPSULE ORAL at 10:43

## 2020-09-16 RX ADMIN — Medication 125 MICROGRAM(S): at 05:39

## 2020-09-16 RX ADMIN — Medication 650 MILLIGRAM(S): at 04:37

## 2020-09-16 NOTE — PROGRESS NOTE ADULT - ASSESSMENT
p59yo female with PMH CAD, PAD s/p fem-pop bypass s/p RT iliac stent, DM2, HLD, hypothyroidism, current active smoker, uses cocaine, chronic back pain s/p lumbar surgery presented on 8/23/2020 with multiple non-specific complaints, including fevers, weakness, poor po intake, diffuse abdominal pain, diarrhea. She found to have STEMI s/p LHC with RCA occlusion , E coli sepsis, cystitis, ischemic colitis.    hospital course complicated by acute kidney failure, intubation, pressor requirement, diarrhea, and now  pneumonia with occlusion of left mainstem bronchus    # Acute hypoxic respiratory failure  secondary to septic shock   Hospital acquired PNA with  Mucosal impaction of left main bronchus , complete atelectasis of LLL   COPD , Nicotine dependence   Bilateral pleural effusion L >R  COVID-19 ruled out   -s/p intubation /extubation  -acute , recurrent hypoxic respiratory failure >> patient requiring supplemental o2 3 L  -CT chest shows large mucous impaction left main bronchus >> pulmonary followin g  -c/w mucinex, chest PT, albuterol, atrovent; c/w incentive spirometry  -c/w Meropenem, D/w Dr. Mesa  - may benefit from pleural effusion drainage - left thoracentesis 9/14  - d/w Dr. Hernandez cardiology - ok to Hold asa, plavix for thoracentesis - now resumed    #Septic shock secondary to Ecoli  bacteremia, possible ischemic infectious colitis ,  diarrheal syndrome, resolved   Lower Abdominal pain   -colitis on CT scan , possible bacteremia due to ischemic colitis   -completed antibiotics.  days IV zosyn [id number] --> 9/4 and 4 days ceftin 9/5-9/8,  s/p rocephin 2gm daily #6, s/p IV flagyl #4  - GI consult - Dr. Hammer, IV abx, recent colonoscopy unremarkable , GI PCR and C diff neg   - stop betanechol, bentyl ( can cause abdominal cramps/lose bm)  , add probiotics, if lose bm persist will retest for c diff and GI PCR  US abd 9/13 - trace of ascites, b/l pleural effusions`    #STEMI due to 100 % occlusion of RCA , PAD s/p LE stents   -Echo show hypokinesis on admission, troponins, st elevations   -Urgent coronary angiogram showed occluded RCA of unknown chronicity  -cardiology f/u noted; c/w DAPT - help for thoracocentesis, restart as soon as or from IR prespective- statins atorvastatin 80--> 40 mg atorvastatin ,LDL 19, due to weakness , elevated CK  -  ranexa 1000--> 500 bid due to renal dysfunction  - c/w metoprolol 12.5 bid      #AMISH w septic shock/stemi/hypotension    Urinary retention s/p Black , failed trial of void 9/10  - bethanechol, d/c bentyl can cause urinary retention  -  s/p IV diuresis with worsening of renal function  - nephrology consult     #Anemia normocytic , mixed type ACD and iron deficiency   - check B12, folate, add iron daily ,  monitor    #Hypocalcemia, vitamin D deficiency    Intact PTH: 87, Vitamin D, 25-Hydroxy: 27.8, Magnesium, Serum: 1.8 mg/dL, c/w Calcitriol - Vit D analogue     #Hypomagnesemia - replace, repeat in am    #Hypothyroidism- home dose 112 mcg--> 125 mcg , increased due to elevated  TSH 22    #DM2 with A1C 8.1 - FBG monitoring , ISS     #Generalized weakness, multifactorial  anemia, uncontrolled hypothyroidism, deconditioning, anasarca, renal and respiratory failure, STEMi  - PT daily, OOB to chair daily, c/w vit D  elevated CK with low LDL - > lower dose of statin atorvastatin 80--> 40 can cause weakness in elderly       Advanced directives   HCP daughter Antoinette [phone] updated 9/11/20 , 9/13, 9/15    Full code    Dispo - IV abx, chair, patient will need ANA MARÍA due to weakness and deconditioning       Attending Attestation:   patient seen and examined with PA student Stephen Calle was physically present for the key portions of the evaluation and management (E/M) service provided.  I agree with the above history, physical, and plan which I have reviewed and edited where appropriate.  - pulm eval noted. now s/p thoracentesis - transudate as per lights criteria  - cardio and renal following.  will give dose of IV lasix and supplement K  - LE swelling - start lasix. LE dopplers negative 9/15  - started diflucan for thrush 9/14  - continue meropenem as per ID til 9/17 and then plan for rehab.     Plan discussed with patient, nursing, staff.

## 2020-09-16 NOTE — PROGRESS NOTE ADULT - SUBJECTIVE AND OBJECTIVE BOX
Patient is a 59y Female who reports no complaints as new, still weak. Refused PT.    MEDICATIONS  (STANDING):  ALBUTerol    90 MICROgram(s) HFA Inhaler 2 Puff(s) Inhalation every 6 hours  aspirin  chewable 81 milliGRAM(s) Oral daily  atorvastatin 40 milliGRAM(s) Oral at bedtime  budesonide  80 MICROgram(s)/formoterol 4.5 MICROgram(s) Inhaler 2 Puff(s) Inhalation two times a day  calcitriol   Capsule 0.25 MICROGram(s) Oral daily  clopidogrel Tablet 75 milliGRAM(s) Oral daily  dextrose 5%. 1000 milliLiter(s) (50 mL/Hr) IV Continuous <Continuous>  dextrose 50% Injectable 12.5 Gram(s) IV Push once  dextrose 50% Injectable 25 Gram(s) IV Push once  dextrose 50% Injectable 25 Gram(s) IV Push once  ferrous    sulfate 325 milliGRAM(s) Oral daily  fluconAZOLE   Tablet 100 milliGRAM(s) Oral daily  furosemide    Tablet 20 milliGRAM(s) Oral daily  guaiFENesin ER 1200 milliGRAM(s) Oral every 12 hours  heparin   Injectable 5000 Unit(s) SubCutaneous every 8 hours  insulin lispro (HumaLOG) corrective regimen sliding scale   SubCutaneous three times a day before meals  insulin lispro (HumaLOG) corrective regimen sliding scale   SubCutaneous at bedtime  ipratropium 17 MICROgram(s) HFA Inhaler 1 Puff(s) Inhalation every 6 hours  lactobacillus acidophilus 1 Tablet(s) Oral three times a day with meals  levothyroxine 125 MICROGram(s) Oral daily  meropenem  IVPB      meropenem  IVPB 500 milliGRAM(s) IV Intermittent every 12 hours  metoprolol tartrate 12.5 milliGRAM(s) Oral two times a day  ranolazine 500 milliGRAM(s) Oral two times a day    MEDICATIONS  (PRN):  acetaminophen   Tablet .. 650 milliGRAM(s) Oral every 4 hours PRN Mild Pain (1 - 3)  ALBUTerol    90 MICROgram(s) HFA Inhaler 2 Puff(s) Inhalation every 6 hours PRN Shortness of Breath and/or Wheezing  aluminum hydroxide/magnesium hydroxide/simethicone Suspension 30 milliLiter(s) Oral every 4 hours PRN Dyspepsia  dextrose 40% Gel 15 Gram(s) Oral once PRN Blood Glucose LESS THAN 70 milliGRAM(s)/deciliter  glucagon  Injectable 1 milliGRAM(s) IntraMuscular once PRN Glucose LESS THAN 70 milligrams/deciliter  ondansetron Injectable 4 milliGRAM(s) IV Push every 6 hours PRN Nausea        T(C): , Max: 36.8 (09-16-20 @ 00:08)  T(F): , Max: 98.2 (09-16-20 @ 00:08)  HR: 90 (09-16-20 @ 08:28)  BP: 118/60 (09-16-20 @ 08:28)  BP(mean): --  RR: 18 (09-16-20 @ 08:28)  SpO2: 99% (09-16-20 @ 08:28)  Wt(kg): --    09-15 @ 07:01  -  09-16 @ 07:00  --------------------------------------------------------  IN: 360 mL / OUT: 1375 mL / NET: -1015 mL          PHYSICAL EXAM:    Constitutional: NAD, frail.   HEENT: dry MM  Neck: No LAD, No JVD  Respiratory: dist BS  Cardiovascular: S1 and S2  Extremities: ++ peripheral edema  Neurological: A/O x 3, no focal deficits  Psychiatric: Normal mood, normal affect  : No Black  Skin: No rashes  Access: Not applicable        LABS:                        8.3    7.01  )-----------( 234      ( 16 Sep 2020 06:31 )             25.7     16 Sep 2020 06:31    134    |  103    |  58     ----------------------------<  72     3.7     |  23     |  3.59   15 Sep 2020 06:25    133    |  103    |  55     ----------------------------<  77     3.3     |  20     |  3.68   14 Sep 2020 07:26    132    |  103    |  62     ----------------------------<  77     3.3     |  22     |  3.85   13 Sep 2020 06:37    135    |  104    |  65     ----------------------------<  84     3.5     |  24     |  4.11     Ca    7.4        16 Sep 2020 06:31  Ca    7.9        15 Sep 2020 06:25  Ca    8.1        14 Sep 2020 07:26  Ca    8.2        13 Sep 2020 06:37  Phos  4.2       13 Sep 2020 06:37  Mg     1.5       13 Sep 2020 06:37    TPro  5.4    /  Alb  1.2    /  TBili  0.5    /  DBili  x      /  AST  134    /  ALT  52     /  AlkPhos  115    15 Sep 2020 06:25  TPro  5.4    /  Alb  1.2    /  TBili  0.4    /  DBili  0.1    /  AST  98     /  ALT  39     /  AlkPhos  100    14 Sep 2020 07:26          Urine Studies:          RADIOLOGY & ADDITIONAL STUDIES:

## 2020-09-16 NOTE — PROGRESS NOTE ADULT - SUBJECTIVE AND OBJECTIVE BOX
Subjective:  feels like her breathing has improved overall since thoracentesis but still has abdominal pain    Review of Systems:  All 10 systems reviewed in detailed and found to be negative with the exception of what has already been described above    Allergies:  No Known Allergies    Meds  MEDICATIONS  (STANDING):  ALBUTerol    90 MICROgram(s) HFA Inhaler 2 Puff(s) Inhalation every 6 hours  aspirin  chewable 81 milliGRAM(s) Oral daily  atorvastatin 40 milliGRAM(s) Oral at bedtime  budesonide  80 MICROgram(s)/formoterol 4.5 MICROgram(s) Inhaler 2 Puff(s) Inhalation two times a day  calcitriol   Capsule 0.25 MICROGram(s) Oral daily  clopidogrel Tablet 75 milliGRAM(s) Oral daily  dextrose 5%. 1000 milliLiter(s) (50 mL/Hr) IV Continuous <Continuous>  dextrose 50% Injectable 12.5 Gram(s) IV Push once  dextrose 50% Injectable 25 Gram(s) IV Push once  dextrose 50% Injectable 25 Gram(s) IV Push once  ferrous    sulfate 325 milliGRAM(s) Oral daily  fluconAZOLE   Tablet 100 milliGRAM(s) Oral daily  furosemide    Tablet 20 milliGRAM(s) Oral daily  guaiFENesin ER 1200 milliGRAM(s) Oral every 12 hours  heparin   Injectable 5000 Unit(s) SubCutaneous every 8 hours  insulin lispro (HumaLOG) corrective regimen sliding scale   SubCutaneous three times a day before meals  insulin lispro (HumaLOG) corrective regimen sliding scale   SubCutaneous at bedtime  ipratropium 17 MICROgram(s) HFA Inhaler 1 Puff(s) Inhalation every 6 hours  lactobacillus acidophilus 1 Tablet(s) Oral three times a day with meals  levothyroxine 125 MICROGram(s) Oral daily  meropenem  IVPB      meropenem  IVPB 500 milliGRAM(s) IV Intermittent every 12 hours  metoprolol tartrate 12.5 milliGRAM(s) Oral two times a day  ranolazine 500 milliGRAM(s) Oral two times a day    MEDICATIONS  (PRN):  acetaminophen   Tablet .. 650 milliGRAM(s) Oral every 4 hours PRN Mild Pain (1 - 3)  ALBUTerol    90 MICROgram(s) HFA Inhaler 2 Puff(s) Inhalation every 6 hours PRN Shortness of Breath and/or Wheezing  aluminum hydroxide/magnesium hydroxide/simethicone Suspension 30 milliLiter(s) Oral every 4 hours PRN Dyspepsia  dextrose 40% Gel 15 Gram(s) Oral once PRN Blood Glucose LESS THAN 70 milliGRAM(s)/deciliter  glucagon  Injectable 1 milliGRAM(s) IntraMuscular once PRN Glucose LESS THAN 70 milligrams/deciliter  ondansetron Injectable 4 milliGRAM(s) IV Push every 6 hours PRN Nausea    Physical Exam  T(C): 36.4 (09-16-20 @ 08:28), Max: 36.8 (09-16-20 @ 00:08)  HR: 90 (09-16-20 @ 08:28) (90 - 97)  BP: 118/60 (09-16-20 @ 08:28) (118/60 - 138/67)  RR: 18 (09-16-20 @ 08:28) (18 - 19)  SpO2: 99% (09-16-20 @ 08:28) (95% - 100%)  Gen: Alert, oriented, no distress  HEENT: Anicteric sclera, + thrush, poor dentition  Cardio: Regular rhythm and rate, normal S1S2, no murmurs  Resp: decreased breath sounds  GI: slightly tender  Ext: No cyanosis, clubbing or edema  Neuro: Nonfocal    Labs:                        8.3    7.01  )-----------( 234      ( 16 Sep 2020 06:31 )             25.7     09-16    134<L>  |  103  |  58<H>  ----------------------------<  72  3.7   |  23  |  3.59<H>    Ca    7.4<L>      16 Sep 2020 06:31    TPro  5.4<L>  /  Alb  1.2<L>  /  TBili  0.5  /  DBili  x   /  AST  134<H>  /  ALT  52  /  AlkPhos  115  09-15     < from: CT Chest No Cont (09.09.20 @ 15:52) >  PROCEDURE DATE:  09/09/2020          INTERPRETATION:  CLINICAL INFORMATION: Chest pain    COMPARISON: CT chest 7/29/2019, CT abdomen 9/8/2020    PROCEDURE:  CT of the Chest was performed without intravenous contrast.  Sagittal and coronal reformats were performed.    FINDINGS:    LUNGS AND AIRWAYS: Mucosal impaction of the left mainstem bronchus. Near complete atelectasis of the left lower lobe. Significant segmental atelectasis in the lingula and anterior left upper lobe. Right basilar compressive atelectasis. Additional scattered peripheral groundglass opacities.  Paraseptal emphysema bilaterally.  PLEURA: No moderate to large left pleural effusion. Mild to moderate right pleural effusion.  MEDIASTINUM AND TATIANA: No lymphadenopathy.  VESSELS: Atherosclerotic changes of the aorta and coronary vasculature. No aortic aneurysm.  HEART: Heart size is normal. No pericardial effusion.  CHEST WALL AND LOWER NECK: Extensive anasarca.  VISUALIZED UPPER ABDOMEN: Prior cholecystectomy.  BONES: Old right lateral sixth and seventh rib fractures. Degenerative changes.    IMPRESSION:  Extensive mucosal impaction of the left mainstem bronchus extending into left upper lobe and left lower lobe segmental bronchi with near complete atelectasis left lower lobe and multifocal segmental atelectasis and/or pneumonia in the left upper lobe. Moderate to large left pleural effusion with a mild to moderate right pleural effusion with bibasilar compressive atelectasis.    Additional scattered groundglass opacities bilaterally.    < end of copied text >     chest x-ray   09/11/2020: Reviewed with Radiology   when compared to  film from CT scan September 9, 2020, there is improved aeration of the left lung.  This is most likely secondary to improved atelectasis furthermore there is better visualization of the left mainstem bronchus.  Overall the suggest that the mucus plug and resultant atelectasis has improved since prior study

## 2020-09-16 NOTE — PROGRESS NOTE ADULT - ASSESSMENT
59 CAD, PAD s/p fem-pop bypass s/p RT iliac stent, diabetes, hypothyroidism, hyperlipidemia, current active smoker, uses cocaine last use yesterday, chronic back pain s/p lumbar surgery presented with multiple non-specific complaints found to have ST elevations on presenting EKG, code STEMI called and pt underwent urgent angiogram showing occluded RCA w no intervention with UTI sepsis and E. coli bacteremia POA w hospital course c/b code blue.     AMISH w septic shock/UTI w STEMI and Code blue event w hypotension   Cr stable, despite diuretic dose  Oral lasix started, may need atleast 40 daily to ensure some diuresis with elevated creatinien  Monitor K with creatinine  bethenechol for bladder retention   Will need outpatient renal follow up with discharge    Hypokalemia  -Replete po as needed    Hypocalcemia - Ca improving  On Calcum acetate with feeds for elevated phos/low ca++   Intact PTH: 87:, phos ok  Vitamin D, 25-Hydroxy: 27.8  Calcitriol - Vit D analogue     d/c with Rn staff

## 2020-09-16 NOTE — PROGRESS NOTE ADULT - SUBJECTIVE AND OBJECTIVE BOX
Cheif complaints - weakness, dyspnea, cough, abdominal pain    58yo female with PMH CAD, PAD s/p fem-pop bypass s/p RT iliac stent, DM2, HLD, hypothyroidism, current active smoker, uses cocaine, chronic back pain s/p lumbar surgery presented on 2020 with multiple non-specific complaints, including fevers, weakness, poor po intake, diffuse abdominal pain, diarrhea. She found to have STEMI s/p LHC with RCA occlusion , E coli sepsis, cystitis, ischemic colitis.    hospital course complicated by acute kidney failure, intubation, pressor requirement, diarrhea, and now  pneumonia with occlusion of left mainstem bronchus     - pt seen and examined, chart reviewed, pt reports felling weak, + abdominal distension, + dyspnea, + poor po intake, cough, afebrile, denies HA, CP, dizziness, POC discussed    - pt seen and examined, + lose bm, + diffuse abdominal discomfort, + gen weakness, afebrile, pOC discussed     - pt seen and examined, BM better, lower abdominal pain, + weakness, appetite improving , afebrile, POC discussed    - No new complaints   9/15 - Nonew complaints     ROS:   All 10 systems reviewed and found to be negative with the exception of what has been described above.  GEN: lying in bed, NAD  HEENT:   NC/AT, pupils equal and reactive, EOMI  CV:  +S1, +S2, RRR  RESP:   lungs clear to auscultation bilaterally, no wheeze, rales, rhonchi   BREAST:  not examined  GI:  abdomen soft, non-tender, non-distended, normoactive BS  RECTAL:  not examined  :  not examined  MSK:   normal muscle tone  EXT:  no edema  NEURO:  AAOX3, no focal neurological deficits  SKIN:  no rashes        PHYSICAL EXAM:    Daily     Daily Weight in k.6 (16 Sep 2020 06:37)    ICU Vital Signs Last 24 Hrs  T(C): 36.4 (16 Sep 2020 08:28), Max: 36.8 (16 Sep 2020 00:08)  T(F): 97.6 (16 Sep 2020 08:28), Max: 98.2 (16 Sep 2020 00:08)  HR: 90 (16 Sep 2020 08:28) (88 - 97)  BP: 118/60 (16 Sep 2020 08:28) (118/60 - 138/67)  BP(mean): --  ABP: --  ABP(mean): --  RR: 18 (16 Sep 2020 08:28) (18 - 19)  SpO2: 99% (16 Sep 2020 08:28) (96% - 99%)                              8.3    7.01  )-----------( 234      ( 16 Sep 2020 06:31 )             25.7       CBC Full  -  ( 16 Sep 2020 06:31 )  WBC Count : 7.01 K/uL  RBC Count : 2.83 M/uL  Hemoglobin : 8.3 g/dL  Hematocrit : 25.7 %  Platelet Count - Automated : 234 K/uL  Mean Cell Volume : 90.8 fl  Mean Cell Hemoglobin : 29.3 pg  Mean Cell Hemoglobin Concentration : 32.3 gm/dL  Auto Neutrophil # : x  Auto Lymphocyte # : x  Auto Monocyte # : x  Auto Eosinophil # : x  Auto Basophil # : x  Auto Neutrophil % : x  Auto Lymphocyte % : x  Auto Monocyte % : x  Auto Eosinophil % : x  Auto Basophil % : x          134<L>  |  103  |  58<H>  ----------------------------<  72  3.7   |  23  |  3.59<H>    Ca    7.4<L>      16 Sep 2020 06:31    TPro  5.4<L>  /  Alb  1.2<L>  /  TBili  0.5  /  DBili  x   /  AST  134<H>  /  ALT  52  /  AlkPhos  115  09-15      LIVER FUNCTIONS - ( 15 Sep 2020 06:25 )  Alb: 1.2 g/dL / Pro: 5.4 gm/dL / ALK PHOS: 115 U/L / ALT: 52 U/L / AST: 134 U/L / GGT: x                 CARDIAC MARKERS ( 15 Sep 2020 06:25 )  x     / x     / 867 U/L / x     / x                    MEDICATIONS  (STANDING):  ALBUTerol    90 MICROgram(s) HFA Inhaler 2 Puff(s) Inhalation every 6 hours  aspirin  chewable 81 milliGRAM(s) Oral daily  atorvastatin 40 milliGRAM(s) Oral at bedtime  budesonide  80 MICROgram(s)/formoterol 4.5 MICROgram(s) Inhaler 2 Puff(s) Inhalation two times a day  calcitriol   Capsule 0.25 MICROGram(s) Oral daily  clopidogrel Tablet 75 milliGRAM(s) Oral daily  dextrose 5%. 1000 milliLiter(s) (50 mL/Hr) IV Continuous <Continuous>  dextrose 50% Injectable 12.5 Gram(s) IV Push once  dextrose 50% Injectable 25 Gram(s) IV Push once  dextrose 50% Injectable 25 Gram(s) IV Push once  ferrous    sulfate 325 milliGRAM(s) Oral daily  fluconAZOLE   Tablet 100 milliGRAM(s) Oral daily  furosemide    Tablet 20 milliGRAM(s) Oral daily  guaiFENesin ER 1200 milliGRAM(s) Oral every 12 hours  heparin   Injectable 5000 Unit(s) SubCutaneous every 8 hours  insulin lispro (HumaLOG) corrective regimen sliding scale   SubCutaneous three times a day before meals  insulin lispro (HumaLOG) corrective regimen sliding scale   SubCutaneous at bedtime  ipratropium 17 MICROgram(s) HFA Inhaler 1 Puff(s) Inhalation every 6 hours  lactobacillus acidophilus 1 Tablet(s) Oral three times a day with meals  levothyroxine 125 MICROGram(s) Oral daily  meropenem  IVPB      meropenem  IVPB 500 milliGRAM(s) IV Intermittent every 12 hours  metoprolol tartrate 12.5 milliGRAM(s) Oral two times a day  ranolazine 500 milliGRAM(s) Oral two times a day

## 2020-09-16 NOTE — PROGRESS NOTE ADULT - ASSESSMENT
58yo/F with PMH CAD, PAD s/p fem-pop bypass s/p RT iliac stent, diabetes, hypothyroidism, hyperlipidemia, current active smoker, uses cocaine, chronic back pain s/p lumbar surgery presented on 8/23/2020 with multiple non-specific complaints, including fevers, hospital course complicated by acute kidney failure, intubation, pressor requirement, diarrhea, and now  pneumonia with occlusion of left mainstem bronchus, improved on most recent CXR  1. hospital-acquired pneumonia, with large obstructing left mainstem plug:  - continue supplemental oxygen  - continue Mucinex  - continue chest  physiotherapy  - continue meropenem  - continue albuterol, ipratropium  - will need bronchoscopy if chest physiotherapy fails to clear secretions but given improvement in CXR will defer for now  - chest x-ray on 9/11 reveals improved atelectasis and better visualization of left mainstem bronchus when compared to  film of CT scan from September 9, 2020  - continue Symbicort  2. septic shock from E coli bacteremia: s/p treatment, but now on meropenem for pneumonia  3. Diarrhea: C diff negative.  GI following  4. Abdominal pain: Had abdominal ultrasound.  GI following  5. Pleural effusions:  s/p thoracentesis. diurese as able.  6. thrush: on fluconazole

## 2020-09-17 PROCEDURE — 99232 SBSQ HOSP IP/OBS MODERATE 35: CPT

## 2020-09-17 RX ORDER — POLYETHYLENE GLYCOL 3350 17 G/17G
17 POWDER, FOR SOLUTION ORAL ONCE
Refills: 0 | Status: COMPLETED | OUTPATIENT
Start: 2020-09-17 | End: 2020-09-17

## 2020-09-17 RX ADMIN — POLYETHYLENE GLYCOL 3350 17 GRAM(S): 17 POWDER, FOR SOLUTION ORAL at 21:06

## 2020-09-17 RX ADMIN — Medication 12.5 MILLIGRAM(S): at 21:05

## 2020-09-17 RX ADMIN — ATORVASTATIN CALCIUM 40 MILLIGRAM(S): 80 TABLET, FILM COATED ORAL at 21:06

## 2020-09-17 RX ADMIN — Medication 1200 MILLIGRAM(S): at 21:05

## 2020-09-17 RX ADMIN — RANOLAZINE 500 MILLIGRAM(S): 500 TABLET, FILM COATED, EXTENDED RELEASE ORAL at 21:05

## 2020-09-17 RX ADMIN — BUDESONIDE AND FORMOTEROL FUMARATE DIHYDRATE 2 PUFF(S): 160; 4.5 AEROSOL RESPIRATORY (INHALATION) at 21:04

## 2020-09-17 RX ADMIN — HEPARIN SODIUM 5000 UNIT(S): 5000 INJECTION INTRAVENOUS; SUBCUTANEOUS at 21:06

## 2020-09-17 RX ADMIN — HEPARIN SODIUM 5000 UNIT(S): 5000 INJECTION INTRAVENOUS; SUBCUTANEOUS at 15:07

## 2020-09-17 RX ADMIN — Medication 650 MILLIGRAM(S): at 15:08

## 2020-09-17 RX ADMIN — Medication 650 MILLIGRAM(S): at 02:48

## 2020-09-17 RX ADMIN — Medication 1 PUFF(S): at 13:37

## 2020-09-17 RX ADMIN — Medication 125 MICROGRAM(S): at 05:34

## 2020-09-17 RX ADMIN — Medication 1 TABLET(S): at 17:32

## 2020-09-17 RX ADMIN — Medication 1 PUFF(S): at 21:05

## 2020-09-17 RX ADMIN — Medication 1 TABLET(S): at 14:50

## 2020-09-17 RX ADMIN — ALBUTEROL 2 PUFF(S): 90 AEROSOL, METERED ORAL at 13:38

## 2020-09-17 RX ADMIN — ALBUTEROL 2 PUFF(S): 90 AEROSOL, METERED ORAL at 21:03

## 2020-09-17 RX ADMIN — Medication 650 MILLIGRAM(S): at 01:48

## 2020-09-17 RX ADMIN — Medication 2: at 17:30

## 2020-09-17 NOTE — PROGRESS NOTE ADULT - SUBJECTIVE AND OBJECTIVE BOX
prog 58yo female with PMH CAD, PAD s/p fem-pop bypass s/p RT iliac stent, DM2, HLD, hypothyroidism, current active smoker, uses cocaine, chronic back pain s/p lumbar surgery presented on 2020 with multiple non-specific complaints, including fevers, weakness, poor po intake, diffuse abdominal pain, diarrhea. She found to have STEMI s/p LHC with RCA occlusion , E coli sepsis, cystitis, ischemic colitis.    hospital course complicated by acute kidney failure, intubation, pressor requirement, diarrhea, and now  pneumonia with occlusion of left mainstem bronchus     - pt seen and examined, chart reviewed, pt reports felling weak, + abdominal distension, + dyspnea, + poor po intake, cough, afebrile, denies HA, CP, dizziness, POC discussed    - pt seen and examined, + lose bm, + diffuse abdominal discomfort, + gen weakness, afebrile, pOC discussed     - pt seen and examined, BM better, lower abdominal pain, + weakness, appetite improving , afebrile, POC discussed    - No new complaints   9/15 - Nonew complaints    still with lower abd pain , had small non bloody BM today    ROS:   All 10 systems reviewed and found to be negative with the exception of what has been described above.  GEN: lying in bed, NAD  HEENT:   NC/AT, pupils equal and reactive, EOMI  CV:  +S1, +S2, RRR  RESP:   lungs clear to auscultation bilaterally, no wheeze, rales, rhonchi   BREAST:  not examined  GI:  abdomen soft, non-tender, non-distended, normoactive BS  RECTAL:  not examined  :  not examined  MSK:   normal muscle tone  EXT:  no edema  NEURO:  AAOX3, no focal neurological deficits  SKIN:  no rashes        PHYSICAL EXAM:    Daily     Daily Weight in k.5 (17 Sep 2020 05:49)    ICU Vital Signs Last 24 Hrs  T(C): 36.4 (17 Sep 2020 08:24), Max: 36.7 (16 Sep 2020 16:31)  T(F): 97.6 (17 Sep 2020 08:24), Max: 98.1 (16 Sep 2020 16:31)  HR: 80 (17 Sep 2020 08:45) (80 - 96)  BP: 130/49 (17 Sep 2020 08:24) (130/49 - 140/52)  BP(mean): --  ABP: --  ABP(mean): --  RR: 17 (17 Sep 2020 08:24) (17 - 17)  SpO2: 99% (17 Sep 2020 08:24) (93% - 100%)                                8.4    7.13  )-----------( 262      ( 17 Sep 2020 07:01 )             26.3       CBC Full  -  ( 17 Sep 2020 07: )  WBC Count : 7.13 K/uL  RBC Count : 2.90 M/uL  Hemoglobin : 8.4 g/dL  Hematocrit : 26.3 %  Platelet Count - Automated : 262 K/uL  Mean Cell Volume : 90.7 fl  Mean Cell Hemoglobin : 29.0 pg  Mean Cell Hemoglobin Concentration : 31.9 gm/dL  Auto Neutrophil # : 5.39 K/uL  Auto Lymphocyte # : 0.96 K/uL  Auto Monocyte # : 0.55 K/uL  Auto Eosinophil # : 0.11 K/uL  Auto Basophil # : 0.03 K/uL  Auto Neutrophil % : 75.6 %  Auto Lymphocyte % : 13.5 %  Auto Monocyte % : 7.7 %  Auto Eosinophil % : 1.5 %  Auto Basophil % : 0.4 %          135  |  103  |  58<H>  ----------------------------<  78  3.1<L>   |  23  |  3.43<H>    Ca    7.6<L>      17 Sep 2020 07:01                              MEDICATIONS  (STANDING):  ALBUTerol    90 MICROgram(s) HFA Inhaler 2 Puff(s) Inhalation every 6 hours  aspirin  chewable 81 milliGRAM(s) Oral daily  atorvastatin 40 milliGRAM(s) Oral at bedtime  budesonide  80 MICROgram(s)/formoterol 4.5 MICROgram(s) Inhaler 2 Puff(s) Inhalation two times a day  calcitriol   Capsule 0.25 MICROGram(s) Oral daily  calcium carbonate 1250 mG  + Vitamin D (OsCal 500 + D) 1 Tablet(s) Oral daily  clopidogrel Tablet 75 milliGRAM(s) Oral daily  dextrose 5%. 1000 milliLiter(s) (50 mL/Hr) IV Continuous <Continuous>  dextrose 50% Injectable 12.5 Gram(s) IV Push once  dextrose 50% Injectable 25 Gram(s) IV Push once  dextrose 50% Injectable 25 Gram(s) IV Push once  ferrous    sulfate 325 milliGRAM(s) Oral daily  fluconAZOLE   Tablet 100 milliGRAM(s) Oral daily  furosemide    Tablet 20 milliGRAM(s) Oral daily  guaiFENesin ER 1200 milliGRAM(s) Oral every 12 hours  heparin   Injectable 5000 Unit(s) SubCutaneous every 8 hours  insulin lispro (HumaLOG) corrective regimen sliding scale   SubCutaneous three times a day before meals  insulin lispro (HumaLOG) corrective regimen sliding scale   SubCutaneous at bedtime  ipratropium 17 MICROgram(s) HFA Inhaler 1 Puff(s) Inhalation every 6 hours  lactobacillus acidophilus 1 Tablet(s) Oral three times a day with meals  levothyroxine 125 MICROGram(s) Oral daily  metoprolol tartrate 12.5 milliGRAM(s) Oral two times a day  ranolazine 500 milliGRAM(s) Oral two times a day

## 2020-09-17 NOTE — PROGRESS NOTE ADULT - SUBJECTIVE AND OBJECTIVE BOX
Subjective:  breathing well  continues to have abdominal pain    Review of Systems:  All 10 systems reviewed in detailed and found to be negative with the exception of what has already been described above    Allergies:  No Known Allergies    Meds  MEDICATIONS  (STANDING):  ALBUTerol    90 MICROgram(s) HFA Inhaler 2 Puff(s) Inhalation every 6 hours  aspirin  chewable 81 milliGRAM(s) Oral daily  atorvastatin 40 milliGRAM(s) Oral at bedtime  budesonide  80 MICROgram(s)/formoterol 4.5 MICROgram(s) Inhaler 2 Puff(s) Inhalation two times a day  calcitriol   Capsule 0.25 MICROGram(s) Oral daily  clopidogrel Tablet 75 milliGRAM(s) Oral daily  dextrose 5%. 1000 milliLiter(s) (50 mL/Hr) IV Continuous <Continuous>  dextrose 50% Injectable 12.5 Gram(s) IV Push once  dextrose 50% Injectable 25 Gram(s) IV Push once  dextrose 50% Injectable 25 Gram(s) IV Push once  ferrous    sulfate 325 milliGRAM(s) Oral daily  fluconAZOLE   Tablet 100 milliGRAM(s) Oral daily  furosemide    Tablet 20 milliGRAM(s) Oral daily  guaiFENesin ER 1200 milliGRAM(s) Oral every 12 hours  heparin   Injectable 5000 Unit(s) SubCutaneous every 8 hours  insulin lispro (HumaLOG) corrective regimen sliding scale   SubCutaneous three times a day before meals  insulin lispro (HumaLOG) corrective regimen sliding scale   SubCutaneous at bedtime  ipratropium 17 MICROgram(s) HFA Inhaler 1 Puff(s) Inhalation every 6 hours  lactobacillus acidophilus 1 Tablet(s) Oral three times a day with meals  levothyroxine 125 MICROGram(s) Oral daily  metoprolol tartrate 12.5 milliGRAM(s) Oral two times a day  ranolazine 500 milliGRAM(s) Oral two times a day    MEDICATIONS  (PRN):  acetaminophen   Tablet .. 650 milliGRAM(s) Oral every 4 hours PRN Mild Pain (1 - 3)  ALBUTerol    90 MICROgram(s) HFA Inhaler 2 Puff(s) Inhalation every 6 hours PRN Shortness of Breath and/or Wheezing  aluminum hydroxide/magnesium hydroxide/simethicone Suspension 30 milliLiter(s) Oral every 4 hours PRN Dyspepsia  dextrose 40% Gel 15 Gram(s) Oral once PRN Blood Glucose LESS THAN 70 milliGRAM(s)/deciliter  glucagon  Injectable 1 milliGRAM(s) IntraMuscular once PRN Glucose LESS THAN 70 milligrams/deciliter  ondansetron Injectable 4 milliGRAM(s) IV Push every 6 hours PRN Nausea    Physical Exam  T(C): 36.4 (09-17-20 @ 08:24), Max: 36.7 (09-16-20 @ 16:31)  HR: 80 (09-17-20 @ 08:45) (80 - 96)  BP: 130/49 (09-17-20 @ 08:24) (130/49 - 140/52)  RR: 17 (09-17-20 @ 08:24) (17 - 17)  SpO2: 99% (09-17-20 @ 08:24) (93% - 100%)  Gen: Alert, oriented, no distress  HEENT: Anicteric sclera, + thrush, poor dentition  Cardio: Regular rhythm and rate, normal S1S2, no murmurs  Resp: decreased breath sounds  GI: slightly tender  Ext: No cyanosis, clubbing or edema  Neuro: Nonfocal    Labs:                        8.4    7.13  )-----------( 262      ( 17 Sep 2020 07:01 )             26.3     09-17    135  |  103  |  58<H>  ----------------------------<  78  3.1<L>   |  23  |  3.43<H>    Ca    7.6<L>      17 Sep 2020 07:01     < from: CT Chest No Cont (09.09.20 @ 15:52) >  PROCEDURE DATE:  09/09/2020          INTERPRETATION:  CLINICAL INFORMATION: Chest pain    COMPARISON: CT chest 7/29/2019, CT abdomen 9/8/2020    PROCEDURE:  CT of the Chest was performed without intravenous contrast.  Sagittal and coronal reformats were performed.    FINDINGS:    LUNGS AND AIRWAYS: Mucosal impaction of the left mainstem bronchus. Near complete atelectasis of the left lower lobe. Significant segmental atelectasis in the lingula and anterior left upper lobe. Right basilar compressive atelectasis. Additional scattered peripheral groundglass opacities.  Paraseptal emphysema bilaterally.  PLEURA: No moderate to large left pleural effusion. Mild to moderate right pleural effusion.  MEDIASTINUM AND TATIANA: No lymphadenopathy.  VESSELS: Atherosclerotic changes of the aorta and coronary vasculature. No aortic aneurysm.  HEART: Heart size is normal. No pericardial effusion.  CHEST WALL AND LOWER NECK: Extensive anasarca.  VISUALIZED UPPER ABDOMEN: Prior cholecystectomy.  BONES: Old right lateral sixth and seventh rib fractures. Degenerative changes.    IMPRESSION:  Extensive mucosal impaction of the left mainstem bronchus extending into left upper lobe and left lower lobe segmental bronchi with near complete atelectasis left lower lobe and multifocal segmental atelectasis and/or pneumonia in the left upper lobe. Moderate to large left pleural effusion with a mild to moderate right pleural effusion with bibasilar compressive atelectasis.    Additional scattered groundglass opacities bilaterally.    < end of copied text >     chest x-ray   09/11/2020: Reviewed with Radiology   when compared to  film from CT scan September 9, 2020, there is improved aeration of the left lung.  This is most likely secondary to improved atelectasis furthermore there is better visualization of the left mainstem bronchus.  Overall the suggest that the mucus plug and resultant atelectasis has improved since prior study

## 2020-09-17 NOTE — PROGRESS NOTE ADULT - ASSESSMENT
60yo/F with PMH CAD, PAD s/p fem-pop bypass s/p RT iliac stent, diabetes, hypothyroidism, hyperlipidemia, current active smoker, uses cocaine, chronic back pain s/p lumbar surgery presented on 8/23/2020 with multiple non-specific complaints, including fevers, hospital course complicated by acute kidney failure, intubation, pressor requirement, diarrhea, and now  pneumonia with occlusion of left mainstem bronchus, improved on most recent CXR  1. hospital-acquired pneumonia, with large obstructing left mainstem plug:  - continue supplemental oxygen  - continue Mucinex  - continue chest  physiotherapy  - continue meropenem, finihsing 9/17  - continue albuterol, ipratropium  - will need bronchoscopy if chest physiotherapy fails to clear secretions but given improvement in CXR will defer for now  - chest x-ray on 9/11 reveals improved atelectasis and better visualization of left mainstem bronchus when compared to  film of CT scan from September 9, 2020  - Repeat CXR to re eval effusions and to eval atelectasis  - continue Symbicort  2. septic shock from E coli bacteremia: s/p treatment, but now on meropenem for pneumonia  3. Diarrhea: C diff negative.  GI following  4. Abdominal pain: Had abdominal ultrasound.  GI following  5. Pleural effusions:  s/p thoracentesis. diurese as able.  6. thrush: on fluconazole

## 2020-09-17 NOTE — PROGRESS NOTE ADULT - ASSESSMENT
59 CAD, PAD s/p fem-pop bypass s/p RT iliac stent, diabetes, hypothyroidism, hyperlipidemia, current active smoker, uses cocaine last use yesterday, chronic back pain s/p lumbar surgery presented with multiple non-specific complaints found to have ST elevations on presenting EKG, code STEMI called and pt underwent urgent angiogram showing occluded RCA w no intervention with UTI sepsis and E. coli bacteremia POA w hospital course c/b code blue.     AMISH w septic shock/UTI w STEMI and Code blue event w hypotension   Cr stable on low dose lasix - monitor  If Cr remains stable - may need to increase to 40 mg daily for effective diuresis   I and O's   Edema with low Albumin state  - increase protein in diet   Replete K and monitor K with creatinine  Bethenechol for bladder retention   Will need outpatient renal follow up with discharge    Hypokalemia  -Replete po as needed    Hypocalcemia - Ca stable   monitor while on loop diuretics  OScal daily   Calcitriol - Vit D analogue

## 2020-09-17 NOTE — PROGRESS NOTE ADULT - SUBJECTIVE AND OBJECTIVE BOX
Patient is a 59y Female who reports no complaints as new, still weak.  c/o abd pains    MEDICATIONS  (STANDING):  ALBUTerol    90 MICROgram(s) HFA Inhaler 2 Puff(s) Inhalation every 6 hours  aspirin  chewable 81 milliGRAM(s) Oral daily  atorvastatin 40 milliGRAM(s) Oral at bedtime  budesonide  80 MICROgram(s)/formoterol 4.5 MICROgram(s) Inhaler 2 Puff(s) Inhalation two times a day  calcitriol   Capsule 0.25 MICROGram(s) Oral daily  clopidogrel Tablet 75 milliGRAM(s) Oral daily  dextrose 5%. 1000 milliLiter(s) (50 mL/Hr) IV Continuous <Continuous>  dextrose 50% Injectable 12.5 Gram(s) IV Push once  dextrose 50% Injectable 25 Gram(s) IV Push once  dextrose 50% Injectable 25 Gram(s) IV Push once  ferrous    sulfate 325 milliGRAM(s) Oral daily  fluconAZOLE   Tablet 100 milliGRAM(s) Oral daily  furosemide    Tablet 20 milliGRAM(s) Oral daily  guaiFENesin ER 1200 milliGRAM(s) Oral every 12 hours  heparin   Injectable 5000 Unit(s) SubCutaneous every 8 hours  insulin lispro (HumaLOG) corrective regimen sliding scale   SubCutaneous three times a day before meals  insulin lispro (HumaLOG) corrective regimen sliding scale   SubCutaneous at bedtime  ipratropium 17 MICROgram(s) HFA Inhaler 1 Puff(s) Inhalation every 6 hours  lactobacillus acidophilus 1 Tablet(s) Oral three times a day with meals  levothyroxine 125 MICROGram(s) Oral daily  metoprolol tartrate 12.5 milliGRAM(s) Oral two times a day  potassium chloride    Tablet ER 40 milliEquivalent(s) Oral once  ranolazine 500 milliGRAM(s) Oral two times a day    MEDICATIONS  (PRN):  acetaminophen   Tablet .. 650 milliGRAM(s) Oral every 4 hours PRN Mild Pain (1 - 3)  ALBUTerol    90 MICROgram(s) HFA Inhaler 2 Puff(s) Inhalation every 6 hours PRN Shortness of Breath and/or Wheezing  aluminum hydroxide/magnesium hydroxide/simethicone Suspension 30 milliLiter(s) Oral every 4 hours PRN Dyspepsia  dextrose 40% Gel 15 Gram(s) Oral once PRN Blood Glucose LESS THAN 70 milliGRAM(s)/deciliter  glucagon  Injectable 1 milliGRAM(s) IntraMuscular once PRN Glucose LESS THAN 70 milligrams/deciliter  ondansetron Injectable 4 milliGRAM(s) IV Push every 6 hours PRN Nausea      ICU Vital Signs Last 24 Hrs  T(C): 36.4 (17 Sep 2020 08:24), Max: 36.7 (16 Sep 2020 16:31)  T(F): 97.6 (17 Sep 2020 08:24), Max: 98.1 (16 Sep 2020 16:31)  HR: 80 (17 Sep 2020 08:45) (80 - 96)  BP: 130/49 (17 Sep 2020 08:24) (130/49 - 140/52)  BP(mean): --  ABP: --  ABP(mean): --  RR: 17 (17 Sep 2020 08:24) (17 - 17)  SpO2: 99% (17 Sep 2020 08:24) (93% - 100%)  I&O's Detail    16 Sep 2020 07:01  -  17 Sep 2020 07:00  --------------------------------------------------------  IN:    IV PiggyBack: 50 mL  Total IN: 50 mL    OUT:    Incontinent per Collection Bag (mL): 2100 mL  Total OUT: 2100 mL    Total NET: -2050 mL      PHYSICAL EXAM:    Constitutional: NAD, frail.   HEENT: dry MM  Neck: No LAD, No JVD  Respiratory: dist BS  Cardiovascular: S1 and S2  Extremities: ++ peripheral edema - improved   Neurological: A/O x 3, no focal deficits  : No Black  Skin: No rashes  Access: Not applicable        LABS:               135    |  103    |  58     ----------------------------<  78        17 Sep 2020 07:01  3.1     |  23     |  3.43     134    |  103    |  58     ----------------------------<  72        16 Sep 2020 06:31  3.7     |  23     |  3.59     133    |  103    |  55     ----------------------------<  77        15 Sep 2020 06:25  3.3     |  20     |  3.68     Ca    7.6        17 Sep 2020 07:01  Ca    7.4        16 Sep 2020 06:31        TPro  5.4    /  Alb  1.2    /  TBili  0.5    /        15 Sep 2020 06:25  DBili  x      /  AST  134    /  ALT  52     /  AlkPhos  115      TPro  5.4    /  Alb  1.2    /  TBili  0.4    /        14 Sep 2020 07:26  DBili  0.1    /  AST  98     /  ALT  39     /  AlkPhos  100                                8.4    7.13  )-----------( 262      ( 17 Sep 2020 07:01 )             26.3                         8.3    7.01  )-----------( 234      ( 16 Sep 2020 06:31 )             25.7       Urine Studies:          RADIOLOGY & ADDITIONAL STUDIES:

## 2020-09-17 NOTE — PROVIDER CONTACT NOTE (OTHER) - SITUATION
This evening patients daughter called nursing unit upset { using some curse words }that Vascular surgeon from [place] called and that no doctor spoke to the surgeon. I got the phone number of
Notified Dr. Billy office regarding consult spoke with Arthur from answering service.
Notified Dr. Palla office regarding consult spoke with Luz from answering service.
Spoke to May.  is aware of consult.
Doctors office is aware of consult. Spoke with Edi
Patient c/o of abd pain 7/10 and also having copious amount of diarrhea. Patient pulled out [place] this AM.
automatic B.P 76/44 left arm at 1605. 1L bolus given. no change in BP reading after bolus. automatic BP   76/39 left arm after bolus. manual B.P take on left arm, but no sound audible.
called md office spoke with Md he will see the pt tomorrow
no audible left radial pulse via doppler.  +2 left FA pulse via doppler  +2 left pedal dorsalis pedis
pt noted with an oxygen saturation of 84% on 3L nasal cannula, increase to 10L, saturation remains in 80's. non-rebreather mask applied saturation increased to 88%-92%. pt denies s/s of distress.

## 2020-09-17 NOTE — PROGRESS NOTE ADULT - ASSESSMENT
p59yo female with PMH CAD, PAD s/p fem-pop bypass s/p RT iliac stent, DM2, HLD, hypothyroidism, current active smoker, uses cocaine, chronic back pain s/p lumbar surgery presented on 8/23/2020 with multiple non-specific complaints, including fevers, weakness, poor po intake, diffuse abdominal pain, diarrhea. She found to have STEMI s/p LHC with RCA occlusion , E coli sepsis, cystitis, ischemic colitis.    hospital course complicated by acute kidney failure, intubation, pressor requirement, diarrhea, and now  pneumonia with occlusion of left mainstem bronchus    9/17 Lower abd pain /  ischemic colitis without acute abd   Gi f/u , completed 7 days of meropenem 9/17 , ? colonoscopy  eventually will need ANA MARÍA due to weakness and deconditioning  pulm eval noted. now s/p thoracentesis - transudate as per lights criteria  on lasix po 20 if cr stable  would increase to 40mg renal consult appreciated   - LE dopplers negative 9/15  - started diflucan for thrush 9/14  - s/p  meropenem as per ID til 9/17 for PNA and then plan for rehab.     Plan discussed with patient, nursing, staff.    # Acute hypoxic respiratory failure  secondary to septic shock   Hospital acquired PNA with  Mucosal impaction of left main bronchus , complete atelectasis of LLL   COPD , Nicotine dependence   Bilateral pleural effusion L >R  COVID-19 ruled out   -s/p intubation /extubation  -acute , recurrent hypoxic respiratory failure >> patient requiring supplemental o2 3 L  -CT chest shows large mucous impaction left main bronchus >> pulmonary followin g  -c/w mucinex, chest PT, albuterol, atrovent; c/w incentive spirometry  -c/w Meropenem, D/w Dr. [place]  - may benefit from pleural effusion drainage - left thoracentesis 9/14  - d/w Dr. Hernandez cardiology - ok to Hold asa, plavix for thoracentesis - now resumed    #Septic shock secondary to Ecoli  bacteremia, possible ischemic infectious colitis ,  diarrheal syndrome, resolved   Lower Abdominal pain   -colitis on CT scan , possible bacteremia due to ischemic colitis   -completed antibiotics.  days IV zosyn [id number] --> 9/4 and 4 days ceftin 9/5-9/8,  s/p rocephin 2gm daily #6, s/p IV flagyl #4  - GI consult - Dr. Hammer, IV abx, recent colonoscopy unremarkable , GI PCR and C diff neg   - stop betanechol, bentyl ( can cause abdominal cramps/lose bm)  , add probiotics, if lose bm persist will retest for c diff and GI PCR  US abd 9/13 - trace of ascites, b/l pleural effusions`    #STEMI due to 100 % occlusion of RCA , PAD s/p LE stents -Echo show hypokinesis on admission, troponins, st elevations   -Urgent coronary angiogram showed occluded RCA of unknown chronicity  -cardiology f/u noted; c/w DAPT - help for thoracocentesis, restart as soon as or from IR prespective- statins atorvastatin 80--> 40 mg atorvastatin ,LDL 19, due to weakness , elevated CK  -  ranexa 1000--> 500 bid due to renal dysfunction  - c/w metoprolol 12.5 bid      #AMISH w septic shock/stemi/hypotension    Urinary retention s/p Black , failed trial of void 9/10  - bethanechol, d/c bentyl can cause urinary retention  -  s/p IV diuresis with worsening of renal function  - nephrology consult     #Anemia normocytic , mixed type ACD and iron deficiency   - check B12, folate, add iron daily ,  monitor    #Hypocalcemia, vitamin D deficiency    Intact PTH: 87, Vitamin D, 25-Hydroxy: 27.8, Magnesium, Serum: 1.8 mg/dL, c/w Calcitriol - Vit D analogue     #Hypomagnesemia - replace, repeat in am    #Hypothyroidism- home dose 112 mcg--> 125 mcg , increased due to elevated  TSH 22    #DM2 with A1C 8.1 - FBG monitoring , ISS     #Generalized weakness, multifactorialanemia, uncontrolled hypothyroidism, deconditioning, anasarca, renal and respiratory failure, STEMi  - PT daily, OOB to chair daily, c/w vit D  elevated CK with low LDL - > lower dose of statin atorvastatin 80--> 40 can cause weakness in elderly       Advanced directives   HCP daughter Antoinette [phone] updated 9/11/20 , 9/13, 9/15    Full code

## 2020-09-18 LAB
ALBUMIN SERPL ELPH-MCNC: 1.2 G/DL — LOW (ref 3.3–5)
ANION GAP SERPL CALC-SCNC: 8 MMOL/L — SIGNIFICANT CHANGE UP (ref 5–17)
BASOPHILS # BLD AUTO: 0.03 K/UL — SIGNIFICANT CHANGE UP (ref 0–0.2)
BASOPHILS NFR BLD AUTO: 0.4 % — SIGNIFICANT CHANGE UP (ref 0–2)
BUN SERPL-MCNC: 58 MG/DL — HIGH (ref 7–23)
CALCIUM SERPL-MCNC: 7.7 MG/DL — LOW (ref 8.5–10.1)
CHLORIDE SERPL-SCNC: 103 MMOL/L — SIGNIFICANT CHANGE UP (ref 96–108)
CO2 SERPL-SCNC: 24 MMOL/L — SIGNIFICANT CHANGE UP (ref 22–31)
CREAT SERPL-MCNC: 3.16 MG/DL — HIGH (ref 0.5–1.3)
EOSINOPHIL # BLD AUTO: 0.1 K/UL — SIGNIFICANT CHANGE UP (ref 0–0.5)
EOSINOPHIL NFR BLD AUTO: 1.2 % — SIGNIFICANT CHANGE UP (ref 0–6)
GLUCOSE SERPL-MCNC: 80 MG/DL — SIGNIFICANT CHANGE UP (ref 70–99)
HCT VFR BLD CALC: 24.5 % — LOW (ref 34.5–45)
HGB BLD-MCNC: 8 G/DL — LOW (ref 11.5–15.5)
IMM GRANULOCYTES NFR BLD AUTO: 1.2 % — SIGNIFICANT CHANGE UP (ref 0–1.5)
LYMPHOCYTES # BLD AUTO: 1.24 K/UL — SIGNIFICANT CHANGE UP (ref 1–3.3)
LYMPHOCYTES # BLD AUTO: 15.3 % — SIGNIFICANT CHANGE UP (ref 13–44)
MCHC RBC-ENTMCNC: 29.2 PG — SIGNIFICANT CHANGE UP (ref 27–34)
MCHC RBC-ENTMCNC: 32.7 GM/DL — SIGNIFICANT CHANGE UP (ref 32–36)
MCV RBC AUTO: 89.4 FL — SIGNIFICANT CHANGE UP (ref 80–100)
MONOCYTES # BLD AUTO: 0.62 K/UL — SIGNIFICANT CHANGE UP (ref 0–0.9)
MONOCYTES NFR BLD AUTO: 7.7 % — SIGNIFICANT CHANGE UP (ref 2–14)
NEUTROPHILS # BLD AUTO: 6 K/UL — SIGNIFICANT CHANGE UP (ref 1.8–7.4)
NEUTROPHILS NFR BLD AUTO: 74.2 % — SIGNIFICANT CHANGE UP (ref 43–77)
PHOSPHATE SERPL-MCNC: 3.4 MG/DL — SIGNIFICANT CHANGE UP (ref 2.5–4.5)
PLATELET # BLD AUTO: 264 K/UL — SIGNIFICANT CHANGE UP (ref 150–400)
POTASSIUM SERPL-MCNC: 3.4 MMOL/L — LOW (ref 3.5–5.3)
POTASSIUM SERPL-SCNC: 3.4 MMOL/L — LOW (ref 3.5–5.3)
RBC # BLD: 2.74 M/UL — LOW (ref 3.8–5.2)
RBC # FLD: 17.9 % — HIGH (ref 10.3–14.5)
SODIUM SERPL-SCNC: 135 MMOL/L — SIGNIFICANT CHANGE UP (ref 135–145)
WBC # BLD: 8.09 K/UL — SIGNIFICANT CHANGE UP (ref 3.8–10.5)
WBC # FLD AUTO: 8.09 K/UL — SIGNIFICANT CHANGE UP (ref 3.8–10.5)

## 2020-09-18 PROCEDURE — 99232 SBSQ HOSP IP/OBS MODERATE 35: CPT

## 2020-09-18 PROCEDURE — 99231 SBSQ HOSP IP/OBS SF/LOW 25: CPT

## 2020-09-18 RX ADMIN — HEPARIN SODIUM 5000 UNIT(S): 5000 INJECTION INTRAVENOUS; SUBCUTANEOUS at 05:41

## 2020-09-18 RX ADMIN — ALBUTEROL 2 PUFF(S): 90 AEROSOL, METERED ORAL at 14:24

## 2020-09-18 RX ADMIN — Medication 1200 MILLIGRAM(S): at 22:23

## 2020-09-18 RX ADMIN — ALBUTEROL 2 PUFF(S): 90 AEROSOL, METERED ORAL at 20:05

## 2020-09-18 RX ADMIN — Medication 81 MILLIGRAM(S): at 12:11

## 2020-09-18 RX ADMIN — ATORVASTATIN CALCIUM 40 MILLIGRAM(S): 80 TABLET, FILM COATED ORAL at 22:23

## 2020-09-18 RX ADMIN — Medication 325 MILLIGRAM(S): at 12:11

## 2020-09-18 RX ADMIN — Medication 650 MILLIGRAM(S): at 04:39

## 2020-09-18 RX ADMIN — Medication 1 PUFF(S): at 08:15

## 2020-09-18 RX ADMIN — Medication 1200 MILLIGRAM(S): at 12:11

## 2020-09-18 RX ADMIN — Medication 650 MILLIGRAM(S): at 03:39

## 2020-09-18 RX ADMIN — Medication 12.5 MILLIGRAM(S): at 22:23

## 2020-09-18 RX ADMIN — Medication 1 TABLET(S): at 18:03

## 2020-09-18 RX ADMIN — Medication 12.5 MILLIGRAM(S): at 12:11

## 2020-09-18 RX ADMIN — Medication 20 MILLIGRAM(S): at 12:11

## 2020-09-18 RX ADMIN — Medication 1 TABLET(S): at 12:11

## 2020-09-18 RX ADMIN — Medication 1 PUFF(S): at 20:05

## 2020-09-18 RX ADMIN — BUDESONIDE AND FORMOTEROL FUMARATE DIHYDRATE 2 PUFF(S): 160; 4.5 AEROSOL RESPIRATORY (INHALATION) at 20:03

## 2020-09-18 RX ADMIN — HEPARIN SODIUM 5000 UNIT(S): 5000 INJECTION INTRAVENOUS; SUBCUTANEOUS at 22:23

## 2020-09-18 RX ADMIN — CLOPIDOGREL BISULFATE 75 MILLIGRAM(S): 75 TABLET, FILM COATED ORAL at 12:11

## 2020-09-18 RX ADMIN — ALBUTEROL 2 PUFF(S): 90 AEROSOL, METERED ORAL at 08:16

## 2020-09-18 RX ADMIN — BUDESONIDE AND FORMOTEROL FUMARATE DIHYDRATE 2 PUFF(S): 160; 4.5 AEROSOL RESPIRATORY (INHALATION) at 08:18

## 2020-09-18 RX ADMIN — RANOLAZINE 500 MILLIGRAM(S): 500 TABLET, FILM COATED, EXTENDED RELEASE ORAL at 22:23

## 2020-09-18 RX ADMIN — Medication 125 MICROGRAM(S): at 05:40

## 2020-09-18 RX ADMIN — Medication 1 PUFF(S): at 14:21

## 2020-09-18 NOTE — PROGRESS NOTE ADULT - ASSESSMENT
59 CAD, PAD s/p fem-pop bypass s/p RT iliac stent, diabetes, hypothyroidism, hyperlipidemia, current active smoker, uses cocaine last use yesterday, chronic back pain s/p lumbar surgery presented with multiple non-specific complaints found to have ST elevations on presenting EKG, code STEMI called and pt underwent urgent angiogram showing occluded RCA w no intervention with UTI sepsis and E. coli bacteremia POA w hospital course c/b code blue.     AMISH w septic shock/UTI w STEMI and Code blue event w hypotension   Edema with low Albumin state  , tolerating lasix  OPtimize intake  Replete K and monitor K with creatinine  Bethenechol for bladder retention   Will need outpatient renal follow up with discharge    Hypokalemia  -Replete po as needed    Hypocalcemia - Ca stable   monitor while on loop diuretics  OScal daily   Calcitriol - Vit D analogue

## 2020-09-18 NOTE — PROGRESS NOTE ADULT - SUBJECTIVE AND OBJECTIVE BOX
Patient is a 59y Female who reports no complaints , taking some po. NO cp or sob now.   seen earlier, note now      MEDICATIONS  (STANDING):  ALBUTerol    90 MICROgram(s) HFA Inhaler 2 Puff(s) Inhalation every 6 hours  aspirin  chewable 81 milliGRAM(s) Oral daily  atorvastatin 40 milliGRAM(s) Oral at bedtime  budesonide  80 MICROgram(s)/formoterol 4.5 MICROgram(s) Inhaler 2 Puff(s) Inhalation two times a day  calcitriol   Capsule 0.25 MICROGram(s) Oral daily  calcium carbonate 1250 mG  + Vitamin D (OsCal 500 + D) 1 Tablet(s) Oral daily  clopidogrel Tablet 75 milliGRAM(s) Oral daily  dextrose 5%. 1000 milliLiter(s) (50 mL/Hr) IV Continuous <Continuous>  dextrose 50% Injectable 12.5 Gram(s) IV Push once  dextrose 50% Injectable 25 Gram(s) IV Push once  dextrose 50% Injectable 25 Gram(s) IV Push once  ferrous    sulfate 325 milliGRAM(s) Oral daily  fluconAZOLE   Tablet 100 milliGRAM(s) Oral daily  furosemide    Tablet 20 milliGRAM(s) Oral daily  guaiFENesin ER 1200 milliGRAM(s) Oral every 12 hours  heparin   Injectable 5000 Unit(s) SubCutaneous every 8 hours  insulin lispro (HumaLOG) corrective regimen sliding scale   SubCutaneous three times a day before meals  insulin lispro (HumaLOG) corrective regimen sliding scale   SubCutaneous at bedtime  ipratropium 17 MICROgram(s) HFA Inhaler 1 Puff(s) Inhalation every 6 hours  lactobacillus acidophilus 1 Tablet(s) Oral three times a day with meals  levothyroxine 125 MICROGram(s) Oral daily  metoprolol tartrate 12.5 milliGRAM(s) Oral two times a day  ranolazine 500 milliGRAM(s) Oral two times a day    MEDICATIONS  (PRN):  acetaminophen   Tablet .. 650 milliGRAM(s) Oral every 4 hours PRN Mild Pain (1 - 3)  ALBUTerol    90 MICROgram(s) HFA Inhaler 2 Puff(s) Inhalation every 6 hours PRN Shortness of Breath and/or Wheezing  aluminum hydroxide/magnesium hydroxide/simethicone Suspension 30 milliLiter(s) Oral every 4 hours PRN Dyspepsia  dextrose 40% Gel 15 Gram(s) Oral once PRN Blood Glucose LESS THAN 70 milliGRAM(s)/deciliter  glucagon  Injectable 1 milliGRAM(s) IntraMuscular once PRN Glucose LESS THAN 70 milligrams/deciliter  ondansetron Injectable 4 milliGRAM(s) IV Push every 6 hours PRN Nausea        T(C): , Max: 37 (09-18-20 @ 22:22)  T(F): , Max: 98.6 (09-18-20 @ 22:22)  HR: 102 (09-18-20 @ 22:22)  BP: 155/58 (09-18-20 @ 22:22)  BP(mean): --  RR: 17 (09-18-20 @ 22:22)  SpO2: 99% (09-18-20 @ 22:22)  Wt(kg): --    09-17 @ 07:01  -  09-18 @ 07:00  --------------------------------------------------------  IN: 0 mL / OUT: 2000 mL / NET: -2000 mL    09-18 @ 07:01  -  09-19 @ 00:44  --------------------------------------------------------  IN: 0 mL / OUT: 1000 mL / NET: -1000 mL          PHYSICAL EXAM:    Constitutional: NAD, frail. >then stated age  HEENT: dry MM  Respiratory: CTAB  Cardiovascular: S1 and S2,  Extremities: peripheral edema  Neurological: A/O x 3, no focal deficits  Psychiatric: Normal mood, normal affect  Skin: No rashes  Access: Not applicable        LABS:                        8.0    8.09  )-----------( 264      ( 18 Sep 2020 06:32 )             24.5     18 Sep 2020 06:32    135    |  103    |  58     ----------------------------<  80     3.4     |  24     |  3.16   17 Sep 2020 07:01    135    |  103    |  58     ----------------------------<  78     3.1     |  23     |  3.43   16 Sep 2020 06:31    134    |  103    |  58     ----------------------------<  72     3.7     |  23     |  3.59   15 Sep 2020 06:25    133    |  103    |  55     ----------------------------<  77     3.3     |  20     |  3.68     Ca    7.7        18 Sep 2020 06:32  Ca    7.6        17 Sep 2020 07:01  Ca    7.4        16 Sep 2020 06:31  Ca    7.9        15 Sep 2020 06:25  Phos  3.4       18 Sep 2020 06:32    TPro  x      /  Alb  1.2    /  TBili  x      /  DBili  x      /  AST  x      /  ALT  x      /  AlkPhos  x      18 Sep 2020 06:32  TPro  5.4    /  Alb  1.2    /  TBili  0.5    /  DBili  x      /  AST  134    /  ALT  52     /  AlkPhos  115    15 Sep 2020 06:25          Urine Studies:          RADIOLOGY & ADDITIONAL STUDIES:

## 2020-09-18 NOTE — CHART NOTE - NSCHARTNOTEFT_GEN_A_CORE
HPI:        Recommendations:        Diet Presciption: Diet, Renal Restrictions:   For patients receiving Renal Replacement - No Protein Restr, No Conc K, No Conc Phos, Low Sodium  Consistent Carbohydrate Gestational Diabetic {3 Snacks} (CSTCHOGDM)  1500mL Fluid Restriction ([id number])  Supplement Feeding Modality:  Oral  Nepro Cans or Servings Per Day:  3       Frequency:  Three Times a day (09-11-20 @ 19:14)      Wt Hx: 9/18- 141# weights fluctuating since admission (question accuracy) Admission weight 8/.3# ~9# weight gain/7.4% x 25 days   Height (cm): 160.02 (08-23-20 @ 07:42)  Weight (kg): 54.4 (08-23-20 @ 07:42)  BMI (kg/m2): 21.2 (08-23-20 @ 07:42)      Weight Used for Calculation: admission weight 60Kg     Estimated Energy Needs ( 25-30 calories/kg): 5704-9307 calories  Estimated Protein Needs ( 1.6-1.8 gm/kg):- gm protein  Estimated Fluid Needs (25-30 ml/kg):4424-1474 calories      Labs: 09-18 Na135 mmol/L Glu 80 mg/dL K+ 3.4 mmol/L<L> Cr  3.16 mg/dL<H> BUN 58 mg/dL<H> 09-18 Phos 3.4 mg/dL 09-18 Alb 1.2 g/dL<L> 08-25 Chol 65 mg/dL LDL 19 mg/dL HDL 7 mg/dL<L> Trig 195 mg/dL<H>   CAPILLARY BLOOD GLUCOSE    POCT Blood Glucose.: 100 mg/dL (18 Sep 2020 07:46)  POCT Blood Glucose.: 147 mg/dL (17 Sep 2020 20:56)  POCT Blood Glucose.: 163 mg/dL (17 Sep 2020 16:39)  POCT Blood Glucose.: 132 mg/dL (17 Sep 2020 12:42)      Skin: jennifer score =   Edema- HPI:  60yo/F with PMH CAD, PAD s/p fem-pop bypass s/p RT iliac stent, diabetes, hypothyroidism, hyperlipidemia, current active smoker, uses cocaine, chronic back pain s/p lumbar surgery presented on 8/23/2020 with multiple non-specific complaints, including fevers, hospital course complicated by acute kidney failure, intubation, pressor requirement, diarrhea, and now  pneumonia with occlusion of left mainstem bronchus, improved on most recent CXR.      Nutritional status: Pt remains with poor intake ~50% intake however po fluctuates daily. Pt reports consuming 1 Nepro supplement daily. Will continue with po supplements and add high protein jello po BID (20gm each)    Output: urine output: 2000ml, last bowel movement 9/18.   ***Thrush noted on fluconazole        Recommendations:  1) Suggest d/c gestational DM diet and change to Renal, Consistent carbohydrate diet with 1500ml fluid restriction.   2) monitor daily weights  3) monitor labs/lytes and hydration replete as needed.   4) encourage po intake by offering food preferences for optimal intake.   5) avoid extremely hot/cold foods which may effect intake due to thrush sensitivity      Diet Presciption: Diet, Renal Restrictions:   For patients receiving Renal Replacement - No Protein Restr, No Conc K, No Conc Phos, Low Sodium  Consistent Carbohydrate Gestational Diabetic {3 Snacks} (CSTCHOGDM)  1500mL Fluid Restriction ([id number])  Supplement Feeding Modality:  Oral  Nepro Cans or Servings Per Day:  3       Frequency:  Three Times a day (09-11-20 @ 19:14)      Wt Hx: 9/18- 141# weights fluctuating since admission (question accuracy) Admission weight 8/.3# ~9# weight gain/7.4% x 25 days   Height (cm): 160.02 (08-23-20 @ 07:42)  Weight (kg): 54.4 (08-23-20 @ 07:42)  BMI (kg/m2): 21.2 (08-23-20 @ 07:42)      Weight Used for Calculation: admission weight 60Kg     Estimated Energy Needs ( 25-30 calories/kg): 6798-2678 calories  Estimated Protein Needs ( 1.6-1.8 gm/kg):- gm protein  Estimated Fluid Needs (25-30 ml/kg):1445-2405 calories      Labs: 09-18 Na135 mmol/L Glu 80 mg/dL K+ 3.4 mmol/L<L> Cr  3.16 mg/dL<H> BUN 58 mg/dL<H> 09-18 Phos 3.4 mg/dL 09-18 Alb 1.2 g/dL<L> 08-25 Chol 65 mg/dL LDL 19 mg/dL HDL 7 mg/dL<L> Trig 195 mg/dL<H>   CAPILLARY BLOOD GLUCOSE    POCT Blood Glucose.: 100 mg/dL (18 Sep 2020 07:46)  POCT Blood Glucose.: 147 mg/dL (17 Sep 2020 20:56)  POCT Blood Glucose.: 163 mg/dL (17 Sep 2020 16:39)  POCT Blood Glucose.: 132 mg/dL (17 Sep 2020 12:42)      Skin: jennifer score = 13 (high risk for skin breakdown)  Edema-+3, +2 extremities.     **Will continue to monitor and follow up prn*** HPI:  58yo/F with PMH CAD, PAD s/p fem-pop bypass s/p RT iliac stent, diabetes, hypothyroidism, hyperlipidemia, current active smoker, uses cocaine, chronic back pain s/p lumbar surgery presented on 8/23/2020 with multiple non-specific complaints, including fevers, hospital course complicated by acute kidney failure, intubation, pressor requirement, diarrhea, and now  pneumonia with occlusion of left mainstem bronchus, improved on most recent CXR.      Nutritional status: Pt remains with poor intake ~50% intake however po fluctuates daily. Pt reports consuming 1 Nepro supplement daily. Will continue with po supplements and add high protein jello po BID (20gm each)    Output: urine output: 2000ml, last bowel movement 9/18.   ***Thrush noted on fluconazole        Recommendations:  1) Suggest d/c gestational DM diet and change to Renal, Consistent carbohydrate diet with 1500ml fluid restriction.   2) monitor daily weights  3) monitor labs/lytes and hydration replete as needed.   4) encourage po intake by offering food preferences for optimal intake.   5) avoid extremely hot/cold foods which may effect intake due to thrush sensitivity  6) continue with nepro supplement and add Gelatein plus jello po BID for additional 40gm protein      Diet Presciption: Diet, Renal Restrictions:   For patients receiving Renal Replacement - No Protein Restr, No Conc K, No Conc Phos, Low Sodium  Consistent Carbohydrate Gestational Diabetic {3 Snacks} (CSTCHOGDM)  1500mL Fluid Restriction ([id number])  Supplement Feeding Modality:  Oral  Nepro Cans or Servings Per Day:  3       Frequency:  Three Times a day (09-11-20 @ 19:14)      Wt Hx: 9/18- 141# weights fluctuating since admission (question accuracy) Admission weight 8/.3# ~9# weight gain/7.4% x 25 days   Height (cm): 160.02 (08-23-20 @ 07:42)  Weight (kg): 54.4 (08-23-20 @ 07:42)  BMI (kg/m2): 21.2 (08-23-20 @ 07:42)      Weight Used for Calculation: admission weight 60Kg     Estimated Energy Needs ( 25-30 calories/kg): 1881-0276 calories  Estimated Protein Needs ( 1.6-1.8 gm/kg):- gm protein  Estimated Fluid Needs (25-30 ml/kg):9352-4385 calories      Labs: 09-18 Na135 mmol/L Glu 80 mg/dL K+ 3.4 mmol/L<L> Cr  3.16 mg/dL<H> BUN 58 mg/dL<H> 09-18 Phos 3.4 mg/dL 09-18 Alb 1.2 g/dL<L> 08-25 Chol 65 mg/dL LDL 19 mg/dL HDL 7 mg/dL<L> Trig 195 mg/dL<H>   CAPILLARY BLOOD GLUCOSE    POCT Blood Glucose.: 100 mg/dL (18 Sep 2020 07:46)  POCT Blood Glucose.: 147 mg/dL (17 Sep 2020 20:56)  POCT Blood Glucose.: 163 mg/dL (17 Sep 2020 16:39)  POCT Blood Glucose.: 132 mg/dL (17 Sep 2020 12:42)      Skin: jennifer score = 13 (high risk for skin breakdown)  Edema-+3, +2 extremities.     **Will continue to monitor and follow up prn***

## 2020-09-18 NOTE — PROGRESS NOTE ADULT - SUBJECTIVE AND OBJECTIVE BOX
Patient is a 59y old  Female who presents with a chief complaint of abd pain, weakness (17 Sep 2020 15:57)    HPI:  58yo/F with PMH CAD, PAD s/p fem-pop bypass s/p RT iliac stent, diabetes, hypothyroidism, hyperlipidemia, current active smoker, uses cocaine last use yesterday, chronic back pain s/p lumbar surgery presented with multiple non-specific complaints, including fevers on/off last 4-5 days, weakness, no appetite and reduced oral intake, vague diffuse abd pains, one day of diarrhea. No cough, no SOB. SHe came in today for evaluation of weakness and found to have ST elevations on presenting EKG, code STEMI called and pt underwent urgent angiogram showing occluded RCA. She denies chest pain at present. (23 Aug 2020 10:39)    9/18/2020-  Seen earlier this morning with Dr. Hammer.   Pt feels better today, less abd. pain.  Able to eat alittle more for breakfast.   PAST MEDICAL & SURGICAL HISTORY:  Lung nodule    Diabetes mellitus    History of TIAs    History of gallbladder disease  surgery    History of colon polyps  precancerous    Substance abuse  history of. last used 14 years ago.    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 Decreased ROM.    Angina pectoris    History of MRSA infection  left lower extremity incisional area 2015    Anxiety and depression    Transient cerebral ischemia, unspecified type    Osteoarthritis of spine, unspecified spinal osteoarthritis complication status, unspecified spinal region    Lumbar herniated disc    Urinary incontinence, unspecified type    Overactive bladder    Hiatal hernia with GERD  hiatal hernia repaired    PAD (peripheral artery disease)    Hyperlipidemia, unspecified hyperlipidemia type    Essential hypertension    Hypothyroidism    COPD (chronic obstructive pulmonary disease)    CAD (coronary artery disease)    History of lumbar fusion  with laminectomy 11/8/2018    H/O hernia repair  hiatal hernia - 2017, 2018    S/P dilatation and curettage  1991    H/O rhinoplasty  1980    H/O angioplasty  Right iliac artery. 5/12/2017    H/O ventral hernia repair  3/2017    History of incision and drainage  of lower extremity incisional site x 5 . last done5/2015    H/O arterial bypass of lower limb  Femoral - Popliteal. 11/2014 Left lower side with stents    History of laparoscopic cholecystectomy  2/2016      MEDICATIONS  (STANDING):  ALBUTerol    90 MICROgram(s) HFA Inhaler 2 Puff(s) Inhalation every 6 hours  aspirin  chewable 81 milliGRAM(s) Oral daily  atorvastatin 40 milliGRAM(s) Oral at bedtime  budesonide  80 MICROgram(s)/formoterol 4.5 MICROgram(s) Inhaler 2 Puff(s) Inhalation two times a day  calcitriol   Capsule 0.25 MICROGram(s) Oral daily  calcium carbonate 1250 mG  + Vitamin D (OsCal 500 + D) 1 Tablet(s) Oral daily  clopidogrel Tablet 75 milliGRAM(s) Oral daily  dextrose 5%. 1000 milliLiter(s) (50 mL/Hr) IV Continuous <Continuous>  dextrose 50% Injectable 12.5 Gram(s) IV Push once  dextrose 50% Injectable 25 Gram(s) IV Push once  dextrose 50% Injectable 25 Gram(s) IV Push once  ferrous    sulfate 325 milliGRAM(s) Oral daily  fluconAZOLE   Tablet 100 milliGRAM(s) Oral daily  furosemide    Tablet 20 milliGRAM(s) Oral daily  guaiFENesin ER 1200 milliGRAM(s) Oral every 12 hours  heparin   Injectable 5000 Unit(s) SubCutaneous every 8 hours  insulin lispro (HumaLOG) corrective regimen sliding scale   SubCutaneous three times a day before meals  insulin lispro (HumaLOG) corrective regimen sliding scale   SubCutaneous at bedtime  ipratropium 17 MICROgram(s) HFA Inhaler 1 Puff(s) Inhalation every 6 hours  lactobacillus acidophilus 1 Tablet(s) Oral three times a day with meals  levothyroxine 125 MICROGram(s) Oral daily  metoprolol tartrate 12.5 milliGRAM(s) Oral two times a day  ranolazine 500 milliGRAM(s) Oral two times a day    MEDICATIONS  (PRN):  acetaminophen   Tablet .. 650 milliGRAM(s) Oral every 4 hours PRN Mild Pain (1 - 3)  ALBUTerol    90 MICROgram(s) HFA Inhaler 2 Puff(s) Inhalation every 6 hours PRN Shortness of Breath and/or Wheezing  aluminum hydroxide/magnesium hydroxide/simethicone Suspension 30 milliLiter(s) Oral every 4 hours PRN Dyspepsia  dextrose 40% Gel 15 Gram(s) Oral once PRN Blood Glucose LESS THAN 70 milliGRAM(s)/deciliter  glucagon  Injectable 1 milliGRAM(s) IntraMuscular once PRN Glucose LESS THAN 70 milligrams/deciliter  ondansetron Injectable 4 milliGRAM(s) IV Push every 6 hours PRN Nausea    Allergies    No Known Allergies    Intolerances      SOCIAL HISTORY:  FAMILY HISTORY:  Family history of asthma (Sibling)  sister    Family history of epilepsy (Sibling, Sibling)  2 sisters    Family history of cardiovascular disease  mother    Family history of stroke  mother    Family history of heart disease  father    Family history of alcoholism in father      REVIEW OF SYSTEMS:  CONSTITUTIONAL: No weakness, fevers or chills  RESPIRATORY: No cough, wheezing, hemoptysis; No shortness of breath  CARDIOVASCULAR: No chest pain or palpitations  GASTROINTESTINAL: PEr HPI.     Vital Signs Last 24 Hrs  T(C): 36.5 (18 Sep 2020 08:30), Max: 36.6 (17 Sep 2020 17:05)  T(F): 97.7 (18 Sep 2020 08:30), Max: 97.9 (17 Sep 2020 17:05)  HR: 88 (18 Sep 2020 08:30) (84 - 92)  BP: 100/86 (18 Sep 2020 08:30) (100/86 - 122/50)  BP(mean): --  RR: 17 (18 Sep 2020 08:30) (16 - 17)  SpO2: 98% (18 Sep 2020 08:30) (97% - 98%)    PHYSICAL EXAM:  Constitutional: NAD, well-developed  Respiratory: CTAB  Cardiovascular: S1 and S2, RRR, no M/G/R  Gastrointestinal: BS+, soft, mild lower abdominal tenderness, ND.     LABS:                        8.0    8.09  )-----------( 264      ( 18 Sep 2020 06:32 )             24.5     09-18    135  |  103  |  58<H>  ----------------------------<  80  3.4<L>   |  24  |  3.16<H>    Ca    7.7<L>      18 Sep 2020 06:32  Phos  3.4     09-18    TPro  x   /  Alb  1.2<L>  /  TBili  x   /  DBili  x   /  AST  x   /  ALT  x   /  AlkPhos  x   09-18      LIVER FUNCTIONS - ( 18 Sep 2020 06:32 )  Alb: 1.2 g/dL / Pro: x     / ALK PHOS: x     / ALT: x     / AST: x     / GGT: x             RADIOLOGY & ADDITIONAL STUDIES:

## 2020-09-18 NOTE — PROGRESS NOTE ADULT - ASSESSMENT
58 y/o female with CAD, PAD s/p fem-pop bypass s/p RT iliac stent, diabetes, admitted with UTI sepsis and E. coli bacteremia POA.  ECG revealed STEMI prompting code STEMI s/p urgent angiogram showing occluded RCA.  On iv zosyn for e. coli bactermia now with persistent lower abdominal pain that has increased over the past several days.  Also with diarrhea up to 6 times daily.  C. Diff was negative several days ago.  WBC improving.     Impression:  Lower abdominal pain/diarrhea likely due to ischemic colitis.     Plan:  Slowly improving, no need to reimage at this time.   Ok to d/c home from gi standpoint.   Recent colonoscopy and egd with Dr. Martinez-all unremarkable per pt.   Pt to f/u with Dr. Jacobsen as outpatient.     Discussed with pt, Dr. Hammer.

## 2020-09-18 NOTE — PROGRESS NOTE ADULT - SUBJECTIVE AND OBJECTIVE BOX
Subjective:  comfortable in bed today without any new complaints  may go to rehab  anxious to start moving around again    Review of Systems:  All 10 systems reviewed in detailed and found to be negative with the exception of what has already been described above    Allergies:  No Known Allergies    Meds  MEDICATIONS  (STANDING):  ALBUTerol    90 MICROgram(s) HFA Inhaler 2 Puff(s) Inhalation every 6 hours  aspirin  chewable 81 milliGRAM(s) Oral daily  atorvastatin 40 milliGRAM(s) Oral at bedtime  budesonide  80 MICROgram(s)/formoterol 4.5 MICROgram(s) Inhaler 2 Puff(s) Inhalation two times a day  calcitriol   Capsule 0.25 MICROGram(s) Oral daily  calcium carbonate 1250 mG  + Vitamin D (OsCal 500 + D) 1 Tablet(s) Oral daily  clopidogrel Tablet 75 milliGRAM(s) Oral daily  dextrose 5%. 1000 milliLiter(s) (50 mL/Hr) IV Continuous <Continuous>  dextrose 50% Injectable 12.5 Gram(s) IV Push once  dextrose 50% Injectable 25 Gram(s) IV Push once  dextrose 50% Injectable 25 Gram(s) IV Push once  ferrous    sulfate 325 milliGRAM(s) Oral daily  fluconAZOLE   Tablet 100 milliGRAM(s) Oral daily  furosemide    Tablet 20 milliGRAM(s) Oral daily  guaiFENesin ER 1200 milliGRAM(s) Oral every 12 hours  heparin   Injectable 5000 Unit(s) SubCutaneous every 8 hours  insulin lispro (HumaLOG) corrective regimen sliding scale   SubCutaneous three times a day before meals  insulin lispro (HumaLOG) corrective regimen sliding scale   SubCutaneous at bedtime  ipratropium 17 MICROgram(s) HFA Inhaler 1 Puff(s) Inhalation every 6 hours  lactobacillus acidophilus 1 Tablet(s) Oral three times a day with meals  levothyroxine 125 MICROGram(s) Oral daily  metoprolol tartrate 12.5 milliGRAM(s) Oral two times a day  ranolazine 500 milliGRAM(s) Oral two times a day    MEDICATIONS  (PRN):  acetaminophen   Tablet .. 650 milliGRAM(s) Oral every 4 hours PRN Mild Pain (1 - 3)  ALBUTerol    90 MICROgram(s) HFA Inhaler 2 Puff(s) Inhalation every 6 hours PRN Shortness of Breath and/or Wheezing  aluminum hydroxide/magnesium hydroxide/simethicone Suspension 30 milliLiter(s) Oral every 4 hours PRN Dyspepsia  dextrose 40% Gel 15 Gram(s) Oral once PRN Blood Glucose LESS THAN 70 milliGRAM(s)/deciliter  glucagon  Injectable 1 milliGRAM(s) IntraMuscular once PRN Glucose LESS THAN 70 milligrams/deciliter  ondansetron Injectable 4 milliGRAM(s) IV Push every 6 hours PRN Nausea    Physical Exam  T(C): 36.5 (09-18-20 @ 08:30), Max: 36.6 (09-17-20 @ 17:05)  HR: 88 (09-18-20 @ 08:30) (84 - 92)  BP: 100/86 (09-18-20 @ 08:30) (100/86 - 122/50)  RR: 17 (09-18-20 @ 08:30) (16 - 17)  SpO2: 98% (09-18-20 @ 08:30) (97% - 98%)  Gen: Alert, oriented, no distress  HEENT: Anicteric sclera, + thrush, poor dentition  Cardio: Regular rhythm and rate, normal S1S2, no murmurs  Resp: decreased breath sounds  GI: slightly tender  Ext: No cyanosis, clubbing or edema  Neuro: Nonfocal    Labs:                        8.0    8.09  )-----------( 264      ( 18 Sep 2020 06:32 )             24.5     09-18    135  |  103  |  58<H>  ----------------------------<  80  3.4<L>   |  24  |  3.16<H>    Ca    7.7<L>      18 Sep 2020 06:32  Phos  3.4     09-18    TPro  x   /  Alb  1.2<L>  /  TBili  x   /  DBili  x   /  AST  x   /  ALT  x   /  AlkPhos  x   09-18       < from: CT Chest No Cont (09.09.20 @ 15:52) >  PROCEDURE DATE:  09/09/2020          INTERPRETATION:  CLINICAL INFORMATION: Chest pain    COMPARISON: CT chest 7/29/2019, CT abdomen 9/8/2020    PROCEDURE:  CT of the Chest was performed without intravenous contrast.  Sagittal and coronal reformats were performed.    FINDINGS:    LUNGS AND AIRWAYS: Mucosal impaction of the left mainstem bronchus. Near complete atelectasis of the left lower lobe. Significant segmental atelectasis in the lingula and anterior left upper lobe. Right basilar compressive atelectasis. Additional scattered peripheral groundglass opacities.  Paraseptal emphysema bilaterally.  PLEURA: No moderate to large left pleural effusion. Mild to moderate right pleural effusion.  MEDIASTINUM AND TATIANA: No lymphadenopathy.  VESSELS: Atherosclerotic changes of the aorta and coronary vasculature. No aortic aneurysm.  HEART: Heart size is normal. No pericardial effusion.  CHEST WALL AND LOWER NECK: Extensive anasarca.  VISUALIZED UPPER ABDOMEN: Prior cholecystectomy.  BONES: Old right lateral sixth and seventh rib fractures. Degenerative changes.    IMPRESSION:  Extensive mucosal impaction of the left mainstem bronchus extending into left upper lobe and left lower lobe segmental bronchi with near complete atelectasis left lower lobe and multifocal segmental atelectasis and/or pneumonia in the left upper lobe. Moderate to large left pleural effusion with a mild to moderate right pleural effusion with bibasilar compressive atelectasis.    Additional scattered groundglass opacities bilaterally.    < end of copied text >     chest x-ray   09/11/2020: Reviewed with Radiology   when compared to  film from CT scan September 9, 2020, there is improved aeration of the left lung.  This is most likely secondary to improved atelectasis furthermore there is better visualization of the left mainstem bronchus.  Overall the suggest that the mucus plug and resultant atelectasis has improved since prior study

## 2020-09-18 NOTE — PROGRESS NOTE ADULT - SUBJECTIVE AND OBJECTIVE BOX
60yo female with PMH CAD, PAD s/p fem-pop bypass s/p RT iliac stent, DM2, HLD, hypothyroidism, current active smoker, uses cocaine, chronic back pain s/p lumbar surgery presented on 2020 with multiple non-specific complaints, including fevers, weakness, poor po intake, diffuse abdominal pain, diarrhea. She found to have STEMI s/p LHC with RCA occlusion , E coli sepsis, cystitis, ischemic colitis.    hospital course complicated by acute kidney failure, intubation, pressor requirement, diarrhea, and now  pneumonia with occlusion of left mainstem bronchus     - pt seen and examined, chart reviewed, pt reports felling weak, + abdominal distension, + dyspnea, + poor po intake, cough, afebrile, denies HA, CP, dizziness, POC discussed    - pt seen and examined, + lose bm, + diffuse abdominal discomfort, + gen weakness, afebrile, pOC discussed     - pt seen and examined, BM better, lower abdominal pain, + weakness, appetite improving , afebrile, POC discussed    - No new complaints   9/15 - Nonew complaints    lower abd pain resolved after BM overnight,, hb 8    ROS:   All 10 systems reviewed and found to be negative with the exception of what has been described above.  GEN: lying in bed, NAD  HEENT:   NC/AT, pupils equal and reactive, EOMI  CV:  +S1, +S2, RRR  RESP:   lungs clear to auscultation bilaterally, no wheeze, rales, rhonchi   BREAST:  not examined  GI:  abdomen soft, non-tender, non-distended, normoactive BS  RECTAL:  not examined  :  not examined  MSK:   normal muscle tone  EXT:  no edema  NEURO:  AAOX3, no focal neurological deficits  SKIN:  no rashes        PHYSICAL EXAM:    Daily     Daily Weight in k (18 Sep 2020 06:32)    ICU Vital Signs Last 24 Hrs  T(C): 36.7 (18 Sep 2020 16:48), Max: 36.7 (18 Sep 2020 16:48)  T(F): 98.1 (18 Sep 2020 16:48), Max: 98.1 (18 Sep 2020 16:48)  HR: 99 (18 Sep 2020 16:48) (84 - 99)  BP: 140/56 (18 Sep 2020 16:48) (100/86 - 140/56)  BP(mean): --  ABP: --  ABP(mean): --  RR: 16 (18 Sep 2020 16:48) (16 - 17)  SpO2: 99% (18 Sep 2020 16:48) (98% - 99%)                              8.0    8.09  )-----------( 264      ( 18 Sep 2020 06:32 )             24.5       CBC Full  -  ( 18 Sep 2020 06:32 )  WBC Count : 8.09 K/uL  RBC Count : 2.74 M/uL  Hemoglobin : 8.0 g/dL  Hematocrit : 24.5 %  Platelet Count - Automated : 264 K/uL  Mean Cell Volume : 89.4 fl  Mean Cell Hemoglobin : 29.2 pg  Mean Cell Hemoglobin Concentration : 32.7 gm/dL  Auto Neutrophil # : 6.00 K/uL  Auto Lymphocyte # : 1.24 K/uL  Auto Monocyte # : 0.62 K/uL  Auto Eosinophil # : 0.10 K/uL  Auto Basophil # : 0.03 K/uL  Auto Neutrophil % : 74.2 %  Auto Lymphocyte % : 15.3 %  Auto Monocyte % : 7.7 %  Auto Eosinophil % : 1.2 %  Auto Basophil % : 0.4 %          135  |  103  |  58<H>  ----------------------------<  80  3.4<L>   |  24  |  3.16<H>    Ca    7.7<L>      18 Sep 2020 06:32  Phos  3.4         TPro  x   /  Alb  1.2<L>  /  TBili  x   /  DBili  x   /  AST  x   /  ALT  x   /  AlkPhos  x   18      LIVER FUNCTIONS - ( 18 Sep 2020 06:32 )  Alb: 1.2 g/dL / Pro: x     / ALK PHOS: x     / ALT: x     / AST: x     / GGT: x                               MEDICATIONS  (STANDING):  ALBUTerol    90 MICROgram(s) HFA Inhaler 2 Puff(s) Inhalation every 6 hours  aspirin  chewable 81 milliGRAM(s) Oral daily  atorvastatin 40 milliGRAM(s) Oral at bedtime  budesonide  80 MICROgram(s)/formoterol 4.5 MICROgram(s) Inhaler 2 Puff(s) Inhalation two times a day  calcitriol   Capsule 0.25 MICROGram(s) Oral daily  calcium carbonate 1250 mG  + Vitamin D (OsCal 500 + D) 1 Tablet(s) Oral daily  clopidogrel Tablet 75 milliGRAM(s) Oral daily  dextrose 5%. 1000 milliLiter(s) (50 mL/Hr) IV Continuous <Continuous>  dextrose 50% Injectable 12.5 Gram(s) IV Push once  dextrose 50% Injectable 25 Gram(s) IV Push once  dextrose 50% Injectable 25 Gram(s) IV Push once  ferrous    sulfate 325 milliGRAM(s) Oral daily  fluconAZOLE   Tablet 100 milliGRAM(s) Oral daily  furosemide    Tablet 20 milliGRAM(s) Oral daily  guaiFENesin ER 1200 milliGRAM(s) Oral every 12 hours  heparin   Injectable 5000 Unit(s) SubCutaneous every 8 hours  insulin lispro (HumaLOG) corrective regimen sliding scale   SubCutaneous three times a day before meals  insulin lispro (HumaLOG) corrective regimen sliding scale   SubCutaneous at bedtime  ipratropium 17 MICROgram(s) HFA Inhaler 1 Puff(s) Inhalation every 6 hours  lactobacillus acidophilus 1 Tablet(s) Oral three times a day with meals  levothyroxine 125 MICROGram(s) Oral daily  metoprolol tartrate 12.5 milliGRAM(s) Oral two times a day  ranolazine 500 milliGRAM(s) Oral two times a day

## 2020-09-18 NOTE — PROGRESS NOTE ADULT - ASSESSMENT
60yo female with PMH CAD, PAD s/p fem-pop bypass s/p RT iliac stent, DM2, HLD, hypothyroidism, current active smoker, uses cocaine, chronic back pain s/p lumbar surgery presented on 8/23/2020 with multiple non-specific complaints, including fevers, weakness, poor po intake, diffuse abdominal pain, diarrhea. She found to have STEMI s/p LHC with RCA occlusion , E coli sepsis, cystitis, ischemic colitis.    hospital course complicated by acute kidney failure, intubation, pressor requirement, diarrhea, and now  pneumonia with occlusion of left mainstem bronchus    9/18 Lower abd pain improved with bowel movement  /  ischemic colitis without acute abd   GI cleared  , completed 7 days of meropenem 9/17 , ? colonoscopy  eventually will need ANA MARÍA due to weakness and deconditioning  pulm eval noted. now s/p thoracentesis - transudate as per lights criteria  on lasix po 20 if cr stable  would increase to 40mg renal consult appreciated   - LE dopplers negative 9/15  - started diflucan for thrush 9/14  - s/p  meropenem as per ID til 9/17 for PNA and then plan for rehab.     Plan discussed with patient, nursing, staff.    # Acute hypoxic respiratory failure  secondary to septic shock   Hospital acquired PNA with  Mucosal impaction of left main bronchus , complete atelectasis of LLL   COPD , Nicotine dependence   Bilateral pleural effusion L >R  COVID-19 ruled out   -s/p intubation /extubation  -acute , recurrent hypoxic respiratory failure >> patient requiring supplemental o2 3 L  -CT chest shows large mucous impaction left main bronchus >> pulmonary followin g  -c/w mucinex, chest PT, albuterol, atrovent; c/w incentive spirometry  -c/w Meropenem, D/w Dr. Mesa  - may benefit from pleural effusion drainage - left thoracentesis 9/14  - d/w Dr. Hernandez cardiology - ok to Hold asa, plavix for thoracentesis - now resumed    #Septic shock secondary to Ecoli  bacteremia, possible ischemic infectious colitis ,  diarrheal syndrome, resolved   Lower Abdominal pain   -colitis on CT scan , possible bacteremia due to ischemic colitis   -completed antibiotics.  days IV zosyn [id number] --> 9/4 and 4 days ceftin 9/5-9/8,  s/p rocephin 2gm daily #6, s/p IV flagyl #4  - GI consult - Dr. Hammer, IV abx, recent colonoscopy unremarkable , GI PCR and C diff neg   - stop betanechol, bentyl ( can cause abdominal cramps/lose bm)  , add probiotics, if lose bm persist will retest for c diff and GI PCR  US abd 9/13 - trace of ascites, b/l pleural effusions`    #STEMI due to 100 % occlusion of RCA , PAD s/p LE stents -Echo show hypokinesis on admission, troponins, st elevations   -Urgent coronary angiogram showed occluded RCA of unknown chronicity  -cardiology f/u noted; c/w DAPT - help for thoracocentesis, restart as soon as or from IR prespective- statins atorvastatin 80--> 40 mg atorvastatin ,LDL 19, due to weakness , elevated CK  -  ranexa 1000--> 500 bid due to renal dysfunction  - c/w metoprolol 12.5 bid      #AMISH w septic shock/stemi/hypotension    Urinary retention s/p Black , failed trial of void 9/10  - bethanechol, d/c bentyl can cause urinary retention  -  s/p IV diuresis with worsening of renal function  - nephrology consult   #Anemia normocytic , mixed type ACD and iron deficiency   - check B12, folate, add iron daily ,  monitor    #Hypocalcemia, vitamin D deficiency    Intact PTH: 87, Vitamin D, 25-Hydroxy: 27.8, Magnesium, Serum: 1.8 mg/dL, c/w Calcitriol - Vit D analogue     #Hypomagnesemia - replace, repeat in am    #Hypothyroidism- home dose 112 mcg--> 125 mcg , increased due to elevated  TSH 22    #DM2 with A1C 8.1 - FBG monitoring , ISS     #Generalized weakness, multifactorialanemia, uncontrolled hypothyroidism, deconditioning, anasarca, renal and respiratory failure, STEMi  - PT daily, OOB to chair daily, c/w vit D  elevated CK with low LDL - > lower dose of statin atorvastatin 80--> 40 can cause weakness in elderly       Advanced directives   HCP daughter Antoinette [phone] updated 9/11/20 , 9/13, 9/15    Full code

## 2020-09-18 NOTE — PROGRESS NOTE ADULT - ASSESSMENT
58yo/F with PMH CAD, PAD s/p fem-pop bypass s/p RT iliac stent, diabetes, hypothyroidism, hyperlipidemia, current active smoker, uses cocaine, chronic back pain s/p lumbar surgery presented on 8/23/2020 with multiple non-specific complaints, including fevers, hospital course complicated by acute kidney failure, intubation, pressor requirement, diarrhea, and now  pneumonia with occlusion of left mainstem bronchus, improved on most recent CXR  1. hospital-acquired pneumonia, with large obstructing left mainstem plug:  - continue supplemental oxygen  - continue Mucinex  - continue chest  physiotherapy  - continue meropenem, finihsing 9/17  - continue albuterol, ipratropium  - will need bronchoscopy if chest physiotherapy fails to clear secretions but given improvement in CXR will defer for now  - chest x-ray on 9/11 reveals improved atelectasis and better visualization of left mainstem bronchus when compared to  film of CT scan from September 9, 2020  - Repeat CXR to re eval effusions and to eval atelectasis  - continue Symbicort  2. septic shock from E coli bacteremia: s/p treatment, but now on meropenem for pneumonia  3. Diarrhea: C diff negative.  GI following  4. Abdominal pain: Had abdominal ultrasound.  GI following  5. Pleural effusions:  s/p thoracentesis. diurese as able.  6. thrush: on fluconazole

## 2020-09-18 NOTE — PROGRESS NOTE ADULT - ATTENDING COMMENTS
See NP note for details. Patient denies any abdominal pain currently and is tolerating increased po. Please call with questions.

## 2020-09-19 LAB
ANION GAP SERPL CALC-SCNC: 8 MMOL/L — SIGNIFICANT CHANGE UP (ref 5–17)
BASOPHILS # BLD AUTO: 0.04 K/UL — SIGNIFICANT CHANGE UP (ref 0–0.2)
BASOPHILS NFR BLD AUTO: 0.6 % — SIGNIFICANT CHANGE UP (ref 0–2)
BUN SERPL-MCNC: 54 MG/DL — HIGH (ref 7–23)
CALCIUM SERPL-MCNC: 7.4 MG/DL — LOW (ref 8.5–10.1)
CHLORIDE SERPL-SCNC: 103 MMOL/L — SIGNIFICANT CHANGE UP (ref 96–108)
CO2 SERPL-SCNC: 24 MMOL/L — SIGNIFICANT CHANGE UP (ref 22–31)
CREAT SERPL-MCNC: 3.01 MG/DL — HIGH (ref 0.5–1.3)
CULTURE RESULTS: NO GROWTH — SIGNIFICANT CHANGE UP
EOSINOPHIL # BLD AUTO: 0.1 K/UL — SIGNIFICANT CHANGE UP (ref 0–0.5)
EOSINOPHIL NFR BLD AUTO: 1.4 % — SIGNIFICANT CHANGE UP (ref 0–6)
GLUCOSE SERPL-MCNC: 80 MG/DL — SIGNIFICANT CHANGE UP (ref 70–99)
HCT VFR BLD CALC: 24.5 % — LOW (ref 34.5–45)
HGB BLD-MCNC: 8 G/DL — LOW (ref 11.5–15.5)
IMM GRANULOCYTES NFR BLD AUTO: 1.2 % — SIGNIFICANT CHANGE UP (ref 0–1.5)
LYMPHOCYTES # BLD AUTO: 1.42 K/UL — SIGNIFICANT CHANGE UP (ref 1–3.3)
LYMPHOCYTES # BLD AUTO: 19.6 % — SIGNIFICANT CHANGE UP (ref 13–44)
MCHC RBC-ENTMCNC: 29.9 PG — SIGNIFICANT CHANGE UP (ref 27–34)
MCHC RBC-ENTMCNC: 32.7 GM/DL — SIGNIFICANT CHANGE UP (ref 32–36)
MCV RBC AUTO: 91.4 FL — SIGNIFICANT CHANGE UP (ref 80–100)
MONOCYTES # BLD AUTO: 0.67 K/UL — SIGNIFICANT CHANGE UP (ref 0–0.9)
MONOCYTES NFR BLD AUTO: 9.2 % — SIGNIFICANT CHANGE UP (ref 2–14)
NEUTROPHILS # BLD AUTO: 4.93 K/UL — SIGNIFICANT CHANGE UP (ref 1.8–7.4)
NEUTROPHILS NFR BLD AUTO: 68 % — SIGNIFICANT CHANGE UP (ref 43–77)
PLATELET # BLD AUTO: 296 K/UL — SIGNIFICANT CHANGE UP (ref 150–400)
POTASSIUM SERPL-MCNC: 3.3 MMOL/L — LOW (ref 3.5–5.3)
POTASSIUM SERPL-SCNC: 3.3 MMOL/L — LOW (ref 3.5–5.3)
RBC # BLD: 2.68 M/UL — LOW (ref 3.8–5.2)
RBC # FLD: 17.9 % — HIGH (ref 10.3–14.5)
SODIUM SERPL-SCNC: 135 MMOL/L — SIGNIFICANT CHANGE UP (ref 135–145)
SPECIMEN SOURCE: SIGNIFICANT CHANGE UP
WBC # BLD: 7.25 K/UL — SIGNIFICANT CHANGE UP (ref 3.8–10.5)
WBC # FLD AUTO: 7.25 K/UL — SIGNIFICANT CHANGE UP (ref 3.8–10.5)

## 2020-09-19 PROCEDURE — 99232 SBSQ HOSP IP/OBS MODERATE 35: CPT

## 2020-09-19 RX ORDER — POTASSIUM CHLORIDE 20 MEQ
40 PACKET (EA) ORAL ONCE
Refills: 0 | Status: COMPLETED | OUTPATIENT
Start: 2020-09-19 | End: 2020-09-19

## 2020-09-19 RX ORDER — OXYCODONE AND ACETAMINOPHEN 5; 325 MG/1; MG/1
1 TABLET ORAL ONCE
Refills: 0 | Status: DISCONTINUED | OUTPATIENT
Start: 2020-09-19 | End: 2020-09-19

## 2020-09-19 RX ADMIN — Medication 1 PUFF(S): at 08:18

## 2020-09-19 RX ADMIN — Medication 325 MILLIGRAM(S): at 09:12

## 2020-09-19 RX ADMIN — FLUCONAZOLE 100 MILLIGRAM(S): 150 TABLET ORAL at 09:13

## 2020-09-19 RX ADMIN — Medication 40 MILLIEQUIVALENT(S): at 18:08

## 2020-09-19 RX ADMIN — Medication 10 MILLIGRAM(S): at 23:49

## 2020-09-19 RX ADMIN — Medication 650 MILLIGRAM(S): at 02:58

## 2020-09-19 RX ADMIN — Medication 1 PUFF(S): at 14:29

## 2020-09-19 RX ADMIN — OXYCODONE AND ACETAMINOPHEN 1 TABLET(S): 5; 325 TABLET ORAL at 23:52

## 2020-09-19 RX ADMIN — CALCITRIOL 0.25 MICROGRAM(S): 0.5 CAPSULE ORAL at 09:15

## 2020-09-19 RX ADMIN — Medication 650 MILLIGRAM(S): at 04:11

## 2020-09-19 RX ADMIN — ALBUTEROL 2 PUFF(S): 90 AEROSOL, METERED ORAL at 14:33

## 2020-09-19 RX ADMIN — RANOLAZINE 500 MILLIGRAM(S): 500 TABLET, FILM COATED, EXTENDED RELEASE ORAL at 23:49

## 2020-09-19 RX ADMIN — ALBUTEROL 2 PUFF(S): 90 AEROSOL, METERED ORAL at 08:19

## 2020-09-19 RX ADMIN — Medication 1 TABLET(S): at 08:08

## 2020-09-19 RX ADMIN — Medication 1 PUFF(S): at 20:22

## 2020-09-19 RX ADMIN — Medication 10 MILLIGRAM(S): at 18:09

## 2020-09-19 RX ADMIN — HEPARIN SODIUM 5000 UNIT(S): 5000 INJECTION INTRAVENOUS; SUBCUTANEOUS at 21:32

## 2020-09-19 RX ADMIN — CLOPIDOGREL BISULFATE 75 MILLIGRAM(S): 75 TABLET, FILM COATED ORAL at 09:13

## 2020-09-19 RX ADMIN — Medication 1200 MILLIGRAM(S): at 09:15

## 2020-09-19 RX ADMIN — HEPARIN SODIUM 5000 UNIT(S): 5000 INJECTION INTRAVENOUS; SUBCUTANEOUS at 13:04

## 2020-09-19 RX ADMIN — Medication 81 MILLIGRAM(S): at 09:12

## 2020-09-19 RX ADMIN — BUDESONIDE AND FORMOTEROL FUMARATE DIHYDRATE 2 PUFF(S): 160; 4.5 AEROSOL RESPIRATORY (INHALATION) at 08:19

## 2020-09-19 RX ADMIN — Medication 20 MILLIGRAM(S): at 09:12

## 2020-09-19 RX ADMIN — Medication 125 MICROGRAM(S): at 05:44

## 2020-09-19 RX ADMIN — HEPARIN SODIUM 5000 UNIT(S): 5000 INJECTION INTRAVENOUS; SUBCUTANEOUS at 05:44

## 2020-09-19 RX ADMIN — Medication 12.5 MILLIGRAM(S): at 09:12

## 2020-09-19 RX ADMIN — Medication 1 TABLET(S): at 18:09

## 2020-09-19 RX ADMIN — ALBUTEROL 2 PUFF(S): 90 AEROSOL, METERED ORAL at 20:22

## 2020-09-19 RX ADMIN — Medication 1 TABLET(S): at 13:03

## 2020-09-19 RX ADMIN — BUDESONIDE AND FORMOTEROL FUMARATE DIHYDRATE 2 PUFF(S): 160; 4.5 AEROSOL RESPIRATORY (INHALATION) at 20:21

## 2020-09-19 RX ADMIN — RANOLAZINE 500 MILLIGRAM(S): 500 TABLET, FILM COATED, EXTENDED RELEASE ORAL at 09:13

## 2020-09-19 NOTE — PROGRESS NOTE ADULT - ASSESSMENT
58yo female with PMH CAD, PAD s/p fem-pop bypass s/p RT iliac stent, DM2, HLD, hypothyroidism, current active smoker, uses cocaine, chronic back pain s/p lumbar surgery presented on 8/23/2020 with multiple non-specific complaints, including fevers, weakness, poor po intake, diffuse abdominal pain, diarrhea. She found to have STEMI s/p LHC with RCA occlusion , E coli sepsis, cystitis, ischemic colitis.    hospital course complicated by acute kidney failure, intubation, pressor requirement, diarrhea, and now  pneumonia with occlusion of left mainstem bronchus    9/19 Lower abd pain  waxing and waning with mucus mixed with stool  / recent  ischemic colitis without acute abd    GI pcr, start dicyclomine  , completed 7 days of meropenem 9/17 ,I discussed with GI no indication for further imaging at this time   eventually will need ANA MARÍA due to weakness and deconditioning  pulm eval noted. now s/p thoracentesis - transudate as per lights criteria  on lasix po 20 if cr stable  would increase to 40mg renal consult appreciated   - LE dopplers negative 9/15  - started diflucan for thrush 9/14  - s/p  meropenem as per ID til 9/17 for PNA and then plan for rehab.     Plan discussed with patient, nursing, staff.    # Acute hypoxic respiratory failure  secondary to septic shock   Hospital acquired PNA with  Mucosal impaction of left main bronchus , complete atelectasis of LLL   COPD , Nicotine dependence   Bilateral pleural effusion L >R  COVID-19 ruled out   -s/p intubation /extubation  -acute , recurrent hypoxic respiratory failure >> patient requiring supplemental o2 3 L  -CT chest shows large mucous impaction left main bronchus >> pulmonary followin g  -c/w mucinex, chest PT, albuterol, atrovent; c/w incentive spirometry  -c/w Meropenem, D/w Dr. Mesa  - may benefit from pleural effusion drainage - left thoracentesis 9/14  - d/w Dr. Hernandez cardiology - ok to Hold asa, plavix for thoracentesis - now resumed    #Septic shock secondary to Ecoli  bacteremia, possible ischemic infectious colitis ,  diarrheal syndrome, resolved Lower Abdominal pain   -colitis on CT scan , possible bacteremia due to ischemic colitis   -completed antibiotics.  days IV zosyn [id number] --> 9/4 and 4 days ceftin 9/5-9/8,  s/p rocephin 2gm daily #6, s/p IV flagyl #4  - GI consult - Dr. Hammer, IV abx, recent colonoscopy unremarkable , GI PCR and C diff neg   - stop betanechol, bentyl ( can cause abdominal cramps/lose bm)  , add probiotics, if lose bm persist will retest for c diff and GI PCR  US abd 9/13 - trace of ascites, b/l pleural effusions`    #STEMI due to 100 % occlusion of RCA , PAD s/p LE stents -Echo show hypokinesis on admission, troponins, st elevations   -Urgent coronary angiogram showed occluded RCA of unknown chronicity  -cardiology f/u noted; c/w DAPT - help for thoracocentesis, restart as soon as or from IR prespective- statins atorvastatin 80--> 40 mg atorvastatin ,LDL 19, due to weakness , elevated CK  -  ranexa 1000--> 500 bid due to renal dysfunction  - c/w metoprolol 12.5 bid      #AMISH w septic shock/stemi/hypotension    Urinary retention s/p Black , failed trial of void 9/10  - bethanechol, d/c bentyl can cause urinary retention  -  s/p IV diuresis with worsening of renal function  - nephrology consult   #Anemia normocytic , mixed type ACD and iron deficiency   - check B12, folate, add iron daily ,  monitor    #Hypocalcemia, vitamin D deficiency    Intact PTH: 87, Vitamin D, 25-Hydroxy: 27.8, Magnesium, Serum: 1.8 mg/dL, c/w Calcitriol - Vit D analogue     #Hypomagnesemia - replace, repeat in am    #Hypothyroidism- home dose 112 mcg--> 125 mcg , increased due to elevated  TSH 22    #DM2 with A1C 8.1 - FBG monitoring , ISS     #Generalized weakness, multifactorialanemia, uncontrolled hypothyroidism, deconditioning, anasarca, renal and respiratory failure, STEMi  - PT daily, OOB to chair daily, c/w vit D  elevated CK with low LDL - > lower dose of statin atorvastatin 80--> 40 can cause weakness in elderly       Advanced directives   HCP daughter Antoinette [phone] updated 9/11/20 , 9/13, 9/15    Full code

## 2020-09-19 NOTE — PROGRESS NOTE ADULT - SUBJECTIVE AND OBJECTIVE BOX
SUBJECTIVE     Patient breathing comfortably with out sob or wheeze   she says she has mild cough   off the meropenum now   no fever spikes   no recent cxr     PAST MEDICAL & SURGICAL HISTORY:  Lung nodule    Diabetes mellitus    History of TIAs    History of gallbladder disease  surgery    History of colon polyps  precancerous    Substance abuse  history of. last used 14 years ago.    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 Decreased ROM.    Angina pectoris    History of MRSA infection  left lower extremity incisional area 2015    Anxiety and depression    Transient cerebral ischemia, unspecified type    Osteoarthritis of spine, unspecified spinal osteoarthritis complication status, unspecified spinal region    Lumbar herniated disc    Urinary incontinence, unspecified type    Overactive bladder    Hiatal hernia with GERD  hiatal hernia repaired    PAD (peripheral artery disease)    Hyperlipidemia, unspecified hyperlipidemia type    Essential hypertension    Hypothyroidism    COPD (chronic obstructive pulmonary disease)    CAD (coronary artery disease)    History of lumbar fusion  with laminectomy 11/8/2018    H/O hernia repair  hiatal hernia - 2017, 2018    S/P dilatation and curettage  1991    H/O rhinoplasty  1980    H/O angioplasty  Right iliac artery. 5/12/2017    H/O ventral hernia repair  3/2017    History of incision and drainage  of lower extremity incisional site x 5 . last done5/2015    H/O arterial bypass of lower limb  Femoral - Popliteal. 11/2014 Left lower side with stents    History of laparoscopic cholecystectomy  2/2016      OBJECTIVE   Vital Signs Last 24 Hrs  T(C): 36.6 (19 Sep 2020 08:05), Max: 37 (18 Sep 2020 22:22)  T(F): 97.9 (19 Sep 2020 08:05), Max: 98.6 (18 Sep 2020 22:22)  HR: 92 (19 Sep 2020 08:05) (80 - 102)  BP: 136/55 (19 Sep 2020 08:05) (136/55 - 155/58)  BP(mean): --  RR: 19 (19 Sep 2020 08:05) (16 - 19)  SpO2: 99% (19 Sep 2020 08:05) (99% - 99%)    Review of systems   as dictated in the history of present illness with the review of other systems non contributory     PHYSICAL EXAM:  Constitutional: , awake and alert, not in distress and not on the 02   HEENT: Normo cephalic atraumatic  Neck: Soft and supple, No J.V.D   Respiratory: vesicular breathing , No wheezing, rales or rhonchi.   Cardiovascular: S1 and S2, regular rate .   Gastrointestinal:  soft, nontender,and has hughes catheter   Extremities: No  edema or calf tenderness .  Neurological: No new  focal deficits.    MEDICATIONS  (STANDING):  ALBUTerol    90 MICROgram(s) HFA Inhaler 2 Puff(s) Inhalation every 6 hours  aspirin  chewable 81 milliGRAM(s) Oral daily  atorvastatin 40 milliGRAM(s) Oral at bedtime  budesonide  80 MICROgram(s)/formoterol 4.5 MICROgram(s) Inhaler 2 Puff(s) Inhalation two times a day  calcitriol   Capsule 0.25 MICROGram(s) Oral daily  calcium carbonate 1250 mG  + Vitamin D (OsCal 500 + D) 1 Tablet(s) Oral daily  clopidogrel Tablet 75 milliGRAM(s) Oral daily  dextrose 5%. 1000 milliLiter(s) (50 mL/Hr) IV Continuous <Continuous>  dextrose 50% Injectable 12.5 Gram(s) IV Push once  dextrose 50% Injectable 25 Gram(s) IV Push once  dextrose 50% Injectable 25 Gram(s) IV Push once  ferrous    sulfate 325 milliGRAM(s) Oral daily  fluconAZOLE   Tablet 100 milliGRAM(s) Oral daily  furosemide    Tablet 20 milliGRAM(s) Oral daily  guaiFENesin ER 1200 milliGRAM(s) Oral every 12 hours  heparin   Injectable 5000 Unit(s) SubCutaneous every 8 hours  insulin lispro (HumaLOG) corrective regimen sliding scale   SubCutaneous three times a day before meals  insulin lispro (HumaLOG) corrective regimen sliding scale   SubCutaneous at bedtime  ipratropium 17 MICROgram(s) HFA Inhaler 1 Puff(s) Inhalation every 6 hours  lactobacillus acidophilus 1 Tablet(s) Oral three times a day with meals  levothyroxine 125 MICROGram(s) Oral daily  metoprolol tartrate 12.5 milliGRAM(s) Oral two times a day  ranolazine 500 milliGRAM(s) Oral two times a day                            8.0    7.25  )-----------( 296      ( 19 Sep 2020 06:22 )             24.5     09-19    135  |  103  |  54<H>  ----------------------------<  80  3.3<L>   |  24  |  3.01<H>    Ca    7.4<L>      19 Sep 2020 06:22  Phos  3.4     09-18    TPro  x   /  Alb  1.2<L>  /  TBili  x   /  DBili  x   /  AST  x   /  ALT  x   /  AlkPhos  x   09-18

## 2020-09-19 NOTE — PROGRESS NOTE ADULT - SUBJECTIVE AND OBJECTIVE BOX
58yo female with PMH CAD, PAD s/p fem-pop bypass s/p RT iliac stent, DM2, HLD, hypothyroidism, current active smoker, uses cocaine, chronic back pain s/p lumbar surgery presented on 2020 with multiple non-specific complaints, including fevers, weakness, poor po intake, diffuse abdominal pain, diarrhea. She found to have STEMI s/p LHC with RCA occlusion , E coli sepsis, cystitis, ischemic colitis.    hospital course complicated by acute kidney failure, intubation, pressor requirement, diarrhea, and now  pneumonia with occlusion of left mainstem bronchus     - pt seen and examined, chart reviewed, pt reports felling weak, + abdominal distension, + dyspnea, + poor po intake, cough, afebrile, denies HA, CP, dizziness, POC discussed    - pt seen and examined, + lose bm, + diffuse abdominal discomfort, + gen weakness, afebrile, pOC discussed     - pt seen and examined, BM better, lower abdominal pain, + weakness, appetite improving , afebrile, POC discussed    - No new complaints   9/15 - Nonew complaints    lower abd pain resolved after BM overnight,, hb 8   lower abd pain which waxes and wanes, some days pain free and some days worse since admission, no fever, mucus mixed with stool x1 today, no gross blood in stool, afebrile, tolerating po intake     ROS:   All 10 systems reviewed and found to be negative with the exception of what has been described above.  GEN: lying in bed, NAD  HEENT:   NC/AT, pupils equal and reactive, EOMI  CV:  +S1, +S2, RRR  RESP:   lungs clear to auscultation bilaterally, no wheeze, rales, rhonchi   BREAST:  not examined  GI:  abdomen softlower abd tenderness, no rebound , normoactive BS  RECTAL:  not examined  :  not examined  MSK:   normal muscle tone  EXT:  no edema  NEURO:  AAOX3, no focal neurological deficits      PHYSICAL EXAM:    Daily     Daily Weight in k.7 (19 Sep 2020 06:29)    ICU Vital Signs Last 24 Hrs  T(C): 36.6 (19 Sep 2020 08:05), Max: 37 (18 Sep 2020 22:22)  T(F): 97.9 (19 Sep 2020 08:05), Max: 98.6 (18 Sep 2020 22:22)  HR: 73 (19 Sep 2020 14:30) (73 - 102)  BP: 136/55 (19 Sep 2020 08:05) (136/55 - 155/58)  BP(mean): --  ABP: --  ABP(mean): --  RR: 19 (19 Sep 2020 08:05) (16 - 19)  SpO2: 99% (19 Sep 2020 08:05) (99% - 99%)                              8.0    7.25  )-----------( 296      ( 19 Sep 2020 06:22 )             24.5       CBC Full  -  ( 19 Sep 2020 06:22 )  WBC Count : 7.25 K/uL  RBC Count : 2.68 M/uL  Hemoglobin : 8.0 g/dL  Hematocrit : 24.5 %  Platelet Count - Automated : 296 K/uL  Mean Cell Volume : 91.4 fl  Mean Cell Hemoglobin : 29.9 pg  Mean Cell Hemoglobin Concentration : 32.7 gm/dL  Auto Neutrophil # : 4.93 K/uL  Auto Lymphocyte # : 1.42 K/uL  Auto Monocyte # : 0.67 K/uL  Auto Eosinophil # : 0.10 K/uL  Auto Basophil # : 0.04 K/uL  Auto Neutrophil % : 68.0 %  Auto Lymphocyte % : 19.6 %  Auto Monocyte % : 9.2 %  Auto Eosinophil % : 1.4 %  Auto Basophil % : 0.6 %          135  |  103  |  54<H>  ----------------------------<  80  3.3<L>   |  24  |  3.01<H>    Ca    7.4<L>      19 Sep 2020 06:22  Phos  3.4         TPro  x   /  Alb  1.2<L>  /  TBili  x   /  DBili  x   /  AST  x   /  ALT  x   /  AlkPhos  x         LIVER FUNCTIONS - ( 18 Sep 2020 06:32 )  Alb: 1.2 g/dL / Pro: x     / ALK PHOS: x     / ALT: x     / AST: x     / GGT: x                               MEDICATIONS  (STANDING):  ALBUTerol    90 MICROgram(s) HFA Inhaler 2 Puff(s) Inhalation every 6 hours  aspirin  chewable 81 milliGRAM(s) Oral daily  atorvastatin 40 milliGRAM(s) Oral at bedtime  budesonide  80 MICROgram(s)/formoterol 4.5 MICROgram(s) Inhaler 2 Puff(s) Inhalation two times a day  calcitriol   Capsule 0.25 MICROGram(s) Oral daily  calcium carbonate 1250 mG  + Vitamin D (OsCal 500 + D) 1 Tablet(s) Oral daily  clopidogrel Tablet 75 milliGRAM(s) Oral daily  dextrose 5%. 1000 milliLiter(s) (50 mL/Hr) IV Continuous <Continuous>  dextrose 50% Injectable 12.5 Gram(s) IV Push once  dextrose 50% Injectable 25 Gram(s) IV Push once  dextrose 50% Injectable 25 Gram(s) IV Push once  ferrous    sulfate 325 milliGRAM(s) Oral daily  fluconAZOLE   Tablet 100 milliGRAM(s) Oral daily  furosemide    Tablet 20 milliGRAM(s) Oral daily  guaiFENesin ER 1200 milliGRAM(s) Oral every 12 hours  heparin   Injectable 5000 Unit(s) SubCutaneous every 8 hours  insulin lispro (HumaLOG) corrective regimen sliding scale   SubCutaneous three times a day before meals  insulin lispro (HumaLOG) corrective regimen sliding scale   SubCutaneous at bedtime  ipratropium 17 MICROgram(s) HFA Inhaler 1 Puff(s) Inhalation every 6 hours  lactobacillus acidophilus 1 Tablet(s) Oral three times a day with meals  levothyroxine 125 MICROGram(s) Oral daily  metoprolol tartrate 12.5 milliGRAM(s) Oral two times a day  ranolazine 500 milliGRAM(s) Oral two times a day

## 2020-09-19 NOTE — PROGRESS NOTE ADULT - ASSESSMENT
60yo/F with PMH CAD, PAD s/p fem-pop bypass s/p RT iliac stent, diabetes, hypothyroidism, hyperlipidemia, current active smoker, uses cocaine, chronic back pain s/p lumbar surgery presented on 8/23/2020 with multiple non-specific complaints, including fevers, hospital course complicated by acute kidney failure, intubation, pressor requirement, diarrhea, and now  pneumonia with occlusion of left mainstem bronchus, improved on most recent CXR  1. hospital-acquired pneumonia, with large obstructing left mainstem plug:  - patient completed antibiotic therapy with the meropenum and last cxr shows improved atelectasis and apart from the mild cough patient denies any sob   continue with the bronchodilators as needed for the wheezing   2. septic shock from E coli bacteremia: s/p treatment, patient completed antibiotic therapy   3. Diarrhea: C diff negative.  GI following  5. Pleural effusions:  s/p thoracentesis. and fluid is transudative by the light creteria   6. thrush: on fluconazole  7 other issues include acute on chronic ckd and anemia with the hughes cather in place with the urinary retention

## 2020-09-20 LAB
ANION GAP SERPL CALC-SCNC: 5 MMOL/L — SIGNIFICANT CHANGE UP (ref 5–17)
BASOPHILS # BLD AUTO: 0.05 K/UL — SIGNIFICANT CHANGE UP (ref 0–0.2)
BASOPHILS NFR BLD AUTO: 0.6 % — SIGNIFICANT CHANGE UP (ref 0–2)
BUN SERPL-MCNC: 53 MG/DL — HIGH (ref 7–23)
CALCIUM SERPL-MCNC: 8 MG/DL — LOW (ref 8.5–10.1)
CHLORIDE SERPL-SCNC: 104 MMOL/L — SIGNIFICANT CHANGE UP (ref 96–108)
CO2 SERPL-SCNC: 25 MMOL/L — SIGNIFICANT CHANGE UP (ref 22–31)
CREAT SERPL-MCNC: 2.96 MG/DL — HIGH (ref 0.5–1.3)
CULTURE RESULTS: SIGNIFICANT CHANGE UP
EOSINOPHIL # BLD AUTO: 0.09 K/UL — SIGNIFICANT CHANGE UP (ref 0–0.5)
EOSINOPHIL NFR BLD AUTO: 1.2 % — SIGNIFICANT CHANGE UP (ref 0–6)
FERRITIN SERPL-MCNC: 533 NG/ML — HIGH (ref 15–150)
GLUCOSE SERPL-MCNC: 120 MG/DL — HIGH (ref 70–99)
HCT VFR BLD CALC: 25.6 % — LOW (ref 34.5–45)
HGB BLD-MCNC: 8.2 G/DL — LOW (ref 11.5–15.5)
IMM GRANULOCYTES NFR BLD AUTO: 1.2 % — SIGNIFICANT CHANGE UP (ref 0–1.5)
IRON SATN MFR SERPL: 18 % — SIGNIFICANT CHANGE UP (ref 14–50)
IRON SATN MFR SERPL: 33 UG/DL — SIGNIFICANT CHANGE UP (ref 30–160)
LYMPHOCYTES # BLD AUTO: 1.5 K/UL — SIGNIFICANT CHANGE UP (ref 1–3.3)
LYMPHOCYTES # BLD AUTO: 19.2 % — SIGNIFICANT CHANGE UP (ref 13–44)
MCHC RBC-ENTMCNC: 29.8 PG — SIGNIFICANT CHANGE UP (ref 27–34)
MCHC RBC-ENTMCNC: 32 GM/DL — SIGNIFICANT CHANGE UP (ref 32–36)
MCV RBC AUTO: 93.1 FL — SIGNIFICANT CHANGE UP (ref 80–100)
MONOCYTES # BLD AUTO: 0.73 K/UL — SIGNIFICANT CHANGE UP (ref 0–0.9)
MONOCYTES NFR BLD AUTO: 9.3 % — SIGNIFICANT CHANGE UP (ref 2–14)
NEUTROPHILS # BLD AUTO: 5.35 K/UL — SIGNIFICANT CHANGE UP (ref 1.8–7.4)
NEUTROPHILS NFR BLD AUTO: 68.5 % — SIGNIFICANT CHANGE UP (ref 43–77)
PLATELET # BLD AUTO: 325 K/UL — SIGNIFICANT CHANGE UP (ref 150–400)
POTASSIUM SERPL-MCNC: 3.8 MMOL/L — SIGNIFICANT CHANGE UP (ref 3.5–5.3)
POTASSIUM SERPL-SCNC: 3.8 MMOL/L — SIGNIFICANT CHANGE UP (ref 3.5–5.3)
RBC # BLD: 2.75 M/UL — LOW (ref 3.8–5.2)
RBC # BLD: 2.75 M/UL — LOW (ref 3.8–5.2)
RBC # FLD: 18.3 % — HIGH (ref 10.3–14.5)
RETICS #: 118 K/UL — SIGNIFICANT CHANGE UP (ref 25–125)
RETICS/RBC NFR: 4.3 % — HIGH (ref 0.5–2.5)
SODIUM SERPL-SCNC: 134 MMOL/L — LOW (ref 135–145)
SPECIMEN SOURCE: SIGNIFICANT CHANGE UP
TIBC SERPL-MCNC: 183 UG/DL — LOW (ref 220–430)
UIBC SERPL-MCNC: 150 UG/DL — SIGNIFICANT CHANGE UP (ref 110–370)
WBC # BLD: 7.81 K/UL — SIGNIFICANT CHANGE UP (ref 3.8–10.5)
WBC # FLD AUTO: 7.81 K/UL — SIGNIFICANT CHANGE UP (ref 3.8–10.5)

## 2020-09-20 PROCEDURE — 99232 SBSQ HOSP IP/OBS MODERATE 35: CPT

## 2020-09-20 RX ADMIN — HEPARIN SODIUM 5000 UNIT(S): 5000 INJECTION INTRAVENOUS; SUBCUTANEOUS at 05:21

## 2020-09-20 RX ADMIN — Medication 10 MILLIGRAM(S): at 21:26

## 2020-09-20 RX ADMIN — HEPARIN SODIUM 5000 UNIT(S): 5000 INJECTION INTRAVENOUS; SUBCUTANEOUS at 13:40

## 2020-09-20 RX ADMIN — RANOLAZINE 500 MILLIGRAM(S): 500 TABLET, FILM COATED, EXTENDED RELEASE ORAL at 21:27

## 2020-09-20 RX ADMIN — Medication 125 MICROGRAM(S): at 05:21

## 2020-09-20 RX ADMIN — Medication 10 MILLIGRAM(S): at 16:49

## 2020-09-20 RX ADMIN — Medication 1 PUFF(S): at 14:18

## 2020-09-20 RX ADMIN — RANOLAZINE 500 MILLIGRAM(S): 500 TABLET, FILM COATED, EXTENDED RELEASE ORAL at 10:21

## 2020-09-20 RX ADMIN — BUDESONIDE AND FORMOTEROL FUMARATE DIHYDRATE 2 PUFF(S): 160; 4.5 AEROSOL RESPIRATORY (INHALATION) at 20:29

## 2020-09-20 RX ADMIN — Medication 10 MILLIGRAM(S): at 08:32

## 2020-09-20 RX ADMIN — ALBUTEROL 2 PUFF(S): 90 AEROSOL, METERED ORAL at 14:18

## 2020-09-20 RX ADMIN — Medication 1 PUFF(S): at 08:24

## 2020-09-20 RX ADMIN — HEPARIN SODIUM 5000 UNIT(S): 5000 INJECTION INTRAVENOUS; SUBCUTANEOUS at 21:26

## 2020-09-20 RX ADMIN — Medication 1 PUFF(S): at 20:31

## 2020-09-20 RX ADMIN — ALBUTEROL 2 PUFF(S): 90 AEROSOL, METERED ORAL at 08:25

## 2020-09-20 RX ADMIN — Medication 1200 MILLIGRAM(S): at 08:37

## 2020-09-20 RX ADMIN — ATORVASTATIN CALCIUM 40 MILLIGRAM(S): 80 TABLET, FILM COATED ORAL at 21:25

## 2020-09-20 RX ADMIN — Medication 10 MILLIGRAM(S): at 12:02

## 2020-09-20 RX ADMIN — Medication 81 MILLIGRAM(S): at 08:36

## 2020-09-20 RX ADMIN — Medication 1 TABLET(S): at 08:32

## 2020-09-20 RX ADMIN — CALCITRIOL 0.25 MICROGRAM(S): 0.5 CAPSULE ORAL at 10:21

## 2020-09-20 RX ADMIN — Medication 325 MILLIGRAM(S): at 08:37

## 2020-09-20 RX ADMIN — Medication 20 MILLIGRAM(S): at 10:22

## 2020-09-20 RX ADMIN — Medication 1200 MILLIGRAM(S): at 21:26

## 2020-09-20 RX ADMIN — CLOPIDOGREL BISULFATE 75 MILLIGRAM(S): 75 TABLET, FILM COATED ORAL at 08:36

## 2020-09-20 RX ADMIN — ALBUTEROL 2 PUFF(S): 90 AEROSOL, METERED ORAL at 20:29

## 2020-09-20 RX ADMIN — FLUCONAZOLE 100 MILLIGRAM(S): 150 TABLET ORAL at 10:22

## 2020-09-20 RX ADMIN — Medication 12.5 MILLIGRAM(S): at 10:22

## 2020-09-20 RX ADMIN — BUDESONIDE AND FORMOTEROL FUMARATE DIHYDRATE 2 PUFF(S): 160; 4.5 AEROSOL RESPIRATORY (INHALATION) at 08:26

## 2020-09-20 RX ADMIN — Medication 1 TABLET(S): at 12:02

## 2020-09-20 RX ADMIN — Medication 12.5 MILLIGRAM(S): at 21:27

## 2020-09-20 RX ADMIN — Medication 1 TABLET(S): at 16:49

## 2020-09-20 NOTE — PROGRESS NOTE ADULT - SUBJECTIVE AND OBJECTIVE BOX
SUBJECTIVE     Patient has  no sob or wheezing and has mild cough   no fever spikes   she is off the meropenum       PAST MEDICAL & SURGICAL HISTORY:  Lung nodule    Diabetes mellitus    History of TIAs    History of gallbladder disease  surgery    History of colon polyps  precancerous    Substance abuse  history of. last used 14 years ago.    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 Decreased ROM.    Angina pectoris    History of MRSA infection  left lower extremity incisional area 2015    Anxiety and depression    Transient cerebral ischemia, unspecified type    Osteoarthritis of spine, unspecified spinal osteoarthritis complication status, unspecified spinal region    Lumbar herniated disc    Urinary incontinence, unspecified type    Overactive bladder    Hiatal hernia with GERD  hiatal hernia repaired    PAD (peripheral artery disease)    Hyperlipidemia, unspecified hyperlipidemia type    Essential hypertension    Hypothyroidism    COPD (chronic obstructive pulmonary disease)    CAD (coronary artery disease)    History of lumbar fusion  with laminectomy 11/8/2018    H/O hernia repair  hiatal hernia - 2017, 2018    S/P dilatation and curettage  1991    H/O rhinoplasty  1980    H/O angioplasty  Right iliac artery. 5/12/2017    H/O ventral hernia repair  3/2017    History of incision and drainage  of lower extremity incisional site x 5 . last done5/2015    H/O arterial bypass of lower limb  Femoral - Popliteal. 11/2014 Left lower side with stents    History of laparoscopic cholecystectomy  2/2016      OBJECTIVE   Vital Signs Last 24 Hrs  T(C): 36.7 (20 Sep 2020 08:20), Max: 36.8 (19 Sep 2020 16:36)  T(F): 98.1 (20 Sep 2020 08:20), Max: 98.2 (19 Sep 2020 16:36)  HR: 86 (20 Sep 2020 09:36) (73 - 93)  BP: 142/56 (20 Sep 2020 08:20) (142/56 - 152/64)  BP(mean): --  RR: 19 (20 Sep 2020 08:20) (16 - 19)  SpO2: 99% (20 Sep 2020 08:20) (98% - 99%)    Review of systems   as dictated in the history of present illness with the review of other systems non contributory     PHYSICAL EXAM:  Constitutional: , awake and alert, not in distress and not on the 02   HEENT: Normo cephalic atraumatic  Neck: Soft and supple, No J.V.D   Respiratory: vesicular breathing , No wheezing, rales or rhonchi.   Cardiovascular: S1 and S2, regular rate .   Gastrointestinal:  soft, nontender, and has chronic hughes catheter   Extremities: No  edema or calf tenderness .  Neurological: No new  focal deficits.    MEDICATIONS  (STANDING):  ALBUTerol    90 MICROgram(s) HFA Inhaler 2 Puff(s) Inhalation every 6 hours  aspirin  chewable 81 milliGRAM(s) Oral daily  atorvastatin 40 milliGRAM(s) Oral at bedtime  budesonide  80 MICROgram(s)/formoterol 4.5 MICROgram(s) Inhaler 2 Puff(s) Inhalation two times a day  calcitriol   Capsule 0.25 MICROGram(s) Oral daily  calcium carbonate 1250 mG  + Vitamin D (OsCal 500 + D) 1 Tablet(s) Oral daily  clopidogrel Tablet 75 milliGRAM(s) Oral daily  dextrose 5%. 1000 milliLiter(s) (50 mL/Hr) IV Continuous <Continuous>  dextrose 50% Injectable 12.5 Gram(s) IV Push once  dextrose 50% Injectable 25 Gram(s) IV Push once  dextrose 50% Injectable 25 Gram(s) IV Push once  dicyclomine 10 milliGRAM(s) Oral four times a day before meals  ferrous    sulfate 325 milliGRAM(s) Oral daily  fluconAZOLE   Tablet 100 milliGRAM(s) Oral daily  furosemide    Tablet 20 milliGRAM(s) Oral daily  guaiFENesin ER 1200 milliGRAM(s) Oral every 12 hours  heparin   Injectable 5000 Unit(s) SubCutaneous every 8 hours  insulin lispro (HumaLOG) corrective regimen sliding scale   SubCutaneous three times a day before meals  insulin lispro (HumaLOG) corrective regimen sliding scale   SubCutaneous at bedtime  ipratropium 17 MICROgram(s) HFA Inhaler 1 Puff(s) Inhalation every 6 hours  lactobacillus acidophilus 1 Tablet(s) Oral three times a day with meals  levothyroxine 125 MICROGram(s) Oral daily  metoprolol tartrate 12.5 milliGRAM(s) Oral two times a day  ranolazine 500 milliGRAM(s) Oral two times a day                            8.2    7.81  )-----------( 325      ( 20 Sep 2020 06:39 )             25.6     09-20    134<L>  |  104  |  53<H>  ----------------------------<  120<H>  3.8   |  25  |  2.96<H>    Ca    8.0<L>      20 Sep 2020 06:39         rad

## 2020-09-20 NOTE — PROGRESS NOTE ADULT - ASSESSMENT
58yo/F with PMH CAD, PAD s/p fem-pop bypass s/p RT iliac stent, diabetes, hypothyroidism, hyperlipidemia, current active smoker, uses cocaine, chronic back pain s/p lumbar surgery presented on 8/23/2020 with multiple non-specific complaints, including fevers, hospital course complicated by acute kidney failure, intubation, pressor requirement, diarrhea, and now  pneumonia with occlusion of left mainstem bronchus, improved on most recent CXR  1. hospital-acquired pneumonia, with large obstructing left mainstem plug:  - patient completed antibiotic therapy with the meropenum and last cxr shows improved atelectasis and apart from the mild cough patient denies any sob   continue with the bronchodilators as needed for the wheezing along with the symbicort   2. septic shock from E coli bacteremia: s/p treatment, patient completed antibiotic therapy   3. Pleural effusions:  s/p thoracentesis. and fluid is transudative by the light creteria and follow up cxr shows improved effusions   6. thrush: on fluconazole  7 other issues include acute on chronic ckd and anemia with the hughes cather in place with the urinary retention and follows with the medicine   8 mucinex for the symptom relief of the cough

## 2020-09-20 NOTE — PROGRESS NOTE ADULT - SUBJECTIVE AND OBJECTIVE BOX
60yo female with PMH CAD, PAD s/p fem-pop bypass s/p RT iliac stent, DM2, HLD, hypothyroidism, current active smoker, uses cocaine, chronic back pain s/p lumbar surgery presented on 2020 with multiple non-specific complaints, including fevers, weakness, poor po intake, diffuse abdominal pain, diarrhea. She found to have STEMI s/p LHC with RCA occlusion , E coli sepsis, cystitis, ischemic colitis.    hospital course complicated by acute kidney failure, intubation, pressor requirement, diarrhea, and now  pneumonia with occlusion of left mainstem bronchus     - pt seen and examined, chart reviewed, pt reports felling weak, + abdominal distension, + dyspnea, + poor po intake, cough, afebrile, denies HA, CP, dizziness, POC discussed    - pt seen and examined, + lose bm, + diffuse abdominal discomfort, + gen weakness, afebrile, pOC discussed     - pt seen and examined, BM better, lower abdominal pain, + weakness, appetite improving , afebrile, POC discussed    - No new complaints   9/15 - Nonew complaints    lower abd pain resolved after BM overnight,, hb 8   lower abd pain which waxes and wanes, some days pain free and some days worse since admission, no fever, mucus mixed with stool x1 today, no gross blood in stool, afebrile, tolerating po intake    abd pain improved with dicyclomine , tolerating po, afebrile , no overnight events     ROS:   All 10 systems reviewed and found to be negative with the exception of what has been described above.  GEN: lying in bed, NAD  HEENT:   NC/AT, pupils equal and reactive, EOMI  CV:  +S1, +S2, RRR  RESP:   lungs clear to auscultation bilaterally, no wheeze, rales, rhonchi   BREAST:  not examined  GI:  abdomen softlower abd tenderness, no rebound , normoactive BS  RECTAL:  not examined  :  not examined  MSK:   normal muscle tone  EXT:  no edema  NEURO:  AAOX3, no focal neurological deficits      PHYSICAL EXAM:    Daily     Daily Weight in k.3 (20 Sep 2020 06:36)    ICU Vital Signs Last 24 Hrs  T(C): 36.7 (20 Sep 2020 08:20), Max: 36.8 (19 Sep 2020 16:36)  T(F): 98.1 (20 Sep 2020 08:20), Max: 98.2 (19 Sep 2020 16:36)  HR: 86 (20 Sep 2020 09:36) (86 - 93)  BP: 142/56 (20 Sep 2020 08:20) (142/56 - 152/64)  BP(mean): --  ABP: --  ABP(mean): --  RR: 19 (20 Sep 2020 08:20) (16 - 19)  SpO2: 99% (20 Sep 2020 08:20) (98% - 99%)                              8.2    7.81  )-----------( 325      ( 20 Sep 2020 06:39 )             25.6       CBC Full  -  ( 20 Sep 2020 06:39 )  WBC Count : 7.81 K/uL  RBC Count : 2.75 M/uL  Hemoglobin : 8.2 g/dL  Hematocrit : 25.6 %  Platelet Count - Automated : 325 K/uL  Mean Cell Volume : 93.1 fl  Mean Cell Hemoglobin : 29.8 pg  Mean Cell Hemoglobin Concentration : 32.0 gm/dL  Auto Neutrophil # : 5.35 K/uL  Auto Lymphocyte # : 1.50 K/uL  Auto Monocyte # : 0.73 K/uL  Auto Eosinophil # : 0.09 K/uL  Auto Basophil # : 0.05 K/uL  Auto Neutrophil % : 68.5 %  Auto Lymphocyte % : 19.2 %  Auto Monocyte % : 9.3 %  Auto Eosinophil % : 1.2 %  Auto Basophil % : 0.6 %      20    134<L>  |  104  |  53<H>  ----------------------------<  120<H>  3.8   |  25  |  2.96<H>    Ca    8.0<L>      20 Sep 2020 06:39                              MEDICATIONS  (STANDING):  ALBUTerol    90 MICROgram(s) HFA Inhaler 2 Puff(s) Inhalation every 6 hours  aspirin  chewable 81 milliGRAM(s) Oral daily  atorvastatin 40 milliGRAM(s) Oral at bedtime  budesonide  80 MICROgram(s)/formoterol 4.5 MICROgram(s) Inhaler 2 Puff(s) Inhalation two times a day  calcitriol   Capsule 0.25 MICROGram(s) Oral daily  calcium carbonate 1250 mG  + Vitamin D (OsCal 500 + D) 1 Tablet(s) Oral daily  clopidogrel Tablet 75 milliGRAM(s) Oral daily  dextrose 5%. 1000 milliLiter(s) (50 mL/Hr) IV Continuous <Continuous>  dextrose 50% Injectable 12.5 Gram(s) IV Push once  dextrose 50% Injectable 25 Gram(s) IV Push once  dextrose 50% Injectable 25 Gram(s) IV Push once  dicyclomine 10 milliGRAM(s) Oral four times a day before meals  ferrous    sulfate 325 milliGRAM(s) Oral daily  fluconAZOLE   Tablet 100 milliGRAM(s) Oral daily  furosemide    Tablet 20 milliGRAM(s) Oral daily  guaiFENesin ER 1200 milliGRAM(s) Oral every 12 hours  heparin   Injectable 5000 Unit(s) SubCutaneous every 8 hours  insulin lispro (HumaLOG) corrective regimen sliding scale   SubCutaneous three times a day before meals  insulin lispro (HumaLOG) corrective regimen sliding scale   SubCutaneous at bedtime  ipratropium 17 MICROgram(s) HFA Inhaler 1 Puff(s) Inhalation every 6 hours  lactobacillus acidophilus 1 Tablet(s) Oral three times a day with meals  levothyroxine 125 MICROGram(s) Oral daily  metoprolol tartrate 12.5 milliGRAM(s) Oral two times a day  ranolazine 500 milliGRAM(s) Oral two times a day

## 2020-09-20 NOTE — PROGRESS NOTE ADULT - ASSESSMENT
60yo female with PMH CAD, PAD s/p fem-pop bypass s/p RT iliac stent, DM2, HLD, hypothyroidism, current active smoker, uses cocaine, chronic back pain s/p lumbar surgery presented on 8/23/2020 with multiple non-specific complaints, including fevers, weakness, poor po intake, diffuse abdominal pain, diarrhea. She found to have STEMI s/p LHC with RCA occlusion , E coli sepsis, cystitis, ischemic colitis.    hospital course complicated by acute kidney failure, intubation, pressor requirement, diarrhea, and now  pneumonia with occlusion of left mainstem bronchus    9/20 Lower abd pain  waxing and waning Now improving on diclyclomine  , had mucus mixed with stool   / recent  ischemic colitis without acute abd    GI pcr, on  dicyclomine  , completed 7 days of meropenem 9/17 ,I discussed with GI no indication for further imaging at this time   eventually will need ANA MARÍA due to weakness and deconditioning  pulm eval noted. now s/p thoracentesis - transudate as per lights criteria  on lasix po 20 if cr stable  would increase to 40mg renal consult appreciated   - LE dopplers negative 9/15  - started diflucan for thrush 9/14  - s/p  meropenem as per ID til 9/17 for PNA and then plan for rehab.     Plan discussed with patient, nursing, staff.    # Acute hypoxic respiratory failure  secondary to septic shock   Hospital acquired PNA with  Mucosal impaction of left main bronchus , complete atelectasis of LLL   COPD , Nicotine dependence   Bilateral pleural effusion L >R  COVID-19 ruled out   -s/p intubation /extubation  -acute , recurrent hypoxic respiratory failure >> patient requiring supplemental o2 3 L  -CT chest shows large mucous impaction left main bronchus >> pulmonary followin g  -c/w mucinex, chest PT, albuterol, atrovent; c/w incentive spirometry  -c/w Meropenem, D/w Dr. Mesa  - may benefit from pleural effusion drainage - left thoracentesis 9/14  - d/w Dr. Hernandez cardiology - ok to Hold asa, plavix for thoracentesis - now resumed#Septic shock secondary to Ecoli  bacteremia, possible ischemic infectious colitis ,  diarrheal syndrome, resolved Lower Abdominal pain   -colitis on CT scan , possible bacteremia due to ischemic colitis   -completed antibiotics.  days IV zosyn [id number] --> 9/4 and 4 days ceftin 9/5-9/8,  s/p rocephin 2gm daily #6, s/p IV flagyl #4  - GI consult - Dr. Hammer, IV abx, recent colonoscopy unremarkable , GI PCR and C diff neg   - stop betanechol, bentyl ( can cause abdominal cramps/lose bm)  , add probiotics, if lose bm persist will retest for c diff and GI PCR  US abd 9/13 - trace of ascites, b/l pleural effusions`    #STEMI due to 100 % occlusion of RCA , PAD s/p LE stents -Echo show hypokinesis on admission, troponins, st elevations   -Urgent coronary angiogram showed occluded RCA of unknown chronicity  -cardiology f/u noted; c/w DAPT - help for thoracocentesis, restart as soon as or from IR prespective- statins atorvastatin 80--> 40 mg atorvastatin ,LDL 19, due to weakness , elevated CK  -  ranexa 1000--> 500 bid due to renal dysfunction  - c/w metoprolol 12.5 bid      #AMISH w septic shock/stemi/hypotension    Urinary retention s/p Black , failed trial of void 9/10  - bethanechol, d/c bentyl can cause urinary retention  -  s/p IV diuresis with worsening of renal function  - nephrology consult   #Anemia normocytic , mixed type ACD and iron deficiency   - check B12, folate, add iron daily ,  monitor    #Hypocalcemia, vitamin D deficiency    Intact PTH: 87, Vitamin D, 25-Hydroxy: 27.8, Magnesium, Serum: 1.8 mg/dL, c/w Calcitriol - Vit D analogue     #Hypomagnesemia - replace, repeat in am    #Hypothyroidism- home dose 112 mcg--> 125 mcg , increased due to elevated  TSH 22    #DM2 with A1C 8.1 - FBG monitoring , ISS #Generalized weakness, multifactorialanemia, uncontrolled hypothyroidism, deconditioning, anasarca, renal and respiratory failure, STEMi  - PT daily, OOB to chair daily, c/w vit D  elevated CK with low LDL - > lower dose of statin atorvastatin 80--> 40 can cause weakness in elderly       Advanced directives   HCP daughter Antoinette [phone] updated 9/11/20 , 9/13, 9/15    Full code

## 2020-09-21 ENCOUNTER — TRANSCRIPTION ENCOUNTER (OUTPATIENT)
Age: 60
End: 2020-09-21

## 2020-09-21 VITALS
TEMPERATURE: 99 F | DIASTOLIC BLOOD PRESSURE: 50 MMHG | OXYGEN SATURATION: 100 % | RESPIRATION RATE: 18 BRPM | HEART RATE: 85 BPM | SYSTOLIC BLOOD PRESSURE: 135 MMHG

## 2020-09-21 LAB
ANION GAP SERPL CALC-SCNC: 6 MMOL/L — SIGNIFICANT CHANGE UP (ref 5–17)
BASOPHILS # BLD AUTO: 0.05 K/UL — SIGNIFICANT CHANGE UP (ref 0–0.2)
BASOPHILS NFR BLD AUTO: 0.6 % — SIGNIFICANT CHANGE UP (ref 0–2)
BUN SERPL-MCNC: 52 MG/DL — HIGH (ref 7–23)
CALCIUM SERPL-MCNC: 8.6 MG/DL — SIGNIFICANT CHANGE UP (ref 8.5–10.1)
CHLORIDE SERPL-SCNC: 101 MMOL/L — SIGNIFICANT CHANGE UP (ref 96–108)
CO2 SERPL-SCNC: 27 MMOL/L — SIGNIFICANT CHANGE UP (ref 22–31)
CREAT SERPL-MCNC: 2.86 MG/DL — HIGH (ref 0.5–1.3)
EOSINOPHIL # BLD AUTO: 0.08 K/UL — SIGNIFICANT CHANGE UP (ref 0–0.5)
EOSINOPHIL NFR BLD AUTO: 1 % — SIGNIFICANT CHANGE UP (ref 0–6)
GLUCOSE SERPL-MCNC: 104 MG/DL — HIGH (ref 70–99)
HCT VFR BLD CALC: 25.4 % — LOW (ref 34.5–45)
HGB BLD-MCNC: 8.3 G/DL — LOW (ref 11.5–15.5)
IMM GRANULOCYTES NFR BLD AUTO: 1 % — SIGNIFICANT CHANGE UP (ref 0–1.5)
LYMPHOCYTES # BLD AUTO: 1.64 K/UL — SIGNIFICANT CHANGE UP (ref 1–3.3)
LYMPHOCYTES # BLD AUTO: 19.6 % — SIGNIFICANT CHANGE UP (ref 13–44)
MCHC RBC-ENTMCNC: 30.5 PG — SIGNIFICANT CHANGE UP (ref 27–34)
MCHC RBC-ENTMCNC: 32.7 GM/DL — SIGNIFICANT CHANGE UP (ref 32–36)
MCV RBC AUTO: 93.4 FL — SIGNIFICANT CHANGE UP (ref 80–100)
MONOCYTES # BLD AUTO: 0.74 K/UL — SIGNIFICANT CHANGE UP (ref 0–0.9)
MONOCYTES NFR BLD AUTO: 8.8 % — SIGNIFICANT CHANGE UP (ref 2–14)
NEUTROPHILS # BLD AUTO: 5.79 K/UL — SIGNIFICANT CHANGE UP (ref 1.8–7.4)
NEUTROPHILS NFR BLD AUTO: 69 % — SIGNIFICANT CHANGE UP (ref 43–77)
PLATELET # BLD AUTO: 341 K/UL — SIGNIFICANT CHANGE UP (ref 150–400)
POTASSIUM SERPL-MCNC: 3.8 MMOL/L — SIGNIFICANT CHANGE UP (ref 3.5–5.3)
POTASSIUM SERPL-SCNC: 3.8 MMOL/L — SIGNIFICANT CHANGE UP (ref 3.5–5.3)
RBC # BLD: 2.72 M/UL — LOW (ref 3.8–5.2)
RBC # FLD: 18.5 % — HIGH (ref 10.3–14.5)
SODIUM SERPL-SCNC: 134 MMOL/L — LOW (ref 135–145)
WBC # BLD: 8.38 K/UL — SIGNIFICANT CHANGE UP (ref 3.8–10.5)
WBC # FLD AUTO: 8.38 K/UL — SIGNIFICANT CHANGE UP (ref 3.8–10.5)

## 2020-09-21 PROCEDURE — 99239 HOSP IP/OBS DSCHRG MGMT >30: CPT

## 2020-09-21 RX ORDER — CALCITRIOL 0.5 UG/1
0.25 CAPSULE ORAL
Refills: 0 | Status: DISCONTINUED | OUTPATIENT
Start: 2020-09-22 | End: 2020-09-21

## 2020-09-21 RX ORDER — ASPIRIN/CALCIUM CARB/MAGNESIUM 324 MG
1 TABLET ORAL
Qty: 0 | Refills: 0 | DISCHARGE
Start: 2020-09-21

## 2020-09-21 RX ORDER — METFORMIN HYDROCHLORIDE 850 MG/1
1 TABLET ORAL
Qty: 0 | Refills: 0 | DISCHARGE

## 2020-09-21 RX ORDER — FUROSEMIDE 40 MG
1 TABLET ORAL
Qty: 0 | Refills: 0 | DISCHARGE
Start: 2020-09-21

## 2020-09-21 RX ORDER — ATORVASTATIN CALCIUM 80 MG/1
1 TABLET, FILM COATED ORAL
Qty: 0 | Refills: 0 | DISCHARGE
Start: 2020-09-21

## 2020-09-21 RX ORDER — RANOLAZINE 500 MG/1
1 TABLET, FILM COATED, EXTENDED RELEASE ORAL
Qty: 0 | Refills: 0 | DISCHARGE

## 2020-09-21 RX ORDER — GABAPENTIN 400 MG/1
2 CAPSULE ORAL
Qty: 0 | Refills: 0 | DISCHARGE

## 2020-09-21 RX ORDER — INSULIN GLARGINE 100 [IU]/ML
35 INJECTION, SOLUTION SUBCUTANEOUS
Qty: 0 | Refills: 0 | DISCHARGE

## 2020-09-21 RX ORDER — METOPROLOL TARTRATE 50 MG
1 TABLET ORAL
Qty: 0 | Refills: 0 | DISCHARGE

## 2020-09-21 RX ORDER — CALCITRIOL 0.5 UG/1
1 CAPSULE ORAL
Qty: 0 | Refills: 0 | DISCHARGE
Start: 2020-09-21

## 2020-09-21 RX ORDER — LEVOTHYROXINE SODIUM 125 MCG
1 TABLET ORAL
Qty: 0 | Refills: 0 | DISCHARGE

## 2020-09-21 RX ORDER — LACTOBACILLUS ACIDOPHILUS 100MM CELL
1 CAPSULE ORAL
Qty: 0 | Refills: 0 | DISCHARGE
Start: 2020-09-21

## 2020-09-21 RX ORDER — ISOSORBIDE MONONITRATE 60 MG/1
1 TABLET, EXTENDED RELEASE ORAL
Qty: 0 | Refills: 0 | DISCHARGE

## 2020-09-21 RX ORDER — LEVOTHYROXINE SODIUM 125 MCG
1 TABLET ORAL
Qty: 0 | Refills: 0 | DISCHARGE
Start: 2020-09-21

## 2020-09-21 RX ORDER — BUDESONIDE AND FORMOTEROL FUMARATE DIHYDRATE 160; 4.5 UG/1; UG/1
2 AEROSOL RESPIRATORY (INHALATION)
Qty: 0 | Refills: 0 | DISCHARGE
Start: 2020-09-21

## 2020-09-21 RX ORDER — RANOLAZINE 500 MG/1
1 TABLET, FILM COATED, EXTENDED RELEASE ORAL
Qty: 0 | Refills: 0 | DISCHARGE
Start: 2020-09-21

## 2020-09-21 RX ORDER — FERROUS SULFATE 325(65) MG
1 TABLET ORAL
Qty: 0 | Refills: 0 | DISCHARGE
Start: 2020-09-21

## 2020-09-21 RX ORDER — ATORVASTATIN CALCIUM 80 MG/1
1 TABLET, FILM COATED ORAL
Qty: 0 | Refills: 0 | DISCHARGE

## 2020-09-21 RX ORDER — ASPIRIN/CALCIUM CARB/MAGNESIUM 324 MG
1 TABLET ORAL
Qty: 0 | Refills: 0 | DISCHARGE

## 2020-09-21 RX ADMIN — ALBUTEROL 2 PUFF(S): 90 AEROSOL, METERED ORAL at 14:07

## 2020-09-21 RX ADMIN — HEPARIN SODIUM 5000 UNIT(S): 5000 INJECTION INTRAVENOUS; SUBCUTANEOUS at 05:32

## 2020-09-21 RX ADMIN — RANOLAZINE 500 MILLIGRAM(S): 500 TABLET, FILM COATED, EXTENDED RELEASE ORAL at 09:51

## 2020-09-21 RX ADMIN — CLOPIDOGREL BISULFATE 75 MILLIGRAM(S): 75 TABLET, FILM COATED ORAL at 09:49

## 2020-09-21 RX ADMIN — Medication 12.5 MILLIGRAM(S): at 09:51

## 2020-09-21 RX ADMIN — HEPARIN SODIUM 5000 UNIT(S): 5000 INJECTION INTRAVENOUS; SUBCUTANEOUS at 13:15

## 2020-09-21 RX ADMIN — Medication 325 MILLIGRAM(S): at 09:50

## 2020-09-21 RX ADMIN — Medication 10 MILLIGRAM(S): at 07:49

## 2020-09-21 RX ADMIN — Medication 81 MILLIGRAM(S): at 09:49

## 2020-09-21 RX ADMIN — Medication 1 PUFF(S): at 14:07

## 2020-09-21 RX ADMIN — Medication 1 TABLET(S): at 11:57

## 2020-09-21 RX ADMIN — ALBUTEROL 2 PUFF(S): 90 AEROSOL, METERED ORAL at 08:24

## 2020-09-21 RX ADMIN — FLUCONAZOLE 100 MILLIGRAM(S): 150 TABLET ORAL at 09:50

## 2020-09-21 RX ADMIN — Medication 1 TABLET(S): at 07:49

## 2020-09-21 RX ADMIN — Medication 10 MILLIGRAM(S): at 11:54

## 2020-09-21 RX ADMIN — Medication 20 MILLIGRAM(S): at 09:50

## 2020-09-21 RX ADMIN — Medication 1 PUFF(S): at 08:25

## 2020-09-21 RX ADMIN — BUDESONIDE AND FORMOTEROL FUMARATE DIHYDRATE 2 PUFF(S): 160; 4.5 AEROSOL RESPIRATORY (INHALATION) at 08:24

## 2020-09-21 RX ADMIN — Medication 125 MICROGRAM(S): at 05:32

## 2020-09-21 RX ADMIN — Medication 1200 MILLIGRAM(S): at 09:50

## 2020-09-21 RX ADMIN — CALCITRIOL 0.25 MICROGRAM(S): 0.5 CAPSULE ORAL at 09:49

## 2020-09-21 NOTE — PROGRESS NOTE ADULT - ASSESSMENT
59 CAD, PAD s/p fem-pop bypass s/p RT iliac stent, diabetes, hypothyroidism, hyperlipidemia, current active smoker, uses cocaine last use yesterday, chronic back pain s/p lumbar surgery presented with multiple non-specific complaints found to have ST elevations on presenting EKG, code STEMI called and pt underwent urgent angiogram showing occluded RCA w no intervention with UTI sepsis and E. coli bacteremia POA w hospital course c/b code blue.     AMISH w septic shock/UTI w STEMI and Code blue event w hypotension   Edema with low Albumin state  , tolerating lasix and edema markedly improved  Can likely stop diuretic within a short duration of time at rehab  Trend renal panel 2 x per week at rehab  Bethenechol for bladder retention   Will need outpatient renal follow up with discharge    Hypokalemia  -Replete po as needed    Hypocalcemia - Ca stable   monitor while on loop diuretics  OScal daily   Calcitriol - Vit D analogue

## 2020-09-21 NOTE — DISCHARGE NOTE PROVIDER - CARE PROVIDER_API CALL
David Judge)  Cardiology  [address] [place] [address]  Phone: [phone]  Fax: [phone]  Follow Up Time:     Kiara Sales  CRITICAL CARE MEDICINE  [address] [place] [address]  Phone: [phone]  Fax: [phone]  Follow Up Time:     Natan Argueta)  Gastroenterology  [address] [place] [address]  Phone: [phone]  Fax: [phone]  Follow Up Time:     urology,   Phone: (   )    -  Fax: (   )    -  Follow Up Time:     Yoni Billy  INTERNAL MEDICINE  [address] [place] [address]  Phone: [phone]  Fax: [phone]  Follow Up Time:

## 2020-09-21 NOTE — PROGRESS NOTE ADULT - SUBJECTIVE AND OBJECTIVE BOX
Patient is a 59y Female who reports no complaints overnight, edema much improved. intake as well.     REVIEW OF SYSTEMS:    CONSTITUTIONAL: No weakness, fevers or chills  RESPIRATORY: No cough, wheezing, hemoptysis; No shortness of breath  CARDIOVASCULAR: No chest pain or palpitations  GENITOURINARY: No dysuria, frequency or hematuria  All other review of systems is negative unless indicated above.    MEDICATIONS  (STANDING):  ALBUTerol    90 MICROgram(s) HFA Inhaler 2 Puff(s) Inhalation every 6 hours  aspirin  chewable 81 milliGRAM(s) Oral daily  atorvastatin 40 milliGRAM(s) Oral at bedtime  budesonide  80 MICROgram(s)/formoterol 4.5 MICROgram(s) Inhaler 2 Puff(s) Inhalation two times a day  calcitriol   Capsule 0.25 MICROGram(s) Oral daily  calcium carbonate 1250 mG  + Vitamin D (OsCal 500 + D) 1 Tablet(s) Oral daily  clopidogrel Tablet 75 milliGRAM(s) Oral daily  dextrose 5%. 1000 milliLiter(s) (50 mL/Hr) IV Continuous <Continuous>  dextrose 50% Injectable 12.5 Gram(s) IV Push once  dextrose 50% Injectable 25 Gram(s) IV Push once  dextrose 50% Injectable 25 Gram(s) IV Push once  dicyclomine 10 milliGRAM(s) Oral four times a day before meals  ferrous    sulfate 325 milliGRAM(s) Oral daily  furosemide    Tablet 20 milliGRAM(s) Oral daily  guaiFENesin ER 1200 milliGRAM(s) Oral every 12 hours  heparin   Injectable 5000 Unit(s) SubCutaneous every 8 hours  insulin lispro (HumaLOG) corrective regimen sliding scale   SubCutaneous three times a day before meals  insulin lispro (HumaLOG) corrective regimen sliding scale   SubCutaneous at bedtime  ipratropium 17 MICROgram(s) HFA Inhaler 1 Puff(s) Inhalation every 6 hours  lactobacillus acidophilus 1 Tablet(s) Oral three times a day with meals  levothyroxine 125 MICROGram(s) Oral daily  metoprolol tartrate 12.5 milliGRAM(s) Oral two times a day  ranolazine 500 milliGRAM(s) Oral two times a day    MEDICATIONS  (PRN):  acetaminophen   Tablet .. 650 milliGRAM(s) Oral every 4 hours PRN Mild Pain (1 - 3)  ALBUTerol    90 MICROgram(s) HFA Inhaler 2 Puff(s) Inhalation every 6 hours PRN Shortness of Breath and/or Wheezing  aluminum hydroxide/magnesium hydroxide/simethicone Suspension 30 milliLiter(s) Oral every 4 hours PRN Dyspepsia  dextrose 40% Gel 15 Gram(s) Oral once PRN Blood Glucose LESS THAN 70 milliGRAM(s)/deciliter  glucagon  Injectable 1 milliGRAM(s) IntraMuscular once PRN Glucose LESS THAN 70 milligrams/deciliter  ondansetron Injectable 4 milliGRAM(s) IV Push every 6 hours PRN Nausea        T(C): , Max: 36.9 (09-20-20 @ 20:50)  T(F): , Max: 98.4 (09-20-20 @ 20:50)  HR: 84 (09-21-20 @ 07:37)  BP: 128/66 (09-21-20 @ 07:37)  BP(mean): --  RR: 18 (09-21-20 @ 07:37)  SpO2: 100% (09-21-20 @ 07:37)  Wt(kg): --    09-20 @ 07:01  -  09-21 @ 07:00  --------------------------------------------------------  IN: 0 mL / OUT: 2500 mL / NET: -2500 mL          PHYSICAL EXAM:    Constitutional: NAD, frail  HEENT: MMM  Neck: No LAD, No JVD  Respiratory: dist BS  Cardiovascular: S1 and S2  Extremities: No peripheral edema  Neurological: A/O x 3  : No Black  Skin: No rashes      LABS:                        8.3    8.38  )-----------( 341      ( 21 Sep 2020 06:16 )             25.4     21 Sep 2020 06:16    134    |  101    |  52     ----------------------------<  104    3.8     |  27     |  2.86   20 Sep 2020 06:39    134    |  104    |  53     ----------------------------<  120    3.8     |  25     |  2.96   19 Sep 2020 06:22    135    |  103    |  54     ----------------------------<  80     3.3     |  24     |  3.01   18 Sep 2020 06:32    135    |  103    |  58     ----------------------------<  80     3.4     |  24     |  3.16     Ca    8.6        21 Sep 2020 06:16  Ca    8.0        20 Sep 2020 06:39  Ca    7.4        19 Sep 2020 06:22  Ca    7.7        18 Sep 2020 06:32  Phos  3.4       18 Sep 2020 06:32    TPro  x      /  Alb  1.2    /  TBili  x      /  DBili  x      /  AST  x      /  ALT  x      /  AlkPhos  x      18 Sep 2020 06:32          Urine Studies:          RADIOLOGY & ADDITIONAL STUDIES:

## 2020-09-21 NOTE — DISCHARGE NOTE PROVIDER - HOSPITAL COURSE
60yo female with PMH CAD, PAD s/p fem-pop bypass s/p RT iliac stent, DM2, HLD, hypothyroidism, current active smoker, uses cocaine, chronic back pain s/p lumbar surgery presented on 8/23/2020 with multiple non-specific complaints, including fevers, weakness, poor po intake, diffuse abdominal pain, diarrhea. She found to have STEMI s/p LHC with RCA occlusion , E coli sepsis, cystitis, ischemic colitis.    hospital course complicated by acute kidney failure, intubation, pressor requirement, diarrhea, and now  pneumonia with occlusion of left mainstem bronchus    9/11 - pt seen and examined, chart reviewed, pt reports felling weak, + abdominal distension, + dyspnea, + poor po intake, cough, afebrile, denies HA, CP, dizziness, POC discussed   9/12 - pt seen and examined, + lose bm, + diffuse abdominal discomfort, + gen weakness, afebrile, pOC discussed    9/13 - pt seen and examined, BM better, lower abdominal pain, + weakness, appetite improving , afebrile, POC discussed   9/14 - No new complaints   9/15 - Nonew complaints   9/18 lower abd pain resolved after BM overnight,, hb 8  9/19 lower abd pain which waxes and wanes, some days pain free and some days worse since admission, no fever, mucus mixed with stool x1 today, no gross blood in stool, afebrile, tolerating po intake    9/21  abd pain improved with dicyclomine , tolerating po, afebrile , no overnight events     ROS:   All 10 systems reviewed and found to be negative with the exception of what has been described above.  GEN: lying in bed, NAD  HEENT:   NC/AT, pupils equal and reactive, EOMI  CV:  +S1, +S2, RRR  RESP:   lungs clear to auscultation bilaterally, no wheeze, rales, rhonchi   BREAST:  not examined  GI:  abdomen softlower abd tenderness, no rebound , normoactive BS  RECTAL:  not examined  :  not examined  MSK:   normal muscle tone  EXT:  no edema  NEURO:  AAOX3, no focal neurological deficits    60yo female with PMH CAD, PAD s/p fem-pop bypass s/p RT iliac stent, DM2, HLD, hypothyroidism, current active smoker, uses cocaine, chronic back pain s/p lumbar surgery presented on 8/23/2020 with multiple non-specific complaints, including fevers, weakness, poor po intake, diffuse abdominal pain, diarrhea. She found to have STEMI s/p LHC with RCA occlusion , E coli sepsis, cystitis, ischemic colitis.    hospital course complicated by acute kidney failure, intubation, pressor requirement, diarrhea, and now  pneumonia with occlusion of left mainstem bronchus    9/21 Lower abd pain  waxing and waning Now improvied on resumin  diclyclomine  , had mucus mixed with stool   / recent  ischemic colitis without acute abd    GI pcr neg, on  dicyclomine  , completed 7 days of meropenem 9/17 ,I discussed with GI no indication for further imaging at this time     medically stable to discharge to [facility] due to weakness and deconditioning    pulm eval noted. now s/p thoracentesis - transudate as per lights criteria  on lasix po 20 if cr stable  , f/u with  renal consult as outpatient to   increase  lasix to 40mg  , follow up urology as outpatient   - LE dopplers negative 9/15  - started diflucan for thrush 9/14 completed 9/21  - s/p  meropenem as per ID til 9/17 for PNA     Plan discussed with patient, nursing, staff.    #s/p  Acute hypoxic respiratory failure  secondary to septic shock   Hospital acquired PNA with  Mucosal impaction of left main bronchus , complete atelectasis of LLL   COPD , Nicotine dependence   Bilateral pleural effusion L >R, s/p  left thoracentesis 9/14 showed transudative pleural fluid   COVID-19 ruled out   -s/p intubation /extubation  -CT chest shows large mucous impaction left main bronchus    completed tx with  Meropenem x7 days , D/w Dr. Mesa  -c/w mucinex, chest PT, albuterol, atrovent; c/w incentive spirometry  cardio and pulm consults appreciated     #s/p Septic shock secondary to Ecoli  bacteremia, possible ischemic infectious colitis ,  diarrheal syndrome, resolved Lower Abdominal pain   -colitis on CT scan , possible bacteremia due to ischemic colitis completed antibiotics.  days IV zosyn [id number] --> 9/4 and 4 days ceftin 9/5-9/8,  s/p rocephin 2gm daily #6, s/p IV flagyl #4  - GI consult - Dr. Hammer, recent colonoscopy unremarkable , GI PCR and C diff neg   - stop betanechol, bentyl ( can cause abdominal cramps/lose bm)  , add probiotics, retested  GI PCR  9/19 which is neg   US abd 9/13 - trace of ascites, b/l pleural effusions`    #STEMI due to 100 % occlusion of RCA , PAD s/p LE stents -Echo show hypokinesis on admission, troponins, st elevations   -Urgent coronary angiogram showed occluded RCA of unknown chronicity  -cardiology f/u noted; c/w DAPT -- statins atorvastatin 80--> 40 mg atorvastatin ,LDL 19, due to weakness , elevated CK  -  ranexa 1000--> 500 bid due to renal dysfunction  - c/w metoprolol 12.5 bid      #AMISH w septic shock/stemi/hypotension    Urinary retention s/p Hughes , failed trial of void 9/10, discharge with hughes to rehab , with outpatient urology follow up  - bethanechol,   -  s/p IV diuresis with worsening of renal function  - nephrology consult       #Anemia normocytic , mixed type ACD and iron deficiency    add iron daily ,  monitor    #Hypocalcemia, vitamin D deficiency    Intact PTH: 87, Vitamin D, 25-Hydroxy: 27.8, Magnesium, Serum: 1.8 mg/dL, c/w Calcitriol - Vit D analogue , f/u as outpatient    #Hypomagnesemia - replaced    #Hypothyroidism- home dose 112 mcg--> 125 mcg , increased due to elevated  TSH 22    #DM2 with A1C 8.1 -  resume home meds      #Generalized weakness, multifactorialanemia, uncontrolled hypothyroidism, deconditioning, anasarca, renal and respiratory failure, STEMi- PT daily, OOB to chair daily, c/w vit D    elevated CK with low LDL - > lower dose of statin atorvastatin 80--> 40 can cause weakness in elderly       Advanced directives   HCP daughter Antoinette [phone] updated 9/11/20 , 9/13, 9/15 , 9/199/21    Full code      discharge time spent 47 mins   60yo female with PMH CAD, PAD s/p fem-pop bypass s/p RT iliac stent, DM2, HLD, hypothyroidism, current active smoker, uses cocaine, chronic back pain s/p lumbar surgery presented on 8/23/2020 with multiple non-specific complaints, including fevers, weakness, poor po intake, diffuse abdominal pain, diarrhea. She found to have STEMI s/p LHC with RCA occlusion , E coli sepsis, cystitis, ischemic colitis.    hospital course complicated by acute kidney failure, intubation, pressor requirement, diarrhea, and now  pneumonia with occlusion of left mainstem bronchus      9/19 lower abd pain which waxes and wanes, some days pain free and some days worse since admission, no fever, mucus mixed with stool x1 today, no gross blood in stool, afebrile, tolerating po intake    9/21  abd pain improved with dicyclomine , tolerating po, afebrile , no overnight events     ROS:   All 10 systems reviewed and found to be negative with the exception of what has been described above.  GEN: lying in bed, NAD  HEENT:   NC/AT, pupils equal and reactive, EOMI  CV:  +S1, +S2, RRR  RESP:   lungs clear to auscultation bilaterally, no wheeze, rales, rhonchi   BREAST:  not examined  GI:  abdomen softlower abd tenderness, no rebound , normoactive BS  RECTAL:  not examined  :  not examined  MSK:   normal muscle tone  EXT:  no edema  NEURO:  AAOX3, no focal neurological deficits    60yo female with PMH CAD, PAD s/p fem-pop bypass s/p RT iliac stent, DM2, HLD, hypothyroidism, current active smoker, uses cocaine, chronic back pain s/p lumbar surgery presented on 8/23/2020 with multiple non-specific complaints, including fevers, weakness, poor po intake, diffuse abdominal pain, diarrhea. She found to have STEMI s/p LHC with RCA occlusion , E coli sepsis, cystitis, ischemic colitis.    hospital course complicated by acute kidney failure, intubation, pressor requirement, diarrhea, and now  pneumonia with occlusion of left mainstem bronchus    A/P    #s/p  Acute hypoxic respiratory failure  secondary to septic shock   Hospital acquired PNA with  Mucosal impaction of left main bronchus , complete atelectasis of LLL   COPD , Nicotine dependence   Bilateral pleural effusion L >R, s/p  left thoracentesis 9/14 showed transudative pleural fluid   COVID-19 ruled out   -s/p intubation /extubation  -CT chest shows large mucous impaction left main bronchus    completed tx with  Meropenem x7 days , D/w Dr. Mesa  -c/w mucinex, chest PT, albuterol, continue inhalers  c/w incentive spirometry  cardio and pulm consults appreciated     #s/p Septic shock secondary to Ecoli  bacteremia, possible ischemic infectious colitis ,  diarrheal syndrome, resolved Lower Abdominal pain resolved , still with some residual lower abdominal tenderness, no clinical signs of acute abdomen  -colitis on CT scan , possible bacteremia due to ischemic colitis completed antibiotics.  days IV zosyn [id number] --> 9/4 and 4 days ceftin 9/5-9/8,  s/p rocephin 2gm daily #6, s/p IV flagyl #4  - GI consult - Dr. Hammer, recent colonoscopy unremarkable , GI PCR and C diff neg   , add probiotics, retested  GI PCR  9/19 which is neg   GI cleared patient for discharge with outpatient follow up with Dr blue GI  US abd 9/13 - trace of ascites, b/l pleural effusions`    #STEMI due to 100 % occlusion of RCA , PAD s/p LE stents -Echo show hypokinesis on admission, troponins, st elevations   -Urgent coronary angiogram showed occluded RCA of unknown chronicity  -cardiology f/u noted; c/w DAPT -- statins atorvastatin 80--> 40 mg atorvastatin ,LDL 19, due to weakness , elevated CK  -  ranexa 1000--> 500 bid due to renal dysfunction  - c/w metoprolol 12.5 bid  , statin, asa, plavix   Cardio stopped imdur due to risk of hypotenion, f/u with cardio to consider resuming as outpatient if BP ok    #AMISH w septic shock/stemi/hypotension    Urinary retention s/p Hughes , failed trial of void 9/10, discharge with hughes to rehab , with outpatient urology follow up  - bethanechol,   - nephrology consult  appreciated   I discussed with Dr danilo montoya 9/21 - to reconsider resumin lasix 20mg at time of discharge with BMP check 2 times per week for 2 weeks , f/u with nephro as outpatient    #Anemia normocytic , mixed type ACD and iron deficiency    add iron daily ,  monitor    #Hypocalcemia, vitamin D deficiency     c/w Calcitriol - Vit D analogue , f/u as outpatient    #Hypothyroidism- home dose 112 mcg--> 125 mcg , increased due to elevated  TSH 22    #DM2 with A1C 8.1 -  ISS , stopped metformin  due to renal insufficiency and lantus due to hypoglycemia      Advanced directives   HCP daughter Antoinette [phone] updated 9/11/20 , 9/13, 9/15 , 9/199/21    Full code    discharge time spent 47 mins    9/21 Lower abd pain  waxing and waning Now improvied on resumin  diclyclomine  , had mucus mixed with stool   / recent  ischemic colitis without acute abd    GI pcr neg, on  dicyclomine  , completed 7 days of meropenem 9/17 ,I discussed with GI no indication for further imaging at this time     medically stable to discharge to [facility] due to weakness and deconditioning    pulm eval noted. now s/p thoracentesis - transudate as per lights criteria  on lasix po 20 if cr stable  , f/u with  renal consult as outpatient to   increase  lasix to 40mg  , follow up urology as outpatient   - LE dopplers negative 9/15  - started diflucan for thrush 9/14 completed 9/21  - s/p  meropenem as per ID til 9/17 for PNA     Plan discussed with patient, nursing, staff.   58yo female with PMH CAD, PAD s/p fem-pop bypass s/p RT iliac stent, DM2, HLD, hypothyroidism, current active smoker, uses cocaine, chronic back pain s/p lumbar surgery presented on 8/23/2020 with multiple non-specific complaints, including fevers, weakness, poor po intake, diffuse abdominal pain, diarrhea. She found to have STEMI s/p LHC with RCA occlusion , E coli sepsis, cystitis, ischemic colitis.    hospital course complicated by acute kidney failure, intubation, pressor requirement, diarrhea, and now  pneumonia with occlusion of left mainstem bronchus      9/19 lower abd pain which waxes and wanes, some days pain free and some days worse since admission, no fever, mucus mixed with stool x1 today, no gross blood in stool, afebrile, tolerating po intake    9/21  abd pain improved with dicyclomine , tolerating po, afebrile , no overnight events     ROS:   All 10 systems reviewed and found to be negative with the exception of what has been described above.  GEN: lying in bed, NAD  HEENT:   NC/AT, pupils equal and reactive, EOMI  CV:  +S1, +S2, RRR  RESP:   lungs clear to auscultation bilaterally, no wheeze, rales, rhonchi   BREAST:  not examined  GI:  abdomen softlower abd tenderness, no rebound , normoactive BS  RECTAL:  not examined  :  not examined  MSK:   normal muscle tone  EXT:  no edema  NEURO:  AAOX3, no focal neurological deficits    58yo female with PMH CAD, PAD s/p fem-pop bypass s/p RT iliac stent, DM2, HLD, hypothyroidism, current active smoker, uses cocaine, chronic back pain s/p lumbar surgery presented on 8/23/2020 with multiple non-specific complaints, including fevers, weakness, poor po intake, diffuse abdominal pain, diarrhea. She found to have STEMI s/p LHC with RCA occlusion , E coli sepsis, cystitis, ischemic colitis.    hospital course complicated by acute kidney failure, intubation, pressor requirement, diarrhea, and now  pneumonia with occlusion of left mainstem bronchus    A/P    #s/p  Acute hypoxic respiratory failure  secondary to septic shock   Hospital acquired PNA with  Mucosal impaction of left main bronchus , complete atelectasis of LLL   COPD , Nicotine dependence   Bilateral pleural effusion L >R, s/p  left thoracentesis 9/14 showed transudative pleural fluid   COVID-19 ruled out   -s/p intubation /extubation  -CT chest shows large mucous impaction left main bronchus    completed tx with  Meropenem x7 days , D/w Dr. Mesa  -c/w mucinex, chest PT, albuterol, continue inhalers  c/w incentive spirometry  cardio and pulm consults appreciated     #s/p Septic shock secondary to Ecoli  bacteremia, possible ischemic infectious colitis ,  diarrheal syndrome, resolved Lower Abdominal pain resolved , still with some residual lower abdominal tenderness, no clinical signs of acute abdomen  -colitis on CT scan , possible bacteremia due to ischemic colitis completed antibiotics.  days IV zosyn [id number] --> 9/4 and 4 days ceftin 9/5-9/8,  s/p rocephin 2gm daily #6, s/p IV flagyl #4  - GI consult - Dr. Hammer, recent colonoscopy unremarkable , GI PCR and C diff neg   , add probiotics, retested  GI PCR  9/19 which is neg   GI cleared patient for discharge with outpatient follow up with Dr blue GI  US abd 9/13 - trace of ascites, b/l pleural effusions`    #STEMI due to 100 % occlusion of RCA , PAD s/p LE stents -Echo show hypokinesis on admission, troponins, st elevations   -Urgent coronary angiogram showed occluded RCA of unknown chronicity  -cardiology f/u noted; c/w DAPT -- statins atorvastatin 80--> 40 mg atorvastatin ,LDL 19, due to weakness , elevated CK  -  ranexa 1000--> 500 bid due to renal dysfunction  - c/w metoprolol 12.5 bid  , statin, asa, plavix   Cardio stopped imdur due to risk of hypotenion, f/u with cardio to consider resuming as outpatient if BP ok    #AMISH w septic shock/stemi/hypotension    Urinary retention s/p Hughes , failed trial of void 9/10, discharge with hughes to rehab , with outpatient urology follow up  - bethanechol,   - nephrology consult  appreciated   I discussed with Dr danilo montoya 9/21 - to reconsider resumin lasix 20mg at time of discharge with BMP check 2 times per week for 2 weeks , f/u with nephro as outpatient    #Anemia normocytic , mixed type ACD and iron deficiency    add iron daily ,  monitor    #Hypocalcemia, vitamin D deficiency     c/w Calcitriol - Vit D analogue , f/u as outpatient    #Hypothyroidism- home dose 112 mcg--> 125 mcg , increased due to elevated  TSH 22    #DM2 with A1C 8.1 -  ISS , stopped metformin  due to renal insufficiency and lantus due to hypoglycemia      Advanced directives   HCP daughter Antoinette [phone] updated 9/11/20 , 9/13, 9/15 , 9/19, 9/21    Full code    discharge time spent 47 mins    9/21 Lower abd pain  waxing and waning Now improvied on resumin  diclyclomine  , had mucus mixed with stool   / recent  ischemic colitis without acute abd    GI pcr neg, on  dicyclomine  , completed 7 days of meropenem 9/17 ,I discussed with GI no indication for further imaging at this time     medically stable to discharge to [facility] due to weakness and deconditioning    pulm eval noted. now s/p thoracentesis - transudate as per lights criteria  on lasix po 20 if cr stable  , f/u with  renal consult as outpatient to   increase  lasix to 40mg  , follow up urology as outpatient   - LE dopplers negative 9/15  - started diflucan for thrush 9/14 completed 9/21  - s/p  meropenem as per ID til 9/17 for PNA     Plan discussed with patient, nursing, staff.   05-Dec-2019 12:30

## 2020-09-21 NOTE — PROGRESS NOTE ADULT - REASON FOR ADMISSION
abd pain, weakness

## 2020-09-21 NOTE — DISCHARGE NOTE PROVIDER - NSDCCPCAREPLAN_GEN_ALL_CORE_FT
PRINCIPAL DISCHARGE DIAGNOSIS  Diagnosis: STEMI (ST elevation myocardial infarction)  Assessment and Plan of Treatment: please follow up with cardiology to consider resuming imdur as outpatient if indicated   check BMP twice a a week for renal function stability, follow up with nephro to consider increase lasix  dose as outpatient  if needed  follow up with GI as outpatient for recent hx colitis   follow up pulmonology for recent Pneumonia and left main stem bronchus plugging  follow up urology for trial of voiding  as outpatient

## 2020-09-21 NOTE — DISCHARGE NOTE PROVIDER - CARE PROVIDERS DIRECT ADDRESSES
,DirectAddress_Unknown,DirectAddress_Unknown,[email or web address],DirectAddress_Unknown,[email or web address]

## 2020-09-21 NOTE — DISCHARGE NOTE NURSING/CASE MANAGEMENT/SOCIAL WORK - NSDCPEWEB_GEN_ALL_CORE
[place] website --- [email or web address] for Tobacco Control website --- [email or web address]

## 2020-09-21 NOTE — DISCHARGE NOTE PROVIDER - PROVIDER TOKENS
PROVIDER:[TOKEN:[[id number]]],PROVIDER:[TOKEN:[[id number]]],PROVIDER:[TOKEN:[[id number]]],FREE:[LAST:[urology],PHONE:[(   )    -],FAX:[(   )    -]],PROVIDER:[TOKEN:[[id number]]]

## 2020-09-21 NOTE — DISCHARGE NOTE NURSING/CASE MANAGEMENT/SOCIAL WORK - NSDCPEEMAIL_GEN_ALL_CORE
[facility] for Tobacco Control email [email or web address]

## 2020-09-21 NOTE — DISCHARGE NOTE NURSING/CASE MANAGEMENT/SOCIAL WORK - PATIENT PORTAL LINK FT
You can access the FollowMyHealth Patient Portal offered by [facility] by registering at the following website: [email or web address]. By joining [employer]’s FollowMyHealth portal, you will also be able to view your health information using other applications (apps) compatible with our system.

## 2020-09-21 NOTE — DISCHARGE NOTE PROVIDER - NSDCMRMEDTOKEN_GEN_ALL_CORE_FT
Abilify 20 mg oral tablet: 1 tab(s) orally once a day  acetaminophen 325 mg oral tablet: 2 tab(s) orally every 6 hours, As needed, Temp greater or equal to 38C (100.4F), Mild Pain (1 - 3)  aluminum hydroxide-magnesium hydroxide 200 mg-200 mg/5 mL oral suspension: 30 milliliter(s) orally every 4 hours, As needed, Dyspepsia  aspirin 81 mg oral tablet, chewable: 1 tab(s) orally once a day  atorvastatin 40 mg oral tablet: 1 tab(s) orally once a day (at bedtime)  Basaglar KwikPen 100 units/mL subcutaneous solution: 35 unit(s) subcutaneous once a day (at bedtime)  budesonide-formoterol 80 mcg-4.5 mcg/inh inhalation aerosol: 2 puff(s) inhaled 2 times a day  calcitriol 0.25 mcg oral capsule: 1 cap(s) orally once a day  calcium-vitamin D 500 mg-200 intl units (5 mcg) oral tablet: 1 tab(s) orally once a day  Chantix 1 mg oral tablet: 1 tab(s) orally 2 times a day  clopidogrel 75 mg oral tablet: 1 tab(s) orally once a day.  last dose 10/15/ 2019    dicyclomine 10 mg oral capsule: 1 cap(s) orally 4 times a day (before meals and at bedtime)  ferrous sulfate 325 mg (65 mg elemental iron) oral tablet: 1 tab(s) orally once a day  furosemide 20 mg oral tablet: 1 tab(s) orally once a day  gabapentin 300 mg oral tablet: 2 cap(s) orally 3 times a day. Equals 600mg 3 times a day  guaiFENesin 1200 mg oral tablet, extended release: 1 tab(s) orally every 12 hours  HumaLOG KwikPen 100 units/mL subcutaneous solution: dose(s) subcutaneous 4 times a day (before meals and at bedtime) as per fingerstick result  Imdur 60 mg oral tablet, extended release: 1 tab(s) orally once a day (in the morning)  Incruse Ellipta: 1 puff(s) inhaled once a day  lactobacillus acidophilus oral capsule: 1 tab(s) orally once a day  levothyroxine 125 mcg (0.125 mg) oral tablet: 1 tab(s) orally once a day  metFORMIN 500 mg oral tablet: 1 tab(s) orally 2 times a day  Metoprolol Tartrate 25 mg oral tablet: 0.5 tab(s) orally 2 times a day  Multiple Vitamins oral capsule: 1 cap(s) orally once a day  Norco 10 mg-325 mg oral tablet: 1 tab(s) orally every 6 hours, As Needed for back pain.  pantoprazole 40 mg oral delayed release tablet: 1 tab(s) orally once a day  Ranexa 1000 mg oral tablet, extended release: 1 tab(s) orally 2 times a day  Ventolin HFA 90 mcg/inh inhalation aerosol: 2 puff(s) inhaled 4 times a day, As Needed   Abilify 20 mg oral tablet: 1 tab(s) orally once a day  acetaminophen 325 mg oral tablet: 2 tab(s) orally every 6 hours, As needed, Temp greater or equal to 38C (100.4F), Mild Pain (1 - 3)  aluminum hydroxide-magnesium hydroxide 200 mg-200 mg/5 mL oral suspension: 30 milliliter(s) orally every 4 hours, As needed, Dyspepsia  aspirin 81 mg oral tablet, chewable: 1 tab(s) orally once a day  atorvastatin 40 mg oral tablet: 1 tab(s) orally once a day  budesonide-formoterol 80 mcg-4.5 mcg/inh inhalation aerosol: 2 puff(s) inhaled 2 times a day  calcitriol 0.25 mcg oral capsule: 1 cap(s) orally once a day  calcium-vitamin D 500 mg-200 intl units (5 mcg) oral tablet: 1 tab(s) orally once a day  Chantix 1 mg oral tablet: 1 tab(s) orally 2 times a day  clopidogrel 75 mg oral tablet: 1 tab(s) orally once a day.  last dose 10/15/ 2019    dicyclomine 10 mg oral capsule: 1 cap(s) orally 4 times a day (before meals and at bedtime)  ferrous sulfate 325 mg (65 mg elemental iron) oral tablet: 1 tab(s) orally once a day  furosemide 20 mg oral tablet: 1 tab(s) orally once a day  gabapentin 300 mg oral tablet: 2 cap(s) orally 3 times a day. Equals 600mg 3 times a day  guaiFENesin 1200 mg oral tablet, extended release: 1 tab(s) orally every 12 hours  HumaLOG KwikPen 100 units/mL subcutaneous solution: dose(s) subcutaneous 4 times a day (before meals and at bedtime) as per fingerstick result  Imdur 60 mg oral tablet, extended release: 1 tab(s) orally once a day (in the morning)  Incruse Ellipta: 1 puff(s) inhaled once a day  lactobacillus acidophilus oral capsule: 1 tab(s) orally once a day  levothyroxine 125 mcg (0.125 mg) oral tablet: 1 tab(s) orally once a day  metFORMIN 500 mg oral tablet: 1 tab(s) orally 2 times a day  Metoprolol Tartrate 25 mg oral tablet: 0.5 tab(s) orally 2 times a day  Multiple Vitamins oral capsule: 1 cap(s) orally once a day  Norco 10 mg-325 mg oral tablet: 1 tab(s) orally every 6 hours, As Needed for back pain.  pantoprazole 40 mg oral delayed release tablet: 1 tab(s) orally once a day  ranolazine 500 mg oral tablet, extended release: 1 tab(s) orally 2 times a day  Ventolin HFA 90 mcg/inh inhalation aerosol: 2 puff(s) inhaled 4 times a day, As Needed   Abilify 20 mg oral tablet: 1 tab(s) orally once a day  acetaminophen 325 mg oral tablet: 2 tab(s) orally every 6 hours, As needed, Temp greater or equal to 38C (100.4F), Mild Pain (1 - 3)  aluminum hydroxide-magnesium hydroxide 200 mg-200 mg/5 mL oral suspension: 30 milliliter(s) orally every 4 hours, As needed, Dyspepsia  aspirin 81 mg oral tablet, chewable: 1 tab(s) orally once a day  atorvastatin 40 mg oral tablet: 1 tab(s) orally once a day  budesonide-formoterol 80 mcg-4.5 mcg/inh inhalation aerosol: 2 puff(s) inhaled 2 times a day  calcitriol 0.25 mcg oral capsule: 1 cap(s) orally   calcium-vitamin D 500 mg-200 intl units (5 mcg) oral tablet: 1 tab(s) orally once a day  clopidogrel 75 mg oral tablet: 1 tab(s) orally once a day.  last dose 10/15/ 2019    dicyclomine 10 mg oral capsule: 1 cap(s) orally 4 times a day (before meals and at bedtime)  ferrous sulfate 325 mg (65 mg elemental iron) oral tablet: 1 tab(s) orally once a day  furosemide 20 mg oral tablet: 1 tab(s) orally once a day  guaiFENesin 1200 mg oral tablet, extended release: 1 tab(s) orally every 12 hours  HumaLOG KwikPen 100 units/mL subcutaneous solution: dose(s) subcutaneous 4 times a day (before meals and at bedtime) as per fingerstick result  Incruse Ellipta: 1 puff(s) inhaled once a day  lactobacillus acidophilus oral capsule: 1 tab(s) orally once a day  levothyroxine 125 mcg (0.125 mg) oral tablet: 1 tab(s) orally once a day  Metoprolol Tartrate 25 mg oral tablet: 0.5 tab(s) orally 2 times a day  Multiple Vitamins oral capsule: 1 cap(s) orally once a day  Norco 10 mg-325 mg oral tablet: 1 tab(s) orally every 6 hours, As Needed for back pain.  pantoprazole 40 mg oral delayed release tablet: 1 tab(s) orally once a day  ranolazine 500 mg oral tablet, extended release: 1 tab(s) orally 2 times a day  Ventolin HFA 90 mcg/inh inhalation aerosol: 2 puff(s) inhaled 4 times a day, As Needed   Abilify 20 mg oral tablet: 1 tab(s) orally once a day  acetaminophen 325 mg oral tablet: 2 tab(s) orally every 6 hours, As needed, Temp greater or equal to 38C (100.4F), Mild Pain (1 - 3)  aluminum hydroxide-magnesium hydroxide 200 mg-200 mg/5 mL oral suspension: 30 milliliter(s) orally every 4 hours, As needed, Dyspepsia  aspirin 81 mg oral tablet, chewable: 1 tab(s) orally once a day  atorvastatin 40 mg oral tablet: 1 tab(s) orally once a day  budesonide-formoterol 80 mcg-4.5 mcg/inh inhalation aerosol: 2 puff(s) inhaled 2 times a day  calcitriol 0.25 mcg oral capsule: 1 cap(s) orally every other day  calcium-vitamin D 500 mg-200 intl units (5 mcg) oral tablet: 1 tab(s) orally once a day  clopidogrel 75 mg oral tablet: 1 tab(s) orally once a day.  last dose 10/15/ 2019    dicyclomine 10 mg oral capsule: 1 cap(s) orally 4 times a day (before meals and at bedtime)  ferrous sulfate 325 mg (65 mg elemental iron) oral tablet: 1 tab(s) orally once a day  furosemide 20 mg oral tablet: 1 tab(s) orally once a day  guaiFENesin 1200 mg oral tablet, extended release: 1 tab(s) orally every 12 hours  HumaLOG KwikPen 100 units/mL subcutaneous solution: dose(s) subcutaneous 4 times a day (before meals and at bedtime) as per fingerstick result  Incruse Ellipta: 1 puff(s) inhaled once a day  lactobacillus acidophilus oral capsule: 1 tab(s) orally once a day  levothyroxine 125 mcg (0.125 mg) oral tablet: 1 tab(s) orally once a day  Metoprolol Tartrate 25 mg oral tablet: 0.5 tab(s) orally 2 times a day  Multiple Vitamins oral capsule: 1 cap(s) orally once a day  Norco 10 mg-325 mg oral tablet: 1 tab(s) orally every 6 hours, As Needed for back pain.  pantoprazole 40 mg oral delayed release tablet: 1 tab(s) orally once a day  ranolazine 500 mg oral tablet, extended release: 1 tab(s) orally 2 times a day  Ventolin HFA 90 mcg/inh inhalation aerosol: 2 puff(s) inhaled 4 times a day, As Needed

## 2020-09-23 DIAGNOSIS — J44.9 CHRONIC OBSTRUCTIVE PULMONARY DISEASE, UNSPECIFIED: ICD-10-CM

## 2020-09-23 DIAGNOSIS — D50.9 IRON DEFICIENCY ANEMIA, UNSPECIFIED: ICD-10-CM

## 2020-09-23 DIAGNOSIS — E11.51 TYPE 2 DIABETES MELLITUS WITH DIABETIC PERIPHERAL ANGIOPATHY WITHOUT GANGRENE: ICD-10-CM

## 2020-09-23 DIAGNOSIS — E03.9 HYPOTHYROIDISM, UNSPECIFIED: ICD-10-CM

## 2020-09-23 DIAGNOSIS — N39.0 URINARY TRACT INFECTION, SITE NOT SPECIFIED: ICD-10-CM

## 2020-09-23 DIAGNOSIS — F41.9 ANXIETY DISORDER, UNSPECIFIED: ICD-10-CM

## 2020-09-23 DIAGNOSIS — E88.09 OTHER DISORDERS OF PLASMA-PROTEIN METABOLISM, NOT ELSEWHERE CLASSIFIED: ICD-10-CM

## 2020-09-23 DIAGNOSIS — E87.1 HYPO-OSMOLALITY AND HYPONATREMIA: ICD-10-CM

## 2020-09-23 DIAGNOSIS — E83.42 HYPOMAGNESEMIA: ICD-10-CM

## 2020-09-23 DIAGNOSIS — I21.3 ST ELEVATION (STEMI) MYOCARDIAL INFARCTION OF UNSPECIFIED SITE: ICD-10-CM

## 2020-09-23 DIAGNOSIS — Z91.14 PATIENT'S OTHER NONCOMPLIANCE WITH MEDICATION REGIMEN: ICD-10-CM

## 2020-09-23 DIAGNOSIS — J90 PLEURAL EFFUSION, NOT ELSEWHERE CLASSIFIED: ICD-10-CM

## 2020-09-23 DIAGNOSIS — R33.9 RETENTION OF URINE, UNSPECIFIED: ICD-10-CM
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
## 2020-09-23 DIAGNOSIS — Z79.82 LONG TERM (CURRENT) USE OF ASPIRIN: ICD-10-CM

## 2020-09-23 DIAGNOSIS — F17.210 NICOTINE DEPENDENCE, CIGARETTES, UNCOMPLICATED: ICD-10-CM

## 2020-09-23 DIAGNOSIS — G93.41 METABOLIC ENCEPHALOPATHY: ICD-10-CM

## 2020-09-23 DIAGNOSIS — R65.21 SEVERE SEPSIS WITH SEPTIC SHOCK: ICD-10-CM

## 2020-09-23 DIAGNOSIS — E11.65 TYPE 2 DIABETES MELLITUS WITH HYPERGLYCEMIA: ICD-10-CM

## 2020-09-23 DIAGNOSIS — B96.29 OTHER ESCHERICHIA COLI [E. COLI] AS THE CAUSE OF DISEASES CLASSIFIED ELSEWHERE: ICD-10-CM

## 2020-09-23 DIAGNOSIS — F31.9 BIPOLAR DISORDER, UNSPECIFIED: ICD-10-CM

## 2020-09-23 DIAGNOSIS — N17.0 ACUTE KIDNEY FAILURE WITH TUBULAR NECROSIS: ICD-10-CM

## 2020-09-23 DIAGNOSIS — E87.5 HYPERKALEMIA: ICD-10-CM

## 2020-09-23 DIAGNOSIS — I10 ESSENTIAL (PRIMARY) HYPERTENSION: ICD-10-CM

## 2020-09-23 DIAGNOSIS — I25.82 CHRONIC TOTAL OCCLUSION OF CORONARY ARTERY: ICD-10-CM

## 2020-09-23 DIAGNOSIS — M54.9 DORSALGIA, UNSPECIFIED: ICD-10-CM

## 2020-09-23 DIAGNOSIS — E55.9 VITAMIN D DEFICIENCY, UNSPECIFIED: ICD-10-CM

## 2020-09-23 DIAGNOSIS — E78.5 HYPERLIPIDEMIA, UNSPECIFIED: ICD-10-CM

## 2020-09-23 DIAGNOSIS — K55.9 VASCULAR DISORDER OF INTESTINE, UNSPECIFIED: ICD-10-CM

## 2020-09-23 DIAGNOSIS — I97.120 POSTPROCEDURAL CARDIAC ARREST FOLLOWING CARDIAC SURGERY: ICD-10-CM

## 2020-09-23 DIAGNOSIS — A41.9 SEPSIS, UNSPECIFIED ORGANISM: ICD-10-CM

## 2020-09-23 DIAGNOSIS — J18.9 PNEUMONIA, UNSPECIFIED ORGANISM: ICD-10-CM

## 2020-09-23 DIAGNOSIS — Z79.4 LONG TERM (CURRENT) USE OF INSULIN: ICD-10-CM

## 2020-09-23 DIAGNOSIS — J96.01 ACUTE RESPIRATORY FAILURE WITH HYPOXIA: ICD-10-CM

## 2020-09-23 DIAGNOSIS — I65.29 OCCLUSION AND STENOSIS OF UNSPECIFIED CAROTID ARTERY: ICD-10-CM

## 2020-09-23 DIAGNOSIS — G89.29 OTHER CHRONIC PAIN: ICD-10-CM

## 2020-09-23 DIAGNOSIS — F14.90 COCAINE USE, UNSPECIFIED, UNCOMPLICATED: ICD-10-CM

## 2020-10-12 ENCOUNTER — INPATIENT (INPATIENT)
Facility: HOSPITAL | Age: 60
LOS: 15 days | Discharge: NURSING, REHAB OR LONG-TERM CARE | DRG: 710 | End: 2020-10-28
Attending: FAMILY MEDICINE | Admitting: SURGERY
Payer: MEDICAID

## 2020-10-12 VITALS
HEART RATE: 76 BPM | DIASTOLIC BLOOD PRESSURE: 99 MMHG | SYSTOLIC BLOOD PRESSURE: 147 MMHG | RESPIRATION RATE: 16 BRPM | TEMPERATURE: 98 F | OXYGEN SATURATION: 94 % | WEIGHT: 154.98 LBS | HEIGHT: 63 IN

## 2020-10-12 DIAGNOSIS — D64.9 ANEMIA, UNSPECIFIED: ICD-10-CM

## 2020-10-12 DIAGNOSIS — Z41.1 ENCOUNTER FOR COSMETIC SURGERY: Chronic | ICD-10-CM

## 2020-10-12 DIAGNOSIS — Z95.828 PRESENCE OF OTHER VASCULAR IMPLANTS AND GRAFTS: Chronic | ICD-10-CM

## 2020-10-12 DIAGNOSIS — Z98.890 OTHER SPECIFIED POSTPROCEDURAL STATES: Chronic | ICD-10-CM

## 2020-10-12 DIAGNOSIS — Z98.62 PERIPHERAL VASCULAR ANGIOPLASTY STATUS: Chronic | ICD-10-CM

## 2020-10-12 DIAGNOSIS — Z98.1 ARTHRODESIS STATUS: Chronic | ICD-10-CM

## 2020-10-12 DIAGNOSIS — Z90.49 ACQUIRED ABSENCE OF OTHER SPECIFIED PARTS OF DIGESTIVE TRACT: Chronic | ICD-10-CM

## 2020-10-12 LAB
ADD ON TEST-SPECIMEN IN LAB: SIGNIFICANT CHANGE UP
ANION GAP SERPL CALC-SCNC: 14 MMOL/L — SIGNIFICANT CHANGE UP (ref 5–17)
APPEARANCE UR: ABNORMAL
APTT BLD: 29 SEC — SIGNIFICANT CHANGE UP (ref 27.5–35.5)
BASOPHILS # BLD AUTO: 0 K/UL — SIGNIFICANT CHANGE UP (ref 0–0.2)
BASOPHILS NFR BLD AUTO: 0 % — SIGNIFICANT CHANGE UP (ref 0–2)
BILIRUB UR-MCNC: NEGATIVE — SIGNIFICANT CHANGE UP
BUN SERPL-MCNC: 76 MG/DL — HIGH (ref 7–23)
CALCIUM SERPL-MCNC: 6.6 MG/DL — LOW (ref 8.5–10.1)
CHLORIDE SERPL-SCNC: 103 MMOL/L — SIGNIFICANT CHANGE UP (ref 96–108)
CO2 SERPL-SCNC: 15 MMOL/L — LOW (ref 22–31)
COLOR SPEC: YELLOW — SIGNIFICANT CHANGE UP
CREAT SERPL-MCNC: 3.09 MG/DL — HIGH (ref 0.5–1.3)
DIFF PNL FLD: ABNORMAL
EOSINOPHIL # BLD AUTO: 0 K/UL — SIGNIFICANT CHANGE UP (ref 0–0.5)
EOSINOPHIL NFR BLD AUTO: 0 % — SIGNIFICANT CHANGE UP (ref 0–6)
GLUCOSE SERPL-MCNC: 184 MG/DL — HIGH (ref 70–99)
GLUCOSE UR QL: NEGATIVE MG/DL — SIGNIFICANT CHANGE UP
HCT VFR BLD CALC: 17.9 % — CRITICAL LOW (ref 34.5–45)
HCT VFR BLD CALC: 28.3 % — LOW (ref 34.5–45)
HGB BLD-MCNC: 5.9 G/DL — CRITICAL LOW (ref 11.5–15.5)
HGB BLD-MCNC: 9.3 G/DL — LOW (ref 11.5–15.5)
INR BLD: 1.29 RATIO — HIGH (ref 0.88–1.16)
KETONES UR-MCNC: NEGATIVE — SIGNIFICANT CHANGE UP
LACTATE SERPL-SCNC: 1.3 MMOL/L — SIGNIFICANT CHANGE UP (ref 0.7–2)
LACTATE SERPL-SCNC: 1.4 MMOL/L — SIGNIFICANT CHANGE UP (ref 0.7–2)
LEUKOCYTE ESTERASE UR-ACNC: ABNORMAL
LYMPHOCYTES # BLD AUTO: 28 % — SIGNIFICANT CHANGE UP (ref 13–44)
LYMPHOCYTES # BLD AUTO: 4.25 K/UL — HIGH (ref 1–3.3)
MCHC RBC-ENTMCNC: 29.8 PG — SIGNIFICANT CHANGE UP (ref 27–34)
MCHC RBC-ENTMCNC: 30.3 PG — SIGNIFICANT CHANGE UP (ref 27–34)
MCHC RBC-ENTMCNC: 32.9 GM/DL — SIGNIFICANT CHANGE UP (ref 32–36)
MCHC RBC-ENTMCNC: 33 GM/DL — SIGNIFICANT CHANGE UP (ref 32–36)
MCV RBC AUTO: 90.4 FL — SIGNIFICANT CHANGE UP (ref 80–100)
MCV RBC AUTO: 92.2 FL — SIGNIFICANT CHANGE UP (ref 80–100)
MONOCYTES # BLD AUTO: 0.46 K/UL — SIGNIFICANT CHANGE UP (ref 0–0.9)
MONOCYTES NFR BLD AUTO: 3 % — SIGNIFICANT CHANGE UP (ref 2–14)
NEUTROPHILS # BLD AUTO: 10.48 K/UL — HIGH (ref 1.8–7.4)
NEUTROPHILS NFR BLD AUTO: 69 % — SIGNIFICANT CHANGE UP (ref 43–77)
NITRITE UR-MCNC: NEGATIVE — SIGNIFICANT CHANGE UP
NRBC # BLD: SIGNIFICANT CHANGE UP /100 WBCS (ref 0–0)
OB PNL STL: NEGATIVE — SIGNIFICANT CHANGE UP
PH UR: 6 — SIGNIFICANT CHANGE UP (ref 5–8)
PLATELET # BLD AUTO: 219 K/UL — SIGNIFICANT CHANGE UP (ref 150–400)
PLATELET # BLD AUTO: 250 K/UL — SIGNIFICANT CHANGE UP (ref 150–400)
POTASSIUM SERPL-MCNC: 2.7 MMOL/L — CRITICAL LOW (ref 3.5–5.3)
POTASSIUM SERPL-SCNC: 2.7 MMOL/L — CRITICAL LOW (ref 3.5–5.3)
PROT UR-MCNC: 30 MG/DL
PROTHROM AB SERPL-ACNC: 14.9 SEC — HIGH (ref 10.6–13.6)
RBC # BLD: 1.98 M/UL — LOW (ref 3.8–5.2)
RBC # BLD: 3.07 M/UL — LOW (ref 3.8–5.2)
RBC # FLD: 14.7 % — HIGH (ref 10.3–14.5)
RBC # FLD: 15.1 % — HIGH (ref 10.3–14.5)
SARS-COV-2 RNA SPEC QL NAA+PROBE: SIGNIFICANT CHANGE UP
SODIUM SERPL-SCNC: 132 MMOL/L — LOW (ref 135–145)
SP GR SPEC: 1.01 — SIGNIFICANT CHANGE UP (ref 1.01–1.02)
UROBILINOGEN FLD QL: 1 MG/DL
WBC # BLD: 15.19 K/UL — HIGH (ref 3.8–10.5)
WBC # BLD: 17.47 K/UL — HIGH (ref 3.8–10.5)
WBC # FLD AUTO: 15.19 K/UL — HIGH (ref 3.8–10.5)
WBC # FLD AUTO: 17.47 K/UL — HIGH (ref 3.8–10.5)

## 2020-10-12 PROCEDURE — 82330 ASSAY OF CALCIUM: CPT

## 2020-10-12 PROCEDURE — 80069 RENAL FUNCTION PANEL: CPT

## 2020-10-12 PROCEDURE — 36415 COLL VENOUS BLD VENIPUNCTURE: CPT

## 2020-10-12 PROCEDURE — 87493 C DIFF AMPLIFIED PROBE: CPT

## 2020-10-12 PROCEDURE — 87086 URINE CULTURE/COLONY COUNT: CPT

## 2020-10-12 PROCEDURE — 81001 URINALYSIS AUTO W/SCOPE: CPT

## 2020-10-12 PROCEDURE — 82962 GLUCOSE BLOOD TEST: CPT

## 2020-10-12 PROCEDURE — 87040 BLOOD CULTURE FOR BACTERIA: CPT

## 2020-10-12 PROCEDURE — 74176 CT ABD & PELVIS W/O CONTRAST: CPT | Mod: 26

## 2020-10-12 PROCEDURE — C1889: CPT

## 2020-10-12 PROCEDURE — 82306 VITAMIN D 25 HYDROXY: CPT

## 2020-10-12 PROCEDURE — U0003: CPT

## 2020-10-12 PROCEDURE — 93308 TTE F-UP OR LMTD: CPT

## 2020-10-12 PROCEDURE — 71045 X-RAY EXAM CHEST 1 VIEW: CPT | Mod: 26

## 2020-10-12 PROCEDURE — 97530 THERAPEUTIC ACTIVITIES: CPT | Mod: GP

## 2020-10-12 PROCEDURE — 94003 VENT MGMT INPAT SUBQ DAY: CPT

## 2020-10-12 PROCEDURE — 86850 RBC ANTIBODY SCREEN: CPT

## 2020-10-12 PROCEDURE — C1876: CPT

## 2020-10-12 PROCEDURE — 84484 ASSAY OF TROPONIN QUANT: CPT

## 2020-10-12 PROCEDURE — P9016: CPT

## 2020-10-12 PROCEDURE — 87507 IADNA-DNA/RNA PROBE TQ 12-25: CPT

## 2020-10-12 PROCEDURE — 86769 SARS-COV-2 COVID-19 ANTIBODY: CPT

## 2020-10-12 PROCEDURE — 97110 THERAPEUTIC EXERCISES: CPT | Mod: GP

## 2020-10-12 PROCEDURE — C1768: CPT

## 2020-10-12 PROCEDURE — 74019 RADEX ABDOMEN 2 VIEWS: CPT

## 2020-10-12 PROCEDURE — 94640 AIRWAY INHALATION TREATMENT: CPT

## 2020-10-12 PROCEDURE — 85049 AUTOMATED PLATELET COUNT: CPT

## 2020-10-12 PROCEDURE — 94002 VENT MGMT INPAT INIT DAY: CPT

## 2020-10-12 PROCEDURE — P9047: CPT

## 2020-10-12 PROCEDURE — 97162 PT EVAL MOD COMPLEX 30 MIN: CPT | Mod: GP

## 2020-10-12 PROCEDURE — 93010 ELECTROCARDIOGRAM REPORT: CPT

## 2020-10-12 PROCEDURE — C9113: CPT

## 2020-10-12 PROCEDURE — 36600 WITHDRAWAL OF ARTERIAL BLOOD: CPT

## 2020-10-12 PROCEDURE — 82803 BLOOD GASES ANY COMBINATION: CPT

## 2020-10-12 PROCEDURE — 94760 N-INVAS EAR/PLS OXIMETRY 1: CPT

## 2020-10-12 PROCEDURE — 83735 ASSAY OF MAGNESIUM: CPT

## 2020-10-12 PROCEDURE — 97116 GAIT TRAINING THERAPY: CPT | Mod: GP

## 2020-10-12 PROCEDURE — 82310 ASSAY OF CALCIUM: CPT

## 2020-10-12 PROCEDURE — 88307 TISSUE EXAM BY PATHOLOGIST: CPT

## 2020-10-12 PROCEDURE — C1725: CPT

## 2020-10-12 PROCEDURE — 82140 ASSAY OF AMMONIA: CPT

## 2020-10-12 PROCEDURE — 86901 BLOOD TYPING SEROLOGIC RH(D): CPT

## 2020-10-12 PROCEDURE — C1769: CPT

## 2020-10-12 PROCEDURE — 97164 PT RE-EVAL EST PLAN CARE: CPT | Mod: GP

## 2020-10-12 PROCEDURE — 82947 ASSAY GLUCOSE BLOOD QUANT: CPT

## 2020-10-12 PROCEDURE — 84100 ASSAY OF PHOSPHORUS: CPT

## 2020-10-12 PROCEDURE — 82550 ASSAY OF CK (CPK): CPT

## 2020-10-12 PROCEDURE — 83690 ASSAY OF LIPASE: CPT

## 2020-10-12 PROCEDURE — 83036 HEMOGLOBIN GLYCOSYLATED A1C: CPT

## 2020-10-12 PROCEDURE — 86022 PLATELET ANTIBODIES: CPT

## 2020-10-12 PROCEDURE — 76000 FLUOROSCOPY <1 HR PHYS/QHP: CPT

## 2020-10-12 PROCEDURE — 80074 ACUTE HEPATITIS PANEL: CPT

## 2020-10-12 PROCEDURE — 83605 ASSAY OF LACTIC ACID: CPT

## 2020-10-12 PROCEDURE — 36430 TRANSFUSION BLD/BLD COMPNT: CPT

## 2020-10-12 PROCEDURE — 71045 X-RAY EXAM CHEST 1 VIEW: CPT

## 2020-10-12 PROCEDURE — 85027 COMPLETE CBC AUTOMATED: CPT

## 2020-10-12 PROCEDURE — 85730 THROMBOPLASTIN TIME PARTIAL: CPT

## 2020-10-12 PROCEDURE — 83970 ASSAY OF PARATHORMONE: CPT

## 2020-10-12 PROCEDURE — 80053 COMPREHEN METABOLIC PANEL: CPT

## 2020-10-12 PROCEDURE — 85610 PROTHROMBIN TIME: CPT

## 2020-10-12 PROCEDURE — 74176 CT ABD & PELVIS W/O CONTRAST: CPT

## 2020-10-12 PROCEDURE — 80076 HEPATIC FUNCTION PANEL: CPT

## 2020-10-12 PROCEDURE — 80048 BASIC METABOLIC PNL TOTAL CA: CPT

## 2020-10-12 PROCEDURE — 86900 BLOOD TYPING SEROLOGIC ABO: CPT

## 2020-10-12 PROCEDURE — 93971 EXTREMITY STUDY: CPT | Mod: LT

## 2020-10-12 PROCEDURE — P9037: CPT

## 2020-10-12 PROCEDURE — 99291 CRITICAL CARE FIRST HOUR: CPT

## 2020-10-12 PROCEDURE — C1894: CPT

## 2020-10-12 PROCEDURE — 86923 COMPATIBILITY TEST ELECTRIC: CPT

## 2020-10-12 RX ORDER — POTASSIUM CHLORIDE 20 MEQ
10 PACKET (EA) ORAL ONCE
Refills: 0 | Status: COMPLETED | OUTPATIENT
Start: 2020-10-12 | End: 2020-10-12

## 2020-10-12 RX ORDER — PIPERACILLIN AND TAZOBACTAM 4; .5 G/20ML; G/20ML
3.38 INJECTION, POWDER, LYOPHILIZED, FOR SOLUTION INTRAVENOUS EVERY 12 HOURS
Refills: 0 | Status: DISCONTINUED | OUTPATIENT
Start: 2020-10-12 | End: 2020-10-13

## 2020-10-12 RX ORDER — CEFTRIAXONE 500 MG/1
1000 INJECTION, POWDER, FOR SOLUTION INTRAMUSCULAR; INTRAVENOUS ONCE
Refills: 0 | Status: DISCONTINUED | OUTPATIENT
Start: 2020-10-12 | End: 2020-10-12

## 2020-10-12 RX ORDER — PANTOPRAZOLE SODIUM 20 MG/1
40 TABLET, DELAYED RELEASE ORAL
Refills: 0 | Status: DISCONTINUED | OUTPATIENT
Start: 2020-10-12 | End: 2020-10-18

## 2020-10-12 RX ORDER — BUDESONIDE AND FORMOTEROL FUMARATE DIHYDRATE 160; 4.5 UG/1; UG/1
2 AEROSOL RESPIRATORY (INHALATION)
Refills: 0 | Status: DISCONTINUED | OUTPATIENT
Start: 2020-10-12 | End: 2020-10-28

## 2020-10-12 RX ORDER — SODIUM CHLORIDE 9 MG/ML
2200 INJECTION INTRAMUSCULAR; INTRAVENOUS; SUBCUTANEOUS ONCE
Refills: 0 | Status: COMPLETED | OUTPATIENT
Start: 2020-10-12 | End: 2020-10-12

## 2020-10-12 RX ORDER — CEFTRIAXONE 500 MG/1
1000 INJECTION, POWDER, FOR SOLUTION INTRAMUSCULAR; INTRAVENOUS ONCE
Refills: 0 | Status: COMPLETED | OUTPATIENT
Start: 2020-10-12 | End: 2020-10-12

## 2020-10-12 RX ORDER — RANOLAZINE 500 MG/1
500 TABLET, FILM COATED, EXTENDED RELEASE ORAL
Refills: 0 | Status: DISCONTINUED | OUTPATIENT
Start: 2020-10-12 | End: 2020-10-16

## 2020-10-12 RX ORDER — POTASSIUM CHLORIDE 20 MEQ
10 PACKET (EA) ORAL
Refills: 0 | Status: COMPLETED | OUTPATIENT
Start: 2020-10-12 | End: 2020-10-12

## 2020-10-12 RX ORDER — PANTOPRAZOLE SODIUM 20 MG/1
80 TABLET, DELAYED RELEASE ORAL ONCE
Refills: 0 | Status: COMPLETED | OUTPATIENT
Start: 2020-10-12 | End: 2020-10-12

## 2020-10-12 RX ORDER — ARIPIPRAZOLE 15 MG/1
20 TABLET ORAL DAILY
Refills: 0 | Status: DISCONTINUED | OUTPATIENT
Start: 2020-10-12 | End: 2020-10-28

## 2020-10-12 RX ORDER — SODIUM BICARBONATE 1 MEQ/ML
0.16 SYRINGE (ML) INTRAVENOUS
Qty: 150 | Refills: 0 | Status: DISCONTINUED | OUTPATIENT
Start: 2020-10-12 | End: 2020-10-14

## 2020-10-12 RX ORDER — SODIUM CHLORIDE 9 MG/ML
2100 INJECTION INTRAMUSCULAR; INTRAVENOUS; SUBCUTANEOUS ONCE
Refills: 0 | Status: COMPLETED | OUTPATIENT
Start: 2020-10-12 | End: 2020-10-12

## 2020-10-12 RX ORDER — ATORVASTATIN CALCIUM 80 MG/1
40 TABLET, FILM COATED ORAL AT BEDTIME
Refills: 0 | Status: DISCONTINUED | OUTPATIENT
Start: 2020-10-12 | End: 2020-10-16

## 2020-10-12 RX ORDER — LEVOTHYROXINE SODIUM 125 MCG
125 TABLET ORAL DAILY
Refills: 0 | Status: DISCONTINUED | OUTPATIENT
Start: 2020-10-12 | End: 2020-10-28

## 2020-10-12 RX ADMIN — RANOLAZINE 500 MILLIGRAM(S): 500 TABLET, FILM COATED, EXTENDED RELEASE ORAL at 20:27

## 2020-10-12 RX ADMIN — Medication 100 MILLIEQUIVALENT(S): at 22:41

## 2020-10-12 RX ADMIN — Medication 100 MILLIEQUIVALENT(S): at 23:44

## 2020-10-12 RX ADMIN — SODIUM CHLORIDE 1050 MILLILITER(S): 9 INJECTION INTRAMUSCULAR; INTRAVENOUS; SUBCUTANEOUS at 21:15

## 2020-10-12 RX ADMIN — Medication 100 MILLIEQUIVALENT(S): at 20:27

## 2020-10-12 RX ADMIN — Medication 10 MILLIEQUIVALENT(S): at 19:40

## 2020-10-12 RX ADMIN — Medication 75 MEQ/KG/HR: at 20:27

## 2020-10-12 RX ADMIN — CEFTRIAXONE 1000 MILLIGRAM(S): 500 INJECTION, POWDER, FOR SOLUTION INTRAMUSCULAR; INTRAVENOUS at 17:33

## 2020-10-12 RX ADMIN — Medication 100 MILLIEQUIVALENT(S): at 19:41

## 2020-10-12 RX ADMIN — ARIPIPRAZOLE 20 MILLIGRAM(S): 15 TABLET ORAL at 20:27

## 2020-10-12 RX ADMIN — PANTOPRAZOLE SODIUM 80 MILLIGRAM(S): 20 TABLET, DELAYED RELEASE ORAL at 18:31

## 2020-10-12 RX ADMIN — Medication 100 MILLIEQUIVALENT(S): at 18:42

## 2020-10-12 RX ADMIN — BUDESONIDE AND FORMOTEROL FUMARATE DIHYDRATE 2 PUFF(S): 160; 4.5 AEROSOL RESPIRATORY (INHALATION) at 21:15

## 2020-10-12 RX ADMIN — SODIUM CHLORIDE 2200 MILLILITER(S): 9 INJECTION INTRAMUSCULAR; INTRAVENOUS; SUBCUTANEOUS at 17:34

## 2020-10-12 RX ADMIN — PIPERACILLIN AND TAZOBACTAM 25 GRAM(S): 4; .5 INJECTION, POWDER, LYOPHILIZED, FOR SOLUTION INTRAVENOUS at 23:20

## 2020-10-12 RX ADMIN — ATORVASTATIN CALCIUM 40 MILLIGRAM(S): 80 TABLET, FILM COATED ORAL at 20:27

## 2020-10-12 NOTE — ED PROVIDER NOTE - CRITICAL CARE PROVIDED
documentation/conducted a detailed discussion of DNR status/direct patient care (not related to procedure)/interpretation of diagnostic studies/consultation with other physicians

## 2020-10-12 NOTE — ED ADULT TRIAGE NOTE - CHIEF COMPLAINT QUOTE
pt BIBEMS from [facility], as per EMS "pt was sent for rule out sepsis, colitis. pt had low blood pressure". pt has hx uti,sepsis, CHF, cocaine/etoh abuse, and kidney failure. pt a/ox3, VSS in EMS triage.

## 2020-10-12 NOTE — ED PROCEDURE NOTE - ATTENDING CONTRIBUTION TO CARE
I, Jonna Salinas MD, personally saw the patient with resident.  I have personally performed a face to face diagnostic evaluation on this patient.  I have reviewed the resident note and agree with the history, exam, and plan of care.

## 2020-10-12 NOTE — H&P ADULT - NSHPPHYSICALEXAM_GEN_ALL_CORE
General Pale,no distress  HEENT - pale, nc/at  neck no jvd  lungs, clear  cv rrr  GI lower abdominal fullness, abdominal pain  abdomen lower abdominal fullness and tenderness  ext old left groin fem pop scar, right TLC  rectal dark stool, no clear melena

## 2020-10-12 NOTE — ED PROVIDER NOTE - CLINICAL SUMMARY MEDICAL DECISION MAKING FREE TEXT BOX
60 y/o female with a PMHx of angina pectoris, anxiety, bipolar, CAD, COPD, cystic breast, colon polyps, depression, DDD, DM, essential HTN, EtOH abuse, gallbladder disease, hiatal hernia with GERD, HLD, hypothyroid, lumbar herniated disc, MRSA infection, osteoarthritis, overactive bladder, PAD, shoulder disorder, spinal stenosis, substance abuse, TIA, urinary incontinence presents to the ED c/o abd pain. Pt with hx of ischemic colitis. Pt hypotensive. Will initiate sepsis protocol. Will obtain CT, give abx, fluids, reassess.

## 2020-10-12 NOTE — ED ADULT NURSE NOTE - OBJECTIVE STATEMENT
Patient from [facility] for hypotension and abdominal cramping.  Patient is cachectic looking, A&OX4.  Says that she has been having diarrhea for a while now but the abdominal cramping started today.  Patient asymptomatic however BP in the 80's systolic.  Difficulty obtaining IV access, she arrived in ED with an IV in the right wrist from [facility] but does not flush on arrival to the ED.  Incontinence care provided.

## 2020-10-12 NOTE — H&P ADULT - HISTORY OF PRESENT ILLNESS
This patient is a 59 year old female with hx.    DM    STEMI in August, with RCA occlusion, course complicated by intubation, e. coli sepsis, pneumonia, Acute renal failure    Patient was sent from nursing home today with low bp and lower abdominal pain and dark stools  In ED, BP in low 80s,  creatinfine 3.4 was about 3 on last discharge,  Potassium of 2.2, Hgb 5.9 Hgb was 8.3 during last admission  last echo ef 55%   This patient is a 59 year old female with hx.    DM    STEMI in August, with RCA occlusion, course complicated by intubation, e. coli sepsis, pneumonia, Acute renal failure, did not get stent    Patient was sent from nursing home today with low bp and lower abdominal pain and dark stools  In ED, BP in low 80s,  creatinfine 3.4 was about 3 on last discharge,  Potassium of 2.2, Hgb 5.9 Hgb was 8.3 during last admission  last echo ef 55%

## 2020-10-12 NOTE — H&P ADULT - NSHPLABSRESULTS_GEN_ALL_CORE
CBC Full  -  ( 12 Oct 2020 16:16 )  WBC Count : 15.19 K/uL  RBC Count : 1.98 M/uL  Hemoglobin : 5.9 g/dL  Hematocrit : 17.9 %  Platelet Count - Automated : 250 K/uL  Mean Cell Volume : 90.4 fl  Mean Cell Hemoglobin : 29.8 pg  Mean Cell Hemoglobin Concentration : 33.0 gm/dL  Auto Neutrophil # : 10.48 K/uL  Auto Lymphocyte # : 4.25 K/uL  Auto Monocyte # : 0.46 K/uL  Auto Eosinophil # : 0.00 K/uL  Auto Basophil # : 0.00 K/uL  Auto Neutrophil % : 69.0 %  Auto Lymphocyte % : 28.0 %  Auto Monocyte % : 3.0 %  Auto Eosinophil % : 0.0 %  Auto Basophil % : 0.0 %      10-12    129<L>  |  97  |  83<H>  ----------------------------<  155<H>  2.2<LL>   |  16<L>  |  3.47<H>    Ca    7.1<L>      12 Oct 2020 17:06    TPro  6.1  /  Alb  1.4<L>  /  TBili  0.8  /  DBili  x   /  AST  46<H>  /  ALT  33  /  AlkPhos  132<H>  10-12

## 2020-10-12 NOTE — ED PROVIDER NOTE - OBJECTIVE STATEMENT
60 y/o female with a PMHx of angina pectoris, anxiety, bipolar, CAD, COPD, cystic breast, colon polyps, depression, DDD, DM, essential HTN, EtOH abuse, gallbladder disease, hiatal hernia with GERD, HLD, hypothyroid, lumbar herniated disc, MRSA infection, osteoarthritis, overactive bladder, PAD, shoulder disorder, spinal stenosis, substance abuse, TIA, urinary incontinence presents to the ED BIBEMS from [facility] for hypotension. +abd cramping, +diarrhea x 1.5 weeks, +generalized weakness. Denies vomiting. No other complaints at this time.

## 2020-10-12 NOTE — ED ADULT NURSE REASSESSMENT NOTE - COMFORT CARE
assisted in cleaning and changing patient brief and sheets. patient is clean and dry at this time./side rails up/repositioned

## 2020-10-12 NOTE — H&P ADULT - ASSESSMENT
Patient with history of diabetes, cad s/p stent in October  now with anemia, hgb decreased from 8.3 to 5.9,. ? melena but lower abdominal pain fullness  also acute on chrnic renal failure, with likely non anion gap acidosis  hypotension likely secondary to bleeding    Plan    hold aspirin , plavix    check stool for occult blood    transfuse, check serial h/h    concern over lower abdominal pain, ? fullness ? hematoma    will get non contrast ct of abdomen, r/o collitis for hematoma    severe hypokalemia replace    renal consult    will get GI consult   Patient with history of diabetes, cad s/p stent in October  now with anemia, hgb decreased from 8.3 to 5.9,. ? melena but lower abdominal pain fullness  also acute on chrnic renal failure, with likely non anion gap acidosis  hypotension likely secondary to bleeding    Plan    hold aspirin , plavix    check stool for occult blood    transfuse, check serial h/h    concern over lower abdominal pain, ct large bladder r/o colitis    will get non contrast ct of abdomen, r/o collitis for hematoma    severe hypokalemia replace    renal consult    will get GI consult

## 2020-10-12 NOTE — ED ADULT NURSE REASSESSMENT NOTE - NS ED NURSE REASSESS COMMENT FT1
Multiple attempts at IV access by multiple RN's and ultrasound with MD made.  Finally achieved IV access by MD Salinas via ultrasound.  Delay in treatment due to time spent finding IV access.

## 2020-10-12 NOTE — ED ADULT NURSE NOTE - CHIEF COMPLAINT QUOTE
pt BIBEMS from [facility], as per EMS "pt was sent for rule out sepsis, colitis. pt had low blood pressure". pt has hx uti,sepsis, CHF, cocaine/etoh abuse, and kidney failure. pt a/ox3, VSS in EMS triage.

## 2020-10-12 NOTE — ED PROVIDER NOTE - CONSTITUTIONAL, MLM
normal... Well appearing, awake, alert, oriented to person, place, time/situation and in no apparent distress. Elderly female laying in bed, awake, alert, oriented to person, place, time/situation and in no apparent distress.

## 2020-10-12 NOTE — ED PROVIDER NOTE - PROGRESS NOTE DETAILS
Radhika [facility]A for ED attending, Dr. Salinas: EICU consult obtained, blood had been previously ordered will take approx 1 hour, will give 1 unit of emergent blood. EICU called intensivist upstairs who will come down to see patient. I Paul Santos attest that this documentation has been prepared under the direction and in the presence of Doctor Salinas. Radhika CHA for ED attending, Dr. Salinas: Central line placed, ICU called will be down to see patient. Nephrology paged. Radhika CHA for ED attending, Dr. Salinas: d/w with Nephrology who will see patient in the AM, wants patient to have gentle Potassium as patient is already receiving blood.

## 2020-10-13 LAB
-  K. PNEUMONIAE GROUP: SIGNIFICANT CHANGE UP
ANION GAP SERPL CALC-SCNC: 15 MMOL/L — SIGNIFICANT CHANGE UP (ref 5–17)
ANION GAP SERPL CALC-SCNC: 15 MMOL/L — SIGNIFICANT CHANGE UP (ref 5–17)
BUN SERPL-MCNC: 68 MG/DL — HIGH (ref 7–23)
BUN SERPL-MCNC: 74 MG/DL — HIGH (ref 7–23)
CALCIUM SERPL-MCNC: 6.6 MG/DL — LOW (ref 8.5–10.1)
CALCIUM SERPL-MCNC: 6.6 MG/DL — LOW (ref 8.5–10.1)
CHLORIDE SERPL-SCNC: 100 MMOL/L — SIGNIFICANT CHANGE UP (ref 96–108)
CHLORIDE SERPL-SCNC: 101 MMOL/L — SIGNIFICANT CHANGE UP (ref 96–108)
CO2 SERPL-SCNC: 16 MMOL/L — LOW (ref 22–31)
CO2 SERPL-SCNC: 17 MMOL/L — LOW (ref 22–31)
CREAT SERPL-MCNC: 2.92 MG/DL — HIGH (ref 0.5–1.3)
CREAT SERPL-MCNC: 3.04 MG/DL — HIGH (ref 0.5–1.3)
CULTURE RESULTS: SIGNIFICANT CHANGE UP
CULTURE RESULTS: SIGNIFICANT CHANGE UP
GLUCOSE SERPL-MCNC: 153 MG/DL — HIGH (ref 70–99)
GLUCOSE SERPL-MCNC: 250 MG/DL — HIGH (ref 70–99)
GRAM STN FLD: SIGNIFICANT CHANGE UP
GRAM STN FLD: SIGNIFICANT CHANGE UP
HCT VFR BLD CALC: 28.3 % — LOW (ref 34.5–45)
HCT VFR BLD CALC: 31.8 % — LOW (ref 34.5–45)
HGB BLD-MCNC: 10.4 G/DL — LOW (ref 11.5–15.5)
HGB BLD-MCNC: 9.2 G/DL — LOW (ref 11.5–15.5)
LACTATE SERPL-SCNC: 1.7 MMOL/L — SIGNIFICANT CHANGE UP (ref 0.7–2)
MAGNESIUM SERPL-MCNC: 2 MG/DL — SIGNIFICANT CHANGE UP (ref 1.6–2.6)
MCHC RBC-ENTMCNC: 29.4 PG — SIGNIFICANT CHANGE UP (ref 27–34)
MCHC RBC-ENTMCNC: 30.1 PG — SIGNIFICANT CHANGE UP (ref 27–34)
MCHC RBC-ENTMCNC: 32.5 GM/DL — SIGNIFICANT CHANGE UP (ref 32–36)
MCHC RBC-ENTMCNC: 32.7 GM/DL — SIGNIFICANT CHANGE UP (ref 32–36)
MCV RBC AUTO: 90.4 FL — SIGNIFICANT CHANGE UP (ref 80–100)
MCV RBC AUTO: 92.2 FL — SIGNIFICANT CHANGE UP (ref 80–100)
METHOD TYPE: SIGNIFICANT CHANGE UP
PLATELET # BLD AUTO: 207 K/UL — SIGNIFICANT CHANGE UP (ref 150–400)
PLATELET # BLD AUTO: 226 K/UL — SIGNIFICANT CHANGE UP (ref 150–400)
POTASSIUM SERPL-MCNC: 3 MMOL/L — LOW (ref 3.5–5.3)
POTASSIUM SERPL-MCNC: 3.3 MMOL/L — LOW (ref 3.5–5.3)
POTASSIUM SERPL-SCNC: 3 MMOL/L — LOW (ref 3.5–5.3)
POTASSIUM SERPL-SCNC: 3.3 MMOL/L — LOW (ref 3.5–5.3)
RBC # BLD: 3.13 M/UL — LOW (ref 3.8–5.2)
RBC # BLD: 3.45 M/UL — LOW (ref 3.8–5.2)
RBC # FLD: 14.7 % — HIGH (ref 10.3–14.5)
RBC # FLD: 15.1 % — HIGH (ref 10.3–14.5)
SARS-COV-2 IGG SERPL QL IA: NEGATIVE — SIGNIFICANT CHANGE UP
SARS-COV-2 IGM SERPL IA-ACNC: 0.09 INDEX — SIGNIFICANT CHANGE UP
SODIUM SERPL-SCNC: 131 MMOL/L — LOW (ref 135–145)
SODIUM SERPL-SCNC: 133 MMOL/L — LOW (ref 135–145)
SPECIMEN SOURCE: SIGNIFICANT CHANGE UP
WBC # BLD: 16.92 K/UL — HIGH (ref 3.8–10.5)
WBC # BLD: 18.12 K/UL — HIGH (ref 3.8–10.5)
WBC # FLD AUTO: 16.92 K/UL — HIGH (ref 3.8–10.5)
WBC # FLD AUTO: 18.12 K/UL — HIGH (ref 3.8–10.5)

## 2020-10-13 PROCEDURE — 99222 1ST HOSP IP/OBS MODERATE 55: CPT

## 2020-10-13 RX ORDER — POTASSIUM CHLORIDE 20 MEQ
40 PACKET (EA) ORAL EVERY 4 HOURS
Refills: 0 | Status: COMPLETED | OUTPATIENT
Start: 2020-10-13 | End: 2020-10-13

## 2020-10-13 RX ORDER — POTASSIUM CHLORIDE 20 MEQ
10 PACKET (EA) ORAL ONCE
Refills: 0 | Status: COMPLETED | OUTPATIENT
Start: 2020-10-13 | End: 2020-10-13

## 2020-10-13 RX ORDER — POTASSIUM CHLORIDE 20 MEQ
40 PACKET (EA) ORAL ONCE
Refills: 0 | Status: COMPLETED | OUTPATIENT
Start: 2020-10-13 | End: 2020-10-13

## 2020-10-13 RX ORDER — POTASSIUM CHLORIDE 20 MEQ
10 PACKET (EA) ORAL
Refills: 0 | Status: COMPLETED | OUTPATIENT
Start: 2020-10-13 | End: 2020-10-13

## 2020-10-13 RX ORDER — SODIUM CHLORIDE 9 MG/ML
1000 INJECTION, SOLUTION INTRAVENOUS
Refills: 0 | Status: DISCONTINUED | OUTPATIENT
Start: 2020-10-13 | End: 2020-10-13

## 2020-10-13 RX ORDER — SODIUM CHLORIDE 9 MG/ML
1000 INJECTION, SOLUTION INTRAVENOUS
Refills: 0 | Status: DISCONTINUED | OUTPATIENT
Start: 2020-10-13 | End: 2020-10-24

## 2020-10-13 RX ORDER — DEXTROSE 50 % IN WATER 50 %
25 SYRINGE (ML) INTRAVENOUS ONCE
Refills: 0 | Status: DISCONTINUED | OUTPATIENT
Start: 2020-10-13 | End: 2020-10-28

## 2020-10-13 RX ORDER — DEXTROSE 50 % IN WATER 50 %
15 SYRINGE (ML) INTRAVENOUS ONCE
Refills: 0 | Status: DISCONTINUED | OUTPATIENT
Start: 2020-10-13 | End: 2020-10-28

## 2020-10-13 RX ORDER — GLUCAGON INJECTION, SOLUTION 0.5 MG/.1ML
1 INJECTION, SOLUTION SUBCUTANEOUS ONCE
Refills: 0 | Status: DISCONTINUED | OUTPATIENT
Start: 2020-10-13 | End: 2020-10-28

## 2020-10-13 RX ORDER — INSULIN LISPRO 100/ML
VIAL (ML) SUBCUTANEOUS
Refills: 0 | Status: DISCONTINUED | OUTPATIENT
Start: 2020-10-13 | End: 2020-10-28

## 2020-10-13 RX ORDER — PIPERACILLIN AND TAZOBACTAM 4; .5 G/20ML; G/20ML
3.38 INJECTION, POWDER, LYOPHILIZED, FOR SOLUTION INTRAVENOUS EVERY 12 HOURS
Refills: 0 | Status: DISCONTINUED | OUTPATIENT
Start: 2020-10-13 | End: 2020-10-20

## 2020-10-13 RX ORDER — CEFTRIAXONE 500 MG/1
1000 INJECTION, POWDER, FOR SOLUTION INTRAMUSCULAR; INTRAVENOUS EVERY 24 HOURS
Refills: 0 | Status: DISCONTINUED | OUTPATIENT
Start: 2020-10-13 | End: 2020-10-13

## 2020-10-13 RX ORDER — CHOLESTYRAMINE 4 G/9G
4 POWDER, FOR SUSPENSION ORAL DAILY
Refills: 0 | Status: DISCONTINUED | OUTPATIENT
Start: 2020-10-13 | End: 2020-10-28

## 2020-10-13 RX ORDER — DEXTROSE 50 % IN WATER 50 %
12.5 SYRINGE (ML) INTRAVENOUS ONCE
Refills: 0 | Status: DISCONTINUED | OUTPATIENT
Start: 2020-10-13 | End: 2020-10-22

## 2020-10-13 RX ORDER — NOREPINEPHRINE BITARTRATE/D5W 8 MG/250ML
0.05 PLASTIC BAG, INJECTION (ML) INTRAVENOUS
Qty: 8 | Refills: 0 | Status: DISCONTINUED | OUTPATIENT
Start: 2020-10-13 | End: 2020-10-13

## 2020-10-13 RX ORDER — POTASSIUM CHLORIDE 20 MEQ
20 PACKET (EA) ORAL
Refills: 0 | Status: DISCONTINUED | OUTPATIENT
Start: 2020-10-13 | End: 2020-10-13

## 2020-10-13 RX ADMIN — PIPERACILLIN AND TAZOBACTAM 25 GRAM(S): 4; .5 INJECTION, POWDER, LYOPHILIZED, FOR SOLUTION INTRAVENOUS at 09:25

## 2020-10-13 RX ADMIN — PIPERACILLIN AND TAZOBACTAM 25 GRAM(S): 4; .5 INJECTION, POWDER, LYOPHILIZED, FOR SOLUTION INTRAVENOUS at 22:28

## 2020-10-13 RX ADMIN — Medication 40 MILLIEQUIVALENT(S): at 09:27

## 2020-10-13 RX ADMIN — Medication 40 MILLIEQUIVALENT(S): at 18:27

## 2020-10-13 RX ADMIN — Medication 100 MILLIEQUIVALENT(S): at 02:46

## 2020-10-13 RX ADMIN — Medication 75 MEQ/KG/HR: at 11:50

## 2020-10-13 RX ADMIN — RANOLAZINE 500 MILLIGRAM(S): 500 TABLET, FILM COATED, EXTENDED RELEASE ORAL at 22:29

## 2020-10-13 RX ADMIN — PANTOPRAZOLE SODIUM 40 MILLIGRAM(S): 20 TABLET, DELAYED RELEASE ORAL at 09:27

## 2020-10-13 RX ADMIN — CEFTRIAXONE 1000 MILLIGRAM(S): 500 INJECTION, POWDER, FOR SOLUTION INTRAMUSCULAR; INTRAVENOUS at 11:50

## 2020-10-13 RX ADMIN — RANOLAZINE 500 MILLIGRAM(S): 500 TABLET, FILM COATED, EXTENDED RELEASE ORAL at 09:25

## 2020-10-13 RX ADMIN — CHOLESTYRAMINE 4 GRAM(S): 4 POWDER, FOR SUSPENSION ORAL at 10:39

## 2020-10-13 RX ADMIN — Medication 6: at 16:05

## 2020-10-13 RX ADMIN — ARIPIPRAZOLE 20 MILLIGRAM(S): 15 TABLET ORAL at 10:40

## 2020-10-13 RX ADMIN — Medication 100 MILLIEQUIVALENT(S): at 01:19

## 2020-10-13 RX ADMIN — Medication 100 MILLIEQUIVALENT(S): at 03:26

## 2020-10-13 RX ADMIN — PANTOPRAZOLE SODIUM 40 MILLIGRAM(S): 20 TABLET, DELAYED RELEASE ORAL at 22:28

## 2020-10-13 RX ADMIN — Medication 6: at 12:25

## 2020-10-13 RX ADMIN — Medication 50 MILLIEQUIVALENT(S): at 10:41

## 2020-10-13 RX ADMIN — Medication 20 MILLIGRAM(S): at 15:58

## 2020-10-13 RX ADMIN — ATORVASTATIN CALCIUM 40 MILLIGRAM(S): 80 TABLET, FILM COATED ORAL at 22:28

## 2020-10-13 RX ADMIN — Medication 40 MILLIEQUIVALENT(S): at 22:29

## 2020-10-13 RX ADMIN — Medication 50 MILLIEQUIVALENT(S): at 12:25

## 2020-10-13 RX ADMIN — Medication 50 MILLIEQUIVALENT(S): at 11:50

## 2020-10-13 RX ADMIN — Medication 20 MILLIGRAM(S): at 10:40

## 2020-10-13 NOTE — CONSULT NOTE ADULT - SUBJECTIVE AND OBJECTIVE BOX
Patient is a 59y old  Female who presents with a chief complaint of hypotension, lower abtominal pain (12 Oct 2020 18:51)    HPI:  This patient is a 59 year old female with hx.    DM    STEMI in August, with RCA occlusion, course complicated by intubation, e. coli sepsis, pneumonia, Acute renal failure, did not get stent    Patient was sent from nursing home today with low bp and lower abdominal pain and dark stools  In ED, BP in low 80s,  creatinfine 3.4 was about 3 on last discharge,  Potassium of 2.2, Hgb 5.9 Hgb was 8.3 during last admission  last echo ef 55%     (12 Oct 2020 18:51)    10/13/2020-  Pt reports x2 loose stools daily, large in size.   Some abdominal cramping, no abdominal pain.   Last egd and colonoscopy was in March- unremarkable   No overt gi bleeding, overt stool negative.     PAST MEDICAL & SURGICAL HISTORY:  Lung nodule    Diabetes mellitus    History of TIAs    History of gallbladder disease  surgery    History of colon polyps  precancerous    Substance abuse  history of. last used 14 years ago.    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 Decreased ROM.    Angina pectoris    History of MRSA infection  left lower extremity incisional area 2015    Anxiety and depression    Transient cerebral ischemia, unspecified type    Osteoarthritis of spine, unspecified spinal osteoarthritis complication status, unspecified spinal region    Lumbar herniated disc    Urinary incontinence, unspecified type    Overactive bladder    Hiatal hernia with GERD  hiatal hernia repaired    PAD (peripheral artery disease)    Hyperlipidemia, unspecified hyperlipidemia type    Essential hypertension    Hypothyroidism    COPD (chronic obstructive pulmonary disease)    CAD (coronary artery disease)    History of lumbar fusion  with laminectomy 11/8/2018    H/O hernia repair  hiatal hernia - 2017, 2018    S/P dilatation and curettage  1991    H/O rhinoplasty  1980    H/O angioplasty  Right iliac artery. 5/12/2017    H/O ventral hernia repair  3/2017    History of incision and drainage  of lower extremity incisional site x 5 . last done5/2015    H/O arterial bypass of lower limb  Femoral - Popliteal. 11/2014 Left lower side with stents    History of laparoscopic cholecystectomy  2/2016      MEDICATIONS  (STANDING):  ARIPiprazole 20 milliGRAM(s) Oral daily  atorvastatin 40 milliGRAM(s) Oral at bedtime  budesonide  80 MICROgram(s)/formoterol 4.5 MICROgram(s) Inhaler 2 Puff(s) Inhalation two times a day  levothyroxine 125 MICROGram(s) Oral daily  pantoprazole  Injectable 40 milliGRAM(s) IV Push two times a day  piperacillin/tazobactam IVPB.. 3.375 Gram(s) IV Intermittent every 12 hours  ranolazine 500 milliGRAM(s) Oral two times a day  sodium bicarbonate  Infusion 0.16 mEq/kG/Hr (75 mL/Hr) IV Continuous <Continuous>    MEDICATIONS  (PRN):    Allergies    No Known Allergies    Intolerances      SOCIAL HISTORY:  FAMILY HISTORY:  Family history of asthma (Sibling)  sister    Family history of epilepsy (Sibling, Sibling)  2 sisters    Family history of cardiovascular disease  mother    Family history of stroke  mother    Family history of heart disease  father    Family history of alcoholism in father      REVIEW OF SYSTEMS:  CONSTITUTIONAL: + weakness, no fevers or chills  EYES/ENT: No visual changes;  No vertigo or throat pain   NECK: No pain or stiffness  RESPIRATORY: No cough, wheezing, hemoptysis; No shortness of breath  CARDIOVASCULAR: No chest pain or palpitations  GASTROINTESTINAL: Per HPI.   GENITOURINARY: No dysuria, frequency or hematuria  NEUROLOGICAL: No numbness or weakness  SKIN: No itching, burning, rashes, or lesions   PSYCH: Normal mood and affect  All other review of systems is negative unless indicated above.  Vital Signs Last 24 Hrs  T(C): 36.4 (13 Oct 2020 06:30), Max: 36.8 (12 Oct 2020 21:00)  T(F): 97.5 (13 Oct 2020 06:30), Max: 98.2 (12 Oct 2020 21:00)  HR: 86 (13 Oct 2020 06:00) (70 - 87)  BP: 95/40 (13 Oct 2020 06:00) (83/40 - 147/99)  BP(mean): 54 (13 Oct 2020 06:00) (54 - 93)  RR: 9 (13 Oct 2020 06:00) (7 - 16)  SpO2: 100% (13 Oct 2020 06:00) (94% - 100%)    PHYSICAL EXAM:  Constitutional: Middle aged female appears weak in nad.   HEENT: MMM  Neck: No LAD  Respiratory: CTAB  Cardiovascular: S1 and S2, RRR, no M/G/R  Gastrointestinal: BS+, soft, NT/ND  Extremities: No peripheral edema  Vascular: 2+ peripheral pulses  Neurological: A/O x 3, no focal deficits  Psychiatric: Normal mood, normal affect  Skin: No rashes    LABS:                        9.2    16.92 )-----------( 207      ( 13 Oct 2020 06:31 )             28.3     10-13    131<L>  |  100  |  74<H>  ----------------------------<  250<H>  3.0<L>   |  16<L>  |  2.92<H>    Ca    6.6<L>      13 Oct 2020 06:31  Mg     2.1     10-12    TPro  6.1  /  Alb  1.4<L>  /  TBili  0.8  /  DBili  x   /  AST  46<H>  /  ALT  33  /  AlkPhos  132<H>  10-12    PT/INR - ( 12 Oct 2020 16:16 )   PT: 14.9 sec;   INR: 1.29 ratio         PTT - ( 12 Oct 2020 16:16 )  PTT:29.0 sec  LIVER FUNCTIONS - ( 12 Oct 2020 17:06 )  Alb: 1.4 g/dL / Pro: 6.1 gm/dL / ALK PHOS: 132 U/L / ALT: 33 U/L / AST: 46 U/L / GGT: x             RADIOLOGY & ADDITIONAL STUDIES:

## 2020-10-13 NOTE — CONSULT NOTE ADULT - ATTENDING COMMENTS
She has recurrent anemia but hemoccult negative  Would pursue heme w/u and outpt small bowel capsule study    antispasmodic for diarrhea

## 2020-10-13 NOTE — CONSULT NOTE ADULT - SUBJECTIVE AND OBJECTIVE BOX
59 CAD, PAD s/p fem-pop bypass s/p RT iliac stent, diabetes, hypothyroidism, hyperlipidemia, hx of cigg/cocaine use,  chronic back pain s/p lumbar surgery recent admit with  multiple non-specific complaints found to have ST elevations on presenting EKG hospital course c/b UTI sepsis and E. coli bacteremia POA w hospital course c/b code blue, renal evaluation of AMISH. [facility] with abd discomofort, diarrhea and elevated creatinine. D/c creatinine was near 3.0. Admit with multiple lyte issues,anemia s/p prbc and repletion.       PAST MEDICAL & SURGICAL HISTORY:  Lung nodule    Diabetes mellitus    History of TIAs    History of gallbladder disease  surgery    History of colon polyps  precancerous    Substance abuse  history of. last used 14 years ago.    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 Decreased ROM.    Angina pectoris    History of MRSA infection  left lower extremity incisional area     Anxiety and depression    Transient cerebral ischemia, unspecified type    Osteoarthritis of spine, unspecified spinal osteoarthritis complication status, unspecified spinal region    Lumbar herniated disc    Urinary incontinence, unspecified type    Overactive bladder    Hiatal hernia with GERD  hiatal hernia repaired    PAD (peripheral artery disease)    Hyperlipidemia, unspecified hyperlipidemia type    Essential hypertension    Hypothyroidism    COPD (chronic obstructive pulmonary disease)    CAD (coronary artery disease)    History of lumbar fusion  with laminectomy 2018    H/O hernia repair  hiatal hernia - 2018    S/P dilatation and curettage      H/O rhinoplasty      H/O angioplasty  Right iliac artery. 2017    H/O ventral hernia repair  3/2017    History of incision and drainage  of lower extremity incisional site x 5 . last done2015    H/O arterial bypass of lower limb  Femoral - Popliteal. 2014 Left lower side with stents    History of laparoscopic cholecystectomy  2016        MEDICATIONS  (STANDING):  ARIPiprazole 20 milliGRAM(s) Oral daily  atorvastatin 40 milliGRAM(s) Oral at bedtime  budesonide  80 MICROgram(s)/formoterol 4.5 MICROgram(s) Inhaler 2 Puff(s) Inhalation two times a day  cefTRIAXone Injectable. 1000 milliGRAM(s) IV Push every 24 hours  cholestyramine Powder (Sugar-Free) 4 Gram(s) Oral daily  dextrose 5%. 1000 milliLiter(s) (50 mL/Hr) IV Continuous <Continuous>  dextrose 50% Injectable 12.5 Gram(s) IV Push once  dextrose 50% Injectable 25 Gram(s) IV Push once  dextrose 50% Injectable 25 Gram(s) IV Push once  dicyclomine 20 milliGRAM(s) Oral two times a day before meals  insulin lispro (HumaLOG) corrective regimen sliding scale   SubCutaneous Before meals and at bedtime  levothyroxine 125 MICROGram(s) Oral daily  pantoprazole  Injectable 40 milliGRAM(s) IV Push two times a day  ranolazine 500 milliGRAM(s) Oral two times a day  sodium bicarbonate  Infusion 0.16 mEq/kG/Hr (75 mL/Hr) IV Continuous <Continuous>    MEDICATIONS  (PRN):  dextrose 40% Gel 15 Gram(s) Oral once PRN Blood Glucose LESS THAN 70 milliGRAM(s)/deciliter  glucagon  Injectable 1 milliGRAM(s) IntraMuscular once PRN Glucose LESS THAN 70 milligrams/deciliter      Allergies    No Known Allergies    Intolerances        SOCIAL HISTORY:  no etoh/cigg    FAMILY HISTORY:  Family history of asthma (Sibling)  sister    Family history of epilepsy (Sibling, Sibling)  2 sisters    Family history of cardiovascular disease  mother    Family history of stroke  mother    Family history of heart disease  father    Family history of alcoholism in father        REVIEW OF SYSTEMS:    CONSTITUTIONAL: stable weakness, fevers or chills  EYES/ENT: No visual changes;  No vertigo or throat pain   NECK: No pain or stiffness  RESPIRATORY: No cough, wheezing, hemoptysis; No shortness of breath  CARDIOVASCULAR: No chest pain or palpitations  GASTROINTESTINAL: no current pain, +dioarrhea  GENITOURINARY: No dysuria, frequency or hematuria  NEUROLOGICAL: No numbness or weakness  SKIN: No itching, burning, rashes, or lesions   All other review of systems is negative unless indicated above.      T(C): , Max: 36.8 (10-12-20 @ 21:00)  T(F): , Max: 98.2 (10-12-20 @ :00)  HR: 96 (10-13-20 @ 13:00)  BP: 121/52 (10-13-20 @ :00)  BP(mean): 68 (10-13-20 @ :00)  RR: 21 (10-13-20 @ 13:00)  SpO2: 100% (10-13-20 @ 13:00)  Wt(kg): --    10-12 @ 07:01  -  10-13 @ 07:00  --------------------------------------------------------  IN: 1100 mL / OUT: 500 mL / NET: 600 mL      Height (cm): 160 (10-12 @ 14:41)  Weight (kg): 70.3 (10-12 @ 14:41)  BMI (kg/m2): 27.5 (10-12 @ 14:41)  BSA (m2): 1.74 (10-12 @ 14:41)    PHYSICAL EXAM:    Constitutional: frail, ill appearing chronically  HEENT: temp wastingh  Neck: No LAD, No JVD  Respiratory: CTAB  Cardiovascular: S1 and S2, RRR  Gastrointestinal: mild ten  Neurological: A/O x 3, no focal deficits  Psychiatric: Normal mood, normal affect  : No Black  Skin: No rashes  Access: Not applicable        LABS:                        9.2    16.92 )-----------( 207      ( 13 Oct 2020 06:31 )             28.3     13 Oct 2020 06:31    131    |  100    |  74     ----------------------------<  250    3.0     |  16     |  2.92   12 Oct 2020 23:06    132    |  103    |  76     ----------------------------<  184    2.7     |  15     |  3.09   12 Oct 2020 17:06    129    |  97     |  83     ----------------------------<  155    2.2     |  16     |  3.47     Ca    6.6        13 Oct 2020 06:31  Ca    6.6        12 Oct 2020 23:06  Ca    7.1        12 Oct 2020 17:06  Mg     2.1       12 Oct 2020 17:06    TPro  6.1    /  Alb  1.4    /  TBili  0.8    /  DBili  x      /  AST  46     /  ALT  33     /  AlkPhos  132    12 Oct 2020 17:06          Urine Studies:  Urinalysis Basic - ( 12 Oct 2020 19:17 )    Color: Yellow / Appearance: Slightly Turbid / S.010 / pH: x  Gluc: x / Ketone: Negative  / Bili: Negative / Urobili: 1 mg/dL   Blood: x / Protein: 30 mg/dL / Nitrite: Negative   Leuk Esterase: Moderate / RBC: 6-10 /HPF / WBC >50   Sq Epi: x / Non Sq Epi: Few / Bacteria: Many            RADIOLOGY & ADDITIONAL STUDIES:

## 2020-10-13 NOTE — CONSULT NOTE ADULT - ASSESSMENT
60 y/o female with CAD, PAD s/p fem-pop bypass s/p RT iliac stent, diabetes, and recent admission for UTI sepsis and E. coli bacteremia POA and urgent angiogram showing occluded RCA.      Impression:  Anemia of unclear etiology, occult stool negative x2.   Now with abdominal cramping and some diarrhea, ct scan was unrevealing this admission.     Plan:  Start clears now.   Trend H/H.  PPI.   Start bentyl now and questran.   Consider sending stool studies if does not improve.  Ct scan reviewed.   Recent EGD and colonoscopy in March with Dr. Sacksnoff was negative.   No plans for inpatient work up for anemia, could consider outpatient capsule study though occult stool was negative.     Discussed with pt, Dr. Bean, CCU, nurse.

## 2020-10-13 NOTE — CHART NOTE - NSCHARTNOTEFT_GEN_A_CORE
Blood cultures positive w/ GNR. Continue renally adjusted zosyn.   ID consult placed  Stable for transfer to telemetry.

## 2020-10-13 NOTE — PROGRESS NOTE ADULT - ASSESSMENT
58 y/o female pmhx DM2, hx STEMI in august w/ RCA occulusion ( no intervention performed) w/ hospital course complicated by  respiratory failure, pneumonia, e.coli sepsis and renal failure. She was transferred from SNF w/ hypotension and abdominal pain. Admitted w/ anemia, hypokalemia, metabolic acidosis w/ AMISH.    Plan:  - CAD w/ hx of AMI. If H/H remains stable, would restart ASA/plavix tomorrow.   - HTN, holding BB w/ borderline BP. Restart as appropriate   - S/p 2 PRBC, w/ appropriate response in H/H. No signs of bleeding, repeat H/H stable.   - Repeat CBC q8hr. Transfuse for Hg <7 or any signs of major bleeding. Fecal occult negative.   - Continue w/ protonix BID. GI following, at this time no plan for EGD, recommended outpatient anemia work up.   - Send stool culture and GI PCR for continued diarrhea. Started on Questran and Bentyl by GI   - Clear liquid Diet   - AMISH, Non oliguric. Crt is improving. IVF hydration w/ LR @ 100cc/hr. Never required vasopressors.   - Metabolic acidosis, attributed to diarrhea. Remains on sodium bicarb infusion, will continue for now. Trend BMP. Lactate normal.    - Hypokalemia, supplemented this morning w/ PO and IV. Repeat BMP this afternoon.    - DM2, Started on insulin sliding scale.        SCDs for DVT ppx     At this time is HD stable, can be transferred out of ICU to Telemetry.  Case was endorsed to Dr. Campbell.

## 2020-10-13 NOTE — CONSULT NOTE ADULT - ASSESSMENT
59 CAD, PAD s/p fem-pop bypass s/p RT iliac stent, diabetes, hypothyroidism, hyperlipidemia, hx of cigg/cocaine use,  chronic back pain s/p lumbar surgery recent admit with  multiple non-specific complaints found to have ST elevations on presenting EKG hospital course c/b UTI sepsis and E. coli bacteremia POA w hospital course c/b code blue, renal evaluation of AMISH. [facility] abd discomofort, diarrhea and elevated creatinine. D/c creatinine was near 3.0. Admit with multiple lyte issues,anemia s/p prbc and repletion.     AMISH  -renal function stabilizing, likely pre-renal in nature  -IVF if remains with limited intake, diarrhea  -Lasix hold agree  -Daily labs/lytes    Hypokalemia  -IV/Po repletion, goal near 4.0  -Can consider addition to IVF gtt    Acidosis  -Bicarb gtt ongoing, can likely stop by AM with slowing of diarrhea  -GI workup of anemia/diarrhea    Thanks, will follow  d/c with Dr Saldana and RN staff

## 2020-10-13 NOTE — PROGRESS NOTE ADULT - SUBJECTIVE AND OBJECTIVE BOX
Patient is a 59y old  Female who presents with a chief complaint of hypotension, lower abdominal pain (13 Oct 2020 08:42)      BRIEF HOSPITAL COURSE:  60 y/o female pmhx DM2, hx STEMI in august w/ RCA occulusion ( no intervention perfromed), w/ hospital course complicated by  respiratory failure, pneumonia, e.coli sepsis and renal failure. She was transferred from SNF w/ hypotension and abdominal pain. Admitted w/ anemia, hypokalemia, metabolic acidosis w/ AMISH. She received 2 PRBC, placed on sodium bicarb gtt.     Events last 24 hours: H/H stable overnight. Continues to have lose stools     PAST MEDICAL & SURGICAL HISTORY:  Lung nodule    Diabetes mellitus    History of TIAs    History of gallbladder disease  surgery    History of colon polyps  precancerous    Substance abuse  history of. last used 14 years ago.    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 Decreased ROM.    Angina pectoris    History of MRSA infection  left lower extremity incisional area     Anxiety and depression    Transient cerebral ischemia, unspecified type    Osteoarthritis of spine, unspecified spinal osteoarthritis complication status, unspecified spinal region    Lumbar herniated disc    Urinary incontinence, unspecified type    Overactive bladder    Hiatal hernia with GERD  hiatal hernia repaired    PAD (peripheral artery disease)    Hyperlipidemia, unspecified hyperlipidemia type    Essential hypertension    Hypothyroidism    COPD (chronic obstructive pulmonary disease)    CAD (coronary artery disease)    History of lumbar fusion  with laminectomy 2018    H/O hernia repair  hiatal hernia - 2018    S/P dilatation and curettage      H/O rhinoplasty      H/O angioplasty  Right iliac artery. 2017    H/O ventral hernia repair  3/2017    History of incision and drainage  of lower extremity incisional site x 5 . last done2015    H/O arterial bypass of lower limb  Femoral - Popliteal. 2014 Left lower side with stents    History of laparoscopic cholecystectomy  2016        Review of Systems:  CONSTITUTIONAL: No fever, chills, or fatigue  EYES: No eye pain, visual disturbances, or discharge  ENMT:  No difficulty hearing, tinnitus, vertigo; No sinus or throat pain  NECK: No pain or stiffness  RESPIRATORY: No cough, wheezing, chills or hemoptysis; No shortness of breath  CARDIOVASCULAR: No chest pain, palpitations, dizziness, or leg swelling  GASTROINTESTINAL: No abdominal or epigastric pain. No nausea, vomiting, or hematemesis; No diarrhea or constipation. No melena or hematochezia.  GENITOURINARY: No dysuria, frequency, hematuria, or incontinence  NEUROLOGICAL: No headaches, memory loss, loss of strength, numbness, or tremors  SKIN: No itching, burning, rashes, or lesions   MUSCULOSKELETAL: No joint pain or swelling; No muscle, back, or extremity pain  PSYCHIATRIC: No depression, anxiety, mood swings, or difficulty sleeping      Medications:  cefTRIAXone   IVPB 1000 milliGRAM(s) IV Intermittent every 24 hours    norepinephrine Infusion 0.05 MICROgram(s)/kG/Min IV Continuous <Continuous>  ranolazine 500 milliGRAM(s) Oral two times a day    budesonide  80 MICROgram(s)/formoterol 4.5 MICROgram(s) Inhaler 2 Puff(s) Inhalation two times a day    ARIPiprazole 20 milliGRAM(s) Oral daily        dicyclomine 20 milliGRAM(s) Oral two times a day before meals  pantoprazole  Injectable 40 milliGRAM(s) IV Push two times a day      atorvastatin 40 milliGRAM(s) Oral at bedtime  cholestyramine Powder (Sugar-Free) 4 Gram(s) Oral daily  levothyroxine 125 MICROGram(s) Oral daily    potassium chloride  10 mEq/50 mL IVPB 10 milliEquivalent(s) IV Intermittent every 1 hour  potassium chloride  10 mEq/50 mL IVPB 10 milliEquivalent(s) IV Intermittent once  sodium bicarbonate  Infusion 0.16 mEq/kG/Hr IV Continuous <Continuous>                ICU Vital Signs Last 24 Hrs  T(C): 36.4 (13 Oct 2020 06:30), Max: 36.8 (12 Oct 2020 21:00)  T(F): 97.5 (13 Oct 2020 06:30), Max: 98.2 (12 Oct 2020 21:00)  HR: 86 (13 Oct 2020 06:00) (70 - 87)  BP: 95/40 (13 Oct 2020 06:00) (83/40 - 147/99)  BP(mean): 54 (13 Oct 2020 06:00) (54 - 93)  ABP: --  ABP(mean): --  RR: 9 (13 Oct 2020 06:00) (7 - 16)  SpO2: 100% (13 Oct 2020 06:00) (94% - 100%)          I&O's Detail    12 Oct 2020 07:01  -  13 Oct 2020 07:00  --------------------------------------------------------  IN:    IV PiggyBack: 500 mL    Sodium Bicarbonate: 600 mL  Total IN: 1100 mL    OUT:    Indwelling Catheter - Urethral (mL): 500 mL  Total OUT: 500 mL    Total NET: 600 mL            LABS:                        9.2    16.92 )-----------( 207      ( 13 Oct 2020 06:31 )             28.3     10-13    131<L>  |  100  |  74<H>  ----------------------------<  250<H>  3.0<L>   |  16<L>  |  2.92<H>    Ca    6.6<L>      13 Oct 2020 06:31  Mg     2.1     10-12    TPro  6.1  /  Alb  1.4<L>  /  TBili  0.8  /  DBili  x   /  AST  46<H>  /  ALT  33  /  AlkPhos  132<H>  10-12          CAPILLARY BLOOD GLUCOSE        PT/INR - ( 12 Oct 2020 16:16 )   PT: 14.9 sec;   INR: 1.29 ratio         PTT - ( 12 Oct 2020 16:16 )  PTT:29.0 sec  Urinalysis Basic - ( 12 Oct 2020 19:17 )    Color: Yellow / Appearance: Slightly Turbid / S.010 / pH: x  Gluc: x / Ketone: Negative  / Bili: Negative / Urobili: 1 mg/dL   Blood: x / Protein: 30 mg/dL / Nitrite: Negative   Leuk Esterase: Moderate / RBC: 6-10 /HPF / WBC >50   Sq Epi: x / Non Sq Epi: Few / Bacteria: Many      CULTURES:      Physical Examination:    General: No acute distress.  Alert, oriented, interactive, nonfocal    HEENT: Pupils equal, reactive to light.  Symmetric.    PULM: Clear to auscultation bilaterally, no significant sputum production    CVS: Regular rate and rhythm, no murmurs, rubs, or gallops    ABD: Soft, nondistended, nontender, normoactive bowel sounds, no masses    EXT: No edema, nontender    SKIN: Warm and well perfused, no rashes noted.    NEURO: A&Ox3, strength 5/5 all extremities, cranial nerves grossly intact, no focal deficits    RADIOLOGY: ***    CRITICAL CARE TIME SPENT: ***  Evaluating/treating patient, reviewing data/labs/imaging, discussing case with multidisciplinary team, discussing plan/goals of care with patient/family. Non-inclusive of procedure time.   Patient is a 59y old  Female who presents with a chief complaint of hypotension, lower abdominal pain (13 Oct 2020 08:42)      BRIEF HOSPITAL COURSE:  58 y/o female pmhx DM2, hx STEMI in august w/ RCA occulusion ( no intervention perfromed), w/ hospital course complicated by  respiratory failure, pneumonia, e.coli sepsis and renal failure. She was transferred from SNF w/ hypotension and abdominal pain. Admitted w/ anemia, hypokalemia, metabolic acidosis w/ AMISH. She received 2 PRBC, placed on sodium bicarb gtt.     Events last 24 hours: H/H stable overnight. Continues to have lose stools. Hypokalemia supplemented this morning. Remains on sodium bicarb infusion. Added Insulin sliding scale.          PAST MEDICAL & SURGICAL HISTORY:  Lung nodule  Diabetes mellitus  History of TIAs    History of gallbladder disease  surgery    History of colon polyps  precancerous    Substance abuse  history of. last used 14 years ago.    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 Decreased ROM.    Angina pectoris    History of MRSA infection  left lower extremity incisional area     Anxiety and depression    Transient cerebral ischemia, unspecified type    Osteoarthritis of spine, unspecified spinal osteoarthritis complication status, unspecified spinal region    Lumbar herniated disc    Urinary incontinence, unspecified type    Overactive bladder    Hiatal hernia with GERD  hiatal hernia repaired    PAD (peripheral artery disease)    Hyperlipidemia, unspecified hyperlipidemia type    Essential hypertension    Hypothyroidism    COPD (chronic obstructive pulmonary disease)    CAD (coronary artery disease)    History of lumbar fusion  with laminectomy 2018    H/O hernia repair  hiatal hernia - 2018    S/P dilatation and curettage      H/O rhinoplasty      H/O angioplasty  Right iliac artery. 2017    H/O ventral hernia repair  3/2017    History of incision and drainage  of lower extremity incisional site x 5 . last done2015    H/O arterial bypass of lower limb  Femoral - Popliteal. 2014 Left lower side with stents    History of laparoscopic cholecystectomy  2016        Review of Systems:  CONSTITUTIONAL: No fever, chills, or fatigue  EYES: No eye pain, visual disturbances, or discharge  ENMT:  No difficulty hearing, tinnitus, vertigo; No sinus or throat pain  NECK: No pain or stiffness  RESPIRATORY: No cough, wheezing, chills or hemoptysis; No shortness of breath  CARDIOVASCULAR: No chest pain, palpitations, dizziness, or leg swelling  GASTROINTESTINAL: No abdominal or epigastric pain. No nausea, vomiting, or hematemesis; No diarrhea or constipation. No melena or hematochezia.  GENITOURINARY: No dysuria, frequency, hematuria, or incontinence  NEUROLOGICAL: No headaches, memory loss, loss of strength, numbness, or tremors  SKIN: No itching, burning, rashes, or lesions   MUSCULOSKELETAL: No joint pain or swelling; No muscle, back, or extremity pain  PSYCHIATRIC: No depression, anxiety, mood swings, or difficulty sleeping      Medications:  cefTRIAXone   IVPB 1000 milliGRAM(s) IV Intermittent every 24 hours  norepinephrine Infusion 0.05 MICROgram(s)/kG/Min IV Continuous <Continuous>  ranolazine 500 milliGRAM(s) Oral two times a day  budesonide  80 MICROgram(s)/formoterol 4.5 MICROgram(s) Inhaler 2 Puff(s) Inhalation two times a day  ARIPiprazole 20 milliGRAM(s) Oral daily  dicyclomine 20 milliGRAM(s) Oral two times a day before meals  pantoprazole  Injectable 40 milliGRAM(s) IV Push two times a day  atorvastatin 40 milliGRAM(s) Oral at bedtime  cholestyramine Powder (Sugar-Free) 4 Gram(s) Oral daily  levothyroxine 125 MICROGram(s) Oral daily  potassium chloride  10 mEq/50 mL IVPB 10 milliEquivalent(s) IV Intermittent every 1 hour  potassium chloride  10 mEq/50 mL IVPB 10 milliEquivalent(s) IV Intermittent once  sodium bicarbonate  Infusion 0.16 mEq/kG/Hr IV Continuous <Continuous>                ICU Vital Signs Last 24 Hrs  T(C): 36.4 (13 Oct 2020 06:30), Max: 36.8 (12 Oct 2020 21:00)  T(F): 97.5 (13 Oct 2020 06:30), Max: 98.2 (12 Oct 2020 21:00)  HR: 86 (13 Oct 2020 06:00) (70 - 87)  BP: 95/40 (13 Oct 2020 06:00) (83/40 - 147/99)  BP(mean): 54 (13 Oct 2020 06:00) (54 - 93)  RR: 9 (13 Oct 2020 06:00) (7 - 16)  SpO2: 100% (13 Oct 2020 06:00) (94% - 100%)          I&O's Detail    12 Oct 2020 07:01  -  13 Oct 2020 07:00  --------------------------------------------------------  IN:    IV PiggyBack: 500 mL    Sodium Bicarbonate: 600 mL  Total IN: 1100 mL    OUT:    Indwelling Catheter - Urethral (mL): 500 mL  Total OUT: 500 mL    Total NET: 600 mL            LABS:                        9.2    16.92 )-----------( 207      ( 13 Oct 2020 06:31 )             28.3     10-13    131<L>  |  100  |  74<H>  ----------------------------<  250<H>  3.0<L>   |  16<L>  |  2.92<H>    Ca    6.6<L>      13 Oct 2020 06:31  Mg     2.1     10-12    TPro  6.1  /  Alb  1.4<L>  /  TBili  0.8  /  DBili  x   /  AST  46<H>  /  ALT  33  /  AlkPhos  132<H>  10-12          CAPILLARY BLOOD GLUCOSE        PT/INR - ( 12 Oct 2020 16:16 )   PT: 14.9 sec;   INR: 1.29 ratio         PTT - ( 12 Oct 2020 16:16 )  PTT:29.0 sec  Urinalysis Basic - ( 12 Oct 2020 19:17 )    Color: Yellow / Appearance: Slightly Turbid / S.010 / pH: x  Gluc: x / Ketone: Negative  / Bili: Negative / Urobili: 1 mg/dL   Blood: x / Protein: 30 mg/dL / Nitrite: Negative   Leuk Esterase: Moderate / RBC: 6-10 /HPF / WBC >50   Sq Epi: x / Non Sq Epi: Few / Bacteria: Many      CULTURES:      Physical Examination:    General: No acute distress.  Alert, oriented, interactive, nonfocal    HEENT: Pupils equal, reactive to light.  Symmetric.    PULM: Clear to auscultation bilaterally, no significant sputum production    CVS: Regular rate and rhythm, no murmurs, rubs, or gallops    ABD: Soft, nondistended, nontender, normoactive bowel sounds, no masses    EXT: No edema, nontender    SKIN: Warm and well perfused, no rashes noted.    NEURO: A&Ox3, strength 5/5 all extremities, cranial nerves grossly intact, no focal deficits    RADIOLOGY: ***    CRITICAL CARE TIME SPENT: ***  Evaluating/treating patient, reviewing data/labs/imaging, discussing case with multidisciplinary team, discussing plan/goals of care with patient/family. Non-inclusive of procedure time.   Patient is a 59y old  Female who presents with a chief complaint of hypotension, lower abdominal pain (13 Oct 2020 08:42)      BRIEF HOSPITAL COURSE:  60 y/o female pmhx DM2, hx STEMI in august w/ RCA occulusion ( no intervention perfromed), w/ hospital course complicated by  respiratory failure, pneumonia, e.coli sepsis and renal failure. She was transferred from SNF w/ hypotension and abdominal pain. Admitted w/ anemia, hypokalemia, metabolic acidosis w/ AMISH. She received 2 PRBC, placed on sodium bicarb gtt.     Events last 24 hours: H/H stable overnight. Continues to have lose stools. Hypokalemia supplemented this morning. Remains on sodium bicarb infusion. Added Insulin sliding scale. Right femoral CVC removed          PAST MEDICAL & SURGICAL HISTORY:  Lung nodule  Diabetes mellitus  History of TIAs    History of gallbladder disease  surgery    History of colon polyps  precancerous    Substance abuse  history of. last used 14 years ago.    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 Decreased ROM.    Angina pectoris    History of MRSA infection  left lower extremity incisional area     Anxiety and depression    Transient cerebral ischemia, unspecified type    Osteoarthritis of spine, unspecified spinal osteoarthritis complication status, unspecified spinal region    Lumbar herniated disc    Urinary incontinence, unspecified type    Overactive bladder    Hiatal hernia with GERD  hiatal hernia repaired    PAD (peripheral artery disease)    Hyperlipidemia, unspecified hyperlipidemia type    Essential hypertension    Hypothyroidism    COPD (chronic obstructive pulmonary disease)    CAD (coronary artery disease)    History of lumbar fusion  with laminectomy 2018    H/O hernia repair  hiatal hernia - 2018    S/P dilatation and curettage      H/O rhinoplasty      H/O angioplasty  Right iliac artery. 2017    H/O ventral hernia repair  3/2017    History of incision and drainage  of lower extremity incisional site x 5 . last done2015    H/O arterial bypass of lower limb  Femoral - Popliteal. 2014 Left lower side with stents    History of laparoscopic cholecystectomy  2016        Review of Systems:  CONSTITUTIONAL: No fever, chills, or fatigue  EYES: No eye pain, visual disturbances, or discharge  ENMT:  No difficulty hearing, tinnitus, vertigo; No sinus or throat pain  NECK: No pain or stiffness  RESPIRATORY: No cough, wheezing, chills or hemoptysis; No shortness of breath  CARDIOVASCULAR: No chest pain, palpitations, dizziness, or leg swelling  GASTROINTESTINAL: No abdominal or epigastric pain. No nausea, vomiting, or hematemesis; No diarrhea or constipation. No melena or hematochezia.  GENITOURINARY: No dysuria, frequency, hematuria, or incontinence  NEUROLOGICAL: No headaches, memory loss, loss of strength, numbness, or tremors  SKIN: No itching, burning, rashes, or lesions   MUSCULOSKELETAL: No joint pain or swelling; No muscle, back, or extremity pain  PSYCHIATRIC: No depression, anxiety, mood swings, or difficulty sleeping      Medications:  cefTRIAXone   IVPB 1000 milliGRAM(s) IV Intermittent every 24 hours  norepinephrine Infusion 0.05 MICROgram(s)/kG/Min IV Continuous <Continuous>  ranolazine 500 milliGRAM(s) Oral two times a day  budesonide  80 MICROgram(s)/formoterol 4.5 MICROgram(s) Inhaler 2 Puff(s) Inhalation two times a day  ARIPiprazole 20 milliGRAM(s) Oral daily  dicyclomine 20 milliGRAM(s) Oral two times a day before meals  pantoprazole  Injectable 40 milliGRAM(s) IV Push two times a day  atorvastatin 40 milliGRAM(s) Oral at bedtime  cholestyramine Powder (Sugar-Free) 4 Gram(s) Oral daily  levothyroxine 125 MICROGram(s) Oral daily  potassium chloride  10 mEq/50 mL IVPB 10 milliEquivalent(s) IV Intermittent every 1 hour  potassium chloride  10 mEq/50 mL IVPB 10 milliEquivalent(s) IV Intermittent once  sodium bicarbonate  Infusion 0.16 mEq/kG/Hr IV Continuous <Continuous>                ICU Vital Signs Last 24 Hrs  T(C): 36.4 (13 Oct 2020 06:30), Max: 36.8 (12 Oct 2020 21:00)  T(F): 97.5 (13 Oct 2020 06:30), Max: 98.2 (12 Oct 2020 21:00)  HR: 86 (13 Oct 2020 06:00) (70 - 87)  BP: 95/40 (13 Oct 2020 06:00) (83/40 - 147/99)  BP(mean): 54 (13 Oct 2020 06:00) (54 - 93)  RR: 9 (13 Oct 2020 06:00) (7 - 16)  SpO2: 100% (13 Oct 2020 06:00) (94% - 100%)          I&O's Detail    12 Oct 2020 07:01  -  13 Oct 2020 07:00  --------------------------------------------------------  IN:    IV PiggyBack: 500 mL    Sodium Bicarbonate: 600 mL  Total IN: 1100 mL    OUT:    Indwelling Catheter - Urethral (mL): 500 mL  Total OUT: 500 mL    Total NET: 600 mL            LABS:                        9.2    16.92 )-----------( 207      ( 13 Oct 2020 06:31 )             28.3     10-13    131<L>  |  100  |  74<H>  ----------------------------<  250<H>  3.0<L>   |  16<L>  |  2.92<H>    Ca    6.6<L>      13 Oct 2020 06:31  Mg     2.1     10-12    TPro  6.1  /  Alb  1.4<L>  /  TBili  0.8  /  DBili  x   /  AST  46<H>  /  ALT  33  /  AlkPhos  132<H>  10-12          CAPILLARY BLOOD GLUCOSE        PT/INR - ( 12 Oct 2020 16:16 )   PT: 14.9 sec;   INR: 1.29 ratio         PTT - ( 12 Oct 2020 16:16 )  PTT:29.0 sec  Urinalysis Basic - ( 12 Oct 2020 19:17 )    Color: Yellow / Appearance: Slightly Turbid / S.010 / pH: x  Gluc: x / Ketone: Negative  / Bili: Negative / Urobili: 1 mg/dL   Blood: x / Protein: 30 mg/dL / Nitrite: Negative   Leuk Esterase: Moderate / RBC: 6-10 /HPF / WBC >50   Sq Epi: x / Non Sq Epi: Few / Bacteria: Many      CULTURES:      Physical Examination:    General: No acute distress.  Alert, oriented, interactive, nonfocal    HEENT: Pupils equal, reactive to light.  Symmetric.    PULM: Clear to auscultation bilaterally, no significant sputum production    CVS: Regular rate and rhythm, no murmurs, rubs, or gallops    ABD: Soft, nondistended, nontender, normoactive bowel sounds, no masses    EXT: No edema, nontender    SKIN: Warm and well perfused, no rashes noted.    NEURO: A&Ox3, strength 5/5 all extremities, , no focal deficits    RADIOLOGY: < from: CT Abdomen and Pelvis No Cont (10.12.20 @ 18:56) >  EXAM:  CT ABDOMEN AND PELVIS                            PROCEDURE DATE:  10/12/2020          INTERPRETATION:  CLINICAL INFORMATION: GI bleed    COMPARISON: Abdominal ultrasound 2020 and CAT scan of 2020    PROCEDURE:  CT of the Abdomen and Pelvis was performed without intravenous contrast.  Intravenous contrast: None.  Oral contrast: None.  Sagittal and coronal reformats were performed.    FINDINGS:  LOWER CHEST: Bilateral small areas of infiltrate/atelectasis at the lung bases posteriorly also involving the inferior lingula. Coronary artery calcification is also noted    LIVER: Within normal limits.  BILE DUCTS: Extrahepatic biliary ductal dilatation consistent with postcholecystectomy state.  GALLBLADDER: Cholecystectomy.  SPLEEN: Within normal limits.  PANCREAS: Within normal limits.  ADRENALS: Within normal limits.  KIDNEYS/URETERS: Within normal limits.    BLADDER: Within normal limits.  REPRODUCTIVE ORGANS: Uterus and adnexa within normal limits.    BOWEL: No bowel obstruction. Appendix is normal.  PERITONEUM: No ascites.  VESSELS: Extensive vascular calcifications more than expected for the stated age  RETROPERITONEUM/LYMPH NODES: No lymphadenopathy.  ABDOMINAL WALL: Within normal limits.  BONES: Degenerative changes of the spine are seen with evidence of pedicle screws at the L4 and L5 levels with bone graft. There is mild anterolisthesis, grade 1 of L4 on L5    IMPRESSION: The study is done without contrast and cannot therefore exclude any active GI bleeding.  Cholecystectomy  Patchy bilateral lower lobe infiltrates versus atelectasis  Extensive vascular calcifications more than expected for the stated age    < end of copied text >     TIME SPENT: 38 min  Evaluating/treating patient, reviewing data/labs/imaging, discussing case with multidisciplinary team, discussing plan/goals of care with patient/family. Non-inclusive of procedure time.

## 2020-10-14 DIAGNOSIS — F31.70 BIPOLAR DISORDER, CURRENTLY IN REMISSION, MOST RECENT EPISODE UNSPECIFIED: ICD-10-CM

## 2020-10-14 DIAGNOSIS — F43.22 ADJUSTMENT DISORDER WITH ANXIETY: ICD-10-CM

## 2020-10-14 LAB
ADD ON TEST-SPECIMEN IN LAB: SIGNIFICANT CHANGE UP
ANION GAP SERPL CALC-SCNC: 11 MMOL/L — SIGNIFICANT CHANGE UP (ref 5–17)
ANION GAP SERPL CALC-SCNC: 11 MMOL/L — SIGNIFICANT CHANGE UP (ref 5–17)
BUN SERPL-MCNC: 59 MG/DL — HIGH (ref 7–23)
BUN SERPL-MCNC: 61 MG/DL — HIGH (ref 7–23)
C DIFF BY PCR RESULT: SIGNIFICANT CHANGE UP
C DIFF TOX GENS STL QL NAA+PROBE: SIGNIFICANT CHANGE UP
CALCIUM SERPL-MCNC: 6.2 MG/DL — CRITICAL LOW (ref 8.5–10.1)
CALCIUM SERPL-MCNC: 6.6 MG/DL — LOW (ref 8.5–10.1)
CHLORIDE SERPL-SCNC: 98 MMOL/L — SIGNIFICANT CHANGE UP (ref 96–108)
CHLORIDE SERPL-SCNC: 99 MMOL/L — SIGNIFICANT CHANGE UP (ref 96–108)
CO2 SERPL-SCNC: 17 MMOL/L — LOW (ref 22–31)
CO2 SERPL-SCNC: 21 MMOL/L — LOW (ref 22–31)
CREAT SERPL-MCNC: 2.63 MG/DL — HIGH (ref 0.5–1.3)
CREAT SERPL-MCNC: 2.67 MG/DL — HIGH (ref 0.5–1.3)
CULTURE RESULTS: SIGNIFICANT CHANGE UP
GLUCOSE SERPL-MCNC: 196 MG/DL — HIGH (ref 70–99)
GLUCOSE SERPL-MCNC: 225 MG/DL — HIGH (ref 70–99)
HCT VFR BLD CALC: 35.5 % — SIGNIFICANT CHANGE UP (ref 34.5–45)
HGB BLD-MCNC: 11.7 G/DL — SIGNIFICANT CHANGE UP (ref 11.5–15.5)
MAGNESIUM SERPL-MCNC: 1.8 MG/DL — SIGNIFICANT CHANGE UP (ref 1.6–2.6)
MCHC RBC-ENTMCNC: 29.7 PG — SIGNIFICANT CHANGE UP (ref 27–34)
MCHC RBC-ENTMCNC: 33 GM/DL — SIGNIFICANT CHANGE UP (ref 32–36)
MCV RBC AUTO: 90.1 FL — SIGNIFICANT CHANGE UP (ref 80–100)
PHOSPHATE SERPL-MCNC: 3.4 MG/DL — SIGNIFICANT CHANGE UP (ref 2.5–4.5)
PLATELET # BLD AUTO: 183 K/UL — SIGNIFICANT CHANGE UP (ref 150–400)
POTASSIUM SERPL-MCNC: 3.9 MMOL/L — SIGNIFICANT CHANGE UP (ref 3.5–5.3)
POTASSIUM SERPL-MCNC: 4.5 MMOL/L — SIGNIFICANT CHANGE UP (ref 3.5–5.3)
POTASSIUM SERPL-SCNC: 3.9 MMOL/L — SIGNIFICANT CHANGE UP (ref 3.5–5.3)
POTASSIUM SERPL-SCNC: 4.5 MMOL/L — SIGNIFICANT CHANGE UP (ref 3.5–5.3)
RBC # BLD: 3.94 M/UL — SIGNIFICANT CHANGE UP (ref 3.8–5.2)
RBC # FLD: 15.2 % — HIGH (ref 10.3–14.5)
SODIUM SERPL-SCNC: 127 MMOL/L — LOW (ref 135–145)
SODIUM SERPL-SCNC: 130 MMOL/L — LOW (ref 135–145)
SPECIMEN SOURCE: SIGNIFICANT CHANGE UP
WBC # BLD: 12.1 K/UL — HIGH (ref 3.8–10.5)
WBC # FLD AUTO: 12.1 K/UL — HIGH (ref 3.8–10.5)

## 2020-10-14 PROCEDURE — 99233 SBSQ HOSP IP/OBS HIGH 50: CPT

## 2020-10-14 PROCEDURE — 74019 RADEX ABDOMEN 2 VIEWS: CPT | Mod: 26

## 2020-10-14 PROCEDURE — 99232 SBSQ HOSP IP/OBS MODERATE 35: CPT

## 2020-10-14 PROCEDURE — 99223 1ST HOSP IP/OBS HIGH 75: CPT

## 2020-10-14 PROCEDURE — 74176 CT ABD & PELVIS W/O CONTRAST: CPT | Mod: 26

## 2020-10-14 RX ORDER — TIZANIDINE 4 MG/1
2 TABLET ORAL
Qty: 0 | Refills: 0 | DISCHARGE

## 2020-10-14 RX ORDER — GABAPENTIN 400 MG/1
1 CAPSULE ORAL
Qty: 0 | Refills: 0 | DISCHARGE

## 2020-10-14 RX ORDER — INSULIN LISPRO 100/ML
0 VIAL (ML) SUBCUTANEOUS
Qty: 0 | Refills: 0 | DISCHARGE

## 2020-10-14 RX ORDER — LANOLIN ALCOHOL/MO/W.PET/CERES
1 CREAM (GRAM) TOPICAL
Qty: 0 | Refills: 0 | DISCHARGE

## 2020-10-14 RX ORDER — METOPROLOL TARTRATE 50 MG
0.5 TABLET ORAL
Qty: 0 | Refills: 0 | DISCHARGE

## 2020-10-14 RX ORDER — PANTOPRAZOLE SODIUM 20 MG/1
1 TABLET, DELAYED RELEASE ORAL
Qty: 0 | Refills: 0 | DISCHARGE

## 2020-10-14 RX ORDER — UMECLIDINIUM 62.5 UG/1
1 AEROSOL, POWDER ORAL
Qty: 0 | Refills: 0 | DISCHARGE

## 2020-10-14 RX ORDER — METOPROLOL TARTRATE 50 MG
1 TABLET ORAL
Qty: 0 | Refills: 0 | DISCHARGE

## 2020-10-14 RX ORDER — ATORVASTATIN CALCIUM 80 MG/1
1 TABLET, FILM COATED ORAL
Qty: 0 | Refills: 0 | DISCHARGE

## 2020-10-14 RX ORDER — SODIUM BICARBONATE 1 MEQ/ML
0.27 SYRINGE (ML) INTRAVENOUS
Qty: 150 | Refills: 0 | Status: DISCONTINUED | OUTPATIENT
Start: 2020-10-14 | End: 2020-10-16

## 2020-10-14 RX ORDER — LOPERAMIDE HCL 2 MG
1 TABLET ORAL
Qty: 0 | Refills: 0 | DISCHARGE

## 2020-10-14 RX ORDER — FLUTICASONE PROPIONATE AND SALMETEROL 50; 250 UG/1; UG/1
1 POWDER ORAL; RESPIRATORY (INHALATION)
Qty: 0 | Refills: 0 | DISCHARGE

## 2020-10-14 RX ORDER — ARIPIPRAZOLE 15 MG/1
1 TABLET ORAL
Qty: 0 | Refills: 0 | DISCHARGE

## 2020-10-14 RX ORDER — ACETAMINOPHEN 500 MG
650 TABLET ORAL ONCE
Refills: 0 | Status: COMPLETED | OUTPATIENT
Start: 2020-10-14 | End: 2020-10-14

## 2020-10-14 RX ORDER — ALBUTEROL 90 UG/1
2 AEROSOL, METERED ORAL
Qty: 0 | Refills: 0 | DISCHARGE

## 2020-10-14 RX ORDER — CLOPIDOGREL BISULFATE 75 MG/1
1 TABLET, FILM COATED ORAL
Qty: 0 | Refills: 0 | DISCHARGE

## 2020-10-14 RX ORDER — SODIUM CHLORIDE 9 MG/ML
1000 INJECTION INTRAMUSCULAR; INTRAVENOUS; SUBCUTANEOUS
Refills: 0 | Status: DISCONTINUED | OUTPATIENT
Start: 2020-10-14 | End: 2020-10-14

## 2020-10-14 RX ADMIN — Medication 4: at 08:28

## 2020-10-14 RX ADMIN — ATORVASTATIN CALCIUM 40 MILLIGRAM(S): 80 TABLET, FILM COATED ORAL at 21:20

## 2020-10-14 RX ADMIN — Medication 75 MEQ/KG/HR: at 05:35

## 2020-10-14 RX ADMIN — Medication 125 MEQ/KG/HR: at 21:20

## 2020-10-14 RX ADMIN — PIPERACILLIN AND TAZOBACTAM 25 GRAM(S): 4; .5 INJECTION, POWDER, LYOPHILIZED, FOR SOLUTION INTRAVENOUS at 11:18

## 2020-10-14 RX ADMIN — ARIPIPRAZOLE 20 MILLIGRAM(S): 15 TABLET ORAL at 11:17

## 2020-10-14 RX ADMIN — Medication 125 MICROGRAM(S): at 05:35

## 2020-10-14 RX ADMIN — BUDESONIDE AND FORMOTEROL FUMARATE DIHYDRATE 2 PUFF(S): 160; 4.5 AEROSOL RESPIRATORY (INHALATION) at 09:49

## 2020-10-14 RX ADMIN — Medication 4: at 17:30

## 2020-10-14 RX ADMIN — CHOLESTYRAMINE 4 GRAM(S): 4 POWDER, FOR SUSPENSION ORAL at 08:28

## 2020-10-14 RX ADMIN — Medication 20 MILLIGRAM(S): at 05:37

## 2020-10-14 RX ADMIN — Medication 20 MILLIGRAM(S): at 16:32

## 2020-10-14 RX ADMIN — PANTOPRAZOLE SODIUM 40 MILLIGRAM(S): 20 TABLET, DELAYED RELEASE ORAL at 11:17

## 2020-10-14 RX ADMIN — Medication 125 MEQ/KG/HR: at 11:18

## 2020-10-14 RX ADMIN — RANOLAZINE 500 MILLIGRAM(S): 500 TABLET, FILM COATED, EXTENDED RELEASE ORAL at 21:20

## 2020-10-14 RX ADMIN — PIPERACILLIN AND TAZOBACTAM 25 GRAM(S): 4; .5 INJECTION, POWDER, LYOPHILIZED, FOR SOLUTION INTRAVENOUS at 21:20

## 2020-10-14 RX ADMIN — PANTOPRAZOLE SODIUM 40 MILLIGRAM(S): 20 TABLET, DELAYED RELEASE ORAL at 21:20

## 2020-10-14 RX ADMIN — Medication 650 MILLIGRAM(S): at 21:20

## 2020-10-14 RX ADMIN — RANOLAZINE 500 MILLIGRAM(S): 500 TABLET, FILM COATED, EXTENDED RELEASE ORAL at 11:17

## 2020-10-14 NOTE — DIETITIAN INITIAL EVALUATION ADULT. - PERTINENT MEDS FT
MEDICATIONS  (STANDING):  ARIPiprazole 20 milliGRAM(s) Oral daily  atorvastatin 40 milliGRAM(s) Oral at bedtime  budesonide  80 MICROgram(s)/formoterol 4.5 MICROgram(s) Inhaler 2 Puff(s) Inhalation two times a day  cholestyramine Powder (Sugar-Free) 4 Gram(s) Oral daily  dextrose 5%. 1000 milliLiter(s) (50 mL/Hr) IV Continuous <Continuous>  dextrose 50% Injectable 12.5 Gram(s) IV Push once  dextrose 50% Injectable 25 Gram(s) IV Push once  dextrose 50% Injectable 25 Gram(s) IV Push once  dicyclomine 20 milliGRAM(s) Oral two times a day before meals  insulin lispro (HumaLOG) corrective regimen sliding scale   SubCutaneous Before meals and at bedtime  levothyroxine 125 MICROGram(s) Oral daily  pantoprazole  Injectable 40 milliGRAM(s) IV Push two times a day  piperacillin/tazobactam IVPB.. 3.375 Gram(s) IV Intermittent every 12 hours  ranolazine 500 milliGRAM(s) Oral two times a day  sodium bicarbonate  Infusion 0.267 mEq/kG/Hr (125 mL/Hr) IV Continuous <Continuous>    MEDICATIONS  (PRN):  dextrose 40% Gel 15 Gram(s) Oral once PRN Blood Glucose LESS THAN 70 milliGRAM(s)/deciliter  glucagon  Injectable 1 milliGRAM(s) IntraMuscular once PRN Glucose LESS THAN 70 milligrams/deciliter

## 2020-10-14 NOTE — BEHAVIORAL HEALTH ASSESSMENT NOTE - HPI (INCLUDE ILLNESS QUALITY, SEVERITY, DURATION, TIMING, CONTEXT, MODIFYING FACTORS, ASSOCIATED SIGNS AND SYMPTOMS)
59 year-old  female, with history of Bipolar, with no prior suicidal ideation or suicide attempt, or in-patient hospitalization, presenting with pneumoniae and R/O C Diff    Patient is calm and cooperative, pleasant; linear, organized. Patient has no presence of thought disorder. Patient reports mild - moderate anxiety secondary to being overwhelmed by medical comorbidities however stating "I am okay." Denies depressed mood or anhedonia, hopelessness or suicidal ideation. Denies manic / psychotic symptoms. Denies disturbances in sleep; reports chronic history of poor appetite, meaning not eating breakfast. No delusions elicited. Reports being well managed with Abilify 20 mg daily by PMD; stating not seeing a psychiatrist. Denies needs at this time stating being / feeling otherwise stable although having acute medical comorbidities. As per RN, patient was upset and tearful earlier however is emotionally appropriate given emotional stressor.    Attempted calling daughter for collateral to understand her concerns but did not connect with her.

## 2020-10-14 NOTE — DIETITIAN INITIAL EVALUATION ADULT. - PHYSICAL APPEARANCE
other (specify) NFPE incidates severe upper body muscle wasting: Temporal, clavicle deltoids, scapula, patellas, and calves.  Severe fat loss: ocular, triceps, buccal, and ribs  Skin: Right heel suspected DTI, with no edema documented. Ketan score-(High risk for skin breakdown).

## 2020-10-14 NOTE — CONSULT NOTE ADULT - SUBJECTIVE AND OBJECTIVE BOX
Patient is a 59y old  Female who presents with a chief complaint of hypotension, lower abtominal pain (14 Oct 2020 08:44)    HPI:  This patient is a 59 year old female with past medical history bipolar disease and depression, cad, esrd, gerd, hypertension, hyperlipidemia, diabetes, hypothyroidism, substance abuse transferred from [facility] on 10/12 for evaluation of hypotension and lower abdominal pain, loose stools. The patient states she passed out and cannot provide details surrounding her admission. History per medical record.       PMH: as above  PSH: as above  Meds: per reconciliation sheet, noted below  MEDICATIONS  (STANDING):  ARIPiprazole 20 milliGRAM(s) Oral daily  atorvastatin 40 milliGRAM(s) Oral at bedtime  budesonide  80 MICROgram(s)/formoterol 4.5 MICROgram(s) Inhaler 2 Puff(s) Inhalation two times a day  cholestyramine Powder (Sugar-Free) 4 Gram(s) Oral daily  dextrose 5%. 1000 milliLiter(s) (50 mL/Hr) IV Continuous <Continuous>  dextrose 50% Injectable 12.5 Gram(s) IV Push once  dextrose 50% Injectable 25 Gram(s) IV Push once  dextrose 50% Injectable 25 Gram(s) IV Push once  dicyclomine 20 milliGRAM(s) Oral two times a day before meals  insulin lispro (HumaLOG) corrective regimen sliding scale   SubCutaneous Before meals and at bedtime  levothyroxine 125 MICROGram(s) Oral daily  pantoprazole  Injectable 40 milliGRAM(s) IV Push two times a day  piperacillin/tazobactam IVPB.. 3.375 Gram(s) IV Intermittent every 12 hours  ranolazine 500 milliGRAM(s) Oral two times a day  sodium bicarbonate  Infusion 0.267 mEq/kG/Hr (125 mL/Hr) IV Continuous <Continuous>    MEDICATIONS  (PRN):  dextrose 40% Gel 15 Gram(s) Oral once PRN Blood Glucose LESS THAN 70 milliGRAM(s)/deciliter  glucagon  Injectable 1 milliGRAM(s) IntraMuscular once PRN Glucose LESS THAN 70 milligrams/deciliter    Allergies    No Known Allergies    Intolerances      Social: no smoking, no alcohol, use of cocaine in past; no recent travel, no exposure to TB  FAMILY HISTORY:  Family history of asthma (Sibling)  sister    Family history of epilepsy (Sibling, Sibling)  2 sisters    Family history of cardiovascular disease  mother    Family history of stroke  mother    Family history of heart disease  father    Family history of alcoholism in father       ROS unable to obtain secondary to patient medical condition       Vital Signs Last 24 Hrs  T(C): 36.6 (13 Oct 2020 17:43), Max: 36.6 (13 Oct 2020 17:43)  T(F): 97.8 (13 Oct 2020 17:43), Max: 97.8 (13 Oct 2020 17:43)  HR: 88 (14 Oct 2020 09:52) (88 - 96)  BP: 125/57 (13 Oct 2020 19:32) (94/61 - 125/57)  BP(mean): 60 (13 Oct 2020 16:06) (60 - 70)  RR: 15 (13 Oct 2020 16:06) (13 - 21)  SpO2: 100% (13 Oct 2020 13:00) (100% - 100%)  Daily     Daily     PE:    Constitutional: frail looking  HEENT: NC/AT, EOMI, PERRLA, conjunctivae clear; ears and nose atraumatic; pharynx clear  Neck: supple; thyroid not palpable  Back: no tenderness  Respiratory: respiratory effort normal; clear to auscultation  Cardiovascular: S1S2 regular, no murmurs  Abdomen: soft, not tender, mildly distended, positive BS; no liver or spleen organomegaly  Genitourinary: no suprapubic tenderness  Musculoskeletal: no muscle tenderness, no joint swelling or tenderness  Neurological/ Psychiatric: AxOx3, judgement and insight normal;  moving all extremities  Skin: no rashes; no palpable lesions    Labs: all available labs reviewed                        11.7   12.10 )-----------( 183      ( 14 Oct 2020 07:42 )             35.5     10-14    127<L>  |  99  |  61<H>  ----------------------------<  225<H>  4.5   |  17<L>  |  2.67<H>    Ca    6.6<L>      14 Oct 2020 07:42  Phos  3.4     10-14  Mg     1.8     10-14    TPro  6.1  /  Alb  1.4<L>  /  TBili  0.8  /  DBili  x   /  AST  46<H>  /  ALT  33  /  AlkPhos  132<H>  10-12     LIVER FUNCTIONS - ( 12 Oct 2020 17:06 )  Alb: 1.4 g/dL / Pro: 6.1 gm/dL / ALK PHOS: 132 U/L / ALT: 33 U/L / AST: 46 U/L / GGT: x           Urinalysis Basic - ( 12 Oct 2020 19:17 )    Color: Yellow / Appearance: Slightly Turbid / S.010 / pH: x  Gluc: x / Ketone: Negative  / Bili: Negative / Urobili: 1 mg/dL   Blood: x / Protein: 30 mg/dL / Nitrite: Negative   Leuk Esterase: Moderate / RBC: 6-10 /HPF / WBC >50   Sq Epi: x / Non Sq Epi: Few / Bacteria: Many        Culture Results:   GI PCR Results: NOT detected  *******Please Note:*******  GI panel PCR evaluates for:  Campylobacter, Plesiomonas shigelloides, Salmonella,  Vibrio, Yersinia enterocolitica, Enteroaggregative  Escherichia coli (EAEC), Enteropathogenic E.coli (EPEC),  Enterotoxigenic E. coli (ETEC) lt/st, Shiga-like  toxin-producing E. coli (STEC) stx1/stx2,  Shigella/ Enteroinvasive E. coli (EIEC), Cryptosporidium,  Cyclospora cayetanensis, Entamoeba histolytica,  Giardia lamblia, Adenovirus F 40/41, Astrovirus,  Norovirus GI/GII, Rotavirus A, Sapovirus (10-13 @ 14:45)      Culture - Urine (10.12.20 @ 19:17)    Specimen Source: .Urine None    Culture Results:   >=3 organisms. Probable collection contamination.          Culture - Blood (10.12.20 @ 17:21)    Gram Stain:   Growth in aerobic bottle: Gram Negative Rods    Specimen Source: .Blood Blood-Peripheral    Culture Results:   Growth in aerobic bottle: Gram Negative Rods          Culture - Blood (10.12.20 @ 17:06)    -  Klebsiella pneumoniae: Detec    Gram Stain:   Growth in aerobic bottle: Gram Negative Rods    Specimen Source: .Blood None    Organism: Blood Culture PCR    Culture Results:   Growth in aerobic bottle: Gram Negative Rods  "Due to technical problems, Proteus sp. will Not be reported as part of  the BCID panel until further notice"  ***Blood Panel PCR results on this specimen are available  approximately 3 hours after the Gram stain result.***  Gram stain, PCR, and/or culture results may not always  correspond due to difference in methodologies.  ************************************************************  This PCR assay was performed using [employer].  The following targets are tested for: Enterococcus,  vancomycin resistant enterococci, Listeria monocytogenes,  coagulase negative staphylococci, S. aureus,  methicillin resistant S. aureus, Streptococcus agalactiae  (Group B), S. pneumoniae, S. pyogenes (Group A),  Acinetobacter baumannii, Enterobacter cloacae, E. coli,  Klebsiella oxytoca, K. pneumoniae, Proteus sp.,  Serratia marcescens, Haemophilus influenzae,  Neisseria meningitidis, Pseudomonas aeruginosa, Candida  albicans, C. glabrata, C krusei, C parapsilosis,  C. tropicalis and the KPC resistance gene.    Organism Identification: Blood Culture PCR    Method Type: PCR                                  < from: CT Abdomen and Pelvis w/ Oral Cont (10.14.20 @ 11:10) >    IMPRESSION:  Mildly dilated loops of small bowel with no transition point, possibly an ileus.    Presacral edema with infiltration of the perirectal fat, possibly proctitis.    Dilated common bile duct measuring 1.1 cm, increased in caliber since 2020, previously 0.8 cm; correlation is recommended with LFTs; an MRI/MRCP of the abdomen is recommended for further evaluation tiny; a noncontrast study can be performed if contraindicated by the patient's renal function.    Small amount of abdominal and pelvic ascites, increased since 10/12/2020.    Wall thickening versus underdistention involving the region of the pylorus; continued attention is recommended on the MRI.        < end of copied text >                Radiology: all available radiological tests reviewed    Advanced directives addressed: full resuscitation

## 2020-10-14 NOTE — BEHAVIORAL HEALTH ASSESSMENT NOTE - NSBHCHARTREVIEWINVESTIGATE_PSY_A_CORE FT
Ventricular Rate 78 BPM    Atrial Rate 78 BPM    P-R Interval 160 ms    QRS Duration 120 ms    Q-T Interval 464 ms    QTC Calculation(Bazett) 528 ms    P Axis 72 degrees    R Axis 84 degrees    T Axis 153 degrees    Diagnosis Line Normal sinus rhythm  Incomplete left bundle branch block  Nonspecific ST and T wave abnormality  Abnormal ECG  When compared with ECG of 25-AUG-2020 00:12,  Significant changes have occurred  Confirmed by Rl Anglin MD ([id number]) on 10/13/2020 12:03:01 PM

## 2020-10-14 NOTE — CONSULT NOTE ADULT - ATTENDING COMMENTS
Pt seen and examined, C/O abdominal pain, diarrhea, WBC 12K, lactate WNL. Ileus on CT, she is tender on my exam which does not go with ileus, has no inflammation on CT    F/U C diff  IV hydration   Continue antibitoics  Renal consult  Serial exam   Keep K>4, Mg >2  Will follow Pt seen and examined, C/O abdominal pain, diarrhea, WBC 12K, lactate WNL. Ileus on CT, she is tender on my exam which does not go with ileus, has no inflammation on CT    F/U C diff  IV hydration   Continue antibiotics  Renal consult  Serial exam   Keep K>4, Mg >2  If renal function improves and still has pain, consider CTA.  Will follow

## 2020-10-14 NOTE — CONSULT NOTE ADULT - ASSESSMENT
This patient is a 59 year old female with past medical history bipolar disease and depression, cad, esrd, gerd, hypertension, hyperlipidemia, diabetes, hypothyroidism, substance abuse transferred [facility] for evaluation of hypotension and lower abdominal pain, loose stools. The patient states she passed out and cannot provide details surrounding her admission. History per medical record.   1. Patient admitted with sepsis secondary to Klebsiella pneumoniae, possibly due to GI translocation versus urinary origin  - follow up cultures to identification and sensitivity  - serial cbc and monitor temperature   - iv hydration and supportive care   - reviewed prior medical records to evaluate for resistant or atypical pathogens   - agree with zosyn as ordered  - stool studies pending  - consideration for GI evaluation  2. other issues: per medicine

## 2020-10-14 NOTE — PROGRESS NOTE ADULT - SUBJECTIVE AND OBJECTIVE BOX
Patient is a 59y Female who reports no complaints as new, still with large amnts diarrhea. Intake limited, liquids    REVIEW OF SYSTEMS:    CONSTITUTIONAL: stable weakness, no fevers or chills  RESPIRATORY: No cough, wheezing, hemoptysis; No shortness of breath  CARDIOVASCULAR: No chest pain or palpitations  GENITOURINARY: No dysuria, frequency or hematuria  All other review of systems is negative unless indicated above.    MEDICATIONS  (STANDING):  ARIPiprazole 20 milliGRAM(s) Oral daily  atorvastatin 40 milliGRAM(s) Oral at bedtime  budesonide  80 MICROgram(s)/formoterol 4.5 MICROgram(s) Inhaler 2 Puff(s) Inhalation two times a day  cholestyramine Powder (Sugar-Free) 4 Gram(s) Oral daily  dextrose 5%. 1000 milliLiter(s) (50 mL/Hr) IV Continuous <Continuous>  dextrose 50% Injectable 12.5 Gram(s) IV Push once  dextrose 50% Injectable 25 Gram(s) IV Push once  dextrose 50% Injectable 25 Gram(s) IV Push once  dicyclomine 20 milliGRAM(s) Oral two times a day before meals  insulin lispro (HumaLOG) corrective regimen sliding scale   SubCutaneous Before meals and at bedtime  levothyroxine 125 MICROGram(s) Oral daily  pantoprazole  Injectable 40 milliGRAM(s) IV Push two times a day  piperacillin/tazobactam IVPB.. 3.375 Gram(s) IV Intermittent every 12 hours  ranolazine 500 milliGRAM(s) Oral two times a day  sodium bicarbonate  Infusion 0.267 mEq/kG/Hr (125 mL/Hr) IV Continuous <Continuous>    MEDICATIONS  (PRN):  dextrose 40% Gel 15 Gram(s) Oral once PRN Blood Glucose LESS THAN 70 milliGRAM(s)/deciliter  glucagon  Injectable 1 milliGRAM(s) IntraMuscular once PRN Glucose LESS THAN 70 milligrams/deciliter        T(C): --  T(F): --  HR: 88 (10-14-20 @ 09:52)  BP: 125/57 (10-13-20 @ 19:32)  BP(mean): --  RR: --  SpO2: --  Wt(kg): --    10-13 @ 07:01  -  10-14 @ 07:00  --------------------------------------------------------  IN: 1745 mL / OUT: 1120 mL / NET: 625 mL    10-14 @ 07:01  -  10-14 @ 18:17  --------------------------------------------------------  IN: 0 mL / OUT: 100 mL / NET: -100 mL          PHYSICAL EXAM:    Constitutional: frail, greater then stated age  HEENT: PERRLA, EOMI,  MMM  Neck: No LAD, No JVD  Respiratory: dist  Cardiovascular: S1 and S2, RRR  Gastrointestinal: BS+, soft, NT/ND  Extremities: chr changes  Neurological: A/O x 3, no focal deficits  Psychiatric: Normal mood, normal affect  : No Black  Skin: No rashes  Access: Not applicable        LABS:                        11.7   12.10 )-----------( 183      ( 14 Oct 2020 07:42 )             35.5     14 Oct 2020 14:49    130    |  98     |  59     ----------------------------<  196    3.9     |  21     |  2.63   14 Oct 2020 07:42    127    |  99     |  61     ----------------------------<  225    4.5     |  17     |  2.67   13 Oct 2020 15:44    133    |  101    |  68     ----------------------------<  153    3.3     |  17     |  3.04   13 Oct 2020 06:31    131    |  100    |  74     ----------------------------<  250    3.0     |  16     |  2.92   12 Oct 2020 23:06    132    |  103    |  76     ----------------------------<  184    2.7     |  15     |  3.09     Ca    6.2        14 Oct 2020 14:49  Ca    6.6        14 Oct 2020 07:42  Ca    6.6        13 Oct 2020 15:44  Ca    6.6        13 Oct 2020 06:31  Ca    6.6        12 Oct 2020 23:06  Phos  3.4       14 Oct 2020 07:42  Mg     1.8       14 Oct 2020 07:42  Mg     2.0       13 Oct 2020 15:44  Mg     2.1       12 Oct 2020 17:06    TPro  6.4    /  Alb  1.3    /  TBili  0.5    /  DBili  0.2    /  AST  96     /  ALT  42     /  AlkPhos  155    14 Oct 2020 07:42  TPro  6.1    /  Alb  1.4    /  TBili  0.8    /  DBili  x      /  AST  46     /  ALT  33     /  AlkPhos  132    12 Oct 2020 17:06          Urine Studies:  Urinalysis Basic - ( 12 Oct 2020 19:17 )    Color: Yellow / Appearance: Slightly Turbid / S.010 / pH: x  Gluc: x / Ketone: Negative  / Bili: Negative / Urobili: 1 mg/dL   Blood: x / Protein: 30 mg/dL / Nitrite: Negative   Leuk Esterase: Moderate / RBC: 6-10 /HPF / WBC >50   Sq Epi: x / Non Sq Epi: Few / Bacteria: Many            RADIOLOGY & ADDITIONAL STUDIES:

## 2020-10-14 NOTE — CHART NOTE - TREATMENT: THE FOLLOWING DIET HAS BEEN RECOMMENDED
Diet, NPO (10-14-20 @ 12:33) [Active]      Malnutrition severe protein/calorie malnutrition.   Etiology inability to consume adequate nutrition 2/2 abdominal pain and diarrhea.  Signs/Symptoms NPO x 3 days, significant wt loss, severe muscle/fat wasting, Pressure injury suspected DTI.   Goal/Expected Outcome Optimal po intake when diet advanced meeting >80% of ENN.    · Additional Recommendations  1) advance po diet when medically feasible.   2) monitor I & O's   3) monitor labs/lytes and hydration.   4) Suggest add MVI w/minerals, Vit C 500 mg BID, add Zinc Sulfate 220 mg x 10 days to promote wound healing.   5) Maintain aspiration precautions, back of bed >35 degrees.   6) pt may benefit from additional po supplements when diet is advanced to meet energy/protein needs.

## 2020-10-14 NOTE — DIETITIAN INITIAL EVALUATION ADULT. - PERTINENT LABORATORY DATA
10-14 Na127 mmol/L<L> Glu 225 mg/dL<H> K+ 4.5 mmol/L Cr  2.67 mg/dL<H> BUN 61 mg/dL<H> Phos 3.4 mg/dL Alb 1.3 g/dL<L> PAB n/a

## 2020-10-14 NOTE — CONSULT NOTE ADULT - SUBJECTIVE AND OBJECTIVE BOX
Oct 2020 08:44)    HPI:  This patient is a 59 year old female with past medical history bipolar disease and depression, cad, esrd, gerd, hypertension, hyperlipidemia, diabetes, hypothyroidism, substance abuse [facility] for evaluation of hypotension and lower abdominal pain, loose stools. The patient states she passed out and cannot provide details surrounding her admission. History per medical record.       Surgery called to evaluate for Colitis vs SBO.   Patient having diarrhea- found to have electrolyte abnormalities      ICU Vital Signs Last 24 Hrs  T(C): 36.6 (13 Oct 2020 17:43), Max: 36.6 (13 Oct 2020 17:43)  T(F): 97.8 (13 Oct 2020 17:43), Max: 97.8 (13 Oct 2020 17:43)  HR: 88 (14 Oct 2020 09:52) (88 - 95)  BP: 125/57 (13 Oct 2020 19:32) (96/47 - 125/57)  BP(mean): 60 (13 Oct 2020 16:06) (60 - 60)  ABP: --  ABP(mean): --  RR: 15 (13 Oct 2020 16:06) (15 - 15)  SpO2: --      gen aaox3 nad  cardiac s1 s2 rr  lungs clear  abd soft ttp in umbilical region, non peritoneal  ext deloris edema b/l                          11.7   12.10 )-----------( 183      ( 14 Oct 2020 07:42 )             35.5   10-14    130<L>  |  98  |  59<H>  ----------------------------<  196<H>  3.9   |  21<L>  |  2.63<H>    Ca    6.2<LL>      14 Oct 2020 14:49  Phos  3.4     10-14  Mg     1.8     10-14    TPro  6.4  /  Alb  1.3<L>  /  TBili  0.5  /  DBili  0.2  /  AST  96<H>  /  ALT  42  /  AlkPhos  155<H>  10-14

## 2020-10-14 NOTE — BEHAVIORAL HEALTH ASSESSMENT NOTE - RISK ASSESSMENT
Low Acute Suicide Risk LOW RISK     ACUTE RISK FACTORS: anxiety, acute medical comorbidities     CHRONIC RISK FACTORS: Bipolar    PROTECTIVE FACTORS: Bipolar in remission, no suicidal ideation/intent/plan, no history of suicide attempt, no in-patient hospitalization, medication compliance, future oriented, engaged in safety planning.

## 2020-10-14 NOTE — PROGRESS NOTE ADULT - ASSESSMENT
59 CAD, PAD s/p fem-pop bypass s/p RT iliac stent, diabetes, hypothyroidism, hyperlipidemia, hx of cigg/cocaine use,  chronic back pain s/p lumbar surgery recent admit with  multiple non-specific complaints found to have ST elevations on presenting EKG hospital course c/b UTI sepsis and E. coli bacteremia POA w hospital course c/b code blue, renal evaluation of AMISH. Patient here from [facility] with abd discomofort, diarrhea and elevated creatinine. D/c creatinine was near 3.0. Admit with multiple lyte issues,anemia s/p prbc and repletion.     AMISH  -renal function stabilizing, likely pre-renal in nature  -IVF continue with limited intake, diarrhea  -Lasix hold   -Daily labs/lytes    Hypokalemia  -IV/Po repletion, goal near 4.0    Acidosis  -Bicarb gtt ongoing, can increase rate with persistence and hyponatremia    Hyponatremia  -Likely still "dry" with large amounts of GI losses  -Increase rate, repeat in afternoon    dc with staff, Dr. Lofton

## 2020-10-14 NOTE — PROGRESS NOTE ADULT - SUBJECTIVE AND OBJECTIVE BOX
cc: abdominal pain  hpi: 59y female w/ pmh CAD s/p STEMI Aug, occluded RCA w/o PCI,  PAD s/p fem pop bypass/iliac stent, T2D, hypothyroidism, chronic smoker, chronic back pain/hx lumbar surgery w/ recent admission in August for STEMI as above, PEA arrest, septic shock /ecoli uti/pneumonia/effusion.     Patient readmitted 10/12 to ICU from nursing home w/ abd pain, dark stools, acute blood loss anemia.  Also found to have positive blood cultures.   Workup in progress.   Patient downgraded from icu to tele on 10/13.     10/14-      ros-      Vital Signs Last 24 Hrs  T(C): 36.6 (13 Oct 2020 17:43), Max: 36.6 (13 Oct 2020 17:43)  T(F): 97.8 (13 Oct 2020 17:43), Max: 97.8 (13 Oct 2020 17:43)  HR: 91 (13 Oct 2020 19:32) (88 - 96)  BP: 125/57 (13 Oct 2020 19:32) (94/61 - 125/57)  BP(mean): 60 (13 Oct 2020 16:06) (56 - 70)  RR: 15 (13 Oct 2020 16:06) (12 - 21)  SpO2: 100% (13 Oct 2020 13:00) (100% - 100%)    physical            LABS: All Labs Reviewed:                        11.7   12.10 )-----------( 183      ( 14 Oct 2020 07:42 )             35.5     10-13    133<L>  |  101  |  68<H>  ----------------------------<  153<H>  3.3<L>   |  17<L>  |  3.04<H>    Ca    6.6<L>      13 Oct 2020 15:44  Mg     2.0     10-13    TPro  6.1  /  Alb  1.4<L>  /  TBili  0.8  /  DBili  x   /  AST  46<H>  /  ALT  33  /  AlkPhos  132<H>  10-12    PT/INR - ( 12 Oct 2020 16:16 )   PT: 14.9 sec;   INR: 1.29 ratio         PTT - ( 12 Oct 2020 16:16 )  PTT:29.0 sec        < from: CT Abdomen and Pelvis No Cont (10.12.20 @ 18:56) >  IMPRESSION: The study is done without contrast and cannot therefore exclude any active GI bleeding.  Cholecystectomy  Patchy bilateral lower lobe infiltrates versus atelectasis  Extensive vascular calcifications more than expected for the stated age      < end of copied text >    MEDICATIONS  (STANDING):  ARIPiprazole 20 milliGRAM(s) Oral daily  atorvastatin 40 milliGRAM(s) Oral at bedtime  budesonide  80 MICROgram(s)/formoterol 4.5 MICROgram(s) Inhaler 2 Puff(s) Inhalation two times a day  cholestyramine Powder (Sugar-Free) 4 Gram(s) Oral daily  dextrose 5%. 1000 milliLiter(s) (50 mL/Hr) IV Continuous <Continuous>  dextrose 50% Injectable 12.5 Gram(s) IV Push once  dextrose 50% Injectable 25 Gram(s) IV Push once  dextrose 50% Injectable 25 Gram(s) IV Push once  dicyclomine 20 milliGRAM(s) Oral two times a day before meals  insulin lispro (HumaLOG) corrective regimen sliding scale   SubCutaneous Before meals and at bedtime  levothyroxine 125 MICROGram(s) Oral daily  pantoprazole  Injectable 40 milliGRAM(s) IV Push two times a day  piperacillin/tazobactam IVPB.. 3.375 Gram(s) IV Intermittent every 12 hours  ranolazine 500 milliGRAM(s) Oral two times a day  sodium bicarbonate  Infusion 0.16 mEq/kG/Hr (75 mL/Hr) IV Continuous <Continuous>    MEDICATIONS  (PRN):  dextrose 40% Gel 15 Gram(s) Oral once PRN Blood Glucose LESS THAN 70 milliGRAM(s)/deciliter  glucagon  Injectable 1 milliGRAM(s) IntraMuscular once PRN Glucose LESS THAN 70 milligrams/deciliter      ASSESSMENT and PLAN:            cc: abdominal pain  hpi: 59y female w/ pmh CAD s/p STEMI Aug, occluded RCA w/o PCI,  PAD s/p fem pop bypass/iliac stent, T2D, hypothyroidism, chronic smoker, chronic back pain/hx lumbar surgery w/ recent admission in August for STEMI as above, PEA arrest, septic shock /ecoli uti/pneumonia/effusion.     Patient readmitted 10/12 to ICU from nursing home w/ abd pain, dark stools, acute blood loss anemia.  Also found to have positive blood cultures.   Workup in progress.   Patient downgraded from icu to tele on 10/13.     10/14- seen around noon- feeling better than she did this morning w/ less abd pain, no n/v at present.  Upset about frequent loose bms, upset she's here again.  Discussed at length w/ daughter, Antoinette, and pt.  Both requesting Psych for her hx of bipolar/anxiety/depression.      ros- as per hpi, other 10 point ros negative       Vital Signs Last 24 Hrs  T(C): 36.6 (13 Oct 2020 17:43), Max: 36.6 (13 Oct 2020 17:43)  T(F): 97.8 (13 Oct 2020 17:43), Max: 97.8 (13 Oct 2020 17:43)  HR: 91 (13 Oct 2020 19:32) (88 - 96)  BP: 125/57 (13 Oct 2020 19:32) (94/61 - 125/57)  BP(mean): 60 (13 Oct 2020 16:06) (56 - 70)  RR: 15 (13 Oct 2020 16:06) (12 - 21)  SpO2: 100% (13 Oct 2020 13:00) (100% - 100%) on 1LNC       PHYSICAL EXAM:  General: ill appearing female, NAD; appears older than stated age   Neuro: AAOx3, no focal deficits  HEENT: NCAT, dry MM  Neck: Soft and supple  Respiratory: CTA b/l, no w/r/r  Cardiovascular: S1 and S2, RRR  Gastrointestinal: distended, tender to palpation   : hughes w/ dark urine  Extremities: No edema  Vascular: 2+ peripheral pulses  Musculoskeletal: moving all extremities      LABS: All Labs Reviewed:                        11.7   12.10 )-----------( 183      ( 14 Oct 2020 07:42 )             35.5     10-13    133<L>  |  101  |  68<H>  ----------------------------<  153<H>  3.3<L>   |  17<L>  |  3.04<H>    Ca    6.6<L>      13 Oct 2020 15:44  Mg     2.0     10-13    TPro  6.1  /  Alb  1.4<L>  /  TBili  0.8  /  DBili  x   /  AST  46<H>  /  ALT  33  /  AlkPhos  132<H>  10-12    PT/INR - ( 12 Oct 2020 16:16 )   PT: 14.9 sec;   INR: 1.29 ratio         PTT - ( 12 Oct 2020 16:16 )  PTT:29.0 sec        < from: CT Abdomen and Pelvis No Cont (10.12.20 @ 18:56) >  IMPRESSION: The study is done without contrast and cannot therefore exclude any active GI bleeding.  Cholecystectomy  Patchy bilateral lower lobe infiltrates versus atelectasis  Extensive vascular calcifications more than expected for the stated age      < end of copied text >    MEDICATIONS  (STANDING):  ARIPiprazole 20 milliGRAM(s) Oral daily  atorvastatin 40 milliGRAM(s) Oral at bedtime  budesonide  80 MICROgram(s)/formoterol 4.5 MICROgram(s) Inhaler 2 Puff(s) Inhalation two times a day  cholestyramine Powder (Sugar-Free) 4 Gram(s) Oral daily  dextrose 5%. 1000 milliLiter(s) (50 mL/Hr) IV Continuous <Continuous>  dextrose 50% Injectable 12.5 Gram(s) IV Push once  dextrose 50% Injectable 25 Gram(s) IV Push once  dextrose 50% Injectable 25 Gram(s) IV Push once  dicyclomine 20 milliGRAM(s) Oral two times a day before meals  insulin lispro (HumaLOG) corrective regimen sliding scale   SubCutaneous Before meals and at bedtime  levothyroxine 125 MICROGram(s) Oral daily  pantoprazole  Injectable 40 milliGRAM(s) IV Push two times a day  piperacillin/tazobactam IVPB.. 3.375 Gram(s) IV Intermittent every 12 hours  ranolazine 500 milliGRAM(s) Oral two times a day  sodium bicarbonate  Infusion 0.16 mEq/kG/Hr (75 mL/Hr) IV Continuous <Continuous>    MEDICATIONS  (PRN):  dextrose 40% Gel 15 Gram(s) Oral once PRN Blood Glucose LESS THAN 70 milliGRAM(s)/deciliter  glucagon  Injectable 1 milliGRAM(s) IntraMuscular once PRN Glucose LESS THAN 70 milligrams/deciliter      ASSESSMENT and PLAN:     59y female w/     1. acute blood loss anemia ?etiology   - admitted to icu 10/12.  transfused 2uprbc w/ improvement   - aspirin, plavix held.  h/h stable.  fobt negative   - PPI  - GI f/u appreciated- no plan for inpatient scope    2. abdominal pain, diarrhea- ?possible ileus, possible proctitis  - persistent, worse today- discussed w/ GI  - initial CT abd non contrast-  - repeat CT abd today 10/14 discussed w/ GI--> Surgery consulted.  keep npo, ivf    - stool studies pending       3. AMISH on CKD3, non AG acidosis, hyponatremia   - continue bicarb ivf but increase rate  discussed w/ Nephrology- eval appreciated     4. bacteremia   - ?source. urine  pt was on rocephin, now on zosyn.  Pt does have chronic hughes now since recent admission early Sept   - last hospital course in Aug ecoli bacteremia/sepsis due to uti.  pt also tx for pna then   - patchy lower lobe infiltrates vs atelectasis seen on CT abd   - follow up cx/sensitivities, repeat blood cultures.  ID consulted    5. hx CAD w/ occluded RCA  August cath but no PCI.  med management at time.  aspirin, plavix held for now b/c anemia- will need to resume w/in day or 2 if h/h stable  - PAD, T2D- as above.  continue statin      5. bipolar disorder/anxiety/depression  - continue home meds, daughter/patient requesting Psych eval    6. dvt px  - if h/h remains stable will start  - scds for now

## 2020-10-14 NOTE — PROGRESS NOTE ADULT - ASSESSMENT
58 y/o female with CAD, PAD s/p fem-pop bypass s/p RT iliac stent, diabetes, and recent admission for UTI sepsis and E. coli bacteremia POA and urgent angiogram showing occluded RCA.      Impression:  Anemia of unclear etiology, occult stool negative x2.   Abdominal cramping and some diarrhea, ct scan was unrevealing on admission, however now with increasing abdominal pain and distention today.      Plan:  Clinically patient appears worse, worsening abdominal distention and pain.   Still with some diarrhea.   GI PCR negative, awaiting c. diff.   Trend H/H  PPI.   Continue bentyl and questran for now.   Repeat urgent ct scan to r/o colitis.   Recent EGD and colonoscopy in March with Dr. Sacksnoff was negative.   No plans for inpatient work up for anemia, could consider outpatient capsule study though occult stool was negative.     Discussed with pt, Dr. Hammer, nurse, NP.

## 2020-10-14 NOTE — DIETITIAN INITIAL EVALUATION ADULT. - OTHER INFO
59y female w/ PMHX: CAD s/p STEMI Aug, occluded RCA w/o PCI,  PAD s/p fem pop bypass/iliac stent, T2D, hypothyroidism, chronic smoker, chronic back pain/hx lumbar surgery w/ recent admission in August for STEMI as above, PEA arrest, septic shock /Ecoli UTI/pneumonia/effusion. Patient readmitted 10/12 to ICU from nursing home w/ abd pain, dark stools, acute blood loss anemia.  Also found to have positive blood cultures.   Workup in progress.   Patient downgraded from ICU to tele on 10/13. Pt currently NPO x 3 days ( 1 day clear liquid tray however poor intake). Past hospital weight on 8/23/20 120# indicating a 9%/11# weight loss x 2 months (clinically significant). If NPO status continues >5 days suggest nutrition support. Will continue to monitor and follow up prn. 59y female w/ PMHX: CAD s/p STEMI Aug, occluded RCA w/o PCI,  PAD s/p fem pop bypass/iliac stent, T2D, hypothyroidism, chronic smoker, chronic back pain/hx lumbar surgery w/ recent admission in August for STEMI as above, PEA arrest, septic shock /Ecoli UTI/pneumonia/effusion. Patient readmitted 10/12 to ICU from nursing home w/ abd pain, dark stools, acute blood loss anemia.  Also found to have positive blood cultures.   Workup in progress. + Diarrhea noted. Monitoring with possible CDIFF culture. Patient downgraded from ICU to tele on 10/13. Pt currently NPO x 3 days ( 1 day clear liquid tray however poor intake). Past hospital weight on 8/23/20 120# indicating a 9%/11# weight loss x 2 months (clinically significant). If NPO status continues >5 days suggest nutrition support. Will continue to monitor and follow up prn.

## 2020-10-14 NOTE — DIETITIAN INITIAL EVALUATION ADULT. - ETIOLOGY
inability to consume adequate nutrition 2/2 inability to consume adequate nutrition 2/2 abdominal pain and diarrhea

## 2020-10-14 NOTE — BEHAVIORAL HEALTH ASSESSMENT NOTE - SUMMARY
59 year-old  female, with history of Bipolar, with no prior suicidal ideation or suicide attempt, or in-patient hospitalization, presenting with pneumoniae and R/O C Diff    Patient presenting with Bipolar Affective Disorder, in remission. Patient has Adjustment reaction with anxiety in the setting of acute medical comorbidities. Patient has no Major Depressive Disorder. Patient has no manic / psychotic symptoms. No delusions elicited. Patient has therapeutic relationships and social supports. Patient is future oriented. Patient motivation for continual psychiatric treatment.    PLAN  1. Abilify 20 mg daily   2. PATIENT IS PSYCHIATRICALLY CLEARED FOR DISCHARGE ONCE MEDICALLY CLEARED 59 year-old  female, with history of Bipolar, with no prior suicidal ideation or suicide attempt, or in-patient hospitalization, presenting with pneumoniae and R/O C Diff    Patient presenting with Bipolar Affective Disorder, in remission. Patient has Adjustment reaction with anxiety in the setting of acute medical comorbidities. Patient has no Major Depressive Disorder. Patient has no manic / psychotic symptoms. No delusions elicited. Patient has therapeutic relationships and social supports. Patient is future oriented. Patient motivation for continual psychiatric treatment.    PLAN  1. Abilify 20 mg daily  2. PATIENT IS PSYCHIATRICALLY CLEARED FOR DISCHARGE ONCE MEDICALLY CLEARED

## 2020-10-14 NOTE — DIETITIAN INITIAL EVALUATION ADULT. - ADD RECOMMEND
1) advance po diet when medically feasible. 2) monitor I & O's 3) monitor labs/lytes and hydration. 4) Suggest add MVI w/minerals, Vit C 500 mg BID, add Zinc Sulfate 220 mg x 10 days to promote wound healing. 5) Maintain aspiration precautions, back of bed >35 degrees. 6) pt may benefit from additional po supplements when diet is advanced to meet energy/protein needs.

## 2020-10-14 NOTE — PROGRESS NOTE ADULT - SUBJECTIVE AND OBJECTIVE BOX
Patient is a 59y old  Female who presents with a chief complaint of hypotension, lower abtominal pain (13 Oct 2020 13:21)    HPI:  This patient is a 59 year old female with hx.    DM    STEMI in August, with RCA occlusion, course complicated by intubation, e. coli sepsis, pneumonia, Acute renal failure, did not get stent    Patient was sent from nursing home today with low bp and lower abdominal pain and dark stools  In ED, BP in low 80s,  creatinfine 3.4 was about 3 on last discharge,  Potassium of 2.2, Hgb 5.9 Hgb was 8.3 during last admission  last echo ef 55%     (12 Oct 2020 18:51)    10/14/2020-  Patient with worsening abdominal pain.   Increased distention.  Requesting pain medication.   + diarrhea.   PCR negative.    PAST MEDICAL & SURGICAL HISTORY:  Lung nodule    Diabetes mellitus    History of TIAs    History of gallbladder disease  surgery    History of colon polyps  precancerous    Substance abuse  history of. last used 14 years ago.    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 Decreased ROM.    Angina pectoris    History of MRSA infection  left lower extremity incisional area 2015    Anxiety and depression    Transient cerebral ischemia, unspecified type    Osteoarthritis of spine, unspecified spinal osteoarthritis complication status, unspecified spinal region    Lumbar herniated disc    Urinary incontinence, unspecified type    Overactive bladder    Hiatal hernia with GERD  hiatal hernia repaired    PAD (peripheral artery disease)    Hyperlipidemia, unspecified hyperlipidemia type    Essential hypertension    Hypothyroidism    COPD (chronic obstructive pulmonary disease)    CAD (coronary artery disease)    History of lumbar fusion  with laminectomy 11/8/2018    H/O hernia repair  hiatal hernia - 2017, 2018    S/P dilatation and curettage  1991    H/O rhinoplasty  1980    H/O angioplasty  Right iliac artery. 5/12/2017    H/O ventral hernia repair  3/2017    History of incision and drainage  of lower extremity incisional site x 5 . last done5/2015    H/O arterial bypass of lower limb  Femoral - Popliteal. 11/2014 Left lower side with stents    History of laparoscopic cholecystectomy  2/2016      MEDICATIONS  (STANDING):  ARIPiprazole 20 milliGRAM(s) Oral daily  atorvastatin 40 milliGRAM(s) Oral at bedtime  budesonide  80 MICROgram(s)/formoterol 4.5 MICROgram(s) Inhaler 2 Puff(s) Inhalation two times a day  cholestyramine Powder (Sugar-Free) 4 Gram(s) Oral daily  dextrose 5%. 1000 milliLiter(s) (50 mL/Hr) IV Continuous <Continuous>  dextrose 50% Injectable 12.5 Gram(s) IV Push once  dextrose 50% Injectable 25 Gram(s) IV Push once  dextrose 50% Injectable 25 Gram(s) IV Push once  dicyclomine 20 milliGRAM(s) Oral two times a day before meals  insulin lispro (HumaLOG) corrective regimen sliding scale   SubCutaneous Before meals and at bedtime  levothyroxine 125 MICROGram(s) Oral daily  pantoprazole  Injectable 40 milliGRAM(s) IV Push two times a day  piperacillin/tazobactam IVPB.. 3.375 Gram(s) IV Intermittent every 12 hours  ranolazine 500 milliGRAM(s) Oral two times a day  sodium bicarbonate  Infusion 0.16 mEq/kG/Hr (75 mL/Hr) IV Continuous <Continuous>    MEDICATIONS  (PRN):  dextrose 40% Gel 15 Gram(s) Oral once PRN Blood Glucose LESS THAN 70 milliGRAM(s)/deciliter  glucagon  Injectable 1 milliGRAM(s) IntraMuscular once PRN Glucose LESS THAN 70 milligrams/deciliter    Allergies    No Known Allergies    Intolerances      SOCIAL HISTORY:  FAMILY HISTORY:  Family history of asthma (Sibling)  sister    Family history of epilepsy (Sibling, Sibling)  2 sisters    Family history of cardiovascular disease  mother    Family history of stroke  mother    Family history of heart disease  father    Family history of alcoholism in father      REVIEW OF SYSTEMS:  CONSTITUTIONAL: No weakness, fevers or chills  RESPIRATORY: No cough, wheezing, hemoptysis; No shortness of breath  CARDIOVASCULAR: No chest pain or palpitations  GASTROINTESTINAL: Per HPI.     Vital Signs Last 24 Hrs  T(C): 36.6 (13 Oct 2020 17:43), Max: 36.6 (13 Oct 2020 17:43)  T(F): 97.8 (13 Oct 2020 17:43), Max: 97.8 (13 Oct 2020 17:43)  HR: 91 (13 Oct 2020 19:32) (88 - 96)  BP: 125/57 (13 Oct 2020 19:32) (94/61 - 125/57)  BP(mean): 60 (13 Oct 2020 16:06) (56 - 70)  RR: 15 (13 Oct 2020 16:06) (12 - 21)  SpO2: 100% (13 Oct 2020 13:00) (100% - 100%)    PHYSICAL EXAM:  Constitutional: Middle aged female with multiple medical issues in NAD.   Respiratory: CTAB  Cardiovascular: S1 and S2, RRR, no M/G/R  Gastrointestinal: Distended, firm, +BS    LABS:                        11.7   12.10 )-----------( 183      ( 14 Oct 2020 07:42 )             35.5     10-13    133<L>  |  101  |  68<H>  ----------------------------<  153<H>  3.3<L>   |  17<L>  |  3.04<H>    Ca    6.6<L>      13 Oct 2020 15:44  Mg     2.0     10-13    TPro  6.1  /  Alb  1.4<L>  /  TBili  0.8  /  DBili  x   /  AST  46<H>  /  ALT  33  /  AlkPhos  132<H>  10-12    PT/INR - ( 12 Oct 2020 16:16 )   PT: 14.9 sec;   INR: 1.29 ratio         PTT - ( 12 Oct 2020 16:16 )  PTT:29.0 sec  LIVER FUNCTIONS - ( 12 Oct 2020 17:06 )  Alb: 1.4 g/dL / Pro: 6.1 gm/dL / ALK PHOS: 132 U/L / ALT: 33 U/L / AST: 46 U/L / GGT: x             RADIOLOGY & ADDITIONAL STUDIES:

## 2020-10-14 NOTE — CONSULT NOTE ADULT - ASSESSMENT
A/P 59F with extensive vascular hx/cardiac issues presents colitis    Possible Cdiff- follow up labs  if concerns for obstruction- can get small bowel series  Correct multiple electrolyte abnormality  bowel rest  ivf   hydration  gi ppx    d/w Dr sanchez

## 2020-10-14 NOTE — BEHAVIORAL HEALTH ASSESSMENT NOTE - NSBHCHARTREVIEWVS_PSY_A_CORE FT
Vital Signs Last 24 Hrs  T(C): 36.6 (13 Oct 2020 17:43), Max: 36.6 (13 Oct 2020 17:43)  T(F): 97.8 (13 Oct 2020 17:43), Max: 97.8 (13 Oct 2020 17:43)  HR: 88 (14 Oct 2020 09:52) (88 - 95)  BP: 125/57 (13 Oct 2020 19:32) (123/57 - 125/57)  BP(mean): --  RR: --  SpO2: --

## 2020-10-15 LAB
-  AMIKACIN: SIGNIFICANT CHANGE UP
-  AMPICILLIN/SULBACTAM: SIGNIFICANT CHANGE UP
-  AMPICILLIN: SIGNIFICANT CHANGE UP
-  AZTREONAM: SIGNIFICANT CHANGE UP
-  CEFAZOLIN: SIGNIFICANT CHANGE UP
-  CEFEPIME: SIGNIFICANT CHANGE UP
-  CEFOXITIN: SIGNIFICANT CHANGE UP
-  CEFTRIAXONE: SIGNIFICANT CHANGE UP
-  CIPROFLOXACIN: SIGNIFICANT CHANGE UP
-  ERTAPENEM: SIGNIFICANT CHANGE UP
-  GENTAMICIN: SIGNIFICANT CHANGE UP
-  IMIPENEM: SIGNIFICANT CHANGE UP
-  LEVOFLOXACIN: SIGNIFICANT CHANGE UP
-  MEROPENEM: SIGNIFICANT CHANGE UP
-  PIPERACILLIN/TAZOBACTAM: SIGNIFICANT CHANGE UP
-  TOBRAMYCIN: SIGNIFICANT CHANGE UP
-  TRIMETHOPRIM/SULFAMETHOXAZOLE: SIGNIFICANT CHANGE UP
ALBUMIN SERPL ELPH-MCNC: 1.2 G/DL — LOW (ref 3.3–5)
ALP SERPL-CCNC: 131 U/L — HIGH (ref 40–120)
ALT FLD-CCNC: 47 U/L — SIGNIFICANT CHANGE UP (ref 12–78)
ANION GAP SERPL CALC-SCNC: 10 MMOL/L — SIGNIFICANT CHANGE UP (ref 5–17)
ANION GAP SERPL CALC-SCNC: 14 MMOL/L — SIGNIFICANT CHANGE UP (ref 5–17)
ANION GAP SERPL CALC-SCNC: 15 MMOL/L — SIGNIFICANT CHANGE UP (ref 5–17)
APTT BLD: 30.7 SEC — SIGNIFICANT CHANGE UP (ref 27.5–35.5)
APTT BLD: 46.3 SEC — HIGH (ref 27.5–35.5)
AST SERPL-CCNC: 140 U/L — HIGH (ref 15–37)
BASE EXCESS BLDA CALC-SCNC: -6.5 MMOL/L — LOW (ref -2–2)
BASE EXCESS BLDA CALC-SCNC: -9.4 MMOL/L — LOW (ref -2–2)
BILIRUB SERPL-MCNC: 0.5 MG/DL — SIGNIFICANT CHANGE UP (ref 0.2–1.2)
BLOOD GAS COMMENTS ARTERIAL: SIGNIFICANT CHANGE UP
BLOOD GAS COMMENTS ARTERIAL: SIGNIFICANT CHANGE UP
BUN SERPL-MCNC: 47 MG/DL — HIGH (ref 7–23)
BUN SERPL-MCNC: 49 MG/DL — HIGH (ref 7–23)
BUN SERPL-MCNC: 55 MG/DL — HIGH (ref 7–23)
CALCIUM SERPL-MCNC: 5.2 MG/DL — CRITICAL LOW (ref 8.5–10.1)
CALCIUM SERPL-MCNC: 5.5 MG/DL — CRITICAL LOW (ref 8.5–10.1)
CALCIUM SERPL-MCNC: 6.1 MG/DL — CRITICAL LOW (ref 8.5–10.1)
CHLORIDE SERPL-SCNC: 101 MMOL/L — SIGNIFICANT CHANGE UP (ref 96–108)
CHLORIDE SERPL-SCNC: 103 MMOL/L — SIGNIFICANT CHANGE UP (ref 96–108)
CHLORIDE SERPL-SCNC: 92 MMOL/L — LOW (ref 96–108)
CK SERPL-CCNC: 1624 U/L — HIGH (ref 26–192)
CO2 SERPL-SCNC: 16 MMOL/L — LOW (ref 22–31)
CO2 SERPL-SCNC: 16 MMOL/L — LOW (ref 22–31)
CO2 SERPL-SCNC: 27 MMOL/L — SIGNIFICANT CHANGE UP (ref 22–31)
CREAT SERPL-MCNC: 2.12 MG/DL — HIGH (ref 0.5–1.3)
CREAT SERPL-MCNC: 2.23 MG/DL — HIGH (ref 0.5–1.3)
CREAT SERPL-MCNC: 2.36 MG/DL — HIGH (ref 0.5–1.3)
CULTURE RESULTS: SIGNIFICANT CHANGE UP
CULTURE RESULTS: SIGNIFICANT CHANGE UP
GAS PNL BLDA: SIGNIFICANT CHANGE UP
GAS PNL BLDA: SIGNIFICANT CHANGE UP
GLUCOSE SERPL-MCNC: 261 MG/DL — HIGH (ref 70–99)
GLUCOSE SERPL-MCNC: 271 MG/DL — HIGH (ref 70–99)
GLUCOSE SERPL-MCNC: 286 MG/DL — HIGH (ref 70–99)
HCO3 BLDA-SCNC: 15 MMOL/L — LOW (ref 21–29)
HCO3 BLDA-SCNC: 16 MMOL/L — LOW (ref 21–29)
HCT VFR BLD CALC: 26 % — LOW (ref 34.5–45)
HCT VFR BLD CALC: 29 % — LOW (ref 34.5–45)
HCT VFR BLD CALC: 29.5 % — LOW (ref 34.5–45)
HCT VFR BLD CALC: 33.7 % — LOW (ref 34.5–45)
HGB BLD-MCNC: 10.9 G/DL — LOW (ref 11.5–15.5)
HGB BLD-MCNC: 8.7 G/DL — LOW (ref 11.5–15.5)
HGB BLD-MCNC: 9.5 G/DL — LOW (ref 11.5–15.5)
HGB BLD-MCNC: 9.7 G/DL — LOW (ref 11.5–15.5)
INR BLD: 1.42 RATIO — HIGH (ref 0.88–1.16)
LACTATE SERPL-SCNC: 5.2 MMOL/L — CRITICAL HIGH (ref 0.7–2)
MCHC RBC-ENTMCNC: 27.9 PG — SIGNIFICANT CHANGE UP (ref 27–34)
MCHC RBC-ENTMCNC: 28 PG — SIGNIFICANT CHANGE UP (ref 27–34)
MCHC RBC-ENTMCNC: 28.2 PG — SIGNIFICANT CHANGE UP (ref 27–34)
MCHC RBC-ENTMCNC: 29.4 PG — SIGNIFICANT CHANGE UP (ref 27–34)
MCHC RBC-ENTMCNC: 32.3 GM/DL — SIGNIFICANT CHANGE UP (ref 32–36)
MCHC RBC-ENTMCNC: 32.8 GM/DL — SIGNIFICANT CHANGE UP (ref 32–36)
MCHC RBC-ENTMCNC: 32.9 GM/DL — SIGNIFICANT CHANGE UP (ref 32–36)
MCHC RBC-ENTMCNC: 33.5 GM/DL — SIGNIFICANT CHANGE UP (ref 32–36)
MCV RBC AUTO: 84.4 FL — SIGNIFICANT CHANGE UP (ref 80–100)
MCV RBC AUTO: 85.3 FL — SIGNIFICANT CHANGE UP (ref 80–100)
MCV RBC AUTO: 86.6 FL — SIGNIFICANT CHANGE UP (ref 80–100)
MCV RBC AUTO: 89.4 FL — SIGNIFICANT CHANGE UP (ref 80–100)
METHOD TYPE: SIGNIFICANT CHANGE UP
ORGANISM # SPEC MICROSCOPIC CNT: SIGNIFICANT CHANGE UP
PCO2 BLDA: 26 MMHG — LOW (ref 32–46)
PCO2 BLDA: 29 MMHG — LOW (ref 32–46)
PH BLDA: 7.34 — LOW (ref 7.35–7.45)
PH BLDA: 7.42 — SIGNIFICANT CHANGE UP (ref 7.35–7.45)
PLATELET # BLD AUTO: 144 K/UL — LOW (ref 150–400)
PLATELET # BLD AUTO: 76 K/UL — LOW (ref 150–400)
PLATELET # BLD AUTO: 84 K/UL — LOW (ref 150–400)
PLATELET # BLD AUTO: SIGNIFICANT CHANGE UP K/UL (ref 150–400)
PO2 BLDA: 147 MMHG — HIGH (ref 74–108)
PO2 BLDA: 155 MMHG — HIGH (ref 74–108)
POTASSIUM SERPL-MCNC: 3.3 MMOL/L — LOW (ref 3.5–5.3)
POTASSIUM SERPL-MCNC: 3.4 MMOL/L — LOW (ref 3.5–5.3)
POTASSIUM SERPL-MCNC: 4.1 MMOL/L — SIGNIFICANT CHANGE UP (ref 3.5–5.3)
POTASSIUM SERPL-SCNC: 3.3 MMOL/L — LOW (ref 3.5–5.3)
POTASSIUM SERPL-SCNC: 3.4 MMOL/L — LOW (ref 3.5–5.3)
POTASSIUM SERPL-SCNC: 4.1 MMOL/L — SIGNIFICANT CHANGE UP (ref 3.5–5.3)
PROT SERPL-MCNC: 5.7 GM/DL — LOW (ref 6–8.3)
PROTHROM AB SERPL-ACNC: 16.2 SEC — HIGH (ref 10.6–13.6)
RBC # BLD: 3.08 M/UL — LOW (ref 3.8–5.2)
RBC # BLD: 3.3 M/UL — LOW (ref 3.8–5.2)
RBC # BLD: 3.4 M/UL — LOW (ref 3.8–5.2)
RBC # BLD: 3.89 M/UL — SIGNIFICANT CHANGE UP (ref 3.8–5.2)
RBC # FLD: 15.1 % — HIGH (ref 10.3–14.5)
RBC # FLD: 16.2 % — HIGH (ref 10.3–14.5)
RBC # FLD: 16.5 % — HIGH (ref 10.3–14.5)
RBC # FLD: 16.7 % — HIGH (ref 10.3–14.5)
SAO2 % BLDA: 97 % — HIGH (ref 92–96)
SAO2 % BLDA: 98 % — HIGH (ref 92–96)
SODIUM SERPL-SCNC: 129 MMOL/L — LOW (ref 135–145)
SODIUM SERPL-SCNC: 132 MMOL/L — LOW (ref 135–145)
SODIUM SERPL-SCNC: 133 MMOL/L — LOW (ref 135–145)
SPECIMEN SOURCE: SIGNIFICANT CHANGE UP
SPECIMEN SOURCE: SIGNIFICANT CHANGE UP
WBC # BLD: 11.18 K/UL — HIGH (ref 3.8–10.5)
WBC # BLD: 17.11 K/UL — HIGH (ref 3.8–10.5)
WBC # BLD: 19.9 K/UL — HIGH (ref 3.8–10.5)
WBC # BLD: 19.97 K/UL — HIGH (ref 3.8–10.5)
WBC # FLD AUTO: 11.18 K/UL — HIGH (ref 3.8–10.5)
WBC # FLD AUTO: 17.11 K/UL — HIGH (ref 3.8–10.5)
WBC # FLD AUTO: 19.9 K/UL — HIGH (ref 3.8–10.5)
WBC # FLD AUTO: 19.97 K/UL — HIGH (ref 3.8–10.5)

## 2020-10-15 PROCEDURE — 27602 DECOMPRESSION OF LOWER LEG: CPT | Mod: RT

## 2020-10-15 PROCEDURE — 75625 CONTRAST EXAM ABDOMINL AORTA: CPT | Mod: 26

## 2020-10-15 PROCEDURE — 99232 SBSQ HOSP IP/OBS MODERATE 35: CPT

## 2020-10-15 PROCEDURE — 71045 X-RAY EXAM CHEST 1 VIEW: CPT | Mod: 26

## 2020-10-15 PROCEDURE — 99233 SBSQ HOSP IP/OBS HIGH 50: CPT

## 2020-10-15 PROCEDURE — 99291 CRITICAL CARE FIRST HOUR: CPT | Mod: 25

## 2020-10-15 PROCEDURE — 37221: CPT | Mod: RT

## 2020-10-15 PROCEDURE — 71045 X-RAY EXAM CHEST 1 VIEW: CPT | Mod: 26,77

## 2020-10-15 PROCEDURE — 36556 INSERT NON-TUNNEL CV CATH: CPT

## 2020-10-15 PROCEDURE — 99292 CRITICAL CARE ADDL 30 MIN: CPT | Mod: 25

## 2020-10-15 PROCEDURE — 75710 ARTERY X-RAYS ARM/LEG: CPT | Mod: 26,59

## 2020-10-15 PROCEDURE — 36200 PLACE CATHETER IN AORTA: CPT | Mod: RT,59

## 2020-10-15 PROCEDURE — 35656 BPG FEMORAL-POPLITEAL: CPT | Mod: RT

## 2020-10-15 RX ORDER — SODIUM CHLORIDE 9 MG/ML
1000 INJECTION, SOLUTION INTRAVENOUS ONCE
Refills: 0 | Status: COMPLETED | OUTPATIENT
Start: 2020-10-15 | End: 2020-10-15

## 2020-10-15 RX ORDER — HEPARIN SODIUM 5000 [USP'U]/ML
5500 INJECTION INTRAVENOUS; SUBCUTANEOUS EVERY 6 HOURS
Refills: 0 | Status: DISCONTINUED | OUTPATIENT
Start: 2020-10-15 | End: 2020-10-15

## 2020-10-15 RX ORDER — MORPHINE SULFATE 50 MG/1
0.5 CAPSULE, EXTENDED RELEASE ORAL ONCE
Refills: 0 | Status: DISCONTINUED | OUTPATIENT
Start: 2020-10-15 | End: 2020-10-15

## 2020-10-15 RX ORDER — HEPARIN SODIUM 5000 [USP'U]/ML
2000 INJECTION INTRAVENOUS; SUBCUTANEOUS EVERY 6 HOURS
Refills: 0 | Status: DISCONTINUED | OUTPATIENT
Start: 2020-10-15 | End: 2020-10-15

## 2020-10-15 RX ORDER — SODIUM CHLORIDE 9 MG/ML
500 INJECTION INTRAMUSCULAR; INTRAVENOUS; SUBCUTANEOUS ONCE
Refills: 0 | Status: DISCONTINUED | OUTPATIENT
Start: 2020-10-15 | End: 2020-10-15

## 2020-10-15 RX ORDER — HEPARIN SODIUM 5000 [USP'U]/ML
INJECTION INTRAVENOUS; SUBCUTANEOUS
Qty: 25000 | Refills: 0 | Status: DISCONTINUED | OUTPATIENT
Start: 2020-10-15 | End: 2020-10-15

## 2020-10-15 RX ORDER — SODIUM CHLORIDE 9 MG/ML
1000 INJECTION INTRAMUSCULAR; INTRAVENOUS; SUBCUTANEOUS
Refills: 0 | Status: DISCONTINUED | OUTPATIENT
Start: 2020-10-15 | End: 2020-10-15

## 2020-10-15 RX ORDER — HEPARIN SODIUM 5000 [USP'U]/ML
5500 INJECTION INTRAVENOUS; SUBCUTANEOUS ONCE
Refills: 0 | Status: DISCONTINUED | OUTPATIENT
Start: 2020-10-15 | End: 2020-10-15

## 2020-10-15 RX ORDER — PHENYLEPHRINE HYDROCHLORIDE 10 MG/ML
1.4 INJECTION INTRAVENOUS
Qty: 40 | Refills: 0 | Status: DISCONTINUED | OUTPATIENT
Start: 2020-10-15 | End: 2020-10-16

## 2020-10-15 RX ORDER — ALBUMIN HUMAN 25 %
100 VIAL (ML) INTRAVENOUS EVERY 8 HOURS
Refills: 0 | Status: COMPLETED | OUTPATIENT
Start: 2020-10-15 | End: 2020-10-16

## 2020-10-15 RX ORDER — HYDROMORPHONE HYDROCHLORIDE 2 MG/ML
1 INJECTION INTRAMUSCULAR; INTRAVENOUS; SUBCUTANEOUS EVERY 4 HOURS
Refills: 0 | Status: DISCONTINUED | OUTPATIENT
Start: 2020-10-15 | End: 2020-10-21

## 2020-10-15 RX ORDER — HEPARIN SODIUM 5000 [USP'U]/ML
2500 INJECTION INTRAVENOUS; SUBCUTANEOUS EVERY 6 HOURS
Refills: 0 | Status: DISCONTINUED | OUTPATIENT
Start: 2020-10-15 | End: 2020-10-15

## 2020-10-15 RX ORDER — NOREPINEPHRINE BITARTRATE/D5W 8 MG/250ML
0.05 PLASTIC BAG, INJECTION (ML) INTRAVENOUS
Qty: 16 | Refills: 0 | Status: DISCONTINUED | OUTPATIENT
Start: 2020-10-15 | End: 2020-10-17

## 2020-10-15 RX ORDER — PROPOFOL 10 MG/ML
10 INJECTION, EMULSION INTRAVENOUS
Qty: 1000 | Refills: 0 | Status: DISCONTINUED | OUTPATIENT
Start: 2020-10-15 | End: 2020-10-17

## 2020-10-15 RX ORDER — HEPARIN SODIUM 5000 [USP'U]/ML
4000 INJECTION INTRAVENOUS; SUBCUTANEOUS EVERY 6 HOURS
Refills: 0 | Status: DISCONTINUED | OUTPATIENT
Start: 2020-10-15 | End: 2020-10-15

## 2020-10-15 RX ORDER — VANCOMYCIN HCL 1 G
1000 VIAL (EA) INTRAVENOUS ONCE
Refills: 0 | Status: COMPLETED | OUTPATIENT
Start: 2020-10-15 | End: 2020-10-15

## 2020-10-15 RX ORDER — HEPARIN SODIUM 5000 [USP'U]/ML
4000 INJECTION INTRAVENOUS; SUBCUTANEOUS ONCE
Refills: 0 | Status: COMPLETED | OUTPATIENT
Start: 2020-10-15 | End: 2020-10-15

## 2020-10-15 RX ORDER — SODIUM CHLORIDE 9 MG/ML
500 INJECTION INTRAMUSCULAR; INTRAVENOUS; SUBCUTANEOUS ONCE
Refills: 0 | Status: COMPLETED | OUTPATIENT
Start: 2020-10-15 | End: 2020-10-15

## 2020-10-15 RX ORDER — CHLORHEXIDINE GLUCONATE 213 G/1000ML
15 SOLUTION TOPICAL EVERY 12 HOURS
Refills: 0 | Status: DISCONTINUED | OUTPATIENT
Start: 2020-10-15 | End: 2020-10-18

## 2020-10-15 RX ORDER — CALCIUM GLUCONATE 100 MG/ML
1 VIAL (ML) INTRAVENOUS ONCE
Refills: 0 | Status: COMPLETED | OUTPATIENT
Start: 2020-10-15 | End: 2020-10-15

## 2020-10-15 RX ADMIN — Medication 125 MEQ/KG/HR: at 19:05

## 2020-10-15 RX ADMIN — PIPERACILLIN AND TAZOBACTAM 25 GRAM(S): 4; .5 INJECTION, POWDER, LYOPHILIZED, FOR SOLUTION INTRAVENOUS at 23:53

## 2020-10-15 RX ADMIN — PROPOFOL 3.12 MICROGRAM(S)/KG/MIN: 10 INJECTION, EMULSION INTRAVENOUS at 15:40

## 2020-10-15 RX ADMIN — SODIUM CHLORIDE 1000 MILLILITER(S): 9 INJECTION, SOLUTION INTRAVENOUS at 21:13

## 2020-10-15 RX ADMIN — Medication 20 MILLIGRAM(S): at 06:20

## 2020-10-15 RX ADMIN — HEPARIN SODIUM 1000 UNIT(S)/HR: 5000 INJECTION INTRAVENOUS; SUBCUTANEOUS at 07:42

## 2020-10-15 RX ADMIN — Medication 125 MICROGRAM(S): at 06:20

## 2020-10-15 RX ADMIN — SODIUM CHLORIDE 500 MILLILITER(S): 9 INJECTION INTRAMUSCULAR; INTRAVENOUS; SUBCUTANEOUS at 16:03

## 2020-10-15 RX ADMIN — HEPARIN SODIUM 4000 UNIT(S): 5000 INJECTION INTRAVENOUS; SUBCUTANEOUS at 06:59

## 2020-10-15 RX ADMIN — PANTOPRAZOLE SODIUM 40 MILLIGRAM(S): 20 TABLET, DELAYED RELEASE ORAL at 23:53

## 2020-10-15 RX ADMIN — BUDESONIDE AND FORMOTEROL FUMARATE DIHYDRATE 2 PUFF(S): 160; 4.5 AEROSOL RESPIRATORY (INHALATION) at 07:57

## 2020-10-15 RX ADMIN — Medication 6: at 06:20

## 2020-10-15 RX ADMIN — SODIUM CHLORIDE 75 MILLILITER(S): 9 INJECTION INTRAMUSCULAR; INTRAVENOUS; SUBCUTANEOUS at 16:03

## 2020-10-15 RX ADMIN — SODIUM CHLORIDE 1000 MILLILITER(S): 9 INJECTION INTRAMUSCULAR; INTRAVENOUS; SUBCUTANEOUS at 17:14

## 2020-10-15 RX ADMIN — MORPHINE SULFATE 0.5 MILLIGRAM(S): 50 CAPSULE, EXTENDED RELEASE ORAL at 06:59

## 2020-10-15 RX ADMIN — PHENYLEPHRINE HYDROCHLORIDE 27.3 MICROGRAM(S)/KG/MIN: 10 INJECTION INTRAVENOUS at 16:06

## 2020-10-15 RX ADMIN — Medication 100 GRAM(S): at 18:56

## 2020-10-15 RX ADMIN — Medication 2.44 MICROGRAM(S)/KG/MIN: at 18:47

## 2020-10-15 RX ADMIN — ATORVASTATIN CALCIUM 40 MILLIGRAM(S): 80 TABLET, FILM COATED ORAL at 23:53

## 2020-10-15 RX ADMIN — SODIUM CHLORIDE 1000 MILLILITER(S): 9 INJECTION, SOLUTION INTRAVENOUS at 23:00

## 2020-10-15 RX ADMIN — Medication 250 MILLIGRAM(S): at 19:07

## 2020-10-15 NOTE — CONSULT NOTE ADULT - ASSESSMENT
A/P 59F admitted for SBO/Ileus- now with Right Cold Leg    -NPO  -IVF  -Stat Heparin Drip started  -OR for Angiogram/Thrombectomy        d/w Dr Arce

## 2020-10-15 NOTE — PROGRESS NOTE ADULT - ASSESSMENT
58yo female with multiple medical issues presented with anemia  she had negative egd/colonoscopy in 3/20 and is now hemoccult negative  h/h stable without evidence of gi bleeding  OK for iv heparin  likely ileus due to multiple issues causing her distention  hold off on MR to view her bile duct given only mild liver enzyme elevation

## 2020-10-15 NOTE — PROGRESS NOTE ADULT - ASSESSMENT
Patient with CAD, PVD  s/p stent bypass for ischemia leg, with lower leg fasciotomy  also ARF on top of crf, creatinine 2.3 preop, poor urine output during or  hypotensive post op, ? related bleeding and anesthesia  previous sepsis related to Gram negative sepsis, klebsiella  which was possible related to UTI  patient has been having diarrhea, and abdominal pain, she is at risk of ischemic colitis    Plan    Admit to ICU     receiving blood given or blood loss will check hgb     on phenylephrine, will titrate down     zosyn for klebsiella bacteremia     leave intubated till hemodynamically stable     AMISH will hydrate, momitor urien output     last echo ef 55%

## 2020-10-15 NOTE — PROGRESS NOTE ADULT - SUBJECTIVE AND OBJECTIVE BOX
Patient is a 59y old  Female who presents with a chief complaint of hypotension, lower abtominal pain (14 Oct 2020 15:53)      HPI:  events noted  painful right left with no palpable pulse - await stat study  heparin started empirically  she has some passage of gas and loose stool with slight improvement in distention and pain    MEDICATIONS  (STANDING):  ARIPiprazole 20 milliGRAM(s) Oral daily  atorvastatin 40 milliGRAM(s) Oral at bedtime  budesonide  80 MICROgram(s)/formoterol 4.5 MICROgram(s) Inhaler 2 Puff(s) Inhalation two times a day  cholestyramine Powder (Sugar-Free) 4 Gram(s) Oral daily  dextrose 5%. 1000 milliLiter(s) (50 mL/Hr) IV Continuous <Continuous>  dextrose 50% Injectable 12.5 Gram(s) IV Push once  dextrose 50% Injectable 25 Gram(s) IV Push once  dextrose 50% Injectable 25 Gram(s) IV Push once  dicyclomine 20 milliGRAM(s) Oral two times a day before meals  heparin   Injectable 4000 Unit(s) IV Push once  heparin  Infusion.  Unit(s)/Hr (10 mL/Hr) IV Continuous <Continuous>  insulin lispro (HumaLOG) corrective regimen sliding scale   SubCutaneous Before meals and at bedtime  levothyroxine 125 MICROGram(s) Oral daily  morphine  - Injectable 0.5 milliGRAM(s) IV Push once  pantoprazole  Injectable 40 milliGRAM(s) IV Push two times a day  piperacillin/tazobactam IVPB.. 3.375 Gram(s) IV Intermittent every 12 hours  ranolazine 500 milliGRAM(s) Oral two times a day  sodium bicarbonate  Infusion 0.267 mEq/kG/Hr (125 mL/Hr) IV Continuous <Continuous>    MEDICATIONS  (PRN):  dextrose 40% Gel 15 Gram(s) Oral once PRN Blood Glucose LESS THAN 70 milliGRAM(s)/deciliter  glucagon  Injectable 1 milliGRAM(s) IntraMuscular once PRN Glucose LESS THAN 70 milligrams/deciliter  heparin   Injectable 4000 Unit(s) IV Push every 6 hours PRN For aPTT less than 40  heparin   Injectable 2000 Unit(s) IV Push every 6 hours PRN For aPTT between 40 - 57    Allergies  No Known Allergies    REVIEW OF SYSTEMS:    CONSTITUTIONAL: weakness  RESPIRATORY: No cough, wheezing, hemoptysis; No shortness of breath  CARDIOVASCULAR: No chest pain or palpitations  GASTROINTESTINAL: as above  All other review of systems is negative unless indicated above.    Vital Signs Last 24 Hrs  T(C): 36.5 (14 Oct 2020 20:44), Max: 36.5 (14 Oct 2020 20:44)  T(F): 97.7 (14 Oct 2020 20:44), Max: 97.7 (14 Oct 2020 20:44)  HR: 98 (14 Oct 2020 20:44) (88 - 98)  BP: 111/91 (14 Oct 2020 20:44) (111/91 - 111/91)  BP(mean): --  RR: 15 (14 Oct 2020 20:44) (15 - 15)  SpO2: 97% (14 Oct 2020 20:44) (97% - 97%)    PHYSICAL EXAM:  Constitutional: appears uncomfortable  Gastrointestinal: softly distended, decreased bowel sounds  Extremities: no pulse on right    LABS:                        11.7   12.10 )-----------( 183      ( 14 Oct 2020 07:42 )             35.5     10-14    130<L>  |  98  |  59<H>  ----------------------------<  196<H>  3.9   |  21<L>  |  2.63<H>    Ca    6.2<LL>      14 Oct 2020 14:49  Phos  3.4     10-14  Mg     1.8     10-14    TPro  6.4  /  Alb  1.3<L>  /  TBili  0.5  /  DBili  0.2  /  AST  96<H>  /  ALT  42  /  AlkPhos  155<H>  10-14      LIVER FUNCTIONS - ( 14 Oct 2020 07:42 )  Alb: 1.3 g/dL / Pro: 6.4 gm/dL / ALK PHOS: 155 U/L / ALT: 42 U/L / AST: 96 U/L / GGT: x             RADIOLOGY & ADDITIONAL STUDIES:

## 2020-10-15 NOTE — SEPSIS NOTE - ASSESSMENT
Shock with lactic acidosis    POCUS shows a virtual IVC and an empty LV     Aggressive fluid resus in effect.  ABX for bacteremia  La sens K. Pneumonia       Is on low dose nor-epi that we are trying to take off as she is volume responsive.    D/W  surgical resident.  Fasciotomy performed showed dead muscle in leg which explains the lactic acidosis     CCT 43 min Shock with lactic acidosis AMISH anuric     POCUS shows a virtual IVC and an empty LV     Aggressive fluid resus in effect.  ABX for bacteremia  La sens K. Pneumonia       Is on low dose nor-epi that we are trying to take off as she is volume responsive.    D/W  surgical resident.  Fasciotomy performed showed dead muscle in leg which explains the lactic acidosis     CCT 43 min

## 2020-10-15 NOTE — PROGRESS NOTE ADULT - SUBJECTIVE AND OBJECTIVE BOX
Patient with persistent hypotension  on phenylephrine  minimal urine output  developing acidosis  remains intubated and sedated

## 2020-10-15 NOTE — CONSULT NOTE ADULT - ATTENDING COMMENTS
Patient evaluated at the bedside. RLE ischemia-acute with neuro muscular involvement.  Critical condition with multi organ failure: GI-Renal and now peripheral vascular. S/p recent MI and admission for colitis-sbo-GI bleed. Off of antiplatelets. Hx of BLE revascularization with extensive stenting and LLE bypass. Active smoker. High risk for limb loss. Patient started on hep gtt and to go to the OR emergently. Patient at this point is refusing primary amputation and wants everything done fo try to save the foot.

## 2020-10-15 NOTE — PROGRESS NOTE ADULT - ASSESSMENT
Shock  hypovalemic  vs, septic shock, ? related anesthesia  focused echo collapsing ivc  will give additional fluid  start bicarbonate drip  check f/u h/h  will give dose vanco to broaden abx coverage  repeat blood cultures sent

## 2020-10-15 NOTE — PROGRESS NOTE ADULT - SUBJECTIVE AND OBJECTIVE BOX
Patient is a 59y Female who   seen in ICU   Dr Saldana at bedside  earlier evetns; take to OR for cold RLE - required urgent     angioplasty of iliac vessel with stent placement     decompression fasciotomy    femoral pop bypass    required PRBC in OR   postop pt hypotensive and transferred to ICU    remains hypotensive in multiple pressors         REVIEW OF SYSTEMS:  UTO intubated    MEDICATIONS  (STANDING):  ARIPiprazole 20 milliGRAM(s) Oral daily  atorvastatin 40 milliGRAM(s) Oral at bedtime  budesonide  80 MICROgram(s)/formoterol 4.5 MICROgram(s) Inhaler 2 Puff(s) Inhalation two times a day  chlorhexidine 0.12% Liquid 15 milliLiter(s) Oral Mucosa every 12 hours  cholestyramine Powder (Sugar-Free) 4 Gram(s) Oral daily  dextrose 5%. 1000 milliLiter(s) (50 mL/Hr) IV Continuous <Continuous>  dextrose 50% Injectable 12.5 Gram(s) IV Push once  dextrose 50% Injectable 25 Gram(s) IV Push once  dextrose 50% Injectable 25 Gram(s) IV Push once  dicyclomine 20 milliGRAM(s) Oral two times a day before meals  insulin lispro (HumaLOG) corrective regimen sliding scale   SubCutaneous Before meals and at bedtime  levothyroxine 125 MICROGram(s) Oral daily  norepinephrine Infusion 0.05 MICROgram(s)/kG/Min (2.44 mL/Hr) IV Continuous <Continuous>  pantoprazole  Injectable 40 milliGRAM(s) IV Push two times a day  phenylephrine    Infusion 1.4 MICROgram(s)/kG/Min (27.3 mL/Hr) IV Continuous <Continuous>  piperacillin/tazobactam IVPB.. 3.375 Gram(s) IV Intermittent every 12 hours  propofol Infusion 10 MICROgram(s)/kG/Min (3.12 mL/Hr) IV Continuous <Continuous>  ranolazine 500 milliGRAM(s) Oral two times a day  sodium bicarbonate  Infusion 0.267 mEq/kG/Hr (125 mL/Hr) IV Continuous <Continuous>    MEDICATIONS  (PRN):  dextrose 40% Gel 15 Gram(s) Oral once PRN Blood Glucose LESS THAN 70 milliGRAM(s)/deciliter  glucagon  Injectable 1 milliGRAM(s) IntraMuscular once PRN Glucose LESS THAN 70 milligrams/deciliter  HYDROmorphone  Injectable 1 milliGRAM(s) IV Push every 4 hours PRN Moderate Pain (4 - 6)    ICU Vital Signs Last 24 Hrs  T(C): 34.9 (15 Oct 2020 17:00), Max: 36.6 (15 Oct 2020 14:49)  T(F): 94.8 (15 Oct 2020 17:00), Max: 97.8 (15 Oct 2020 14:49)  HR: 95 (15 Oct 2020 18:00) (92 - 123)  BP: 126/68 (15 Oct 2020 08:20) (111/91 - 126/68)  BP(mean): --  ABP: 97/57 (15 Oct 2020 18:00) (88/48 - 111/62)  ABP(mean): 72 (15 Oct 2020 18:00) (68 - 72)  RR: 20 (15 Oct 2020 18:00) (14 - 20)  SpO2: 100% (15 Oct 2020 18:00) (97% - 100%)  I&O's Detail    14 Oct 2020 07:01  -  15 Oct 2020 07:00  --------------------------------------------------------  IN:    Sodium Bicarbonate: 500 mL  Total IN: 500 mL    OUT:    Indwelling Catheter - Urethral (mL): 350 mL    Voided (mL): 100 mL  Total OUT: 450 mL    Total NET: 50 mL      15 Oct 2020 07:01  -  15 Oct 2020 19:16  --------------------------------------------------------  IN:    Other (mL): 3700 mL    sodium chloride 0.9%: 375 mL    Sodium Chloride 0.9% Bolus: 1000 mL  Total IN: 5075 mL    OUT:    Indwelling Catheter - Urethral (mL): 5 mL    Nasogastric/Oral tube (mL): 400 mL    Other (mL): 50 mL  Total OUT: 455 mL    Total NET: 4620 mL        HYSICAL EXAM:    Constitutional: intubated   HEENT: sedated   Neck: No LAD, No JVD  Respiratory: dist  Cardiovascular: S1 and S2  Gastrointestinal: BS+, soft, NT/ND  Extremities: RLE bandaged   Neurological: sedated on vent   : Black - no urine   Skin: No rashes  Access: Not applicable        LABS:                  132    |  101    |  49     ----------------------------<  271       15 Oct 2020 15:35  4.1     |  16     |  2.23     129    |  92     |  55     ----------------------------<  286       15 Oct 2020 07:04  3.3     |  27     |  2.36     130    |  98     |  59     ----------------------------<  196       14 Oct 2020 14:49  3.9     |  21     |  2.63     Ca    5.2        15 Oct 2020 15:35  Ca    6.1        15 Oct 2020 07:04    Phos  3.4       14 Oct 2020 07:42    Mg     1.8       14 Oct 2020 07:42  Mg     2.0       13 Oct 2020 15:44    TPro  5.7    /  Alb  1.2    /  TBili  0.5    /        15 Oct 2020 07:04  DBili  x      /  AST  140    /  ALT  47     /  AlkPhos  131      TPro  6.4    /  Alb  1.3    /  TBili  0.5    /        14 Oct 2020 07:42  DBili  0.2    /  AST  96     /  ALT  42     /  AlkPhos  155                              10.9   17.11 )-----------( Clumped    ( 15 Oct 2020 15:35 )             33.7                         9.7    11.18 )-----------( 144      ( 15 Oct 2020 07:04 )             29.5     Urine Studies:  Urinalysis Basic - ( 12 Oct 2020 19:17 )    Color: Yellow / Appearance: Slightly Turbid / S.010 / pH: x  Gluc: x / Ketone: Negative  / Bili: Negative / Urobili: 1 mg/dL   Blood: x / Protein: 30 mg/dL / Nitrite: Negative   Leuk Esterase: Moderate / RBC: 6-10 /HPF / WBC >50   Sq Epi: x / Non Sq Epi: Few / Bacteria: Many      RADIOLOGY & ADDITIONAL STUDIES:

## 2020-10-15 NOTE — PROVIDER CONTACT NOTE (CHANGE IN STATUS NOTIFICATION) - RECOMMENDATIONS
Vascular consult, arterial doppler, pain management Vascular consult STAT, arterial doppler, restarting AC, pain management

## 2020-10-15 NOTE — CHART NOTE - NSCHARTNOTEFT_GEN_A_CORE
59y female w/ pmh CAD s/p STEMI Aug, occluded RCA w/o PCI,  PAD s/p fem pop bypass/iliac stent in 2014, T2D, hypothyroidism, chronic smoker, chronic back pain/hx lumbar surgery w/ recent admission in August for STEMI as above, PEA arrest, septic shock /ecoli uti/pneumonia/effusion. Admitted and being managed for GI bleed, pt off AC since 10/12/20.     Called to bedside due to cold right foot.   Pt seen, c/o excruciating right LE pain.     Vitals  T(F): 97.7 (10-14-20 @ 20:44), Max: 97.7 (10-14-20 @ 20:44)  HR: 98 (10-14-20 @ 20:44) (88 - 98)  BP: 111/91 (10-14-20 @ 20:44) (111/91 - 111/91)  RR: 15 (10-14-20 @ 20:44) (15 - 15)  SpO2: 97% (10-14-20 @ 20:44) (97% - 97%)    Physical Exam   Gen: Uncomfortable, in pain  Extremities: RLE pallor, colder when compared to LLE. Dorsalis pedis & posterior tibialis nonpalpable. Popliteal unable to palpate due to pain. Bedside doppler U/S, no pulses on RLE dorsalis pedis or posterior tibialis assessed on femoral.    Skin: nl warm and dry, no wounds   Neuro: A&Ox3, answering questions appropriately      LABS:  cret                        11.7   12.10 )-----------( 183      ( 14 Oct 2020 07:42 )             35.5     10-14    130<L>  |  98  |  59<H>  ----------------------------<  196<H>  3.9   |  21<L>  |  2.63<H>    Ca    6.2<LL>      14 Oct 2020 14:49  Phos  3.4     10-14  Mg     1.8     10-14    TPro  6.4  /  Alb  1.3<L>  /  TBili  0.5  /  DBili  0.2  /  AST  96<H>  /  ALT  42  /  AlkPhos  155<H>  10-14    MEDICATIONS  (STANDING):  ARIPiprazole 20 milliGRAM(s) Oral daily  atorvastatin 40 milliGRAM(s) Oral at bedtime  budesonide  80 MICROgram(s)/formoterol 4.5 MICROgram(s) Inhaler 2 Puff(s) Inhalation two times a day  cholestyramine Powder (Sugar-Free) 4 Gram(s) Oral daily  dextrose 5%. 1000 milliLiter(s) (50 mL/Hr) IV Continuous <Continuous>  dextrose 50% Injectable 12.5 Gram(s) IV Push once  dextrose 50% Injectable 25 Gram(s) IV Push once  dextrose 50% Injectable 25 Gram(s) IV Push once  dicyclomine 20 milliGRAM(s) Oral two times a day before meals  heparin   Injectable 5500 Unit(s) IV Push once  heparin  Infusion.  Unit(s)/Hr (12 mL/Hr) IV Continuous <Continuous>  insulin lispro (HumaLOG) corrective regimen sliding scale   SubCutaneous Before meals and at bedtime  levothyroxine 125 MICROGram(s) Oral daily  pantoprazole  Injectable 40 milliGRAM(s) IV Push two times a day  piperacillin/tazobactam IVPB.. 3.375 Gram(s) IV Intermittent every 12 hours  ranolazine 500 milliGRAM(s) Oral two times a day  sodium bicarbonate  Infusion 0.267 mEq/kG/Hr (125 mL/Hr) IV Continuous <Continuous>    MEDICATIONS  (PRN):  dextrose 40% Gel 15 Gram(s) Oral once PRN Blood Glucose LESS THAN 70 milliGRAM(s)/deciliter  glucagon  Injectable 1 milliGRAM(s) IntraMuscular once PRN Glucose LESS THAN 70 milligrams/deciliter  heparin   Injectable 5500 Unit(s) IV Push every 6 hours PRN For aPTT less than 40  heparin   Injectable 2500 Unit(s) IV Push every 6 hours PRN For aPTT between 40 - 57      A/P:   59y female w/ pmh CAD s/p STEMI Aug, occluded RCA w/o PCI,  PAD s/p fem pop bypass/iliac stent in 2014, T2D, hypothyroidism, chronic smoker, chronic back pain/hx lumbar surgery w/ recent admission in August for STEMI as above, PEA arrest, septic shock /ecoli uti/pneumonia/effusion. Admitted and being managed for GI bleed, pt off AC since 10/12/20.     Called to bedside due to cold right foot. Pt in excruciating pain of RLE, cold upon palpation, no pedal pulses palpated upon manual palpation or bedside doppler US.   -Called Dr. España, suggested STAT CTA Abdominal Aorta w run off  -Started on heparin drip  -STAT PT/PTT/INR  -Official vascular consult placed, will come and evaluate pt    Case discussed w Dr. España

## 2020-10-15 NOTE — PROVIDER CONTACT NOTE (CHANGE IN STATUS NOTIFICATION) - ASSESSMENT
R leg cold, pain with gentle palpation, right side paler, prolonged capillary refill on R compared to L, unable to palpate pedal pulses or obtain doppler on R side. Can obtain doppler on L pedal pulse.

## 2020-10-15 NOTE — CHART NOTE - NSCHARTNOTEFT_GEN_A_CORE
Full consult to follow:  Called this AM for acute change on patients PE.   Patient has a cold R foot with paralysis and paresthesias from knee down to the toes.   Patient states symptoms may have started yesterday but they are worse today.   Patient has extensive PVD hx and was recently admitted with GI bleed vs ileus. All antiplatelet medications are on hold.   I spoke to patients daughter and she informed me not only did she have a LLE bypass but she also has hx of RLE stents.   Patient also underwent a cardiac cath on 8/20 that was diagnostic. Found to have RCA occlusion. No intervention was done due to ongoing sepsis and renal failure.   Intra abdominal process is stable. No tenderness or guarding.   At this point the priority is the RLE. Patient is aware there is a real risk of limb loss given advance neuro motor symptoms.   She is now on heparin drip.   I will perform an angiogram and try to revascularize leg understanding she is a high risk and trying to minimize surgical time as much as possible.   Non contrast CTs were reviewed and she does have inflow Ao Iliac disease. I will target inflow first and then runoff.   OR was called.

## 2020-10-15 NOTE — CONSULT NOTE ADULT - SUBJECTIVE AND OBJECTIVE BOX
59y female w/ pmh CAD s/p STEMI Aug, occluded RCA w/o PCI,  PAD s/p fem pop bypass/iliac stent in 2014, T2D, hypothyroidism, chronic smoker, chronic back pain/hx lumbar surgery w/ recent admission in August for STEMI as above, PEA arrest, septic shock /ecoli uti/pneumonia/effusion. Admitted and being managed for GI bleed, pt off AC since 10/12/20.     Called to bedside due to cold right foot.   Pt seen, c/o excruciating right LE pain.   Mottled foot, with lack of sensation.       ICU Vital Signs Last 24 Hrs  T(C): 36.5 (14 Oct 2020 20:44), Max: 36.5 (14 Oct 2020 20:44)  T(F): 97.7 (14 Oct 2020 20:44), Max: 97.7 (14 Oct 2020 20:44)  HR: 92 (15 Oct 2020 07:59) (88 - 98)  BP: 111/91 (14 Oct 2020 20:44) (111/91 - 111/91)  BP(mean): --  ABP: --  ABP(mean): --  RR: 15 (14 Oct 2020 20:44) (15 - 15)  SpO2: 97% (14 Oct 2020 20:44) (97% - 97%)        Gen alert awake, in pain  cardiac s1 s2   abd soft ttp in infraumbilical region,  Ext right foot cold, non palpable pulse, pallor and mottling of foot  absent femoral pulse on right side  calf tenderness  Left leg palpable pulse                            9.7    11.18 )-----------( 144      ( 15 Oct 2020 07:04 )             29.5   10-15    129<L>  |  92<L>  |  55<H>  ----------------------------<  286<H>  3.3<L>   |  27  |  2.36<H>    Ca    6.1<LL>      15 Oct 2020 07:04  Phos  3.4     10-14  Mg     1.8     10-14    TPro  5.7<L>  /  Alb  1.2<L>  /  TBili  0.5  /  DBili  x   /  AST  140<H>  /  ALT  47  /  AlkPhos  131<H>  10-15

## 2020-10-15 NOTE — PROGRESS NOTE ADULT - ASSESSMENT
59 CAD, PAD s/p fem-pop bypass s/p RT iliac stent, diabetes, hypothyroidism, hyperlipidemia, hx of cigg/cocaine use,  chronic back pain s/p lumbar surgery recent admit with STEMI c/b UTI sepsis and E. coli bacteremia.Hospital course c/b code blue, AMISH.  Now [facility] with abd discomofort, diarrhea and elevated creatinine. Creatinine was near 3.0 at DC.   Due to concern for Blood loss - a/c was discontinued     Now with new onset ischemic right foot - emergent fem pop bypass and fasciotomy of right leg      AMISH    monitor Cr postop    expect rise in Creatinine post today events    agree with IVF resucitation - keep SBP > 110 as able    trend labs - monitor lytes   check CPK levels post fasciotmy /compartment sydrome      Met Acidosis   IV HCO3 gtt as above     Hypocalcemia with hypoalbuminemia  - Corrected Ca 7.44    trend this    assess and replete mag /phos as needed     RLE ischemia    per Vascular     d/w Dr Saldana at bedside

## 2020-10-15 NOTE — PROGRESS NOTE ADULT - SUBJECTIVE AND OBJECTIVE BOX
cc: abdominal pain  hpi: 59y female w/ pmh CAD s/p STEMI Aug, occluded RCA w/o PCI,  PAD s/p fem pop bypass/iliac stent, T2D, hypothyroidism, chronic smoker, chronic back pain/hx lumbar surgery w/ recent admission in August for STEMI as above, PEA arrest, septic shock /ecoli uti/pneumonia/effusion.     Patient readmitted 10/12 to ICU from nursing home w/ abd pain, dark stools, acute blood loss anemia.  Also found to have positive blood cultures.   Workup in progress.   Patient downgraded from icu to tele on 10/13.     10/14- seen around noon- feeling better than she did this morning w/ less abd pain, no n/v at present.  Upset about frequent loose bms, upset she's here again.  Discussed at length w/ daughter, Antoinette, and pt.  Both requesting Psych for her hx of bipolar/anxiety/depression.     10/15- overnight events/early am notes reviewed.   Pt seen this am prior to OR , still w/ leg pain.  No abd pain.   Loose bms improving. No other complaints.     ros- as per hpi, other 10 point ros negative     Vital Signs Last 24 Hrs  T(C): 36.5 (14 Oct 2020 20:44), Max: 36.5 (14 Oct 2020 20:44)  T(F): 97.7 (14 Oct 2020 20:44), Max: 97.7 (14 Oct 2020 20:44)  HR: 100 (15 Oct 2020 08:20) (92 - 100)  BP: 126/68 (15 Oct 2020 08:20) (111/91 - 126/68)  BP(mean): --  RR: 14 (15 Oct 2020 08:20) (14 - 15)  SpO2: 100% (15 Oct 2020 08:20) (97% - 100%)  T(C): 36.6 (13 Oct 2020 17:43), Max: 36.6 (13 Oct 2020 17:43)      PHYSICAL EXAM:  General: ill appearing female, NAD; appears older than stated age   Neuro: AAOx3, no focal deficits  HEENT: NCAT, dry MM  Neck: Soft and supple  Respiratory: CTA b/l, no w/r/r  Cardiovascular: S1 and S2, RRR  Gastrointestinal: distended, tender to palpation   : hughes w/ dark urine  Extremities: rle cold, ttp, non palp. pulses       LABS: All Labs Reviewed:                        9.7    11.18 )-----------( 144      ( 15 Oct 2020 07:04 )             29.5     10-15    129<L>  |  92<L>  |  55<H>  ----------------------------<  286<H>  3.3<L>   |  27  |  2.36<H>    Ca    6.1<LL>      15 Oct 2020 07:04  Phos  3.4     10-14  Mg     1.8     10-14    TPro  5.7<L>  /  Alb  1.2<L>  /  TBili  0.5  /  DBili  x   /  AST  140<H>  /  ALT  47  /  AlkPhos  131<H>  10-15    PT/INR - ( 15 Oct 2020 07:04 )   PT: 16.2 sec;   INR: 1.42 ratio         PTT - ( 15 Oct 2020 07:04 )  PTT:30.7 sec        < from: CT Abdomen and Pelvis No Cont (10.12.20 @ 18:56) >  IMPRESSION: The study is done without contrast and cannot therefore exclude any active GI bleeding.  Cholecystectomy  Patchy bilateral lower lobe infiltrates versus atelectasis  Extensive vascular calcifications more than expected for the stated age      < end of copied text >    MEDICATIONS  (STANDING):  ARIPiprazole 20 milliGRAM(s) Oral daily  atorvastatin 40 milliGRAM(s) Oral at bedtime  budesonide  80 MICROgram(s)/formoterol 4.5 MICROgram(s) Inhaler 2 Puff(s) Inhalation two times a day  cholestyramine Powder (Sugar-Free) 4 Gram(s) Oral daily  dextrose 5%. 1000 milliLiter(s) (50 mL/Hr) IV Continuous <Continuous>  dextrose 50% Injectable 12.5 Gram(s) IV Push once  dextrose 50% Injectable 25 Gram(s) IV Push once  dextrose 50% Injectable 25 Gram(s) IV Push once  dicyclomine 20 milliGRAM(s) Oral two times a day before meals  heparin  Infusion.  Unit(s)/Hr (10 mL/Hr) IV Continuous <Continuous>  insulin lispro (HumaLOG) corrective regimen sliding scale   SubCutaneous Before meals and at bedtime  levothyroxine 125 MICROGram(s) Oral daily  pantoprazole  Injectable 40 milliGRAM(s) IV Push two times a day  piperacillin/tazobactam IVPB.. 3.375 Gram(s) IV Intermittent every 12 hours  ranolazine 500 milliGRAM(s) Oral two times a day  sodium bicarbonate  Infusion 0.267 mEq/kG/Hr (125 mL/Hr) IV Continuous <Continuous>    MEDICATIONS  (PRN):  dextrose 40% Gel 15 Gram(s) Oral once PRN Blood Glucose LESS THAN 70 milliGRAM(s)/deciliter  glucagon  Injectable 1 milliGRAM(s) IntraMuscular once PRN Glucose LESS THAN 70 milligrams/deciliter  heparin   Injectable 4000 Unit(s) IV Push every 6 hours PRN For aPTT less than 40  heparin   Injectable 2000 Unit(s) IV Push every 6 hours PRN For aPTT between 40 - 57        ASSESSMENT and PLAN:     59y female w/     1. acute blood loss anemia ?etiology .  resolved  - admitted to icu 10/12.  transfused 2uprbc w/ improvement   - aspirin, plavix held.  h/h stable.  fobt negative   - PPI  - GI f/u appreciated- no plan for inpatient scope.    10/15- ok for heparin drip for LE    2. abdominal pain, diarrhea- possible proctitis  - persistent, worse today 10/14- discussed w/ GI  - initial CT abd non contrast noted  - repeat CT abd today 10/14 discussed w/ GI--> Surgery consulted.  keep npo, ivf    - stool studies negative  - Surgery eval appreciated- doubt ileus.  rec npo, ivf, serial abd exams      3. AMISH on CKD3, non AG acidosis, hyponatremia   - continue bicarb ivf but increase rate  discussed w/ Nephrology- eval appreciated   10/15- continue ivf, supplement lytes.      4. bacteremia +klebsiella  - ?source. urine vs abd source.    pt was on rocephin, now on zosyn.  Pt does have chronic hughes now since recent admission early Sept   - last hospital course in Aug ecoli bacteremia/sepsis due to uti.  pt also tx for pna then   - patchy lower lobe infiltrates vs atelectasis seen on CT abd   - follow up cx/sensitivities, repeat blood cultures.  ID consult appreciated     5. hx CAD w/ occluded RCA  August cath but no PCI.  med management at time.  aspirin, plavix held for now b/c anemia- will need to resume w/in day or 2 if h/h stable  - PAD, T2D- as above.  continue statin    10/15- resume aspirin, plavix when ok w/ GI    6. PAD s/p fem pop bypass/iliac stent,  now w/ RLE occlusion- OR today , heparin drip , f/u h/h    7. bipolar disorder/anxiety/depression  - continue home meds, daughter/patient requesting Psych eval appreciated      8. dvt px heparin        full code discussed w/ daughter 10/14

## 2020-10-16 LAB
AMMONIA BLD-MCNC: 35 UMOL/L — HIGH (ref 11–32)
ANION GAP SERPL CALC-SCNC: 9 MMOL/L — SIGNIFICANT CHANGE UP (ref 5–17)
APTT BLD: 175.5 SEC — CRITICAL HIGH (ref 27.5–35.5)
APTT BLD: > 200 SEC (ref 27.5–35.5)
BASE EXCESS BLDA CALC-SCNC: 0.2 MMOL/L — SIGNIFICANT CHANGE UP (ref -2–2)
BLOOD GAS COMMENTS ARTERIAL: SIGNIFICANT CHANGE UP
BUN SERPL-MCNC: 45 MG/DL — HIGH (ref 7–23)
CALCIUM SERPL-MCNC: 5.5 MG/DL — CRITICAL LOW (ref 8.5–10.1)
CHLORIDE SERPL-SCNC: 101 MMOL/L — SIGNIFICANT CHANGE UP (ref 96–108)
CK SERPL-CCNC: 1303 U/L — HIGH (ref 26–192)
CK SERPL-CCNC: 890 U/L — HIGH (ref 26–192)
CO2 SERPL-SCNC: 25 MMOL/L — SIGNIFICANT CHANGE UP (ref 22–31)
CREAT SERPL-MCNC: 2.24 MG/DL — HIGH (ref 0.5–1.3)
GAS PNL BLDA: SIGNIFICANT CHANGE UP
GLUCOSE SERPL-MCNC: 75 MG/DL — SIGNIFICANT CHANGE UP (ref 70–99)
HCO3 BLDA-SCNC: 22 MMOL/L — SIGNIFICANT CHANGE UP (ref 21–29)
HCT VFR BLD CALC: 19 % — CRITICAL LOW (ref 34.5–45)
HCT VFR BLD CALC: 22.8 % — LOW (ref 34.5–45)
HCT VFR BLD CALC: 24.3 % — LOW (ref 34.5–45)
HGB BLD-MCNC: 6.4 G/DL — CRITICAL LOW (ref 11.5–15.5)
HGB BLD-MCNC: 7.6 G/DL — LOW (ref 11.5–15.5)
HGB BLD-MCNC: 8.2 G/DL — LOW (ref 11.5–15.5)
LACTATE SERPL-SCNC: 1.9 MMOL/L — SIGNIFICANT CHANGE UP (ref 0.7–2)
LACTATE SERPL-SCNC: 4.5 MMOL/L — CRITICAL HIGH (ref 0.7–2)
MCHC RBC-ENTMCNC: 27.8 PG — SIGNIFICANT CHANGE UP (ref 27–34)
MCHC RBC-ENTMCNC: 27.9 PG — SIGNIFICANT CHANGE UP (ref 27–34)
MCHC RBC-ENTMCNC: 28.1 PG — SIGNIFICANT CHANGE UP (ref 27–34)
MCHC RBC-ENTMCNC: 33.3 GM/DL — SIGNIFICANT CHANGE UP (ref 32–36)
MCHC RBC-ENTMCNC: 33.7 GM/DL — SIGNIFICANT CHANGE UP (ref 32–36)
MCHC RBC-ENTMCNC: 33.7 GM/DL — SIGNIFICANT CHANGE UP (ref 32–36)
MCV RBC AUTO: 82.4 FL — SIGNIFICANT CHANGE UP (ref 80–100)
MCV RBC AUTO: 83 FL — SIGNIFICANT CHANGE UP (ref 80–100)
MCV RBC AUTO: 84.4 FL — SIGNIFICANT CHANGE UP (ref 80–100)
PCO2 BLDA: 26 MMHG — LOW (ref 32–46)
PH BLDA: 7.54 — HIGH (ref 7.35–7.45)
PLATELET # BLD AUTO: 73 K/UL — LOW (ref 150–400)
PLATELET # BLD AUTO: 74 K/UL — LOW (ref 150–400)
PLATELET # BLD AUTO: 75 K/UL — LOW (ref 150–400)
PO2 BLDA: 144 MMHG — HIGH (ref 74–108)
POTASSIUM SERPL-MCNC: 3.1 MMOL/L — LOW (ref 3.5–5.3)
POTASSIUM SERPL-SCNC: 3.1 MMOL/L — LOW (ref 3.5–5.3)
RBC # BLD: 2.29 M/UL — LOW (ref 3.8–5.2)
RBC # BLD: 2.7 M/UL — LOW (ref 3.8–5.2)
RBC # BLD: 2.95 M/UL — LOW (ref 3.8–5.2)
RBC # FLD: 16.8 % — HIGH (ref 10.3–14.5)
RBC # FLD: 17.1 % — HIGH (ref 10.3–14.5)
RBC # FLD: 17.2 % — HIGH (ref 10.3–14.5)
SAO2 % BLDA: 97 % — HIGH (ref 92–96)
SODIUM SERPL-SCNC: 135 MMOL/L — SIGNIFICANT CHANGE UP (ref 135–145)
WBC # BLD: 12.72 K/UL — HIGH (ref 3.8–10.5)
WBC # BLD: 12.89 K/UL — HIGH (ref 3.8–10.5)
WBC # BLD: 18.67 K/UL — HIGH (ref 3.8–10.5)
WBC # FLD AUTO: 12.72 K/UL — HIGH (ref 3.8–10.5)
WBC # FLD AUTO: 12.89 K/UL — HIGH (ref 3.8–10.5)
WBC # FLD AUTO: 18.67 K/UL — HIGH (ref 3.8–10.5)

## 2020-10-16 PROCEDURE — 99231 SBSQ HOSP IP/OBS SF/LOW 25: CPT

## 2020-10-16 PROCEDURE — 99291 CRITICAL CARE FIRST HOUR: CPT

## 2020-10-16 RX ORDER — FUROSEMIDE 40 MG
80 TABLET ORAL ONCE
Refills: 0 | Status: COMPLETED | OUTPATIENT
Start: 2020-10-16 | End: 2020-10-16

## 2020-10-16 RX ORDER — SODIUM CHLORIDE 9 MG/ML
1000 INJECTION, SOLUTION INTRAVENOUS ONCE
Refills: 0 | Status: COMPLETED | OUTPATIENT
Start: 2020-10-16 | End: 2020-10-15

## 2020-10-16 RX ORDER — ASPIRIN/CALCIUM CARB/MAGNESIUM 324 MG
300 TABLET ORAL DAILY
Refills: 0 | Status: DISCONTINUED | OUTPATIENT
Start: 2020-10-16 | End: 2020-10-16

## 2020-10-16 RX ORDER — HEPARIN SODIUM 5000 [USP'U]/ML
4000 INJECTION INTRAVENOUS; SUBCUTANEOUS EVERY 6 HOURS
Refills: 0 | Status: DISCONTINUED | OUTPATIENT
Start: 2020-10-16 | End: 2020-10-16

## 2020-10-16 RX ORDER — ASPIRIN/CALCIUM CARB/MAGNESIUM 324 MG
325 TABLET ORAL DAILY
Refills: 0 | Status: DISCONTINUED | OUTPATIENT
Start: 2020-10-16 | End: 2020-10-18

## 2020-10-16 RX ORDER — INSULIN GLARGINE 100 [IU]/ML
10 INJECTION, SOLUTION SUBCUTANEOUS EVERY MORNING
Refills: 0 | Status: DISCONTINUED | OUTPATIENT
Start: 2020-10-16 | End: 2020-10-17

## 2020-10-16 RX ORDER — SODIUM CHLORIDE 9 MG/ML
1000 INJECTION, SOLUTION INTRAVENOUS ONCE
Refills: 0 | Status: DISCONTINUED | OUTPATIENT
Start: 2020-10-16 | End: 2020-10-16

## 2020-10-16 RX ORDER — HEPARIN SODIUM 5000 [USP'U]/ML
INJECTION INTRAVENOUS; SUBCUTANEOUS
Qty: 25000 | Refills: 0 | Status: DISCONTINUED | OUTPATIENT
Start: 2020-10-16 | End: 2020-10-17

## 2020-10-16 RX ORDER — CALCIUM GLUCONATE 100 MG/ML
2 VIAL (ML) INTRAVENOUS ONCE
Refills: 0 | Status: COMPLETED | OUTPATIENT
Start: 2020-10-16 | End: 2020-10-16

## 2020-10-16 RX ORDER — HEPARIN SODIUM 5000 [USP'U]/ML
2000 INJECTION INTRAVENOUS; SUBCUTANEOUS EVERY 6 HOURS
Refills: 0 | Status: DISCONTINUED | OUTPATIENT
Start: 2020-10-16 | End: 2020-10-16

## 2020-10-16 RX ORDER — SODIUM CHLORIDE 9 MG/ML
1000 INJECTION INTRAMUSCULAR; INTRAVENOUS; SUBCUTANEOUS
Refills: 0 | Status: DISCONTINUED | OUTPATIENT
Start: 2020-10-16 | End: 2020-10-17

## 2020-10-16 RX ORDER — FUROSEMIDE 40 MG
40 TABLET ORAL ONCE
Refills: 0 | Status: COMPLETED | OUTPATIENT
Start: 2020-10-16 | End: 2020-10-16

## 2020-10-16 RX ADMIN — Medication 2.44 MICROGRAM(S)/KG/MIN: at 17:58

## 2020-10-16 RX ADMIN — CHLORHEXIDINE GLUCONATE 15 MILLILITER(S): 213 SOLUTION TOPICAL at 22:29

## 2020-10-16 RX ADMIN — Medication 20 MILLIGRAM(S): at 05:10

## 2020-10-16 RX ADMIN — PANTOPRAZOLE SODIUM 40 MILLIGRAM(S): 20 TABLET, DELAYED RELEASE ORAL at 22:29

## 2020-10-16 RX ADMIN — HEPARIN SODIUM 1000 UNIT(S)/HR: 5000 INJECTION INTRAVENOUS; SUBCUTANEOUS at 09:39

## 2020-10-16 RX ADMIN — HEPARIN SODIUM 800 UNIT(S)/HR: 5000 INJECTION INTRAVENOUS; SUBCUTANEOUS at 17:19

## 2020-10-16 RX ADMIN — CHLORHEXIDINE GLUCONATE 15 MILLILITER(S): 213 SOLUTION TOPICAL at 09:40

## 2020-10-16 RX ADMIN — SODIUM CHLORIDE 75 MILLILITER(S): 9 INJECTION INTRAMUSCULAR; INTRAVENOUS; SUBCUTANEOUS at 20:39

## 2020-10-16 RX ADMIN — Medication 325 MILLIGRAM(S): at 09:41

## 2020-10-16 RX ADMIN — ARIPIPRAZOLE 20 MILLIGRAM(S): 15 TABLET ORAL at 09:41

## 2020-10-16 RX ADMIN — Medication 125 MICROGRAM(S): at 05:03

## 2020-10-16 RX ADMIN — PANTOPRAZOLE SODIUM 40 MILLIGRAM(S): 20 TABLET, DELAYED RELEASE ORAL at 09:40

## 2020-10-16 RX ADMIN — Medication 50 MILLILITER(S): at 05:04

## 2020-10-16 RX ADMIN — PIPERACILLIN AND TAZOBACTAM 25 GRAM(S): 4; .5 INJECTION, POWDER, LYOPHILIZED, FOR SOLUTION INTRAVENOUS at 09:42

## 2020-10-16 RX ADMIN — HEPARIN SODIUM 0 UNIT(S)/HR: 5000 INJECTION INTRAVENOUS; SUBCUTANEOUS at 16:18

## 2020-10-16 RX ADMIN — HYDROMORPHONE HYDROCHLORIDE 1 MILLIGRAM(S): 2 INJECTION INTRAMUSCULAR; INTRAVENOUS; SUBCUTANEOUS at 16:58

## 2020-10-16 RX ADMIN — BUDESONIDE AND FORMOTEROL FUMARATE DIHYDRATE 2 PUFF(S): 160; 4.5 AEROSOL RESPIRATORY (INHALATION) at 20:51

## 2020-10-16 RX ADMIN — INSULIN GLARGINE 10 UNIT(S): 100 INJECTION, SOLUTION SUBCUTANEOUS at 11:34

## 2020-10-16 RX ADMIN — HYDROMORPHONE HYDROCHLORIDE 1 MILLIGRAM(S): 2 INJECTION INTRAMUSCULAR; INTRAVENOUS; SUBCUTANEOUS at 16:43

## 2020-10-16 RX ADMIN — Medication 80 MILLIGRAM(S): at 20:39

## 2020-10-16 RX ADMIN — Medication 50 MILLILITER(S): at 00:35

## 2020-10-16 RX ADMIN — Medication 8: at 00:09

## 2020-10-16 RX ADMIN — Medication 8: at 11:36

## 2020-10-16 RX ADMIN — Medication 6: at 05:28

## 2020-10-16 RX ADMIN — Medication 200 GRAM(S): at 06:17

## 2020-10-16 RX ADMIN — Medication 40 MILLIGRAM(S): at 16:02

## 2020-10-16 RX ADMIN — CHLORHEXIDINE GLUCONATE 15 MILLILITER(S): 213 SOLUTION TOPICAL at 00:10

## 2020-10-16 RX ADMIN — Medication 4: at 17:21

## 2020-10-16 RX ADMIN — Medication 50 MILLILITER(S): at 15:06

## 2020-10-16 RX ADMIN — PIPERACILLIN AND TAZOBACTAM 25 GRAM(S): 4; .5 INJECTION, POWDER, LYOPHILIZED, FOR SOLUTION INTRAVENOUS at 17:58

## 2020-10-16 NOTE — PROVIDER CONTACT NOTE (CRITICAL VALUE NOTIFICATION) - SITUATION
admitted with suspected GI bleed, s/p right fem pop bypass with fasciotomy. on heparin gtt @800units. levo gtt @.1mcg admitted with suspected GI bleed, s/p right fem pop bypass with fasciotomy. on heparin gtt @800units. levo gtt @0.1mcg

## 2020-10-16 NOTE — PROGRESS NOTE ADULT - SUBJECTIVE AND OBJECTIVE BOX
HPI:     58 y/o female pmhx DM2, hx STEMI in august w/ RCA occulusion ( no intervention performed), w/ hospital course complicated by  respiratory failure, pneumonia, e.coli sepsis and renal failure. She was transferred from SNF w/ hypotension and abdominal pain. Admitted w/ anemia, hypokalemia, metabolic acidosis w/ AMISH.    multiple previous vascular procedure  Patient had bacteremic with Klebsiella in blood on Zosyn, source felt to be urine  given gi bleeding on admission antiplatelet agents were held  leg. on 10/15 the patient was found to have a cold right foot.   She went to OR and underwent right stent placement on fem pop.   At end of procedure pateint became hypotensive, started on pressors  hgb stable, shock improved overnight    ROS cant obtain Patient is intubated    MEDICATIONS  (STANDING):  albumin human 25% IVPB 100 milliLiter(s) IV Intermittent every 8 hours  ARIPiprazole 20 milliGRAM(s) Oral daily  aspirin 325 milliGRAM(s) Oral daily  atorvastatin 40 milliGRAM(s) Oral at bedtime  budesonide  80 MICROgram(s)/formoterol 4.5 MICROgram(s) Inhaler 2 Puff(s) Inhalation two times a day  chlorhexidine 0.12% Liquid 15 milliLiter(s) Oral Mucosa every 12 hours  cholestyramine Powder (Sugar-Free) 4 Gram(s) Oral daily  dextrose 5%. 1000 milliLiter(s) (50 mL/Hr) IV Continuous <Continuous>  dextrose 50% Injectable 12.5 Gram(s) IV Push once  dextrose 50% Injectable 25 Gram(s) IV Push once  dextrose 50% Injectable 25 Gram(s) IV Push once  dicyclomine 20 milliGRAM(s) Oral two times a day before meals  heparin  Infusion.  Unit(s)/Hr (10 mL/Hr) IV Continuous <Continuous>  insulin glargine Injectable (LANTUS) 10 Unit(s) SubCutaneous every morning  insulin lispro (HumaLOG) corrective regimen sliding scale   SubCutaneous Before meals and at bedtime  levothyroxine 125 MICROGram(s) Oral daily  norepinephrine Infusion 0.05 MICROgram(s)/kG/Min (2.44 mL/Hr) IV Continuous <Continuous>  pantoprazole  Injectable 40 milliGRAM(s) IV Push two times a day  piperacillin/tazobactam IVPB.. 3.375 Gram(s) IV Intermittent every 12 hours  propofol Infusion 10 MICROgram(s)/kG/Min (3.12 mL/Hr) IV Continuous <Continuous>  sodium chloride 0.9%. 1000 milliLiter(s) (75 mL/Hr) IV Continuous <Continuous>    MEDICATIONS  (PRN):  dextrose 40% Gel 15 Gram(s) Oral once PRN Blood Glucose LESS THAN 70 milliGRAM(s)/deciliter  glucagon  Injectable 1 milliGRAM(s) IntraMuscular once PRN Glucose LESS THAN 70 milligrams/deciliter  heparin   Injectable 4000 Unit(s) IV Push every 6 hours PRN For aPTT less than 40  heparin   Injectable 2000 Unit(s) IV Push every 6 hours PRN For aPTT between 40 - 57  HYDROmorphone  Injectable 1 milliGRAM(s) IV Push every 4 hours PRN Moderate Pain (4 - 6)    ICU Vital Signs Last 24 Hrs  T(C): 36 (16 Oct 2020 08:00), Max: 36.7 (16 Oct 2020 02:00)  T(F): 96.8 (16 Oct 2020 08:00), Max: 98 (16 Oct 2020 02:00)  HR: 108 (16 Oct 2020 09:00) (94 - 123)  BP: 96/64 (15 Oct 2020 21:57) (96/64 - 96/64)  BP(mean): 74 (15 Oct 2020 21:57) (74 - 74)  ABP: 132/54 (16 Oct 2020 09:00) (79/45 - 147/60)  ABP(mean): 80 (16 Oct 2020 09:00) (57 - 96)  RR: 14 (16 Oct 2020 09:00) (14 - 20)  SpO2: 100% (16 Oct 2020 09:00) (95% - 100%)      Mode: AC/ CMV (Assist Control/ Continuous Mandatory Ventilation)  RR (machine): 14  TV (machine): 450  FiO2: 40  PEEP: 5  ITime: 1  PIP: 19      I&O's Summary    15 Oct 2020 07:01  -  16 Oct 2020 07:00  --------------------------------------------------------  IN: 9687 mL / OUT: 854 mL / NET: 8833 mL                          8.2    18.67 )-----------( 73       ( 16 Oct 2020 04:46 )             24.3       10-16    132<L>  |  99  |  44<H>  ----------------------------<  313<H>  3.4<L>   |  22  |  2.03<H>    Ca    5.4<LL>      16 Oct 2020 04:46    TPro  4.0<L>  /  Alb  1.2<L>  /  TBili  0.4  /  DBili  x   /  AST  952<H>  /  ALT  278<H>  /  AlkPhos  105  10-16      CARDIAC MARKERS ( 16 Oct 2020 04:46 )  x     / x     / 1303 U/L / x     / x      CARDIAC MARKERS ( 15 Oct 2020 21:00 )  x     / x     / 1624 U/L / x     / x            ABG - ( 16 Oct 2020 06:26 )  pH, Arterial: 7.54  pH, Blood: x     /  pCO2: 26    /  pO2: 144   / HCO3: 22    / Base Excess: .2    /  SaO2: 97        DVT Prophylaxis:   IV Heparin                                                              Advanced Directives: Full Code

## 2020-10-16 NOTE — PROVIDER CONTACT NOTE (CRITICAL VALUE NOTIFICATION) - ASSESSMENT
scant red (jelly consistency) noted around perianal area, no overt signs and symptoms of active bleeding noted, surgical incisions stable.

## 2020-10-16 NOTE — PROGRESS NOTE ADULT - ASSESSMENT
59 CAD, PAD s/p fem-pop bypass s/p RT iliac stent, diabetes, hypothyroidism, hyperlipidemia, hx of cigg/cocaine use,  chronic back pain s/p lumbar surgery recent admit with STEMI c/b UTI sepsis and E. coli bacteremia.     AMISH    monitor Cr post op, stable today   agree with IVF with soft MAP, pressors to continue for now   check CPK levels post fasciotomy /compartment syndrome     Hypocalcemia with hypoalbuminemia    trend this , ionized ca can be checked   assess and replete mag /phos as needed     RLE ischemia    per Vascular     d/w RN staff  Dr. Lema to cover the weekend

## 2020-10-16 NOTE — PROVIDER CONTACT NOTE (CRITICAL VALUE NOTIFICATION) - ACTION/TREATMENT ORDERED:
PA aware Antibiotics orders. ID consult ordered
no new orders at this time
Orders received.
awaiting results from type and screen, 2 units PRBCs ordered

## 2020-10-16 NOTE — PROGRESS NOTE ADULT - ASSESSMENT
This patient is a 59 year old female with past medical history bipolar disease and depression, cad, esrd, gerd, hypertension, hyperlipidemia, diabetes, hypothyroidism, substance abuse transferred from snf on 10/12 for evaluation of hypotension and lower abdominal pain, loose stools. The patient states she passed out and cannot provide details surrounding her admission. History per medical record.   1. Patient admitted with sepsis secondary to Klebsiella pneumoniae, possibly due to GI translocation versus urinary origin; subsequent right lower extremity ischemia, s/p operative procedure  - follow up cultures to identification and sensitivity; pansensitive, however given that the patient may have necrotic tissue of right lower extremity will continue zosyn and will adjust dose accordingly   - wound care to right lower extremity per vascular  - vent and pressors per icu  - serial cbc and monitor temperature   - iv hydration and supportive care   - reviewed prior medical records to evaluate for resistant or atypical pathogens   - repeat blood cultures are pending  2. other issues: per medicine

## 2020-10-16 NOTE — CDI QUERY NOTE - NSCDIOTHERTXTBX_GEN_ALL_CORE_HH
This patient was admitted with anemia and for rule out sepsis.     ED Provider Note 10-12-20 @ 15:14  Will initiate sepsis protocol. Will obtain CT, give abx, fluids, reassess.  Present on Admission - Sepsis: No.    Labs on admission:  WBC Count: 15.19 K/uL (10.12.20 @ 16:16)  WBC Count: 17.47 K/uL (10.12.20 @ 23:06)    Vitals on admission:    BP: 83/40  RR: 16 o2 Sat at 94% on RA, titrated to 2L N/C then to 3L N/C with sats at 94-95%    2 days after admission, infectious diseases documents sepsis was present on admission:  Consult Note Adult Infectious Disease Attending 10-14-20 @ 12:40  1. Patient admitted with sepsis secondary to Klebsiella pneumoniae  possibly due to GI translocation versus urinary origin    Progress Note Adult-GOC-Hospitalist Attending 10-15-20 @ 11:00  4. bacteremia +klebsiella  - ?source. urine vs abd source.    pt was on rocephin, now on zosyn.  Pt does have chronic hughes now since recent admission early Sept     Can you please clarify the status of sepsis upon admission and the suspected source of sepsis?  A) Sepsis likely due to chronic hughes,  evolving on admission.  B) Sepsis likely unrelated to chronic hughes, evolving on admission.  C) Sepsis not present on admission, please specify source if possible.  D) Other, please clarify: This patient was admitted with anemia and for rule out sepsis.     ED Provider Note 10-12-20 @ 15:14  Will initiate sepsis protocol. Will obtain CT, give abx, fluids, reassess.  Present on Admission - Sepsis: No.    Labs on admission:  WBC Count: 15.19 K/uL (10.12.20 @ 16:16)  WBC Count: 17.47 K/uL (10.12.20 @ 23:06)    Vitals on admission:    BP: 83/40  RR: 16 o2 Sat at 94% on RA, titrated to 2L N/C then to 3L N/C with sats at 94-95%    2 days after admission, infectious diseases documents sepsis was present on admission:  Consult Note Adult Infectious Disease Attending 10-14-20 @ 12:40  1. Patient admitted with sepsis secondary to Klebsiella pneumoniae  possibly due to GI translocation versus urinary origin    Progress Note Adult-GOC-Hospitalist Attending 10-15-20 @ 11:00  4. bacteremia +klebsiella  - ?source. urine vs abd source.    pt was on rocephin, now on zosyn.  Pt does have chronic hughes now since recent admission early Sept     Can you please clarify the status of sepsis upon admission and the suspected source of sepsis?  A) Sepsis likely due to chronic hughes,  evolving on admission.  B) Sepsis likely unrelated to chronic hughes, evolving on admission.  C) Sepsis not evolving on admission, please specify source if possible.  D) Other, please clarify:

## 2020-10-16 NOTE — CDI QUERY NOTE - NSCDIOTHERTXTBX3_GEN_ALL_CORE_FT
Upon arrival to the ED, the patient had a respiratory rate of 16 but had an o2 saturation of 94%.    The patient was placed on 2L n/c and then titrated up to 3L with an improvement of o2 saturation to 95%.    Please consider the P/F ratio of 250.    The patient is still currently on mechanical ventilation.    Progress Note Adult-Critical Care Attending 10-16-20 @ 09:34  when awakens, will give weaning trial  ROS cant obtain Patient is intubated    Is the above clinical criteria indicative of a further diagnosis?  A) Acute respiratory failure.  B) No clinical indication of respiratory failure.  C) Other, please specify:

## 2020-10-16 NOTE — PROGRESS NOTE ADULT - SUBJECTIVE AND OBJECTIVE BOX
Patient is a 59y old  Female who presents with a chief complaint of hypotension, lower abdominal pain (16 Oct 2020 08:46)    HPI:  This patient is a 59 year old female with hx.    DM    STEMI in August, with RCA occlusion, course complicated by intubation, e. coli sepsis, pneumonia, Acute renal failure, did not get stent    Patient was sent from nursing home today with low bp and lower abdominal pain and dark stools  In ED, BP in low 80s,  creatinfine 3.4 was about 3 on last discharge,  Potassium of 2.2, Hgb 5.9 Hgb was 8.3 during last admission  last echo ef 55%     (12 Oct 2020 18:51)    10/16/2020-  Intubated.   No further diarrhea per RN.   S/p bypass yesterday.     PAST MEDICAL & SURGICAL HISTORY:  Lung nodule    Diabetes mellitus    History of TIAs    History of gallbladder disease  surgery    History of colon polyps  precancerous    Substance abuse  history of. last used 14 years ago.    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 Decreased ROM.    Angina pectoris    History of MRSA infection  left lower extremity incisional area 2015    Anxiety and depression    Transient cerebral ischemia, unspecified type    Osteoarthritis of spine, unspecified spinal osteoarthritis complication status, unspecified spinal region    Lumbar herniated disc    Urinary incontinence, unspecified type    Overactive bladder    Hiatal hernia with GERD  hiatal hernia repaired    PAD (peripheral artery disease)    Hyperlipidemia, unspecified hyperlipidemia type    Essential hypertension    Hypothyroidism    COPD (chronic obstructive pulmonary disease)    CAD (coronary artery disease)    History of lumbar fusion  with laminectomy 11/8/2018    H/O hernia repair  hiatal hernia - 2017, 2018    S/P dilatation and curettage  1991    H/O rhinoplasty  1980    H/O angioplasty  Right iliac artery. 5/12/2017    H/O ventral hernia repair  3/2017    History of incision and drainage  of lower extremity incisional site x 5 . last done5/2015    H/O arterial bypass of lower limb  Femoral - Popliteal. 11/2014 Left lower side with stents    History of laparoscopic cholecystectomy  2/2016      MEDICATIONS  (STANDING):  albumin human 25% IVPB 100 milliLiter(s) IV Intermittent every 8 hours  ARIPiprazole 20 milliGRAM(s) Oral daily  aspirin 325 milliGRAM(s) Oral daily  atorvastatin 40 milliGRAM(s) Oral at bedtime  budesonide  80 MICROgram(s)/formoterol 4.5 MICROgram(s) Inhaler 2 Puff(s) Inhalation two times a day  chlorhexidine 0.12% Liquid 15 milliLiter(s) Oral Mucosa every 12 hours  cholestyramine Powder (Sugar-Free) 4 Gram(s) Oral daily  dextrose 5%. 1000 milliLiter(s) (50 mL/Hr) IV Continuous <Continuous>  dextrose 50% Injectable 12.5 Gram(s) IV Push once  dextrose 50% Injectable 25 Gram(s) IV Push once  dextrose 50% Injectable 25 Gram(s) IV Push once  dicyclomine 20 milliGRAM(s) Oral two times a day before meals  heparin  Infusion.  Unit(s)/Hr (10 mL/Hr) IV Continuous <Continuous>  insulin glargine Injectable (LANTUS) 10 Unit(s) SubCutaneous every morning  insulin lispro (HumaLOG) corrective regimen sliding scale   SubCutaneous Before meals and at bedtime  levothyroxine 125 MICROGram(s) Oral daily  norepinephrine Infusion 0.05 MICROgram(s)/kG/Min (2.44 mL/Hr) IV Continuous <Continuous>  pantoprazole  Injectable 40 milliGRAM(s) IV Push two times a day  piperacillin/tazobactam IVPB.. 3.375 Gram(s) IV Intermittent every 12 hours  propofol Infusion 10 MICROgram(s)/kG/Min (3.12 mL/Hr) IV Continuous <Continuous>  sodium chloride 0.9%. 1000 milliLiter(s) (75 mL/Hr) IV Continuous <Continuous>    MEDICATIONS  (PRN):  dextrose 40% Gel 15 Gram(s) Oral once PRN Blood Glucose LESS THAN 70 milliGRAM(s)/deciliter  glucagon  Injectable 1 milliGRAM(s) IntraMuscular once PRN Glucose LESS THAN 70 milligrams/deciliter  heparin   Injectable 4000 Unit(s) IV Push every 6 hours PRN For aPTT less than 40  heparin   Injectable 2000 Unit(s) IV Push every 6 hours PRN For aPTT between 40 - 57  HYDROmorphone  Injectable 1 milliGRAM(s) IV Push every 4 hours PRN Moderate Pain (4 - 6)    Allergies    No Known Allergies    Intolerances      SOCIAL HISTORY:  FAMILY HISTORY:  Family history of asthma (Sibling)  sister    Family history of epilepsy (Sibling, Sibling)  2 sisters    Family history of cardiovascular disease  mother    Family history of stroke  mother    Family history of heart disease  father    Family history of alcoholism in father      REVIEW OF SYSTEMS:  Unable to obtain as patient is intubated.     Vital Signs Last 24 Hrs  T(C): 36 (16 Oct 2020 08:00), Max: 36.7 (16 Oct 2020 02:00)  T(F): 96.8 (16 Oct 2020 08:00), Max: 98 (16 Oct 2020 02:00)  HR: 108 (16 Oct 2020 09:00) (94 - 123)  BP: 96/64 (15 Oct 2020 21:57) (96/64 - 96/64)  BP(mean): 74 (15 Oct 2020 21:57) (74 - 74)  RR: 14 (16 Oct 2020 09:00) (14 - 20)  SpO2: 100% (16 Oct 2020 09:00) (95% - 100%)    PHYSICAL EXAM:  Constitutional: Middle aged female, intubated.   Respiratory: CTAB  Cardiovascular: S1 and S2, RRR, no M/G/R  Gastrointestinal: BS+, soft, NT/ND    LABS:                        8.2    18.67 )-----------( 73       ( 16 Oct 2020 04:46 )             24.3     10-16    132<L>  |  99  |  44<H>  ----------------------------<  313<H>  3.4<L>   |  22  |  2.03<H>    Ca    5.4<LL>      16 Oct 2020 04:46    TPro  4.0<L>  /  Alb  1.2<L>  /  TBili  0.4  /  DBili  x   /  AST  952<H>  /  ALT  278<H>  /  AlkPhos  105  10-16    PT/INR - ( 15 Oct 2020 07:04 )   PT: 16.2 sec;   INR: 1.42 ratio         PTT - ( 15 Oct 2020 21:00 )  PTT:46.3 sec  LIVER FUNCTIONS - ( 16 Oct 2020 04:46 )  Alb: 1.2 g/dL / Pro: 4.0 gm/dL / ALK PHOS: 105 U/L / ALT: 278 U/L / AST: 952 U/L / GGT: x             RADIOLOGY & ADDITIONAL STUDIES:

## 2020-10-16 NOTE — PROGRESS NOTE ADULT - ADDITIONAL PE
dopplerable signal in graft, right leg wrapped, slow capillary refill in toe, feet equal temperature

## 2020-10-16 NOTE — CDI QUERY NOTE - NSCDIOTHERTXTBX2_GEN_ALL_CORE_FT
The patient received a nutrition assessment by a Registered Dietician on (10-14-20).  The documentation of this assessment states: (severe protein/calorie malnutrition)       Please specify the clinical significance of these findings based on your clinical judgement such as:    -	(severe protein/calorie malnutrition)  -	Other (please specify)  -	Clinically unable to be determined      Please document your response in your progress notes and/or discharge summary as appropriate.      Chart Documentation:    Chart Note-Malnutrition Notific RD 10-14-20 @ 15:10  Signs/Symptoms NPO x 3 days, significant wt loss, severe muscle/fat wasting, Pressure injury suspected DTI.   Goal/Expected Outcome Optimal po intake when   Malnutrition severe protein/calorie malnutrition.     Can you please clarify the diagnosis of severe protein calorie malnutrition?  A) Severe protein calorie malnutrition ruled in.  B) Severe protein calorie malnutrition ruled out.  C) Other, please specify:

## 2020-10-16 NOTE — PROGRESS NOTE ADULT - ASSESSMENT
60yo female with abdominal pain and distention now resolved.   Abdominal exam significantly and diarrhea significantly improved.   Hold questran as needed to avoid constipation.   Ok to hold bentyl as patient is intubated for now.     Also, w/ right leg ischemia s/p right femoral-pt bypass POD1  ICU management, surgery following.     Discussed with rn, Dr. Hammer.

## 2020-10-16 NOTE — PROGRESS NOTE ADULT - ATTENDING COMMENTS
See NP note for details. Patient s/p leg bypass. Abdominal exam is improved with less tenderness and distension.

## 2020-10-16 NOTE — PROGRESS NOTE ADULT - SUBJECTIVE AND OBJECTIVE BOX
Patient is a 59y Female no events.       MEDICATIONS  (STANDING):  ARIPiprazole 20 milliGRAM(s) Oral daily  aspirin 325 milliGRAM(s) Oral daily  budesonide  80 MICROgram(s)/formoterol 4.5 MICROgram(s) Inhaler 2 Puff(s) Inhalation two times a day  chlorhexidine 0.12% Liquid 15 milliLiter(s) Oral Mucosa every 12 hours  cholestyramine Powder (Sugar-Free) 4 Gram(s) Oral daily  dextrose 5%. 1000 milliLiter(s) (50 mL/Hr) IV Continuous <Continuous>  dextrose 50% Injectable 12.5 Gram(s) IV Push once  dextrose 50% Injectable 25 Gram(s) IV Push once  dextrose 50% Injectable 25 Gram(s) IV Push once  dicyclomine 20 milliGRAM(s) Oral two times a day before meals  heparin  Infusion.  Unit(s)/Hr (10 mL/Hr) IV Continuous <Continuous>  insulin glargine Injectable (LANTUS) 10 Unit(s) SubCutaneous every morning  insulin lispro (HumaLOG) corrective regimen sliding scale   SubCutaneous Before meals and at bedtime  levothyroxine 125 MICROGram(s) Oral daily  norepinephrine Infusion 0.05 MICROgram(s)/kG/Min (2.44 mL/Hr) IV Continuous <Continuous>  pantoprazole  Injectable 40 milliGRAM(s) IV Push two times a day  piperacillin/tazobactam IVPB.. 3.375 Gram(s) IV Intermittent every 8 hours  propofol Infusion 10 MICROgram(s)/kG/Min (3.12 mL/Hr) IV Continuous <Continuous>  sodium chloride 0.9%. 1000 milliLiter(s) (75 mL/Hr) IV Continuous <Continuous>    MEDICATIONS  (PRN):  dextrose 40% Gel 15 Gram(s) Oral once PRN Blood Glucose LESS THAN 70 milliGRAM(s)/deciliter  glucagon  Injectable 1 milliGRAM(s) IntraMuscular once PRN Glucose LESS THAN 70 milligrams/deciliter  heparin   Injectable 4000 Unit(s) IV Push every 6 hours PRN For aPTT less than 40  heparin   Injectable 2000 Unit(s) IV Push every 6 hours PRN For aPTT between 40 - 57  HYDROmorphone  Injectable 1 milliGRAM(s) IV Push every 4 hours PRN Moderate Pain (4 - 6)        T(C): , Max: 36.7 (10-16-20 @ 02:00)  T(F): , Max: 98 (10-16-20 @ 02:00)  HR: 107 (10-16-20 @ 15:00)  BP: 96/64 (10-15-20 @ 21:57)  BP(mean): 74 (10-15-20 @ 21:57)  RR: 17 (10-16-20 @ 15:00)  SpO2: 100% (10-16-20 @ 15:00)  Wt(kg): --    10-15 @ 07:01  -  10-16 @ 07:00  --------------------------------------------------------  IN: 9687 mL / OUT: 854 mL / NET: 8833 mL    10-16 @ 07:01  -  10-16 @ 15:43  --------------------------------------------------------  IN: 100 mL / OUT: 36 mL / NET: 64 mL          PHYSICAL EXAM:    Constitutional: frail  HEENT: frail, cachetic  dist BS  Cardiovascular: S1 and S2,  Extremities: No peripheral edema, le dsg  Neurological: int  rectal tube  Skin: No rashes  Access: Not applicable        LABS:                        7.6    12.89 )-----------( 74       ( 16 Oct 2020 15:25 )             22.8     16 Oct 2020 04:46    132    |  99     |  44     ----------------------------<  313    3.4     |  22     |  2.03   15 Oct 2020 19:30    133    |  103    |  47     ----------------------------<  261    3.4     |  16     |  2.12   15 Oct 2020 15:35    132    |  101    |  49     ----------------------------<  271    4.1     |  16     |  2.23   15 Oct 2020 07:04    129    |  92     |  55     ----------------------------<  286    3.3     |  27     |  2.36   14 Oct 2020 14:49    130    |  98     |  59     ----------------------------<  196    3.9     |  21     |  2.63     Ca    5.4        16 Oct 2020 04:46  Ca    5.5        15 Oct 2020 19:30  Ca    5.2        15 Oct 2020 15:35  Ca    6.1        15 Oct 2020 07:04  Ca    6.2        14 Oct 2020 14:49  Phos  3.4       14 Oct 2020 07:42  Mg     1.8       14 Oct 2020 07:42  Mg     2.0       13 Oct 2020 15:44  Mg     2.1       12 Oct 2020 17:06    TPro  4.0    /  Alb  1.2    /  TBili  0.4    /  DBili  x      /  AST  952    /  ALT  278    /  AlkPhos  105    16 Oct 2020 04:46  TPro  5.7    /  Alb  1.2    /  TBili  0.5    /  DBili  x      /  AST  140    /  ALT  47     /  AlkPhos  131    15 Oct 2020 07:04  TPro  6.4    /  Alb  1.3    /  TBili  0.5    /  DBili  0.2    /  AST  96     /  ALT  42     /  AlkPhos  155    14 Oct 2020 07:42  TPro  6.1    /  Alb  1.4    /  TBili  0.8    /  DBili  x      /  AST  46     /  ALT  33     /  AlkPhos  132    12 Oct 2020 17:06      Hepatitis C Virus Interpretation: Nonreact (10-16 @ 05:00)  Hepatitis C Virus S/CO Ratio: 0.27 S/CO [0.00 - 0.99] (10-16 @ 05:00)      Urine Studies:          RADIOLOGY & ADDITIONAL STUDIES:

## 2020-10-16 NOTE — PHARMACOTHERAPY INTERVENTION NOTE - COMMENTS
Medication list obtained from SNF paperwork
Pt AST increased to 900s, recommend to hold atorvastatin
Pt has history of DM2, now BGM in 250-300s, rec'd 20 units of SSI in last 24 hours, recommend to start lantus
ceftriaxone 1000 mg ivpb q24h interchanged to ceftriaxone 1000mg ivpush q24h per policy
Pt on zosyn for bacteremia, now CrCl improved to 25, recommend to change zosyn [id number] to Q8H

## 2020-10-16 NOTE — PROGRESS NOTE ADULT - SUBJECTIVE AND OBJECTIVE BOX
Date of service: 10-16-20 @ 12:37    Events noted; the patient remains on ventilatory support and pressors  The pressors are being tapered down  Had surgery on right lower extremity for occluded arterial stents        ROS: unable to obtain secondary to patient medical condition     MEDICATIONS  (STANDING):  albumin human 25% IVPB 100 milliLiter(s) IV Intermittent every 8 hours  ARIPiprazole 20 milliGRAM(s) Oral daily  aspirin 325 milliGRAM(s) Oral daily  budesonide  80 MICROgram(s)/formoterol 4.5 MICROgram(s) Inhaler 2 Puff(s) Inhalation two times a day  chlorhexidine 0.12% Liquid 15 milliLiter(s) Oral Mucosa every 12 hours  cholestyramine Powder (Sugar-Free) 4 Gram(s) Oral daily  dextrose 5%. 1000 milliLiter(s) (50 mL/Hr) IV Continuous <Continuous>  dextrose 50% Injectable 12.5 Gram(s) IV Push once  dextrose 50% Injectable 25 Gram(s) IV Push once  dextrose 50% Injectable 25 Gram(s) IV Push once  dicyclomine 20 milliGRAM(s) Oral two times a day before meals  heparin  Infusion.  Unit(s)/Hr (10 mL/Hr) IV Continuous <Continuous>  insulin glargine Injectable (LANTUS) 10 Unit(s) SubCutaneous every morning  insulin lispro (HumaLOG) corrective regimen sliding scale   SubCutaneous Before meals and at bedtime  levothyroxine 125 MICROGram(s) Oral daily  norepinephrine Infusion 0.05 MICROgram(s)/kG/Min (2.44 mL/Hr) IV Continuous <Continuous>  pantoprazole  Injectable 40 milliGRAM(s) IV Push two times a day  piperacillin/tazobactam IVPB.. 3.375 Gram(s) IV Intermittent every 8 hours  propofol Infusion 10 MICROgram(s)/kG/Min (3.12 mL/Hr) IV Continuous <Continuous>  sodium chloride 0.9%. 1000 milliLiter(s) (75 mL/Hr) IV Continuous <Continuous>    MEDICATIONS  (PRN):  dextrose 40% Gel 15 Gram(s) Oral once PRN Blood Glucose LESS THAN 70 milliGRAM(s)/deciliter  glucagon  Injectable 1 milliGRAM(s) IntraMuscular once PRN Glucose LESS THAN 70 milligrams/deciliter  heparin   Injectable 4000 Unit(s) IV Push every 6 hours PRN For aPTT less than 40  heparin   Injectable 2000 Unit(s) IV Push every 6 hours PRN For aPTT between 40 - 57  HYDROmorphone  Injectable 1 milliGRAM(s) IV Push every 4 hours PRN Moderate Pain (4 - 6)      Vital Signs Last 24 Hrs  T(C): 36 (16 Oct 2020 12:00), Max: 36.7 (16 Oct 2020 02:00)  T(F): 96.8 (16 Oct 2020 12:00), Max: 98 (16 Oct 2020 02:00)  HR: 100 (16 Oct 2020 12:00) (94 - 123)  BP: 96/64 (15 Oct 2020 21:57) (96/64 - 96/64)  BP(mean): 74 (15 Oct 2020 21:57) (74 - 74)  RR: 14 (16 Oct 2020 12:00) (14 - 20)  SpO2: 100% (16 Oct 2020 12:00) (95% - 100%)    Mode: AC/ CMV (Assist Control/ Continuous Mandatory Ventilation), RR (machine): 14, TV (machine): 450, FiO2: 40, PEEP: 5, ITime: 1, PIP: 18    Physical Exam:            Constitutional: frail looking  HEENT: NC/AT, orally intubated  Neck: supple; thyroid not palpable; left neck IJ CVL  Back: no tenderness  Respiratory: respiratory effort normal; clear to auscultation  Cardiovascular: S1S2 regular, no murmurs  Abdomen: soft, not tender, mildly distended, positive BS; no liver or spleen organomegaly  Genitourinary: no suprapubic tenderness  Musculoskeletal: right foot cool to touch; surgical dressing in place of right lower extremity  Neurological/ Psychiatric: intubated  Skin: no rashes; no palpable lesions    Labs: all available labs reviewed                     Labs:                        8.2    18.67 )-----------( 73       ( 16 Oct 2020 04:46 )             24.3     10-16    132<L>  |  99  |  44<H>  ----------------------------<  313<H>  3.4<L>   |  22  |  2.03<H>    Ca    5.4<LL>      16 Oct 2020 04:46    TPro  4.0<L>  /  Alb  1.2<L>  /  TBili  0.4  /  DBili  x   /  AST  952<H>  /  ALT  278<H>  /  AlkPhos  105  10-16           Cultures:       Culture - Blood (collected 10-14-20 @ 15:35)  Source: .Blood None  Preliminary Report (10-15-20 @ 19:02):    No growth to date.    Culture - Blood (collected 10-14-20 @ 15:30)  Source: .Blood None  Preliminary Report (10-15-20 @ 19:02):    No growth to date.    GI PCR Panel, Stool (collected 10-13-20 @ 14:45)  Source: .Stool Feces  Final Report (10-13-20 @ 22:04):    GI PCR Results: NOT detected    *******Please Note:*******    GI panel PCR evaluates for:    Campylobacter, Plesiomonas shigelloides, Salmonella,    Vibrio, Yersinia enterocolitica, Enteroaggregative    Escherichia coli (EAEC), Enteropathogenic E.coli (EPEC),    Enterotoxigenic E. coli (ETEC) lt/st, Shiga-like    toxin-producing E. coli (STEC) stx1/stx2,    Shigella/ Enteroinvasive E. coli (EIEC), Cryptosporidium,    Cyclospora cayetanensis, Entamoeba histolytica,    Giardia lamblia, Adenovirus F 40/41, Astrovirus,    Norovirus GI/GII, Rotavirus A, Sapovirus    Culture - Urine (collected 10-12-20 @ 19:17)  Source: .Urine None  Final Report (10-13-20 @ 19:35):    >=3 organisms. Probable collection contamination.    Culture - Blood (collected 10-12-20 @ 17:21)  Source: .Blood Blood-Peripheral  Gram Stain (10-13-20 @ 14:54):    Growth in aerobic bottle: Gram Negative Rods  Final Report (10-15-20 @ 07:20):    Growth in aerobic bottle: Klebsiella pneumoniae    See previous culture [id number]    Culture - Blood (collected 10-12-20 @ 17:06)  Source: .Blood None  Gram Stain (10-13-20 @ 13:44):    Growth in aerobic bottle: Gram Negative Rods  Final Report (10-15-20 @ 07:19):    Growth in aerobic bottle: Klebsiella pneumoniae    "Due to technical problems, Proteus sp. will Not be reported as part of    the BCID panel until further notice"    ***Blood Panel PCR results on this specimen are available    approximately 3 hours after the Gram stain result.***    Gram stain, PCR, and/or culture results may not always    correspond due to difference in methodologies.    ************************************************************    This PCR assay was performed using [employer].    The following targets are tested for: Enterococcus,    vancomycin resistant enterococci, Listeria monocytogenes,    coagulase negative staphylococci, S. aureus,    methicillin resistant S. aureus, Streptococcus agalactiae    (Group B), S. pneumoniae, S. pyogenes (Group A),    Acinetobacter baumannii, Enterobacter cloacae, E. coli,    Klebsiella oxytoca, K. pneumoniae, Proteus sp.,    Serratia marcescens, Haemophilus influenzae,    Neisseria meningitidis, Pseudomonas aeruginosa, Candida    albicans, C. glabrata, C krusei, C parapsilosis,    C. tropicalis and the KPC resistance gene.  Organism: Blood Culture PCR  Klebsiella pneumoniae (10-15-20 @ 07:19)  Organism: Klebsiella pneumoniae (10-15-20 @ 07:19)      -  Amikacin: S <=16      -  Ampicillin: R >16 These ampicillin results predict results for amoxicillin      -  Ampicillin/Sulbactam: S 8/4 Enterobacter, Citrobacter, and Serratia may develop resistance during prolonged therapy (3-4 days)      -  Aztreonam: S <=4      -  Cefazolin: S <=2 Enterobacter, Citrobacter, and Serratia may develop resistance during prolonged therapy (3-4 days)      -  Cefepime: S <=2      -  Cefoxitin: S <=8      -  Ceftriaxone: S <=1 Enterobacter, Citrobacter, and Serratia may develop resistance during prolonged therapy      -  Ciprofloxacin: S <=0.25      -  Ertapenem: S <=0.5      -  Gentamicin: S <=2      -  Imipenem: S <=1      -  Levofloxacin: S <=0.5      -  Meropenem: S <=1      -  Piperacillin/Tazobactam: S <=8      -  Tobramycin: S <=2      -  Trimethoprim/Sulfamethoxazole: S <=0.5/9.5      Method Type: PINA  Organism: Blood Culture PCR (10-15-20 @ 07:19)      -  Klebsiella pneumoniae: Detec      Method Type: PCR                              < from: CT Abdomen and Pelvis w/ Oral Cont (10.14.20 @ 11:10) >    IMPRESSION:  Mildly dilated loops of small bowel with no transition point, possibly an ileus.    Presacral edema with infiltration of the perirectal fat, possibly proctitis.    Dilated common bile duct measuring 1.1 cm, increased in caliber since 9/8/2020, previously 0.8 cm; correlation is recommended with LFTs; an MRI/MRCP of the abdomen is recommended for further evaluation tiny; a noncontrast study can be performed if contraindicated by the patient's renal function.    Small amount of abdominal and pelvic ascites, increased since 10/12/2020.    Wall thickening versus underdistention involving the region of the pylorus; continued attention is recommended on the MRI.        < end of copied text >                Radiology: all available radiological tests reviewed    Advanced directives addressed: full resuscitation

## 2020-10-16 NOTE — PROGRESS NOTE ADULT - ASSESSMENT
Patient with diffuse vascular diease  renal failure acute on chronic  klebsiella sepsis on zosyn  s/p right sided fem- pop and stent  shock post procedure  Pressors titrating down  started on aspirin, and IV heparin for thrombosis of right leg  renal failure, still poor urine output, no acute indication for dialysis  when awakens, will give weaning trial     Patient with diffuse vascular diease  renal failure acute on chronic  klebsiella sepsis on zosyn  s/p right sided fem- pop and stent  shock post procedure  Pressors titrating down  inc cpk mild rhabdo from leg ischemia  started on aspirin, and IV heparin for thrombosis of right leg  renal failure, still poor urine output, no acute indication for dialysis  when awakens, will give weaning trial

## 2020-10-16 NOTE — PROGRESS NOTE ADULT - ASSESSMENT
60 YO F with right leg ischemia s/p right femoral-PT bypass POD1    Start ASA TN today; will transition to PO ASA once extubated  Start Heparin drip today with no bolus; Monitor PTT  Will start Plavix if pt is extubated  Monitor for bleeding once heparin drip is started  Keep dressing intact  Continue ICU care  Monitor urine output      Case discussed with Dr Turner

## 2020-10-17 LAB
ANION GAP SERPL CALC-SCNC: 9 MMOL/L — SIGNIFICANT CHANGE UP (ref 5–17)
APTT BLD: 105.8 SEC — HIGH (ref 27.5–35.5)
BLD GP AB SCN SERPL QL: SIGNIFICANT CHANGE UP
BUN SERPL-MCNC: 45 MG/DL — HIGH (ref 7–23)
CALCIUM SERPL-MCNC: 5.3 MG/DL — CRITICAL LOW (ref 8.5–10.1)
CHLORIDE SERPL-SCNC: 103 MMOL/L — SIGNIFICANT CHANGE UP (ref 96–108)
CO2 SERPL-SCNC: 25 MMOL/L — SIGNIFICANT CHANGE UP (ref 22–31)
CREAT SERPL-MCNC: 2.09 MG/DL — HIGH (ref 0.5–1.3)
GLUCOSE SERPL-MCNC: 10 MG/DL — CRITICAL LOW (ref 70–99)
HCT VFR BLD CALC: 28.8 % — LOW (ref 34.5–45)
HEPARIN-PF4 AB RESULT: <0.6 U/ML — SIGNIFICANT CHANGE UP (ref 0–0.9)
HGB BLD-MCNC: 10.1 G/DL — LOW (ref 11.5–15.5)
LACTATE SERPL-SCNC: 0.7 MMOL/L — SIGNIFICANT CHANGE UP (ref 0.7–2)
MAGNESIUM SERPL-MCNC: 1.2 MG/DL — LOW (ref 1.6–2.6)
MCHC RBC-ENTMCNC: 29.4 PG — SIGNIFICANT CHANGE UP (ref 27–34)
MCHC RBC-ENTMCNC: 35.1 GM/DL — SIGNIFICANT CHANGE UP (ref 32–36)
MCV RBC AUTO: 84 FL — SIGNIFICANT CHANGE UP (ref 80–100)
PF4 HEPARIN CMPLX AB SER-ACNC: NEGATIVE — SIGNIFICANT CHANGE UP
PLATELET # BLD AUTO: 49 K/UL — LOW (ref 150–400)
POTASSIUM SERPL-MCNC: 3.5 MMOL/L — SIGNIFICANT CHANGE UP (ref 3.5–5.3)
POTASSIUM SERPL-SCNC: 3.5 MMOL/L — SIGNIFICANT CHANGE UP (ref 3.5–5.3)
RBC # BLD: 3.43 M/UL — LOW (ref 3.8–5.2)
RBC # FLD: 14.9 % — HIGH (ref 10.3–14.5)
SODIUM SERPL-SCNC: 137 MMOL/L — SIGNIFICANT CHANGE UP (ref 135–145)
WBC # BLD: 10.13 K/UL — SIGNIFICANT CHANGE UP (ref 3.8–10.5)
WBC # FLD AUTO: 10.13 K/UL — SIGNIFICANT CHANGE UP (ref 3.8–10.5)

## 2020-10-17 PROCEDURE — 99291 CRITICAL CARE FIRST HOUR: CPT

## 2020-10-17 PROCEDURE — 99231 SBSQ HOSP IP/OBS SF/LOW 25: CPT

## 2020-10-17 RX ORDER — POTASSIUM CHLORIDE 20 MEQ
10 PACKET (EA) ORAL
Refills: 0 | Status: COMPLETED | OUTPATIENT
Start: 2020-10-17 | End: 2020-10-17

## 2020-10-17 RX ORDER — DEXTROSE 50 % IN WATER 50 %
50 SYRINGE (ML) INTRAVENOUS ONCE
Refills: 0 | Status: COMPLETED | OUTPATIENT
Start: 2020-10-17 | End: 2020-10-17

## 2020-10-17 RX ORDER — DEXTROSE 50 % IN WATER 50 %
25 SYRINGE (ML) INTRAVENOUS ONCE
Refills: 0 | Status: COMPLETED | OUTPATIENT
Start: 2020-10-17 | End: 2020-10-17

## 2020-10-17 RX ORDER — CALCIUM GLUCONATE 100 MG/ML
1 VIAL (ML) INTRAVENOUS ONCE
Refills: 0 | Status: COMPLETED | OUTPATIENT
Start: 2020-10-17 | End: 2020-10-17

## 2020-10-17 RX ORDER — SODIUM CHLORIDE 9 MG/ML
1000 INJECTION, SOLUTION INTRAVENOUS
Refills: 0 | Status: DISCONTINUED | OUTPATIENT
Start: 2020-10-17 | End: 2020-10-18

## 2020-10-17 RX ORDER — POTASSIUM CHLORIDE 20 MEQ
20 PACKET (EA) ORAL
Refills: 0 | Status: DISCONTINUED | OUTPATIENT
Start: 2020-10-17 | End: 2020-10-17

## 2020-10-17 RX ORDER — MAGNESIUM SULFATE 500 MG/ML
2 VIAL (ML) INJECTION ONCE
Refills: 0 | Status: COMPLETED | OUTPATIENT
Start: 2020-10-17 | End: 2020-10-17

## 2020-10-17 RX ADMIN — PIPERACILLIN AND TAZOBACTAM 25 GRAM(S): 4; .5 INJECTION, POWDER, LYOPHILIZED, FOR SOLUTION INTRAVENOUS at 09:22

## 2020-10-17 RX ADMIN — Medication 50 MILLIEQUIVALENT(S): at 04:11

## 2020-10-17 RX ADMIN — HYDROMORPHONE HYDROCHLORIDE 1 MILLIGRAM(S): 2 INJECTION INTRAMUSCULAR; INTRAVENOUS; SUBCUTANEOUS at 21:12

## 2020-10-17 RX ADMIN — Medication 325 MILLIGRAM(S): at 09:16

## 2020-10-17 RX ADMIN — PANTOPRAZOLE SODIUM 40 MILLIGRAM(S): 20 TABLET, DELAYED RELEASE ORAL at 09:16

## 2020-10-17 RX ADMIN — BUDESONIDE AND FORMOTEROL FUMARATE DIHYDRATE 2 PUFF(S): 160; 4.5 AEROSOL RESPIRATORY (INHALATION) at 20:13

## 2020-10-17 RX ADMIN — CHOLESTYRAMINE 4 GRAM(S): 4 POWDER, FOR SUSPENSION ORAL at 09:15

## 2020-10-17 RX ADMIN — Medication 125 MICROGRAM(S): at 05:15

## 2020-10-17 RX ADMIN — HEPARIN SODIUM 600 UNIT(S)/HR: 5000 INJECTION INTRAVENOUS; SUBCUTANEOUS at 01:46

## 2020-10-17 RX ADMIN — Medication 20 MILLIGRAM(S): at 16:03

## 2020-10-17 RX ADMIN — Medication 25 GRAM(S): at 08:40

## 2020-10-17 RX ADMIN — HYDROMORPHONE HYDROCHLORIDE 1 MILLIGRAM(S): 2 INJECTION INTRAMUSCULAR; INTRAVENOUS; SUBCUTANEOUS at 09:30

## 2020-10-17 RX ADMIN — HYDROMORPHONE HYDROCHLORIDE 1 MILLIGRAM(S): 2 INJECTION INTRAMUSCULAR; INTRAVENOUS; SUBCUTANEOUS at 16:45

## 2020-10-17 RX ADMIN — HYDROMORPHONE HYDROCHLORIDE 1 MILLIGRAM(S): 2 INJECTION INTRAMUSCULAR; INTRAVENOUS; SUBCUTANEOUS at 21:30

## 2020-10-17 RX ADMIN — PIPERACILLIN AND TAZOBACTAM 25 GRAM(S): 4; .5 INJECTION, POWDER, LYOPHILIZED, FOR SOLUTION INTRAVENOUS at 01:45

## 2020-10-17 RX ADMIN — SODIUM CHLORIDE 50 MILLILITER(S): 9 INJECTION, SOLUTION INTRAVENOUS at 08:45

## 2020-10-17 RX ADMIN — Medication 100 GRAM(S): at 11:16

## 2020-10-17 RX ADMIN — HEPARIN SODIUM 0 UNIT(S)/HR: 5000 INJECTION INTRAVENOUS; SUBCUTANEOUS at 01:22

## 2020-10-17 RX ADMIN — Medication 50 MILLIEQUIVALENT(S): at 03:41

## 2020-10-17 RX ADMIN — Medication 25 GRAM(S): at 13:45

## 2020-10-17 RX ADMIN — HYDROMORPHONE HYDROCHLORIDE 1 MILLIGRAM(S): 2 INJECTION INTRAMUSCULAR; INTRAVENOUS; SUBCUTANEOUS at 17:00

## 2020-10-17 RX ADMIN — PANTOPRAZOLE SODIUM 40 MILLIGRAM(S): 20 TABLET, DELAYED RELEASE ORAL at 21:12

## 2020-10-17 RX ADMIN — Medication 50 MILLILITER(S): at 06:25

## 2020-10-17 RX ADMIN — ARIPIPRAZOLE 20 MILLIGRAM(S): 15 TABLET ORAL at 09:15

## 2020-10-17 RX ADMIN — PIPERACILLIN AND TAZOBACTAM 25 GRAM(S): 4; .5 INJECTION, POWDER, LYOPHILIZED, FOR SOLUTION INTRAVENOUS at 17:01

## 2020-10-17 RX ADMIN — Medication 50 GRAM(S): at 11:17

## 2020-10-17 RX ADMIN — Medication 2: at 21:13

## 2020-10-17 RX ADMIN — CHLORHEXIDINE GLUCONATE 15 MILLILITER(S): 213 SOLUTION TOPICAL at 09:16

## 2020-10-17 RX ADMIN — Medication 50 MILLIEQUIVALENT(S): at 03:10

## 2020-10-17 RX ADMIN — HYDROMORPHONE HYDROCHLORIDE 1 MILLIGRAM(S): 2 INJECTION INTRAMUSCULAR; INTRAVENOUS; SUBCUTANEOUS at 09:15

## 2020-10-17 NOTE — PROGRESS NOTE ADULT - SUBJECTIVE AND OBJECTIVE BOX
Events Overnight: remains on low dose pressors, required 2 units of blood, for dec hgb, but hgb increased appropriately                            dec platelet count to 49. had episode of low glucose, given d50(did receive low dose of lantus)    HPI:       58 y/o female pmhx DM2, hx STEMI in august w/ RCA occulusion ( no intervention performed), w/ hospital course complicated by  respiratory failure, pneumonia, e.coli sepsis and renal failure. She was transferred from SNF w/ hypotension and abdominal pain. Admitted w/ anemia, hypokalemia, metabolic acidosis w/ AMISH.    multiple previous vascular procedure  Patient had bacteremic with Klebsiella in blood on Zosyn, source felt to be urine  given gi bleeding on admission antiplatelet agents were held   on 10/15 the patient was found to have a cold right foot.   She went to OR and underwent right stent placement and  fem pop.   At end of procedure pateint became hypotensive, started on pressors  remins on levophed, right foot is cool and cyanotic    ROS cant obtain Patient is intubated       MEDICATIONS  (STANDING):  ARIPiprazole 20 milliGRAM(s) Oral daily  aspirin 325 milliGRAM(s) Oral daily  budesonide  80 MICROgram(s)/formoterol 4.5 MICROgram(s) Inhaler 2 Puff(s) Inhalation two times a day  calcium gluconate IVPB 1 Gram(s) IV Intermittent once  chlorhexidine 0.12% Liquid 15 milliLiter(s) Oral Mucosa every 12 hours  cholestyramine Powder (Sugar-Free) 4 Gram(s) Oral daily  dextrose 5%. 1000 milliLiter(s) (50 mL/Hr) IV Continuous <Continuous>  dextrose 5%. 1000 milliLiter(s) (50 mL/Hr) IV Continuous <Continuous>  dextrose 50% Injectable 12.5 Gram(s) IV Push once  dextrose 50% Injectable 25 Gram(s) IV Push once  dextrose 50% Injectable 25 Gram(s) IV Push once  dicyclomine 20 milliGRAM(s) Oral two times a day before meals  insulin lispro (HumaLOG) corrective regimen sliding scale   SubCutaneous Before meals and at bedtime  levothyroxine 125 MICROGram(s) Oral daily  magnesium sulfate  IVPB 2 Gram(s) IV Intermittent once  norepinephrine Infusion 0.05 MICROgram(s)/kG/Min (2.44 mL/Hr) IV Continuous <Continuous>  pantoprazole  Injectable 40 milliGRAM(s) IV Push two times a day  piperacillin/tazobactam IVPB.. 3.375 Gram(s) IV Intermittent every 8 hours    MEDICATIONS  (PRN):  dextrose 40% Gel 15 Gram(s) Oral once PRN Blood Glucose LESS THAN 70 milliGRAM(s)/deciliter  glucagon  Injectable 1 milliGRAM(s) IntraMuscular once PRN Glucose LESS THAN 70 milligrams/deciliter  HYDROmorphone  Injectable 1 milliGRAM(s) IV Push every 4 hours PRN Moderate Pain (4 - 6)    ICU Vital Signs Last 24 Hrs  T(C): 36.7 (17 Oct 2020 06:00), Max: 37.1 (16 Oct 2020 20:00)  T(F): 98 (17 Oct 2020 06:00), Max: 98.8 (16 Oct 2020 20:00)  HR: 81 (17 Oct 2020 08:00) (79 - 112)  BP: 69/50 (17 Oct 2020 08:00) (69/50 - 69/50)  BP(mean): 54 (17 Oct 2020 08:00) (54 - 54)  ABP: 130/58 (17 Oct 2020 08:00) (86/35 - 141/51)  ABP(mean): 87 (17 Oct 2020 08:00) (54 - 319)  RR: 15 (17 Oct 2020 08:00) (12 - 22)  SpO2: 100% (17 Oct 2020 08:00) (100% - 100%)    Mode: CPAP with PS  FiO2: 40  PEEP: 5  PS: 5    I&O's Summary    16 Oct 2020 07:01  -  17 Oct 2020 07:00  --------------------------------------------------------  IN: 3903.7 mL / OUT: 1690 mL / NET: 2213.7 mL                         10.1   10.13 )-----------( 49       ( 17 Oct 2020 06:15 )             28.8       10-17    137  |  103  |  45<H>  ----------------------------<  10<LL>  3.5   |  25  |  2.09<H>    Ca    5.3<LL>      17 Oct 2020 06:15  Mg     1.2     10-17    TPro  4.0<L>  /  Alb  1.2<L>  /  TBili  0.4  /  DBili  x   /  AST  952<H>  /  ALT  278<H>  /  AlkPhos  105  10-16      CARDIAC MARKERS ( 16 Oct 2020 15:25 )  x     / x     / 890 U/L / x     / x      CARDIAC MARKERS ( 16 Oct 2020 04:46 )  x     / x     / 1303 U/L / x     / x      CARDIAC MARKERS ( 15 Oct 2020 21:00 )  x     / x     / 1624 U/L / x     / x            ABG - ( 16 Oct 2020 06:26 )  pH, Arterial: 7.54  pH, Blood: x     /  pCO2: 26    /  pO2: 144   / HCO3: 22    / Base Excess: .2    /  SaO2: 97          DVT Prophylaxis:  hepairn held given platelet count of 49                                                               Advanced Directives: Full Code                      Events Overnight: remains on low dose pressors, required 2 units of blood, for dec hgb, but hgb increased appropriately                            dec platelet count to 49. had episode of low glucose, given d50(did receive low dose of lantus)    HPI:       60 y/o female pmhx DM2, hx STEMI in august w/ RCA occulusion ( no intervention performed), w/ hospital course complicated by  respiratory failure, pneumonia, e.coli sepsis and renal failure. She was transferred from SNF w/ hypotension and abdominal pain. Admitted w/ anemia, hypokalemia, metabolic acidosis w/ AMISH.    multiple previous vascular procedure  Patient had septic shock with  bacteremia with Klebsiella in blood on Zosyn, source felt to be urinary source  given gi bleeding on admission antiplatelet agents were held   on 10/15 the patient was found to have a cold right foot.   She went to OR and underwent right stent placement and  fem pop.   At end of procedure pateint became hypotensive, started on pressors  remins on levophed, right foot is cool and cyanotic    ROS cant obtain Patient is intubated       MEDICATIONS  (STANDING):  ARIPiprazole 20 milliGRAM(s) Oral daily  aspirin 325 milliGRAM(s) Oral daily  budesonide  80 MICROgram(s)/formoterol 4.5 MICROgram(s) Inhaler 2 Puff(s) Inhalation two times a day  calcium gluconate IVPB 1 Gram(s) IV Intermittent once  chlorhexidine 0.12% Liquid 15 milliLiter(s) Oral Mucosa every 12 hours  cholestyramine Powder (Sugar-Free) 4 Gram(s) Oral daily  dextrose 5%. 1000 milliLiter(s) (50 mL/Hr) IV Continuous <Continuous>  dextrose 5%. 1000 milliLiter(s) (50 mL/Hr) IV Continuous <Continuous>  dextrose 50% Injectable 12.5 Gram(s) IV Push once  dextrose 50% Injectable 25 Gram(s) IV Push once  dextrose 50% Injectable 25 Gram(s) IV Push once  dicyclomine 20 milliGRAM(s) Oral two times a day before meals  insulin lispro (HumaLOG) corrective regimen sliding scale   SubCutaneous Before meals and at bedtime  levothyroxine 125 MICROGram(s) Oral daily  magnesium sulfate  IVPB 2 Gram(s) IV Intermittent once  norepinephrine Infusion 0.05 MICROgram(s)/kG/Min (2.44 mL/Hr) IV Continuous <Continuous>  pantoprazole  Injectable 40 milliGRAM(s) IV Push two times a day  piperacillin/tazobactam IVPB.. 3.375 Gram(s) IV Intermittent every 8 hours    MEDICATIONS  (PRN):  dextrose 40% Gel 15 Gram(s) Oral once PRN Blood Glucose LESS THAN 70 milliGRAM(s)/deciliter  glucagon  Injectable 1 milliGRAM(s) IntraMuscular once PRN Glucose LESS THAN 70 milligrams/deciliter  HYDROmorphone  Injectable 1 milliGRAM(s) IV Push every 4 hours PRN Moderate Pain (4 - 6)    ICU Vital Signs Last 24 Hrs  T(C): 36.7 (17 Oct 2020 06:00), Max: 37.1 (16 Oct 2020 20:00)  T(F): 98 (17 Oct 2020 06:00), Max: 98.8 (16 Oct 2020 20:00)  HR: 81 (17 Oct 2020 08:00) (79 - 112)  BP: 69/50 (17 Oct 2020 08:00) (69/50 - 69/50)  BP(mean): 54 (17 Oct 2020 08:00) (54 - 54)  ABP: 130/58 (17 Oct 2020 08:00) (86/35 - 141/51)  ABP(mean): 87 (17 Oct 2020 08:00) (54 - 319)  RR: 15 (17 Oct 2020 08:00) (12 - 22)  SpO2: 100% (17 Oct 2020 08:00) (100% - 100%)    Mode: CPAP with PS  FiO2: 40  PEEP: 5  PS: 5    I&O's Summary    16 Oct 2020 07:01  -  17 Oct 2020 07:00  --------------------------------------------------------  IN: 3903.7 mL / OUT: 1690 mL / NET: 2213.7 mL                         10.1   10.13 )-----------( 49       ( 17 Oct 2020 06:15 )             28.8       10-17    137  |  103  |  45<H>  ----------------------------<  10<LL>  3.5   |  25  |  2.09<H>    Ca    5.3<LL>      17 Oct 2020 06:15  Mg     1.2     10-17    TPro  4.0<L>  /  Alb  1.2<L>  /  TBili  0.4  /  DBili  x   /  AST  952<H>  /  ALT  278<H>  /  AlkPhos  105  10-16      CARDIAC MARKERS ( 16 Oct 2020 15:25 )  x     / x     / 890 U/L / x     / x      CARDIAC MARKERS ( 16 Oct 2020 04:46 )  x     / x     / 1303 U/L / x     / x      CARDIAC MARKERS ( 15 Oct 2020 21:00 )  x     / x     / 1624 U/L / x     / x            ABG - ( 16 Oct 2020 06:26 )  pH, Arterial: 7.54  pH, Blood: x     /  pCO2: 26    /  pO2: 144   / HCO3: 22    / Base Excess: .2    /  SaO2: 97          DVT Prophylaxis:  hepairn held given platelet count of 49                                                               Advanced Directives: Full Code

## 2020-10-17 NOTE — PROGRESS NOTE ADULT - SUBJECTIVE AND OBJECTIVE BOX
CC:Patient is a 59y old  Female who presents with a chief complaint of hypotension, lower abtominal pain (17 Oct 2020 04:22)      Subjective:  Pt seen and examined at bedside. Patient dressing removed yesterday. Patient given lasix and 2 units pRBC overnight. patient plt continue to downtrend to 45 this am. Patient alter and tolerated CPAP. Patient has doplerable pulse over graft no pulses in the foot. patient given ASA yesterday. Patient continue to be on levophed.  Patient for possible extubation today per ICU.     ROS:      Vital Signs Last 24 Hrs  T(C): 36.7 (17 Oct 2020 06:00), Max: 37.1 (16 Oct 2020 20:00)  T(F): 98 (17 Oct 2020 06:00), Max: 98.8 (16 Oct 2020 20:00)  HR: 81 (17 Oct 2020 08:00) (79 - 112)  BP: 69/50 (17 Oct 2020 08:00) (69/50 - 69/50)  BP(mean): 54 (17 Oct 2020 08:00) (54 - 54)  RR: 15 (17 Oct 2020 08:00) (12 - 22)  SpO2: 100% (17 Oct 2020 08:00) (100% - 100%)    Labs:    ABG - ( 16 Oct 2020 06:26 )  pH, Arterial: 7.54  pH, Blood: x     /  pCO2: 26    /  pO2: 144   / HCO3: 22    / Base Excess: .2    /  SaO2: 97                CARDIAC MARKERS ( 16 Oct 2020 15:25 )  x     / x     / 890 U/L / x     / x      CARDIAC MARKERS ( 16 Oct 2020 04:46 )  x     / x     / 1303 U/L / x     / x      CARDIAC MARKERS ( 15 Oct 2020 21:00 )  x     / x     / 1624 U/L / x     / x                                10.1   10.13 )-----------( 49       ( 17 Oct 2020 06:15 )             28.8     CBC Full  -  ( 17 Oct 2020 06:15 )  WBC Count : 10.13 K/uL  RBC Count : 3.43 M/uL  Hemoglobin : 10.1 g/dL  Hematocrit : 28.8 %  Platelet Count - Automated : 49 K/uL  Mean Cell Volume : 84.0 fl  Mean Cell Hemoglobin : 29.4 pg  Mean Cell Hemoglobin Concentration : 35.1 gm/dL  Auto Neutrophil # : x  Auto Lymphocyte # : x  Auto Monocyte # : x  Auto Eosinophil # : x  Auto Basophil # : x  Auto Neutrophil % : x  Auto Lymphocyte % : x  Auto Monocyte % : x  Auto Eosinophil % : x  Auto Basophil % : x    10-17    137  |  103  |  45<H>  ----------------------------<  10<LL>  3.5   |  25  |  2.09<H>    Ca    5.3<LL>      17 Oct 2020 06:15  Mg     1.2     10-17    TPro  4.0<L>  /  Alb  1.2<L>  /  TBili  0.4  /  DBili  x   /  AST  952<H>  /  ALT  278<H>  /  AlkPhos  105  10-16    LIVER FUNCTIONS - ( 16 Oct 2020 04:46 )  Alb: 1.2 g/dL / Pro: 4.0 gm/dL / ALK PHOS: 105 U/L / ALT: 278 U/L / AST: 952 U/L / GGT: x           PTT - ( 17 Oct 2020 07:40 )  PTT:105.8 sec      Meds:  ARIPiprazole 20 milliGRAM(s) Oral daily  aspirin 325 milliGRAM(s) Oral daily  budesonide  80 MICROgram(s)/formoterol 4.5 MICROgram(s) Inhaler 2 Puff(s) Inhalation two times a day  chlorhexidine 0.12% Liquid 15 milliLiter(s) Oral Mucosa every 12 hours  cholestyramine Powder (Sugar-Free) 4 Gram(s) Oral daily  dextrose 40% Gel 15 Gram(s) Oral once PRN  dextrose 5%. 1000 milliLiter(s) IV Continuous <Continuous>  dextrose 50% Injectable 12.5 Gram(s) IV Push once  dextrose 50% Injectable 25 Gram(s) IV Push once  dextrose 50% Injectable 25 Gram(s) IV Push once  dicyclomine 20 milliGRAM(s) Oral two times a day before meals  glucagon  Injectable 1 milliGRAM(s) IntraMuscular once PRN  HYDROmorphone  Injectable 1 milliGRAM(s) IV Push every 4 hours PRN  insulin lispro (HumaLOG) corrective regimen sliding scale   SubCutaneous Before meals and at bedtime  levothyroxine 125 MICROGram(s) Oral daily  norepinephrine Infusion 0.05 MICROgram(s)/kG/Min IV Continuous <Continuous>  pantoprazole  Injectable 40 milliGRAM(s) IV Push two times a day  piperacillin/tazobactam IVPB.. 3.375 Gram(s) IV Intermittent every 8 hours  propofol Infusion 10 MICROgram(s)/kG/Min IV Continuous <Continuous>  sodium chloride 0.9%. 1000 milliLiter(s) IV Continuous <Continuous>      Radiology:      Physical exam:  Gen: intubated, alter   Resp: mechanical breath sounds present   CV: s1 and S2 present   Abd; Soft, edematous 2/2 3rd spacing   Ext: RLE dopplerable graft in the medial thigh, nondopperalbe pulses in the foot, LLE dopperlable pulses   R CVC in place, rectal tube in place, hughes in place

## 2020-10-17 NOTE — PROGRESS NOTE ADULT - SUBJECTIVE AND OBJECTIVE BOX
Patient is a 59y old  Female who presents with a chief complaint of hypotension, lower abdominal pain (16 Oct 2020 15:40)    PAST MEDICAL & SURGICAL HISTORY:  Lung nodule    Diabetes mellitus    History of TIAs    History of gallbladder disease  surgery    History of colon polyps  precancerous    Substance abuse  history of. last used 14 years ago.    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 Decreased ROM.    Angina pectoris    History of MRSA infection  left lower extremity incisional area 2015    Anxiety and depression    Transient cerebral ischemia, unspecified type    Osteoarthritis of spine, unspecified spinal osteoarthritis complication status, unspecified spinal region    Lumbar herniated disc    Urinary incontinence, unspecified type    Overactive bladder    Hiatal hernia with GERD  hiatal hernia repaired    PAD (peripheral artery disease)    Hyperlipidemia, unspecified hyperlipidemia type    Essential hypertension    Hypothyroidism    COPD (chronic obstructive pulmonary disease)    CAD (coronary artery disease)    History of lumbar fusion  with laminectomy 11/8/2018    H/O hernia repair  hiatal hernia - 2017, 2018    S/P dilatation and curettage  1991    H/O rhinoplasty  1980    H/O angioplasty  Right iliac artery. 5/12/2017    H/O ventral hernia repair  3/2017    History of incision and drainage  of lower extremity incisional site x 5 . last done5/2015    H/O arterial bypass of lower limb  Femoral - Popliteal. 11/2014 Left lower side with stents    History of laparoscopic cholecystectomy  2/2016      EFREM BRENNAN   59y    Female    BRIEF HOSPITAL COURSE:      58 y/o female pmhx DM2, hx STEMI in august w/ RCA occulusion ( no intervention perfromed), w/ hospital course complicated by  respiratory failure, pneumonia, e.coli sepsis and renal failure. She was transferred from SNF w/ hypotension and abdominal pain. Admitted w/ anemia, hypokalemia, metabolic acidosis w/ AMISH. She received 2 PRBC, placed on sodium bicarb gtt.       Developed cold foot s/p emergent fem pop on R with stent  fasciotomy  post op 10/15     Pressor dependent shock AMISH noow non oliguric     Review of Systems:     UATO vented                   All other ROS are negative.    Allergies    No Known Allergies    Intolerances          ICU Vital Signs Last 24 Hrs  T(C): 36.1 (17 Oct 2020 03:52), Max: 37.1 (16 Oct 2020 20:00)  T(F): 97 (17 Oct 2020 03:52), Max: 98.8 (16 Oct 2020 20:00)  HR: 85 (17 Oct 2020 03:52) (85 - 112)  BP: --  BP(mean): --  ABP: 122/49 (17 Oct 2020 03:52) (86/35 - 147/60)  ABP(mean): 77 (17 Oct 2020 03:52) (54 - 319)  RR: 14 (17 Oct 2020 03:52) (12 - 20)  SpO2: 100% (17 Oct 2020 03:52) (100% - 100%)    Physical Examination:        HEENT: no JVD    PULM: bilateral BS     CVS: s1 s2 reg    ABD: soft NT    EXT: no pulse on R foot   + edema     SKIN: cool RLE     Neuro: alert     ABG - ( 16 Oct 2020 06:26 )  pH, Arterial: 7.54  pH, Blood: x     /  pCO2: 26    /  pO2: 144   / HCO3: 22    / Base Excess: .2    /  SaO2: 97                Mode: AC/ CMV (Assist Control/ Continuous Mandatory Ventilation)  RR (machine): 14  TV (machine): 450  FiO2: 40  PEEP: 5  ITime: 1  PIP: 19    Mode: AC/ CMV (Assist Control/ Continuous Mandatory Ventilation), RR (machine): 14, TV (machine): 450, FiO2: 40, PEEP: 5, ITime: 1, PIP: 19  LABS:                        6.4    12.72 )-----------( 75       ( 16 Oct 2020 23:00 )             19.0     10-16    135  |  101  |  45<H>  ----------------------------<  75  3.1<L>   |  25  |  2.24<H>    Ca    5.5<LL>      16 Oct 2020 23:00    TPro  4.0<L>  /  Alb  1.2<L>  /  TBili  0.4  /  DBili  x   /  AST  952<H>  /  ALT  278<H>  /  AlkPhos  105  10-16      CARDIAC MARKERS ( 16 Oct 2020 15:25 )  x     / x     / 890 U/L / x     / x      CARDIAC MARKERS ( 16 Oct 2020 04:46 )  x     / x     / 1303 U/L / x     / x      CARDIAC MARKERS ( 15 Oct 2020 21:00 )  x     / x     / 1624 U/L / x     / x          CAPILLARY BLOOD GLUCOSE      POCT Blood Glucose.: 96 mg/dL (16 Oct 2020 22:35)  POCT Blood Glucose.: 201 mg/dL (16 Oct 2020 17:18)  POCT Blood Glucose.: 322 mg/dL (16 Oct 2020 11:33)  POCT Blood Glucose.: 255 mg/dL (16 Oct 2020 05:25)    PT/INR - ( 15 Oct 2020 07:04 )   PT: 16.2 sec;   INR: 1.42 ratio         PTT - ( 16 Oct 2020 23:00 )  PTT:175.5 sec    CULTURES:  Culture Results:   No growth to date. (10-15 @ 17:30)  Culture Results:   No growth to date. (10-14 @ 15:35)  Culture Results:   No growth to date. (10-14 @ 15:30)  Culture Results:   GI PCR Results: NOT detected  *******Please Note:*******  GI panel PCR evaluates for:  Campylobacter, Plesiomonas shigelloides, Salmonella,  Vibrio, Yersinia enterocolitica, Enteroaggregative  Escherichia coli (EAEC), Enteropathogenic E.coli (EPEC),  Enterotoxigenic E. coli (ETEC) lt/st, Shiga-like  toxin-producing E. coli (STEC) stx1/stx2,  Shigella/ Enteroinvasive E. coli (EIEC), Cryptosporidium,  Cyclospora cayetanensis, Entamoeba histolytica,  Giardia lamblia, Adenovirus F 40/41, Astrovirus,  Norovirus GI/GII, Rotavirus A, Sapovirus (10-13 @ 14:45)  C Diff by PCR Result: NotDetec (10-13 @ 14:45)  Culture Results:   >=3 organisms. Probable collection contamination. (10-12 @ 19:17)  Culture Results:   Growth in aerobic bottle: Klebsiella pneumoniae  See previous culture [id number] (10-12 @ 17:21)  Culture Results:   Growth in aerobic bottle: Klebsiella pneumoniae  "Due to technical problems, Proteus sp. will Not be reported as part of  the BCID panel until further notice"  ***Blood Panel PCR results on this specimen are available  approximately 3 hours after the Gram stain result.***  Gram stain, PCR, and/or culture results may not always  correspond due to difference in methodologies.  ************************************************************  This PCR assay was performed using [employer].  The following targets are tested for: Enterococcus,  vancomycin resistant enterococci, Listeria monocytogenes,  coagulase negative staphylococci, S. aureus,  methicillin resistant S. aureus, Streptococcus agalactiae  (Group B), S. pneumoniae, S. pyogenes (Group A),  Acinetobacter baumannii, Enterobacter cloacae, E. coli,  Klebsiella oxytoca, K. pneumoniae, Proteus sp.,  Serratia marcescens, Haemophilus influenzae,  Neisseria meningitidis, Pseudomonas aeruginosa, Candida  albicans, C. glabrata, C krusei, C parapsilosis,  C. tropicalis and the KPC resistance gene. (10-12 @ 17:06)      Medications:  MEDICATIONS  (STANDING):  ARIPiprazole 20 milliGRAM(s) Oral daily  aspirin 325 milliGRAM(s) Oral daily  budesonide  80 MICROgram(s)/formoterol 4.5 MICROgram(s) Inhaler 2 Puff(s) Inhalation two times a day  chlorhexidine 0.12% Liquid 15 milliLiter(s) Oral Mucosa every 12 hours  cholestyramine Powder (Sugar-Free) 4 Gram(s) Oral daily  dextrose 5%. 1000 milliLiter(s) (50 mL/Hr) IV Continuous <Continuous>  dextrose 50% Injectable 12.5 Gram(s) IV Push once  dextrose 50% Injectable 25 Gram(s) IV Push once  dextrose 50% Injectable 25 Gram(s) IV Push once  dicyclomine 20 milliGRAM(s) Oral two times a day before meals  heparin  Infusion.  Unit(s)/Hr (10 mL/Hr) IV Continuous <Continuous>  insulin glargine Injectable (LANTUS) 10 Unit(s) SubCutaneous every morning  insulin lispro (HumaLOG) corrective regimen sliding scale   SubCutaneous Before meals and at bedtime  levothyroxine 125 MICROGram(s) Oral daily  norepinephrine Infusion 0.05 MICROgram(s)/kG/Min (2.44 mL/Hr) IV Continuous <Continuous>  pantoprazole  Injectable 40 milliGRAM(s) IV Push two times a day  piperacillin/tazobactam IVPB.. 3.375 Gram(s) IV Intermittent every 8 hours  propofol Infusion 10 MICROgram(s)/kG/Min (3.12 mL/Hr) IV Continuous <Continuous>  sodium chloride 0.9%. 1000 milliLiter(s) (75 mL/Hr) IV Continuous <Continuous>    MEDICATIONS  (PRN):  dextrose 40% Gel 15 Gram(s) Oral once PRN Blood Glucose LESS THAN 70 milliGRAM(s)/deciliter  glucagon  Injectable 1 milliGRAM(s) IntraMuscular once PRN Glucose LESS THAN 70 milligrams/deciliter  HYDROmorphone  Injectable 1 milliGRAM(s) IV Push every 4 hours PRN Moderate Pain (4 - 6)        10-15 @ 07:01  -  10-16 @ 07:00  --------------------------------------------------------  IN: 9687 mL / OUT: 854 mL / NET: 8833 mL    10-16 @ 07:01  -  10-17 @ 04:22  --------------------------------------------------------  IN: 2014.7 mL / OUT: 1120 mL / NET: 894.7 mL        RADIOLOGY/IMAGING/ECHO      < from: Xray Chest 1 View-PORTABLE IMMEDIATE (Xray Chest 1 View-PORTABLE IMMEDIATE .) (10.15.20 @ 18:30) >    Lines in place unchanged. No focal consolidation.          Assessment/Plan:    59F hx BPD  DM2, hypothyroidism COPD  hx STEMI 2 months from  SNF w/ hypotension and abdominal pain. Admitted w/ anemia, hypokalemia, metabolic acidosis w/ AMISH.  She developed a pulseless R leg.  Now POD #2 R leg fasciotomy fem pop bypass.    Post op AMISH resp failure shock klebsiella bacteremia ABLA       Ongoing transfusion needs without a clear source of bleeding     Neuro awake off sedation SCHNEIDER  Cor  HD unstable septic shock titrating Nor-epi to MAP 70 for renal perfusion vasculopathy  Pulm  Placing on CPAP  PS trial now  GI   will feed if not extubated   Renal AMISH on CKD was anuric lasix 80 mg given now brisk UOP   Heme/DVT  full AC if further hgb drop CT abd/pelvis r/o RP bleed  ID  pip/tazo.  f/u cultures are neg.    Endo synthroid   Lines/tubes  [facility] 10/15 site clean          CRITICAL CARE TIME SPENT: 37 minutes assessing presenting problems of acute illness, which pose high probability of life threatening deterioration or end organ damage/dysfunction, as well as medical decision making including initiating plan of care, reviewing data, reviewing radiologic exams, discussing with multidisciplinary team,  discussing goals of care with patient/family, and writing this note.  Non-inclusive of procedures performed,

## 2020-10-17 NOTE — PROGRESS NOTE ADULT - ASSESSMENT
60yo female s/p surgery for leg ischemia  also multiple gi issues  currently her abdomen is softer with likely resolving ileus    plan is for for possible extubation today  ok to try and resume PO when extubated

## 2020-10-17 NOTE — PROGRESS NOTE ADULT - ASSESSMENT
Patient with diffuse vascular diease  renal failure acute on chronic  klebsiella sepsis    s/p right sided fem- pop and stent    On zosyn for Klebsiella bacteremia which was likely from urinary source  Pressors titrating down  right leg ischemia, awaiting demarcation , will likely need amputation  started on aspirin, and IV heparin for thrombosis of right leg, however, dec platelet count and falling hgb will need  to stop heparin, will sent HIT antibody, put I think pattern of platelet fall most likely dilutional  acute respiratory failure , post op secondary to shock, kyra attempt to extubate, cxr is clear,   pateint did not have respiratory failure on admission  renal failure,  renal function is stable      Patient with diffuse vascular diease  renal failure acute on chronic  klebsiella sepsic shock on admission  severe protein, calorie malnutirion   s/p right sided fem- pop and stent    On zosyn for Klebsiella bacteremia which was likely from urinary source  Pressors titrating down  right leg ischemia, awaiting demarcation , will likely need amputation  started on aspirin, and IV heparin for thrombosis of right leg, however, dec platelet count and falling hgb will need  to stop heparin, will sent HIT antibody, put I think pattern of platelet fall most likely dilutional  acute respiratory failure , post op secondary to shock, kyra attempt to extubate, cxr is clear,   pateint did not have respiratory failure on admission  renal failure,  renal function is stable  will encourage po when able

## 2020-10-17 NOTE — PROGRESS NOTE ADULT - SUBJECTIVE AND OBJECTIVE BOX
58 y/o female pmhx DM2, hx STEMI in august w/ RCA occulusion ( no intervention performed), w/ hospital course complicated by  respiratory failure, pneumonia, e.coli sepsis and renal failure. She was transferred from SNF w/ hypotension and abdominal pain. Admitted w/ anemia, hypokalemia, metabolic acidosis w/ AMISH.  Multiple previous vascular procedure. Patient had septic shock with  bacteremia with Klebsiella in blood on Zosyn, source felt to be urinary source, [place] positive stools on admission antiplatelet agents were held   on 10/15 the patient was found to have a cold right foot. Seen By Vascular, started on hep gtt and was taken to the OR for angiogram, Thrombectomy with fem pop bypass and placement of a stent. At end of procedure patient became hypotensive, started on pressors  remains on levophed, right foot is cool and cyanotic    ROS cant obtain Patient is intubated  10/17: Events Overnight: remains on low dose pressors, required 2 units of blood, for dec hgb, but hgb increased appropriately this am                             Plts noted to be 49 this am trending down over the past couple of days. This am pt was noted to be unresponsive BGM was 12, had rec'd low dose lantus last night as BGMs were consistently >200, given d50 with appropriate response         MEDICATIONS  (STANDING):  ARIPiprazole 20 milliGRAM(s) Oral daily  aspirin 325 milliGRAM(s) Oral daily  budesonide  80 MICROgram(s)/formoterol 4.5 MICROgram(s) Inhaler 2 Puff(s) Inhalation two times a day  calcium gluconate IVPB 1 Gram(s) IV Intermittent once  chlorhexidine 0.12% Liquid 15 milliLiter(s) Oral Mucosa every 12 hours  cholestyramine Powder (Sugar-Free) 4 Gram(s) Oral daily  dextrose 5%. 1000 milliLiter(s) (50 mL/Hr) IV Continuous <Continuous>  dextrose 5%. 1000 milliLiter(s) (50 mL/Hr) IV Continuous <Continuous>  dextrose 50% Injectable 12.5 Gram(s) IV Push once  dextrose 50% Injectable 25 Gram(s) IV Push once  dextrose 50% Injectable 25 Gram(s) IV Push once  dicyclomine 20 milliGRAM(s) Oral two times a day before meals  insulin lispro (HumaLOG) corrective regimen sliding scale   SubCutaneous Before meals and at bedtime  levothyroxine 125 MICROGram(s) Oral daily  magnesium sulfate  IVPB 2 Gram(s) IV Intermittent once  norepinephrine Infusion 0.05 MICROgram(s)/kG/Min (2.44 mL/Hr) IV Continuous <Continuous>  pantoprazole  Injectable 40 milliGRAM(s) IV Push two times a day  piperacillin/tazobactam IVPB.. 3.375 Gram(s) IV Intermittent every 8 hours    MEDICATIONS  (PRN):  dextrose 40% Gel 15 Gram(s) Oral once PRN Blood Glucose LESS THAN 70 milliGRAM(s)/deciliter  glucagon  Injectable 1 milliGRAM(s) IntraMuscular once PRN Glucose LESS THAN 70 milligrams/deciliter  HYDROmorphone  Injectable 1 milliGRAM(s) IV Push every 4 hours PRN Moderate Pain (4 - 6)    Vital Signs Last 24 Hrs  T(C): 36.7 (10-17-20 @ 06:00), Max: 37.1 (10-16-20 @ 20:00)  T(F): 98 (10-17-20 @ 06:00), Max: 98.8 (10-16-20 @ 20:00)  HR: 81 (10-17-20 @ 08:00) (79 - 112)  BP: 69/50 (10-17-20 @ 08:00) (69/50 - 69/50)  BP(mean): 54 (10-17-20 @ 08:00) (54 - 54)  RR: 15 (10-17-20 @ 08:00) (12 - 22)  SpO2: 100% (10-17-20 @ 08:00) (100% - 100%)    Mode: CPAP with PS  FiO2: 40  PEEP: 5  PS: 5    I&O's Summary    16 Oct 2020 07:01  -  17 Oct 2020 07:00  --------------------------------------------------------  IN: 3903.7 mL / OUT: 1690 mL / NET: 2213.7 mL                         10.1   10.13 )-----------( 49       ( 17 Oct 2020 06:15 )             28.8       10-17    137  |  103  |  45<H>  ----------------------------<  10<LL>  3.5   |  25  |  2.09<H>    Ca    5.3<LL>      17 Oct 2020 06:15  Mg     1.2     10-17    TPro  4.0<L>  /  Alb  1.2<L>  /  TBili  0.4  /  DBili  x   /  AST  952<H>  /  ALT  278<H>  /  AlkPhos  105  10-16      CARDIAC MARKERS ( 16 Oct 2020 15:25 )  x     / x     / 890 U/L / x     / x      CARDIAC MARKERS ( 16 Oct 2020 04:46 )  x     / x     / 1303 U/L / x     / x      CARDIAC MARKERS ( 15 Oct 2020 21:00 )  x     / x     / 1624 U/L / x     / x            ABG - ( 16 Oct 2020 06:26 )  pH, Arterial: 7.54  pH, Blood: x     /  pCO2: 26    /  pO2: 144   / HCO3: 22    / Base Excess: .2    /  SaO2: 97                A; 60 yo female with above pmh:      DVT Prophylaxis:  hepn held given platelet count of 49                                                               Advanced Directives: Full Code                             60 y/o female pmhx PAD,  DM2, hx STEMI in august w/ RCA occulusion ( no intervention performed), w/ hospital course complicated by  respiratory failure, pneumonia, e.coli sepsis and renal failure. She was transferred from SNF w/ hypotension and abdominal pain. Admitted w/ anemia, hypokalemia, metabolic acidosis w/ AMISH.  Multiple previous vascular procedure including LLE fem-pop bypass with stents in 2014 and right angioplasty to right iliac artery in May 2017  Patient had septic shock with  bacteremia with Klebsiella in blood on Zosyn, source felt to be urinary source, [place] positive stools on admission antiplatelet agents were held.  On 10/15 the patient was found to have a cold right foot. Seen By Vascular, started on hep gtt and was taken to the OR for angiogram, Thrombectomy with fem pop bypass and placement of a stent. At end of procedure patient became hypotensive, started on pressors and remained intubated    ROS cant obtain Patient is intubated    10/17: Events Overnight:  right foot/leg, remain cool, cyanotic. no pulses Seen By vascular.remains on low dose pressors, required 2 units of blood, for dec hgb, but hgb increased appropriately this am .  Plts noted to be 49 this am trending down over the past couple of days. This am pt was noted to be unresponsive BGM was 12, had rec'd low dose lantus last night as BGMs were consistently >200, given D50 with appropriate response         MEDICATIONS  (STANDING):  ARIPiprazole 20 milliGRAM(s) Oral daily  aspirin 325 milliGRAM(s) Oral daily  budesonide  80 MICROgram(s)/formoterol 4.5 MICROgram(s) Inhaler 2 Puff(s) Inhalation two times a day  calcium gluconate IVPB 1 Gram(s) IV Intermittent once  chlorhexidine 0.12% Liquid 15 milliLiter(s) Oral Mucosa every 12 hours  cholestyramine Powder (Sugar-Free) 4 Gram(s) Oral daily  dextrose 5%. 1000 milliLiter(s) (50 mL/Hr) IV Continuous <Continuous>  dextrose 5%. 1000 milliLiter(s) (50 mL/Hr) IV Continuous <Continuous>  dextrose 50% Injectable 12.5 Gram(s) IV Push once  dextrose 50% Injectable 25 Gram(s) IV Push once  dextrose 50% Injectable 25 Gram(s) IV Push once  dicyclomine 20 milliGRAM(s) Oral two times a day before meals  insulin lispro (HumaLOG) corrective regimen sliding scale   SubCutaneous Before meals and at bedtime  levothyroxine 125 MICROGram(s) Oral daily  magnesium sulfate  IVPB 2 Gram(s) IV Intermittent once  norepinephrine Infusion 0.05 MICROgram(s)/kG/Min (2.44 mL/Hr) IV Continuous <Continuous>  pantoprazole  Injectable 40 milliGRAM(s) IV Push two times a day  piperacillin/tazobactam IVPB.. 3.375 Gram(s) IV Intermittent every 8 hours    MEDICATIONS  (PRN):  dextrose 40% Gel 15 Gram(s) Oral once PRN Blood Glucose LESS THAN 70 milliGRAM(s)/deciliter  glucagon  Injectable 1 milliGRAM(s) IntraMuscular once PRN Glucose LESS THAN 70 milligrams/deciliter  HYDROmorphone  Injectable 1 milliGRAM(s) IV Push every 4 hours PRN Moderate Pain (4 - 6)    Vital Signs Last 24 Hrs  T(C): 36.7 (10-17-20 @ 06:00), Max: 37.1 (10-16-20 @ 20:00)  T(F): 98 (10-17-20 @ 06:00), Max: 98.8 (10-16-20 @ 20:00)  HR: 81 (10-17-20 @ 08:00) (79 - 112)  BP: 69/50 (10-17-20 @ 08:00) (69/50 - 69/50)  BP(mean): 54 (10-17-20 @ 08:00) (54 - 54)  RR: 15 (10-17-20 @ 08:00) (12 - 22)  SpO2: 100% (10-17-20 @ 08:00) (100% - 100%)    Mode: CPAP with PS  FiO2: 40  PEEP: 5  PS: 5    I&O's Summary    16 Oct 2020 07:01  -  17 Oct 2020 07:00  --------------------------------------------------------  IN: 3903.7 mL / OUT: 1690 mL / NET: 2213.7 mL                         10.1   10.13 )-----------( 49       ( 17 Oct 2020 06:15 )             28.8       10-17    137  |  103  |  45<H>  ----------------------------<  10<LL>  3.5   |  25  |  2.09<H>    Ca    5.3<LL>      17 Oct 2020 06:15  Mg     1.2     10-17    TPro  4.0<L>  /  Alb  1.2<L>  /  TBili  0.4  /  DBili  x   /  AST  952<H>  /  ALT  278<H>  /  AlkPhos  105  10-16      CARDIAC MARKERS ( 16 Oct 2020 15:25 )  x     / x     / 890 U/L / x     / x      CARDIAC MARKERS ( 16 Oct 2020 04:46 )  x     / x     / 1303 U/L / x     / x      CARDIAC MARKERS ( 15 Oct 2020 21:00 )  x     / x     / 1624 U/L / x     / x            ABG - ( 16 Oct 2020 06:26 )  pH, Arterial: 7.54  pH, Blood: x     /  pCO2: 26    /  pO2: 144   / HCO3: 22    / Base Excess: .2    /  SaO2: 97                A; 58 yo female with above pmh a/w:  anemia, hypokalemia, metabolic acidosis w/ AMISH.     # Resp failure    Aaron Cpap overnight    Extubated on 3LNC, O2 sat 100%    # Thrombocytopenia   PLTs downtrending 250-->207-->183-->144-->73-->49    # HIT??  pt last here in sept and was on heparin sq    HIT algorithm with intermediate probability     HIT ab ordered     hep gtt D/c'd    Not a candidate for fondaparinux/ argatroban due to high risk for bleed    #  Vascular RLE ischemia     monitor NV     ON ASA presently    Vascular service appreciated    # DM    D5W @ 50 cchr    D/C Lantus    Insulin ss only    # AMISH    prerenal    cont IVFs    renal following    # anemia    Transfused 2 upcs last night    monitor    #hypokalemia, Metabolic acidosis    resolved    # VTE prophylaxis    Heparin held   Venodyne to LLE      Dispo:        Advanced Directives: Full Code

## 2020-10-17 NOTE — PROGRESS NOTE ADULT - SUBJECTIVE AND OBJECTIVE BOX
NEPHROLOGY INTERVAL HPI/OVERNIGHT EVENTS:    Date of Service: 10-17-20 @ 13:41    Covering for Puma Billy/ Jorje    10/17 SY  58 yo woman admitted with GNR urosepsis and GIB.  Hospital course complicated by acute RLE ischemia.  Post Right stent and fem-pop complicated by hypotension.  started on pressors.  Remains on pressors.  Post transfusion 2 units PRBC.  Resp status stable.        59 CAD, PAD s/p fem-pop bypass s/p RT iliac stent, diabetes, hypothyroidism, hyperlipidemia, hx of cigg/cocaine use,  chronic back pain s/p lumbar surgery recent admit with  multiple non-specific complaints found to have ST elevations on presenting EKG hospital course c/b UTI sepsis and E. coli bacteremia POA w hospital course c/b code blue, renal evaluation of AMISH. Patient here from [place] with abd discomofort, diarrhea and elevated creatinine. D/c creatinine was near 3.0. Admit with multiple lyte issues,anemia s/p prbc and repletion.     MEDICATIONS  (STANDING):  ARIPiprazole 20 milliGRAM(s) Oral daily  aspirin 325 milliGRAM(s) Oral daily  budesonide  80 MICROgram(s)/formoterol 4.5 MICROgram(s) Inhaler 2 Puff(s) Inhalation two times a day  chlorhexidine 0.12% Liquid 15 milliLiter(s) Oral Mucosa every 12 hours  cholestyramine Powder (Sugar-Free) 4 Gram(s) Oral daily  dextrose 5%. 1000 milliLiter(s) (50 mL/Hr) IV Continuous <Continuous>  dextrose 5%. 1000 milliLiter(s) (50 mL/Hr) IV Continuous <Continuous>  dextrose 50% Injectable 12.5 Gram(s) IV Push once  dextrose 50% Injectable 25 Gram(s) IV Push once  dextrose 50% Injectable 25 Gram(s) IV Push once  dicyclomine 20 milliGRAM(s) Oral two times a day before meals  insulin lispro (HumaLOG) corrective regimen sliding scale   SubCutaneous Before meals and at bedtime  levothyroxine 125 MICROGram(s) Oral daily  norepinephrine Infusion 0.05 MICROgram(s)/kG/Min (2.44 mL/Hr) IV Continuous <Continuous>  pantoprazole  Injectable 40 milliGRAM(s) IV Push two times a day  piperacillin/tazobactam IVPB.. 3.375 Gram(s) IV Intermittent every 8 hours    MEDICATIONS  (PRN):  dextrose 40% Gel 15 Gram(s) Oral once PRN Blood Glucose LESS THAN 70 milliGRAM(s)/deciliter  glucagon  Injectable 1 milliGRAM(s) IntraMuscular once PRN Glucose LESS THAN 70 milligrams/deciliter  HYDROmorphone  Injectable 1 milliGRAM(s) IV Push every 4 hours PRN Moderate Pain (4 - 6)    Vital Signs Last 24 Hrs  T(C): 36.5 (17 Oct 2020 10:00), Max: 37.1 (16 Oct 2020 20:00)  T(F): 97.7 (17 Oct 2020 10:00), Max: 98.8 (16 Oct 2020 20:00)  HR: 80 (17 Oct 2020 13:00) (78 - 112)  BP: 69/50 (17 Oct 2020 08:00) (69/50 - 69/50)  BP(mean): 54 (17 Oct 2020 08:00) (54 - 54)  RR: 10 (17 Oct 2020 13:00) (8 - 22)  SpO2: 100% (17 Oct 2020 13:00) (98% - 100%)    10-16 @ 07:01  -  10-17 @ 07:00  --------------------------------------------------------  IN: 3903.7 mL / OUT: 1690 mL / NET: 2213.7 mL    10-17 @ 07:01  -  10-17 @ 13:41  --------------------------------------------------------  IN: 320 mL / OUT: 575 mL / NET: -255 mL    PHYSICAL EXAM: awake.   GENERAL: No acute distress  CHEST/LUNG: coarse bs  HEART: S1S2 RRR  ABDOMEN: soft  EXTREMITIES: Right LE cool to touch/cyanotic  SKIN:     LABS:                        10.1   10.13 )-----------( 49       ( 17 Oct 2020 06:15 )             28.8     10-17    137  |  103  |  45<H>  ----------------------------<  10<LL>  3.5   |  25  |  2.09<H>    Ca    5.3<LL>      17 Oct 2020 06:15  Mg     1.2     10-17    TPro  4.0<L>  /  Alb  1.2<L>  /  TBili  0.4  /  DBili  x   /  AST  952<H>  /  ALT  278<H>  /  AlkPhos  105  10-16    PTT - ( 17 Oct 2020 07:40 )  PTT:105.8 sec    Magnesium, Serum: 1.2 mg/dL (10-17 @ 06:15)    ABG - ( 16 Oct 2020 06:26 )  pH, Arterial: 7.54  pH, Blood: x     /  pCO2: 26    /  pO2: 144   / HCO3: 22    / Base Excess: .2    /  SaO2: 97                    RADIOLOGY & ADDITIONAL TESTS:

## 2020-10-17 NOTE — PROGRESS NOTE ADULT - ASSESSMENT
60 YO F with right leg ischemia s/p right femoral-PT bypass POD2    Plan:   continue ASA, start plavix once extubated   continue Heparin if plt count stable and not bleeding   continue to monitor dressing in the RLE and groin   continue to monitor pulses, doppler graft right medial thigh  Continue ICU care  Monitor urine output      Case discussed with attending

## 2020-10-17 NOTE — PROGRESS NOTE ADULT - ASSESSMENT
Patient with diffuse vascular diease  renal failure acute on chronic  klebsiella sepsic shock on admission  severe protein, calorie malnutirion   s/p right sided fem- pop and stent    On zosyn for Klebsiella bacteremia which was likely from urinary source  Pressors titrating down  right leg ischemia, awaiting demarcation , will likely need amputation  started on aspirin, and IV heparin for thrombosis of right leg, however, dec platelet count and falling hgb will need  to stop heparin, will sent HIT antibody, put I think pattern of platelet fall most likely dilutional  acute respiratory failure , post op secondary to shock, kyra attempt to extubate, cxr is clear,   pateint did not have respiratory failure on admission  renal failure,  renal function is stable  will encourage po when able

## 2020-10-17 NOTE — PROGRESS NOTE ADULT - SUBJECTIVE AND OBJECTIVE BOX
Patient is a 59y old  Female who presents with a chief complaint of hypotension, lower abtominal pain (17 Oct 2020 04:22)      HPI:  events noted with hypoglycemia  pt remains intubated but is awake and nodding yes and no to questions appropriately    MEDICATIONS  (STANDING):  ARIPiprazole 20 milliGRAM(s) Oral daily  aspirin 325 milliGRAM(s) Oral daily  budesonide  80 MICROgram(s)/formoterol 4.5 MICROgram(s) Inhaler 2 Puff(s) Inhalation two times a day  chlorhexidine 0.12% Liquid 15 milliLiter(s) Oral Mucosa every 12 hours  cholestyramine Powder (Sugar-Free) 4 Gram(s) Oral daily  dextrose 5%. 1000 milliLiter(s) (50 mL/Hr) IV Continuous <Continuous>  dextrose 50% Injectable 12.5 Gram(s) IV Push once  dextrose 50% Injectable 25 Gram(s) IV Push once  dextrose 50% Injectable 25 Gram(s) IV Push once  dicyclomine 20 milliGRAM(s) Oral two times a day before meals  insulin lispro (HumaLOG) corrective regimen sliding scale   SubCutaneous Before meals and at bedtime  levothyroxine 125 MICROGram(s) Oral daily  norepinephrine Infusion 0.05 MICROgram(s)/kG/Min (2.44 mL/Hr) IV Continuous <Continuous>  pantoprazole  Injectable 40 milliGRAM(s) IV Push two times a day  piperacillin/tazobactam IVPB.. 3.375 Gram(s) IV Intermittent every 8 hours  propofol Infusion 10 MICROgram(s)/kG/Min (3.12 mL/Hr) IV Continuous <Continuous>  sodium chloride 0.9%. 1000 milliLiter(s) (75 mL/Hr) IV Continuous <Continuous>    MEDICATIONS  (PRN):  dextrose 40% Gel 15 Gram(s) Oral once PRN Blood Glucose LESS THAN 70 milliGRAM(s)/deciliter  glucagon  Injectable 1 milliGRAM(s) IntraMuscular once PRN Glucose LESS THAN 70 milligrams/deciliter  HYDROmorphone  Injectable 1 milliGRAM(s) IV Push every 4 hours PRN Moderate Pain (4 - 6)      Allergies  No Known Allergies    REVIEW OF SYSTEMS:  not obtained    Vital Signs Last 24 Hrs  T(C): 36.7 (17 Oct 2020 06:00), Max: 37.1 (16 Oct 2020 20:00)  T(F): 98 (17 Oct 2020 06:00), Max: 98.8 (16 Oct 2020 20:00)  HR: 81 (17 Oct 2020 08:00) (79 - 112)  BP: 69/50 (17 Oct 2020 08:00) (69/50 - 69/50)  BP(mean): 54 (17 Oct 2020 08:00) (54 - 54)  RR: 15 (17 Oct 2020 08:00) (12 - 22)  SpO2: 100% (17 Oct 2020 08:00) (100% - 100%)    PHYSICAL EXAM:    Constitutional: intubated appears chronically ill but in NAD  Respiratory: CTAB  Cardiovascular: S1 and S2  Gastrointestinal: BS+, soft, mildly distended      LABS:                        10.1   10.13 )-----------( 49       ( 17 Oct 2020 06:15 )             28.8     10-17    137  |  103  |  45<H>  ----------------------------<  10<LL>  3.5   |  25  |  2.09<H>    Ca    5.3<LL>      17 Oct 2020 06:15  Mg     1.2     10-17    TPro  4.0<L>  /  Alb  1.2<L>  /  TBili  0.4  /  DBili  x   /  AST  952<H>  /  ALT  278<H>  /  AlkPhos  105  10-16    PTT - ( 17 Oct 2020 07:40 )  PTT:105.8 sec  LIVER FUNCTIONS - ( 16 Oct 2020 04:46 )  Alb: 1.2 g/dL / Pro: 4.0 gm/dL / ALK PHOS: 105 U/L / ALT: 278 U/L / AST: 952 U/L / GGT: x             RADIOLOGY & ADDITIONAL STUDIES:

## 2020-10-17 NOTE — PROGRESS NOTE ADULT - ASSESSMENT
59 CAD, PAD s/p fem-pop bypass s/p RT iliac stent, diabetes, hypothyroidism, hyperlipidemia, hx of cigg/cocaine use,  chronic back pain s/p lumbar surgery recent admit with STEMI c/b UTI sepsis and E. coli bacteremia.     AMISH    monitor Cr post op, stable today   agree with IVF with soft MAP, pressors to continue for now   check CPK levels post fasciotomy /compartment syndrome     Hypocalcemia with hypoalbuminemia    trend this , ionized ca can be checked   assess and replete mag /phos as needed     RLE ischemia    per Vascular     d/w RN staff  Dr. Lema to cover the weekend    10/17 SY  --AMISH/CKD : renal parameters holding fairly steady.  --PVD/vasculopathy : post R iliac stent and fem-pop: RLE remains cool and cyanotic. On ASA only.  --Hypotension : with urosepsis : continue abtx.  --Thrombocytopenia: IV heparin on hold check HIT ab.  --Calcium corrected around 7.5--replete  --Replete Mg.    Puma Billy/Jorje will resume care 10/19

## 2020-10-18 LAB
ANION GAP SERPL CALC-SCNC: 12 MMOL/L — SIGNIFICANT CHANGE UP (ref 5–17)
BUN SERPL-MCNC: 41 MG/DL — HIGH (ref 7–23)
CALCIUM SERPL-MCNC: 5.4 MG/DL — CRITICAL LOW (ref 8.5–10.1)
CHLORIDE SERPL-SCNC: 100 MMOL/L — SIGNIFICANT CHANGE UP (ref 96–108)
CK SERPL-CCNC: 1357 U/L — HIGH (ref 26–192)
CO2 SERPL-SCNC: 20 MMOL/L — LOW (ref 22–31)
CREAT SERPL-MCNC: 2.21 MG/DL — HIGH (ref 0.5–1.3)
GLUCOSE SERPL-MCNC: 172 MG/DL — HIGH (ref 70–99)
HCT VFR BLD CALC: 30.9 % — LOW (ref 34.5–45)
HGB BLD-MCNC: 10.5 G/DL — LOW (ref 11.5–15.5)
MAGNESIUM SERPL-MCNC: 1.5 MG/DL — LOW (ref 1.6–2.6)
MCHC RBC-ENTMCNC: 29 PG — SIGNIFICANT CHANGE UP (ref 27–34)
MCHC RBC-ENTMCNC: 34 GM/DL — SIGNIFICANT CHANGE UP (ref 32–36)
MCV RBC AUTO: 85.4 FL — SIGNIFICANT CHANGE UP (ref 80–100)
PHOSPHATE SERPL-MCNC: 4.1 MG/DL — SIGNIFICANT CHANGE UP (ref 2.5–4.5)
PLATELET # BLD AUTO: 36 K/UL — LOW (ref 150–400)
POTASSIUM SERPL-MCNC: 3.2 MMOL/L — LOW (ref 3.5–5.3)
POTASSIUM SERPL-SCNC: 3.2 MMOL/L — LOW (ref 3.5–5.3)
RBC # BLD: 3.62 M/UL — LOW (ref 3.8–5.2)
RBC # FLD: 15.9 % — HIGH (ref 10.3–14.5)
SODIUM SERPL-SCNC: 132 MMOL/L — LOW (ref 135–145)
WBC # BLD: 8.42 K/UL — SIGNIFICANT CHANGE UP (ref 3.8–10.5)
WBC # FLD AUTO: 8.42 K/UL — SIGNIFICANT CHANGE UP (ref 3.8–10.5)

## 2020-10-18 PROCEDURE — 99233 SBSQ HOSP IP/OBS HIGH 50: CPT

## 2020-10-18 RX ORDER — HYDROMORPHONE HYDROCHLORIDE 2 MG/ML
1 INJECTION INTRAMUSCULAR; INTRAVENOUS; SUBCUTANEOUS ONCE
Refills: 0 | Status: DISCONTINUED | OUTPATIENT
Start: 2020-10-18 | End: 2020-10-18

## 2020-10-18 RX ORDER — CLOPIDOGREL BISULFATE 75 MG/1
75 TABLET, FILM COATED ORAL DAILY
Refills: 0 | Status: DISCONTINUED | OUTPATIENT
Start: 2020-10-18 | End: 2020-10-18

## 2020-10-18 RX ORDER — CALCIUM CARBONATE 500(1250)
1 TABLET ORAL
Refills: 0 | Status: DISCONTINUED | OUTPATIENT
Start: 2020-10-18 | End: 2020-10-28

## 2020-10-18 RX ORDER — MAGNESIUM OXIDE 400 MG ORAL TABLET 241.3 MG
400 TABLET ORAL ONCE
Refills: 0 | Status: COMPLETED | OUTPATIENT
Start: 2020-10-18 | End: 2020-10-18

## 2020-10-18 RX ORDER — ASPIRIN/CALCIUM CARB/MAGNESIUM 324 MG
81 TABLET ORAL DAILY
Refills: 0 | Status: DISCONTINUED | OUTPATIENT
Start: 2020-10-18 | End: 2020-10-28

## 2020-10-18 RX ORDER — POTASSIUM CHLORIDE 20 MEQ
40 PACKET (EA) ORAL EVERY 4 HOURS
Refills: 0 | Status: COMPLETED | OUTPATIENT
Start: 2020-10-18 | End: 2020-10-18

## 2020-10-18 RX ORDER — PANTOPRAZOLE SODIUM 20 MG/1
40 TABLET, DELAYED RELEASE ORAL DAILY
Refills: 0 | Status: DISCONTINUED | OUTPATIENT
Start: 2020-10-18 | End: 2020-10-28

## 2020-10-18 RX ADMIN — Medication 40 MILLIEQUIVALENT(S): at 14:31

## 2020-10-18 RX ADMIN — Medication 325 MILLIGRAM(S): at 09:13

## 2020-10-18 RX ADMIN — CHOLESTYRAMINE 4 GRAM(S): 4 POWDER, FOR SUSPENSION ORAL at 12:14

## 2020-10-18 RX ADMIN — HYDROMORPHONE HYDROCHLORIDE 1 MILLIGRAM(S): 2 INJECTION INTRAMUSCULAR; INTRAVENOUS; SUBCUTANEOUS at 14:56

## 2020-10-18 RX ADMIN — Medication 125 MICROGRAM(S): at 06:11

## 2020-10-18 RX ADMIN — SODIUM CHLORIDE 50 MILLILITER(S): 9 INJECTION, SOLUTION INTRAVENOUS at 04:31

## 2020-10-18 RX ADMIN — Medication 20 MILLIGRAM(S): at 06:11

## 2020-10-18 RX ADMIN — HYDROMORPHONE HYDROCHLORIDE 1 MILLIGRAM(S): 2 INJECTION INTRAMUSCULAR; INTRAVENOUS; SUBCUTANEOUS at 15:11

## 2020-10-18 RX ADMIN — PIPERACILLIN AND TAZOBACTAM 25 GRAM(S): 4; .5 INJECTION, POWDER, LYOPHILIZED, FOR SOLUTION INTRAVENOUS at 09:12

## 2020-10-18 RX ADMIN — CLOPIDOGREL BISULFATE 75 MILLIGRAM(S): 75 TABLET, FILM COATED ORAL at 10:43

## 2020-10-18 RX ADMIN — BUDESONIDE AND FORMOTEROL FUMARATE DIHYDRATE 2 PUFF(S): 160; 4.5 AEROSOL RESPIRATORY (INHALATION) at 20:04

## 2020-10-18 RX ADMIN — HYDROMORPHONE HYDROCHLORIDE 1 MILLIGRAM(S): 2 INJECTION INTRAMUSCULAR; INTRAVENOUS; SUBCUTANEOUS at 04:30

## 2020-10-18 RX ADMIN — HYDROMORPHONE HYDROCHLORIDE 1 MILLIGRAM(S): 2 INJECTION INTRAMUSCULAR; INTRAVENOUS; SUBCUTANEOUS at 20:30

## 2020-10-18 RX ADMIN — BUDESONIDE AND FORMOTEROL FUMARATE DIHYDRATE 2 PUFF(S): 160; 4.5 AEROSOL RESPIRATORY (INHALATION) at 08:37

## 2020-10-18 RX ADMIN — Medication 40 MILLIEQUIVALENT(S): at 09:13

## 2020-10-18 RX ADMIN — Medication 2: at 17:52

## 2020-10-18 RX ADMIN — PIPERACILLIN AND TAZOBACTAM 25 GRAM(S): 4; .5 INJECTION, POWDER, LYOPHILIZED, FOR SOLUTION INTRAVENOUS at 01:48

## 2020-10-18 RX ADMIN — HYDROMORPHONE HYDROCHLORIDE 1 MILLIGRAM(S): 2 INJECTION INTRAMUSCULAR; INTRAVENOUS; SUBCUTANEOUS at 20:16

## 2020-10-18 RX ADMIN — Medication 2: at 12:14

## 2020-10-18 RX ADMIN — HYDROMORPHONE HYDROCHLORIDE 1 MILLIGRAM(S): 2 INJECTION INTRAMUSCULAR; INTRAVENOUS; SUBCUTANEOUS at 01:48

## 2020-10-18 RX ADMIN — HYDROMORPHONE HYDROCHLORIDE 1 MILLIGRAM(S): 2 INJECTION INTRAMUSCULAR; INTRAVENOUS; SUBCUTANEOUS at 02:00

## 2020-10-18 RX ADMIN — PIPERACILLIN AND TAZOBACTAM 25 GRAM(S): 4; .5 INJECTION, POWDER, LYOPHILIZED, FOR SOLUTION INTRAVENOUS at 17:52

## 2020-10-18 RX ADMIN — PANTOPRAZOLE SODIUM 40 MILLIGRAM(S): 20 TABLET, DELAYED RELEASE ORAL at 09:13

## 2020-10-18 RX ADMIN — HYDROMORPHONE HYDROCHLORIDE 1 MILLIGRAM(S): 2 INJECTION INTRAMUSCULAR; INTRAVENOUS; SUBCUTANEOUS at 04:45

## 2020-10-18 RX ADMIN — Medication 20 MILLIGRAM(S): at 17:52

## 2020-10-18 RX ADMIN — MAGNESIUM OXIDE 400 MG ORAL TABLET 400 MILLIGRAM(S): 241.3 TABLET ORAL at 09:13

## 2020-10-18 RX ADMIN — ARIPIPRAZOLE 20 MILLIGRAM(S): 15 TABLET ORAL at 10:43

## 2020-10-18 RX ADMIN — Medication 2: at 06:11

## 2020-10-18 RX ADMIN — Medication 1 TABLET(S): at 21:05

## 2020-10-18 NOTE — PROGRESS NOTE ADULT - ASSESSMENT
59 CAD, PAD s/p fem-pop bypass s/p RT iliac stent, diabetes, hypothyroidism, hyperlipidemia, hx of cigg/cocaine use,  chronic back pain s/p lumbar surgery recent admit with STEMI c/b UTI sepsis and E. coli bacteremia.     AMISH    monitor Cr post op, stable today   agree with IVF with soft MAP, pressors to continue for now   check CPK levels post fasciotomy /compartment syndrome     Hypocalcemia with hypoalbuminemia    trend this , ionized ca can be checked   assess and replete mag /phos as needed     RLE ischemia    per Vascular     d/w RN staff  Dr. Lema to cover the weekend    10/17 SY  --AMISH/CKD : renal parameters holding fairly steady.  --PVD/vasculopathy : post R iliac stent and fem-pop: RLE remains cool and cyanotic. On ASA only.  --Hypotension : with urosepsis : continue abtx.  --Thrombocytopenia: IV heparin on hold check HIT ab.  --Calcium corrected around 7.5--replete  --Replete Mg.    10/18 SY  --AMISH CKD : renal parameters remain fairly steady.  --Hyponatremia : likely due to decreased po intake last several days.  Now with improved mental status and po intake.  Monitor without supp.  --BP : fairly stable.  --ID :continue abtx.  --replete K, MG and Calcium as well.--start po calcium supp      Puma Billy/Jorje will resume care 10/19

## 2020-10-18 NOTE — PROGRESS NOTE ADULT - ASSESSMENT
60 YO F with right leg ischemia s/p right femoral-PT bypass POD3    Plan:   continue ASA 81mg qd  Hold plavix until platelet count stabilizes  hold heparin   continue to monitor dressing in the RLE and groin   continue to monitor pulses, doppler graft right medial thigh  Continue ICU care  Monitor urine output      Case discussed with Dr. Pop

## 2020-10-18 NOTE — PROGRESS NOTE ADULT - SUBJECTIVE AND OBJECTIVE BOX
Events Overnight: off pressors, was extubated yesterday, c/o right foot pain.    HPI:        60 y/o female pmhx DM2, hx STEMI in august w/ RCA occulusion ( no intervention performed), w/ hospital course complicated by  respiratory failure, pneumonia, e.coli sepsis and renal failure. She was transferred from SNF w/ hypotension and abdominal pain. Admitted w/ anemia, hypokalemia, metabolic acidosis w/ AMISH.    multiple previous vascular procedure  Patient had blood culture positive for zosyn, felt to be urinary in origen   on 10/15 the patient was found to have a cold right foot.   She went to OR and underwent right stent placement and  fem pop.   At end of procedure pateint became hypotensive, started on pressors  off pressors,  was extubated on 10/17    ROS  : no fevers, no chills, no chest pain, no shortness of breathe, abdominal pain improved, hungry            c/o right foot numness and pain in right leg,            ROS otherwise negative     MEDICATIONS  (STANDING):  ARIPiprazole 20 milliGRAM(s) Oral daily  aspirin 325 milliGRAM(s) Oral daily  budesonide  80 MICROgram(s)/formoterol 4.5 MICROgram(s) Inhaler 2 Puff(s) Inhalation two times a day  chlorhexidine 0.12% Liquid 15 milliLiter(s) Oral Mucosa every 12 hours  cholestyramine Powder (Sugar-Free) 4 Gram(s) Oral daily  dextrose 5%. 1000 milliLiter(s) (50 mL/Hr) IV Continuous <Continuous>  dextrose 5%. 1000 milliLiter(s) (50 mL/Hr) IV Continuous <Continuous>  dextrose 50% Injectable 12.5 Gram(s) IV Push once  dextrose 50% Injectable 25 Gram(s) IV Push once  dextrose 50% Injectable 25 Gram(s) IV Push once  dicyclomine 20 milliGRAM(s) Oral two times a day before meals  insulin lispro (HumaLOG) corrective regimen sliding scale   SubCutaneous Before meals and at bedtime  levothyroxine 125 MICROGram(s) Oral daily  pantoprazole  Injectable 40 milliGRAM(s) IV Push two times a day  piperacillin/tazobactam IVPB.. 3.375 Gram(s) IV Intermittent every 8 hours    MEDICATIONS  (PRN):  dextrose 40% Gel 15 Gram(s) Oral once PRN Blood Glucose LESS THAN 70 milliGRAM(s)/deciliter  glucagon  Injectable 1 milliGRAM(s) IntraMuscular once PRN Glucose LESS THAN 70 milligrams/deciliter  HYDROmorphone  Injectable 1 milliGRAM(s) IV Push every 4 hours PRN Moderate Pain (4 - 6)      ICU Vital Signs Last 24 Hrs  T(C): 36.2 (18 Oct 2020 06:00), Max: 36.7 (17 Oct 2020 20:00)  T(F): 97.1 (18 Oct 2020 06:00), Max: 98 (17 Oct 2020 20:00)  HR: 94 (18 Oct 2020 06:00) (78 - 95)  BP: 69/50 (17 Oct 2020 08:00) (69/50 - 69/50)  BP(mean): 54 (17 Oct 2020 08:00) (54 - 54)  ABP: 124/49 (18 Oct 2020 06:00) (105/43 - 157/67)  ABP(mean): 77 (18 Oct 2020 06:00) (64 - 105)  RR: 16 (18 Oct 2020 06:00) (7 - 23)  SpO2: 100% (18 Oct 2020 06:00) (97% - 100%)    Mode: CPAP with PS  FiO2: 40  PEEP: 5  PS: 5      I&O's Summary    17 Oct 2020 07:01  -  18 Oct 2020 07:00  --------------------------------------------------------  IN: 2397.1 mL / OUT: 1775 mL / NET: 622.1 mL                         10.5   8.42  )-----------( 36       ( 18 Oct 2020 06:20 )             30.9       10-18    132<L>  |  100  |  41<H>  ----------------------------<  172<H>  3.2<L>   |  20<L>  |  2.21<H>    Ca    5.4<LL>      18 Oct 2020 06:20  Phos  4.1     10-18  Mg     1.5     10-18    CARDIAC MARKERS ( 18 Oct 2020 06:20 )  x     / x     / 1357 U/L / x     / x      CARDIAC MARKERS ( 16 Oct 2020 15:25 )  x     / x     / 890 U/L / x     / x

## 2020-10-18 NOTE — PROGRESS NOTE ADULT - SUBJECTIVE AND OBJECTIVE BOX
NEPHROLOGY INTERVAL HPI/OVERNIGHT EVENTS:    Date of Service: 10-18-20 @ 12:43    Covering for Puma Billy/ Jorje  10/18 SY  Alert and comfortable today.  Increased po intake today and wants to eat.  Positive RLE pain.    10/17 SY  58 yo woman admitted with GNR urosepsis and GIB.  Hospital course complicated by acute RLE ischemia.  Post Right stent and fem-pop complicated by hypotension.  started on pressors.  Remains on pressors.  Post transfusion 2 units PRBC.  Resp status stable.        59 CAD, PAD s/p fem-pop bypass s/p RT iliac stent, diabetes, hypothyroidism, hyperlipidemia, hx of cigg/cocaine use,  chronic back pain s/p lumbar surgery recent admit with  multiple non-specific complaints found to have ST elevations on presenting EKG hospital course c/b UTI sepsis and E. coli bacteremia POA w hospital course c/b code blue, renal evaluation of AMISH. Patient here from [place] with abd discomofort, diarrhea and elevated creatinine. D/c creatinine was near 3.0. Admit with multiple lyte issues,anemia s/p prbc and repletion.     MEDICATIONS  (STANDING):  ARIPiprazole 20 milliGRAM(s) Oral daily  aspirin 81 milliGRAM(s) Oral daily  budesonide  80 MICROgram(s)/formoterol 4.5 MICROgram(s) Inhaler 2 Puff(s) Inhalation two times a day  cholestyramine Powder (Sugar-Free) 4 Gram(s) Oral daily  dextrose 5%. 1000 milliLiter(s) (50 mL/Hr) IV Continuous <Continuous>  dextrose 5%. 1000 milliLiter(s) (50 mL/Hr) IV Continuous <Continuous>  dextrose 50% Injectable 12.5 Gram(s) IV Push once  dextrose 50% Injectable 25 Gram(s) IV Push once  dextrose 50% Injectable 25 Gram(s) IV Push once  dicyclomine 20 milliGRAM(s) Oral two times a day before meals  insulin lispro (HumaLOG) corrective regimen sliding scale   SubCutaneous Before meals and at bedtime  levothyroxine 125 MICROGram(s) Oral daily  pantoprazole    Tablet 40 milliGRAM(s) Oral daily  piperacillin/tazobactam IVPB.. 3.375 Gram(s) IV Intermittent every 8 hours  potassium chloride    Tablet ER 40 milliEquivalent(s) Oral every 4 hours    MEDICATIONS  (PRN):  dextrose 40% Gel 15 Gram(s) Oral once PRN Blood Glucose LESS THAN 70 milliGRAM(s)/deciliter  glucagon  Injectable 1 milliGRAM(s) IntraMuscular once PRN Glucose LESS THAN 70 milligrams/deciliter  HYDROmorphone  Injectable 1 milliGRAM(s) IV Push every 4 hours PRN Moderate Pain (4 - 6)    Vital Signs Last 24 Hrs  T(C): 36.6 (18 Oct 2020 11:00), Max: 36.7 (17 Oct 2020 20:00)  T(F): 97.8 (18 Oct 2020 11:00), Max: 98 (17 Oct 2020 20:00)  HR: 103 (18 Oct 2020 11:00) (80 - 103)  BP: 130/87 (18 Oct 2020 10:00) (76/44 - 130/87)  BP(mean): 98 (18 Oct 2020 10:00) (51 - 98)  RR: 11 (18 Oct 2020 11:00) (7 - 23)  SpO2: 100% (18 Oct 2020 11:00) (97% - 100%)  10-17 @ 07:01  -  10-18 @ 07:00  --------------------------------------------------------  IN: 2397.1 mL / OUT: 1775 mL / NET: 622.1 mL    10-18 @ 07:01  -  10-18 @ 12:43  --------------------------------------------------------  IN: 100 mL / OUT: 150 mL / NET: -50 mL    PHYSICAL EXAM:  GENERAL:  comfortable  CHEST/LUNG: Scattered rhonchi  HEART: S1S2 RRR  ABDOMEN: soft  EXTREMITIES: trace edema  SKIN:     LABS:                        10.5   8.42  )-----------( 36       ( 18 Oct 2020 06:20 )             30.9     10-18    132<L>  |  100  |  41<H>  ----------------------------<  172<H>  3.2<L>   |  20<L>  |  2.21<H>    Ca    5.4<LL>      18 Oct 2020 06:20  Phos  4.1     10-18  Mg     1.5     10-18      PTT - ( 17 Oct 2020 07:40 )  PTT:105.8 sec    Magnesium, Serum: 1.5 mg/dL (10-18 @ 06:20)  Phosphorus Level, Serum: 4.1 mg/dL (10-18 @ 06:20)          RADIOLOGY & ADDITIONAL TESTS:

## 2020-10-18 NOTE — PROGRESS NOTE ADULT - ASSESSMENT
Patient with hx. cad, pvd, admitted with sepsis from Likely UTI, on zosyn,  developed ischemic cold foot  acute on chronic renal failure    1. Septic Shock - on Zosyn, bp better, had klebsiella bacteremia on admission, also dirty U/a        off pressors, bp better  2. PVD. ischemic right leg - cyanotic right foot, cpk ok will need amputation when fully demarcated  3. Thromboyctopenia - likley from sepsis and or dilutional received multiple units of blood, cant place on heparin          on  aspirin, will d/c if platelet ct. falls any lower, or signs of bleeding, doubt hit but  antibody sent  4. Renal insufficiency, seems to be stabilizing, creatinine likely close to her baseline  5. hypoikalemia - replace  6. Respiratory Failure - improved, now extubated on room air    if adequate access will d/c central line

## 2020-10-18 NOTE — PROGRESS NOTE ADULT - SUBJECTIVE AND OBJECTIVE BOX
CC:Patient is a 59y old  Female who presents with a chief complaint of hypotension, lower abtominal pain (17 Oct 2020 04:22)      Subjective:  Pt seen and examined at bedside. Patient dressing removed yesterday. . patient plt continue to downtrend to 36 this am.  Patient extubated yesterday, saturating well on NC this am. Patient has dopplerable pulse over graft no pulses in the foot. patient given ASA yesterday.           ICU Vital Signs Last 24 Hrs  T(C): 36.6 (18 Oct 2020 11:00), Max: 36.7 (17 Oct 2020 20:00)  T(F): 97.8 (18 Oct 2020 11:00), Max: 98 (17 Oct 2020 20:00)  HR: 103 (18 Oct 2020 11:00) (80 - 103)  BP: 130/87 (18 Oct 2020 10:00) (76/44 - 130/87)  BP(mean): 98 (18 Oct 2020 10:00) (51 - 98)  ABP: 136/54 (18 Oct 2020 11:00) (106/47 - 157/67)  ABP(mean): 84 (18 Oct 2020 11:00) (66 - 105)  RR: 11 (18 Oct 2020 11:00) (7 - 23)  SpO2: 100% (18 Oct 2020 11:00) (97% - 100%)      Labs:                          10.5   8.42  )-----------( 36       ( 18 Oct 2020 06:20 )             30.9   10-18    132<L>  |  100  |  41<H>  ----------------------------<  172<H>  3.2<L>   |  20<L>  |  2.21<H>    Ca    5.4<LL>      18 Oct 2020 06:20  Phos  4.1     10-18  Mg     1.5     10-18          Meds:  ARIPiprazole 20 milliGRAM(s) Oral daily  aspirin 325 milliGRAM(s) Oral daily  PTT - ( 17 Oct 2020 07:40 )  PTT:105.8 secbudesonide  80 MICROgram(s)/formoterol 4.5 MICROgram(s) Inhaler 2 Puff(s) Inhalation two times a day  chlorhexidine 0.12% Liquid 15 milliLiter(s) Oral Mucosa every 12 hours  cholestyramine Powder (Sugar-Free) 4 Gram(s) Oral daily  dextrose 40% Gel 15 Gram(s) Oral once PRN  dextrose 5%. 1000 milliLiter(s) IV Continuous <Continuous>  dextrose 50% Injectable 12.5 Gram(s) IV Push once  dextrose 50% Injectable 25 Gram(s) IV Push once  dextrose 50% Injectable 25 Gram(s) IV Push once  dicyclomine 20 milliGRAM(s) Oral two times a day before meals  glucagon  Injectable 1 milliGRAM(s) IntraMuscular once PRN  HYDROmorphone  Injectable 1 milliGRAM(s) IV Push every 4 hours PRN  insulin lispro (HumaLOG) corrective regimen sliding scale   SubCutaneous Before meals and at bedtime  levothyroxine 125 MICROGram(s) Oral daily  norepinephrine Infusion 0.05 MICROgram(s)/kG/Min IV Continuous <Continuous>  pantoprazole  Injectable 40 milliGRAM(s) IV Push two times a day  piperacillin/tazobactam IVPB.. 3.375 Gram(s) IV Intermittent every 8 hours  propofol Infusion 10 MICROgram(s)/kG/Min IV Continuous <Continuous>  sodium chloride 0.9%. 1000 milliLiter(s) IV Continuous <Continuous>      Radiology:      Physical exam:  Gen: intubated, alter   Resp: mechanical breath sounds present   CV: s1 and S2 present   Abd; Soft, edematous 2/2 3rd spacing   Ext: RLE dopplerable graft in the medial thigh, nondopperalbe pulses in the foot, LLE dopperlable pulses   R CVC in place, rectal tube in place, hughes in place

## 2020-10-19 LAB
ALBUMIN SERPL ELPH-MCNC: 1.4 G/DL — LOW (ref 3.3–5)
ANION GAP SERPL CALC-SCNC: 9 MMOL/L — SIGNIFICANT CHANGE UP (ref 5–17)
BUN SERPL-MCNC: 38 MG/DL — HIGH (ref 7–23)
CA-I BLD-SCNC: 0.8 MMOL/L — LOW (ref 1.12–1.3)
CALCIUM SERPL-MCNC: 5.9 MG/DL — CRITICAL LOW (ref 8.5–10.1)
CHLORIDE SERPL-SCNC: 100 MMOL/L — SIGNIFICANT CHANGE UP (ref 96–108)
CK SERPL-CCNC: 1027 U/L — HIGH (ref 26–192)
CK SERPL-CCNC: 1181 U/L — HIGH (ref 26–192)
CK SERPL-CCNC: 1265 U/L — HIGH (ref 26–192)
CO2 SERPL-SCNC: 23 MMOL/L — SIGNIFICANT CHANGE UP (ref 22–31)
CREAT SERPL-MCNC: 2.11 MG/DL — HIGH (ref 0.5–1.3)
CULTURE RESULTS: SIGNIFICANT CHANGE UP
CULTURE RESULTS: SIGNIFICANT CHANGE UP
GLUCOSE SERPL-MCNC: 76 MG/DL — SIGNIFICANT CHANGE UP (ref 70–99)
HCT VFR BLD CALC: 30.7 % — LOW (ref 34.5–45)
HGB BLD-MCNC: 10.4 G/DL — LOW (ref 11.5–15.5)
MCHC RBC-ENTMCNC: 29.4 PG — SIGNIFICANT CHANGE UP (ref 27–34)
MCHC RBC-ENTMCNC: 33.9 GM/DL — SIGNIFICANT CHANGE UP (ref 32–36)
MCV RBC AUTO: 86.7 FL — SIGNIFICANT CHANGE UP (ref 80–100)
PHOSPHATE SERPL-MCNC: 4 MG/DL — SIGNIFICANT CHANGE UP (ref 2.5–4.5)
PLATELET # BLD AUTO: 51 K/UL — LOW (ref 150–400)
POTASSIUM SERPL-MCNC: 3.7 MMOL/L — SIGNIFICANT CHANGE UP (ref 3.5–5.3)
POTASSIUM SERPL-SCNC: 3.7 MMOL/L — SIGNIFICANT CHANGE UP (ref 3.5–5.3)
RBC # BLD: 3.54 M/UL — LOW (ref 3.8–5.2)
RBC # FLD: 15.9 % — HIGH (ref 10.3–14.5)
SODIUM SERPL-SCNC: 132 MMOL/L — LOW (ref 135–145)
SPECIMEN SOURCE: SIGNIFICANT CHANGE UP
SPECIMEN SOURCE: SIGNIFICANT CHANGE UP
WBC # BLD: 8.04 K/UL — SIGNIFICANT CHANGE UP (ref 3.8–10.5)
WBC # FLD AUTO: 8.04 K/UL — SIGNIFICANT CHANGE UP (ref 3.8–10.5)

## 2020-10-19 PROCEDURE — 99233 SBSQ HOSP IP/OBS HIGH 50: CPT

## 2020-10-19 PROCEDURE — 99231 SBSQ HOSP IP/OBS SF/LOW 25: CPT

## 2020-10-19 RX ORDER — METOPROLOL TARTRATE 50 MG
25 TABLET ORAL
Refills: 0 | Status: DISCONTINUED | OUTPATIENT
Start: 2020-10-19 | End: 2020-10-28

## 2020-10-19 RX ORDER — ACETAMINOPHEN 500 MG
650 TABLET ORAL EVERY 6 HOURS
Refills: 0 | Status: DISCONTINUED | OUTPATIENT
Start: 2020-10-19 | End: 2020-10-28

## 2020-10-19 RX ORDER — HEPARIN SODIUM 5000 [USP'U]/ML
5000 INJECTION INTRAVENOUS; SUBCUTANEOUS EVERY 8 HOURS
Refills: 0 | Status: DISCONTINUED | OUTPATIENT
Start: 2020-10-19 | End: 2020-10-28

## 2020-10-19 RX ADMIN — Medication 1 TABLET(S): at 10:30

## 2020-10-19 RX ADMIN — Medication 1 TABLET(S): at 21:58

## 2020-10-19 RX ADMIN — HEPARIN SODIUM 5000 UNIT(S): 5000 INJECTION INTRAVENOUS; SUBCUTANEOUS at 14:47

## 2020-10-19 RX ADMIN — Medication 650 MILLIGRAM(S): at 19:40

## 2020-10-19 RX ADMIN — HYDROMORPHONE HYDROCHLORIDE 1 MILLIGRAM(S): 2 INJECTION INTRAMUSCULAR; INTRAVENOUS; SUBCUTANEOUS at 08:30

## 2020-10-19 RX ADMIN — BUDESONIDE AND FORMOTEROL FUMARATE DIHYDRATE 2 PUFF(S): 160; 4.5 AEROSOL RESPIRATORY (INHALATION) at 08:00

## 2020-10-19 RX ADMIN — BUDESONIDE AND FORMOTEROL FUMARATE DIHYDRATE 2 PUFF(S): 160; 4.5 AEROSOL RESPIRATORY (INHALATION) at 20:57

## 2020-10-19 RX ADMIN — HYDROMORPHONE HYDROCHLORIDE 1 MILLIGRAM(S): 2 INJECTION INTRAMUSCULAR; INTRAVENOUS; SUBCUTANEOUS at 20:40

## 2020-10-19 RX ADMIN — HYDROMORPHONE HYDROCHLORIDE 1 MILLIGRAM(S): 2 INJECTION INTRAMUSCULAR; INTRAVENOUS; SUBCUTANEOUS at 16:25

## 2020-10-19 RX ADMIN — Medication 20 MILLIGRAM(S): at 08:21

## 2020-10-19 RX ADMIN — HYDROMORPHONE HYDROCHLORIDE 1 MILLIGRAM(S): 2 INJECTION INTRAMUSCULAR; INTRAVENOUS; SUBCUTANEOUS at 04:12

## 2020-10-19 RX ADMIN — HYDROMORPHONE HYDROCHLORIDE 1 MILLIGRAM(S): 2 INJECTION INTRAMUSCULAR; INTRAVENOUS; SUBCUTANEOUS at 10:00

## 2020-10-19 RX ADMIN — Medication 25 MILLIGRAM(S): at 21:08

## 2020-10-19 RX ADMIN — PANTOPRAZOLE SODIUM 40 MILLIGRAM(S): 20 TABLET, DELAYED RELEASE ORAL at 10:30

## 2020-10-19 RX ADMIN — HYDROMORPHONE HYDROCHLORIDE 1 MILLIGRAM(S): 2 INJECTION INTRAMUSCULAR; INTRAVENOUS; SUBCUTANEOUS at 22:00

## 2020-10-19 RX ADMIN — Medication 20 MILLIGRAM(S): at 16:25

## 2020-10-19 RX ADMIN — Medication 81 MILLIGRAM(S): at 10:30

## 2020-10-19 RX ADMIN — PIPERACILLIN AND TAZOBACTAM 25 GRAM(S): 4; .5 INJECTION, POWDER, LYOPHILIZED, FOR SOLUTION INTRAVENOUS at 17:41

## 2020-10-19 RX ADMIN — HYDROMORPHONE HYDROCHLORIDE 1 MILLIGRAM(S): 2 INJECTION INTRAMUSCULAR; INTRAVENOUS; SUBCUTANEOUS at 00:35

## 2020-10-19 RX ADMIN — Medication 650 MILLIGRAM(S): at 19:20

## 2020-10-19 RX ADMIN — PIPERACILLIN AND TAZOBACTAM 25 GRAM(S): 4; .5 INJECTION, POWDER, LYOPHILIZED, FOR SOLUTION INTRAVENOUS at 10:31

## 2020-10-19 RX ADMIN — Medication 125 MICROGRAM(S): at 06:48

## 2020-10-19 RX ADMIN — ARIPIPRAZOLE 20 MILLIGRAM(S): 15 TABLET ORAL at 10:30

## 2020-10-19 RX ADMIN — Medication 2: at 21:58

## 2020-10-19 RX ADMIN — HYDROMORPHONE HYDROCHLORIDE 1 MILLIGRAM(S): 2 INJECTION INTRAMUSCULAR; INTRAVENOUS; SUBCUTANEOUS at 18:49

## 2020-10-19 RX ADMIN — HEPARIN SODIUM 5000 UNIT(S): 5000 INJECTION INTRAVENOUS; SUBCUTANEOUS at 21:08

## 2020-10-19 RX ADMIN — PIPERACILLIN AND TAZOBACTAM 25 GRAM(S): 4; .5 INJECTION, POWDER, LYOPHILIZED, FOR SOLUTION INTRAVENOUS at 01:40

## 2020-10-19 RX ADMIN — HYDROMORPHONE HYDROCHLORIDE 1 MILLIGRAM(S): 2 INJECTION INTRAMUSCULAR; INTRAVENOUS; SUBCUTANEOUS at 12:20

## 2020-10-19 RX ADMIN — HYDROMORPHONE HYDROCHLORIDE 1 MILLIGRAM(S): 2 INJECTION INTRAMUSCULAR; INTRAVENOUS; SUBCUTANEOUS at 12:35

## 2020-10-19 RX ADMIN — HYDROMORPHONE HYDROCHLORIDE 1 MILLIGRAM(S): 2 INJECTION INTRAMUSCULAR; INTRAVENOUS; SUBCUTANEOUS at 00:19

## 2020-10-19 RX ADMIN — HYDROMORPHONE HYDROCHLORIDE 1 MILLIGRAM(S): 2 INJECTION INTRAMUSCULAR; INTRAVENOUS; SUBCUTANEOUS at 04:22

## 2020-10-19 NOTE — PROGRESS NOTE ADULT - ASSESSMENT
60 YO F with right leg ischemia s/p right femoral-PT bypass POD4    Plan:   continue ASA 81mg qd  Hold plavix until platelet count stabilizes  Hold heparin   F/U NARESH workup  Continue to monitor dressing in the RLE and groin   continue to monitor pulses, doppler graft right medial thigh  Continue ICU care  Monitor urine output  Will need medical and Cardiology clearance for Above Knee Amputation this week      Case discussed with Dr. Pop 60 YO F with right leg ischemia s/p right femoral-PT bypass POD4    Plan:   continue ASA 81mg qd  Hold plavix until platelet count stabilizes  Hold heparin   F/U NARESH workup  Continue to monitor dressing in the RLE and groin   continue to monitor pulses, doppler graft right medial thigh  Continue ICU care  Monitor urine output  Will need medical and Cardiology clearance for Above Knee Amputation this Thursday      Case discussed with Dr. Pop

## 2020-10-19 NOTE — PHYSICAL THERAPY INITIAL EVALUATION ADULT - GENERAL OBSERVATIONS, REHAB EVAL
rec'd supine in bed awake,alert,moaning in pain intermittently ; B feet in heel protectors ,legs flexed over pillow ,R anterior knee skin is very shiny and appears taut,lower leg /foot discolored purple with scattered areas of blanching,immobile R foot ,min motion at R knee ,pt is awake,alert ,Ox4 ,looks older than stated age of 59 ; helaed surgical scars noted L groin region,dressings in place R groin ,R leg wrapped in gauze

## 2020-10-19 NOTE — PROGRESS NOTE ADULT - EXTREMITIES COMMENTS
signal in right thigh graft,  right leg cool and cyanotic
right foot cool pulseless and cyanotic  right brachial issa
left foot warm  signal in fem pop graft  right foot cyanotic, fasciotomy site, no purulence, musle dusky

## 2020-10-19 NOTE — CHART NOTE - NSCHARTNOTEFT_GEN_A_CORE
Assessment:   *pt admitted with sepsis 2/2 UTI, developed ischemic cold foot (will likely need amputation), acute on chronic renal failure, and multiple GI issues.  Currently abd softer, likely resolving ileus.  s/p extubation on 10/17.    *pt missing upper dentures; having difficulty with harder textures, may require soft texture being added to diet Rx.  Pt reports consuming 50% of meal, noted documented 75% of meal being consumed.  Pt with high protein needs for healing, pt dislikes ensure and is willing to trial gelatein (protein jello).      *Rectal tube 100mL output in past 24hours.  (+2) generalized, (+3) labia edema.    *no new wt since 10/16, obtain new wt when feasible to track/trend changes.    Recommendations:  1) encourage protein intake and add gelatein once daily  2) add MVI with minerals daily to ensure 100% RDI met  3) obtain new wt when feasible to track/trend changes      Diet Prescription: Diet, Consistent Carbohydrate w/Evening Snack (10-18-20 @ 07:45)      Wt Hx:  63.3Kg (10/16)  Height (cm): 160 (10-12-20 @ 14:41)  Weight (kg): 52 (10-15-20 @ 06:47)  BMI (kg/m2): 20.3 (10-15-20 @ 06:47)      10-18-20 @ 07:01  -  10-19-20 @ 07:00  --------------------------------------------------------  IN: 400 mL / OUT: 800 mL / NET: -400 mL    10-19-20 @ 07:01  -  10-19-20 @ 13:17  --------------------------------------------------------  IN: 240 mL / OUT: 0 mL / NET: 240 mL        Estimated Needs:    Estimated Energy Needs (25-30 calories/kg):  · Weight  (lbs)	109.5 lb  · Weight (kg)	49.6 kg  · From (25cal/kg)	1240 · To (30cal/kg) 1488     Other Calculation:  · Other Calculation	Ht. 63   "      Wt.  109.5  #       BMI   19.3      IBW   120 #      Pt is at   91 %  IBW     Estimated Protein Needs (1.8-2.0 gm/kg):  · Weight  (lbs)	109.5  · Weight (kg)	49.6 kg  · From (1.8 g/kg)	89.28 · To (2.0 g/kg) 99.2     Estimated Fluid Needs (25-30 ml/kg):  · Weight  (lbs)	109.5  · Weight (kg)	49.6  · From (25 ml/kg)	1240 · To (30 ml/kg) 1488        Pertinent Labs: 10-19    132<L>  |  100  |  38<H>  ----------------------------<  76  3.7   |  23  |  2.11<H>    Ca    5.9<LL>      19 Oct 2020 06:37  Phos  4.0     10-19  Mg     1.5     10-18    TPro  x   /  Alb  1.4<L>  /  TBili  x   /  DBili  x   /  AST  x   /  ALT  x   /  AlkPhos  x   10-19       CAPILLARY BLOOD GLUCOSE      POCT Blood Glucose.: 147 mg/dL (19 Oct 2020 11:35)  POCT Blood Glucose.: 84 mg/dL (19 Oct 2020 08:22)  POCT Blood Glucose.: 107 mg/dL (18 Oct 2020 21:08)  POCT Blood Glucose.: 178 mg/dL (18 Oct 2020 17:51)      Skin: jennifer score = 15  SDTI Rt heel  stage 3 Rt buttocks  unstageable coccyx  stage 2 on Rt/Lt buttocks    Monitoring and Evaluation:   [x] PO intake/Nutr support infusion [ x ] Tolerance to Nutr [ x ] weights [ x ] labs[ x ] follow up per protocol  [ ] other:

## 2020-10-19 NOTE — PROGRESS NOTE ADULT - ASSESSMENT
This patient is a 59 year old female with past medical history bipolar disease and depression, cad, esrd, gerd, hypertension, hyperlipidemia, diabetes, hypothyroidism, substance abuse transferred from snf on 10/12 for evaluation of hypotension and lower abdominal pain, loose stools. The patient states she passed out and cannot provide details surrounding her admission. History per medical record.   1. Patient admitted with sepsis secondary to Klebsiella pneumoniae, possibly due to GI translocation versus urinary origin; subsequent right lower extremity ischemia, s/p operative procedure  - will continue zosyn  - tolerating antibiotics without rashes or side effects   - wound care to right lower extremity per vascular; however patient states she will have amputation  - serial cbc and monitor temperature   - iv hydration and supportive care   - reviewed prior medical records to evaluate for resistant or atypical pathogens   - repeat blood cultures are no growth  2. other issues: per medicine

## 2020-10-19 NOTE — PROGRESS NOTE ADULT - SUBJECTIVE AND OBJECTIVE BOX
Date of service: 10-19-20 @ 13:03      Patient awake, eating lunch  Afebrile, has right leg pain, cool to touch      ROS: no fever or chills; denies dizziness, no HA, no SOB or cough, no abdominal pain, no diarrhea or constipation; no dysuria, no urinary frequency,  no rashes    MEDICATIONS  (STANDING):  ARIPiprazole 20 milliGRAM(s) Oral daily  aspirin 81 milliGRAM(s) Oral daily  budesonide  80 MICROgram(s)/formoterol 4.5 MICROgram(s) Inhaler 2 Puff(s) Inhalation two times a day  calcium carbonate    500 mG (Tums) Chewable 1 Tablet(s) Chew two times a day  cholestyramine Powder (Sugar-Free) 4 Gram(s) Oral daily  dextrose 5%. 1000 milliLiter(s) (50 mL/Hr) IV Continuous <Continuous>  dextrose 50% Injectable 12.5 Gram(s) IV Push once  dextrose 50% Injectable 25 Gram(s) IV Push once  dextrose 50% Injectable 25 Gram(s) IV Push once  dicyclomine 20 milliGRAM(s) Oral two times a day before meals  heparin   Injectable 5000 Unit(s) SubCutaneous every 8 hours  insulin lispro (HumaLOG) corrective regimen sliding scale   SubCutaneous Before meals and at bedtime  levothyroxine 125 MICROGram(s) Oral daily  metoprolol tartrate 25 milliGRAM(s) Oral two times a day  pantoprazole    Tablet 40 milliGRAM(s) Oral daily  piperacillin/tazobactam IVPB.. 3.375 Gram(s) IV Intermittent every 8 hours    MEDICATIONS  (PRN):  dextrose 40% Gel 15 Gram(s) Oral once PRN Blood Glucose LESS THAN 70 milliGRAM(s)/deciliter  glucagon  Injectable 1 milliGRAM(s) IntraMuscular once PRN Glucose LESS THAN 70 milligrams/deciliter  HYDROmorphone  Injectable 1 milliGRAM(s) IV Push every 4 hours PRN Moderate Pain (4 - 6)      Vital Signs Last 24 Hrs  T(C): 36.7 (19 Oct 2020 10:00), Max: 37.1 (18 Oct 2020 21:00)  T(F): 98 (19 Oct 2020 10:00), Max: 98.8 (19 Oct 2020 01:00)  HR: 104 (19 Oct 2020 12:00) (98 - 105)  BP: 107/43 (19 Oct 2020 12:00) (96/51 - 134/88)  BP(mean): 59 (19 Oct 2020 12:00) (53 - 108)  RR: 11 (19 Oct 2020 12:00) (8 - 18)  SpO2: 100% (19 Oct 2020 12:00) (96% - 100%)        Physical Exam:          Constitutional: frail looking  HEENT: NC/AT  Neck: supple; thyroid not palpable; left neck IJ CVL  Back: no tenderness  Respiratory: respiratory effort normal; clear to auscultation  Cardiovascular: S1S2 regular, no murmurs  Abdomen: soft, not tender, mildly distended, positive BS; no liver or spleen organomegaly  Genitourinary: no suprapubic tenderness  Musculoskeletal: right foot cool to touch; surgical dressing in place of right lower extremity  Neurological/ Psychiatric: nonfocal  Skin: no rashes; no palpable lesions    Labs: all available labs reviewed                     Labs:             Labs:                        10.4   8.04  )-----------( 51       ( 19 Oct 2020 06:37 )             30.7     10-19    132<L>  |  100  |  38<H>  ----------------------------<  76  3.7   |  23  |  2.11<H>    Ca    5.9<LL>      19 Oct 2020 06:37  Phos  4.0     10-19  Mg     1.5     10-18    TPro  x   /  Alb  1.4<L>  /  TBili  x   /  DBili  x   /  AST  x   /  ALT  x   /  AlkPhos  x   10-19           Cultures:       Culture - Blood (collected 10-15-20 @ 17:30)  Source: .Blood Blood  Preliminary Report (10-16-20 @ 22:02):    No growth to date.    Culture - Blood (collected 10-14-20 @ 15:35)  Source: .Blood None  Preliminary Report (10-15-20 @ 19:02):    No growth to date.    Culture - Blood (collected 10-14-20 @ 15:30)  Source: .Blood None  Preliminary Report (10-15-20 @ 19:02):    No growth to date.    GI PCR Panel, Stool (collected 10-13-20 @ 14:45)  Source: .Stool Feces  Final Report (10-13-20 @ 22:04):    GI PCR Results: NOT detected    *******Please Note:*******    GI panel PCR evaluates for:    Campylobacter, Plesiomonas shigelloides, Salmonella,    Vibrio, Yersinia enterocolitica, Enteroaggregative    Escherichia coli (EAEC), Enteropathogenic E.coli (EPEC),    Enterotoxigenic E. coli (ETEC) lt/st, Shiga-like    toxin-producing E. coli (STEC) stx1/stx2,    Shigella/ Enteroinvasive E. coli (EIEC), Cryptosporidium,    Cyclospora cayetanensis, Entamoeba histolytica,    Giardia lamblia, Adenovirus F 40/41, Astrovirus,    Norovirus GI/GII, Rotavirus A, Sapovirus    Culture - Urine (collected 10-12-20 @ 19:17)  Source: .Urine None  Final Report (10-13-20 @ 19:35):    >=3 organisms. Probable collection contamination.    Culture - Blood (collected 10-12-20 @ 17:21)  Source: .Blood Blood-Peripheral  Gram Stain (10-13-20 @ 14:54):    Growth in aerobic bottle: Gram Negative Rods  Final Report (10-15-20 @ 07:20):    Growth in aerobic bottle: Klebsiella pneumoniae    See previous culture [id number]    Culture - Blood (collected 10-12-20 @ 17:06)  Source: .Blood None  Gram Stain (10-13-20 @ 13:44):    Growth in aerobic bottle: Gram Negative Rods  Final Report (10-15-20 @ 07:19):    Growth in aerobic bottle: Klebsiella pneumoniae    "Due to technical problems, Proteus sp. will Not be reported as part of    the BCID panel until further notice"    ***Blood Panel PCR results on this specimen are available    approximately 3 hours after the Gram stain result.***    Gram stain, PCR, and/or culture results may not always    correspond due to difference in methodologies.    ************************************************************    This PCR assay was performed using [employer].    The following targets are tested for: Enterococcus,    vancomycin resistant enterococci, Listeria monocytogenes,    coagulase negative staphylococci, S. aureus,    methicillin resistant S. aureus, Streptococcus agalactiae    (Group B), S. pneumoniae, S. pyogenes (Group A),    Acinetobacter baumannii, Enterobacter cloacae, E. coli,    Klebsiella oxytoca, K. pneumoniae, Proteus sp.,    Serratia marcescens, Haemophilus influenzae,    Neisseria meningitidis, Pseudomonas aeruginosa, Candida    albicans, C. glabrata, C krusei, C parapsilosis,    C. tropicalis and the KPC resistance gene.  Organism: Blood Culture PCR  Klebsiella pneumoniae (10-15-20 @ 07:19)  Organism: Klebsiella pneumoniae (10-15-20 @ 07:19)      -  Amikacin: S <=16      -  Ampicillin: R >16 These ampicillin results predict results for amoxicillin      -  Ampicillin/Sulbactam: S 8/4 Enterobacter, Citrobacter, and Serratia may develop resistance during prolonged therapy (3-4 days)      -  Aztreonam: S <=4      -  Cefazolin: S <=2 Enterobacter, Citrobacter, and Serratia may develop resistance during prolonged therapy (3-4 days)      -  Cefepime: S <=2      -  Cefoxitin: S <=8      -  Ceftriaxone: S <=1 Enterobacter, Citrobacter, and Serratia may develop resistance during prolonged therapy      -  Ciprofloxacin: S <=0.25      -  Ertapenem: S <=0.5      -  Gentamicin: S <=2      -  Imipenem: S <=1      -  Levofloxacin: S <=0.5      -  Meropenem: S <=1      -  Piperacillin/Tazobactam: S <=8      -  Tobramycin: S <=2      -  Trimethoprim/Sulfamethoxazole: S <=0.5/9.5      Method Type: PINA  Organism: Blood Culture PCR (10-15-20 @ 07:19)      -  Klebsiella pneumoniae: Detec      Method Type: PCR                            < from: CT Abdomen and Pelvis w/ Oral Cont (10.14.20 @ 11:10) >    IMPRESSION:  Mildly dilated loops of small bowel with no transition point, possibly an ileus.    Presacral edema with infiltration of the perirectal fat, possibly proctitis.    Dilated common bile duct measuring 1.1 cm, increased in caliber since 9/8/2020, previously 0.8 cm; correlation is recommended with LFTs; an MRI/MRCP of the abdomen is recommended for further evaluation tiny; a noncontrast study can be performed if contraindicated by the patient's renal function.    Small amount of abdominal and pelvic ascites, increased since 10/12/2020.    Wall thickening versus underdistention involving the region of the pylorus; continued attention is recommended on the MRI.        < end of copied text >                Radiology: all available radiological tests reviewed    Advanced directives addressed: full resuscitation

## 2020-10-19 NOTE — PHYSICAL THERAPY INITIAL EVALUATION ADULT - SKIN COLOR/CHARACTERISTICS
cyanotic/dusky/RLE below knee with ischemic changes ,purplish discoloration/mottled/shiny/bruised (ecchymotic)

## 2020-10-19 NOTE — PROGRESS NOTE ADULT - SUBJECTIVE AND OBJECTIVE BOX
Pt seen and examined at bedside. Patient dressing changed today. Patient awake and alert and complaining of a lot of pain in her right leg. Saturating well on NC this am. Patient has dopplerable pulse over graft no pulses in the foot.      Vital Signs Last 24 Hrs  T(C): 36.9 (19 Oct 2020 04:00), Max: 37.1 (18 Oct 2020 21:00)  T(F): 98.5 (19 Oct 2020 04:00), Max: 98.8 (19 Oct 2020 01:00)  HR: 103 (19 Oct 2020 08:00) (97 - 104)  BP: 120/63 (19 Oct 2020 08:00) (76/44 - 134/88)  BP(mean): 76 (19 Oct 2020 08:00) (51 - 108)  RR: 12 (19 Oct 2020 08:00) (8 - 21)  SpO2: 100% (19 Oct 2020 08:00) (96% - 100%)            Labs:              10.4<L>                132<L>| 23   | 38<H>        8.04  >-----------< 51<L>   ------------------------< 76                    30.7<L>                3.7  | 100  | 2.11<H>                                                                     Ca 5.9<LL> Mg x     Ph 4.0      ,             10.5<L>                132<L>| 20<L>| 41<H>        8.42  >-----------< 36<L>   ------------------------< 172<H>                 30.9<L>                3.2<L>| 100  | 2.21<H>                                                                     Ca 5.4<LL> Mg 1.5<L> Ph 4.1                         Physical exam:  Gen: alert  Resp: Equal chest rise   CV: s1 and S2 present   Abd; Soft, edematous 2/2 3rd spacing   Ext: RLE dopplerable graft in the medial thigh, nondopperalbe pulses in the foot, LLE dopperlable pulses leg is demarking   rectal tube in place

## 2020-10-19 NOTE — PROGRESS NOTE ADULT - SUBJECTIVE AND OBJECTIVE BOX
Patient is a 59y Female who reports no complaints as new, on NC. Feels better she reports      MEDICATIONS  (STANDING):  ARIPiprazole 20 milliGRAM(s) Oral daily  aspirin 81 milliGRAM(s) Oral daily  budesonide  80 MICROgram(s)/formoterol 4.5 MICROgram(s) Inhaler 2 Puff(s) Inhalation two times a day  calcium carbonate    500 mG (Tums) Chewable 1 Tablet(s) Chew two times a day  cholestyramine Powder (Sugar-Free) 4 Gram(s) Oral daily  dextrose 5%. 1000 milliLiter(s) (50 mL/Hr) IV Continuous <Continuous>  dextrose 50% Injectable 12.5 Gram(s) IV Push once  dextrose 50% Injectable 25 Gram(s) IV Push once  dextrose 50% Injectable 25 Gram(s) IV Push once  dicyclomine 20 milliGRAM(s) Oral two times a day before meals  heparin   Injectable 5000 Unit(s) SubCutaneous every 8 hours  insulin lispro (HumaLOG) corrective regimen sliding scale   SubCutaneous Before meals and at bedtime  levothyroxine 125 MICROGram(s) Oral daily  metoprolol tartrate 25 milliGRAM(s) Oral two times a day  pantoprazole    Tablet 40 milliGRAM(s) Oral daily  piperacillin/tazobactam IVPB.. 3.375 Gram(s) IV Intermittent every 8 hours    MEDICATIONS  (PRN):  dextrose 40% Gel 15 Gram(s) Oral once PRN Blood Glucose LESS THAN 70 milliGRAM(s)/deciliter  glucagon  Injectable 1 milliGRAM(s) IntraMuscular once PRN Glucose LESS THAN 70 milligrams/deciliter  HYDROmorphone  Injectable 1 milliGRAM(s) IV Push every 4 hours PRN Moderate Pain (4 - 6)        T(C): , Max: 37.1 (10-18-20 @ 21:00)  T(F): , Max: 98.8 (10-19-20 @ 01:00)  HR: 97 (10-19-20 @ 16:54)  BP: 130/69 (10-19-20 @ 16:54)  BP(mean): 90 (10-19-20 @ 15:50)  RR: 18 (10-19-20 @ 16:54)  SpO2: 100% (10-19-20 @ 16:54)  Wt(kg): --    10-18 @ 07:01  -  10-19 @ 07:00  --------------------------------------------------------  IN: 400 mL / OUT: 800 mL / NET: -400 mL    10-19 @ 07:01  -  10-19 @ 18:00  --------------------------------------------------------  IN: 820 mL / OUT: 350 mL / NET: 470 mL          PHYSICAL EXAM:    Constitutional: NAD, >>then stated age  HEENT: MAYITO, EOMI,  MMM  Neck: No LAD, No JVD  Respiratory: dist bs  Cardiovascular: S1 and S2  Extremities: le chr changes  Neurological: A/O x 3, no focal deficits  Psychiatric: Normal mood, normal affect  : No Black, rect tube  Skin: No rashes  Access: Not applicable        LABS:                        10.4   8.04  )-----------( 51       ( 19 Oct 2020 06:37 )             30.7     19 Oct 2020 06:37    132    |  100    |  38     ----------------------------<  76     3.7     |  23     |  2.11   18 Oct 2020 06:20    132    |  100    |  41     ----------------------------<  172    3.2     |  20     |  2.21   17 Oct 2020 06:15    137    |  103    |  45     ----------------------------<  10     3.5     |  25     |  2.09   16 Oct 2020 23:00    135    |  101    |  45     ----------------------------<  75     3.1     |  25     |  2.24   16 Oct 2020 04:46    132    |  99     |  44     ----------------------------<  313    3.4     |  22     |  2.03     Ca    5.9        19 Oct 2020 06:37  Ca    5.4        18 Oct 2020 06:20  Ca    5.3        17 Oct 2020 06:15  Ca    5.5        16 Oct 2020 23:00  Ca    5.4        16 Oct 2020 04:46  Phos  4.0       19 Oct 2020 06:37  Phos  4.1       18 Oct 2020 06:20  Mg     1.5       18 Oct 2020 06:20  Mg     1.2       17 Oct 2020 06:15    TPro  x      /  Alb  1.4    /  TBili  x      /  DBili  x      /  AST  x      /  ALT  x      /  AlkPhos  x      19 Oct 2020 06:37  TPro  4.0    /  Alb  1.2    /  TBili  0.4    /  DBili  x      /  AST  952    /  ALT  278    /  AlkPhos  105    16 Oct 2020 04:46      Creatine Kinase, Serum: 1181 U/L <H> [26 - 192] (10-19 @ 13:11)  Creatine Kinase, Serum: 1199 U/L <H> [26 - 192] (10-19 @ 06:37)      Urine Studies:          RADIOLOGY & ADDITIONAL STUDIES:

## 2020-10-19 NOTE — PHYSICAL THERAPY INITIAL EVALUATION ADULT - PASSIVE RANGE OF MOTION EXAMINATION, REHAB EVAL
deficits as listed below/PROM L ankle DF -10 degrees of neutral,adaptive mm.shortening posterior leg mm ; PROM deferred RLE at patients request

## 2020-10-19 NOTE — PROGRESS NOTE ADULT - SUBJECTIVE AND OBJECTIVE BOX
Events Overnight: complaining of severe right leg pain, breathing otherwise comfortable    HPI:       58 y/o female pmhx DM2, hx STEMI in august w/ RCA occulusion ( no intervention performed), w/ hospital course complicated by  respiratory failure, pneumonia, e.coli sepsis and renal failure. She was transferred from SNF w/ hypotension and abdominal pain. Admitted w/ anemia, hypokalemia, metabolic acidosis w/ AMISH.    multiple previous vascular procedure  Patient had blood culture positive for klebsiella on zosyn, felt to be urinary in origen   on 10/15 the patient was found to have a cold right foot.   She went to OR and underwent right stent placement and  fem pop.   At end of procedure pateint became hypotensive, started on pressors  off pressors,  was extubated on 10/17  on pressors, central line out    ROS  : no fevers, no chills, no chest pain, no shortness of breathe, abdominal pain improved,  tolerating diet            c/o right foot numness and pain in right leg, no urinary symptoms            ROS otherwise negative     MEDICATIONS  (STANDING):  ARIPiprazole 20 milliGRAM(s) Oral daily  aspirin 81 milliGRAM(s) Oral daily  budesonide  80 MICROgram(s)/formoterol 4.5 MICROgram(s) Inhaler 2 Puff(s) Inhalation two times a day  calcium carbonate    500 mG (Tums) Chewable 1 Tablet(s) Chew two times a day  cholestyramine Powder (Sugar-Free) 4 Gram(s) Oral daily  dextrose 5%. 1000 milliLiter(s) (50 mL/Hr) IV Continuous <Continuous>  dextrose 50% Injectable 12.5 Gram(s) IV Push once  dextrose 50% Injectable 25 Gram(s) IV Push once  dextrose 50% Injectable 25 Gram(s) IV Push once  dicyclomine 20 milliGRAM(s) Oral two times a day before meals  insulin lispro (HumaLOG) corrective regimen sliding scale   SubCutaneous Before meals and at bedtime  levothyroxine 125 MICROGram(s) Oral daily  metoprolol tartrate 25 milliGRAM(s) Oral two times a day  pantoprazole    Tablet 40 milliGRAM(s) Oral daily  piperacillin/tazobactam IVPB.. 3.375 Gram(s) IV Intermittent every 8 hours    MEDICATIONS  (PRN):  dextrose 40% Gel 15 Gram(s) Oral once PRN Blood Glucose LESS THAN 70 milliGRAM(s)/deciliter  glucagon  Injectable 1 milliGRAM(s) IntraMuscular once PRN Glucose LESS THAN 70 milligrams/deciliter  HYDROmorphone  Injectable 1 milliGRAM(s) IV Push every 4 hours PRN Moderate Pain (4 - 6)    ICU Vital Signs Last 24 Hrs  T(C): 36.9 (19 Oct 2020 04:00), Max: 37.1 (18 Oct 2020 21:00)  T(F): 98.5 (19 Oct 2020 04:00), Max: 98.8 (19 Oct 2020 01:00)  HR: 105 (19 Oct 2020 10:00) (98 - 105)  BP: 117/57 (19 Oct 2020 10:00) (96/51 - 134/88)  BP(mean): 68 (19 Oct 2020 10:00) (53 - 108)  ABP: 136/54 (18 Oct 2020 11:00) (136/54 - 136/54)  ABP(mean): 84 (18 Oct 2020 11:00) (84 - 84)  RR: 13 (19 Oct 2020 10:00) (8 - 18)  SpO2: 100% (19 Oct 2020 10:00) (96% - 100%)      I&O's Summary    18 Oct 2020 07:01  -  19 Oct 2020 07:00  --------------------------------------------------------  IN: 400 mL / OUT: 800 mL / NET: -400 mL                        10.4   8.04  )-----------( 51       ( 19 Oct 2020 06:37 )             30.7       10-19    132<L>  |  100  |  38<H>  ----------------------------<  76  3.7   |  23  |  2.11<H>    Ca    5.9<LL>      19 Oct 2020 06:37  Phos  4.0     10-19  Mg     1.5     10-18    TPro  x   /  Alb  1.4<L>  /  TBili  x   /  DBili  x   /  AST  x   /  ALT  x   /  AlkPhos  x   10-19      CARDIAC MARKERS ( 19 Oct 2020 06:37 )  x     / x     / 1199 U/L / x     / x      CARDIAC MARKERS ( 19 Oct 2020 03:10 )  x     / x     / 1265 U/L / x     / x      CARDIAC MARKERS ( 18 Oct 2020 06:20 )  x     / x     / 1357 U/L / x     / x        DVT Prophylaxis:   heparin subq                                                            Advanced Directives: full code

## 2020-10-19 NOTE — PROGRESS NOTE ADULT - ASSESSMENT
59 CAD, PAD s/p fem-pop bypass s/p RT iliac stent, diabetes, hypothyroidism, hyperlipidemia, hx of cigg/cocaine use,  chronic back pain s/p lumbar surgery recent admit with STEMI c/b UTI sepsis and E. coli bacteremia.     AMISH    Stable renal function, near baseline   Keep near net even    Hypocalcemia  0ral repletion  trend mg/k/phos    Ischemia  -medicine/vascular    seen this AM, note now  d/c with ICU Rn and team

## 2020-10-19 NOTE — PHYSICAL THERAPY INITIAL EVALUATION ADULT - LIVES WITH, PROFILE
had lived in [place] previously but most recently has been a resident of [facility]
81

## 2020-10-19 NOTE — PHYSICAL THERAPY INITIAL EVALUATION ADULT - SKIN INTEGRITY
scale/Fasciotomy incision R leg  is bandaged with Kerlix gauze R lower leg ; multiple foam dressings over L leg due to ?tears per patient,large dry scles noted plantar surface L foot/surgical incision

## 2020-10-19 NOTE — PHYSICAL THERAPY INITIAL EVALUATION ADULT - PATIENT PROFILE REVIEW, REHAB EVAL
pt had acute changes on physical exam noted 10/15/20 with cold R foot,+paralysis/parasthesiais from knee to toes R leg

## 2020-10-19 NOTE — PROGRESS NOTE ADULT - GASTROINTESTINAL
detailed exam
Soft, non-tender, no hepatosplenomegaly, normal bowel sounds
detailed exam

## 2020-10-19 NOTE — PHYSICAL THERAPY INITIAL EVALUATION ADULT - DIAGNOSIS, PT EVAL
PAD, ischemic RLE s/p angioplasty R iliac vessel, decompression fasciotomy & creation of fem-pop bypass using PTFE graft,due to +occlusion of previously placed stent down to popliteal artery ; ischemic leg pain with demarcation/pulseless R foot post-op ,will require R AKA planned 10/22/20; recent GIB vs ileus ; +bipolar d/o anxiety/depression

## 2020-10-20 LAB
ADD ON TEST-SPECIMEN IN LAB: SIGNIFICANT CHANGE UP
ANION GAP SERPL CALC-SCNC: 9 MMOL/L — SIGNIFICANT CHANGE UP (ref 5–17)
BUN SERPL-MCNC: 35 MG/DL — HIGH (ref 7–23)
CALCIUM SERPL-MCNC: 6 MG/DL — CRITICAL LOW (ref 8.5–10.1)
CHLORIDE SERPL-SCNC: 102 MMOL/L — SIGNIFICANT CHANGE UP (ref 96–108)
CO2 SERPL-SCNC: 22 MMOL/L — SIGNIFICANT CHANGE UP (ref 22–31)
CREAT SERPL-MCNC: 2.19 MG/DL — HIGH (ref 0.5–1.3)
CULTURE RESULTS: SIGNIFICANT CHANGE UP
GLUCOSE SERPL-MCNC: 199 MG/DL — HIGH (ref 70–99)
GLUCOSE SERPL-MCNC: 46 MG/DL — LOW (ref 70–99)
HCT VFR BLD CALC: 29.1 % — LOW (ref 34.5–45)
HGB BLD-MCNC: 9.9 G/DL — LOW (ref 11.5–15.5)
MCHC RBC-ENTMCNC: 29.4 PG — SIGNIFICANT CHANGE UP (ref 27–34)
MCHC RBC-ENTMCNC: 34 GM/DL — SIGNIFICANT CHANGE UP (ref 32–36)
MCV RBC AUTO: 86.4 FL — SIGNIFICANT CHANGE UP (ref 80–100)
PLATELET # BLD AUTO: 61 K/UL — LOW (ref 150–400)
POTASSIUM SERPL-MCNC: 3.7 MMOL/L — SIGNIFICANT CHANGE UP (ref 3.5–5.3)
POTASSIUM SERPL-SCNC: 3.7 MMOL/L — SIGNIFICANT CHANGE UP (ref 3.5–5.3)
RBC # BLD: 3.37 M/UL — LOW (ref 3.8–5.2)
RBC # FLD: 16.1 % — HIGH (ref 10.3–14.5)
SODIUM SERPL-SCNC: 133 MMOL/L — LOW (ref 135–145)
SPECIMEN SOURCE: SIGNIFICANT CHANGE UP
WBC # BLD: 11.37 K/UL — HIGH (ref 3.8–10.5)
WBC # FLD AUTO: 11.37 K/UL — HIGH (ref 3.8–10.5)

## 2020-10-20 PROCEDURE — 93971 EXTREMITY STUDY: CPT | Mod: 26,LT

## 2020-10-20 PROCEDURE — 99233 SBSQ HOSP IP/OBS HIGH 50: CPT

## 2020-10-20 RX ORDER — PIPERACILLIN AND TAZOBACTAM 4; .5 G/20ML; G/20ML
3.38 INJECTION, POWDER, LYOPHILIZED, FOR SOLUTION INTRAVENOUS EVERY 8 HOURS
Refills: 0 | Status: DISCONTINUED | OUTPATIENT
Start: 2020-10-20 | End: 2020-10-23

## 2020-10-20 RX ORDER — CEFUROXIME AXETIL 250 MG
500 TABLET ORAL EVERY 12 HOURS
Refills: 0 | Status: DISCONTINUED | OUTPATIENT
Start: 2020-10-20 | End: 2020-10-20

## 2020-10-20 RX ORDER — DEXTROSE 50 % IN WATER 50 %
15 SYRINGE (ML) INTRAVENOUS ONCE
Refills: 0 | Status: COMPLETED | OUTPATIENT
Start: 2020-10-20 | End: 2020-10-20

## 2020-10-20 RX ORDER — DEXTROSE 50 % IN WATER 50 %
15 SYRINGE (ML) INTRAVENOUS ONCE
Refills: 0 | Status: COMPLETED | OUTPATIENT
Start: 2020-10-20 | End: 2020-10-21

## 2020-10-20 RX ADMIN — Medication 650 MILLIGRAM(S): at 03:54

## 2020-10-20 RX ADMIN — HYDROMORPHONE HYDROCHLORIDE 1 MILLIGRAM(S): 2 INJECTION INTRAMUSCULAR; INTRAVENOUS; SUBCUTANEOUS at 21:21

## 2020-10-20 RX ADMIN — HYDROMORPHONE HYDROCHLORIDE 1 MILLIGRAM(S): 2 INJECTION INTRAMUSCULAR; INTRAVENOUS; SUBCUTANEOUS at 03:51

## 2020-10-20 RX ADMIN — PANTOPRAZOLE SODIUM 40 MILLIGRAM(S): 20 TABLET, DELAYED RELEASE ORAL at 10:04

## 2020-10-20 RX ADMIN — HYDROMORPHONE HYDROCHLORIDE 1 MILLIGRAM(S): 2 INJECTION INTRAMUSCULAR; INTRAVENOUS; SUBCUTANEOUS at 16:35

## 2020-10-20 RX ADMIN — Medication 4: at 08:21

## 2020-10-20 RX ADMIN — HYDROMORPHONE HYDROCHLORIDE 1 MILLIGRAM(S): 2 INJECTION INTRAMUSCULAR; INTRAVENOUS; SUBCUTANEOUS at 01:49

## 2020-10-20 RX ADMIN — Medication 2: at 16:26

## 2020-10-20 RX ADMIN — HEPARIN SODIUM 5000 UNIT(S): 5000 INJECTION INTRAVENOUS; SUBCUTANEOUS at 21:11

## 2020-10-20 RX ADMIN — Medication 2: at 11:33

## 2020-10-20 RX ADMIN — PIPERACILLIN AND TAZOBACTAM 25 GRAM(S): 4; .5 INJECTION, POWDER, LYOPHILIZED, FOR SOLUTION INTRAVENOUS at 01:49

## 2020-10-20 RX ADMIN — Medication 125 MICROGRAM(S): at 05:41

## 2020-10-20 RX ADMIN — HEPARIN SODIUM 5000 UNIT(S): 5000 INJECTION INTRAVENOUS; SUBCUTANEOUS at 14:25

## 2020-10-20 RX ADMIN — CHOLESTYRAMINE 4 GRAM(S): 4 POWDER, FOR SUSPENSION ORAL at 08:59

## 2020-10-20 RX ADMIN — Medication 81 MILLIGRAM(S): at 10:04

## 2020-10-20 RX ADMIN — HYDROMORPHONE HYDROCHLORIDE 1 MILLIGRAM(S): 2 INJECTION INTRAMUSCULAR; INTRAVENOUS; SUBCUTANEOUS at 16:22

## 2020-10-20 RX ADMIN — HYDROMORPHONE HYDROCHLORIDE 1 MILLIGRAM(S): 2 INJECTION INTRAMUSCULAR; INTRAVENOUS; SUBCUTANEOUS at 10:04

## 2020-10-20 RX ADMIN — Medication 15 GRAM(S): at 22:02

## 2020-10-20 RX ADMIN — HYDROMORPHONE HYDROCHLORIDE 1 MILLIGRAM(S): 2 INJECTION INTRAMUSCULAR; INTRAVENOUS; SUBCUTANEOUS at 10:20

## 2020-10-20 RX ADMIN — Medication 1 TABLET(S): at 21:11

## 2020-10-20 RX ADMIN — ARIPIPRAZOLE 20 MILLIGRAM(S): 15 TABLET ORAL at 10:05

## 2020-10-20 RX ADMIN — HYDROMORPHONE HYDROCHLORIDE 1 MILLIGRAM(S): 2 INJECTION INTRAMUSCULAR; INTRAVENOUS; SUBCUTANEOUS at 05:41

## 2020-10-20 RX ADMIN — Medication 25 MILLIGRAM(S): at 21:11

## 2020-10-20 RX ADMIN — HEPARIN SODIUM 5000 UNIT(S): 5000 INJECTION INTRAVENOUS; SUBCUTANEOUS at 05:41

## 2020-10-20 RX ADMIN — Medication 25 MILLIGRAM(S): at 10:05

## 2020-10-20 RX ADMIN — Medication 500 MILLIGRAM(S): at 10:05

## 2020-10-20 RX ADMIN — Medication 650 MILLIGRAM(S): at 18:24

## 2020-10-20 RX ADMIN — HYDROMORPHONE HYDROCHLORIDE 1 MILLIGRAM(S): 2 INJECTION INTRAMUSCULAR; INTRAVENOUS; SUBCUTANEOUS at 20:31

## 2020-10-20 RX ADMIN — BUDESONIDE AND FORMOTEROL FUMARATE DIHYDRATE 2 PUFF(S): 160; 4.5 AEROSOL RESPIRATORY (INHALATION) at 09:25

## 2020-10-20 RX ADMIN — PIPERACILLIN AND TAZOBACTAM 25 GRAM(S): 4; .5 INJECTION, POWDER, LYOPHILIZED, FOR SOLUTION INTRAVENOUS at 21:11

## 2020-10-20 RX ADMIN — PIPERACILLIN AND TAZOBACTAM 25 GRAM(S): 4; .5 INJECTION, POWDER, LYOPHILIZED, FOR SOLUTION INTRAVENOUS at 14:06

## 2020-10-20 RX ADMIN — HYDROMORPHONE HYDROCHLORIDE 1 MILLIGRAM(S): 2 INJECTION INTRAMUSCULAR; INTRAVENOUS; SUBCUTANEOUS at 06:03

## 2020-10-20 RX ADMIN — Medication 20 MILLIGRAM(S): at 16:23

## 2020-10-20 RX ADMIN — Medication 20 MILLIGRAM(S): at 06:02

## 2020-10-20 RX ADMIN — Medication 1 TABLET(S): at 10:05

## 2020-10-20 NOTE — PROGRESS NOTE ADULT - SUBJECTIVE AND OBJECTIVE BOX
Cheif complaints and Diagnosis: septic shock/ PVD/ AMISH    Subjective: no complaints      REVIEW OF SYSTEMS:    CONSTITUTIONAL: No weakness, fevers or chills  EYES/ENT: No visual changes;  No vertigo or throat pain   NECK: No pain or stiffness  RESPIRATORY: No cough, wheezing, hemoptysis; No shortness of breath  CARDIOVASCULAR: No chest pain or palpitations  GASTROINTESTINAL: No abdominal or epigastric pain. No nausea, vomiting, or hematemesis; No diarrhea or constipation. No melena or hematochezia.  GENITOURINARY: No dysuria, frequency or hematuria  NEUROLOGICAL: No numbness or weakness  SKIN: No itching, burning, rashes, or lesions   All other review of systems is negative unless indicated above      Vital Signs Last 24 Hrs  T(C): 36.7 (20 Oct 2020 07:57), Max: 37.1 (19 Oct 2020 16:54)  T(F): 98.1 (20 Oct 2020 07:57), Max: 98.7 (19 Oct 2020 16:54)  HR: 86 (20 Oct 2020 07:57) (86 - 120)  BP: 121/59 (20 Oct 2020 07:57) (99/63 - 141/71)  BP(mean): 90 (19 Oct 2020 15:50) (59 - 90)  RR: 19 (20 Oct 2020 07:57) (9 - 19)  SpO2: 94% (20 Oct 2020 07:57) (94% - 100%)    HEENT:   pupils equal and reactive, EOMI, no oropharyngeal lesions, erythema, exudates, oral thrush    NECK:   supple, no carotid bruits, no palpable lymph nodes, no thyromegaly    CV:  +S1, +S2, regular, no murmurs or rubs    RESP:   lungs clear to auscultation bilaterally, no wheezing, rales, rhonchi, good air entry bilaterally    BREAST:  not examined    GI:  abdomen soft, non-tender, non-distended, normal BS, no bruits, no abdominal masses, no palpable masses    RECTAL:  not examined    :  not examined    MSK:   normal muscle tone, no atrophy, no rigidity, no contractions    EXT:   no clubbing, no cyanosis, no edema, no calf pain, swelling or erythema    VASCULAR:  pulses equal and symmetric in the upper and lower extremities    NEURO:  AAOX3, no focal neurological deficits, follows all commands, able to move extremities spontaneously    SKIN:  no ulcers, lesions or rashes    MEDICATIONS  (STANDING):  ARIPiprazole 20 milliGRAM(s) Oral daily  aspirin 81 milliGRAM(s) Oral daily  budesonide  80 MICROgram(s)/formoterol 4.5 MICROgram(s) Inhaler 2 Puff(s) Inhalation two times a day  calcium carbonate    500 mG (Tums) Chewable 1 Tablet(s) Chew two times a day  cholestyramine Powder (Sugar-Free) 4 Gram(s) Oral daily  dextrose 5%. 1000 milliLiter(s) (50 mL/Hr) IV Continuous <Continuous>  dextrose 50% Injectable 12.5 Gram(s) IV Push once  dextrose 50% Injectable 25 Gram(s) IV Push once  dextrose 50% Injectable 25 Gram(s) IV Push once  dicyclomine 20 milliGRAM(s) Oral two times a day before meals  heparin   Injectable 5000 Unit(s) SubCutaneous every 8 hours  insulin lispro (HumaLOG) corrective regimen sliding scale   SubCutaneous Before meals and at bedtime  levothyroxine 125 MICROGram(s) Oral daily  metoprolol tartrate 25 milliGRAM(s) Oral two times a day  pantoprazole    Tablet 40 milliGRAM(s) Oral daily  piperacillin/tazobactam IVPB.. 3.375 Gram(s) IV Intermittent every 8 hours    MEDICATIONS  (PRN):  acetaminophen   Tablet .. 650 milliGRAM(s) Oral every 6 hours PRN Temp greater or equal to 38C (100.4F), Mild Pain (1 - 3)  dextrose 40% Gel 15 Gram(s) Oral once PRN Blood Glucose LESS THAN 70 milliGRAM(s)/deciliter  glucagon  Injectable 1 milliGRAM(s) IntraMuscular once PRN Glucose LESS THAN 70 milligrams/deciliter  HYDROmorphone  Injectable 1 milliGRAM(s) IV Push every 4 hours PRN Moderate Pain (4 - 6)      20 Oct 2020 07:32    133    |  102    |  35     ----------------------------<  199    3.7     |  22     |  2.19     Ca    6.0        20 Oct 2020 07:32  Phos  4.0       19 Oct 2020 06:37    TPro  x      /  Alb  1.4    /  TBili  x      /  DBili  x      /  AST  x      /  ALT  x      /  AlkPhos  x      19 Oct 2020 06:37  LIVER FUNCTIONS - ( 19 Oct 2020 06:37 )  Alb: 1.4 g/dL / Pro: x     / ALK PHOS: x     / ALT: x     / AST: x     / GGT: x         CBC Full  -  ( 20 Oct 2020 07:32 )  WBC Count : 11.37 K/uL  Hemoglobin : 9.9 g/dL  Hematocrit : 29.1 %  Platelet Count - Automated : 61 K/uL  Mean Cell Volume : 86.4 fl  Mean Cell Hemoglobin : 29.4 pg  Mean Cell Hemoglobin Concentration : 34.0 gm/dL  Auto Neutrophil # : x  Auto Lymphocyte # : x  Auto Monocyte # : x  Auto Eosinophil # : x  Auto Basophil # : x  Auto Neutrophil % : x  Auto Lymphocyte % : x  Auto Monocyte % : x  Auto Eosinophil % : x  Auto Basophil % : x  CARDIAC MARKERS ( 19 Oct 2020 20:37 )  x     / x     / 1027 U/L / x     / x      CARDIAC MARKERS ( 19 Oct 2020 16:49 )  x     / x     / 1097 U/L / x     / x      CARDIAC MARKERS ( 19 Oct 2020 13:11 )  x     / x     / 1181 U/L / x     / x      CARDIAC MARKERS ( 19 Oct 2020 06:37 )  x     / x     / 1199 U/L / x     / x      CARDIAC MARKERS ( 19 Oct 2020 03:10 )  x     / x     / 1265 U/L / x     / x                  Assessment and Plan:   	  Patient with hx. cad, pvd, admitted with sepsis from Likely UTI, on zosyn,  developed ischemic cold foot  acute on chronic renal failure    1. Septic Shock - on Zosyn, bp better central line out, had klebsiella bacteremia on admission, also dirty U/a  -10/15+ 10/14  cultures negative  -10/12 positive klebseilla  -compelted 8 days of IV  zosyn , will change to Oral ceftin          2. PVD. ischemic right leg - cyanotic right foot, cpk ok will need amputation when fully demarcated, possible wednesday    3. Thromboyctopenia - likley from sepsis and or dilutional received multiple units of blood,  on aspirin, resume prophylaxis if HIT negative   -platelet 30's >> 61  -HIT screen negative    4. Renal insufficiency, seems to be stabilizing, creatinine likely close to her baseline    5. Hypokalemia- replace    6. Respiratory Failure - improved, now extubated on room air    7-DVT proph- sc heparin          Dispo: rehab facility

## 2020-10-20 NOTE — PROGRESS NOTE ADULT - SUBJECTIVE AND OBJECTIVE BOX
Pt seen and examined at bedside. Patient dressing changed today. Patient awake and alert and complaining of a lot of pain in her right leg. Saturating well on NC this am. Patient has dopplerable pulse over graft no pulses in the foot.        Vital Signs Last 24 Hrs  T(C): 36.7 (20 Oct 2020 07:57), Max: 37.1 (19 Oct 2020 16:54)  T(F): 98.1 (20 Oct 2020 07:57), Max: 98.7 (19 Oct 2020 16:54)  HR: 86 (20 Oct 2020 07:57) (86 - 120)  BP: 121/59 (20 Oct 2020 07:57) (107/43 - 141/71)  BP(mean): 90 (19 Oct 2020 15:50) (59 - 90)  RR: 19 (20 Oct 2020 07:57) (9 - 19)  SpO2: 94% (20 Oct 2020 07:57) (94% - 100%)      Lab                        9.9    11.37 )-----------( 61       ( 20 Oct 2020 07:32 )             29.1   10-20    133<L>  |  102  |  35<H>  ----------------------------<  199<H>  3.7   |  22  |  2.19<H>    Ca    6.0<LL>      20 Oct 2020 07:32  Phos  4.0     10-19    TPro  4.6<L>  /  Alb  1.2<L>  /  TBili  0.4  /  DBili  0.2  /  AST  110<H>  /  ALT  42  /  AlkPhos  130<H>  10-20                         Physical exam:  Gen: alert  Resp: Equal chest rise   CV: s1 and S2 present   Abd; Soft, edematous 2/2 3rd spacing   Ext: RLE dopplerable graft in the medial thigh, nondopperalbe pulses in the foot, LLE dopperlable pulses leg is demarking and weeping serous fluid

## 2020-10-20 NOTE — PROGRESS NOTE ADULT - ASSESSMENT
58 YO F with right leg ischemia s/p right femoral-PT bypass POD5    Plan:   continue ASA 81mg qd  Hold plavix until platelet count stabilizes  Transfuse Platelets starting today  Hold heparin   NARESH workup negative  Continue to monitor dressing in the RLE and groin   continue to monitor pulses, doppler graft right medial thigh  recommend pain management consult   Monitor urine output  Need medical and Cardiology clearance for Above Knee Amputation this Thursday      Case discussed with attending

## 2020-10-20 NOTE — PROGRESS NOTE ADULT - ASSESSMENT
58 YO F with right leg ischemia s/p right femoral-PT bypass POD5    Plan:   continue ASA 81mg qd  Hold plavix until platelet count stabilizes  Transfuse Platelets starting today  Hold heparin   NARESH workup negative  Continue to monitor dressing in the RLE and groin   continue to monitor pulses, doppler graft right medial thigh  Continue ICU care  Monitor urine output  Need medical and Cardiology clearance for Above Knee Amputation this Thursday      Case discussed with Dr. Pop

## 2020-10-20 NOTE — PROGRESS NOTE ADULT - SUBJECTIVE AND OBJECTIVE BOX
Patient is a 59y Female who reports no complaints as new    REVIEW OF SYSTEMS:  no events    MEDICATIONS  (STANDING):  ARIPiprazole 20 milliGRAM(s) Oral daily  aspirin 81 milliGRAM(s) Oral daily  budesonide  80 MICROgram(s)/formoterol 4.5 MICROgram(s) Inhaler 2 Puff(s) Inhalation two times a day  calcium carbonate    500 mG (Tums) Chewable 1 Tablet(s) Chew two times a day  cefuroxime   Tablet 500 milliGRAM(s) Oral every 12 hours  cholestyramine Powder (Sugar-Free) 4 Gram(s) Oral daily  dextrose 5%. 1000 milliLiter(s) (50 mL/Hr) IV Continuous <Continuous>  dextrose 50% Injectable 12.5 Gram(s) IV Push once  dextrose 50% Injectable 25 Gram(s) IV Push once  dextrose 50% Injectable 25 Gram(s) IV Push once  dicyclomine 20 milliGRAM(s) Oral two times a day before meals  heparin   Injectable 5000 Unit(s) SubCutaneous every 8 hours  insulin lispro (HumaLOG) corrective regimen sliding scale   SubCutaneous Before meals and at bedtime  levothyroxine 125 MICROGram(s) Oral daily  metoprolol tartrate 25 milliGRAM(s) Oral two times a day  pantoprazole    Tablet 40 milliGRAM(s) Oral daily    MEDICATIONS  (PRN):  acetaminophen   Tablet .. 650 milliGRAM(s) Oral every 6 hours PRN Temp greater or equal to 38C (100.4F), Mild Pain (1 - 3)  dextrose 40% Gel 15 Gram(s) Oral once PRN Blood Glucose LESS THAN 70 milliGRAM(s)/deciliter  glucagon  Injectable 1 milliGRAM(s) IntraMuscular once PRN Glucose LESS THAN 70 milligrams/deciliter  HYDROmorphone  Injectable 1 milliGRAM(s) IV Push every 4 hours PRN Moderate Pain (4 - 6)        T(C): , Max: 37.1 (10-19-20 @ 16:54)  T(F): , Max: 98.7 (10-19-20 @ 16:54)  HR: 86 (10-20-20 @ 07:57)  BP: 121/59 (10-20-20 @ 07:57)  BP(mean): 90 (10-19-20 @ 15:50)  RR: 19 (10-20-20 @ 07:57)  SpO2: 94% (10-20-20 @ 07:57)  Wt(kg): --    10-19 @ 07:01  -  10-20 @ 07:00  --------------------------------------------------------  IN: 820 mL / OUT: 350 mL / NET: 470 mL          PHYSICAL EXAM:    Constitutional: frail  HEENT: MM  Neck: No LAD, No JVD  Respiratory: dist  Cardiovascular: S1 and S2D  Extremities: rle dsg  Neurological: A/O x 3, no focal deficits  Psychiatric: Normal mood, normal affect  : No Black  Skin: No rashes  Access: Not applicable        LABS:                        9.9    11.37 )-----------( 61       ( 20 Oct 2020 07:32 )             29.1     20 Oct 2020 07:32    133    |  102    |  35     ----------------------------<  199    3.7     |  22     |  2.19   19 Oct 2020 06:37    132    |  100    |  38     ----------------------------<  76     3.7     |  23     |  2.11   18 Oct 2020 06:20    132    |  100    |  41     ----------------------------<  172    3.2     |  20     |  2.21   17 Oct 2020 06:15    137    |  103    |  45     ----------------------------<  10     3.5     |  25     |  2.09   16 Oct 2020 23:00    135    |  101    |  45     ----------------------------<  75     3.1     |  25     |  2.24     Ca    6.0        20 Oct 2020 07:32  Ca    5.9        19 Oct 2020 06:37  Ca    5.4        18 Oct 2020 06:20  Ca    5.3        17 Oct 2020 06:15  Ca    5.5        16 Oct 2020 23:00  Phos  4.0       19 Oct 2020 06:37  Phos  4.1       18 Oct 2020 06:20  Mg     1.5       18 Oct 2020 06:20  Mg     1.2       17 Oct 2020 06:15    TPro  4.6    /  Alb  1.2    /  TBili  0.4    /  DBili  0.2    /  AST  110    /  ALT  42     /  AlkPhos  130    20 Oct 2020 07:32  TPro  x      /  Alb  1.4    /  TBili  x      /  DBili  x      /  AST  x      /  ALT  x      /  AlkPhos  x      19 Oct 2020 06:37      Creatine Kinase, Serum: 1027 U/L <H> [26 - 192] (10-19 @ 20:37)  Creatine Kinase, Serum: 1097 U/L <H> [26 - 192] (10-19 @ 16:49)      Urine Studies:          RADIOLOGY & ADDITIONAL STUDIES:

## 2020-10-20 NOTE — PROGRESS NOTE ADULT - ASSESSMENT
This patient is a 59 year old female with past medical history bipolar disease and depression, cad, esrd, gerd, hypertension, hyperlipidemia, diabetes, hypothyroidism, substance abuse transferred from sn[facility] on 10/12 for evaluation of hypotension and lower abdominal pain, loose stools. The patient states she passed out and cannot provide details surrounding her admission. History per medical record.   1. Patient admitted with sepsis secondary to Klebsiella pneumoniae, possibly due to GI translocation versus urinary origin; subsequent right lower extremity ischemia, s/p operative procedure  - will continue zosyn, given the ischemic, necrosis of the right lower extremity  - tolerating antibiotics without rashes or side effects   - wound care to right lower extremity per vascular; however patient states she will have amputation  - serial cbc and monitor temperature   - iv hydration and supportive care   - reviewed prior medical records to evaluate for resistant or atypical pathogens   - repeat blood cultures are no growth  2. other issues: per medicine

## 2020-10-20 NOTE — PROGRESS NOTE ADULT - ASSESSMENT
59 CAD, PAD s/p fem-pop bypass s/p RT iliac stent, diabetes, hypothyroidism, hyperlipidemia, hx of cigg/cocaine use,  chronic back pain s/p lumbar surgery recent admit with STEMI c/b UTI sepsis and E. coli bacteremia.     AMISH    Stable renal function, near baseline   Keep near net even for now, ivf if prolonged npo    Hypocalcemia  0ral repletion  trend mg/k/phos    Ischemia  -medicine/vascular for amputation    d/c with staff

## 2020-10-20 NOTE — PROGRESS NOTE ADULT - SUBJECTIVE AND OBJECTIVE BOX
9.9<L>                133<L>| 22   | 35<H>        11.37<H> >-----------< 61<L>   ------------------------< 199<H>                 29.1<L>                3.7  | 102  | 2.19<H>                                                                     Ca 6.0<LL> Mg x     Ph x        ,             10.4<L>                132<L>| 23   | 38<H>        8.04  >-----------< 51<L>   ------------------------< 76                    30.7<L>                3.7  | 100  | 2.11<H>                                                                     Ca 5.9<LL> Mg x     Ph 4.0        Pt seen and examined at bedside. Patient dressing changed today. Patient awake and alert and complaining of a lot of pain in her right leg. Saturating well on NC this am. Patient has dopplerable pulse over graft no pulses in the foot.      Vital Signs Last 24 Hrs  T(C): 36.7 (20 Oct 2020 07:57), Max: 37.1 (19 Oct 2020 16:54)  T(F): 98.1 (20 Oct 2020 07:57), Max: 98.7 (19 Oct 2020 16:54)  HR: 86 (20 Oct 2020 07:57) (86 - 120)  BP: 121/59 (20 Oct 2020 07:57) (100/66 - 141/71)  BP(mean): 90 (19 Oct 2020 15:50) (59 - 90)  RR: 19 (20 Oct 2020 07:57) (9 - 19)  SpO2: 94% (20 Oct 2020 07:57) (94% - 100%)            Labs:  Pending labs                       Physical exam:  Gen: alert  Resp: Equal chest rise   CV: s1 and S2 present   Abd; Soft, edematous 2/2 3rd spacing   Ext: RLE dopplerable graft in the medial thigh, nondopperalbe pulses in the foot, LLE dopperlable pulses leg is demarking and weeping serous fluid

## 2020-10-20 NOTE — PROGRESS NOTE ADULT - SUBJECTIVE AND OBJECTIVE BOX
Date of service: 10-20-20 @ 13:09      Patient lying in bed; still with oozing from right lower extremity and cool right foot and leg; anticipates surgery       ROS: no fever or chills; denies dizziness, no HA, no SOB or cough, no abdominal pain, no diarrhea or constipation; no dysuria, no urinary frequency, no rashes    MEDICATIONS  (STANDING):  ARIPiprazole 20 milliGRAM(s) Oral daily  aspirin 81 milliGRAM(s) Oral daily  budesonide  80 MICROgram(s)/formoterol 4.5 MICROgram(s) Inhaler 2 Puff(s) Inhalation two times a day  calcium carbonate    500 mG (Tums) Chewable 1 Tablet(s) Chew two times a day  cholestyramine Powder (Sugar-Free) 4 Gram(s) Oral daily  dextrose 5%. 1000 milliLiter(s) (50 mL/Hr) IV Continuous <Continuous>  dextrose 50% Injectable 12.5 Gram(s) IV Push once  dextrose 50% Injectable 25 Gram(s) IV Push once  dextrose 50% Injectable 25 Gram(s) IV Push once  dicyclomine 20 milliGRAM(s) Oral two times a day before meals  heparin   Injectable 5000 Unit(s) SubCutaneous every 8 hours  insulin lispro (HumaLOG) corrective regimen sliding scale   SubCutaneous Before meals and at bedtime  levothyroxine 125 MICROGram(s) Oral daily  metoprolol tartrate 25 milliGRAM(s) Oral two times a day  pantoprazole    Tablet 40 milliGRAM(s) Oral daily  piperacillin/tazobactam IVPB.. 3.375 Gram(s) IV Intermittent every 8 hours    MEDICATIONS  (PRN):  acetaminophen   Tablet .. 650 milliGRAM(s) Oral every 6 hours PRN Temp greater or equal to 38C (100.4F), Mild Pain (1 - 3)  dextrose 40% Gel 15 Gram(s) Oral once PRN Blood Glucose LESS THAN 70 milliGRAM(s)/deciliter  glucagon  Injectable 1 milliGRAM(s) IntraMuscular once PRN Glucose LESS THAN 70 milligrams/deciliter  HYDROmorphone  Injectable 1 milliGRAM(s) IV Push every 4 hours PRN Moderate Pain (4 - 6)      Vital Signs Last 24 Hrs  T(C): 36.7 (20 Oct 2020 07:57), Max: 37.1 (19 Oct 2020 16:54)  T(F): 98.1 (20 Oct 2020 07:57), Max: 98.7 (19 Oct 2020 16:54)  HR: 86 (20 Oct 2020 07:57) (86 - 120)  BP: 121/59 (20 Oct 2020 07:57) (121/59 - 141/71)  BP(mean): 90 (19 Oct 2020 15:50) (72 - 90)  RR: 19 (20 Oct 2020 07:57) (11 - 19)  SpO2: 94% (20 Oct 2020 07:57) (94% - 100%)        Physical Exam:        Constitutional: frail looking  HEENT: NC/AT  Neck: supple; thyroid not palpable; left neck IJ CVL  Back: no tenderness  Respiratory: respiratory effort normal; clear to auscultation  Cardiovascular: S1S2 regular, no murmurs  Abdomen: soft, not tender, mildly distended, positive BS; no liver or spleen organomegaly  Genitourinary: no suprapubic tenderness  Musculoskeletal: right foot cool to touch; surgical dressing in place of right lower extremity; mottled right foot, dressings with drainage  Neurological/ Psychiatric: nonfocal  Skin: no rashes; no palpable lesions    Labs: all available labs reviewed                     Labs:             Labs:                        Labs:                        9.9    11.37 )-----------( 61       ( 20 Oct 2020 07:32 )             29.1     10-20    133<L>  |  102  |  35<H>  ----------------------------<  199<H>  3.7   |  22  |  2.19<H>    Ca    6.0<LL>      20 Oct 2020 07:32  Phos  4.0     10-19    TPro  4.6<L>  /  Alb  1.2<L>  /  TBili  0.4  /  DBili  0.2  /  AST  110<H>  /  ALT  42  /  AlkPhos  130<H>  10-20           Cultures:       Culture - Blood (collected 10-15-20 @ 17:30)  Source: .Blood Blood  Preliminary Report (10-16-20 @ 22:02):    No growth to date.    Culture - Blood (collected 10-14-20 @ 15:35)  Source: .Blood None  Final Report (10-19-20 @ 19:00):    No Growth Final    Culture - Blood (collected 10-14-20 @ 15:30)  Source: .Blood None  Final Report (10-19-20 @ 19:00):    No Growth Final    GI PCR Panel, Stool (collected 10-13-20 @ 14:45)  Source: .Stool Feces  Final Report (10-13-20 @ 22:04):    GI PCR Results: NOT detected    *******Please Note:*******    GI panel PCR evaluates for:    Campylobacter, Plesiomonas shigelloides, Salmonella,    Vibrio, Yersinia enterocolitica, Enteroaggregative    Escherichia coli (EAEC), Enteropathogenic E.coli (EPEC),    Enterotoxigenic E. coli (ETEC) lt/st, Shiga-like    toxin-producing E. coli (STEC) stx1/stx2,    Shigella/ Enteroinvasive E. coli (EIEC), Cryptosporidium,    Cyclospora cayetanensis, Entamoeba histolytica,    Giardia lamblia, Adenovirus F 40/41, Astrovirus,    Norovirus GI/GII, Rotavirus A, Sapovirus                              < from: CT Abdomen and Pelvis w/ Oral Cont (10.14.20 @ 11:10) >    IMPRESSION:  Mildly dilated loops of small bowel with no transition point, possibly an ileus.    Presacral edema with infiltration of the perirectal fat, possibly proctitis.    Dilated common bile duct measuring 1.1 cm, increased in caliber since 9/8/2020, previously 0.8 cm; correlation is recommended with LFTs; an MRI/MRCP of the abdomen is recommended for further evaluation tiny; a noncontrast study can be performed if contraindicated by the patient's renal function.    Small amount of abdominal and pelvic ascites, increased since 10/12/2020.    Wall thickening versus underdistention involving the region of the pylorus; continued attention is recommended on the MRI.        < end of copied text >                Radiology: all available radiological tests reviewed    Advanced directives addressed: full resuscitation

## 2020-10-20 NOTE — PROGRESS NOTE ADULT - SUBJECTIVE AND OBJECTIVE BOX
Cheif complaints and Diagnosis: sepsis Klebsiella/ ischemic right foot    Subjective: no complaints      REVIEW OF SYSTEMS:    CONSTITUTIONAL: No weakness, fevers or chills  EYES/ENT: No visual changes;  No vertigo or throat pain   NECK: No pain or stiffness  RESPIRATORY: No cough, wheezing, hemoptysis; No shortness of breath  CARDIOVASCULAR: No chest pain or palpitations  GASTROINTESTINAL: No abdominal or epigastric pain. No nausea, vomiting, or hematemesis; No diarrhea or constipation. No melena or hematochezia.  GENITOURINARY: No dysuria, frequency or hematuria  NEUROLOGICAL: No numbness or weakness  SKIN: No itching, burning, rashes, or lesions   All other review of systems is negative unless indicated above      Vital Signs Last 24 Hrs  T(C): 36.7 (20 Oct 2020 07:57), Max: 37.1 (19 Oct 2020 16:54)  T(F): 98.1 (20 Oct 2020 07:57), Max: 98.7 (19 Oct 2020 16:54)  HR: 86 (20 Oct 2020 07:57) (86 - 120)  BP: 121/59 (20 Oct 2020 07:57) (121/59 - 141/71)  BP(mean): 90 (19 Oct 2020 15:50) (73 - 90)  RR: 19 (20 Oct 2020 07:57) (12 - 19)  SpO2: 94% (20 Oct 2020 07:57) (94% - 100%)    HEENT:   pupils equal and reactive, EOMI, no oropharyngeal lesions, erythema, exudates, oral thrush    NECK:   supple, no carotid bruits, no palpable lymph nodes, no thyromegaly    CV:  +S1, +S2, regular, no murmurs or rubs    RESP:   lungs clear to auscultation bilaterally, no wheezing, rales, rhonchi, good air entry bilaterally    BREAST:  not examined    GI:  abdomen soft, non-tender, non-distended, normal BS, no bruits, no abdominal masses, no palpable masses    RECTAL:  not examined    :  not examined    MSK:   normal muscle tone, no atrophy, no rigidity, no contractions    EXT:   no clubbing, no cyanosis, no edema, no calf pain, swelling or erythema    VASCULAR:  pulses equal and symmetric in the upper and lower extremities    NEURO:  AAOX3, no focal neurological deficits, follows all commands, able to move extremities spontaneously    SKIN:  no ulcers, lesions or rashes    MEDICATIONS  (STANDING):  ARIPiprazole 20 milliGRAM(s) Oral daily  aspirin 81 milliGRAM(s) Oral daily  budesonide  80 MICROgram(s)/formoterol 4.5 MICROgram(s) Inhaler 2 Puff(s) Inhalation two times a day  calcium carbonate    500 mG (Tums) Chewable 1 Tablet(s) Chew two times a day  cholestyramine Powder (Sugar-Free) 4 Gram(s) Oral daily  dextrose 5%. 1000 milliLiter(s) (50 mL/Hr) IV Continuous <Continuous>  dextrose 50% Injectable 12.5 Gram(s) IV Push once  dextrose 50% Injectable 25 Gram(s) IV Push once  dextrose 50% Injectable 25 Gram(s) IV Push once  dicyclomine 20 milliGRAM(s) Oral two times a day before meals  heparin   Injectable 5000 Unit(s) SubCutaneous every 8 hours  insulin lispro (HumaLOG) corrective regimen sliding scale   SubCutaneous Before meals and at bedtime  levothyroxine 125 MICROGram(s) Oral daily  metoprolol tartrate 25 milliGRAM(s) Oral two times a day  pantoprazole    Tablet 40 milliGRAM(s) Oral daily  piperacillin/tazobactam IVPB.. 3.375 Gram(s) IV Intermittent every 8 hours    MEDICATIONS  (PRN):  acetaminophen   Tablet .. 650 milliGRAM(s) Oral every 6 hours PRN Temp greater or equal to 38C (100.4F), Mild Pain (1 - 3)  dextrose 40% Gel 15 Gram(s) Oral once PRN Blood Glucose LESS THAN 70 milliGRAM(s)/deciliter  glucagon  Injectable 1 milliGRAM(s) IntraMuscular once PRN Glucose LESS THAN 70 milligrams/deciliter  HYDROmorphone  Injectable 1 milliGRAM(s) IV Push every 4 hours PRN Moderate Pain (4 - 6)      20 Oct 2020 07:32    133    |  102    |  35     ----------------------------<  199    3.7     |  22     |  2.19     Ca    6.0        20 Oct 2020 07:32  Phos  4.0       19 Oct 2020 06:37    TPro  4.6    /  Alb  1.2    /  TBili  0.4    /  DBili  0.2    /  AST  110    /  ALT  42     /  AlkPhos  130    20 Oct 2020 07:32  LIVER FUNCTIONS - ( 20 Oct 2020 07:32 )  Alb: 1.2 g/dL / Pro: 4.6 gm/dL / ALK PHOS: 130 U/L / ALT: 42 U/L / AST: 110 U/L / GGT: x         CBC Full  -  ( 20 Oct 2020 07:32 )  WBC Count : 11.37 K/uL  Hemoglobin : 9.9 g/dL  Hematocrit : 29.1 %  Platelet Count - Automated : 61 K/uL  Mean Cell Volume : 86.4 fl  Mean Cell Hemoglobin : 29.4 pg  Mean Cell Hemoglobin Concentration : 34.0 gm/dL            Assessment and Plan:   	  Patient with hx. cad, pvd, admitted with sepsis from Likely UTI, on zosyn,  developed ischemic cold foot  acute on chronic renal failure    1. Septic Shock - on Zosyn, bp better central line out, had klebsiella bacteremia on admission, also dirty U/a  -10/15+ 10/14  cultures negative  -10/12 positive klebseilla  -compelted 8 days of IV  zosyn , will change to Oral ceftin          2. PVD. ischemic right leg - cyanotic right foot, ice cold  -will need amputation, likley friday    3. Thromboyctopenia - likley from sepsis and or dilutional received multiple units of blood,  on aspirin, resume prophylaxis if HIT negative   -platelet 30's >> 61  -HIT screen negative    4. Renal insufficiency, seems to be stabilizing, creatinine likely close to her baseline    5. Hypokalemia- replace    6. Respiratory Failure - improved, now extubated on room air    7-DVT proph- sc heparin          Dispo: rehab facility       Cheif complaints and Diagnosis: sepsis Klebsiella/ ischemic right foot    Subjective: no complaints      REVIEW OF SYSTEMS:    CONSTITUTIONAL: No weakness, fevers or chills  EYES/ENT: No visual changes;  No vertigo or throat pain   NECK: No pain or stiffness  RESPIRATORY: No cough, wheezing, hemoptysis; No shortness of breath  CARDIOVASCULAR: No chest pain or palpitations  GASTROINTESTINAL: No abdominal or epigastric pain. No nausea, vomiting, or hematemesis; No diarrhea or constipation. No melena or hematochezia.  GENITOURINARY: No dysuria, frequency or hematuria  NEUROLOGICAL: No numbness or weakness  SKIN: No itching, burning, rashes, or lesions   All other review of systems is negative unless indicated above      Vital Signs Last 24 Hrs  T(C): 36.7 (20 Oct 2020 07:57), Max: 37.1 (19 Oct 2020 16:54)  T(F): 98.1 (20 Oct 2020 07:57), Max: 98.7 (19 Oct 2020 16:54)  HR: 86 (20 Oct 2020 07:57) (86 - 120)  BP: 121/59 (20 Oct 2020 07:57) (121/59 - 141/71)  BP(mean): 90 (19 Oct 2020 15:50) (73 - 90)  RR: 19 (20 Oct 2020 07:57) (12 - 19)  SpO2: 94% (20 Oct 2020 07:57) (94% - 100%)    HEENT:   pupils equal and reactive, EOMI, no oropharyngeal lesions, erythema, exudates, oral thrush    NECK:   supple, no carotid bruits, no palpable lymph nodes, no thyromegaly    CV:  +S1, +S2, regular, no murmurs or rubs    RESP:   lungs clear to auscultation bilaterally, no wheezing, rales, rhonchi, good air entry bilaterally    BREAST:  not examined    GI:  abdomen soft, non-tender, non-distended, normal BS, no bruits, no abdominal masses, no palpable masses    RECTAL:  not examined    :  not examined    MSK:   normal muscle tone, no atrophy, no rigidity, no contractions    EXT:   no clubbing, no cyanosis, no edema, no calf pain, swelling or erythema    VASCULAR:  pulses equal and symmetric in the upper and lower extremities    NEURO:  AAOX3, no focal neurological deficits, follows all commands, able to move extremities spontaneously    SKIN:  no ulcers, lesions or rashes    MEDICATIONS  (STANDING):  ARIPiprazole 20 milliGRAM(s) Oral daily  aspirin 81 milliGRAM(s) Oral daily  budesonide  80 MICROgram(s)/formoterol 4.5 MICROgram(s) Inhaler 2 Puff(s) Inhalation two times a day  calcium carbonate    500 mG (Tums) Chewable 1 Tablet(s) Chew two times a day  cholestyramine Powder (Sugar-Free) 4 Gram(s) Oral daily  dextrose 5%. 1000 milliLiter(s) (50 mL/Hr) IV Continuous <Continuous>  dextrose 50% Injectable 12.5 Gram(s) IV Push once  dextrose 50% Injectable 25 Gram(s) IV Push once  dextrose 50% Injectable 25 Gram(s) IV Push once  dicyclomine 20 milliGRAM(s) Oral two times a day before meals  heparin   Injectable 5000 Unit(s) SubCutaneous every 8 hours  insulin lispro (HumaLOG) corrective regimen sliding scale   SubCutaneous Before meals and at bedtime  levothyroxine 125 MICROGram(s) Oral daily  metoprolol tartrate 25 milliGRAM(s) Oral two times a day  pantoprazole    Tablet 40 milliGRAM(s) Oral daily  piperacillin/tazobactam IVPB.. 3.375 Gram(s) IV Intermittent every 8 hours    MEDICATIONS  (PRN):  acetaminophen   Tablet .. 650 milliGRAM(s) Oral every 6 hours PRN Temp greater or equal to 38C (100.4F), Mild Pain (1 - 3)  dextrose 40% Gel 15 Gram(s) Oral once PRN Blood Glucose LESS THAN 70 milliGRAM(s)/deciliter  glucagon  Injectable 1 milliGRAM(s) IntraMuscular once PRN Glucose LESS THAN 70 milligrams/deciliter  HYDROmorphone  Injectable 1 milliGRAM(s) IV Push every 4 hours PRN Moderate Pain (4 - 6)      20 Oct 2020 07:32    133    |  102    |  35     ----------------------------<  199    3.7     |  22     |  2.19     Ca    6.0        20 Oct 2020 07:32  Phos  4.0       19 Oct 2020 06:37    TPro  4.6    /  Alb  1.2    /  TBili  0.4    /  DBili  0.2    /  AST  110    /  ALT  42     /  AlkPhos  130    20 Oct 2020 07:32  LIVER FUNCTIONS - ( 20 Oct 2020 07:32 )  Alb: 1.2 g/dL / Pro: 4.6 gm/dL / ALK PHOS: 130 U/L / ALT: 42 U/L / AST: 110 U/L / GGT: x         CBC Full  -  ( 20 Oct 2020 07:32 )  WBC Count : 11.37 K/uL  Hemoglobin : 9.9 g/dL  Hematocrit : 29.1 %  Platelet Count - Automated : 61 K/uL  Mean Cell Volume : 86.4 fl  Mean Cell Hemoglobin : 29.4 pg  Mean Cell Hemoglobin Concentration : 34.0 gm/dL            Assessment and Plan:   	  Patient with hx. cad, pvd, admitted with sepsis from Likely UTI, on zosyn,  developed ischemic cold foot  acute on chronic renal failure    1. Septic Shock secondary to UTI  vs ischemic foot vs both  -10/15+ 10/14  cultures negative  -10/12 positive klebseilla  -compelted 8 days of IV  zosyn , will change to Oral ceftin          2. PVD. ischemic right leg - cyanotic right foot, ice cold  -will need amputation, likley friday    3. Thromboyctopenia - likley from sepsis and or dilutional received multiple units of blood,  on aspirin, resume prophylaxis if HIT negative   -platelet 30's >> 61  -HIT screen negative  -c/w sc heparin for dvt propy as patient is high risk for thrombosis and occlusion    4. Renal insufficiency, seems to be stabilizing, creatinine likely close to her baseline    5. Hypokalemia- replace    6. Respiratory Failure - improved, now extubated on room air    7-DVT proph- sc heparin          Dispo: rehab facility

## 2020-10-21 DIAGNOSIS — Z01.818 ENCOUNTER FOR OTHER PREPROCEDURAL EXAMINATION: ICD-10-CM

## 2020-10-21 LAB
ANION GAP SERPL CALC-SCNC: 9 MMOL/L — SIGNIFICANT CHANGE UP (ref 5–17)
BUN SERPL-MCNC: 32 MG/DL — HIGH (ref 7–23)
CALCIUM SERPL-MCNC: 6.1 MG/DL — CRITICAL LOW (ref 8.5–10.1)
CHLORIDE SERPL-SCNC: 105 MMOL/L — SIGNIFICANT CHANGE UP (ref 96–108)
CO2 SERPL-SCNC: 18 MMOL/L — LOW (ref 22–31)
CREAT SERPL-MCNC: 1.99 MG/DL — HIGH (ref 0.5–1.3)
GLUCOSE SERPL-MCNC: 104 MG/DL — HIGH (ref 70–99)
HCT VFR BLD CALC: 32.2 % — LOW (ref 34.5–45)
HGB BLD-MCNC: 10.9 G/DL — LOW (ref 11.5–15.5)
MCHC RBC-ENTMCNC: 29.2 PG — SIGNIFICANT CHANGE UP (ref 27–34)
MCHC RBC-ENTMCNC: 33.9 GM/DL — SIGNIFICANT CHANGE UP (ref 32–36)
MCV RBC AUTO: 86.3 FL — SIGNIFICANT CHANGE UP (ref 80–100)
PLATELET # BLD AUTO: 82 K/UL — LOW (ref 150–400)
POTASSIUM SERPL-MCNC: 3.6 MMOL/L — SIGNIFICANT CHANGE UP (ref 3.5–5.3)
POTASSIUM SERPL-SCNC: 3.6 MMOL/L — SIGNIFICANT CHANGE UP (ref 3.5–5.3)
RBC # BLD: 3.73 M/UL — LOW (ref 3.8–5.2)
RBC # FLD: 16.3 % — HIGH (ref 10.3–14.5)
SODIUM SERPL-SCNC: 132 MMOL/L — LOW (ref 135–145)
WBC # BLD: 10.75 K/UL — HIGH (ref 3.8–10.5)
WBC # FLD AUTO: 10.75 K/UL — HIGH (ref 3.8–10.5)

## 2020-10-21 PROCEDURE — 99233 SBSQ HOSP IP/OBS HIGH 50: CPT

## 2020-10-21 RX ORDER — CALCIUM GLUCONATE 100 MG/ML
1 VIAL (ML) INTRAVENOUS ONCE
Refills: 0 | Status: COMPLETED | OUTPATIENT
Start: 2020-10-21 | End: 2020-10-21

## 2020-10-21 RX ORDER — SODIUM CHLORIDE 9 MG/ML
1000 INJECTION, SOLUTION INTRAVENOUS
Refills: 0 | Status: DISCONTINUED | OUTPATIENT
Start: 2020-10-21 | End: 2020-10-24

## 2020-10-21 RX ORDER — DEXTROSE 50 % IN WATER 50 %
15 SYRINGE (ML) INTRAVENOUS ONCE
Refills: 0 | Status: DISCONTINUED | OUTPATIENT
Start: 2020-10-21 | End: 2020-10-21

## 2020-10-21 RX ORDER — HYDROMORPHONE HYDROCHLORIDE 2 MG/ML
0.5 INJECTION INTRAMUSCULAR; INTRAVENOUS; SUBCUTANEOUS
Refills: 0 | Status: DISCONTINUED | OUTPATIENT
Start: 2020-10-21 | End: 2020-10-27

## 2020-10-21 RX ORDER — HYDROMORPHONE HYDROCHLORIDE 2 MG/ML
1 INJECTION INTRAMUSCULAR; INTRAVENOUS; SUBCUTANEOUS ONCE
Refills: 0 | Status: DISCONTINUED | OUTPATIENT
Start: 2020-10-21 | End: 2020-10-21

## 2020-10-21 RX ORDER — HYDROMORPHONE HYDROCHLORIDE 2 MG/ML
1.5 INJECTION INTRAMUSCULAR; INTRAVENOUS; SUBCUTANEOUS EVERY 4 HOURS
Refills: 0 | Status: DISCONTINUED | OUTPATIENT
Start: 2020-10-21 | End: 2020-10-27

## 2020-10-21 RX ADMIN — Medication 20 MILLIGRAM(S): at 10:44

## 2020-10-21 RX ADMIN — HYDROMORPHONE HYDROCHLORIDE 1 MILLIGRAM(S): 2 INJECTION INTRAMUSCULAR; INTRAVENOUS; SUBCUTANEOUS at 08:39

## 2020-10-21 RX ADMIN — HYDROMORPHONE HYDROCHLORIDE 0.5 MILLIGRAM(S): 2 INJECTION INTRAMUSCULAR; INTRAVENOUS; SUBCUTANEOUS at 20:25

## 2020-10-21 RX ADMIN — ARIPIPRAZOLE 20 MILLIGRAM(S): 15 TABLET ORAL at 10:43

## 2020-10-21 RX ADMIN — Medication 20 MILLIGRAM(S): at 21:29

## 2020-10-21 RX ADMIN — Medication 125 MICROGRAM(S): at 05:47

## 2020-10-21 RX ADMIN — CHOLESTYRAMINE 4 GRAM(S): 4 POWDER, FOR SUSPENSION ORAL at 08:39

## 2020-10-21 RX ADMIN — HEPARIN SODIUM 5000 UNIT(S): 5000 INJECTION INTRAVENOUS; SUBCUTANEOUS at 13:47

## 2020-10-21 RX ADMIN — PIPERACILLIN AND TAZOBACTAM 25 GRAM(S): 4; .5 INJECTION, POWDER, LYOPHILIZED, FOR SOLUTION INTRAVENOUS at 05:48

## 2020-10-21 RX ADMIN — PANTOPRAZOLE SODIUM 40 MILLIGRAM(S): 20 TABLET, DELAYED RELEASE ORAL at 10:44

## 2020-10-21 RX ADMIN — Medication 1 TABLET(S): at 21:29

## 2020-10-21 RX ADMIN — HYDROMORPHONE HYDROCHLORIDE 1 MILLIGRAM(S): 2 INJECTION INTRAMUSCULAR; INTRAVENOUS; SUBCUTANEOUS at 08:54

## 2020-10-21 RX ADMIN — Medication 100 GRAM(S): at 17:04

## 2020-10-21 RX ADMIN — Medication 15 GRAM(S): at 01:55

## 2020-10-21 RX ADMIN — HYDROMORPHONE HYDROCHLORIDE 1.5 MILLIGRAM(S): 2 INJECTION INTRAMUSCULAR; INTRAVENOUS; SUBCUTANEOUS at 17:31

## 2020-10-21 RX ADMIN — HYDROMORPHONE HYDROCHLORIDE 0.5 MILLIGRAM(S): 2 INJECTION INTRAMUSCULAR; INTRAVENOUS; SUBCUTANEOUS at 20:10

## 2020-10-21 RX ADMIN — HYDROMORPHONE HYDROCHLORIDE 1 MILLIGRAM(S): 2 INJECTION INTRAMUSCULAR; INTRAVENOUS; SUBCUTANEOUS at 06:17

## 2020-10-21 RX ADMIN — Medication 1 TABLET(S): at 10:43

## 2020-10-21 RX ADMIN — Medication 2: at 11:22

## 2020-10-21 RX ADMIN — HYDROMORPHONE HYDROCHLORIDE 1.5 MILLIGRAM(S): 2 INJECTION INTRAMUSCULAR; INTRAVENOUS; SUBCUTANEOUS at 11:11

## 2020-10-21 RX ADMIN — Medication 81 MILLIGRAM(S): at 10:43

## 2020-10-21 RX ADMIN — HYDROMORPHONE HYDROCHLORIDE 1 MILLIGRAM(S): 2 INJECTION INTRAMUSCULAR; INTRAVENOUS; SUBCUTANEOUS at 06:39

## 2020-10-21 RX ADMIN — HYDROMORPHONE HYDROCHLORIDE 1.5 MILLIGRAM(S): 2 INJECTION INTRAMUSCULAR; INTRAVENOUS; SUBCUTANEOUS at 11:26

## 2020-10-21 RX ADMIN — HEPARIN SODIUM 5000 UNIT(S): 5000 INJECTION INTRAVENOUS; SUBCUTANEOUS at 05:47

## 2020-10-21 RX ADMIN — HEPARIN SODIUM 5000 UNIT(S): 5000 INJECTION INTRAVENOUS; SUBCUTANEOUS at 21:30

## 2020-10-21 RX ADMIN — Medication 25 MILLIGRAM(S): at 10:43

## 2020-10-21 RX ADMIN — HYDROMORPHONE HYDROCHLORIDE 1 MILLIGRAM(S): 2 INJECTION INTRAMUSCULAR; INTRAVENOUS; SUBCUTANEOUS at 03:00

## 2020-10-21 RX ADMIN — PIPERACILLIN AND TAZOBACTAM 25 GRAM(S): 4; .5 INJECTION, POWDER, LYOPHILIZED, FOR SOLUTION INTRAVENOUS at 13:47

## 2020-10-21 RX ADMIN — HYDROMORPHONE HYDROCHLORIDE 1.5 MILLIGRAM(S): 2 INJECTION INTRAMUSCULAR; INTRAVENOUS; SUBCUTANEOUS at 17:44

## 2020-10-21 RX ADMIN — Medication 25 MILLIGRAM(S): at 21:29

## 2020-10-21 RX ADMIN — HYDROMORPHONE HYDROCHLORIDE 1 MILLIGRAM(S): 2 INJECTION INTRAMUSCULAR; INTRAVENOUS; SUBCUTANEOUS at 02:01

## 2020-10-21 NOTE — CONSULT NOTE ADULT - SUBJECTIVE AND OBJECTIVE BOX
58 y/o w/ h/o RCA stemi Aug '20 no PCI (see prior admit notes for details),   DM, severe PAD w/ h/o peripheral bypass, ARF,   initially admitted for low BP w/ ab pain & dark stools concerning for GIB.  Hospital course complicated by septic shock & cyanotic R foot.  initially had emergent bypass earlier this admit, now needs R foot amputation.    No cardiac symptoms since last admit, no chest pain, dyspnea,  palpitations, syncope.  Surgery appears to be urgent/emergent.    PAST MEDICAL AND SURGICAL HISTORY:    Lung nodule  Diabetes mellitus  History of TIAs  History of gallbladder disease  surgery  History of colon polyps  precancerous  Substance abuse  history of. last used 14 years ago.  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 Decreased ROM.  Angina pectoris  History of MRSA infection  left lower extremity incisional area 2015  Anxiety and depression  Transient cerebral ischemia, unspecified type  Osteoarthritis of spine, unspecified spinal osteoarthritis complication status, unspecified spinal region  Lumbar herniated disc  Urinary incontinence, unspecified type  Overactive bladder  Hiatal hernia with GERD  hiatal hernia repaired  PAD (peripheral artery disease)  Hyperlipidemia, unspecified hyperlipidemia type  Essential hypertension  Hypothyroidism  COPD (chronic obstructive pulmonary disease)  CAD (coronary artery disease)    History of lumbar fusion  with laminectomy 11/8/2018    H/O hernia repair  hiatal hernia - 2017, 2018    S/P dilatation and curettage  1991    H/O rhinoplasty  1980    H/O angioplasty  Right iliac artery. 5/12/2017    H/O ventral hernia repair  3/2017    History of incision and drainage  of lower extremity incisional site x 5 . last done5/2015    H/O arterial bypass of lower limb  Femoral - Popliteal. 11/2014 Left lower side with stents    History of laparoscopic cholecystectomy  2/2016        ALLERGIES:  Allergies  No Known Allergies  Intolerances    SOCIAL HISTORY:  Social History:  from [place], non smoker, non drinker (12 Oct 2020 18:51)  prior h/o Illicit drug use.    FAMILY  HISTORY:  Family history of asthma (Sibling)  sister  Family history of epilepsy (Sibling, Sibling)  2 sisters  Family history of cardiovascular disease  mother  Family history of stroke  mother  Family history of heart disease  father  Family history of alcoholism in father    MEDICATIONS:    INPATIENT:  MEDICATIONS  (STANDING):  ARIPiprazole 20 milliGRAM(s) Oral daily  aspirin 81 milliGRAM(s) Oral daily  budesonide  80 MICROgram(s)/formoterol 4.5 MICROgram(s) Inhaler 2 Puff(s) Inhalation two times a day  calcium carbonate    500 mG (Tums) Chewable 1 Tablet(s) Chew two times a day  calcium gluconate IVPB 1 Gram(s) IV Intermittent once  cholestyramine Powder (Sugar-Free) 4 Gram(s) Oral daily  dextrose 5%. 1000 milliLiter(s) (50 mL/Hr) IV Continuous <Continuous>  dextrose 50% Injectable 12.5 Gram(s) IV Push once  dextrose 50% Injectable 25 Gram(s) IV Push once  dextrose 50% Injectable 25 Gram(s) IV Push once  dicyclomine 20 milliGRAM(s) Oral two times a day before meals  heparin   Injectable 5000 Unit(s) SubCutaneous every 8 hours  insulin lispro (HumaLOG) corrective regimen sliding scale   SubCutaneous Before meals and at bedtime  levothyroxine 125 MICROGram(s) Oral daily  metoprolol tartrate 25 milliGRAM(s) Oral two times a day  pantoprazole    Tablet 40 milliGRAM(s) Oral daily  piperacillin/tazobactam IVPB.. 3.375 Gram(s) IV Intermittent every 8 hours    MEDICATIONS  (PRN):  acetaminophen   Tablet .. 650 milliGRAM(s) Oral every 6 hours PRN Temp greater or equal to 38C (100.4F), Mild Pain (1 - 3)  dextrose 40% Gel 15 Gram(s) Oral once PRN Blood Glucose LESS THAN 70 milliGRAM(s)/deciliter  glucagon  Injectable 1 milliGRAM(s) IntraMuscular once PRN Glucose LESS THAN 70 milligrams/deciliter  HYDROmorphone  Injectable 1.5 milliGRAM(s) IV Push every 4 hours PRN Severe Pain (7 - 10)  HYDROmorphone  Injectable 0.5 milliGRAM(s) IV Push every 2 hours PRN Moderate Pain (4 - 6)    REVIEW OF SYSTEMS:    CONSTITUTIONAL: No weakness, fevers or chills  EYES/ENT: No visual changes;  No vertigo or throat pain   NECK: No pain or stiffness  RESPIRATORY: No cough, wheezing, hemoptysis; No shortness of breath  CARDIOVASCULAR: No chest pain or palpitations  GASTROINTESTINAL: No abdominal or epigastric pain. No nausea, vomiting, or hematemesis; No diarrhea or constipation. No melena or hematochezia.  GENITOURINARY: No dysuria, frequency or hematuria  NEUROLOGICAL: No numbness or weakness  SKIN: No itching, burning, rashes, or lesions   All other review of systems is negative unless indicated above    Vital Signs Last 24 Hrs  T(C): 36.6 (21 Oct 2020 07:28), Max: 36.8 (20 Oct 2020 23:37)  T(F): 97.8 (21 Oct 2020 07:28), Max: 98.3 (20 Oct 2020 23:37)  HR: 90 (21 Oct 2020 11:06) (78 - 90)  BP: 105/58 (21 Oct 2020 11:06) (105/58 - 128/64)  BP(mean): --  RR: 18 (21 Oct 2020 07:28) (18 - 18)  SpO2: 97% (21 Oct 2020 07:28) (97% - 100%)    I&O's Summary    20 Oct 2020 07:01  -  21 Oct 2020 07:00  --------------------------------------------------------  IN: 500 mL / OUT: 1100 mL / NET: -600 mL        I&O's Detail    20 Oct 2020 07:01  -  21 Oct 2020 07:00  --------------------------------------------------------  IN:    IV PiggyBack: 200 mL    Oral Fluid: 300 mL  Total IN: 500 mL    OUT:    Rectal Tube (mL): 300 mL    Voided (mL): 800 mL  Total OUT: 1100 mL    Total NET: -600 mL      PHYSICAL EXAM:    Constitutional: NAD, awake and alert, well-developed  HEENT: PERR, EOMI,    Neck:  supple,  No JVD  Respiratory: Breath sounds are clear bilaterally, No wheezing, rales or rhonchi  Cardiovascular: S1 and S2, regular rate and rhythm, no Murmurs, gallops or rubs  Gastrointestinal: Bowel Sounds present, soft, nontender.   Extremities: R foot cool, blue, L foot good d.p. pulses.  Vascular: 2+ peripheral pulses  Neurological: A/O x 3, no focal deficits      LABS: All Labs Reviewed:                        10.9   10.75 )-----------( 82       ( 21 Oct 2020 08:04 )             32.2                         9.9    11.37 )-----------( 61       ( 20 Oct 2020 07:32 )             29.1                         10.4   8.04  )-----------( 51       ( 19 Oct 2020 06:37 )             30.7     21 Oct 2020 08:04    132    |  105    |  32     ----------------------------<  104    3.6     |  18     |  1.99   20 Oct 2020 22:27    x      |  x      |  x      ----------------------------<  46     x       |  x      |  x      20 Oct 2020 07:32    133    |  102    |  35     ----------------------------<  199    3.7     |  22     |  2.19     Ca    6.1        21 Oct 2020 08:04  Ca    6.0        20 Oct 2020 07:32  Ca    5.9        19 Oct 2020 06:37  Phos  4.0       19 Oct 2020 06:37    TPro  4.6    /  Alb  1.2    /  TBili  0.4    /  DBili  0.2    /  AST  110    /  ALT  42     /  AlkPhos  130    20 Oct 2020 07:32  TPro  x      /  Alb  1.4    /  TBili  x      /  DBili  x      /  AST  x      /  ALT  x      /  AlkPhos  x      19 Oct 2020 06:37      CARDIAC MARKERS ( 19 Oct 2020 20:37 )  x     / x     / 1027 U/L / x     / x      CARDIAC MARKERS ( 19 Oct 2020 16:49 )  x     / x     / 1097 U/L / x     / x          Blood Culture:       ECG: NSr, no ischemic changes.      ECHO:  < from: TTE Echo Complete w/o Contrast w/ Doppler (08.27.20 @ 11:04) >   Impression     Summary     The left ventricle size is normal. The inferior wall appears severely   hypokinetic..   Estimated left ventricular ejection fraction is 50 %.   Paradoxical septal wall motion during inspiration is noted.  Left Atrium   Normal appearing left atrium.   Estimated RAP:5 mmHg   RVSP:30 mmHg   EA reversal of the mitral inflow consistent with reduced compliance of the   left ventricle.     LImited echo Sep '20 normal EF, normal diastolic dysfunction    CATH:  < from: Cardiac Cath Lab - Adult (08.23.20 @ 09:33) >  LMCA: Normal.  LAD: Abnormal.Moderate diffuse disease  LCx: Abnormal.Moderate diffuse diease  RCA:  Mid RCA: 100% stenosis.  Impression  Diagnostic Conclusions  Acute occlusion of the RCA of unknown duration. No PCI due to lack of  ischemic symptoms, sepsis, acute renal failure and satisfactory left  coronary anatomy.  Estimated Blood Loss:4ml.  No LV gram performed (likely d/t Cr 2-3 range)

## 2020-10-21 NOTE — PROGRESS NOTE ADULT - SUBJECTIVE AND OBJECTIVE BOX
Patient is a 59y Female who reports no complaints as new, OR tomorrow      MEDICATIONS  (STANDING):  ARIPiprazole 20 milliGRAM(s) Oral daily  aspirin 81 milliGRAM(s) Oral daily  budesonide  80 MICROgram(s)/formoterol 4.5 MICROgram(s) Inhaler 2 Puff(s) Inhalation two times a day  calcium carbonate    500 mG (Tums) Chewable 1 Tablet(s) Chew two times a day  cholestyramine Powder (Sugar-Free) 4 Gram(s) Oral daily  dextrose 5%. 1000 milliLiter(s) (50 mL/Hr) IV Continuous <Continuous>  dextrose 50% Injectable 12.5 Gram(s) IV Push once  dextrose 50% Injectable 25 Gram(s) IV Push once  dextrose 50% Injectable 25 Gram(s) IV Push once  dicyclomine 20 milliGRAM(s) Oral two times a day before meals  heparin   Injectable 5000 Unit(s) SubCutaneous every 8 hours  insulin lispro (HumaLOG) corrective regimen sliding scale   SubCutaneous Before meals and at bedtime  levothyroxine 125 MICROGram(s) Oral daily  metoprolol tartrate 25 milliGRAM(s) Oral two times a day  pantoprazole    Tablet 40 milliGRAM(s) Oral daily  piperacillin/tazobactam IVPB.. 3.375 Gram(s) IV Intermittent every 8 hours    MEDICATIONS  (PRN):  acetaminophen   Tablet .. 650 milliGRAM(s) Oral every 6 hours PRN Temp greater or equal to 38C (100.4F), Mild Pain (1 - 3)  dextrose 40% Gel 15 Gram(s) Oral once PRN Blood Glucose LESS THAN 70 milliGRAM(s)/deciliter  glucagon  Injectable 1 milliGRAM(s) IntraMuscular once PRN Glucose LESS THAN 70 milligrams/deciliter  HYDROmorphone  Injectable 1.5 milliGRAM(s) IV Push every 4 hours PRN Severe Pain (7 - 10)  HYDROmorphone  Injectable 0.5 milliGRAM(s) IV Push every 2 hours PRN Moderate Pain (4 - 6)        T(C): , Max: 36.8 (10-20-20 @ 15:20)  T(F): , Max: 98.3 (10-20-20 @ 15:20)  HR: 90 (10-21-20 @ 11:06)  BP: 105/58 (10-21-20 @ 11:06)  BP(mean): --  RR: 18 (10-21-20 @ 07:28)  SpO2: 97% (10-21-20 @ 07:28)  Wt(kg): --    10-20 @ 07:01  -  10-21 @ 07:00  --------------------------------------------------------  IN: 500 mL / OUT: 1100 mL / NET: -600 mL          PHYSICAL EXAM:    Constitutional: frail, >then stated age  HEENT: MMM  dist BS  Cardiovascular: S1 and S2  Extremities: LE dsg  Neurological: A/O x 3, no focal deficits  Psychiatric: Normal mood, normal affect  : No Black  Skin: No rashes  Access: Not applicable        LABS:                        10.9   10.75 )-----------( 82       ( 21 Oct 2020 08:04 )             32.2     21 Oct 2020 08:04    132    |  105    |  32     ----------------------------<  104    3.6     |  18     |  1.99   20 Oct 2020 22:27    x      |  x      |  x      ----------------------------<  46     x       |  x      |  x      20 Oct 2020 07:32    133    |  102    |  35     ----------------------------<  199    3.7     |  22     |  2.19   19 Oct 2020 06:37    132    |  100    |  38     ----------------------------<  76     3.7     |  23     |  2.11   18 Oct 2020 06:20    132    |  100    |  41     ----------------------------<  172    3.2     |  20     |  2.21     Ca    6.1        21 Oct 2020 08:04  Ca    6.0        20 Oct 2020 07:32  Ca    5.9        19 Oct 2020 06:37  Ca    5.4        18 Oct 2020 06:20  Phos  4.0       19 Oct 2020 06:37  Phos  4.1       18 Oct 2020 06:20  Mg     1.5       18 Oct 2020 06:20    TPro  4.6    /  Alb  1.2    /  TBili  0.4    /  DBili  0.2    /  AST  110    /  ALT  42     /  AlkPhos  130    20 Oct 2020 07:32  TPro  x      /  Alb  1.4    /  TBili  x      /  DBili  x      /  AST  x      /  ALT  x      /  AlkPhos  x      19 Oct 2020 06:37          Urine Studies:          RADIOLOGY & ADDITIONAL STUDIES:

## 2020-10-21 NOTE — PROVIDER CONTACT NOTE (CRITICAL VALUE NOTIFICATION) - NAME OF MD/NP/PA/DO NOTIFIED:
Mars HOGAN
Dr. Garcia
Dr. Garcia
Dr. Lofton
ICU PA Cali Babb
[facility]
Jesus MARSHALL
SAMIRA Sesay
Shama HOGAN
[facility]

## 2020-10-21 NOTE — PROGRESS NOTE ADULT - SUBJECTIVE AND OBJECTIVE BOX
Cheif complaints and Diagnosis: ischemic right lower leg and foot/ septic shock / UTI    Subjective: no complaints      REVIEW OF SYSTEMS:    CONSTITUTIONAL: No weakness, fevers or chills  EYES/ENT: No visual changes;  No vertigo or throat pain   NECK: No pain or stiffness  RESPIRATORY: No cough, wheezing, hemoptysis; No shortness of breath  CARDIOVASCULAR: No chest pain or palpitations  GASTROINTESTINAL: No abdominal or epigastric pain. No nausea, vomiting, or hematemesis; No diarrhea or constipation. No melena or hematochezia.  GENITOURINARY: No dysuria, frequency or hematuria  NEUROLOGICAL: No numbness or weakness  SKIN: No itching, burning, rashes, or lesions   All other review of systems is negative unless indicated above      Vital Signs Last 24 Hrs  T(C): 36.6 (21 Oct 2020 07:28), Max: 36.8 (20 Oct 2020 15:20)  T(F): 97.8 (21 Oct 2020 07:28), Max: 98.3 (20 Oct 2020 15:20)  HR: 90 (21 Oct 2020 11:06) (78 - 90)  BP: 105/58 (21 Oct 2020 11:06) (105/58 - 128/64)  BP(mean): --  RR: 18 (21 Oct 2020 07:28) (18 - 18)  SpO2: 97% (21 Oct 2020 07:28) (97% - 100%)    HEENT:   pupils equal and reactive, EOMI, no oropharyngeal lesions, erythema, exudates, oral thrush    NECK:   supple, no carotid bruits, no palpable lymph nodes, no thyromegaly    CV:  +S1, +S2, regular, no murmurs or rubs    RESP:   lungs clear to auscultation bilaterally, no wheezing, rales, rhonchi, good air entry bilaterally    BREAST:  not examined    GI:  abdomen soft, non-tender, non-distended, normal BS, no bruits, no abdominal masses, no palpable masses    RECTAL:  not examined    :  not examined    MSK:   normal muscle tone, no atrophy, no rigidity, no contractions    EXT:   no clubbing, no cyanosis, no edema, no calf pain, swelling or erythema    VASCULAR:  pulses equal and symmetric in the upper and lower extremities    NEURO:  AAOX3, no focal neurological deficits, follows all commands, able to move extremities spontaneously    SKIN:  no ulcers, lesions or rashes    MEDICATIONS  (STANDING):  ARIPiprazole 20 milliGRAM(s) Oral daily  aspirin 81 milliGRAM(s) Oral daily  budesonide  80 MICROgram(s)/formoterol 4.5 MICROgram(s) Inhaler 2 Puff(s) Inhalation two times a day  calcium carbonate    500 mG (Tums) Chewable 1 Tablet(s) Chew two times a day  cholestyramine Powder (Sugar-Free) 4 Gram(s) Oral daily  dextrose 5%. 1000 milliLiter(s) (50 mL/Hr) IV Continuous <Continuous>  dextrose 50% Injectable 12.5 Gram(s) IV Push once  dextrose 50% Injectable 25 Gram(s) IV Push once  dextrose 50% Injectable 25 Gram(s) IV Push once  dicyclomine 20 milliGRAM(s) Oral two times a day before meals  heparin   Injectable 5000 Unit(s) SubCutaneous every 8 hours  insulin lispro (HumaLOG) corrective regimen sliding scale   SubCutaneous Before meals and at bedtime  levothyroxine 125 MICROGram(s) Oral daily  metoprolol tartrate 25 milliGRAM(s) Oral two times a day  pantoprazole    Tablet 40 milliGRAM(s) Oral daily  piperacillin/tazobactam IVPB.. 3.375 Gram(s) IV Intermittent every 8 hours    MEDICATIONS  (PRN):  acetaminophen   Tablet .. 650 milliGRAM(s) Oral every 6 hours PRN Temp greater or equal to 38C (100.4F), Mild Pain (1 - 3)  dextrose 40% Gel 15 Gram(s) Oral once PRN Blood Glucose LESS THAN 70 milliGRAM(s)/deciliter  glucagon  Injectable 1 milliGRAM(s) IntraMuscular once PRN Glucose LESS THAN 70 milligrams/deciliter  HYDROmorphone  Injectable 1.5 milliGRAM(s) IV Push every 4 hours PRN Severe Pain (7 - 10)  HYDROmorphone  Injectable 0.5 milliGRAM(s) IV Push every 2 hours PRN Moderate Pain (4 - 6)      21 Oct 2020 08:04    132    |  105    |  32     ----------------------------<  104    3.6     |  18     |  1.99     Ca    6.1        21 Oct 2020 08:04    TPro  4.6    /  Alb  1.2    /  TBili  0.4    /  DBili  0.2    /  AST  110    /  ALT  42     /  AlkPhos  130    20 Oct 2020 07:32  LIVER FUNCTIONS - ( 20 Oct 2020 07:32 )  Alb: 1.2 g/dL / Pro: 4.6 gm/dL / ALK PHOS: 130 U/L / ALT: 42 U/L / AST: 110 U/L / GGT: x         CBC Full  -  ( 21 Oct 2020 08:04 )  WBC Count : 10.75 K/uL  Hemoglobin : 10.9 g/dL  Hematocrit : 32.2 %  Platelet Count - Automated : 82 K/uL  Mean Cell Volume : 86.3 fl  Mean Cell Hemoglobin : 29.2 pg  Mean Cell Hemoglobin Concentration : 33.9 gm/dL              Assessment and Plan:   	  59 CAD, PAD s/p fem-pop bypass s/p RT iliac stent, diabetes, hypothyroidism, hyperlipidemia, hx of cigg/cocaine use,  chronic back pain s/p lumbar surgery recent admit with STEMI c/b UTI sepsis and E. coli bacteremia.  now presents with septic shock and ichemic right leg and foot.        1. Septic Shock secondary to UTI  vs ischemic foot vs both  -10/15+ 10/14  cultures negative  -10/12 positive klebseilla  -c/w  IV  zosyn          2. PVD. ischemic right leg - cyanotic right foot, ice cold  -will need amputation, scheduled for [facility]  -patient with multiple co morbidities, and hx of STEMI.  Will reccomend cardiac clearance for OR.   -cardiolgy consult pending.     3. Thromboyctopenia - likley from sepsis and or dilutional received multiple units of blood,  on aspirin, resume prophylaxis if HIT negative   -platelet 30's >> 82   -HIT screen negative  -c/w sc heparin for dvt propy as patient is high risk for thrombosis and occlusion    4. Renal insufficiency, seems to be stabilizing, creatinine likely close to her baseline    5. Hypokalemia- replace    6. Respiratory Failure - improved, now extubated on room air    7-DVT proph- sc heparin          Dispo: rehab facility     Cheif complaints and Diagnosis: ischemic right lower leg and foot/ septic shock / UTI    Subjective: no complaints      REVIEW OF SYSTEMS:    CONSTITUTIONAL: No weakness, fevers or chills  EYES/ENT: No visual changes;  No vertigo or throat pain   NECK: No pain or stiffness  RESPIRATORY: No cough, wheezing, hemoptysis; No shortness of breath  CARDIOVASCULAR: No chest pain or palpitations  GASTROINTESTINAL: No abdominal or epigastric pain. No nausea, vomiting, or hematemesis; No diarrhea or constipation. No melena or hematochezia.  GENITOURINARY: No dysuria, frequency or hematuria  NEUROLOGICAL: No numbness or weakness  SKIN: No itching, burning, rashes, or lesions   All other review of systems is negative unless indicated above      Vital Signs Last 24 Hrs  T(C): 36.6 (21 Oct 2020 07:28), Max: 36.8 (20 Oct 2020 15:20)  T(F): 97.8 (21 Oct 2020 07:28), Max: 98.3 (20 Oct 2020 15:20)  HR: 90 (21 Oct 2020 11:06) (78 - 90)  BP: 105/58 (21 Oct 2020 11:06) (105/58 - 128/64)  BP(mean): --  RR: 18 (21 Oct 2020 07:28) (18 - 18)  SpO2: 97% (21 Oct 2020 07:28) (97% - 100%)    HEENT:   pupils equal and reactive, EOMI, no oropharyngeal lesions, erythema, exudates, oral thrush    NECK:   supple, no carotid bruits, no palpable lymph nodes, no thyromegaly    CV:  +S1, +S2, regular, no murmurs or rubs    RESP:   lungs clear to auscultation bilaterally, no wheezing, rales, rhonchi, good air entry bilaterally    BREAST:  not examined    GI:  abdomen soft, non-tender, non-distended, normal BS, no bruits, no abdominal masses, no palpable masses    RECTAL:  not examined    :  not examined    MSK:   normal muscle tone, no atrophy, no rigidity, no contractions    EXT:   no clubbing, no cyanosis, no edema, no calf pain, swelling or erythema    VASCULAR:  pulses equal and symmetric in the upper and lower extremities    NEURO:  AAOX3, no focal neurological deficits, follows all commands, able to move extremities spontaneously    SKIN:  no ulcers, lesions or rashes    MEDICATIONS  (STANDING):  ARIPiprazole 20 milliGRAM(s) Oral daily  aspirin 81 milliGRAM(s) Oral daily  budesonide  80 MICROgram(s)/formoterol 4.5 MICROgram(s) Inhaler 2 Puff(s) Inhalation two times a day  calcium carbonate    500 mG (Tums) Chewable 1 Tablet(s) Chew two times a day  cholestyramine Powder (Sugar-Free) 4 Gram(s) Oral daily  dextrose 5%. 1000 milliLiter(s) (50 mL/Hr) IV Continuous <Continuous>  dextrose 50% Injectable 12.5 Gram(s) IV Push once  dextrose 50% Injectable 25 Gram(s) IV Push once  dextrose 50% Injectable 25 Gram(s) IV Push once  dicyclomine 20 milliGRAM(s) Oral two times a day before meals  heparin   Injectable 5000 Unit(s) SubCutaneous every 8 hours  insulin lispro (HumaLOG) corrective regimen sliding scale   SubCutaneous Before meals and at bedtime  levothyroxine 125 MICROGram(s) Oral daily  metoprolol tartrate 25 milliGRAM(s) Oral two times a day  pantoprazole    Tablet 40 milliGRAM(s) Oral daily  piperacillin/tazobactam IVPB.. 3.375 Gram(s) IV Intermittent every 8 hours    MEDICATIONS  (PRN):  acetaminophen   Tablet .. 650 milliGRAM(s) Oral every 6 hours PRN Temp greater or equal to 38C (100.4F), Mild Pain (1 - 3)  dextrose 40% Gel 15 Gram(s) Oral once PRN Blood Glucose LESS THAN 70 milliGRAM(s)/deciliter  glucagon  Injectable 1 milliGRAM(s) IntraMuscular once PRN Glucose LESS THAN 70 milligrams/deciliter  HYDROmorphone  Injectable 1.5 milliGRAM(s) IV Push every 4 hours PRN Severe Pain (7 - 10)  HYDROmorphone  Injectable 0.5 milliGRAM(s) IV Push every 2 hours PRN Moderate Pain (4 - 6)      21 Oct 2020 08:04    132    |  105    |  32     ----------------------------<  104    3.6     |  18     |  1.99     Ca    6.1        21 Oct 2020 08:04    TPro  4.6    /  Alb  1.2    /  TBili  0.4    /  DBili  0.2    /  AST  110    /  ALT  42     /  AlkPhos  130    20 Oct 2020 07:32  LIVER FUNCTIONS - ( 20 Oct 2020 07:32 )  Alb: 1.2 g/dL / Pro: 4.6 gm/dL / ALK PHOS: 130 U/L / ALT: 42 U/L / AST: 110 U/L / GGT: x         CBC Full  -  ( 21 Oct 2020 08:04 )  WBC Count : 10.75 K/uL  Hemoglobin : 10.9 g/dL  Hematocrit : 32.2 %  Platelet Count - Automated : 82 K/uL  Mean Cell Volume : 86.3 fl  Mean Cell Hemoglobin : 29.2 pg  Mean Cell Hemoglobin Concentration : 33.9 gm/dL              Assessment and Plan:   	  59 CAD, PAD s/p fem-pop bypass s/p RT iliac stent, diabetes, hypothyroidism, hyperlipidemia, hx of cigg/cocaine use,  chronic back pain s/p lumbar surgery recent admit with STEMI c/b UTI sepsis and E. coli bacteremia.  now presents with septic shock and ichemic right leg and foot.        1. Septic Shock secondary to UTI  vs ischemic foot vs both  -10/15+ 10/14  cultures negative  -10/12 positive klebseilla  -c/w  IV  zosyn          2. PVD. ischemic right leg - cyanotic right foot, ice cold  -will need amputation, scheduled for [facility]  -patient with multiple co morbidities, and hx of STEMI.  Will reccomend cardiac clearance for OR.   -cardiolgy consult pending.     3. Thromboyctopenia - likley from sepsis and or dilutional received multiple units of blood,  on aspirin, resume prophylaxis if HIT negative   -platelet 30's >> 82   -HIT screen negative  -c/w sc heparin for dvt propy as patient is high risk for thrombosis and occlusion    4. Renal insufficiency, seems to be stabilizing, creatinine likely close to her baseline    5. Hypokalemia- replace    6. Respiratory Failure - improved, now extubated on room air    7-DVT proph- sc heparin    8-Hypocalcemia- corrected with albumin >> calcium is mildly low 7.94   -will replace with IV calcium gluconate  to correct for OR to[facility]          Dispo: rehab facility

## 2020-10-21 NOTE — PROVIDER CONTACT NOTE (CHANGE IN STATUS NOTIFICATION) - ACTION/TREATMENT ORDERED:
MD aware. Awaiting orders.
BGM @60  check BGM @ 0200  Will continue to monitor.
Vascular consult STAT, heparin infusion, 1x dose morphine

## 2020-10-21 NOTE — PROGRESS NOTE ADULT - SUBJECTIVE AND OBJECTIVE BOX
Date of service: 10-21-20 @ 12:16      Patient lying in bed; pain of leg is well controlled  Afebrile      ROS: unable to obtain secondary to patient medical condition     MEDICATIONS  (STANDING):  ARIPiprazole 20 milliGRAM(s) Oral daily  aspirin 81 milliGRAM(s) Oral daily  budesonide  80 MICROgram(s)/formoterol 4.5 MICROgram(s) Inhaler 2 Puff(s) Inhalation two times a day  calcium carbonate    500 mG (Tums) Chewable 1 Tablet(s) Chew two times a day  cholestyramine Powder (Sugar-Free) 4 Gram(s) Oral daily  dextrose 5%. 1000 milliLiter(s) (50 mL/Hr) IV Continuous <Continuous>  dextrose 50% Injectable 12.5 Gram(s) IV Push once  dextrose 50% Injectable 25 Gram(s) IV Push once  dextrose 50% Injectable 25 Gram(s) IV Push once  dicyclomine 20 milliGRAM(s) Oral two times a day before meals  heparin   Injectable 5000 Unit(s) SubCutaneous every 8 hours  insulin lispro (HumaLOG) corrective regimen sliding scale   SubCutaneous Before meals and at bedtime  levothyroxine 125 MICROGram(s) Oral daily  metoprolol tartrate 25 milliGRAM(s) Oral two times a day  pantoprazole    Tablet 40 milliGRAM(s) Oral daily  piperacillin/tazobactam IVPB.. 3.375 Gram(s) IV Intermittent every 8 hours    MEDICATIONS  (PRN):  acetaminophen   Tablet .. 650 milliGRAM(s) Oral every 6 hours PRN Temp greater or equal to 38C (100.4F), Mild Pain (1 - 3)  dextrose 40% Gel 15 Gram(s) Oral once PRN Blood Glucose LESS THAN 70 milliGRAM(s)/deciliter  glucagon  Injectable 1 milliGRAM(s) IntraMuscular once PRN Glucose LESS THAN 70 milligrams/deciliter  HYDROmorphone  Injectable 1.5 milliGRAM(s) IV Push every 4 hours PRN Severe Pain (7 - 10)  HYDROmorphone  Injectable 0.5 milliGRAM(s) IV Push every 2 hours PRN Moderate Pain (4 - 6)      Vital Signs Last 24 Hrs  T(C): 36.6 (21 Oct 2020 07:28), Max: 36.8 (20 Oct 2020 15:20)  T(F): 97.8 (21 Oct 2020 07:28), Max: 98.3 (20 Oct 2020 15:20)  HR: 90 (21 Oct 2020 11:06) (78 - 90)  BP: 105/58 (21 Oct 2020 11:06) (105/58 - 128/64)  BP(mean): --  RR: 18 (21 Oct 2020 07:28) (18 - 18)  SpO2: 97% (21 Oct 2020 07:28) (97% - 100%)        Physical Exam:      Constitutional: frail looking  HEENT: NC/AT  Neck: supple; thyroid not palpable; left neck IJ CVL is removed  Back: no tenderness  Respiratory: respiratory effort normal; clear to auscultation  Cardiovascular: S1S2 regular, no murmurs  Abdomen: soft, not tender, mildly distended, positive BS; no liver or spleen organomegaly  Genitourinary: no suprapubic tenderness  Musculoskeletal: right foot cool to touch; surgical dressing in place of right lower extremity; mottled right foot, dressings with drainage  Neurological/ Psychiatric: nonfocal  Skin: no rashes; no palpable lesions    Labs: all available labs reviewed              Labs:                        10.9   10.75 )-----------( 82       ( 21 Oct 2020 08:04 )             32.2     10-21    132<L>  |  105  |  32<H>  ----------------------------<  104<H>  3.6   |  18<L>  |  1.99<H>    Ca    6.1<LL>      21 Oct 2020 08:04    TPro  4.6<L>  /  Alb  1.2<L>  /  TBili  0.4  /  DBili  0.2  /  AST  110<H>  /  ALT  42  /  AlkPhos  130<H>  10-20           Cultures:       Culture - Blood (collected 10-15-20 @ 17:30)  Source: .Blood Blood  Final Report (10-20-20 @ 22:01):    No Growth Final    Culture - Blood (collected 10-14-20 @ 15:35)  Source: .Blood None  Final Report (10-19-20 @ 19:00):    No Growth Final    Culture - Blood (collected 10-14-20 @ 15:30)  Source: .Blood None  Final Report (10-19-20 @ 19:00):    No Growth Final                              < from: CT Abdomen and Pelvis w/ Oral Cont (10.14.20 @ 11:10) >    IMPRESSION:  Mildly dilated loops of small bowel with no transition point, possibly an ileus.    Presacral edema with infiltration of the perirectal fat, possibly proctitis.    Dilated common bile duct measuring 1.1 cm, increased in caliber since 9/8/2020, previously 0.8 cm; correlation is recommended with LFTs; an MRI/MRCP of the abdomen is recommended for further evaluation tiny; a noncontrast study can be performed if contraindicated by the patient's renal function.    Small amount of abdominal and pelvic ascites, increased since 10/12/2020.    Wall thickening versus underdistention involving the region of the pylorus; continued attention is recommended on the MRI.        < end of copied text >                Radiology: all available radiological tests reviewed    Advanced directives addressed: full resuscitation

## 2020-10-21 NOTE — CONSULT NOTE ADULT - ATTENDING COMMENTS
David Judge M.D.  Cardiology, [facility]  Cell: [phone]  Office:   [phone] ([facility] Office)  [phone] ([facility] Office)

## 2020-10-21 NOTE — PROGRESS NOTE ADULT - SUBJECTIVE AND OBJECTIVE BOX
Pt seen and examined at bedside. Patient dressing changed by nursing overnight. Patient continues to have pain in the RLE. Patient was hypoglycemic overnight.       Vital Signs Last 24 Hrs  T(C): 36.8 (20 Oct 2020 23:37), Max: 36.8 (20 Oct 2020 15:20)  T(F): 98.3 (20 Oct 2020 23:37), Max: 98.3 (20 Oct 2020 15:20)  HR: 82 (20 Oct 2020 23:37) (82 - 88)  BP: 123/60 (20 Oct 2020 23:37) (121/59 - 125/67)  BP(mean): --  RR: 18 (20 Oct 2020 20:31) (18 - 19)  SpO2: 100% (20 Oct 2020 23:37) (94% - 100%)          Labs:  Pending labs                     Physical exam:  Gen: alert  Resp: Equal chest rise   CV: s1 and S2 present   Abd; Soft, edematous 2/2 3rd spacing   Ext: RLE dopplerable graft in the medial thigh, nondopperalbe pulses in the foot, LLE dopperlable pulses leg is demarking and weeping serous fluid

## 2020-10-21 NOTE — PROGRESS NOTE ADULT - ASSESSMENT
60 YO F with right leg ischemia s/p right femoral-PT bypass POD6    Plan:   continue ASA 81mg qd  Hold plavix until platelet count stabilizes  Transfuse Platelets if below 50   NARESH workup negative  Continue to monitor dressing in the RLE and groin   continue to monitor pulses, doppler graft right medial thigh  Monitor urine output  hospitalist following   cardiology evaluation PENDING   OR thursday for AKA as long as medically optimized and cleared         Case discussed with attending

## 2020-10-21 NOTE — PROGRESS NOTE ADULT - ASSESSMENT
This patient is a 59 year old female with past medical history bipolar disease and depression, cad, esrd, gerd, hypertension, hyperlipidemia, diabetes, hypothyroidism, substance abuse transferred from sn[facility] on 10/12 for evaluation of hypotension and lower abdominal pain, loose stools. The patient states she passed out and cannot provide details surrounding her admission. History per medical record.   1. Patient admitted with sepsis secondary to Klebsiella pneumoniae, possibly due to GI translocation versus urinary origin; subsequent right lower extremity ischemia, s/p operative procedure  - will continue zosyn, given the ischemic, necrosis of the right lower extremity  - tolerating antibiotics without rashes or side effects   - wound care to right lower extremity per vascular; however patient states she will have amputation, most likely tomorrow  - serial cbc and monitor temperature   - iv hydration and supportive care   - reviewed prior medical records to evaluate for resistant or atypical pathogens   - repeat blood cultures are no growth  2. other issues: per medicine

## 2020-10-21 NOTE — CONSULT NOTE ADULT - PROBLEM SELECTOR RECOMMENDATION 9
Intermediate risk surgery.  Multiple clinical risk predictors for cardiac event: CAD, CRI, DM, TIAs, poor functional capacity. However tolerated emergent high risk vascular bypass w/o cardiac events.  Surgery urgent/emergent.  may proceed w/ surgery w/o further cardiac testing.  elevated but acceptable risk for intermediate risk surgery.  Start atoravastatin 40 mg QHS & cont. BB to minimize periop. risk.

## 2020-10-21 NOTE — PROVIDER CONTACT NOTE (CRITICAL VALUE NOTIFICATION) - TEST AND RESULT REPORTED:
gram negative rods in aerobic bottle
Ca=5.4
Ca=5.5
Calcium 5.5
Calcium 6.1
Calcium level 6.1
H&H 6.4/19.0
Potassium 2.7
Serum glucose 10, calcium 5.1
calcium 5.4
calcium 6.2
calcium: 6.0
hemoglobin 5.9 hematocrit 17.9
lactate 4.5
lactate=5.2
potasium 2.2

## 2020-10-21 NOTE — CONSULT NOTE ADULT - REASON FOR ADMISSION
hypotension, lower abdominal pain
hypotension, lower abtominal pain

## 2020-10-21 NOTE — PROGRESS NOTE ADULT - ASSESSMENT
59 CAD, PAD s/p fem-pop bypass s/p RT iliac stent, diabetes, hypothyroidism, hyperlipidemia, hx of cigg/cocaine use,  chronic back pain s/p lumbar surgery recent admit with STEMI c/b UTI sepsis and E. coli bacteremia.     AMISH    Stable renal function, near baseline   IVF if OR pending/NPO   Trend labs/lytes   Avoid nsaids/contrast as able    Hypocalcemia  0ral repletion  trend mg/k/phos    Ischemia  -medicine/vascular for amputation    d/c with RN staff bedside

## 2020-10-22 ENCOUNTER — RESULT REVIEW (OUTPATIENT)
Age: 60
End: 2020-10-22

## 2020-10-22 LAB
A1C WITH ESTIMATED AVERAGE GLUCOSE RESULT: 5.6 % — SIGNIFICANT CHANGE UP (ref 4–5.6)
ALBUMIN SERPL ELPH-MCNC: 1.1 G/DL — LOW (ref 3.3–5)
ALP SERPL-CCNC: 132 U/L — HIGH (ref 40–120)
ALT FLD-CCNC: 19 U/L — SIGNIFICANT CHANGE UP (ref 12–78)
ANION GAP SERPL CALC-SCNC: 10 MMOL/L — SIGNIFICANT CHANGE UP (ref 5–17)
ANION GAP SERPL CALC-SCNC: 8 MMOL/L — SIGNIFICANT CHANGE UP (ref 5–17)
APTT BLD: 29.4 SEC — SIGNIFICANT CHANGE UP (ref 27.5–35.5)
AST SERPL-CCNC: 53 U/L — HIGH (ref 15–37)
BILIRUB SERPL-MCNC: 0.3 MG/DL — SIGNIFICANT CHANGE UP (ref 0.2–1.2)
BUN SERPL-MCNC: 30 MG/DL — HIGH (ref 7–23)
BUN SERPL-MCNC: 30 MG/DL — HIGH (ref 7–23)
CALCIUM SERPL-MCNC: 6.3 MG/DL — CRITICAL LOW (ref 8.5–10.1)
CALCIUM SERPL-MCNC: 6.9 MG/DL — LOW (ref 8.5–10.1)
CHLORIDE SERPL-SCNC: 105 MMOL/L — SIGNIFICANT CHANGE UP (ref 96–108)
CHLORIDE SERPL-SCNC: 107 MMOL/L — SIGNIFICANT CHANGE UP (ref 96–108)
CK SERPL-CCNC: 566 U/L — HIGH (ref 26–192)
CK SERPL-CCNC: 596 U/L — HIGH (ref 26–192)
CO2 SERPL-SCNC: 20 MMOL/L — LOW (ref 22–31)
CO2 SERPL-SCNC: 21 MMOL/L — LOW (ref 22–31)
CREAT SERPL-MCNC: 1.89 MG/DL — HIGH (ref 0.5–1.3)
CREAT SERPL-MCNC: 1.94 MG/DL — HIGH (ref 0.5–1.3)
ESTIMATED AVERAGE GLUCOSE: 114 MG/DL — SIGNIFICANT CHANGE UP (ref 68–114)
GLUCOSE SERPL-MCNC: 194 MG/DL — HIGH (ref 70–99)
GLUCOSE SERPL-MCNC: 209 MG/DL — HIGH (ref 70–99)
HCT VFR BLD CALC: 29.4 % — LOW (ref 34.5–45)
HCT VFR BLD CALC: 30.1 % — LOW (ref 34.5–45)
HGB BLD-MCNC: 10.1 G/DL — LOW (ref 11.5–15.5)
HGB BLD-MCNC: 9.7 G/DL — LOW (ref 11.5–15.5)
INR BLD: 1.33 RATIO — HIGH (ref 0.88–1.16)
MAGNESIUM SERPL-MCNC: 1.4 MG/DL — LOW (ref 1.6–2.6)
MCHC RBC-ENTMCNC: 28.7 PG — SIGNIFICANT CHANGE UP (ref 27–34)
MCHC RBC-ENTMCNC: 29.4 PG — SIGNIFICANT CHANGE UP (ref 27–34)
MCHC RBC-ENTMCNC: 33 GM/DL — SIGNIFICANT CHANGE UP (ref 32–36)
MCHC RBC-ENTMCNC: 33.6 GM/DL — SIGNIFICANT CHANGE UP (ref 32–36)
MCV RBC AUTO: 87 FL — SIGNIFICANT CHANGE UP (ref 80–100)
MCV RBC AUTO: 87.8 FL — SIGNIFICANT CHANGE UP (ref 80–100)
PHOSPHATE SERPL-MCNC: 3.5 MG/DL — SIGNIFICANT CHANGE UP (ref 2.5–4.5)
PLATELET # BLD AUTO: 85 K/UL — LOW (ref 150–400)
PLATELET # BLD AUTO: 88 K/UL — LOW (ref 150–400)
POTASSIUM SERPL-MCNC: 3.2 MMOL/L — LOW (ref 3.5–5.3)
POTASSIUM SERPL-MCNC: 3.3 MMOL/L — LOW (ref 3.5–5.3)
POTASSIUM SERPL-SCNC: 3.2 MMOL/L — LOW (ref 3.5–5.3)
POTASSIUM SERPL-SCNC: 3.3 MMOL/L — LOW (ref 3.5–5.3)
PROT SERPL-MCNC: 4.6 GM/DL — LOW (ref 6–8.3)
PROTHROM AB SERPL-ACNC: 15.4 SEC — HIGH (ref 10.6–13.6)
RBC # BLD: 3.38 M/UL — LOW (ref 3.8–5.2)
RBC # BLD: 3.43 M/UL — LOW (ref 3.8–5.2)
RBC # FLD: 16.4 % — HIGH (ref 10.3–14.5)
RBC # FLD: 16.5 % — HIGH (ref 10.3–14.5)
SODIUM SERPL-SCNC: 135 MMOL/L — SIGNIFICANT CHANGE UP (ref 135–145)
SODIUM SERPL-SCNC: 136 MMOL/L — SIGNIFICANT CHANGE UP (ref 135–145)
TROPONIN I SERPL-MCNC: 0.04 NG/ML — SIGNIFICANT CHANGE UP (ref 0.01–0.04)
WBC # BLD: 10.17 K/UL — SIGNIFICANT CHANGE UP (ref 3.8–10.5)
WBC # BLD: 10.52 K/UL — HIGH (ref 3.8–10.5)
WBC # FLD AUTO: 10.17 K/UL — SIGNIFICANT CHANGE UP (ref 3.8–10.5)
WBC # FLD AUTO: 10.52 K/UL — HIGH (ref 3.8–10.5)

## 2020-10-22 PROCEDURE — 99233 SBSQ HOSP IP/OBS HIGH 50: CPT

## 2020-10-22 PROCEDURE — 88307 TISSUE EXAM BY PATHOLOGIST: CPT | Mod: 26

## 2020-10-22 PROCEDURE — 27590 AMPUTATE LEG AT THIGH: CPT | Mod: RT,58

## 2020-10-22 RX ORDER — ATORVASTATIN CALCIUM 80 MG/1
40 TABLET, FILM COATED ORAL AT BEDTIME
Refills: 0 | Status: DISCONTINUED | OUTPATIENT
Start: 2020-10-22 | End: 2020-10-28

## 2020-10-22 RX ORDER — ACETAMINOPHEN 500 MG
1000 TABLET ORAL ONCE
Refills: 0 | Status: DISCONTINUED | OUTPATIENT
Start: 2020-10-22 | End: 2020-10-22

## 2020-10-22 RX ORDER — CALCIUM CARBONATE 500(1250)
1 TABLET ORAL
Refills: 0 | Status: DISCONTINUED | OUTPATIENT
Start: 2020-10-22 | End: 2020-10-28

## 2020-10-22 RX ORDER — FENTANYL CITRATE 50 UG/ML
50 INJECTION INTRAVENOUS
Refills: 0 | Status: DISCONTINUED | OUTPATIENT
Start: 2020-10-22 | End: 2020-10-22

## 2020-10-22 RX ORDER — MAGNESIUM SULFATE 500 MG/ML
1 VIAL (ML) INJECTION ONCE
Refills: 0 | Status: COMPLETED | OUTPATIENT
Start: 2020-10-22 | End: 2020-10-22

## 2020-10-22 RX ORDER — SODIUM CHLORIDE 9 MG/ML
1000 INJECTION, SOLUTION INTRAVENOUS
Refills: 0 | Status: DISCONTINUED | OUTPATIENT
Start: 2020-10-22 | End: 2020-10-22

## 2020-10-22 RX ORDER — POTASSIUM CHLORIDE 20 MEQ
40 PACKET (EA) ORAL ONCE
Refills: 0 | Status: COMPLETED | OUTPATIENT
Start: 2020-10-22 | End: 2020-10-23

## 2020-10-22 RX ORDER — OXYCODONE HYDROCHLORIDE 5 MG/1
5 TABLET ORAL ONCE
Refills: 0 | Status: DISCONTINUED | OUTPATIENT
Start: 2020-10-22 | End: 2020-10-22

## 2020-10-22 RX ORDER — ONDANSETRON 8 MG/1
4 TABLET, FILM COATED ORAL EVERY 6 HOURS
Refills: 0 | Status: DISCONTINUED | OUTPATIENT
Start: 2020-10-22 | End: 2020-10-22

## 2020-10-22 RX ADMIN — FENTANYL CITRATE 50 MICROGRAM(S): 50 INJECTION INTRAVENOUS at 12:45

## 2020-10-22 RX ADMIN — HYDROMORPHONE HYDROCHLORIDE 0.5 MILLIGRAM(S): 2 INJECTION INTRAMUSCULAR; INTRAVENOUS; SUBCUTANEOUS at 08:02

## 2020-10-22 RX ADMIN — Medication 25 MILLIGRAM(S): at 21:20

## 2020-10-22 RX ADMIN — PIPERACILLIN AND TAZOBACTAM 25 GRAM(S): 4; .5 INJECTION, POWDER, LYOPHILIZED, FOR SOLUTION INTRAVENOUS at 14:51

## 2020-10-22 RX ADMIN — HEPARIN SODIUM 5000 UNIT(S): 5000 INJECTION INTRAVENOUS; SUBCUTANEOUS at 21:19

## 2020-10-22 RX ADMIN — HYDROMORPHONE HYDROCHLORIDE 1.5 MILLIGRAM(S): 2 INJECTION INTRAMUSCULAR; INTRAVENOUS; SUBCUTANEOUS at 16:22

## 2020-10-22 RX ADMIN — FENTANYL CITRATE 50 MICROGRAM(S): 50 INJECTION INTRAVENOUS at 12:33

## 2020-10-22 RX ADMIN — ARIPIPRAZOLE 20 MILLIGRAM(S): 15 TABLET ORAL at 14:51

## 2020-10-22 RX ADMIN — HEPARIN SODIUM 5000 UNIT(S): 5000 INJECTION INTRAVENOUS; SUBCUTANEOUS at 14:49

## 2020-10-22 RX ADMIN — FENTANYL CITRATE 50 MICROGRAM(S): 50 INJECTION INTRAVENOUS at 12:00

## 2020-10-22 RX ADMIN — HYDROMORPHONE HYDROCHLORIDE 1.5 MILLIGRAM(S): 2 INJECTION INTRAMUSCULAR; INTRAVENOUS; SUBCUTANEOUS at 02:43

## 2020-10-22 RX ADMIN — Medication 6: at 21:18

## 2020-10-22 RX ADMIN — OXYCODONE HYDROCHLORIDE 5 MILLIGRAM(S): 5 TABLET ORAL at 11:50

## 2020-10-22 RX ADMIN — Medication 81 MILLIGRAM(S): at 14:50

## 2020-10-22 RX ADMIN — Medication 100 GRAM(S): at 20:09

## 2020-10-22 RX ADMIN — Medication 8: at 16:23

## 2020-10-22 RX ADMIN — FENTANYL CITRATE 50 MICROGRAM(S): 50 INJECTION INTRAVENOUS at 12:15

## 2020-10-22 RX ADMIN — Medication 20 MILLIGRAM(S): at 21:20

## 2020-10-22 RX ADMIN — HEPARIN SODIUM 5000 UNIT(S): 5000 INJECTION INTRAVENOUS; SUBCUTANEOUS at 05:44

## 2020-10-22 RX ADMIN — Medication 125 MICROGRAM(S): at 05:44

## 2020-10-22 RX ADMIN — SODIUM CHLORIDE 75 MILLILITER(S): 9 INJECTION, SOLUTION INTRAVENOUS at 14:49

## 2020-10-22 RX ADMIN — HYDROMORPHONE HYDROCHLORIDE 1.5 MILLIGRAM(S): 2 INJECTION INTRAMUSCULAR; INTRAVENOUS; SUBCUTANEOUS at 21:59

## 2020-10-22 RX ADMIN — HYDROMORPHONE HYDROCHLORIDE 1.5 MILLIGRAM(S): 2 INJECTION INTRAMUSCULAR; INTRAVENOUS; SUBCUTANEOUS at 02:29

## 2020-10-22 RX ADMIN — HYDROMORPHONE HYDROCHLORIDE 1.5 MILLIGRAM(S): 2 INJECTION INTRAMUSCULAR; INTRAVENOUS; SUBCUTANEOUS at 22:00

## 2020-10-22 RX ADMIN — SODIUM CHLORIDE 75 MILLILITER(S): 9 INJECTION, SOLUTION INTRAVENOUS at 00:02

## 2020-10-22 RX ADMIN — Medication 1 TABLET(S): at 21:20

## 2020-10-22 RX ADMIN — Medication 650 MILLIGRAM(S): at 15:03

## 2020-10-22 RX ADMIN — PIPERACILLIN AND TAZOBACTAM 25 GRAM(S): 4; .5 INJECTION, POWDER, LYOPHILIZED, FOR SOLUTION INTRAVENOUS at 05:44

## 2020-10-22 RX ADMIN — ATORVASTATIN CALCIUM 40 MILLIGRAM(S): 80 TABLET, FILM COATED ORAL at 21:20

## 2020-10-22 RX ADMIN — FENTANYL CITRATE 50 MICROGRAM(S): 50 INJECTION INTRAVENOUS at 11:49

## 2020-10-22 NOTE — PROGRESS NOTE ADULT - SUBJECTIVE AND OBJECTIVE BOX
Patient is a 59y Female who reports no complaints as new,  s/p right AKA this AM    feeling good   eating lunch and talking on the phone       MEDICATIONS  (STANDING):  ARIPiprazole 20 milliGRAM(s) Oral daily  aspirin 81 milliGRAM(s) Oral daily  atorvastatin 40 milliGRAM(s) Oral at bedtime  budesonide  80 MICROgram(s)/formoterol 4.5 MICROgram(s) Inhaler 2 Puff(s) Inhalation two times a day  calcium carbonate    500 mG (Tums) Chewable 1 Tablet(s) Chew two times a day  calcium carbonate   1250 mG (OsCal) 1 Tablet(s) Oral two times a day  cholestyramine Powder (Sugar-Free) 4 Gram(s) Oral daily  dextrose 5% + sodium chloride 0.9%. 1000 milliLiter(s) (75 mL/Hr) IV Continuous <Continuous>  dextrose 5%. 1000 milliLiter(s) (50 mL/Hr) IV Continuous <Continuous>  dextrose 50% Injectable 25 Gram(s) IV Push once  dextrose 50% Injectable 25 Gram(s) IV Push once  dicyclomine 20 milliGRAM(s) Oral two times a day before meals  heparin   Injectable 5000 Unit(s) SubCutaneous every 8 hours  insulin lispro (HumaLOG) corrective regimen sliding scale   SubCutaneous Before meals and at bedtime  levothyroxine 125 MICROGram(s) Oral daily  metoprolol tartrate 25 milliGRAM(s) Oral two times a day  pantoprazole    Tablet 40 milliGRAM(s) Oral daily  piperacillin/tazobactam IVPB.. 3.375 Gram(s) IV Intermittent every 8 hours    MEDICATIONS  (PRN):  acetaminophen   Tablet .. 650 milliGRAM(s) Oral every 6 hours PRN Temp greater or equal to 38C (100.4F), Mild Pain (1 - 3)  dextrose 40% Gel 15 Gram(s) Oral once PRN Blood Glucose LESS THAN 70 milliGRAM(s)/deciliter  glucagon  Injectable 1 milliGRAM(s) IntraMuscular once PRN Glucose LESS THAN 70 milligrams/deciliter  HYDROmorphone  Injectable 1.5 milliGRAM(s) IV Push every 4 hours PRN Severe Pain (7 - 10)  HYDROmorphone  Injectable 0.5 milliGRAM(s) IV Push every 2 hours PRN Moderate Pain (4 - 6)         Vital Signs Last 24 Hrs  T(C): 36.7 (22 Oct 2020 15:00), Max: 37.1 (21 Oct 2020 18:44)  T(F): 98 (22 Oct 2020 15:00), Max: 98.8 (21 Oct 2020 18:44)  HR: 91 (22 Oct 2020 15:00) (79 - 92)  BP: 122/88 (22 Oct 2020 15:00) (106/53 - 138/56)  BP(mean): --  ABP: --  ABP(mean): --  RR: 18 (22 Oct 2020 15:00) (12 - 22)  SpO2: 100% (22 Oct 2020 15:00) (92% - 100%)      PHYSICAL EXAM:    Constitutional: frail, >then stated age  HEENT: MMM  dist BS  Cardiovascular: S1 and S2  Extremities: LE dsg  Neurological: A/O x 3, no focal deficits  : No Black  Skin: No rashes  Access: Not applicable  Ext: Right AKA - bandaged stump       LABS:               135    |  105    |  30     ----------------------------<  194       22 Oct 2020 11:54  3.2     |  20     |  1.94     136    |  107    |  30     ----------------------------<  209       22 Oct 2020 07:37  3.3     |  21     |  1.89     132    |  105    |  32     ----------------------------<  104       21 Oct 2020 08:04  3.6     |  18     |  1.99     Ca    6.9        22 Oct 2020 11:54 - corrected Ca 9.2   Ca    6.3        22 Oct 2020 07:37    Phos  3.5       22 Oct 2020 07:37  Phos  4.0       19 Oct 2020 06:37    Mg     1.4       22 Oct 2020 07:37    TPro  4.6    /  Alb  1.1    /  TBili  0.3    /        22 Oct 2020 07:37  DBili  x      /  AST  53     /  ALT  19     /  AlkPhos  132      TPro  4.6    /  Alb  1.2    /  TBili  0.4    /        20 Oct 2020 07:32  DBili  0.2    /  AST  110    /  ALT  42     /  AlkPhos  130                              10.1   10.52 )-----------( 85       ( 22 Oct 2020 11:54 )             30.1                         9.7    10.17 )-----------( 88       ( 22 Oct 2020 07:37 )             29.4       Urine Studies:          RADIOLOGY & ADDITIONAL STUDIES:

## 2020-10-22 NOTE — PROGRESS NOTE ADULT - SUBJECTIVE AND OBJECTIVE BOX
Pt seen and examined at bedside. Patient dressing changed by nursing overnight. Patient continues to have pain in the RLE.     Vital Signs Last 24 Hrs  T(C): 36.8 (22 Oct 2020 07:33), Max: 37.1 (21 Oct 2020 18:44)  T(F): 98.3 (22 Oct 2020 07:33), Max: 98.8 (21 Oct 2020 18:44)  HR: 86 (22 Oct 2020 07:33) (79 - 90)  BP: 118/47 (22 Oct 2020 07:33) (105/58 - 133/64)  BP(mean): --  RR: 18 (22 Oct 2020 07:33) (18 - 22)  SpO2: 97% (22 Oct 2020 07:33) (92% - 100%)      Physical exam:  Gen: alert  Resp: Equal chest rise   CV: s1 and S2 present   Abd; Soft, edematous 2/2 3rd spacing   Ext: RLE dopplerable graft in the medial thigh, non-dopperalbe pulses in the foot leg has demarked and weeping serous fluid    Labs:                    10.9<L>                132 | 18<L>| 32<H>        10.75<H> >-----------< 82<L>   ------------------------< 104<H>                      32.2<L>                 3.6  | 105  | 1.99<H>                                                                     Ca 6.1<LL> Mg x     Ph x        ,             x                        x    | x    | x            x     >-----------< x       ------------------------< 46<L>                 x                          x    | x    | x                                                                         Ca x     Mg x     Ph x

## 2020-10-22 NOTE — PROVIDER CONTACT NOTE (OTHER) - BACKGROUND
Through hospital course, pt has had low blood glucoses, sometimes in the 40s. Pt has D5 NS running at 75ml/hr.

## 2020-10-22 NOTE — PROVIDER CONTACT NOTE (OTHER) - SITUATION
Pt had R AKA scheduled for today and pt also has heparin injection scheduled for 0600, unsure if this RN should administer heparin injection

## 2020-10-22 NOTE — PROGRESS NOTE ADULT - SUBJECTIVE AND OBJECTIVE BOX
Cheif complaints and Diagnosis: right ischemic leg/ PVD/ cocaine abuse/ UTI / sepsis    Subjective: patient for OR today       REVIEW OF SYSTEMS:    CONSTITUTIONAL: No weakness, fevers or chills  EYES/ENT: No visual changes;  No vertigo or throat pain   NECK: No pain or stiffness  RESPIRATORY: No cough, wheezing, hemoptysis; No shortness of breath  CARDIOVASCULAR: No chest pain or palpitations  GASTROINTESTINAL: No abdominal or epigastric pain. No nausea, vomiting, or hematemesis; No diarrhea or constipation. No melena or hematochezia.  GENITOURINARY: No dysuria, frequency or hematuria  NEUROLOGICAL: No numbness or weakness  SKIN: No itching, burning, rashes, or lesions   All other review of systems is negative unless indicated above      Vital Signs Last 24 Hrs  T(C): 36.8 (22 Oct 2020 07:33), Max: 37.1 (21 Oct 2020 18:44)  T(F): 98.3 (22 Oct 2020 07:33), Max: 98.8 (21 Oct 2020 18:44)  HR: 86 (22 Oct 2020 07:33) (79 - 90)  BP: 118/47 (22 Oct 2020 07:33) (105/58 - 133/64)  BP(mean): --  RR: 18 (22 Oct 2020 07:33) (18 - 22)  SpO2: 97% (22 Oct 2020 07:33) (92% - 100%)    HEENT:   pupils equal and reactive, EOMI, no oropharyngeal lesions, erythema, exudates, oral thrush    NECK:   supple, no carotid bruits, no palpable lymph nodes, no thyromegaly    CV:  +S1, +S2, regular, no murmurs or rubs    RESP:   lungs clear to auscultation bilaterally, no wheezing, rales, rhonchi, good air entry bilaterally    BREAST:  not examined    GI:  abdomen soft, non-tender, non-distended, normal BS, no bruits, no abdominal masses, no palpable masses    RECTAL:  not examined    :  not examined    MSK:   normal muscle tone, no atrophy, no rigidity, no contractions    EXT:   no clubbing, no cyanosis, no edema, no calf pain, swelling or erythema    VASCULAR:  pulses equal and symmetric in the upper and lower extremities    NEURO:  AAOX3, no focal neurological deficits, follows all commands, able to move extremities spontaneously    SKIN:  no ulcers, lesions or rashes    MEDICATIONS  (STANDING):  ARIPiprazole 20 milliGRAM(s) Oral daily  aspirin 81 milliGRAM(s) Oral daily  atorvastatin 40 milliGRAM(s) Oral at bedtime  budesonide  80 MICROgram(s)/formoterol 4.5 MICROgram(s) Inhaler 2 Puff(s) Inhalation two times a day  calcium carbonate    500 mG (Tums) Chewable 1 Tablet(s) Chew two times a day  cholestyramine Powder (Sugar-Free) 4 Gram(s) Oral daily  dextrose 5% + sodium chloride 0.9%. 1000 milliLiter(s) (75 mL/Hr) IV Continuous <Continuous>  dextrose 5%. 1000 milliLiter(s) (50 mL/Hr) IV Continuous <Continuous>  dextrose 50% Injectable 12.5 Gram(s) IV Push once  dextrose 50% Injectable 25 Gram(s) IV Push once  dextrose 50% Injectable 25 Gram(s) IV Push once  dicyclomine 20 milliGRAM(s) Oral two times a day before meals  heparin   Injectable 5000 Unit(s) SubCutaneous every 8 hours  insulin lispro (HumaLOG) corrective regimen sliding scale   SubCutaneous Before meals and at bedtime  levothyroxine 125 MICROGram(s) Oral daily  metoprolol tartrate 25 milliGRAM(s) Oral two times a day  pantoprazole    Tablet 40 milliGRAM(s) Oral daily  piperacillin/tazobactam IVPB.. 3.375 Gram(s) IV Intermittent every 8 hours    MEDICATIONS  (PRN):  acetaminophen   Tablet .. 650 milliGRAM(s) Oral every 6 hours PRN Temp greater or equal to 38C (100.4F), Mild Pain (1 - 3)  dextrose 40% Gel 15 Gram(s) Oral once PRN Blood Glucose LESS THAN 70 milliGRAM(s)/deciliter  glucagon  Injectable 1 milliGRAM(s) IntraMuscular once PRN Glucose LESS THAN 70 milligrams/deciliter  HYDROmorphone  Injectable 1.5 milliGRAM(s) IV Push every 4 hours PRN Severe Pain (7 - 10)  HYDROmorphone  Injectable 0.5 milliGRAM(s) IV Push every 2 hours PRN Moderate Pain (4 - 6)      21 Oct 2020 08:04    132    |  105    |  32     ----------------------------<  104    3.6     |  18     |  1.99     Ca    6.1        21 Oct 2020 08:04    CBC Full  -  ( 21 Oct 2020 08:04 )  WBC Count : 10.75 K/uL  Hemoglobin : 10.9 g/dL  Hematocrit : 32.2 %  Platelet Count - Automated : 82 K/uL  Mean Cell Volume : 86.3 fl  Mean Cell Hemoglobin : 29.2 pg  Mean Cell Hemoglobin Concentration : 33.9 gm/dL              Assessment and Plan:   	  59 CAD, PAD s/p fem-pop bypass s/p RT iliac stent, diabetes, hypothyroidism, hyperlipidemia, hx of cigg/cocaine use,  chronic back pain s/p lumbar surgery recent admit with STEMI c/b UTI sepsis and E. coli bacteremia.  now presents with septic shock and ichemic right leg and foot.        1. Septic Shock secondary to UTI  vs ischemic foot vs both  -10/15+ 10/14  cultures negative  -10/12 positive klebseilla  -c/w  IV  zosyn          2. PVD. ischemic right leg - cyanotic right foot, ice cold  -will need amputation, scheduled for [place]  -cleared by cardiology for OR for amputation  -cardiolgy consult appreciated.     3. Thromboyctopenia - likley from sepsis and or dilutional received multiple units of blood,  on aspirin, resume prophylaxis if HIT negative   -platelet 30's >> 82   -HIT screen negative  -c/w sc heparin for dvt propy as patient is high risk for thrombosis and occlusion    4. Renal insufficiency, seems to be stabilizing, creatinine likely close to her baseline    5. Hypokalemia- replace    6. Respiratory Failure - improved, now extubated on room air    7-DVT proph- sc heparin    8-Hypocalcemia- corrected with albumin >> calcium is mildly low 7.94   -will replace with IV calcium gluconate  to correct for OR tomm[place]

## 2020-10-22 NOTE — PROVIDER CONTACT NOTE (OTHER) - SITUATION
Pt had blood glucose of 220 at 0557. Pt NPO for OR today. This RN is unsure whether to give 4 units as per sliding scale protocol.

## 2020-10-22 NOTE — BRIEF OPERATIVE NOTE - OPERATION/FINDINGS
previously placed stent on right side down to popliteal, was occluded
graft suture ligated 2-0 silk   femoral vessels ligated   sciatic nerve ligated

## 2020-10-22 NOTE — PROGRESS NOTE ADULT - ASSESSMENT
58 YO F with right leg ischemia s/p right femoral-PT bypass POD6    Plan:   continue ASA 81mg qd  Hold plavix until platelet count stabilizes  Platelets transfused yesterday with more on hold today for OR   NARESH workup negative  Continue to monitor dressing in the RLE and groin   continue to monitor pulses, doppler graft right medial thigh  Monitor urine output  hospitalist following   Cardiology evaluation completed   NPO  Plan for AKA today    Case discussed with Dr Turner      Case discussed with attending

## 2020-10-22 NOTE — BRIEF OPERATIVE NOTE - NSICDXBRIEFPROCEDURE_GEN_ALL_CORE_FT
PROCEDURES:  Initial angioplasty of iliac vessel with stent placement 15-Oct-2020 18:08:38  Yodit Monroy  Decompression fasciotomy, leg 15-Oct-2020 18:08:04  Yodit Monroy  Creation, bypass, arterial, femoral to popliteal, using PTFE graft 15-Oct-2020 18:07:22  Yodit Monroy  
PROCEDURES:  AKA (above-knee amputation), right 22-Oct-2020 11:53:41  DB MORIN

## 2020-10-22 NOTE — BRIEF OPERATIVE NOTE - NSICDXBRIEFPREOP_GEN_ALL_CORE_FT
PRE-OP DIAGNOSIS:  Ischemic leg pain 15-Oct-2020 18:10:31  Yodit Monroy  
PRE-OP DIAGNOSIS:  Ischemic leg pain 15-Oct-2020 18:10:31  Yodit Monroy

## 2020-10-22 NOTE — BRIEF OPERATIVE NOTE - NSICDXBRIEFPOSTOP_GEN_ALL_CORE_FT
POST-OP DIAGNOSIS:  Ischemic leg 15-Oct-2020 18:10:47  Yodit Monroy  
POST-OP DIAGNOSIS:  Ischemic leg 15-Oct-2020 18:10:47  Yodit Monroy

## 2020-10-23 DIAGNOSIS — I73.9 PERIPHERAL VASCULAR DISEASE, UNSPECIFIED: ICD-10-CM

## 2020-10-23 DIAGNOSIS — I25.10 ATHEROSCLEROTIC HEART DISEASE OF NATIVE CORONARY ARTERY WITHOUT ANGINA PECTORIS: ICD-10-CM

## 2020-10-23 LAB
24R-OH-CALCIDIOL SERPL-MCNC: 38.1 NG/ML — SIGNIFICANT CHANGE UP (ref 30–80)
ALBUMIN SERPL ELPH-MCNC: 1.3 G/DL — LOW (ref 3.3–5)
ALP SERPL-CCNC: 108 U/L — SIGNIFICANT CHANGE UP (ref 40–120)
ALT FLD-CCNC: 21 U/L — SIGNIFICANT CHANGE UP (ref 12–78)
ANION GAP SERPL CALC-SCNC: 9 MMOL/L — SIGNIFICANT CHANGE UP (ref 5–17)
AST SERPL-CCNC: 30 U/L — SIGNIFICANT CHANGE UP (ref 15–37)
BILIRUB SERPL-MCNC: 0.3 MG/DL — SIGNIFICANT CHANGE UP (ref 0.2–1.2)
BUN SERPL-MCNC: 29 MG/DL — HIGH (ref 7–23)
CALCIUM SERPL-MCNC: 6.6 MG/DL — LOW (ref 8.4–10.5)
CALCIUM SERPL-MCNC: 6.7 MG/DL — LOW (ref 8.5–10.1)
CHLORIDE SERPL-SCNC: 107 MMOL/L — SIGNIFICANT CHANGE UP (ref 96–108)
CK SERPL-CCNC: 171 U/L — SIGNIFICANT CHANGE UP (ref 26–192)
CO2 SERPL-SCNC: 22 MMOL/L — SIGNIFICANT CHANGE UP (ref 22–31)
CREAT SERPL-MCNC: 1.95 MG/DL — HIGH (ref 0.5–1.3)
GLUCOSE SERPL-MCNC: 96 MG/DL — SIGNIFICANT CHANGE UP (ref 70–99)
HCT VFR BLD CALC: 25.7 % — LOW (ref 34.5–45)
HGB BLD-MCNC: 8.4 G/DL — LOW (ref 11.5–15.5)
MAGNESIUM SERPL-MCNC: 1.8 MG/DL — SIGNIFICANT CHANGE UP (ref 1.6–2.6)
MCHC RBC-ENTMCNC: 29.1 PG — SIGNIFICANT CHANGE UP (ref 27–34)
MCHC RBC-ENTMCNC: 32.7 GM/DL — SIGNIFICANT CHANGE UP (ref 32–36)
MCV RBC AUTO: 88.9 FL — SIGNIFICANT CHANGE UP (ref 80–100)
PHOSPHATE SERPL-MCNC: 3.7 MG/DL — SIGNIFICANT CHANGE UP (ref 2.5–4.5)
PLATELET # BLD AUTO: 111 K/UL — LOW (ref 150–400)
POTASSIUM SERPL-MCNC: 3.5 MMOL/L — SIGNIFICANT CHANGE UP (ref 3.5–5.3)
POTASSIUM SERPL-SCNC: 3.5 MMOL/L — SIGNIFICANT CHANGE UP (ref 3.5–5.3)
PROT SERPL-MCNC: 5 GM/DL — LOW (ref 6–8.3)
PTH-INTACT FLD-MCNC: 45 PG/ML — SIGNIFICANT CHANGE UP (ref 15–65)
RBC # BLD: 2.89 M/UL — LOW (ref 3.8–5.2)
RBC # FLD: 16.5 % — HIGH (ref 10.3–14.5)
SODIUM SERPL-SCNC: 138 MMOL/L — SIGNIFICANT CHANGE UP (ref 135–145)
WBC # BLD: 9.89 K/UL — SIGNIFICANT CHANGE UP (ref 3.8–10.5)
WBC # FLD AUTO: 9.89 K/UL — SIGNIFICANT CHANGE UP (ref 3.8–10.5)

## 2020-10-23 PROCEDURE — 99233 SBSQ HOSP IP/OBS HIGH 50: CPT

## 2020-10-23 PROCEDURE — 99232 SBSQ HOSP IP/OBS MODERATE 35: CPT

## 2020-10-23 RX ADMIN — HYDROMORPHONE HYDROCHLORIDE 1.5 MILLIGRAM(S): 2 INJECTION INTRAMUSCULAR; INTRAVENOUS; SUBCUTANEOUS at 12:56

## 2020-10-23 RX ADMIN — Medication 25 MILLIGRAM(S): at 10:22

## 2020-10-23 RX ADMIN — Medication 1 TABLET(S): at 21:08

## 2020-10-23 RX ADMIN — HYDROMORPHONE HYDROCHLORIDE 1.5 MILLIGRAM(S): 2 INJECTION INTRAMUSCULAR; INTRAVENOUS; SUBCUTANEOUS at 05:40

## 2020-10-23 RX ADMIN — Medication 1 TABLET(S): at 21:07

## 2020-10-23 RX ADMIN — HYDROMORPHONE HYDROCHLORIDE 1.5 MILLIGRAM(S): 2 INJECTION INTRAMUSCULAR; INTRAVENOUS; SUBCUTANEOUS at 21:47

## 2020-10-23 RX ADMIN — Medication 1 TABLET(S): at 10:23

## 2020-10-23 RX ADMIN — HYDROMORPHONE HYDROCHLORIDE 0.5 MILLIGRAM(S): 2 INJECTION INTRAMUSCULAR; INTRAVENOUS; SUBCUTANEOUS at 11:59

## 2020-10-23 RX ADMIN — BUDESONIDE AND FORMOTEROL FUMARATE DIHYDRATE 2 PUFF(S): 160; 4.5 AEROSOL RESPIRATORY (INHALATION) at 08:25

## 2020-10-23 RX ADMIN — Medication 40 MILLIEQUIVALENT(S): at 05:39

## 2020-10-23 RX ADMIN — HEPARIN SODIUM 5000 UNIT(S): 5000 INJECTION INTRAVENOUS; SUBCUTANEOUS at 13:53

## 2020-10-23 RX ADMIN — Medication 20 MILLIGRAM(S): at 21:07

## 2020-10-23 RX ADMIN — Medication 81 MILLIGRAM(S): at 10:22

## 2020-10-23 RX ADMIN — Medication 20 MILLIGRAM(S): at 10:22

## 2020-10-23 RX ADMIN — Medication 125 MICROGRAM(S): at 05:40

## 2020-10-23 RX ADMIN — Medication 25 MILLIGRAM(S): at 21:07

## 2020-10-23 RX ADMIN — BUDESONIDE AND FORMOTEROL FUMARATE DIHYDRATE 2 PUFF(S): 160; 4.5 AEROSOL RESPIRATORY (INHALATION) at 20:11

## 2020-10-23 RX ADMIN — Medication 1 TABLET(S): at 10:22

## 2020-10-23 RX ADMIN — PANTOPRAZOLE SODIUM 40 MILLIGRAM(S): 20 TABLET, DELAYED RELEASE ORAL at 10:22

## 2020-10-23 RX ADMIN — HYDROMORPHONE HYDROCHLORIDE 1.5 MILLIGRAM(S): 2 INJECTION INTRAMUSCULAR; INTRAVENOUS; SUBCUTANEOUS at 13:11

## 2020-10-23 RX ADMIN — HEPARIN SODIUM 5000 UNIT(S): 5000 INJECTION INTRAVENOUS; SUBCUTANEOUS at 21:07

## 2020-10-23 RX ADMIN — ATORVASTATIN CALCIUM 40 MILLIGRAM(S): 80 TABLET, FILM COATED ORAL at 21:07

## 2020-10-23 RX ADMIN — HYDROMORPHONE HYDROCHLORIDE 1.5 MILLIGRAM(S): 2 INJECTION INTRAMUSCULAR; INTRAVENOUS; SUBCUTANEOUS at 17:09

## 2020-10-23 RX ADMIN — SODIUM CHLORIDE 75 MILLILITER(S): 9 INJECTION, SOLUTION INTRAVENOUS at 18:00

## 2020-10-23 RX ADMIN — Medication 6: at 21:07

## 2020-10-23 RX ADMIN — ARIPIPRAZOLE 20 MILLIGRAM(S): 15 TABLET ORAL at 10:22

## 2020-10-23 RX ADMIN — PIPERACILLIN AND TAZOBACTAM 25 GRAM(S): 4; .5 INJECTION, POWDER, LYOPHILIZED, FOR SOLUTION INTRAVENOUS at 07:00

## 2020-10-23 RX ADMIN — HYDROMORPHONE HYDROCHLORIDE 1.5 MILLIGRAM(S): 2 INJECTION INTRAMUSCULAR; INTRAVENOUS; SUBCUTANEOUS at 06:43

## 2020-10-23 RX ADMIN — HYDROMORPHONE HYDROCHLORIDE 0.5 MILLIGRAM(S): 2 INJECTION INTRAMUSCULAR; INTRAVENOUS; SUBCUTANEOUS at 12:14

## 2020-10-23 RX ADMIN — SODIUM CHLORIDE 75 MILLILITER(S): 9 INJECTION, SOLUTION INTRAVENOUS at 07:00

## 2020-10-23 RX ADMIN — HEPARIN SODIUM 5000 UNIT(S): 5000 INJECTION INTRAVENOUS; SUBCUTANEOUS at 05:39

## 2020-10-23 RX ADMIN — CHOLESTYRAMINE 4 GRAM(S): 4 POWDER, FOR SUSPENSION ORAL at 08:11

## 2020-10-23 RX ADMIN — HYDROMORPHONE HYDROCHLORIDE 1.5 MILLIGRAM(S): 2 INJECTION INTRAMUSCULAR; INTRAVENOUS; SUBCUTANEOUS at 21:27

## 2020-10-23 RX ADMIN — HYDROMORPHONE HYDROCHLORIDE 1.5 MILLIGRAM(S): 2 INJECTION INTRAMUSCULAR; INTRAVENOUS; SUBCUTANEOUS at 16:54

## 2020-10-23 RX ADMIN — PIPERACILLIN AND TAZOBACTAM 25 GRAM(S): 4; .5 INJECTION, POWDER, LYOPHILIZED, FOR SOLUTION INTRAVENOUS at 00:51

## 2020-10-23 NOTE — PROGRESS NOTE ADULT - SUBJECTIVE AND OBJECTIVE BOX
Date of service: 10-23-20 @ 11:52    Patient is post op right AKA  afebrile and in good spirits        ROS: unable to obtain secondary to patient medical condition     MEDICATIONS  (STANDING):  ARIPiprazole 20 milliGRAM(s) Oral daily  aspirin 81 milliGRAM(s) Oral daily  atorvastatin 40 milliGRAM(s) Oral at bedtime  budesonide  80 MICROgram(s)/formoterol 4.5 MICROgram(s) Inhaler 2 Puff(s) Inhalation two times a day  calcium carbonate    500 mG (Tums) Chewable 1 Tablet(s) Chew two times a day  calcium carbonate   1250 mG (OsCal) 1 Tablet(s) Oral two times a day  cholestyramine Powder (Sugar-Free) 4 Gram(s) Oral daily  dextrose 5% + sodium chloride 0.9%. 1000 milliLiter(s) (75 mL/Hr) IV Continuous <Continuous>  dextrose 5%. 1000 milliLiter(s) (50 mL/Hr) IV Continuous <Continuous>  dextrose 50% Injectable 25 Gram(s) IV Push once  dextrose 50% Injectable 25 Gram(s) IV Push once  dicyclomine 20 milliGRAM(s) Oral two times a day before meals  heparin   Injectable 5000 Unit(s) SubCutaneous every 8 hours  insulin lispro (HumaLOG) corrective regimen sliding scale   SubCutaneous Before meals and at bedtime  levothyroxine 125 MICROGram(s) Oral daily  metoprolol tartrate 25 milliGRAM(s) Oral two times a day  pantoprazole    Tablet 40 milliGRAM(s) Oral daily  piperacillin/tazobactam IVPB.. 3.375 Gram(s) IV Intermittent every 8 hours    MEDICATIONS  (PRN):  acetaminophen   Tablet .. 650 milliGRAM(s) Oral every 6 hours PRN Temp greater or equal to 38C (100.4F), Mild Pain (1 - 3)  dextrose 40% Gel 15 Gram(s) Oral once PRN Blood Glucose LESS THAN 70 milliGRAM(s)/deciliter  glucagon  Injectable 1 milliGRAM(s) IntraMuscular once PRN Glucose LESS THAN 70 milligrams/deciliter  HYDROmorphone  Injectable 1.5 milliGRAM(s) IV Push every 4 hours PRN Severe Pain (7 - 10)  HYDROmorphone  Injectable 0.5 milliGRAM(s) IV Push every 2 hours PRN Moderate Pain (4 - 6)      Vital Signs Last 24 Hrs  T(C): 36.1 (23 Oct 2020 08:00), Max: 37 (22 Oct 2020 13:00)  T(F): 97 (23 Oct 2020 08:00), Max: 98.6 (22 Oct 2020 13:00)  HR: 84 (23 Oct 2020 08:00) (81 - 92)  BP: 137/67 (23 Oct 2020 08:00) (116/47 - 138/56)  BP(mean): --  RR: 18 (23 Oct 2020 08:00) (12 - 18)  SpO2: 99% (23 Oct 2020 08:00) (94% - 100%)        Physical Exam:      Constitutional: frail looking  HEENT: NC/AT  Neck: supple; thyroid not palpable; left neck IJ CVL is removed  Back: no tenderness  Respiratory: respiratory effort normal; clear to auscultation  Cardiovascular: S1S2 regular, no murmurs  Abdomen: soft, not tender, mildly distended, positive BS; no liver or spleen organomegaly  Genitourinary: no suprapubic tenderness  Musculoskeletal: right AKA dressing intact  Neurological/ Psychiatric: nonfocal  Skin: no rashes; no palpable lesions    Labs: all available labs reviewed              Labs:             Labs:                        8.4    9.89  )-----------( 111      ( 23 Oct 2020 08:10 )             25.7     10-23    138  |  107  |  29<H>  ----------------------------<  96  3.5   |  22  |  1.95<H>    Ca    6.7<L>      23 Oct 2020 08:10  Phos  3.7     10-23  Mg     1.8     10-23    TPro  5.0<L>  /  Alb  1.3<L>  /  TBili  0.3  /  DBili  x   /  AST  30  /  ALT  21  /  AlkPhos  108  10-23           Cultures:                                     < from: CT Abdomen and Pelvis w/ Oral Cont (10.14.20 @ 11:10) >    IMPRESSION:  Mildly dilated loops of small bowel with no transition point, possibly an ileus.    Presacral edema with infiltration of the perirectal fat, possibly proctitis.    Dilated common bile duct measuring 1.1 cm, increased in caliber since 9/8/2020, previously 0.8 cm; correlation is recommended with LFTs; an MRI/MRCP of the abdomen is recommended for further evaluation tiny; a noncontrast study can be performed if contraindicated by the patient's renal function.    Small amount of abdominal and pelvic ascites, increased since 10/12/2020.    Wall thickening versus underdistention involving the region of the pylorus; continued attention is recommended on the MRI.        < end of copied text >                Radiology: all available radiological tests reviewed    Advanced directives addressed: full resuscitation

## 2020-10-23 NOTE — PROGRESS NOTE ADULT - SUBJECTIVE AND OBJECTIVE BOX
Pt seen and examined at bedside. Patient dressing changed by nursing overnight. Patient continues to have pain in the RLE.     Vital Signs Last 24 Hrs  T(C): 36.8 (22 Oct 2020 07:33), Max: 37.1 (21 Oct 2020 18:44)  T(F): 98.3 (22 Oct 2020 07:33), Max: 98.8 (21 Oct 2020 18:44)  HR: 86 (22 Oct 2020 07:33) (79 - 90)  BP: 118/47 (22 Oct 2020 07:33) (105/58 - 133/64)  BP(mean): --  RR: 18 (22 Oct 2020 07:33) (18 - 22)  SpO2: 97% (22 Oct 2020 07:33) (92% - 100%)      Physical exam:  Gen: alert  Resp: Equal chest rise   CV: s1 and S2 present   Abd; Soft, edematous 2/2 3rd spacing   Ext: w9und dressing c/d/i      Labs:                    10.9<L>                132 | 18<L>| 32<H>        10.75<H> >-----------< 82<L>   ------------------------< 104<H>                      32.2<L>                 3.6  | 105  | 1.99<H>                                                                     Ca 6.1<LL> Mg x     Ph x        ,             x                        x    | x    | x            x     >-----------< x       ------------------------< 46<L>                 x                          x    | x    | x                                                                         Ca x     Mg x     Ph x

## 2020-10-23 NOTE — PROGRESS NOTE ADULT - SUBJECTIVE AND OBJECTIVE BOX
REASON FOR VISIT: CAD, Post-op Cardiac exam, Rx management    HPI: 60 y/o woman with a history of CAD, MI, occluded RCA, DM, severe PAD now with right leg ischemia and s/p amputation 10/22/20.    10/23/20:  Feels well; tolerated surgery; mild pain at surgical site; no angina or dyspnea.    MEDICATIONS  (STANDING):  amoxicillin  500 milliGRAM(s)/clavulanate 1 Tablet(s) Oral two times a day  ARIPiprazole 20 milliGRAM(s) Oral daily  aspirin 81 milliGRAM(s) Oral daily  atorvastatin 40 milliGRAM(s) Oral at bedtime  budesonide  80 MICROgram(s)/formoterol 4.5 MICROgram(s) Inhaler 2 Puff(s) Inhalation two times a day  calcium carbonate    500 mG (Tums) Chewable 1 Tablet(s) Chew two times a day  calcium carbonate   1250 mG (OsCal) 1 Tablet(s) Oral two times a day  cholestyramine Powder (Sugar-Free) 4 Gram(s) Oral daily  dicyclomine 20 milliGRAM(s) Oral two times a day before meals  heparin   Injectable 5000 Unit(s) SubCutaneous every 8 hours  insulin lispro (HumaLOG) corrective regimen sliding scale   SubCutaneous Before meals and at bedtime  levothyroxine 125 MICROGram(s) Oral daily  metoprolol tartrate 25 milliGRAM(s) Oral two times a day  pantoprazole    Tablet 40 milliGRAM(s) Oral daily    Vital Signs Last 24 Hrs  T(C): 36.1 (23 Oct 2020 08:00), Max: 36.9 (22 Oct 2020 22:10)  T(F): 97 (23 Oct 2020 08:00), Max: 98.4 (22 Oct 2020 22:10)  HR: 85 (23 Oct 2020 12:56) (81 - 91)  BP: 117/43 (23 Oct 2020 12:56) (116/47 - 137/67)  RR: 18 (23 Oct 2020 08:00) (18 - 18)  SpO2: 99% (23 Oct 2020 08:00) (99% - 100%)    PHYSICAL EXAM:  Constitutional: NAD, appears to be resting comfortably in bed  Respiratory: Breath sounds are clear bilaterally, Nonlabored  Cardiovascular: S1 and S2, regular  Gastrointestinal: Bowel Sounds present, soft, nontender.     LABS:   CARDIAC MARKERS ( 23 Oct 2020 08:10 ) x     / x     / 171 U/L / x     / x      CARDIAC MARKERS ( 22 Oct 2020 11:54 ) 0.041 ng/mL / x     / 596 U/L / x     / x      CARDIAC MARKERS ( 22 Oct 2020 07:37 ) x     / x     / 566 U/L / x     / x                          8.4    9.89  )-----------( 111      ( 23 Oct 2020 08:10 )             25.7     138  |  107  |  29<H>  ----------------------------<  96  3.5   |  22  |  1.95<H>    TTE Echo Complete w/o Contrast w/ Doppler (08.27.20 @ 11:04) >   The left ventricle size is normal. The inferior wall appears severely   hypokinetic..  Estimated left ventricular ejection fraction is 50 %.   Paradoxical septal wall motion during inspiration is noted.    Cardiac Cath Lab - Adult (08.23.20 @ 09:33) >  LMCA: Normal.  LAD: Abnormal.Moderate diffuse disease  LCx: Abnormal.Moderate diffuse diease  RCA:  Mid RCA: 100% stenosis.  Acute occlusion of the RCA of unknown duration. No PCI due to lack ofischemic symptoms, sepsis, acute renal failure and satisfactory leftcoronary anatomy.

## 2020-10-23 NOTE — PROGRESS NOTE ADULT - ASSESSMENT
59 CAD, PAD s/p fem-pop bypass s/p RT iliac stent, diabetes, hypothyroidism, hyperlipidemia, hx of cigg/cocaine use,  chronic back pain s/p lumbar surgery recent admit with STEMI c/b UTI sepsis and E. coli bacteremia. w acute ischemic right leg now s/p right AKA.    AMISH    Cr fluctuating stable   monitor Cr trend    Trend labs/lytes and replete K/Mg as needed   Avoid nsaids/contrast as able    Hypocalcemia - corrected Ca 9.2   0ral repletion  follow up Vit D and pTH     Ischemia  -medicine/vasculars/pmputation    d/c with RN staff bedside and daughter

## 2020-10-23 NOTE — PROGRESS NOTE ADULT - SUBJECTIVE AND OBJECTIVE BOX
Cheif complaints and Diagnosis: septic shock/ UTI/ ischemic foot    Subjective: POD 1 AKA right leg; no complaints      REVIEW OF SYSTEMS:    CONSTITUTIONAL: No weakness, fevers or chills  EYES/ENT: No visual changes;  No vertigo or throat pain   NECK: No pain or stiffness  RESPIRATORY: No cough, wheezing, hemoptysis; No shortness of breath  CARDIOVASCULAR: No chest pain or palpitations  GASTROINTESTINAL: No abdominal or epigastric pain. No nausea, vomiting, or hematemesis; No diarrhea or constipation. No melena or hematochezia.  GENITOURINARY: No dysuria, frequency or hematuria  NEUROLOGICAL: No numbness or weakness  SKIN: No itching, burning, rashes, or lesions   All other review of systems is negative unless indicated above      Vital Signs Last 24 Hrs  T(C): 36.1 (23 Oct 2020 08:00), Max: 36.9 (22 Oct 2020 22:10)  T(F): 97 (23 Oct 2020 08:00), Max: 98.4 (22 Oct 2020 22:10)  HR: 85 (23 Oct 2020 12:56) (81 - 91)  BP: 117/43 (23 Oct 2020 12:56) (116/47 - 137/67)  BP(mean): --  RR: 18 (23 Oct 2020 08:00) (18 - 18)  SpO2: 99% (23 Oct 2020 08:00) (99% - 100%)    HEENT:   pupils equal and reactive, EOMI, no oropharyngeal lesions, erythema, exudates, oral thrush    NECK:   supple, no carotid bruits, no palpable lymph nodes, no thyromegaly    CV:  +S1, +S2, regular, no murmurs or rubs    RESP:   lungs clear to auscultation bilaterally, no wheezing, rales, rhonchi, good air entry bilaterally    BREAST:  not examined    GI:  abdomen soft, non-tender, non-distended, normal BS, no bruits, no abdominal masses, no palpable masses    RECTAL:  not examined    :  not examined    MSK:   normal muscle tone, no atrophy, no rigidity, no contractions    EXT:   no clubbing, no cyanosis, no edema, no calf pain, swelling or erythema    VASCULAR:  pulses equal and symmetric in the upper and lower extremities    NEURO:  AAOX3, no focal neurological deficits, follows all commands, able to move extremities spontaneously    SKIN:  no ulcers, lesions or rashes    MEDICATIONS  (STANDING):  amoxicillin  500 milliGRAM(s)/clavulanate 1 Tablet(s) Oral two times a day  ARIPiprazole 20 milliGRAM(s) Oral daily  aspirin 81 milliGRAM(s) Oral daily  atorvastatin 40 milliGRAM(s) Oral at bedtime  budesonide  80 MICROgram(s)/formoterol 4.5 MICROgram(s) Inhaler 2 Puff(s) Inhalation two times a day  calcium carbonate    500 mG (Tums) Chewable 1 Tablet(s) Chew two times a day  calcium carbonate   1250 mG (OsCal) 1 Tablet(s) Oral two times a day  cholestyramine Powder (Sugar-Free) 4 Gram(s) Oral daily  dextrose 5% + sodium chloride 0.9%. 1000 milliLiter(s) (75 mL/Hr) IV Continuous <Continuous>  dextrose 5%. 1000 milliLiter(s) (50 mL/Hr) IV Continuous <Continuous>  dextrose 50% Injectable 25 Gram(s) IV Push once  dextrose 50% Injectable 25 Gram(s) IV Push once  dicyclomine 20 milliGRAM(s) Oral two times a day before meals  heparin   Injectable 5000 Unit(s) SubCutaneous every 8 hours  insulin lispro (HumaLOG) corrective regimen sliding scale   SubCutaneous Before meals and at bedtime  levothyroxine 125 MICROGram(s) Oral daily  metoprolol tartrate 25 milliGRAM(s) Oral two times a day  pantoprazole    Tablet 40 milliGRAM(s) Oral daily    MEDICATIONS  (PRN):  acetaminophen   Tablet .. 650 milliGRAM(s) Oral every 6 hours PRN Temp greater or equal to 38C (100.4F), Mild Pain (1 - 3)  dextrose 40% Gel 15 Gram(s) Oral once PRN Blood Glucose LESS THAN 70 milliGRAM(s)/deciliter  glucagon  Injectable 1 milliGRAM(s) IntraMuscular once PRN Glucose LESS THAN 70 milligrams/deciliter  HYDROmorphone  Injectable 0.5 milliGRAM(s) IV Push every 2 hours PRN Moderate Pain (4 - 6)  HYDROmorphone  Injectable 1.5 milliGRAM(s) IV Push every 4 hours PRN Severe Pain (7 - 10)      23 Oct 2020 08:10    138    |  107    |  29     ----------------------------<  96     3.5     |  22     |  1.95     Ca    6.7        23 Oct 2020 08:10  Phos  3.7       23 Oct 2020 08:10  Mg     1.8       23 Oct 2020 08:10    TPro  5.0    /  Alb  1.3    /  TBili  0.3    /  DBili  x      /  AST  30     /  ALT  21     /  AlkPhos  108    23 Oct 2020 08:10  LIVER FUNCTIONS - ( 23 Oct 2020 08:10 )  Alb: 1.3 g/dL / Pro: 5.0 gm/dL / ALK PHOS: 108 U/L / ALT: 21 U/L / AST: 30 U/L / GGT: x         PT/INR - ( 22 Oct 2020 07:37 )   PT: 15.4 sec;   INR: 1.33 ratio         PTT - ( 22 Oct 2020 07:37 )  PTT:29.4 secCBC Full  -  ( 23 Oct 2020 08:10 )  WBC Count : 9.89 K/uL  Hemoglobin : 8.4 g/dL  Hematocrit : 25.7 %  Platelet Count - Automated : 111 K/uL  Mean Cell Volume : 88.9 fl  Mean Cell Hemoglobin : 29.1 pg  Mean Cell Hemoglobin Concentration : 32.7 gm/dL  Auto Neutrophil # : x  Auto Lymphocyte # : x  Auto Monocyte # : x  Auto Eosinophil # : x  Auto Basophil # : x  Auto Neutrophil % : x  Auto Lymphocyte % : x  Auto Monocyte % : x  Auto Eosinophil % : x  Auto Basophil % : x  CARDIAC MARKERS ( 23 Oct 2020 08:10 )  x     / x     / 171 U/L / x     / x      CARDIAC MARKERS ( 22 Oct 2020 11:54 )  0.041 ng/mL / x     / 596 U/L / x     / x      CARDIAC MARKERS ( 22 Oct 2020 07:37 )  x     / x     / 566 U/L / x     / x                PT/INR - ( 22 Oct 2020 07:37 )   PT: 15.4 sec;   INR: 1.33 ratio         PTT - ( 22 Oct 2020 07:37 )  PTT:29.4 sec    RADIOLOGY RESULTS:          Assessment and Plan:   	  59 CAD, PAD s/p fem-pop bypass s/p RT iliac stent, diabetes, hypothyroidism, hyperlipidemia, hx of cigg/cocaine use,  chronic back pain s/p lumbar surgery recent admit with STEMI c/b UTI sepsis and E. coli bacteremia.  now presents with septic shock and ichemic right leg and foot.        1. Septic Shock secondary to UTI  vs ischemic foot vs both  -10/15+ 10/14  cultures negative  -10/12 positive klebseilla  -c/w  IV  zosyn >> now changed to augmentin as per ID for 4 more days.           2. PVD. ischemic right leg - cyanotic right foot, ice cold  -will need amputation, scheduled for [place]  -cleared by cardiology for OR for amputation  -cardiolgy consult appreciated.     3. Thromboyctopenia - likley from sepsis and or dilutional received multiple units of blood,  on aspirin, resume prophylaxis if HIT negative   -platelet 30's >> 82   -HIT screen negative  -c/w sc heparin for dvt propy as patient is high risk for thrombosis and occlusion    4. Renal insufficiency, seems to be stabilizing, creatinine likely close to her baseline    5. Hypokalemia- replace    6. Respiratory Failure - improved, now extubated on room air    7-DVT proph- sc heparin    8-Hypocalcemia- corrected with albumin >> calcium is mildly low 7.94   -supplemented with oral and iV      Dispo/ signout for AM attending:  Patient admitted with sepsis was in ICU, ichemic leg, and UTI. ON orals for 4 more days for UTI.  s/p AKA for ischmic acute leg. POD1. Needs to see Physical therapy.   Dressing to remain intact till Sunday.   Anticiapte DC to rehab Monday.

## 2020-10-23 NOTE — PROGRESS NOTE ADULT - SUBJECTIVE AND OBJECTIVE BOX
Patient is a 59y Female who reports no complaints as new. No cp or sob.      MEDICATIONS  (STANDING):  amoxicillin  500 milliGRAM(s)/clavulanate 1 Tablet(s) Oral two times a day  ARIPiprazole 20 milliGRAM(s) Oral daily  aspirin 81 milliGRAM(s) Oral daily  atorvastatin 40 milliGRAM(s) Oral at bedtime  budesonide  80 MICROgram(s)/formoterol 4.5 MICROgram(s) Inhaler 2 Puff(s) Inhalation two times a day  calcium carbonate    500 mG (Tums) Chewable 1 Tablet(s) Chew two times a day  calcium carbonate   1250 mG (OsCal) 1 Tablet(s) Oral two times a day  cholestyramine Powder (Sugar-Free) 4 Gram(s) Oral daily  dextrose 5% + sodium chloride 0.9%. 1000 milliLiter(s) (75 mL/Hr) IV Continuous <Continuous>  dextrose 5%. 1000 milliLiter(s) (50 mL/Hr) IV Continuous <Continuous>  dextrose 50% Injectable 25 Gram(s) IV Push once  dextrose 50% Injectable 25 Gram(s) IV Push once  dicyclomine 20 milliGRAM(s) Oral two times a day before meals  heparin   Injectable 5000 Unit(s) SubCutaneous every 8 hours  insulin lispro (HumaLOG) corrective regimen sliding scale   SubCutaneous Before meals and at bedtime  levothyroxine 125 MICROGram(s) Oral daily  metoprolol tartrate 25 milliGRAM(s) Oral two times a day  pantoprazole    Tablet 40 milliGRAM(s) Oral daily    MEDICATIONS  (PRN):  acetaminophen   Tablet .. 650 milliGRAM(s) Oral every 6 hours PRN Temp greater or equal to 38C (100.4F), Mild Pain (1 - 3)  dextrose 40% Gel 15 Gram(s) Oral once PRN Blood Glucose LESS THAN 70 milliGRAM(s)/deciliter  glucagon  Injectable 1 milliGRAM(s) IntraMuscular once PRN Glucose LESS THAN 70 milligrams/deciliter  HYDROmorphone  Injectable 0.5 milliGRAM(s) IV Push every 2 hours PRN Moderate Pain (4 - 6)  HYDROmorphone  Injectable 1.5 milliGRAM(s) IV Push every 4 hours PRN Severe Pain (7 - 10)        T(C): , Max: 36.9 (10-22-20 @ 22:10)  T(F): , Max: 98.4 (10-22-20 @ 22:10)  HR: 87 (10-23-20 @ 15:42)  BP: 129/60 (10-23-20 @ 15:42)  BP(mean): --  RR: 18 (10-23-20 @ 15:42)  SpO2: 95% (10-23-20 @ 15:42)  Wt(kg): --    10-22 @ 07:01  -  10-23 @ 07:00  --------------------------------------------------------  IN: 515 mL / OUT: 1205 mL / NET: -690 mL    10-23 @ 07:01  -  10-23 @ 15:51  --------------------------------------------------------  IN: 0 mL / OUT: 200 mL / NET: -200 mL          PHYSICAL EXAM:    Constitutional: NAD, frail  HEENT: >then stated age  Neck: No LAD, No JVD  Respiratory: dist  Cardiovascular: S1 and S2, RRR  Gastrointestinal: BS+, soft, NT/ND  Extremities: RLE amp/stump  Neurological: A/O x 3, no focal deficits  Psychiatric: Normal mood, normal affect  : No Black  Skin: No rashes  Access: Not applicable        LABS:                        8.4    9.89  )-----------( 111      ( 23 Oct 2020 08:10 )             25.7     23 Oct 2020 08:10    138    |  107    |  29     ----------------------------<  96     3.5     |  22     |  1.95   22 Oct 2020 11:54    135    |  105    |  30     ----------------------------<  194    3.2     |  20     |  1.94   22 Oct 2020 07:37    136    |  107    |  30     ----------------------------<  209    3.3     |  21     |  1.89   21 Oct 2020 08:04    132    |  105    |  32     ----------------------------<  104    3.6     |  18     |  1.99   20 Oct 2020 22:27    x      |  x      |  x      ----------------------------<  46     x       |  x      |  x        Ca    6.7        23 Oct 2020 08:10  Ca    6.9        22 Oct 2020 11:54  Ca    6.3        22 Oct 2020 07:37  Ca    6.1        21 Oct 2020 08:04  Ca    6.0        20 Oct 2020 07:32  Phos  3.7       23 Oct 2020 08:10  Phos  3.5       22 Oct 2020 07:37  Mg     1.8       23 Oct 2020 08:10  Mg     1.4       22 Oct 2020 07:37    TPro  5.0    /  Alb  1.3    /  TBili  0.3    /  DBili  x      /  AST  30     /  ALT  21     /  AlkPhos  108    23 Oct 2020 08:10  TPro  4.6    /  Alb  1.1    /  TBili  0.3    /  DBili  x      /  AST  53     /  ALT  19     /  AlkPhos  132    22 Oct 2020 07:37  TPro  4.6    /  Alb  1.2    /  TBili  0.4    /  DBili  0.2    /  AST  110    /  ALT  42     /  AlkPhos  130    20 Oct 2020 07:32      Creatine Kinase, Serum: 171 U/L [26 - 192] (10-23 @ 08:10)      Urine Studies:          RADIOLOGY & ADDITIONAL STUDIES:

## 2020-10-23 NOTE — PROGRESS NOTE ADULT - ASSESSMENT
58 YO POD1 RIGHT AKA      Pain control  cont ASA  GIGI drain to suction  Dressing stays on till sunday  Adequate pain control  PT       Case discussed with Dr Turner

## 2020-10-23 NOTE — PROGRESS NOTE ADULT - NSHPATTENDINGPLANDISCUSS_GEN_ALL_CORE
patient, nursing, staff
patient, RN, team
patient, daughter, RN, GI, team
patient, nursing, staff

## 2020-10-23 NOTE — PROGRESS NOTE ADULT - ASSESSMENT
This patient is a 59 year old female with past medical history bipolar disease and depression, cad, esrd, gerd, hypertension, hyperlipidemia, diabetes, hypothyroidism, substance abuse transferred from snf on 10/12 for evaluation of hypotension and lower abdominal pain, loose stools. The patient states she passed out and cannot provide details surrounding her admission. History per medical record.   1. Patient admitted with sepsis secondary to Klebsiella pneumoniae, possibly due to GI translocation versus urinary origin; subsequent right lower extremity ischemia, s/p operative procedure  - wound care per vascular  - will change zosyn to augmentin for 4 more days  - patient would like to go home and have physical therapy at home rather than at rehab; will need social work to help with this  - repeat blood cultures are no growth  2. other issues: per medicine

## 2020-10-24 LAB
HCT VFR BLD CALC: 28.2 % — LOW (ref 34.5–45)
HGB BLD-MCNC: 8.9 G/DL — LOW (ref 11.5–15.5)
MCHC RBC-ENTMCNC: 28.9 PG — SIGNIFICANT CHANGE UP (ref 27–34)
MCHC RBC-ENTMCNC: 31.6 GM/DL — LOW (ref 32–36)
MCV RBC AUTO: 91.6 FL — SIGNIFICANT CHANGE UP (ref 80–100)
PLATELET # BLD AUTO: 114 K/UL — LOW (ref 150–400)
RBC # BLD: 3.08 M/UL — LOW (ref 3.8–5.2)
RBC # FLD: 17.1 % — HIGH (ref 10.3–14.5)
WBC # BLD: 8.66 K/UL — SIGNIFICANT CHANGE UP (ref 3.8–10.5)
WBC # FLD AUTO: 8.66 K/UL — SIGNIFICANT CHANGE UP (ref 3.8–10.5)

## 2020-10-24 PROCEDURE — 99232 SBSQ HOSP IP/OBS MODERATE 35: CPT

## 2020-10-24 RX ADMIN — BUDESONIDE AND FORMOTEROL FUMARATE DIHYDRATE 2 PUFF(S): 160; 4.5 AEROSOL RESPIRATORY (INHALATION) at 21:19

## 2020-10-24 RX ADMIN — HYDROMORPHONE HYDROCHLORIDE 1.5 MILLIGRAM(S): 2 INJECTION INTRAMUSCULAR; INTRAVENOUS; SUBCUTANEOUS at 09:35

## 2020-10-24 RX ADMIN — Medication 81 MILLIGRAM(S): at 11:05

## 2020-10-24 RX ADMIN — Medication 1 TABLET(S): at 11:05

## 2020-10-24 RX ADMIN — Medication 125 MICROGRAM(S): at 04:33

## 2020-10-24 RX ADMIN — HYDROMORPHONE HYDROCHLORIDE 1.5 MILLIGRAM(S): 2 INJECTION INTRAMUSCULAR; INTRAVENOUS; SUBCUTANEOUS at 18:00

## 2020-10-24 RX ADMIN — Medication 20 MILLIGRAM(S): at 11:04

## 2020-10-24 RX ADMIN — CHOLESTYRAMINE 4 GRAM(S): 4 POWDER, FOR SUSPENSION ORAL at 09:20

## 2020-10-24 RX ADMIN — HEPARIN SODIUM 5000 UNIT(S): 5000 INJECTION INTRAVENOUS; SUBCUTANEOUS at 04:34

## 2020-10-24 RX ADMIN — Medication 20 MILLIGRAM(S): at 21:22

## 2020-10-24 RX ADMIN — Medication 2: at 11:10

## 2020-10-24 RX ADMIN — HEPARIN SODIUM 5000 UNIT(S): 5000 INJECTION INTRAVENOUS; SUBCUTANEOUS at 21:22

## 2020-10-24 RX ADMIN — SODIUM CHLORIDE 75 MILLILITER(S): 9 INJECTION, SOLUTION INTRAVENOUS at 06:56

## 2020-10-24 RX ADMIN — HYDROMORPHONE HYDROCHLORIDE 1.5 MILLIGRAM(S): 2 INJECTION INTRAMUSCULAR; INTRAVENOUS; SUBCUTANEOUS at 14:05

## 2020-10-24 RX ADMIN — HYDROMORPHONE HYDROCHLORIDE 1.5 MILLIGRAM(S): 2 INJECTION INTRAMUSCULAR; INTRAVENOUS; SUBCUTANEOUS at 13:49

## 2020-10-24 RX ADMIN — HYDROMORPHONE HYDROCHLORIDE 1.5 MILLIGRAM(S): 2 INJECTION INTRAMUSCULAR; INTRAVENOUS; SUBCUTANEOUS at 04:33

## 2020-10-24 RX ADMIN — Medication 1 TABLET(S): at 21:22

## 2020-10-24 RX ADMIN — Medication 1 TABLET(S): at 11:04

## 2020-10-24 RX ADMIN — Medication 25 MILLIGRAM(S): at 21:22

## 2020-10-24 RX ADMIN — BUDESONIDE AND FORMOTEROL FUMARATE DIHYDRATE 2 PUFF(S): 160; 4.5 AEROSOL RESPIRATORY (INHALATION) at 08:42

## 2020-10-24 RX ADMIN — ARIPIPRAZOLE 20 MILLIGRAM(S): 15 TABLET ORAL at 11:05

## 2020-10-24 RX ADMIN — HYDROMORPHONE HYDROCHLORIDE 1.5 MILLIGRAM(S): 2 INJECTION INTRAMUSCULAR; INTRAVENOUS; SUBCUTANEOUS at 09:20

## 2020-10-24 RX ADMIN — HYDROMORPHONE HYDROCHLORIDE 1.5 MILLIGRAM(S): 2 INJECTION INTRAMUSCULAR; INTRAVENOUS; SUBCUTANEOUS at 22:34

## 2020-10-24 RX ADMIN — Medication 4: at 08:24

## 2020-10-24 RX ADMIN — ATORVASTATIN CALCIUM 40 MILLIGRAM(S): 80 TABLET, FILM COATED ORAL at 21:22

## 2020-10-24 RX ADMIN — HYDROMORPHONE HYDROCHLORIDE 1.5 MILLIGRAM(S): 2 INJECTION INTRAMUSCULAR; INTRAVENOUS; SUBCUTANEOUS at 04:48

## 2020-10-24 RX ADMIN — PANTOPRAZOLE SODIUM 40 MILLIGRAM(S): 20 TABLET, DELAYED RELEASE ORAL at 11:04

## 2020-10-24 RX ADMIN — Medication 25 MILLIGRAM(S): at 11:04

## 2020-10-24 RX ADMIN — HEPARIN SODIUM 5000 UNIT(S): 5000 INJECTION INTRAVENOUS; SUBCUTANEOUS at 13:50

## 2020-10-24 NOTE — PHYSICAL THERAPY INITIAL EVALUATION ADULT - ASR EQUIP NEEDS DISCH PT EVAL
3:1 commode/rec. initiate sit to stand w/ Mira stedy when able, may require antony lift at this time/wheelchair

## 2020-10-24 NOTE — PHYSICAL THERAPY INITIAL EVALUATION ADULT - ADDITIONAL COMMENTS
pt adm from [facility], does not wish to return to "living at " [facility]

## 2020-10-24 NOTE — PROGRESS NOTE ADULT - ASSESSMENT
58 YO POD2 s/p RIGHT AKA      Pain control  cont ASA  GIGI drain to suction  Dressing stays on till sunday  Adequate pain control  PT       Case discussed with Dr Turner

## 2020-10-24 NOTE — PHYSICAL THERAPY INITIAL EVALUATION ADULT - PERTINENT HX OF CURRENT PROBLEM, REHAB EVAL
pt sent from nursing home with low BP and c/o lower abdominal pain,she is s/p STEMI August 2020 with LHC showing RCA occlusion ,no interventions done due to ongoing E.Coli sepsis ,acute renal failure at that time
pt w/ acute ischemic right leg now s/p right AKA

## 2020-10-24 NOTE — PHYSICAL THERAPY INITIAL EVALUATION ADULT - ACTIVE RANGE OF MOTION EXAMINATION, REHAB EVAL
BUE WFL except L shld ltd elev and w/ IR, min R hip flex (~10 deg off mattress), L hip/knee flex ltd (c/o pain, "ow" w/ any movement, DF -20 deg A/PROM
Left LE Active ROM was WFL (within functional limits)/L ankle DF -20 degrees of neutral;  RLE no movement R ankle/foot ; minAROM R knee in very limited ROM ,hesitant to attempt movement due to ischemic pain/deficits as listed below/bilateral upper extremity Active ROM was WFL (within functional limits)

## 2020-10-24 NOTE — PROGRESS NOTE ADULT - SUBJECTIVE AND OBJECTIVE BOX
CC:  Patient is a 59y old  Female who presents with a chief complaint of hypotension, lower abtominal pain (23 Oct 2020 15:51)    SUBJECTIVE:     -no new complaints or issues at current time.    ROS:  all other review of systems are negative unless indicated above.    acetaminophen   Tablet .. 650 milliGRAM(s) Oral every 6 hours PRN  amoxicillin  500 milliGRAM(s)/clavulanate 1 Tablet(s) Oral two times a day  ARIPiprazole 20 milliGRAM(s) Oral daily  aspirin 81 milliGRAM(s) Oral daily  atorvastatin 40 milliGRAM(s) Oral at bedtime  budesonide  80 MICROgram(s)/formoterol 4.5 MICROgram(s) Inhaler 2 Puff(s) Inhalation two times a day  calcium carbonate    500 mG (Tums) Chewable 1 Tablet(s) Chew two times a day  calcium carbonate   1250 mG (OsCal) 1 Tablet(s) Oral two times a day  cholestyramine Powder (Sugar-Free) 4 Gram(s) Oral daily  dextrose 40% Gel 15 Gram(s) Oral once PRN  dextrose 5% + sodium chloride 0.9%. 1000 milliLiter(s) IV Continuous <Continuous>  dextrose 5%. 1000 milliLiter(s) IV Continuous <Continuous>  dextrose 50% Injectable 25 Gram(s) IV Push once  dextrose 50% Injectable 25 Gram(s) IV Push once  dicyclomine 20 milliGRAM(s) Oral two times a day before meals  glucagon  Injectable 1 milliGRAM(s) IntraMuscular once PRN  heparin   Injectable 5000 Unit(s) SubCutaneous every 8 hours  HYDROmorphone  Injectable 1.5 milliGRAM(s) IV Push every 4 hours PRN  HYDROmorphone  Injectable 0.5 milliGRAM(s) IV Push every 2 hours PRN  insulin lispro (HumaLOG) corrective regimen sliding scale   SubCutaneous Before meals and at bedtime  levothyroxine 125 MICROGram(s) Oral daily  metoprolol tartrate 25 milliGRAM(s) Oral two times a day  pantoprazole    Tablet 40 milliGRAM(s) Oral daily    T(C): 36.6 (10-24-20 @ 07:50), Max: 36.7 (10-23-20 @ 15:42)  HR: 90 (10-24-20 @ 07:50) (82 - 94)  BP: 157/71 (10-24-20 @ 07:50) (117/43 - 157/71)  RR: 18 (10-24-20 @ 07:50) (18 - 19)  SpO2: 96% (10-24-20 @ 07:50) (95% - 100%)    Constitutional: NAD.   HEENT: PERRL, EOMI, MMM.  Neck: Soft and supple, No carotid bruit, No JVD  Respiratory: Breath sounds are clear bilaterally, No wheezing, rales or rhonchi  Cardiovascular: S1 and S2, regular rate and rhythm, no murmur, rub or gallop.  Gastrointestinal: Bowel Sounds present, soft, nontender, nondistended, no guarding, no rebound, no mass.  Extremities: R AKA.  Vascular: 2+ peripheral pulses  Neurological: A/O x 3, no focal deficits  Musculoskeletal: 5/5 strength b/l upper and lower extremities  Skin:  no visible rashes.                         8.9    8.66  )-----------( 114      ( 24 Oct 2020 07:39 )             28.2       10-23    138  |  107  |  29<H>  ----------------------------<  96  3.5   |  22  |  1.95<H>    Ca    6.7<L>      23 Oct 2020 08:10  Phos  3.7     10-23  Mg     1.8     10-23    TPro  5.0<L>  /  Alb  1.3<L>  /  TBili  0.3  /  DBili  x   /  AST  30  /  ALT  21  /  AlkPhos  108  10-23

## 2020-10-24 NOTE — PROGRESS NOTE ADULT - ASSESSMENT
59 CAD, PAD s/p fem-pop bypass s/p RT iliac stent, diabetes, hypothyroidism, hyperlipidemia, hx of cigg/cocaine use,  chronic back pain s/p lumbar surgery recent admit with STEMI c/b UTI sepsis and E. coli bacteremia. w acute ischemic right leg now s/p right AKA.    AMISH/CKD IV   Cr fluctuating stable, likely at baseline   Avoid nsaids/contrast as able    Hypocalcemia - corrected Ca 9.2   0ral repletion  follow it D and pTH     Ischemia  -medicine/vascular s/p amputation    d/c with RN staff

## 2020-10-24 NOTE — PHYSICAL THERAPY INITIAL EVALUATION ADULT - PRECAUTIONS/LIMITATIONS, REHAB EVAL
cardiac precautions/recent STEMI August 2020 with occlusion RCA on LHC (no interventions done)
surgical precautions/cardiac precautions/recent STEMI (8/'20)/fall precautions

## 2020-10-24 NOTE — PHYSICAL THERAPY INITIAL EVALUATION ADULT - GENERAL OBSERVATIONS, REHAB EVAL
pt rec'd supine in bed on 2S, alissa troy, GIGI, R res. limb w/ ACE/dsg, L heel protector, IV. pt initially annoyed at PT approaching her "as soon as I wake up" (at 1310), pt agreeable to attempt mobility OOB w/ coaxing

## 2020-10-24 NOTE — PROGRESS NOTE ADULT - ASSESSMENT
59 CAD, PAD s/p fem-pop bypass s/p RT iliac stent, diabetes, hypothyroidism, hyperlipidemia, hx of cigg/cocaine use,  chronic back pain s/p lumbar surgery recent admit with STEMI c/b UTI sepsis and E. coli bacteremia.  now presents with septic shock and ichemic right leg and foot.        1. Septic Shock secondary to UTI  vs ischemic foot vs both  -10/15+ 10/14  cultures negative  -10/12 positive klebseilla  -c/w  IV  zosyn >> now changed to augmentin as per ID for 3 more days.           2. PVD. ischemic right leg    -s/p R AKA.     3. Thromboyctopenia.  -PLT normalizing.  -UFH sq resumed.    4. Renal insufficiency, seems to be stabilizing, creatinine likely close to her baseline    5. Hypokalemia- replaced    6. Respiratory Failure - improved, now extubated on room air    7-DVT proph- sc heparin    8-Hypocalcemia   -supplemented with oral and iV    disposition.  -Physical therapy.   Dressing to remain intact till Sunday.   Anticiapte DC to rehab Monday.

## 2020-10-24 NOTE — PROGRESS NOTE ADULT - SUBJECTIVE AND OBJECTIVE BOX
Pt seen and examined at bedside. Patient continues to have pain in the RLE. Otherwise offers no complaints. Denies fever, chills, chest pain, sob, abdominal pain, n/v/d.    Vital Signs Last 24 Hrs  T(C): 36.6 (24 Oct 2020 07:50), Max: 36.7 (23 Oct 2020 15:42)  T(F): 97.9 (24 Oct 2020 07:50), Max: 98 (23 Oct 2020 15:42)  HR: 90 (24 Oct 2020 07:50) (82 - 94)  BP: 157/71 (24 Oct 2020 07:50) (117/43 - 157/71)  BP(mean): --  RR: 18 (24 Oct 2020 07:50) (18 - 19)  SpO2: 96% (24 Oct 2020 07:50) (95% - 100%)      Physical exam:  Gen: alert  Resp: Equal chest rise   CV: s1 and S2 present   Abd; Soft, edematous 2/2 3rd spacing   Ext: wound dressing c/d/i      Labs:                          8.9    8.66  )-----------( 114      ( 24 Oct 2020 07:39 )             28.2   10-23    138  |  107  |  29<H>  ----------------------------<  96  3.5   |  22  |  1.95<H>    Ca    6.7<L>      23 Oct 2020 08:10  Phos  3.7     10-23  Mg     1.8     10-23    TPro  5.0<L>  /  Alb  1.3<L>  /  TBili  0.3  /  DBili  x   /  AST  30  /  ALT  21  /  AlkPhos  108  10-23

## 2020-10-24 NOTE — PHYSICAL THERAPY INITIAL EVALUATION ADULT - DIAGNOSIS, PT EVAL
septic shock and ischemic right leg and foot, S/P R AKA septic shock secondary to UTI  vs ischemic foot vs both, resp failure, S/P R AKA

## 2020-10-25 LAB
HCT VFR BLD CALC: 36.2 % — SIGNIFICANT CHANGE UP (ref 34.5–45)
HGB BLD-MCNC: 11.7 G/DL — SIGNIFICANT CHANGE UP (ref 11.5–15.5)
MCHC RBC-ENTMCNC: 29.5 PG — SIGNIFICANT CHANGE UP (ref 27–34)
MCHC RBC-ENTMCNC: 32.3 GM/DL — SIGNIFICANT CHANGE UP (ref 32–36)
MCV RBC AUTO: 91.4 FL — SIGNIFICANT CHANGE UP (ref 80–100)
PLATELET # BLD AUTO: 143 K/UL — LOW (ref 150–400)
RBC # BLD: 3.96 M/UL — SIGNIFICANT CHANGE UP (ref 3.8–5.2)
RBC # FLD: 16.9 % — HIGH (ref 10.3–14.5)
WBC # BLD: 10.63 K/UL — HIGH (ref 3.8–10.5)
WBC # FLD AUTO: 10.63 K/UL — HIGH (ref 3.8–10.5)

## 2020-10-25 PROCEDURE — 99232 SBSQ HOSP IP/OBS MODERATE 35: CPT

## 2020-10-25 RX ORDER — CLOPIDOGREL BISULFATE 75 MG/1
75 TABLET, FILM COATED ORAL DAILY
Refills: 0 | Status: DISCONTINUED | OUTPATIENT
Start: 2020-10-25 | End: 2020-10-28

## 2020-10-25 RX ADMIN — Medication 2: at 21:22

## 2020-10-25 RX ADMIN — ATORVASTATIN CALCIUM 40 MILLIGRAM(S): 80 TABLET, FILM COATED ORAL at 21:15

## 2020-10-25 RX ADMIN — Medication 125 MICROGRAM(S): at 05:05

## 2020-10-25 RX ADMIN — Medication 1 TABLET(S): at 09:28

## 2020-10-25 RX ADMIN — HYDROMORPHONE HYDROCHLORIDE 1.5 MILLIGRAM(S): 2 INJECTION INTRAMUSCULAR; INTRAVENOUS; SUBCUTANEOUS at 21:33

## 2020-10-25 RX ADMIN — HEPARIN SODIUM 5000 UNIT(S): 5000 INJECTION INTRAVENOUS; SUBCUTANEOUS at 05:05

## 2020-10-25 RX ADMIN — Medication 25 MILLIGRAM(S): at 09:28

## 2020-10-25 RX ADMIN — BUDESONIDE AND FORMOTEROL FUMARATE DIHYDRATE 2 PUFF(S): 160; 4.5 AEROSOL RESPIRATORY (INHALATION) at 08:15

## 2020-10-25 RX ADMIN — Medication 20 MILLIGRAM(S): at 21:15

## 2020-10-25 RX ADMIN — Medication 1 TABLET(S): at 21:15

## 2020-10-25 RX ADMIN — BUDESONIDE AND FORMOTEROL FUMARATE DIHYDRATE 2 PUFF(S): 160; 4.5 AEROSOL RESPIRATORY (INHALATION) at 21:27

## 2020-10-25 RX ADMIN — HYDROMORPHONE HYDROCHLORIDE 1.5 MILLIGRAM(S): 2 INJECTION INTRAMUSCULAR; INTRAVENOUS; SUBCUTANEOUS at 06:42

## 2020-10-25 RX ADMIN — HYDROMORPHONE HYDROCHLORIDE 1.5 MILLIGRAM(S): 2 INJECTION INTRAMUSCULAR; INTRAVENOUS; SUBCUTANEOUS at 03:43

## 2020-10-25 RX ADMIN — ARIPIPRAZOLE 20 MILLIGRAM(S): 15 TABLET ORAL at 09:28

## 2020-10-25 RX ADMIN — HYDROMORPHONE HYDROCHLORIDE 1.5 MILLIGRAM(S): 2 INJECTION INTRAMUSCULAR; INTRAVENOUS; SUBCUTANEOUS at 10:44

## 2020-10-25 RX ADMIN — HEPARIN SODIUM 5000 UNIT(S): 5000 INJECTION INTRAVENOUS; SUBCUTANEOUS at 13:12

## 2020-10-25 RX ADMIN — HYDROMORPHONE HYDROCHLORIDE 1.5 MILLIGRAM(S): 2 INJECTION INTRAMUSCULAR; INTRAVENOUS; SUBCUTANEOUS at 02:42

## 2020-10-25 RX ADMIN — HYDROMORPHONE HYDROCHLORIDE 1.5 MILLIGRAM(S): 2 INJECTION INTRAMUSCULAR; INTRAVENOUS; SUBCUTANEOUS at 11:00

## 2020-10-25 RX ADMIN — Medication 20 MILLIGRAM(S): at 09:28

## 2020-10-25 RX ADMIN — Medication 25 MILLIGRAM(S): at 21:15

## 2020-10-25 RX ADMIN — Medication 81 MILLIGRAM(S): at 09:28

## 2020-10-25 RX ADMIN — HYDROMORPHONE HYDROCHLORIDE 1.5 MILLIGRAM(S): 2 INJECTION INTRAMUSCULAR; INTRAVENOUS; SUBCUTANEOUS at 20:27

## 2020-10-25 RX ADMIN — PANTOPRAZOLE SODIUM 40 MILLIGRAM(S): 20 TABLET, DELAYED RELEASE ORAL at 09:28

## 2020-10-25 RX ADMIN — CHOLESTYRAMINE 4 GRAM(S): 4 POWDER, FOR SUSPENSION ORAL at 10:45

## 2020-10-25 RX ADMIN — CLOPIDOGREL BISULFATE 75 MILLIGRAM(S): 75 TABLET, FILM COATED ORAL at 21:15

## 2020-10-25 RX ADMIN — HEPARIN SODIUM 5000 UNIT(S): 5000 INJECTION INTRAVENOUS; SUBCUTANEOUS at 21:18

## 2020-10-25 NOTE — PROGRESS NOTE ADULT - SUBJECTIVE AND OBJECTIVE BOX
Patient is a 59y Female who reports no complaints as new..    MEDICATIONS  (STANDING):  amoxicillin  500 milliGRAM(s)/clavulanate 1 Tablet(s) Oral two times a day  ARIPiprazole 20 milliGRAM(s) Oral daily  aspirin 81 milliGRAM(s) Oral daily  atorvastatin 40 milliGRAM(s) Oral at bedtime  budesonide  80 MICROgram(s)/formoterol 4.5 MICROgram(s) Inhaler 2 Puff(s) Inhalation two times a day  calcium carbonate    500 mG (Tums) Chewable 1 Tablet(s) Chew two times a day  calcium carbonate   1250 mG (OsCal) 1 Tablet(s) Oral two times a day  cholestyramine Powder (Sugar-Free) 4 Gram(s) Oral daily  dextrose 50% Injectable 25 Gram(s) IV Push once  dextrose 50% Injectable 25 Gram(s) IV Push once  dicyclomine 20 milliGRAM(s) Oral two times a day before meals  heparin   Injectable 5000 Unit(s) SubCutaneous every 8 hours  insulin lispro (HumaLOG) corrective regimen sliding scale   SubCutaneous Before meals and at bedtime  levothyroxine 125 MICROGram(s) Oral daily  metoprolol tartrate 25 milliGRAM(s) Oral two times a day  pantoprazole    Tablet 40 milliGRAM(s) Oral daily    MEDICATIONS  (PRN):  acetaminophen   Tablet .. 650 milliGRAM(s) Oral every 6 hours PRN Temp greater or equal to 38C (100.4F), Mild Pain (1 - 3)  dextrose 40% Gel 15 Gram(s) Oral once PRN Blood Glucose LESS THAN 70 milliGRAM(s)/deciliter  glucagon  Injectable 1 milliGRAM(s) IntraMuscular once PRN Glucose LESS THAN 70 milligrams/deciliter  HYDROmorphone  Injectable 1.5 milliGRAM(s) IV Push every 4 hours PRN Severe Pain (7 - 10)  HYDROmorphone  Injectable 0.5 milliGRAM(s) IV Push every 2 hours PRN Moderate Pain (4 - 6)        T(C): , Max: 37 (10-24-20 @ 16:00)  T(F): , Max: 98.6 (10-24-20 @ 16:00)  HR: 94 (10-25-20 @ 10:49)  BP: 100/60 (10-25-20 @ 10:49)  BP(mean): --  RR: 18 (10-25-20 @ 07:37)  SpO2: 100% (10-25-20 @ 07:37)  Wt(kg): --    10-24 @ 07:01  -  10-25 @ 07:00  --------------------------------------------------------  IN: 0 mL / OUT: 10 mL / NET: -10 mL          PHYSICAL EXAM:    Constitutional: NAD,frail  HEENT: P>then stated age  Neck: No LAD, No JVD  Respiratory: CTAB  Cardiovascular: S1 and S2, RRR  Gastrointestinal: BS+, soft, NT/ND  Extremities: LE amp  Neurological: A/O x 3, no focal deficits  Psychiatric: Normal mood, normal affect  : No Black  Skin: No rashes  Access: Not applicable        LABS:                        11.7   10.63 )-----------( 143      ( 25 Oct 2020 06:55 )             36.2     23 Oct 2020 08:10    138    |  107    |  29     ----------------------------<  96     3.5     |  22     |  1.95   22 Oct 2020 11:54    135    |  105    |  30     ----------------------------<  194    3.2     |  20     |  1.94   22 Oct 2020 07:37    136    |  107    |  30     ----------------------------<  209    3.3     |  21     |  1.89     Ca    6.7        23 Oct 2020 08:10  Ca    6.9        22 Oct 2020 11:54  Ca    6.3        22 Oct 2020 07:37  Phos  3.7       23 Oct 2020 08:10  Phos  3.5       22 Oct 2020 07:37  Mg     1.8       23 Oct 2020 08:10  Mg     1.4       22 Oct 2020 07:37    TPro  5.0    /  Alb  1.3    /  TBili  0.3    /  DBili  x      /  AST  30     /  ALT  21     /  AlkPhos  108    23 Oct 2020 08:10  TPro  4.6    /  Alb  1.1    /  TBili  0.3    /  DBili  x      /  AST  53     /  ALT  19     /  AlkPhos  132    22 Oct 2020 07:37          Urine Studies:          RADIOLOGY & ADDITIONAL STUDIES:

## 2020-10-25 NOTE — PROGRESS NOTE ADULT - ASSESSMENT
59 CAD, PAD s/p fem-pop bypass s/p RT iliac stent, diabetes, hypothyroidism, hyperlipidemia, hx of cigg/cocaine use,  chronic back pain s/p lumbar surgery recent admit with STEMI c/b UTI sepsis and E. coli bacteremia. w acute ischemic right leg now s/p right AKA.    AMISH/CKD IV   Cr fluctuating was stable, labs in AM   Avoid nsaids/contrast as able    Hypocalcemia - corrected Ca 9.2   0ral repletion  PTH and D prior noted    Ischemia  -medicine/vascular s/p amputation    d/c with RN staff

## 2020-10-25 NOTE — PROGRESS NOTE ADULT - SUBJECTIVE AND OBJECTIVE BOX
CC:  Patient is a 59y old  Female who presents with a chief complaint of hypotension, lower abtominal pain (25 Oct 2020 14:26)    SUBJECTIVE:     -no new complaints or issues at current time.    ROS:  all other review of systems are negative unless indicated above.    acetaminophen   Tablet .. 650 milliGRAM(s) Oral every 6 hours PRN  amoxicillin  500 milliGRAM(s)/clavulanate 1 Tablet(s) Oral two times a day  ARIPiprazole 20 milliGRAM(s) Oral daily  aspirin 81 milliGRAM(s) Oral daily  atorvastatin 40 milliGRAM(s) Oral at bedtime  budesonide  80 MICROgram(s)/formoterol 4.5 MICROgram(s) Inhaler 2 Puff(s) Inhalation two times a day  calcium carbonate    500 mG (Tums) Chewable 1 Tablet(s) Chew two times a day  calcium carbonate   1250 mG (OsCal) 1 Tablet(s) Oral two times a day  cholestyramine Powder (Sugar-Free) 4 Gram(s) Oral daily  dextrose 40% Gel 15 Gram(s) Oral once PRN  dextrose 50% Injectable 25 Gram(s) IV Push once  dextrose 50% Injectable 25 Gram(s) IV Push once  dicyclomine 20 milliGRAM(s) Oral two times a day before meals  glucagon  Injectable 1 milliGRAM(s) IntraMuscular once PRN  heparin   Injectable 5000 Unit(s) SubCutaneous every 8 hours  HYDROmorphone  Injectable 1.5 milliGRAM(s) IV Push every 4 hours PRN  HYDROmorphone  Injectable 0.5 milliGRAM(s) IV Push every 2 hours PRN  insulin lispro (HumaLOG) corrective regimen sliding scale   SubCutaneous Before meals and at bedtime  levothyroxine 125 MICROGram(s) Oral daily  metoprolol tartrate 25 milliGRAM(s) Oral two times a day  pantoprazole    Tablet 40 milliGRAM(s) Oral daily    T(C): 36.3 (10-25-20 @ 15:07), Max: 36.6 (10-24-20 @ 22:33)  HR: 81 (10-25-20 @ 15:07) (76 - 94)  BP: 139/68 (10-25-20 @ 15:07) (100/60 - 142/63)  RR: 18 (10-25-20 @ 15:07) (18 - 18)  SpO2: 100% (10-25-20 @ 15:07) (98% - 100%)    Constitutional: NAD.   HEENT: PERRL, EOMI, MMM.  Neck: Soft and supple, No carotid bruit, No JVD  Respiratory: Breath sounds are clear bilaterally, No wheezing, rales or rhonchi  Cardiovascular: S1 and S2, regular rate and rhythm, no murmur, rub or gallop.  Gastrointestinal: Bowel Sounds present, soft, nontender, nondistended, no guarding, no rebound, no mass.  Extremities: No peripheral edema  Vascular: 2+ peripheral pulses  Neurological: A/O x , no focal deficits  Musculoskeletal: 5/5 strength b/l upper and lower extremities  Skin:  no visible rashes.                         11.7   10.63 )-----------( 143      ( 25 Oct 2020 06:55 )             36.2

## 2020-10-25 NOTE — PROGRESS NOTE ADULT - ASSESSMENT
60 YO POD3 s/p RIGHT AKA      Pain control  cont ASA  GIGI drain to suction  Dressing change q3d (next change Tuesday 10/27)  Adequate pain control  PT       Case discussed with Dr Turner

## 2020-10-26 LAB
ANION GAP SERPL CALC-SCNC: 7 MMOL/L — SIGNIFICANT CHANGE UP (ref 5–17)
BUN SERPL-MCNC: 24 MG/DL — HIGH (ref 7–23)
CALCIUM SERPL-MCNC: 7.2 MG/DL — LOW (ref 8.5–10.1)
CHLORIDE SERPL-SCNC: 114 MMOL/L — HIGH (ref 96–108)
CO2 SERPL-SCNC: 21 MMOL/L — LOW (ref 22–31)
CREAT SERPL-MCNC: 1.51 MG/DL — HIGH (ref 0.5–1.3)
GLUCOSE SERPL-MCNC: 72 MG/DL — SIGNIFICANT CHANGE UP (ref 70–99)
GLUCOSE SERPL-MCNC: 74 MG/DL — SIGNIFICANT CHANGE UP (ref 70–99)
HCT VFR BLD CALC: 25.3 % — LOW (ref 34.5–45)
HGB BLD-MCNC: 8.3 G/DL — LOW (ref 11.5–15.5)
MCHC RBC-ENTMCNC: 29.3 PG — SIGNIFICANT CHANGE UP (ref 27–34)
MCHC RBC-ENTMCNC: 32.8 GM/DL — SIGNIFICANT CHANGE UP (ref 32–36)
MCV RBC AUTO: 89.4 FL — SIGNIFICANT CHANGE UP (ref 80–100)
PLATELET # BLD AUTO: 170 K/UL — SIGNIFICANT CHANGE UP (ref 150–400)
POTASSIUM SERPL-MCNC: 3.3 MMOL/L — LOW (ref 3.5–5.3)
POTASSIUM SERPL-SCNC: 3.3 MMOL/L — LOW (ref 3.5–5.3)
RBC # BLD: 2.83 M/UL — LOW (ref 3.8–5.2)
RBC # FLD: 16.8 % — HIGH (ref 10.3–14.5)
SARS-COV-2 RNA SPEC QL NAA+PROBE: SIGNIFICANT CHANGE UP
SODIUM SERPL-SCNC: 142 MMOL/L — SIGNIFICANT CHANGE UP (ref 135–145)
WBC # BLD: 7.36 K/UL — SIGNIFICANT CHANGE UP (ref 3.8–10.5)
WBC # FLD AUTO: 7.36 K/UL — SIGNIFICANT CHANGE UP (ref 3.8–10.5)

## 2020-10-26 PROCEDURE — 99232 SBSQ HOSP IP/OBS MODERATE 35: CPT

## 2020-10-26 RX ORDER — SODIUM CHLORIDE 9 MG/ML
1000 INJECTION, SOLUTION INTRAVENOUS
Refills: 0 | Status: DISCONTINUED | OUTPATIENT
Start: 2020-10-26 | End: 2020-10-28

## 2020-10-26 RX ADMIN — HYDROMORPHONE HYDROCHLORIDE 1.5 MILLIGRAM(S): 2 INJECTION INTRAMUSCULAR; INTRAVENOUS; SUBCUTANEOUS at 21:56

## 2020-10-26 RX ADMIN — CHOLESTYRAMINE 4 GRAM(S): 4 POWDER, FOR SUSPENSION ORAL at 11:46

## 2020-10-26 RX ADMIN — BUDESONIDE AND FORMOTEROL FUMARATE DIHYDRATE 2 PUFF(S): 160; 4.5 AEROSOL RESPIRATORY (INHALATION) at 20:27

## 2020-10-26 RX ADMIN — Medication 1 TABLET(S): at 21:57

## 2020-10-26 RX ADMIN — Medication 1 TABLET(S): at 09:56

## 2020-10-26 RX ADMIN — PANTOPRAZOLE SODIUM 40 MILLIGRAM(S): 20 TABLET, DELAYED RELEASE ORAL at 09:55

## 2020-10-26 RX ADMIN — Medication 81 MILLIGRAM(S): at 09:55

## 2020-10-26 RX ADMIN — Medication 25 MILLIGRAM(S): at 09:56

## 2020-10-26 RX ADMIN — HEPARIN SODIUM 5000 UNIT(S): 5000 INJECTION INTRAVENOUS; SUBCUTANEOUS at 14:59

## 2020-10-26 RX ADMIN — HEPARIN SODIUM 5000 UNIT(S): 5000 INJECTION INTRAVENOUS; SUBCUTANEOUS at 21:57

## 2020-10-26 RX ADMIN — HYDROMORPHONE HYDROCHLORIDE 1.5 MILLIGRAM(S): 2 INJECTION INTRAMUSCULAR; INTRAVENOUS; SUBCUTANEOUS at 22:11

## 2020-10-26 RX ADMIN — HEPARIN SODIUM 5000 UNIT(S): 5000 INJECTION INTRAVENOUS; SUBCUTANEOUS at 05:29

## 2020-10-26 RX ADMIN — CLOPIDOGREL BISULFATE 75 MILLIGRAM(S): 75 TABLET, FILM COATED ORAL at 09:56

## 2020-10-26 RX ADMIN — Medication 125 MICROGRAM(S): at 05:29

## 2020-10-26 RX ADMIN — HYDROMORPHONE HYDROCHLORIDE 1.5 MILLIGRAM(S): 2 INJECTION INTRAMUSCULAR; INTRAVENOUS; SUBCUTANEOUS at 12:15

## 2020-10-26 RX ADMIN — ATORVASTATIN CALCIUM 40 MILLIGRAM(S): 80 TABLET, FILM COATED ORAL at 21:57

## 2020-10-26 RX ADMIN — BUDESONIDE AND FORMOTEROL FUMARATE DIHYDRATE 2 PUFF(S): 160; 4.5 AEROSOL RESPIRATORY (INHALATION) at 08:35

## 2020-10-26 RX ADMIN — Medication 20 MILLIGRAM(S): at 21:57

## 2020-10-26 RX ADMIN — HYDROMORPHONE HYDROCHLORIDE 1.5 MILLIGRAM(S): 2 INJECTION INTRAMUSCULAR; INTRAVENOUS; SUBCUTANEOUS at 11:46

## 2020-10-26 RX ADMIN — Medication 20 MILLIGRAM(S): at 09:56

## 2020-10-26 RX ADMIN — HYDROMORPHONE HYDROCHLORIDE 1.5 MILLIGRAM(S): 2 INJECTION INTRAMUSCULAR; INTRAVENOUS; SUBCUTANEOUS at 17:30

## 2020-10-26 RX ADMIN — ARIPIPRAZOLE 20 MILLIGRAM(S): 15 TABLET ORAL at 09:55

## 2020-10-26 RX ADMIN — HYDROMORPHONE HYDROCHLORIDE 1.5 MILLIGRAM(S): 2 INJECTION INTRAMUSCULAR; INTRAVENOUS; SUBCUTANEOUS at 17:16

## 2020-10-26 RX ADMIN — HYDROMORPHONE HYDROCHLORIDE 1.5 MILLIGRAM(S): 2 INJECTION INTRAMUSCULAR; INTRAVENOUS; SUBCUTANEOUS at 06:31

## 2020-10-26 RX ADMIN — Medication 25 MILLIGRAM(S): at 21:57

## 2020-10-26 NOTE — PROGRESS NOTE ADULT - SUBJECTIVE AND OBJECTIVE BOX
Pt seen and examined at bedside. Patient continues to have pain in the RLE, but improved as compared to yesterday. Dressing changed saturday. Otherwise offers no complaints. Denies fever, chills, chest pain, sob, abdominal pain, n/v/d.    Vital Signs Last 24 Hrs  T(C): 36.2 (25 Oct 2020 23:28), Max: 36.3 (25 Oct 2020 15:07)  T(F): 97.1 (25 Oct 2020 23:28), Max: 97.4 (25 Oct 2020 15:07)  HR: 88 (26 Oct 2020 06:30) (80 - 94)  BP: 105/62 (26 Oct 2020 06:30) (100/60 - 139/68)  BP(mean): --  RR: 18 (25 Oct 2020 15:07) (18 - 18)  SpO2: 100% (25 Oct 2020 23:28) (100% - 100%)      Physical exam:  Gen: alert  Resp: Equal chest rise   CV: s1 and S2 present   Abd; Soft, edematous 2/2 3rd spacing   Ext: wound dressing c/d/i                                8.3    7.36  )-----------( 170      ( 26 Oct 2020 06:59 )             25.3   10-26    142  |  114<H>  |  24<H>  ----------------------------<  72  3.3<L>   |  21<L>  |  1.51<H>    Ca    7.2<L>      26 Oct 2020 06:59

## 2020-10-26 NOTE — PROGRESS NOTE ADULT - SUBJECTIVE AND OBJECTIVE BOX
CC:Patient is a 59y old  Female who presents with a chief complaint of hypotension, lower abtominal pain (25 Oct 2020 17:54)      Subjective:  Pt seen and examined at bedside. patient denies in place. patient reports she has not been out pf bed yet. patient denies fever, chills, nausea, vomiting, SOb and CP.    ROS:      Vital Signs Last 24 Hrs  T(C): 36.2 (25 Oct 2020 23:28), Max: 36.3 (25 Oct 2020 15:07)  T(F): 97.1 (25 Oct 2020 23:28), Max: 97.4 (25 Oct 2020 15:07)  HR: 88 (26 Oct 2020 06:30) (80 - 94)  BP: 105/62 (26 Oct 2020 06:30) (100/60 - 139/68)  BP(mean): --  RR: 18 (25 Oct 2020 15:07) (18 - 18)  SpO2: 100% (25 Oct 2020 23:28) (100% - 100%)    Labs:                                8.3    7.36  )-----------( 170      ( 26 Oct 2020 06:59 )             25.3     CBC Full  -  ( 26 Oct 2020 06:59 )  WBC Count : 7.36 K/uL  RBC Count : 2.83 M/uL  Hemoglobin : 8.3 g/dL  Hematocrit : 25.3 %  Platelet Count - Automated : 170 K/uL  Mean Cell Volume : 89.4 fl  Mean Cell Hemoglobin : 29.3 pg  Mean Cell Hemoglobin Concentration : 32.8 gm/dL  Auto Neutrophil # : x  Auto Lymphocyte # : x  Auto Monocyte # : x  Auto Eosinophil # : x  Auto Basophil # : x  Auto Neutrophil % : x  Auto Lymphocyte % : x  Auto Monocyte % : x  Auto Eosinophil % : x  Auto Basophil % : x                  Meds:  acetaminophen   Tablet .. 650 milliGRAM(s) Oral every 6 hours PRN  amoxicillin  500 milliGRAM(s)/clavulanate 1 Tablet(s) Oral two times a day  ARIPiprazole 20 milliGRAM(s) Oral daily  aspirin 81 milliGRAM(s) Oral daily  atorvastatin 40 milliGRAM(s) Oral at bedtime  budesonide  80 MICROgram(s)/formoterol 4.5 MICROgram(s) Inhaler 2 Puff(s) Inhalation two times a day  calcium carbonate    500 mG (Tums) Chewable 1 Tablet(s) Chew two times a day  calcium carbonate   1250 mG (OsCal) 1 Tablet(s) Oral two times a day  cholestyramine Powder (Sugar-Free) 4 Gram(s) Oral daily  clopidogrel Tablet 75 milliGRAM(s) Oral daily  dextrose 40% Gel 15 Gram(s) Oral once PRN  dextrose 50% Injectable 25 Gram(s) IV Push once  dextrose 50% Injectable 25 Gram(s) IV Push once  dicyclomine 20 milliGRAM(s) Oral two times a day before meals  glucagon  Injectable 1 milliGRAM(s) IntraMuscular once PRN  heparin   Injectable 5000 Unit(s) SubCutaneous every 8 hours  HYDROmorphone  Injectable 1.5 milliGRAM(s) IV Push every 4 hours PRN  HYDROmorphone  Injectable 0.5 milliGRAM(s) IV Push every 2 hours PRN  insulin lispro (HumaLOG) corrective regimen sliding scale   SubCutaneous Before meals and at bedtime  levothyroxine 125 MICROGram(s) Oral daily  metoprolol tartrate 25 milliGRAM(s) Oral two times a day  pantoprazole    Tablet 40 milliGRAM(s) Oral daily      Radiology:      Physical exam:  Pt is aaox3  Pt in no acute distress  Psych: normal affect  Resp: CTAB  CVS: S1S2(+)  ABD: bowel sounds (+), soft, non distended, no rebound, rectal tube in place.  EXT: no calf tenderness or edema to exam b/l, RLE AKA dressing in place   Skin: no adverse skin changes to exam

## 2020-10-26 NOTE — PROGRESS NOTE ADULT - ASSESSMENT
58 YO POD4 s/p RIGHT AKA    Plan:   Pain control  cont ASA  Dressing change q3d (next change Tuesday 10/27)  Adequate pain control  PT for mobilization       Case discussed with attending

## 2020-10-26 NOTE — PROGRESS NOTE ADULT - ASSESSMENT
59 CAD, PAD s/p fem-pop bypass s/p RT iliac stent, diabetes, hypothyroidism, hyperlipidemia, hx of cigg/cocaine use,  chronic back pain s/p lumbar surgery recent admit with STEMI c/b UTI sepsis and E. coli bacteremia. w acute ischemic right leg now s/p right AKA.    AMISH/CKD IV   Cr stable, no renal issue dc planning   Avoid nsaids/contrast as able    Hypocalcemia - corrected Ca 9.2   0ral repletion  PTH and D prior noted    Ischemia  -medicine/vascular s/p amputation    d/c with RN staff  seen this AM, note now

## 2020-10-26 NOTE — PROGRESS NOTE ADULT - SUBJECTIVE AND OBJECTIVE BOX
CC:  Patient is a 59y old  Female who presents with a chief complaint of hypotension, lower abtominal pain (26 Oct 2020 07:55)    SUBJECTIVE:     -no new complaints or issues at current time.    ROS:  all other review of systems are negative unless indicated above.    acetaminophen   Tablet .. 650 milliGRAM(s) Oral every 6 hours PRN  amoxicillin  500 milliGRAM(s)/clavulanate 1 Tablet(s) Oral two times a day  ARIPiprazole 20 milliGRAM(s) Oral daily  aspirin 81 milliGRAM(s) Oral daily  atorvastatin 40 milliGRAM(s) Oral at bedtime  budesonide  80 MICROgram(s)/formoterol 4.5 MICROgram(s) Inhaler 2 Puff(s) Inhalation two times a day  calcium carbonate    500 mG (Tums) Chewable 1 Tablet(s) Chew two times a day  calcium carbonate   1250 mG (OsCal) 1 Tablet(s) Oral two times a day  cholestyramine Powder (Sugar-Free) 4 Gram(s) Oral daily  clopidogrel Tablet 75 milliGRAM(s) Oral daily  dextrose 40% Gel 15 Gram(s) Oral once PRN  dextrose 5%. 1000 milliLiter(s) IV Continuous <Continuous>  dextrose 50% Injectable 25 Gram(s) IV Push once  dextrose 50% Injectable 25 Gram(s) IV Push once  dicyclomine 20 milliGRAM(s) Oral two times a day before meals  glucagon  Injectable 1 milliGRAM(s) IntraMuscular once PRN  heparin   Injectable 5000 Unit(s) SubCutaneous every 8 hours  HYDROmorphone  Injectable 0.5 milliGRAM(s) IV Push every 2 hours PRN  HYDROmorphone  Injectable 1.5 milliGRAM(s) IV Push every 4 hours PRN  insulin lispro (HumaLOG) corrective regimen sliding scale   SubCutaneous Before meals and at bedtime  levothyroxine 125 MICROGram(s) Oral daily  metoprolol tartrate 25 milliGRAM(s) Oral two times a day  pantoprazole    Tablet 40 milliGRAM(s) Oral daily    T(C): 36.3 (10-26-20 @ 15:23), Max: 36.7 (10-26-20 @ 08:13)  HR: 95 (10-26-20 @ 15:23) (80 - 95)  BP: 120/66 (10-26-20 @ 15:11) (105/51 - 122/59)  RR: 20 (10-26-20 @ 15:23) (16 - 20)  SpO2: 97% (10-26-20 @ 15:23) (95% - 100%)    Constitutional: NAD.   HEENT: PERRL, EOMI, MMM.  Neck: Soft and supple, No carotid bruit, No JVD  Respiratory: Breath sounds are clear bilaterally, No wheezing, rales or rhonchi  Cardiovascular: S1 and S2, regular rate and rhythm, no murmur, rub or gallop.  Gastrointestinal: Bowel Sounds present, soft, nontender, nondistended, no guarding, no rebound, no mass.  Extremities: No peripheral edema  Vascular: 2+ peripheral pulses  Neurological: A/O x 3, no focal deficits  Musculoskeletal: 5/5 strength b/l upper and lower extremities  Skin:  no visible rashes.                         8.3    7.36  )-----------( 170      ( 26 Oct 2020 06:59 )             25.3       10-26    x   |  x   |  x   ----------------------------<  74  x    |  x   |  x     Ca    7.2<L>      26 Oct 2020 06:59

## 2020-10-26 NOTE — PROGRESS NOTE ADULT - ASSESSMENT
59 CAD, PAD s/p fem-pop bypass s/p RT iliac stent, diabetes, hypothyroidism, hyperlipidemia, hx of cigg/cocaine use,  chronic back pain s/p lumbar surgery recent admit with STEMI c/b UTI sepsis and E. coli bacteremia.  now presents with septic shock and ichemic right leg and foot.        1. Septic Shock secondary to UTI  vs ischemic foot vs both  -10/15+ 10/14  cultures negative  -10/12 positive klebseilla  -c/w  IV  zosyn >> now changed to augmentin as per ID for 2 more days.           2. PVD. ischemic right leg    -s/p R AKA.     3. Thromboyctopenia.  -PLT normalizing.  -UFH sq resumed.    4. Renal insufficiency, seems to be stabilizing, creatinine likely close to her baseline    5. Hypokalemia- replaced    6. Respiratory Failure - improved, now extubated on room air    7-DVT proph- sc heparin    8-Hypocalcemia   -supplemented with oral and iV    disposition.  -Physical therapy.   Dressing to remain intact till Sunday.   Anticiapte DC to rehab Monday.

## 2020-10-26 NOTE — PROGRESS NOTE ADULT - ASSESSMENT
58 YO POD s/p RIGHT AKA      Pain control  cont ASA  Dressing change q3d (next change Tuesday 10/27)  Adequate pain control  PT       Case discussed with Dr Mcclain

## 2020-10-27 ENCOUNTER — TRANSCRIPTION ENCOUNTER (OUTPATIENT)
Age: 60
End: 2020-10-27

## 2020-10-27 LAB
ANION GAP SERPL CALC-SCNC: 9 MMOL/L — SIGNIFICANT CHANGE UP (ref 5–17)
BUN SERPL-MCNC: 21 MG/DL — SIGNIFICANT CHANGE UP (ref 7–23)
CALCIUM SERPL-MCNC: 7.4 MG/DL — LOW (ref 8.5–10.1)
CHLORIDE SERPL-SCNC: 114 MMOL/L — HIGH (ref 96–108)
CO2 SERPL-SCNC: 18 MMOL/L — LOW (ref 22–31)
CREAT SERPL-MCNC: 1.57 MG/DL — HIGH (ref 0.5–1.3)
GLUCOSE SERPL-MCNC: 77 MG/DL — SIGNIFICANT CHANGE UP (ref 70–99)
HCT VFR BLD CALC: 28.6 % — LOW (ref 34.5–45)
HGB BLD-MCNC: 9 G/DL — LOW (ref 11.5–15.5)
MCHC RBC-ENTMCNC: 28.8 PG — SIGNIFICANT CHANGE UP (ref 27–34)
MCHC RBC-ENTMCNC: 31.5 GM/DL — LOW (ref 32–36)
MCV RBC AUTO: 91.4 FL — SIGNIFICANT CHANGE UP (ref 80–100)
PLATELET # BLD AUTO: 203 K/UL — SIGNIFICANT CHANGE UP (ref 150–400)
POTASSIUM SERPL-MCNC: 3.5 MMOL/L — SIGNIFICANT CHANGE UP (ref 3.5–5.3)
POTASSIUM SERPL-SCNC: 3.5 MMOL/L — SIGNIFICANT CHANGE UP (ref 3.5–5.3)
RBC # BLD: 3.13 M/UL — LOW (ref 3.8–5.2)
RBC # FLD: 16.9 % — HIGH (ref 10.3–14.5)
SODIUM SERPL-SCNC: 141 MMOL/L — SIGNIFICANT CHANGE UP (ref 135–145)
WBC # BLD: 6.33 K/UL — SIGNIFICANT CHANGE UP (ref 3.8–10.5)
WBC # FLD AUTO: 6.33 K/UL — SIGNIFICANT CHANGE UP (ref 3.8–10.5)

## 2020-10-27 PROCEDURE — 99232 SBSQ HOSP IP/OBS MODERATE 35: CPT

## 2020-10-27 RX ORDER — CHOLESTYRAMINE 4 G/9G
4 POWDER, FOR SUSPENSION ORAL
Qty: 1 | Refills: 0
Start: 2020-10-27

## 2020-10-27 RX ORDER — CALCIUM CARBONATE 500(1250)
1 TABLET ORAL
Qty: 0 | Refills: 0 | DISCHARGE
Start: 2020-10-27

## 2020-10-27 RX ORDER — OXYCODONE HYDROCHLORIDE 5 MG/1
5 TABLET ORAL EVERY 6 HOURS
Refills: 0 | Status: DISCONTINUED | OUTPATIENT
Start: 2020-10-27 | End: 2020-10-28

## 2020-10-27 RX ORDER — METRONIDAZOLE 500 MG
1 TABLET ORAL
Qty: 0 | Refills: 0 | DISCHARGE

## 2020-10-27 RX ORDER — OXYCODONE HYDROCHLORIDE 5 MG/1
1 TABLET ORAL
Qty: 12 | Refills: 0
Start: 2020-10-27 | End: 2020-10-29

## 2020-10-27 RX ORDER — CEFTRIAXONE 500 MG/1
2000 INJECTION, POWDER, FOR SOLUTION INTRAMUSCULAR; INTRAVENOUS
Qty: 0 | Refills: 0 | DISCHARGE

## 2020-10-27 RX ORDER — OXYCODONE HYDROCHLORIDE 5 MG/1
10 TABLET ORAL EVERY 6 HOURS
Refills: 0 | Status: DISCONTINUED | OUTPATIENT
Start: 2020-10-27 | End: 2020-10-28

## 2020-10-27 RX ADMIN — Medication 81 MILLIGRAM(S): at 09:00

## 2020-10-27 RX ADMIN — Medication 125 MICROGRAM(S): at 05:53

## 2020-10-27 RX ADMIN — Medication 1 TABLET(S): at 09:00

## 2020-10-27 RX ADMIN — Medication 20 MILLIGRAM(S): at 09:00

## 2020-10-27 RX ADMIN — PANTOPRAZOLE SODIUM 40 MILLIGRAM(S): 20 TABLET, DELAYED RELEASE ORAL at 09:00

## 2020-10-27 RX ADMIN — ATORVASTATIN CALCIUM 40 MILLIGRAM(S): 80 TABLET, FILM COATED ORAL at 22:15

## 2020-10-27 RX ADMIN — Medication 20 MILLIGRAM(S): at 22:15

## 2020-10-27 RX ADMIN — Medication 1 TABLET(S): at 22:15

## 2020-10-27 RX ADMIN — OXYCODONE HYDROCHLORIDE 10 MILLIGRAM(S): 5 TABLET ORAL at 17:22

## 2020-10-27 RX ADMIN — Medication 25 MILLIGRAM(S): at 22:15

## 2020-10-27 RX ADMIN — Medication 25 MILLIGRAM(S): at 09:00

## 2020-10-27 RX ADMIN — CLOPIDOGREL BISULFATE 75 MILLIGRAM(S): 75 TABLET, FILM COATED ORAL at 09:00

## 2020-10-27 RX ADMIN — BUDESONIDE AND FORMOTEROL FUMARATE DIHYDRATE 2 PUFF(S): 160; 4.5 AEROSOL RESPIRATORY (INHALATION) at 19:29

## 2020-10-27 RX ADMIN — BUDESONIDE AND FORMOTEROL FUMARATE DIHYDRATE 2 PUFF(S): 160; 4.5 AEROSOL RESPIRATORY (INHALATION) at 08:29

## 2020-10-27 RX ADMIN — ARIPIPRAZOLE 20 MILLIGRAM(S): 15 TABLET ORAL at 09:00

## 2020-10-27 RX ADMIN — CHOLESTYRAMINE 4 GRAM(S): 4 POWDER, FOR SUSPENSION ORAL at 11:42

## 2020-10-27 NOTE — DISCHARGE NOTE PROVIDER - NSDCMRMEDTOKEN_GEN_ALL_CORE_FT
Abilify 20 mg oral tablet: 1 tab(s) orally once a day  acetaminophen 325 mg oral tablet: 2 tab(s) orally every 6 hours, As needed, Temp greater or equal to 38C (100.4F), Mild Pain (1 - 3)  Admelog SoloStar 100 units/mL injectable solution: subcutaneous 4 times a day (before meals and at bedtime)    Per sliding scale   aluminum hydroxide-magnesium hydroxide 200 mg-200 mg/5 mL oral suspension: 30 milliliter(s) orally every 4 hours, As needed, Dyspepsia  aspirin 81 mg oral tablet, chewable: 1 tab(s) orally once a day  atorvastatin 40 mg oral tablet: 1 tab(s) orally once a day  calcitriol 0.25 mcg oral capsule: 1 cap(s) orally every other day  calcium carbonate 1250 mg (500 mg elemental calcium) oral tablet: 1 tab(s) orally 2 times a day  calcium carbonate 500 mg (200 mg elemental calcium) oral tablet, chewable: 1 tab(s) orally 2 times a day  cholestyramine 4 g/9 g oral powder for reconstitution: 4 gram(s) orally once a day   clopidogrel 75 mg oral tablet: 1 tab(s) orally once a day.  last dose 10/15/ 2019    dicyclomine 10 mg oral capsule: 1 cap(s) orally 4 times a day (before meals and at bedtime)  ferrous sulfate 325 mg (65 mg elemental iron) oral tablet: 1 tab(s) orally once a day  furosemide 20 mg oral tablet: 1 tab(s) orally once a day  gabapentin 300 mg oral capsule: 1 cap(s) orally 2 times a day  guaiFENesin 1200 mg oral tablet, extended release: 1 tab(s) orally every 12 hours  Incruse Ellipta: 1 puff(s) inhaled once a day  lactobacillus acidophilus oral capsule: 1 tab(s) orally once a day  levothyroxine 125 mcg (0.125 mg) oral tablet: 1 tab(s) orally once a day  loperamide 2 mg oral capsule: 1 cap(s) orally every 4 hours, As Needed - for diarrhea  Melatonin 3 mg oral tablet: 1 tab(s) orally once a day (at bedtime)  Metoprolol Tartrate 25 mg oral tablet: 1 tab(s) orally 2 times a day  Multiple Vitamins oral capsule: 1 cap(s) orally once a day  oxyCODONE 5 mg oral tablet: 1 tab(s) orally every 6 hours, As needed, Moderate Pain (4 - 6) MDD:20mg  pantoprazole 40 mg oral delayed release tablet: 1 tab(s) orally once a day  ranolazine 500 mg oral tablet, extended release: 1 tab(s) orally 2 times a day  tiZANidine 2 mg oral tablet: 2 tab(s) orally once a day (at bedtime)  Ventolin HFA 90 mcg/inh inhalation aerosol: 2 puff(s) inhaled every 6 hours, As Needed  Wixela Inhub 100 mcg-50 mcg inhalation powder: 1 puff(s) inhaled 2 times a day

## 2020-10-27 NOTE — DISCHARGE NOTE PROVIDER - NSDCHHNEEDSERVICE_GEN_ALL_CORE
Teaching and training/Observation and assessment/Medication teaching and assessment/Rehabilitation services/Wound care and assessment

## 2020-10-27 NOTE — CHART NOTE - NSCHARTNOTESELECT_GEN_ALL_CORE
Malnutrition Notification
Event Note
Event Note
Event Note/ICU
Hospitalist Addendum
dietitian f/u note

## 2020-10-27 NOTE — PROVIDER CONTACT NOTE (OTHER) - ACTION/TREATMENT ORDERED:
SAMIRA Erickson made aware, she gave okay to give heparin injection this morning, will administer heparin as per order, will continue to monitor
MD Sanchez made aware, md gave telephone order to hold insulin this morning, will hold insulin and continue to monitor.
MD Sanchez made aware, md gave okay to hold humalog 2 units, will hold insulin and continue to monitor.

## 2020-10-27 NOTE — DISCHARGE NOTE PROVIDER - PROVIDER TOKENS
PROVIDER:[TOKEN:[[id number]],FOLLOWUP:[1-3 days]],PROVIDER:[TOKEN:[[id number]],FOLLOWUP:[1-3 days]],PROVIDER:[TOKEN:[[id number]],FOLLOWUP:[Routine]]

## 2020-10-27 NOTE — PROGRESS NOTE ADULT - ASSESSMENT
59 CAD, PAD s/p fem-pop bypass s/p RT iliac stent, diabetes, hypothyroidism, hyperlipidemia, hx of cigg/cocaine use,  chronic back pain s/p lumbar surgery recent admit with STEMI c/b UTI sepsis and E. coli bacteremia.  now presents with septic shock and ichemic right leg and foot.        1. Septic Shock secondary to UTI  vs ischemic foot vs both  -10/15+ 10/14  cultures negative  -10/12 positive klebseilla  -ABx course completed.          2. PVD. ischemic right leg    -s/p R AKA.     3. Thromboyctopenia.  -PLT normalized.  -UFH sq resumed.    4. Renal insufficiency, stable, creatinine likely close to her baseline    5. Respiratory Failure - improved, now extubated on room air    disposition.  - patient to be DC'd home w/ home care.    8-Hypocalcemia   -supplemented with oral and iV    disposition.  -Physical therapy.   Dressing to remain intact till Sunday.   Anticiapte DC to rehab Monday.

## 2020-10-27 NOTE — CHART NOTE - NSCHARTNOTEFT_GEN_A_CORE
hospital bed and gel overlay patient w/ generalized muscle weakness, hx of falls and AKA will need semi-electric hospital bed  to help with her pain and will need frequent repositioning not feasible in an ordinary bed.  since the patient is unable to make changes in body position independelty ahnd has compromised circulation she will require a gel overlay to help prevent a pressure ulcer.    patient is unable to safely ambulate w/ a walker, cane or crutches due to AKA.  patient will require a wheelchair to access the bathroom for toileting and the dining facilities with in her residence.  patient is agreeable to the wheelchair and has a 24 hour talita with the wheelchair.  requires a seat cushion (gel/foam) for positioning and posture.

## 2020-10-27 NOTE — DISCHARGE NOTE PROVIDER - HOSPITAL COURSE
59 CAD, PAD s/p fem-pop bypass s/p RT iliac stent, diabetes, hypothyroidism, hyperlipidemia, hx of cigg/cocaine use,  chronic back pain s/p lumbar surgery recent admit with STEMI c/b UTI sepsis and E. coli bacteremia.  now presents with septic shock and ichemic right leg and foot.        1. Septic Shock secondary to UTI  vs ischemic foot vs both  -10/15+ 10/14  cultures negative  -10/12 positive klebseilla  -ABx course completed.          2. PVD. ischemic right leg    -s/p R AKA.   -dressing change q3d    -continue AP and statin.    3. Thromboyctopenia.  -PLT normalized.  -UFH sq resumed.    4. Renal insufficiency, stable, creatinine likely close to her baseline    5. Respiratory Failure - improved, now extubated on room air    disposition.  - patient to be DC'd home w/ home care.    8-Hypocalcemia   -supplemented with oral and iV    disposition.  -DC home with home care and home PT.  -patient deferred ANA MARÍA.  -VSx f/u outpatient.    Dressing change instructions: Eevery 3 days   - Carefully take down dressing   - Cleanse surgical area with chlorhexidine soap  - Xeroform   - 4x4s  - Fluff kerlex   - Ace wrap

## 2020-10-27 NOTE — PROGRESS NOTE ADULT - SUBJECTIVE AND OBJECTIVE BOX
Patient is a 59y Female who reports no complaints as new    REVIEW OF SYSTEMS:    CONSTITUTIONAL: stable weakness, fevers or chills  RESPIRATORY: No cough, wheezing, hemoptysis; No shortness of breath  CARDIOVASCULAR: No chest pain or palpitations  GENITOURINARY: No dysuria, frequency or hematuria  All other review of systems is negative unless indicated above.    MEDICATIONS  (STANDING):  ARIPiprazole 20 milliGRAM(s) Oral daily  aspirin 81 milliGRAM(s) Oral daily  atorvastatin 40 milliGRAM(s) Oral at bedtime  budesonide  80 MICROgram(s)/formoterol 4.5 MICROgram(s) Inhaler 2 Puff(s) Inhalation two times a day  calcium carbonate    500 mG (Tums) Chewable 1 Tablet(s) Chew two times a day  calcium carbonate   1250 mG (OsCal) 1 Tablet(s) Oral two times a day  cholestyramine Powder (Sugar-Free) 4 Gram(s) Oral daily  clopidogrel Tablet 75 milliGRAM(s) Oral daily  dextrose 5%. 1000 milliLiter(s) (50 mL/Hr) IV Continuous <Continuous>  dextrose 50% Injectable 25 Gram(s) IV Push once  dextrose 50% Injectable 25 Gram(s) IV Push once  dicyclomine 20 milliGRAM(s) Oral two times a day before meals  heparin   Injectable 5000 Unit(s) SubCutaneous every 8 hours  insulin lispro (HumaLOG) corrective regimen sliding scale   SubCutaneous Before meals and at bedtime  levothyroxine 125 MICROGram(s) Oral daily  metoprolol tartrate 25 milliGRAM(s) Oral two times a day  pantoprazole    Tablet 40 milliGRAM(s) Oral daily    MEDICATIONS  (PRN):  acetaminophen   Tablet .. 650 milliGRAM(s) Oral every 6 hours PRN Temp greater or equal to 38C (100.4F), Mild Pain (1 - 3)  dextrose 40% Gel 15 Gram(s) Oral once PRN Blood Glucose LESS THAN 70 milliGRAM(s)/deciliter  glucagon  Injectable 1 milliGRAM(s) IntraMuscular once PRN Glucose LESS THAN 70 milligrams/deciliter  oxyCODONE    IR 5 milliGRAM(s) Oral every 6 hours PRN Moderate Pain (4 - 6)  oxyCODONE    IR 10 milliGRAM(s) Oral every 6 hours PRN Severe Pain (7 - 10)        T(C): , Max: 36.7 (10-27-20 @ 08:43)  T(F): , Max: 98 (10-27-20 @ 08:43)  HR: 80 (10-27-20 @ 08:43)  BP: 145/70 (10-27-20 @ 08:43)  BP(mean): --  RR: 18 (10-27-20 @ 08:43)  SpO2: 95% (10-27-20 @ 08:43)  Wt(kg): --        PHYSICAL EXAM:    Constitutional: frail  HEENT:  MMM  Neck: No LAD, No JVD  Respiratory: CTAB  Cardiovascular: S1 and S2  Extremities: le chronic peripheral edema, amp  Neurological: A/O x 3, no focal deficits  Psychiatric: Normal mood, normal affect  : No Lback  Skin: No rashes  Access: Not applicable        LABS:                        9.0    6.33  )-----------( 203      ( 27 Oct 2020 08:14 )             28.6     27 Oct 2020 08:14    141    |  114    |  21     ----------------------------<  77     3.5     |  18     |  1.57   26 Oct 2020 13:00    x      |  x      |  x      ----------------------------<  74     x       |  x      |  x      26 Oct 2020 06:59    142    |  114    |  24     ----------------------------<  72     3.3     |  21     |  1.51     Ca    7.4        27 Oct 2020 08:14  Ca    7.2        26 Oct 2020 06:59            Urine Studies:          RADIOLOGY & ADDITIONAL STUDIES:

## 2020-10-27 NOTE — PROGRESS NOTE ADULT - SUBJECTIVE AND OBJECTIVE BOX
`CC:  Patient is a 59y old  Female who presents with a chief complaint of hypotension, lower abtominal pain (27 Oct 2020 10:51)    SUBJECTIVE:     -no new complaints or issues at current time.    ROS:  all other review of systems are negative unless indicated above.    acetaminophen   Tablet .. 650 milliGRAM(s) Oral every 6 hours PRN  ARIPiprazole 20 milliGRAM(s) Oral daily  aspirin 81 milliGRAM(s) Oral daily  atorvastatin 40 milliGRAM(s) Oral at bedtime  budesonide  80 MICROgram(s)/formoterol 4.5 MICROgram(s) Inhaler 2 Puff(s) Inhalation two times a day  calcium carbonate    500 mG (Tums) Chewable 1 Tablet(s) Chew two times a day  calcium carbonate   1250 mG (OsCal) 1 Tablet(s) Oral two times a day  cholestyramine Powder (Sugar-Free) 4 Gram(s) Oral daily  clopidogrel Tablet 75 milliGRAM(s) Oral daily  dextrose 40% Gel 15 Gram(s) Oral once PRN  dextrose 5%. 1000 milliLiter(s) IV Continuous <Continuous>  dextrose 50% Injectable 25 Gram(s) IV Push once  dextrose 50% Injectable 25 Gram(s) IV Push once  dicyclomine 20 milliGRAM(s) Oral two times a day before meals  glucagon  Injectable 1 milliGRAM(s) IntraMuscular once PRN  heparin   Injectable 5000 Unit(s) SubCutaneous every 8 hours  insulin lispro (HumaLOG) corrective regimen sliding scale   SubCutaneous Before meals and at bedtime  levothyroxine 125 MICROGram(s) Oral daily  metoprolol tartrate 25 milliGRAM(s) Oral two times a day  oxyCODONE    IR 5 milliGRAM(s) Oral every 6 hours PRN  oxyCODONE    IR 10 milliGRAM(s) Oral every 6 hours PRN  pantoprazole    Tablet 40 milliGRAM(s) Oral daily    T(C): 36.7 (10-27-20 @ 08:43), Max: 36.7 (10-27-20 @ 08:43)  HR: 80 (10-27-20 @ 08:43) (80 - 95)  BP: 145/70 (10-27-20 @ 08:43) (120/66 - 148/48)  RR: 18 (10-27-20 @ 08:43) (18 - 20)  SpO2: 95% (10-27-20 @ 08:43) (95% - 97%)    Constitutional: NAD.   HEENT: PERRL, EOMI, MMM.  Neck: Soft and supple, No carotid bruit, No JVD  Respiratory: Breath sounds are clear bilaterally, No wheezing, rales or rhonchi  Cardiovascular: S1 and S2, regular rate and rhythm, no murmur, rub or gallop.  Gastrointestinal: Bowel Sounds present, soft, nontender, nondistended, no guarding, no rebound, no mass.  Extremities: No peripheral edema  Vascular: 2+ peripheral pulses  Neurological: A/O x 3, no focal deficits  Musculoskeletal: 5/5 strength b/l upper and lower extremities  Skin:  no visible rashes.                         9.0    6.33  )-----------( 203      ( 27 Oct 2020 08:14 )             28.6       10-27    141  |  114<H>  |  21  ----------------------------<  77  3.5   |  18<L>  |  1.57<H>    Ca    7.4<L>      27 Oct 2020 08:14

## 2020-10-27 NOTE — PROGRESS NOTE ADULT - ASSESSMENT
60 YO POD5 s/p RIGHT AKA    Plan:   Pain control  cont ASA  Dressing change q3d -  will change dressing today  Adequate pain control  PT for mobilization   No further vascular surgery indicated at this time  Patient ok for discharge from vascular surgery perspective    Case discussed with attending       Dressing change instructions: Eevery 3 days   - Carefully take down dressing   - Cleanse surgical area with chlorhexidine soap  - Xeroform   - 4x4s  - Fluff kerlex   - Ace wrap

## 2020-10-27 NOTE — DISCHARGE NOTE PROVIDER - CARE PROVIDER_API CALL
Carole Billy  FAMILY MEDICINE  [address] [place] [address]  Phone: [phone]  Fax: [phone]  Follow Up Time: 1-3 days    Ashley Manning)  Vascular Surgery  [address] [place] [address]  Phone: [phone]  Fax: [phone]  Follow Up Time: 1-3 days    Arthur Anaya)  Internal Medicine; Pulmonary Disease  [address] [place] [address]  Phone: [phone]  Fax: [phone]  Follow Up Time: Routine

## 2020-10-27 NOTE — PROGRESS NOTE ADULT - ASSESSMENT
59 CAD, PAD s/p fem-pop bypass s/p RT iliac stent, diabetes, hypothyroidism, hyperlipidemia, hx of cigg/cocaine use,  chronic back pain s/p lumbar surgery recent admit with STEMI c/b UTI sepsis and E. coli bacteremia. w acute ischemic right leg now s/p right AKA.    AMISH/CKD IV   Cr stable, no renal issue dc planning   Avoid nsaids/contrast as able    Hypocalcemia - corrected Ca 9.2   0ral repletion  PTH and D prior noted    Ischemia  -medicine/vascular s/p amputation    d/c with RN staff

## 2020-10-27 NOTE — DISCHARGE NOTE PROVIDER - NSDCCPCAREPLAN_GEN_ALL_CORE_FT
PRINCIPAL DISCHARGE DIAGNOSIS  Diagnosis: Symptomatic anemia  Assessment and Plan of Treatment:       SECONDARY DISCHARGE DIAGNOSES  Diagnosis: GI bleed  Assessment and Plan of Treatment:

## 2020-10-27 NOTE — PROGRESS NOTE ADULT - SUBJECTIVE AND OBJECTIVE BOX
CC:Patient is a 59y old  Female who presents with a chief complaint of hypotension, lower abtominal pain (25 Oct 2020 17:54)      Subjective:  Pt seen and examined at bedside. patient denies in place. patient reports she has not been out pf bed yet, but has sat on the side of the bed which she tolerated. patient denies fever, chills, nausea, vomiting, SOb and CP.    ROS:      Vital Signs Last 24 Hrs  T(C): 36.7 (27 Oct 2020 08:43), Max: 36.7 (27 Oct 2020 08:43)  T(F): 98 (27 Oct 2020 08:43), Max: 98 (27 Oct 2020 08:43)  HR: 80 (27 Oct 2020 08:43) (80 - 95)  BP: 145/70 (27 Oct 2020 08:43) (120/66 - 148/48)  BP(mean): --  RR: 18 (27 Oct 2020 08:43) (18 - 20)  SpO2: 95% (27 Oct 2020 08:43) (95% - 97%)    Labs:                              9.0    6.33  )-----------( 203      ( 27 Oct 2020 08:14 )             28.6   10-27    141  |  114<H>  |  21  ----------------------------<  77  3.5   |  18<L>  |  1.57<H>    Ca    7.4<L>      27 Oct 2020 08:14                    Meds:  acetaminophen   Tablet .. 650 milliGRAM(s) Oral every 6 hours PRN  amoxicillin  500 milliGRAM(s)/clavulanate 1 Tablet(s) Oral two times a day  ARIPiprazole 20 milliGRAM(s) Oral daily  aspirin 81 milliGRAM(s) Oral daily  atorvastatin 40 milliGRAM(s) Oral at bedtime  budesonide  80 MICROgram(s)/formoterol 4.5 MICROgram(s) Inhaler 2 Puff(s) Inhalation two times a day  calcium carbonate    500 mG (Tums) Chewable 1 Tablet(s) Chew two times a day  calcium carbonate   1250 mG (OsCal) 1 Tablet(s) Oral two times a day  cholestyramine Powder (Sugar-Free) 4 Gram(s) Oral daily  clopidogrel Tablet 75 milliGRAM(s) Oral daily  dextrose 40% Gel 15 Gram(s) Oral once PRN  dextrose 50% Injectable 25 Gram(s) IV Push once  dextrose 50% Injectable 25 Gram(s) IV Push once  dicyclomine 20 milliGRAM(s) Oral two times a day before meals  glucagon  Injectable 1 milliGRAM(s) IntraMuscular once PRN  heparin   Injectable 5000 Unit(s) SubCutaneous every 8 hours  HYDROmorphone  Injectable 1.5 milliGRAM(s) IV Push every 4 hours PRN  HYDROmorphone  Injectable 0.5 milliGRAM(s) IV Push every 2 hours PRN  insulin lispro (HumaLOG) corrective regimen sliding scale   SubCutaneous Before meals and at bedtime  levothyroxine 125 MICROGram(s) Oral daily  metoprolol tartrate 25 milliGRAM(s) Oral two times a day  pantoprazole    Tablet 40 milliGRAM(s) Oral daily      Radiology:      Physical exam:  Pt is aaox3  Pt in no acute distress  Psych: normal affect  Resp: CTAB  CVS: S1S2(+)  ABD: bowel sounds (+), soft, non distended, no rebound, rectal tube in place.  EXT: no calf tenderness or edema to exam b/l, RLE AKA dressing in place c/d/i  Skin: no adverse skin changes to exam

## 2020-10-28 ENCOUNTER — TRANSCRIPTION ENCOUNTER (OUTPATIENT)
Age: 60
End: 2020-10-28

## 2020-10-28 VITALS
OXYGEN SATURATION: 96 % | RESPIRATION RATE: 18 BRPM | HEART RATE: 90 BPM | TEMPERATURE: 98 F | DIASTOLIC BLOOD PRESSURE: 64 MMHG | SYSTOLIC BLOOD PRESSURE: 142 MMHG

## 2020-10-28 LAB
HCT VFR BLD CALC: 27.5 % — LOW (ref 34.5–45)
HGB BLD-MCNC: 8.8 G/DL — LOW (ref 11.5–15.5)
MCHC RBC-ENTMCNC: 28.9 PG — SIGNIFICANT CHANGE UP (ref 27–34)
MCHC RBC-ENTMCNC: 32 GM/DL — SIGNIFICANT CHANGE UP (ref 32–36)
MCV RBC AUTO: 90.5 FL — SIGNIFICANT CHANGE UP (ref 80–100)
PLATELET # BLD AUTO: 219 K/UL — SIGNIFICANT CHANGE UP (ref 150–400)
RBC # BLD: 3.04 M/UL — LOW (ref 3.8–5.2)
RBC # FLD: 17 % — HIGH (ref 10.3–14.5)
WBC # BLD: 5.76 K/UL — SIGNIFICANT CHANGE UP (ref 3.8–10.5)
WBC # FLD AUTO: 5.76 K/UL — SIGNIFICANT CHANGE UP (ref 3.8–10.5)

## 2020-10-28 PROCEDURE — 99232 SBSQ HOSP IP/OBS MODERATE 35: CPT

## 2020-10-28 RX ADMIN — Medication 1 TABLET(S): at 09:40

## 2020-10-28 RX ADMIN — Medication 20 MILLIGRAM(S): at 09:41

## 2020-10-28 RX ADMIN — OXYCODONE HYDROCHLORIDE 10 MILLIGRAM(S): 5 TABLET ORAL at 00:51

## 2020-10-28 RX ADMIN — Medication 81 MILLIGRAM(S): at 09:40

## 2020-10-28 RX ADMIN — ARIPIPRAZOLE 20 MILLIGRAM(S): 15 TABLET ORAL at 09:40

## 2020-10-28 RX ADMIN — PANTOPRAZOLE SODIUM 40 MILLIGRAM(S): 20 TABLET, DELAYED RELEASE ORAL at 09:41

## 2020-10-28 RX ADMIN — Medication 125 MICROGRAM(S): at 06:07

## 2020-10-28 RX ADMIN — BUDESONIDE AND FORMOTEROL FUMARATE DIHYDRATE 2 PUFF(S): 160; 4.5 AEROSOL RESPIRATORY (INHALATION) at 07:52

## 2020-10-28 RX ADMIN — Medication 25 MILLIGRAM(S): at 09:40

## 2020-10-28 RX ADMIN — OXYCODONE HYDROCHLORIDE 10 MILLIGRAM(S): 5 TABLET ORAL at 00:21

## 2020-10-28 RX ADMIN — CLOPIDOGREL BISULFATE 75 MILLIGRAM(S): 75 TABLET, FILM COATED ORAL at 09:41

## 2020-10-28 NOTE — PROGRESS NOTE ADULT - SUBJECTIVE AND OBJECTIVE BOX
CC:Patient is a 59y old  Female who presents with a chief complaint of hypotension, lower abtominal pain (25 Oct 2020 17:54)      Subjective:  Pt seen and examined at bedside. Patient reports she has work with PT mostly on the darryl of the bed. Dressing changes yesterday. Patient denies fever, chills, nausea, vomiting, SOb and CP.    ROS:      Vital Signs Last 24 Hrs  T(C): 36.3 (28 Oct 2020 00:00), Max: 36.8 (27 Oct 2020 15:05)  T(F): 97.4 (28 Oct 2020 00:00), Max: 98.2 (27 Oct 2020 15:05)  HR: 86 (27 Oct 2020 22:10) (80 - 89)  BP: 149/65 (27 Oct 2020 22:10) (145/70 - 155/72)  BP(mean): --  RR: 18 (28 Oct 2020 00:00) (18 - 18)  SpO2: 100% (28 Oct 2020 00:00) (95% - 100%)    Labs:                          9.0    6.33  )-----------( 203      ( 27 Oct 2020 08:14 )             28.6   10-27    141  |  114<H>  |  21  ----------------------------<  77  3.5   |  18<L>  |  1.57<H>    Ca    7.4<L>      27 Oct 2020 08:14          Meds:  acetaminophen   Tablet .. 650 milliGRAM(s) Oral every 6 hours PRN  amoxicillin  500 milliGRAM(s)/clavulanate 1 Tablet(s) Oral two times a day  ARIPiprazole 20 milliGRAM(s) Oral daily  aspirin 81 milliGRAM(s) Oral daily  atorvastatin 40 milliGRAM(s) Oral at bedtime  budesonide  80 MICROgram(s)/formoterol 4.5 MICROgram(s) Inhaler 2 Puff(s) Inhalation two times a day  calcium carbonate    500 mG (Tums) Chewable 1 Tablet(s) Chew two times a day  calcium carbonate   1250 mG (OsCal) 1 Tablet(s) Oral two times a day  cholestyramine Powder (Sugar-Free) 4 Gram(s) Oral daily  clopidogrel Tablet 75 milliGRAM(s) Oral daily  dextrose 40% Gel 15 Gram(s) Oral once PRN  dextrose 50% Injectable 25 Gram(s) IV Push once  dextrose 50% Injectable 25 Gram(s) IV Push once  dicyclomine 20 milliGRAM(s) Oral two times a day before meals  glucagon  Injectable 1 milliGRAM(s) IntraMuscular once PRN  heparin   Injectable 5000 Unit(s) SubCutaneous every 8 hours  HYDROmorphone  Injectable 1.5 milliGRAM(s) IV Push every 4 hours PRN  HYDROmorphone  Injectable 0.5 milliGRAM(s) IV Push every 2 hours PRN  insulin lispro (HumaLOG) corrective regimen sliding scale   SubCutaneous Before meals and at bedtime  levothyroxine 125 MICROGram(s) Oral daily  metoprolol tartrate 25 milliGRAM(s) Oral two times a day  pantoprazole    Tablet 40 milliGRAM(s) Oral daily      Radiology:      Physical exam:  Pt is aaox3  Pt in no acute distress  Psych: normal affect  Resp: CTAB  CVS: S1S2(+)  ABD: bowel sounds (+), soft, non distended, no rebound, rectal tube in place.  EXT: no calf tenderness or edema to exam b/l, RLE AKA dressing in place c/d/i  Skin: no adverse skin changes to exam

## 2020-10-28 NOTE — PROGRESS NOTE ADULT - ASSESSMENT
60 YO POD 6 s/p RIGHT AKA    Plan:   Pain control  cont ASA  Dressing change q3day -  will change dressing today  Adequate pain control  PT for mobilization   No further vascular surgery indicated at this time  Patient ok for discharge from vascular surgery perspective    Case discussed with attending       Dressing change instructions: Every 3 days   - Carefully take down dressing   - Cleanse surgical area with chlorhexidine soap  - Xeroform   - 4x4s  - Fluff kerlex   - Ace wrap

## 2020-10-28 NOTE — DISCHARGE NOTE NURSING/CASE MANAGEMENT/SOCIAL WORK - PATIENT PORTAL LINK FT
You can access the FollowMyHealth Patient Portal offered by [facility] by registering at the following website: [email or web address]. By joining [facility]’s FollowMyHealth portal, you will also be able to view your health information using other applications (apps) compatible with our system.

## 2020-10-28 NOTE — PROGRESS NOTE ADULT - SUBJECTIVE AND OBJECTIVE BOX
CC:  Patient is a 59y old  Female who presents with a chief complaint of hypotension, lower abtominal pain (28 Oct 2020 06:20)    SUBJECTIVE:     -no new complaints or issues at current time.    ROS:  all other review of systems are negative unless indicated above.    acetaminophen   Tablet .. 650 milliGRAM(s) Oral every 6 hours PRN  ARIPiprazole 20 milliGRAM(s) Oral daily  aspirin 81 milliGRAM(s) Oral daily  atorvastatin 40 milliGRAM(s) Oral at bedtime  budesonide  80 MICROgram(s)/formoterol 4.5 MICROgram(s) Inhaler 2 Puff(s) Inhalation two times a day  calcium carbonate    500 mG (Tums) Chewable 1 Tablet(s) Chew two times a day  calcium carbonate   1250 mG (OsCal) 1 Tablet(s) Oral two times a day  cholestyramine Powder (Sugar-Free) 4 Gram(s) Oral daily  clopidogrel Tablet 75 milliGRAM(s) Oral daily  dextrose 40% Gel 15 Gram(s) Oral once PRN  dextrose 50% Injectable 25 Gram(s) IV Push once  dextrose 50% Injectable 25 Gram(s) IV Push once  dicyclomine 20 milliGRAM(s) Oral two times a day before meals  glucagon  Injectable 1 milliGRAM(s) IntraMuscular once PRN  heparin   Injectable 5000 Unit(s) SubCutaneous every 8 hours  insulin lispro (HumaLOG) corrective regimen sliding scale   SubCutaneous Before meals and at bedtime  levothyroxine 125 MICROGram(s) Oral daily  metoprolol tartrate 25 milliGRAM(s) Oral two times a day  oxyCODONE    IR 5 milliGRAM(s) Oral every 6 hours PRN  oxyCODONE    IR 10 milliGRAM(s) Oral every 6 hours PRN  pantoprazole    Tablet 40 milliGRAM(s) Oral daily    T(C): 36.3 (10-28-20 @ 07:29), Max: 36.8 (10-27-20 @ 15:05)  HR: 82 (10-28-20 @ 07:53) (80 - 89)  BP: 131/58 (10-28-20 @ 07:29) (131/58 - 155/72)  RR: 18 (10-28-20 @ 07:29) (18 - 18)  SpO2: 100% (10-28-20 @ 07:29) (95% - 100%)    Constitutional: NAD.   HEENT: PERRL, EOMI, MMM.  Neck: Soft and supple, No carotid bruit, No JVD  Respiratory: Breath sounds are clear bilaterally, No wheezing, rales or rhonchi  Cardiovascular: S1 and S2, regular rate and rhythm, no murmur, rub or gallop.  Gastrointestinal: Bowel Sounds present, soft, nontender, nondistended, no guarding, no rebound, no mass.  Extremities: No peripheral edema  Vascular: 2+ peripheral pulses  Neurological: A/O x 3, no focal deficits  Musculoskeletal: 5/5 strength b/l upper and lower extremities  Skin:  no visible rashes.                         8.8    5.76  )-----------( 219      ( 28 Oct 2020 07:13 )             27.5       10-27    141  |  114<H>  |  21  ----------------------------<  77  3.5   |  18<L>  |  1.57<H>    Ca    7.4<L>      27 Oct 2020 08:14

## 2020-10-28 NOTE — DISCHARGE NOTE NURSING/CASE MANAGEMENT/SOCIAL WORK - NSDCPEEMAIL_GEN_ALL_CORE
[facility] for Tobacco Control email [email or web address]

## 2020-10-28 NOTE — DISCHARGE NOTE NURSING/CASE MANAGEMENT/SOCIAL WORK - NSDCPEWEB_GEN_ALL_CORE
[facility] for Tobacco Control website --- [email or web address] --- [email or web address]

## 2020-10-28 NOTE — PROGRESS NOTE ADULT - ASSESSMENT
59 CAD, PAD s/p fem-pop bypass s/p RT iliac stent, diabetes, hypothyroidism, hyperlipidemia, hx of cigg/cocaine use,  chronic back pain s/p lumbar surgery recent admit with STEMI c/b UTI sepsis and E. coli bacteremia.  now presents with septic shock and ichemic right leg and foot.        1. Septic Shock secondary to UTI  vs ischemic foot vs both  -10/15+ 10/14  cultures negative  -10/12 positive klebseilla  -ABx course completed.          2. PVD. ischemic right leg    -s/p R AKA.     3. Thromboyctopenia.  -PLT normalized.  -UFH sq resumed.    4. Renal insufficiency, stable, creatinine likely close to her baseline    5. Respiratory Failure - improved, now extubated on room air    disposition.  - patient to be DC rehab.      disposition.  -Physical therapy.   Dressing to remain intact till Sunday.   Anticiapte DC to rehab.

## 2020-11-02 DIAGNOSIS — Z86.73 PERSONAL HISTORY OF TRANSIENT ISCHEMIC ATTACK (TIA), AND CEREBRAL INFARCTION WITHOUT RESIDUAL DEFICITS: ICD-10-CM

## 2020-11-02 DIAGNOSIS — E83.51 HYPOCALCEMIA: ICD-10-CM
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
## 2020-11-02 DIAGNOSIS — E87.2 ACIDOSIS: ICD-10-CM

## 2020-11-02 DIAGNOSIS — F31.9 BIPOLAR DISORDER, UNSPECIFIED: ICD-10-CM

## 2020-11-02 DIAGNOSIS — E78.5 HYPERLIPIDEMIA, UNSPECIFIED: ICD-10-CM

## 2020-11-02 DIAGNOSIS — R65.21 SEVERE SEPSIS WITH SEPTIC SHOCK: ICD-10-CM

## 2020-11-02 DIAGNOSIS — E43 UNSPECIFIED SEVERE PROTEIN-CALORIE MALNUTRITION: ICD-10-CM

## 2020-11-02 DIAGNOSIS — Z95.5 PRESENCE OF CORONARY ANGIOPLASTY IMPLANT AND GRAFT: ICD-10-CM

## 2020-11-02 DIAGNOSIS — F41.9 ANXIETY DISORDER, UNSPECIFIED: ICD-10-CM

## 2020-11-02 DIAGNOSIS — E11.51 TYPE 2 DIABETES MELLITUS WITH DIABETIC PERIPHERAL ANGIOPATHY WITHOUT GANGRENE: ICD-10-CM

## 2020-11-02 DIAGNOSIS — E87.1 HYPO-OSMOLALITY AND HYPONATREMIA: ICD-10-CM

## 2020-11-02 DIAGNOSIS — I25.10 ATHEROSCLEROTIC HEART DISEASE OF NATIVE CORONARY ARTERY WITHOUT ANGINA PECTORIS: ICD-10-CM

## 2020-11-02 DIAGNOSIS — I70.221 ATHEROSCLEROSIS OF NATIVE ARTERIES OF EXTREMITIES WITH REST PAIN, RIGHT LEG: ICD-10-CM

## 2020-11-02 DIAGNOSIS — K62.89 OTHER SPECIFIED DISEASES OF ANUS AND RECTUM: ICD-10-CM

## 2020-11-02 DIAGNOSIS — E87.6 HYPOKALEMIA: ICD-10-CM

## 2020-11-02 DIAGNOSIS — D69.6 THROMBOCYTOPENIA, UNSPECIFIED: ICD-10-CM

## 2020-11-02 DIAGNOSIS — A41.59 OTHER GRAM-NEGATIVE SEPSIS: ICD-10-CM

## 2020-11-02 DIAGNOSIS — T81.10XA POSTPROCEDURAL SHOCK UNSPECIFIED, INITIAL ENCOUNTER: ICD-10-CM

## 2020-11-02 DIAGNOSIS — I12.9 HYPERTENSIVE CHRONIC KIDNEY DISEASE WITH STAGE 1 THROUGH STAGE 4 CHRONIC KIDNEY DISEASE, OR UNSPECIFIED CHRONIC KIDNEY DISEASE: ICD-10-CM

## 2020-11-02 DIAGNOSIS — Z79.82 LONG TERM (CURRENT) USE OF ASPIRIN: ICD-10-CM

## 2020-11-02 DIAGNOSIS — J44.9 CHRONIC OBSTRUCTIVE PULMONARY DISEASE, UNSPECIFIED: ICD-10-CM

## 2020-11-02 DIAGNOSIS — M62.82 RHABDOMYOLYSIS: ICD-10-CM

## 2020-11-02 DIAGNOSIS — B96.1 KLEBSIELLA PNEUMONIAE [K. PNEUMONIAE] AS THE CAUSE OF DISEASES CLASSIFIED ELSEWHERE: ICD-10-CM

## 2020-11-02 DIAGNOSIS — F17.210 NICOTINE DEPENDENCE, CIGARETTES, UNCOMPLICATED: ICD-10-CM

## 2020-11-02 DIAGNOSIS — N18.4 CHRONIC KIDNEY DISEASE, STAGE 4 (SEVERE): ICD-10-CM

## 2020-11-02 DIAGNOSIS — K92.2 GASTROINTESTINAL HEMORRHAGE, UNSPECIFIED: ICD-10-CM

## 2020-11-02 DIAGNOSIS — D62 ACUTE POSTHEMORRHAGIC ANEMIA: ICD-10-CM

## 2020-11-02 DIAGNOSIS — I25.2 OLD MYOCARDIAL INFARCTION: ICD-10-CM

## 2020-11-02 DIAGNOSIS — N17.9 ACUTE KIDNEY FAILURE, UNSPECIFIED: ICD-10-CM

## 2020-11-02 DIAGNOSIS — Z79.4 LONG TERM (CURRENT) USE OF INSULIN: ICD-10-CM

## 2020-11-02 DIAGNOSIS — I25.119 ATHEROSCLEROTIC HEART DISEASE OF NATIVE CORONARY ARTERY WITH UNSPECIFIED ANGINA PECTORIS: ICD-10-CM

## 2020-11-02 DIAGNOSIS — R19.7 DIARRHEA, UNSPECIFIED: ICD-10-CM

## 2020-11-02 DIAGNOSIS — J96.00 ACUTE RESPIRATORY FAILURE, UNSPECIFIED WHETHER WITH HYPOXIA OR HYPERCAPNIA: ICD-10-CM

## 2020-11-02 DIAGNOSIS — M19.90 UNSPECIFIED OSTEOARTHRITIS, UNSPECIFIED SITE: ICD-10-CM

## 2020-11-02 DIAGNOSIS — K21.9 GASTRO-ESOPHAGEAL REFLUX DISEASE WITHOUT ESOPHAGITIS: ICD-10-CM

## 2020-11-05 LAB — SURGICAL PATHOLOGY STUDY: SIGNIFICANT CHANGE UP

## 2020-11-09 NOTE — PROGRESS NOTE ADULT - SUBJECTIVE AND OBJECTIVE BOX
EFREM Coughlin      acetaminophen   Tablet .. 650 milliGRAM(s) Oral every 4 hours PRN  ALBUTerol    90 MICROgram(s) HFA Inhaler 2 Puff(s) Inhalation every 6 hours PRN  aluminum hydroxide/magnesium hydroxide/simethicone Suspension 30 milliLiter(s) Oral every 4 hours PRN  ARIPiprazole 20 milliGRAM(s) Oral daily  aspirin  chewable 81 milliGRAM(s) Oral daily  atorvastatin 80 milliGRAM(s) Oral at bedtime  cefTRIAXone Injectable. 2000 milliGRAM(s) IV Push every 24 hours  clopidogrel Tablet 75 milliGRAM(s) Oral daily  dextrose 40% Gel 15 Gram(s) Oral once PRN  dextrose 5%. 1000 milliLiter(s) IV Continuous <Continuous>  dextrose 50% Injectable 12.5 Gram(s) IV Push once  dextrose 50% Injectable 25 Gram(s) IV Push once  dextrose 50% Injectable 25 Gram(s) IV Push once  gabapentin 600 milliGRAM(s) Oral three times a day  glucagon  Injectable 1 milliGRAM(s) IntraMuscular once PRN  heparin   Injectable 5000 Unit(s) SubCutaneous every 8 hours  hydrocortisone sodium succinate Injectable 50 milliGRAM(s) IV Push every 12 hours  HYDROmorphone  Injectable 1 milliGRAM(s) IV Push every 4 hours PRN  insulin glargine Injectable (LANTUS) 20 Unit(s) SubCutaneous two times a day  insulin lispro (HumaLOG) corrective regimen sliding scale   SubCutaneous three times a day before meals  insulin lispro (HumaLOG) corrective regimen sliding scale   SubCutaneous at bedtime  levothyroxine 112 MICROGram(s) Oral daily  metroNIDAZOLE  IVPB 500 milliGRAM(s) IV Intermittent every 8 hours  midodrine 15 milliGRAM(s) Oral every 8 hours  multivitamin 1 Tablet(s) Oral daily  norepinephrine Infusion 0.03 MICROgram(s)/kG/Min IV Continuous <Continuous>  ondansetron Injectable 4 milliGRAM(s) IV Push every 6 hours PRN  oxyCODONE    IR 5 milliGRAM(s) Oral every 4 hours PRN  oxyCODONE    IR 10 milliGRAM(s) Oral every 4 hours PRN  pantoprazole    Tablet 40 milliGRAM(s) Oral before breakfast  ranolazine 1000 milliGRAM(s) Oral two times a day  sodium bicarbonate  Infusion 0.207 mEq/kG/Hr IV Continuous <Continuous>        Physical Exam  T(C): 36.2 (08-29-20 @ 06:20), Max: 36.6 (08-28-20 @ 16:23)  HR: 76 (08-29-20 @ 13:00) (73 - 92)  BP: 114/55 (08-29-20 @ 13:00) (95/44 - 134/55)  RR: 13 (08-29-20 @ 13:00) (2 - 20)  SpO2: 92% (08-29-20 @ 13:00) (90% - 96%)  Wt(kg): --  Constitutional  More lethargic  Lungs-  clear  Cor-RRR    Abd-  distended, soft, very tender across lower abdomen, with guarding, rebound    Ext-no edema    < from: Xray Abdomen 2 View PORTABLE -Urgent (08.28.20 @ 14:35) >  FINDINGS:    There is a mild bowel ileus.  There is no free intra-abdominal air.  There are no obvious intra-abdominal masses.  There are no abnormal calcifications.  The osseous structures are intact.    IMPRESSION:  Mild bowel ileus. No free intraperitoneal air.    < end of copied text > Posterior Auricular Interpolation Flap Text: A decision was made to reconstruct the defect utilizing an interpolation axial flap and a staged reconstruction.  A telfa template was made of the defect.  This telfa template was then used to outline the posterior auricular interpolation flap.  The donor area for the pedicle flap was then injected with anesthesia.  The flap was excised through the skin and subcutaneous tissue down to the layer of the underlying musculature.  The pedicle flap was carefully excised within this deep plane to maintain its blood supply.  The edges of the donor site were undermined.   The donor site was closed in a primary fashion.  The pedicle was then rotated into position and sutured.  Once the tube was sutured into place, adequate blood supply was confirmed with blanching and refill.  The pedicle was then wrapped with xeroform gauze and dressed appropriately with a telfa and gauze bandage to ensure continued blood supply and protect the attached pedicle.

## 2020-11-18 ENCOUNTER — APPOINTMENT (OUTPATIENT)
Dept: VASCULAR SURGERY | Facility: CLINIC | Age: 60
End: 2020-11-18

## 2020-11-25 ENCOUNTER — APPOINTMENT (OUTPATIENT)
Dept: VASCULAR SURGERY | Facility: CLINIC | Age: 60
End: 2020-11-25
Payer: MEDICAID

## 2020-11-25 VITALS
HEART RATE: 75 BPM | OXYGEN SATURATION: 96 % | BODY MASS INDEX: 19.31 KG/M2 | HEIGHT: 63 IN | WEIGHT: 109 LBS | SYSTOLIC BLOOD PRESSURE: 172 MMHG | DIASTOLIC BLOOD PRESSURE: 89 MMHG | TEMPERATURE: 97.2 F

## 2020-11-25 PROCEDURE — 99024 POSTOP FOLLOW-UP VISIT: CPT

## 2020-11-25 NOTE — PHYSICAL EXAM
[Normal Breath Sounds] : Normal breath sounds [Normal Heart Sounds] : normal heart sounds [Normal Rate and Rhythm] : normal rate and rhythm [Alert] : alert [Oriented to Person] : oriented to person [Oriented to Place] : oriented to place [Oriented to Time] : oriented to time [JVD] : no jugular venous distention  [Carotid Bruits] : no carotid bruits [de-identified] : Thin F in NAD [de-identified] : CTA [FreeTextEntry1] : R groin with staples and sutures in place. No erythema or ecchymosis. R AKA stump without edema, staples and sutures intact. Mild granulation medial edge of stump

## 2020-11-25 NOTE — REASON FOR VISIT
[Formal Caregiver] : formal caregiver [de-identified] : S/P R AKA [de-identified] : 10/22/20 [de-identified] : 61 y/o F who presents for follow up after prolonged hospitalization secondary to sepsis. Pt has had bilat lower extremity revascularization in past and presented to [facility] in October 2020 with abdominal pain, GIB and eventual UTI sepsis with bacteremia. She developed R foot ischemia and placed on anticoagulation. She was taken urgently to the OR on 10/15/20 by Dr Forman and underwent R iliac artery stenting with R fem  to below knee bypass for limb salvage, however following this she did not regain motor or sensory function. She underwent R AKA. \par \par Since discharge she has done well. Completed her antibiotic treatment and is making improvement in her rehab. She has had no fevers, chills or issues with regard to her stump.

## 2020-11-25 NOTE — ADDENDUM
[FreeTextEntry1] : 59 y/o F with complex medical history now S/P R AKA\par Wound is well healed and she is making progress with rehab\par Staples and sutures removed\par May begin process with /orthotist for prosthesis\par Return to vascular surgery office for follow up in 2 months

## 2020-12-18 ENCOUNTER — APPOINTMENT (OUTPATIENT)
Dept: GASTROENTEROLOGY | Facility: CLINIC | Age: 60
End: 2020-12-18

## 2021-01-04 ENCOUNTER — APPOINTMENT (OUTPATIENT)
Dept: GASTROENTEROLOGY | Facility: CLINIC | Age: 61
End: 2021-01-04

## 2021-01-04 ENCOUNTER — APPOINTMENT (OUTPATIENT)
Dept: GASTROENTEROLOGY | Facility: CLINIC | Age: 61
End: 2021-01-04
Payer: MEDICAID

## 2021-01-04 VITALS — WEIGHT: 100 LBS | BODY MASS INDEX: 17.72 KG/M2 | HEIGHT: 63 IN

## 2021-01-04 DIAGNOSIS — D64.9 ANEMIA, UNSPECIFIED: ICD-10-CM

## 2021-01-04 PROCEDURE — 99072 ADDL SUPL MATRL&STAF TM PHE: CPT

## 2021-01-04 PROCEDURE — 99215 OFFICE O/P EST HI 40 MIN: CPT

## 2021-01-04 PROCEDURE — 99205 OFFICE O/P NEW HI 60 MIN: CPT

## 2021-01-04 NOTE — HISTORY OF PRESENT ILLNESS
[de-identified] : 60 year old woman with multiple medical problems recently with prolonged admission to [facility] for PVD and sepsis complicated by ganrenous extremity s/p AKA, course complicated by hepatitis (likely ischemic) with resolution, c diff, now living in [facility] here for anemia with complaint of diarrhea. \par \par In terms of her anemia, patient denies any overt signs of GI bleeding. No hematemesis, melena, or hematochezia. She says that she had an unremarkable EGD/Colon in February 2020. Her main GI complaint is diarrhea. States she had severe abd pain and several watery BM's daily in the past while she was hospitalized but she was treated for c diff; states her stool is improved but still has several loose stools daily. She isn't sure if she has been retested. Is not on any current abx or c diff treatment (was on PO vanc per patient).

## 2021-01-04 NOTE — PHYSICAL EXAM
[General Appearance - Alert] : alert [General Appearance - In No Acute Distress] : in no acute distress [Sclera] : the sclera and conjunctiva were normal [Extraocular Movements] : extraocular movements were intact [Outer Ear] : the ears and nose were normal in appearance [Hearing Threshold Finger Rub Not [place]] : hearing was normal [Neck Appearance] : the appearance of the neck was normal [Neck Cervical Mass (___cm)] : no neck mass was observed [Jugular Venous Distention Increased] : there was no jugular-venous distention [Thyroid Diffuse Enlargement] : the thyroid was not enlarged [Thyroid Nodule] : there were no palpable thyroid nodules [Auscultation Breath Sounds / Voice Sounds] : lungs were clear to auscultation bilaterally [Heart Rate And Rhythm] : heart rate was normal and rhythm regular [Heart Sounds] : normal S1 and S2 [Heart Sounds Gallop] : no gallops [Murmurs] : no murmurs [Heart Sounds Pericardial Friction Rub] : no pericardial rub [Edema] : there was no peripheral edema [Veins - Varicosity Changes] : there were no varicosital changes [Bowel Sounds] : normal bowel sounds [Abdomen Soft] : soft [Abdomen Tenderness] : non-tender [Abdomen Mass (___ Cm)] : no abdominal mass palpated [Skin Color & Pigmentation] : normal skin color and pigmentation [Skin Turgor] : normal skin turgor [] : no rash [FreeTextEntry1] : in wheelchair (due to leg), otherwise no focal or motor deficits [Oriented To Time, Place, And Person] : oriented to person, place, and time [Impaired Insight] : insight and judgment were intact [Affect] : the affect was normal

## 2021-01-04 NOTE — ASSESSMENT
[FreeTextEntry1] : 60 year old woman with multiple medical problems recently with prolonged admission to [facility] for PVD and sepsis complicated by ganrenous extremity s/p AKA, course complicated by hepatitis (likely ischemic) with resolution, c diff, now living in [facility] here for anemia with complaint of diarrhea. \par \par In terms of her anemia I am not so concerned. She had a three month hospitalization so it is not unreasonable to have a hgb of 9 and it has been stable since she was inpatient. She has no overt signs of GI bleeding. She states she had an unremarkable egd/colon in February 2020 with Dr. Natan Argueta, will obtain those records. If negative will not heavily pursue repeating or even capsule at this point she had a very prolonged hospital course and no need to perform procedures right now would just follow h and h. \par \par In terms of her diarrhea, I am sure facility has retested her for c diff but I don't have those records. She is not on abx (reviewed med list) but was on cholestyramine so I am sure her stools have been an issue. I will get records to see if they worked up, if not can resend c diff and other data. If negative can start loperamide even up to 4 times daily. This is likely all post infectious diarrhea. Not sure I would rescope her to r/o microscopic colitis due to the above reasons but I will get stool work up first, either from [place] or from new [facility].

## 2021-03-20 NOTE — PROGRESS NOTE ADULT - ASSESSMENT
58yo/F with PMH CAD, PAD s/p fem-pop bypass s/p RT iliac stent, diabetes, hypothyroidism, hyperlipidemia, current active smoker, uses cocaine last use 8/22  chronic back pain s/p lumbar surgery presented with multiple non-specific complaints, including fevers on/off last 4-5 days, weakness, no appetite and reduced oral intake, vague diffuse abd pains, one day of diarrhea. No cough, no SOB. She came in 8/23 for evaluation of weakness and found to have ST elevations on presenting EKG, code STEMI called and pt underwent urgent angiogram showing occluded RCA. She denies chest pain at present Also noted with positive UA, blood cx with Ecoli, CT abd/pelvis unremarkable, was started on IV rocephin.     1. LLL lung collapse/pneumonia/L pleural effusion. mucous plugging. ischemic colitis. resolved ecoli sepsis/cystitis. AMISH  - CT chest reviewed, LLL lung collapse/mucosal impaction, b/l pleural effusions L > R  - pulmonary eval  - consider drainage L pleural effusion   - started on meropenem [id number]   - continue with abx coverage  - covid-19 pcr (-) gi pcr (-)   - GI follow up noted  - urine cx/blood cx growing Ecoli; sensitivities reviewed   - completed 7 days IV zosyn [id number] --> 9/4 and 4 days ceftin 9/5-9/8  - s/p rocephin 2gm daily #6, s/p IV flagyl #4  - C diff pcr (-)   - monitor temps  - supportive care  - fu cbc    2. other issues - care per CCU patient states he fell out of bed and hit the corner of a table near his r temple

## 2021-12-29 NOTE — PHARMACOTHERAPY INTERVENTION NOTE - INTERVENTION TYPE RECOOMEND
Route of Administraion Change Normal rate, regular rhythm.  Heart sounds S1, S2.  No murmurs, rubs or gallops.

## 2021-12-29 NOTE — ED ADULT NURSE NOTE - NSFALLRSKINDICTYPE_ED_ALL_ED
12/29/21 1255   Vital Signs   /67   Temp 97.8 °F (36.6 °C)   Pulse 63   Resp 16   Weight 253 lb 1.4 oz (114.8 kg)   Weight Method Bedside scale   Percent Weight Change -3.04   Post-Hemodialysis Assessment   Post-Treatment Procedures Blood returned;Catheter capped, clamped with Citrate x 2 ports   Machine Disinfection Process Acid/Vinegar Clean;Heat Disinfect; Exterior Machine Disinfection   Rinseback Volume (ml) 500 ml   Total Liters Processed (l/min) 66.5 l/min   Dialyzer Clearance Lightly streaked   Duration of Treatment (minutes) 180 minutes   Hemodialysis Intake (ml) 500 ml   Hemodialysis Output (ml) 2500 ml   NET Removed (ml) 2000 ml   Tolerated Treatment Good   Bilateral Breath Sounds Diminished   completed 3 hour tx without difficulties, removed 2 liters, catheter lumens instilled with sodium citrate, dialysis lumens clamped, end caps secure, dressing dry and intact, report given to primary RN Impaired Gait/Altered Elimination

## 2022-01-19 NOTE — ED STATDOCS - PRINCIPAL DIAGNOSIS
DISCHARGE INSTRUCTIONS:    FOLLOW UP WITH YOUR PRIMARY CARE PROVIDER OR THE [address] [place] [address]  MAKE AN APPOINTMENT TO BE SEEN  TAKE MEDICATION AS PRESCRIBED  IF RASH, SHORTNESS OF BREATH OR TROUBLE SWALLOWING, STOP TAKING THE MEDICATION AND BE SEEN  REST AND DRINK PLENTY OF FLUIDS  IF SYMPTOMS WORSEN OR NEW SYMPTOMS ARISE, RETURN TO THE ER TO BE SEEN  Chest pain, unspecified type

## 2022-03-30 NOTE — DISCHARGE NOTE ADULT - FUNCTIONAL SCREEN CURRENT LEVEL: AMBULATION, MLM
Subjective   Patient ID: Enrique is a 32 year old female.    Chief Complaint   Patient presents with   • Annual Exam     Seeing fertility specialist- needs letter for pap.      Labs performed through Fertility specialist.  Noted Low Vit D- 14.  TSH WNL  Last PAP 6/2/20     Past Medical History:   Diagnosis Date   • Anxiety    • Cervical dysplasia    • Left ovarian cyst 09/15/2019   • Pneumonia due to COVID-19 virus 10/2021    [facility]      Past Surgical History:   Procedure Laterality Date   • Leep        Current Outpatient Medications   Medication Sig   • albuterol 108 (90 Base) MCG/ACT inhaler Take 2 inhalations every 4 hours as needed   • fluticasone (FLONASE) 50 MCG/ACT nasal spray Spray 2 sprays in each nostril daily.   • [START ON 4/4/2022] ergocalciferol (DRISDOL) 1.25 mg (50,000 units) capsule Take 1 capsule by mouth 1 day a week. For 8 weeks       Her family history includes Patient is unaware of any medical problems in her brother, father, mother, sister, sister, and sister.  Enrique reports that she has never smoked. She has never used smokeless tobacco. She reports previous alcohol use. She reports that she does not use drugs.    Review of Systems   Constitutional: Negative for chills and fever.   HENT: Negative for postnasal drip, rhinorrhea, sinus pressure, sore throat and voice change.    Eyes: Negative for discharge and visual disturbance.   Respiratory: Negative for cough and shortness of breath.    Cardiovascular: Negative for chest pain and palpitations.   Gastrointestinal: Negative for abdominal pain and diarrhea.   Genitourinary: Negative for difficulty urinating and menstrual problem (regular menses).   Musculoskeletal: Negative for arthralgias.   Skin: Negative for color change.   Neurological: Negative for dizziness and headaches.   Psychiatric/Behavioral: Negative for dysphoric mood. The patient is not nervous/anxious.        Objective    Visit Vitals  /70   Pulse 80   Temp 97  °F (36.1 °C)      Physical Exam  Constitutional:       General: She is not in acute distress.  HENT:      Head: Normocephalic.      Right Ear: Tympanic membrane, ear canal and external ear normal.      Left Ear: Tympanic membrane, ear canal and external ear normal.      Mouth/Throat:      Mouth: Mucous membranes are moist.      Pharynx: Oropharynx is clear. No oropharyngeal exudate.      Neck: Normal range of motion and neck supple.   Eyes:      General: No scleral icterus.     Extraocular Movements: Extraocular movements intact.      Conjunctiva/sclera: Conjunctivae normal.      Pupils: Pupils are equal, round, and reactive to light.      Comments: Lids- movement equal   Neck:      Comments: No thyroid enlargement  Cardiovascular:      Rate and Rhythm: Normal rate and regular rhythm.      Pulses: Normal pulses.      Heart sounds: Normal heart sounds. No murmur heard.    No gallop.   Pulmonary:      Effort: Pulmonary effort is normal.      Breath sounds: Normal breath sounds. No wheezing or rhonchi.   Chest:   Breasts: Breasts are symmetrical.      Right: Normal. No swelling, inverted nipple, mass, nipple discharge, skin change, tenderness, axillary adenopathy or supraclavicular adenopathy.      Left: Normal. No swelling, inverted nipple, mass, nipple discharge, skin change, tenderness, axillary adenopathy or supraclavicular adenopathy.       Abdominal:      General: Bowel sounds are normal.      Palpations: Abdomen is soft.      Tenderness: There is no abdominal tenderness. There is no guarding or rebound.   Genitourinary:     General: Normal vulva.      Exam position: Supine.      Labia:         Right: No rash, tenderness or lesion.         Left: No rash, tenderness or lesion.       Urethra: No prolapse or urethral lesion.      Vagina: No vaginal discharge.      Cervix: No cervical motion tenderness.      Uterus: Normal. Not enlarged.       Adnexa: Right adnexa normal and left adnexa normal.   Musculoskeletal:          General: Normal range of motion.      Right lower leg: No edema.      Left lower leg: No edema.   Lymphadenopathy:      Cervical: No cervical adenopathy.      Upper Body:      Right upper body: No supraclavicular or axillary adenopathy.      Left upper body: No supraclavicular or axillary adenopathy.      Lower Body: No right inguinal adenopathy. No left inguinal adenopathy.   Skin:     General: Skin is warm.      Capillary Refill: Capillary refill takes less than 2 seconds.      Findings: No erythema or rash.   Neurological:      Mental Status: She is alert and oriented to person, place, and time.      Deep Tendon Reflexes: Reflexes normal.         Assessment   Problem List Items Addressed This Visit     None      Visit Diagnoses     Preventative health care    -  Primary    Relevant Orders    COMPREHENSIVE MET PNL (FAST)    CBC WITH DIFFERENTIAL    LIPID PANEL WITH REFLEX    Encounter for gynecological examination without abnormal finding        Vitamin D deficiency    Ergocalciferol 50,000 U-- 1 per week for 8 weeks.  - instructed on use, cautioned against the side effects.  Please see AVS          Pap smears and HPV testing as according to ACOG guidelines.  Continue with yearly breast and pelvic exams.    Health Maintenance Summary     Varicella Vaccine (1 of 2 - 2-dose childhood series)  Postponed until 1/25/2050    COVID-19 Vaccine (3 - Booster for Pfizer series)  Next due on 6/24/2022    Influenza Vaccine (Season Ended)  Next due on 9/1/2022    Depression Screening (Yearly)  Next due on 3/30/2023    Cervical Cancer Screening 30-64 - 3 year (Every 3 Years)  Next due on 6/2/2023    DTaP/Tdap/Td Vaccine (2 - Td or Tdap)  Next due on 6/2/2030    Hepatitis B Vaccine   Aged Out    Meningococcal Vaccine   Aged Out    HPV Vaccine   Aged Out    Pneumococcal Vaccine 0-64   Aged Out          Schedule follow up: Pending labs   Niki Mohan, CNP      0 = independent

## 2022-04-03 NOTE — DISCHARGE NOTE ADULT - PRINCIPAL DIAGNOSIS
[place] ED to IP Report (EDIP)      Mental Status     Alert and oriented    Safety     ED to IP Report Safety: Fall Risk    Mobility    ED to IP Report Mobility: Unsteady Gait    Protocols  ED to IP Report Protocols: None    ISAR      1 (04/03/22 1129)    Isolation  ED to IP Report Isolation: None    Telemetry  Telemetry: NSR    Oxygen   Room air     COVID Vaccination Status  yes, confirmed    Additional Information:      Breast hematoma Statement Selected

## 2022-04-05 NOTE — PROGRESS NOTE ADULT - ASSESSMENT
60 y/o female with CAD, PAD s/p fem-pop bypass s/p RT iliac stent, diabetes, admitted with UTI sepsis and E. coli bacteremia POA.  ECG revealed STEMI prompting code STEMI s/p urgent angiogram showing occluded RCA.  On iv zosyn for e. coli bactermia now with persistent lower abdominal pain that has increased over the past several days.  Also with diarrhea up to 6 times daily.  C. Diff was negative several days ago.  WBC improving.     Impression:  Lower abdominal pain/diarrhea likely due to ischemic colitis.     Plan:  No significant improvement.   COVID and GI PCR negative.   Continue to monitor off IV ABX per ID.   If worsening pain or increasing WBC, would restart abx.   Recent colonoscopy and egd with Dr. Martinez-all unremarkable per pt.     Discussed with pt, ID, Dr. Hammer. F: None  E: Replete PRN  N: TF Jevity 1.2 at 42cc/h + 1 LPS  DVT: Lovenox 50 mg BID   GI: protonix 40 mg daily via PEG  C: Full Code, pending discussion with pt's CAB state advocate for GOC and medical decisions  FOR EMERGENT CONSENT 2-physician consent is required  D: With trach, patient cannot return back to group home and will be discharged to nursing home. F: None  E: Replete PRN  N: TF Jevity 1.2 at 42cc/h + 1 LPS  DVT: Lovenox 50 mg BID   GI: protonix 40 mg daily via PEG  C: Full Code, pending discussion with pt's CAB state advocate for GOC and medical decisions  FOR EMERGENT CONSENT 2-physician consent is required  D: With trach, patient cannot return back to group home and will be discharged to nursing home. F: None  E: Replete PRN  N: TF Jevity 1.2 at 42cc/h + 1 LPS  DVT: Lovenox 50 mg BID   GI: protonix 40 mg daily via PEG  C: Full Code, pending discussion with pt's CAB state advocate for GOC and medical decisions  FOR EMERGENT CONSENT 2-physician consent is required  D: With trach, patient cannot return back to group home and will be discharged to nursing home. F: None  E: Replete PRN  N: TF Jevity 1.2 at 42cc/h + 1 LPS  DVT: Lovenox 50 mg BID   GI: protonix 40 mg daily via PEG  C: Full Code, pending discussion with pt's CAB state advocate for GOC and medical decisions  FOR EMERGENT CONSENT 2-physician consent is required  D: With trach, patient cannot return back to group home and will be discharged to nursing home. F: None  E: Replete PRN  N: TF Jevity 1.2 at 42cc/h + 1 LPS  DVT: Lovenox 50 mg BID   GI: protonix 40 mg daily via PEG  C: Full Code, pending discussion with pt's CAB state advocate for GOC and medical decisions  FOR EMERGENT CONSENT 2-physician consent is required  D: With trach, patient cannot return back to group home and will be discharged to nursing home. F: None  E: Replete PRN  N: TF Jevity 1.2 at 42cc/h + 1 LPS  DVT: Lovenox 50 mg BID   GI: protonix 40 mg daily via PEG  C: Full Code, pending discussion with pt's CAB state advocate for GOC and medical decisions  FOR EMERGENT CONSENT 2-physician consent is required  D: With trach, patient cannot return back to group home and will be discharged to nursing home.

## 2022-04-28 NOTE — PATIENT PROFILE ADULT - NSALCOHOLLASTUSEDT_GEN_A_NUR
Call placed to patient regarding recommendation for;    __X___ RIGHT ___X___LEFT (x 2 1BR/1LN)      __X___Ultrasound guided  ______Stereotactic breast biopsy  Pt states that procedure was explained to her, additional questions answered at this time    __X___Verbalized understanding        Blood thinners: No: __X___ Yes: _____ What:     Biopsy teaching sheet given:  _____yes __X__no (All teaching points discussed during call, pt with no questions at this time, pt adv to arrive at 1330 for 1400 biopsy)    Pt given name/# for any further questions/needs    Pt agreeable to a post procedure call and states we can give her biopsy results to her over the phone 08-Mar-2018

## 2022-06-08 NOTE — PROGRESS NOTE ADULT - PROVIDER SPECIALTY LIST ADULT
Cardiology
Critical Care
Gastroenterology
Gastroenterology
Hospitalist
Infectious Disease
Nephrology
Pulmonology
Surgery
Pulmonology
Critical Care
Hospitalist
Nephrology
Pulmonology
Hospitalist
Hospitalist
Infectious Disease
Cardiology
04-Jun-2022 05:25

## 2022-07-05 NOTE — PROGRESS NOTE ADULT - ASSESSMENT
59 CAD, PAD s/p fem-pop bypass s/p RT iliac stent, diabetes, hypothyroidism, hyperlipidemia, hx of cigg/cocaine use,  chronic back pain s/p lumbar surgery recent admit with STEMI c/b UTI sepsis and E. coli bacteremia. w acute ischemic right leg now s/p right AKA     AMISH    Cr fluctuating - below previous baseline - monitor for continued recovery    on NS IVF postop - trend Cr off IVF   monitor Cr trend    Trend labs/lytes and replete K/Mg/   Avoid nsaids/contrast as able    Hypocalcemia - corrected Ca 9.2   0ral repletion  trend mg/k/phos and replete Mag   check Vit D and pTH     Ischemia  -medicine/vascular for amputation    d/c with RN staff bedside History/Exam/Medical Decision Making

## 2022-08-07 NOTE — PATIENT PROFILE ADULT - NSASFALLATTEMPTOOB_GEN_A_NUR
9 yo M with nasal congestion and cough for about 1 week, pt with no fevers, no vomting no diarrhea and has not stopped his actual activity. no

## 2022-11-22 NOTE — H&P ADULT - NSICDXPASTMEDICALHX_GEN_ALL_CORE_FT
[FreeTextEntry1] : ADVANCED OA L KNEE WITH VARUS ALIGNMENT DUE TO WORK RELATED. UPDATED XRAYS SHOW BILATERAL KNEE OA WITH VARUS DEFORMITY. CONTINUED PAIN, SOME RELIEF WITH ORTHOVISC. WE AGAIN DISCUSSED TREATMENT OPTIONS. \par B/L KNEE ORTHOVISC #2 TODAY. PATIENT TOLERATED INJECTION WELL.
PAST MEDICAL HISTORY:  Angina pectoris     Anxiety and depression     Bipolar depression     CAD (coronary artery disease)     Carotid artery stenosis " 45 percent"    COPD (chronic obstructive pulmonary disease)     Cystic breast b/l    DDD (degenerative disc disease), lumbar     Diabetes mellitus     Essential hypertension     ETOH abuse last drank "2 months ago"    GERD (gastroesophageal reflux disease)     Hiatal hernia with GERD hiatal hernia repaired    History of colon polyps precancerous    History of gallbladder disease surgery    History of MRSA infection left lower extremity incisional area 2015    History of TIAs     Hyperlipidemia, unspecified hyperlipidemia type     Hypothyroidism     Lumbar herniated disc     Lung nodule     Osteoarthritis of spine, unspecified spinal osteoarthritis complication status, unspecified spinal region     Overactive bladder     PAD (peripheral artery disease)     Shoulder disorder 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 Decreased ROM.    Spinal stenosis of lumbar region     Substance abuse history of. last used 14 years ago.    Transient cerebral ischemia, unspecified type     Urinary incontinence, unspecified type

## 2022-12-12 NOTE — PROGRESS NOTE ADULT - ASSESSMENT
CALL RETURN FORM   Reason for patient call? Patient calling needs lab orders updated for bloods drawn today, originally dated for january   Patient's primary oncologist? [id number]    Name of person the patient was calling for? Cristopher Vogel   Any additional information to add, if applicable? [phone]   Informed patient that the message will be forwarded to the team and someone will get back to them as soon as possible    Did you relay this information to the patient?   yes 60yo/F with PMH CAD, PAD s/p fem-pop bypass s/p RT iliac stent, diabetes, hypothyroidism, hyperlipidemia, current active smoker, uses cocaine last use 8/22  chronic back pain s/p lumbar surgery presented with multiple non-specific complaints, including fevers on/off last 4-5 days, weakness, no appetite and reduced oral intake, vague diffuse abd pains, one day of diarrhea. No cough, no SOB. She came in 8/23 for evaluation of weakness and found to have ST elevations on presenting EKG, code STEMI called and pt underwent urgent angiogram showing occluded RCA. She denies chest pain at present Also noted with positive UA, blood cx with Ecoli, CT abd/pelvis unremarkable, was started on IV rocephin.     1. sepsis. ischemic colitis. resolving leukocytosis. ecoli UTI/bacteremia s/p abx course. AMISH  - slowly improving, wbc ct down to 33  - urine cx/blood cx growing Ecoli; sensitivities reviewed   - on IV zosyn [id number] #4  - continue with IV abx coverage   - C diff pcr (-)   - surgery evaluation noted, repeat CT abd/pelvis reviewed   - s/p rocephin 2gm daily #6  - s/p IV flagyl [id number] #4  - monitor temps  - tolerating abx well so far; no side effects noted  - reason for abx use and side effects reviewed with patient  - supportive care  - fu cbc    2. other issues - care per CCU

## 2023-01-01 NOTE — PROGRESS NOTE ADULT - PROBLEM SELECTOR PROBLEM 4
0.22
Angina at rest
PAD (peripheral artery disease)
PAD (peripheral artery disease)
Substance abuse
Angina at rest

## 2023-07-13 NOTE — H&P PST ADULT - BP NONINVASIVE SYSTOLIC (MM HG)
132 Prednisone Counseling:  I discussed with the patient the risks of prolonged use of prednisone including but not limited to weight gain, insomnia, osteoporosis, mood changes, diabetes, susceptibility to infection, glaucoma and high blood pressure.  In cases where prednisone use is prolonged, patients should be monitored with blood pressure checks, serum glucose levels and an eye exam.  Additionally, the patient may need to be placed on GI prophylaxis, PCP prophylaxis, and calcium and vitamin D supplementation and/or a bisphosphonate.  The patient verbalized understanding of the proper use and the possible adverse effects of prednisone.  All of the patient's questions and concerns were addressed.

## 2023-07-18 NOTE — ED PROVIDER NOTE - CARE PLAN
[facility]  UROLOGY [address] [place] [address]  Scheduled Appointment: 08/01/2023    
Principal Discharge DX:	Abrasion  Secondary Diagnosis:	Contusion of head  Secondary Diagnosis:	Skin tear of elbow without complication, left, initial encounter

## 2023-07-26 NOTE — PROGRESS NOTE ADULT - RS GEN PE MLT RESP DETAILS PC
airway patent
airway patent/breath sounds equal
breath sounds equal
breath sounds equal/good air movement
airway patent/breath sounds equal
(3) 3 to less than 7 years old

## 2023-08-31 NOTE — PATIENT PROFILE ADULT - BRADEN ACTIVITY
"Subjective     Prashanth Garcia is a 12 m.o. female who is brought in for this well child visit.    History was provided by the mother.    Birth History    Birth     Length: 48.3 cm (19\")     Weight: 2280 g (5 lb 0.4 oz)    Apgar     One: 8     Five: 9    Delivery Method: , Low Transverse    Gestation Age: 37 4/7 wks     Immunization History   Administered Date(s) Administered    DTaP / Hep B / IPV 2022, 2022, 2023    Hep A, 2 Dose 2023    Hep B, Adolescent or Pediatric 2022    Hib (PRP-OMP) 2022    Hib (PRP-T) 2022, 2023, 2023    MMR 2023    Pneumococcal Conjugate 13-Valent (PCV13) 2022, 2022, 2023    Rotavirus Pentavalent 2022, 2022, 2023    Varicella 2023     The following portions of the patient's history were reviewed and updated as appropriate: allergies, current medications, past family history, past medical history, past social history, past surgical history, and problem list.    Current Issues:  Current concerns include Mom is concerned about her not laughing much.  Do you have any concerns about your child's development? Not very many teeth coming in  Any concerns with how your child sees? None  Any concerns with how your child hears? None    Review of Nutrition:  Current diet: cow's milk  Does your child's diet include iron-rich foods such as meat, eggs, iron-fortified cereals, or beans? Yes  Difficulties with feeding? no    Social Screening:  Current child-care arrangements: in home: primary caregiver is mother  Sibling relations: sisters: 1  Parental coping and self-care: doing well; no concerns  Secondhand smoke exposure? no     Anemia Assessment    Do you ever struggle to put food on the table? No   Does your child's diet include iron-rich foods such as meat, eggs, iron-fortified cereals, or beans? Yes   Action NA   Tuberculosis Assessment    Has a family member or contact had tuberculosis " or a positive tuberculin skin test? No   Was your child born in a country at high risk for tuberculosis (countries other than the [place], [place], [place], [place], or [place]?) No   Has your child traveled (had contact with resident populations) for longer than 1 week to a country at high risk for tuberculosis? No   Is your child infected with HIV? No   Action NA   Lead Assessment:    Does your child have a sibling or playmate who has or had lead poisoning? No   Does your child live in or regularly visit a house or  facility built before 1978 that is being or has recently been (within the last 6 months) renovated or remodeled? No   Does your child live in or regularly visit a house or  facility built before 1950? No   Action NA   Oral Health Assessment:    Do you know a dentist to whom you can bring your child? Yes   Does your child's primary water source contain fluoride? Yes   Action NA       Developmental 9 Months Appropriate       Question Response Comments    Passes small objects from one hand to the other Yes  Yes on 5/25/2023 (Age - 9 m)    Will try to find objects after they're removed from view Yes  Yes on 5/25/2023 (Age - 9 m)    At times holds two objects, one in each hand Yes  Yes on 5/25/2023 (Age - 9 m)    Can bear some weight on legs when held upright Yes  Yes on 5/25/2023 (Age - 9 m)    Picks up small objects using a 'raking or grabbing' motion with palm downward Yes  Yes on 5/25/2023 (Age - 9 m)    Can sit unsupported for 60 seconds or more Yes  Yes on 5/25/2023 (Age - 9 m)    Will feed self a cookie or cracker Yes  Yes on 5/25/2023 (Age - 9 m)    Seems to react to quiet noises Yes  Yes on 5/25/2023 (Age - 9 m)    Will stretch with arms or body to reach a toy Yes  Yes on 5/25/2023 (Age - 9 m)          Developmental 12 Months Appropriate       Question Response Comments    Will play peek-a-bonds (wait for parent to re-appear) Yes  Yes on 8/31/2023 (Age - 12 m)     Will hold on to objects hard enough that it takes effort to get them back Yes  Yes on 8/31/2023 (Age - 12 m)    Can stand holding on to furniture for 30 seconds or more Yes  Yes on 8/31/2023 (Age - 12 m)    Makes 'mama' or 'john' sounds Yes  Yes on 8/31/2023 (Age - 12 m)    Can go from sitting to standing without help Yes  Yes on 8/31/2023 (Age - 12 m)    Uses 'pincer grasp' between thumb and fingers to  small objects Yes  Yes on 8/31/2023 (Age - 12 m)    Can tell parent from strangers Yes  Yes on 8/31/2023 (Age - 12 m)    Can go from supine to sitting without help Yes  Yes on 8/31/2023 (Age - 12 m)    Tries to imitate spoken sounds (not necessarily complete words) Yes  Yes on 8/31/2023 (Age - 12 m)    Can bang 2 small objects together to make sounds Yes  Yes on 8/31/2023 (Age - 12 m)            ____________________________________________________________________________________________________________________________________________    Objective     Growth parameters are noted and are appropriate for age.    Vitals:    08/31/23 0911   Pulse: 136   Temp: 98.2 øF (36.8 øC)   SpO2: 96%       Appearance: no acute distress, alert, well-nourished, well-tended appearance  Head/Neck: normocephalic, neck supple, no masses appreciated, no lymphadenopathy  Eyes: pupils equal and round, +red reflex bilaterally, conjunctivae normal, no discharge, sclerae nonicteric, normal cover/uncover test  Ears: external auditory canals normal, tympanic membranes normal bilaterally  Nose: external nose normal, nares patent  Throat: moist mucous membranes, lip appearance normal, normal dentition for age. gums pink, non-swollen, no bleeding. Tongue moist and normal. Hard and soft palate intact  Lungs: breathing comfortably, clear to auscultation bilaterally. No wheezes, rales, or rhonchi  Heart: regular rate and rhythm, normal S1 and S2, no murmurs, rubs, or gallops  Abdomen: +bowel sounds, soft, nontender, nondistended, no  hepatosplenomegaly, no masses palpated.   Genitourinary: normal external genitalia, anus patent  Musculoskeletal: Normal range of motion of all 4 extremities. Normal leg alignment.  Skin: normal color, no rashes, no lesions, no jaundice  Neuro: actively moves all extremities. Tone normal in all 4 extremities       Assessment & Plan     Healthy 12 m.o. female infant.     1. Anticipatory guidance discussed.  Gave handout on well-child issues at this age.  Specific topics reviewed: avoid infant walkers, avoid potential choking hazards (large, spherical, or coin shaped foods) , avoid putting to bed with bottle, avoid small toys (choking hazard), caution with possible poisons (including pills, plants, and cosmetics), car seat safety, child-proof home with cabinet locks, outlet plugs, window guards, and stair safety pineda, importance of varied diet, never leave unattended, risk of child pulling down objects on him/herself, special weaning formulas rarely useful, wean to cup at 9-12 months of age, and whole milk until 2 years old then taper to low-fat or skim.    2. Development: appropriate for age    3. Primary water source has adequate fluoride: yes    4. Diagnoses and all orders for this visit:    1. Encounter for routine child health examination without abnormal findings (Primary)  -     MMR Vaccine Subcutaneous  -     Varicella Vaccine Subcutaneous  -     Cancel: HiB PRP-OMP Conjugate Vaccine 3 Dose IM  -     Hepatitis A Vaccine Pediatric / Adolescent 2 Dose IM  -     POCT hemoglobin  -     POC Blood Lead  -     HiB PRP-T Conjugate Vaccine 4 Dose IM        Discussed risks/benefits to vaccination, reviewed components of the vaccine, discussed VIS, discussed informed consent, informed consent obtained. Patient/Parent was allowed to accept or refuse vaccine. Questions answered to satisfactory state of patient/parent. We reviewed typical age appropriate and seasonally appropriate vaccinations. Reviewed immunization  history and updated state vaccination form as needed. Patient/Parent was counseled on the above vaccines.    5. Return in about 3 months (around 11/30/2023) for Recheck.      Thang Carrera Jr, MD  08/31/23  13:07 EDT    (1) bedfast

## 2023-09-08 NOTE — H&P PST ADULT - NS MD HP INPLANTS MED DEV
vascular stents Double O-Z Plasty Text: The defect edges were debeveled with a #15 scalpel blade.  Given the location of the defect, shape of the defect and the proximity to free margins a Double O-Z plasty (double transposition flap) was deemed most appropriate.  Using a sterile surgical marker, the appropriate transposition flaps were drawn incorporating the defect and placing the expected incisions within the relaxed skin tension lines where possible. The area thus outlined was incised deep to adipose tissue with a #15 scalpel blade.  The skin margins were undermined to an appropriate distance in all directions utilizing iris scissors.  Hemostasis was achieved with electrocautery.  The flaps were then transposed into place, one clockwise and the other counterclockwise, and anchored with interrupted buried subcutaneous sutures.

## 2023-11-08 NOTE — PATIENT PROFILE ADULT - NSTOBACCO TYPE_GEN_A_CORE_RD
(H) 08/24/2018 1123    BILITOT 0.6 08/24/2018 1123            Lab Results   Component Value Date    TSH 0.683 08/24/2018       Lab Results   Component Value Date    WBC 9.6 11/03/2023    HGB 15.6 11/03/2023    HCT 45.1 11/03/2023    MCV 84.6 11/03/2023     11/03/2023         Health Maintenance   Topic Date Due    Hepatitis B vaccine (1 of 3 - 3-dose series) Never done    COVID-19 Vaccine (1) Never done    Varicella vaccine (1 of 2 - 2-dose childhood series) Never done    HIV screen  Never done    Hepatitis C screen  Never done    DTaP/Tdap/Td vaccine (1 - Tdap) Never done    Flu vaccine (1) Never done    Depression Screen  11/08/2024    Hepatitis A vaccine  Aged Out    Hib vaccine  Aged Out    HPV vaccine  Aged Out    Meningococcal (ACWY) vaccine  Aged Out    Pneumococcal 0-64 years Vaccine  Aged Out         There is no immunization history on file for this patient. Review of Systems   Constitutional:  Negative for chills and fever. HENT: Negative. Respiratory:  Negative for cough and shortness of breath. Cardiovascular:  Negative for chest pain. Gastrointestinal:  Negative for abdominal pain and nausea. Skin:  Negative for rash. Neurological:  Negative for dizziness, light-headedness and headaches. Psychiatric/Behavioral: Negative. Objective:   Physical Exam  Constitutional:       General: He is not in acute distress. Eyes:      Pupils: Pupils are equal, round, and reactive to light. Cardiovascular:      Rate and Rhythm: Normal rate and regular rhythm. Heart sounds: No murmur heard. Pulmonary:      Effort: Pulmonary effort is normal.      Breath sounds: Normal breath sounds. No wheezing. Abdominal:      General: Bowel sounds are normal. There is no distension. Palpations: Abdomen is soft. Tenderness: There is no abdominal tenderness. Musculoskeletal:         General: No tenderness. Normal range of motion.       Cervical back: Normal range of motion and neck
Cigarettes

## 2024-01-24 NOTE — DIETITIAN INITIAL EVALUATION ADULT. - NUTRITIONGOAL OUTCOME1
Received request via: Patient    Was the patient seen in the last year in this department? Yes    Does the patient have an active prescription (recently filled or refills available) for medication(s) requested? No    Pharmacy Name: [facility]     Does the patient have [facility] and need 100 day supply (blood pressure, diabetes and cholesterol meds only)? Medication is not for cholesterol, blood pressure or diabetes and Patient does not have SCP     Optimal po intake when diet advanced meeting >80% of ENN.

## 2024-02-20 NOTE — ED PROVIDER NOTE - COVID-19 ORDERING FACILITY
Problem: Bleeding (Postpartum Vaginal Delivery)  Goal: Hemostasis  Outcome: Ongoing, Progressing     Problem: Infection (Postpartum Vaginal Delivery)  Goal: Absence of Infection Signs/Symptoms  Outcome: Ongoing, Progressing     Problem: Adjustment to Role Transition (Postpartum Vaginal Delivery)  Goal: Successful Maternal Role Transition  Outcome: Ongoing, Progressing     Problem: Pain (Postpartum Vaginal Delivery)  Goal: Acceptable Pain Control  Outcome: Ongoing, Progressing     Problem: Urinary Retention (Postpartum Vaginal Delivery)  Goal: Effective Urinary Elimination  Outcome: Ongoing, Progressing      [facility]
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
## 2024-06-20 NOTE — BRIEF OPERATIVE NOTE - URINE OUTPUT
Head, normocephalic, atraumatic, Face, Face within normal limits, Ears, External ears within normal limits, Nose/Nasopharynx, External nose  normal appearance, nares patent, no nasal discharge, Mouth and Throat, Oral cavity appearance normal, Breath odor normal, Lips, Appearance normal 400

## 2024-10-09 NOTE — PROGRESS NOTE ADULT - SUBJECTIVE AND OBJECTIVE BOX
asked to see patient in PACU by Dr. campbell post op    HPI:     58 y/o female pmhx DM2, hx STEMI in august w/ RCA occulusion ( no intervention performed), w/ hospital course complicated by  respiratory failure, pneumonia, e.coli sepsis and renal failure. She was transferred from SNF w/ hypotension and abdominal pain. Admitted w/ anemia, hypokalemia, metabolic acidosis w/ AMISH.    multiple previous vascular procedure  Patient has bacteremic with Klebsiella in blood on Zosyn  had ileus, had multiple cts and xrays.  now with some diarrhea  given gi bleeding on admission antiplatelet agents were held  leg. This am the pateint was noted to have a cold   She went to OR and underwent right stent placement on fem pop.   At end of procedure pateint became hypotensive, started on phenylephrine  Patient intubated at end of procedure, 1.8 Phenylephrine    ROS cant obtain Patient is intubated       MEDICATIONS  (STANDING):  ARIPiprazole 20 milliGRAM(s) Oral daily  atorvastatin 40 milliGRAM(s) Oral at bedtime  budesonide  80 MICROgram(s)/formoterol 4.5 MICROgram(s) Inhaler 2 Puff(s) Inhalation two times a day  cholestyramine Powder (Sugar-Free) 4 Gram(s) Oral daily  dextrose 5%. 1000 milliLiter(s) (50 mL/Hr) IV Continuous <Continuous>  dextrose 50% Injectable 12.5 Gram(s) IV Push once  dextrose 50% Injectable 25 Gram(s) IV Push once  dextrose 50% Injectable 25 Gram(s) IV Push once  dicyclomine 20 milliGRAM(s) Oral two times a day before meals  heparin  Infusion.  Unit(s)/Hr (10 mL/Hr) IV Continuous <Continuous>  insulin lispro (HumaLOG) corrective regimen sliding scale   SubCutaneous Before meals and at bedtime  levothyroxine 125 MICROGram(s) Oral daily  pantoprazole  Injectable 40 milliGRAM(s) IV Push two times a day  piperacillin/tazobactam IVPB.. 3.375 Gram(s) IV Intermittent every 12 hours  ranolazine 500 milliGRAM(s) Oral two times a day  sodium bicarbonate  Infusion 0.267 mEq/kG/Hr (125 mL/Hr) IV Continuous <Continuous>    MEDICATIONS  (PRN):  dextrose 40% Gel 15 Gram(s) Oral once PRN Blood Glucose LESS THAN 70 milliGRAM(s)/deciliter  glucagon  Injectable 1 milliGRAM(s) IntraMuscular once PRN Glucose LESS THAN 70 milligrams/deciliter  heparin   Injectable 4000 Unit(s) IV Push every 6 hours PRN For aPTT less than 40  heparin   Injectable 2000 Unit(s) IV Push every 6 hours PRN For aPTT between 40 - 57      Weight (kg): 52 (10-15 @ 06:47)    ICU Vital Signs Last 24 Hrs  T(C): 36.5 (14 Oct 2020 20:44), Max: 36.5 (14 Oct 2020 20:44)  T(F): 97.7 (14 Oct 2020 20:44), Max: 97.7 (14 Oct 2020 20:44)  HR: 100 (15 Oct 2020 08:20) (92 - 100)  BP: 126/68 (15 Oct 2020 08:20) (111/91 - 126/68)  BP(mean): --  ABP: --  ABP(mean): --  RR: 14 (15 Oct 2020 08:20) (14 - 15)  SpO2: 100% (15 Oct 2020 08:20) (97% - 100%)    I&O's Summary    14 Oct 2020 07:01  -  15 Oct 2020 07:00  --------------------------------------------------------  IN: 500 mL / OUT: 450 mL / NET: 50 mL                            9.7    11.18 )-----------( 144      ( 15 Oct 2020 07:04 )             29.5       10-15    129<L>  |  92<L>  |  55<H>  ----------------------------<  286<H>  3.3<L>   |  27  |  2.36<H>    Ca    6.1<LL>      15 Oct 2020 07:04  Phos  3.4     10-14  Mg     1.8     10-14    TPro  5.7<L>  /  Alb  1.2<L>  /  TBili  0.5  /  DBili  x   /  AST  140<H>  /  ALT  47  /  AlkPhos  131<H>  10-15    DVT Prophylaxis: held secondary to bleeding                                                                Advanced Directives: full code                      09-Oct-2024 16:05

## 2025-01-09 NOTE — H&P ADULT - NSHPPHYSICALEXAM_GEN_ALL_CORE
1 person assist
Vital Signs Last 24 Hrs  T(C): 36.4 (05 Feb 2018 15:29), Max: 36.4 (05 Feb 2018 15:29)  T(F): 97.5 (05 Feb 2018 15:29), Max: 97.5 (05 Feb 2018 15:29)  HR: 67 (05 Feb 2018 15:29) (67 - 67)  BP: 72/40 (05 Feb 2018 15:29) (72/40 - 72/40)  BP(mean): --  RR: 18 (05 Feb 2018 15:29) (18 - 18)  SpO2: 100% (05 Feb 2018 15:29) (100% - 100%)    HEENT:   pupils equal and reactive, EOMI, no oropharyngeal lesions, erythema, exudates, oral thrush    NECK:   supple, no carotid bruits, no palpable lymph nodes, no thyromegaly    CV:  +S1, +S2, regular, no murmurs or rubs    RESP:   lungs clear to auscultation bilaterally, no wheezing, rales, rhonchi, good air entry bilaterally    BREAST:  not examined    GI:  abdomen soft, non-tender, non-distended, normal BS, no bruits, no abdominal masses, no palpable masses    RECTAL:  not examined    :  not examined    MSK:   normal muscle tone, no atrophy, no rigidity, no contractions    EXT:   no clubbing, no cyanosis, no edema, no calf pain, swelling or erythema    VASCULAR:  pulses equal and symmetric in the upper and lower extremities    NEURO:  AAOX3, no focal neurological deficits, follows all commands, able to move extremities spontaneously    SKIN:  no ulcers, lesions or rashes

## 2025-05-10 NOTE — ED ADULT TRIAGE NOTE - NS ED TRIAGE AVPU SCALE
[FreeTextEntry1] : T 97.1 P 106 S 98% on RA at rest bp stable 110/60 vantin 200mg bid for 5-7 days Prednisone 30mg x 2 days, 20mg x 2 days, 10mg x 2 days then stop mucinex dm bid cxr rtc in 5-7 days Alert-The patient is alert, awake and responds to voice. The patient is oriented to time, place, and person. The triage nurse is able to obtain subjective information.

## 2025-06-11 NOTE — PROVIDER CONTACT NOTE (OTHER) - SITUATION
gi bleeding anemai  service aware- asif Upper Respiratory Infection in Children (“The common cold”)    Your child was seen in the Emergency Department and diagnosed with an upper respiratory infection (URI), or a “common cold.”  It can affect your child's nose, throat, ears, and sinuses. Most children get about 5 to 8 colds each year. Common signs and symptoms include the following: runny or stuffy nose, sneezing and coughing, sore throat or hoarseness, red, watery, and sore eyes, tiredness or fussiness, a fever, headache, and body aches. Your child's cold symptoms will be worse for the first 3 to 5 days, but then should improve.  Fevers usually last for 1-3 days, but can last longer in some children with a URI.    General tips for taking care of a child who has a URI:   There is no cure for the common cold.  Colds are caused by viruses and THEY DO NOT GET BETTER WITH ANTIBIOTICS.  However, kids with colds are more likely to develop some bacterial infections (like ear infections), which may be treated with antibiotics. Close follow-up with your pediatrician is important if symptoms worsen or do not improve.  Most symptoms of colds in children go away without treatment in 1 to 2 weeks.    Your child may benefit from the following to help manage his or her symptoms:   -Both acetaminophen and ibuprofen both decrease fever and discomfort.  These medications are available with or without a doctor’s order.  -Rest will help his or her body get better.   -Give your child plenty of fluids.   -Clear mucus from your child's nose. Use a nasal aspirator (either an electric one or a bulb syringe) to remove mucus from a baby's nose. Squeeze the bulb and put the tip into one of your baby's nostrils. Gently close the other nostril with your finger. Slowly release the bulb to suck up the mucus. Empty the bulb syringe onto a tissue. Repeat the steps if needed. Do the same thing in the other nostril. Make sure your baby's nose is clear before he or she feeds or sleeps. You may need to put saline drops into your baby's nose if the mucus is very thick.  -Soothe your child's throat. If your child is 8 years or older, have him or her gargle with salt water. Make salt water by dissolving ¼ teaspoon salt in 1 cup warm water. You can give honey to children older than 1 year. Give ½ teaspoon of honey to children 1 to 5 years. Give 1 teaspoon of honey to children 6 to 11 years. Give 2 teaspoons of honey to children 12 or older.  -You can briefly turn on a steam shower and stay in the bathroom with steamy water running for your child to breath in the steam.  -Apply petroleum-based jelly around the outside of your child's nostrils. This can decrease irritation from blowing his or her nose.     Do NOT give:  -Over-the-counter (OTC) cough or cold medicines. Cough and cold medicines can cause side effects.  Additionally, they have never really shown to be effective.    -Aspirin: We do not recommend aspirin in any children—it can cause a serious side effect in some cases.     Prevent spread:  -Keep your child away from other people during the first 3 to 5 days of his or her cold. The virus is spread most easily during this time.   -Wash your hands and your child's hands often. Teach your child to cover his or her nose and mouth when he or she sneezes, coughs, and blows his or her nose when age appropriate. Show your child how to cough and sneeze into the crook of the elbow instead of the hands.   -Do not let your child share toys, pacifiers, or towels with others while he or she is sick.   -Do not let your child share foods, eating utensils, cups, or drinks with others while he or she is sick.    Follow up with your pediatrician in 1-2 days to make sure that your child is doing better.    Return to the Emergency Department if:  -Your child has trouble breathing or is breathing faster than usual.   -Your child's lips or nails turn blue.   -Your child's nostrils flare when he or she takes a breath.    -The skin above or below your child's ribs is sucked in with each breath.   -Your child's heart is beating much faster than usual.   -You see pinpoint or larger reddish-purple dots on your child's skin.   -Your child stops urinating or urinates much less than usual.   -Your baby's soft spot on his or her head is bulging outward or sunken inward.   -Your child has a severe headache or stiff neck.   -Your child has severe chest or stomach pain.   -Your baby is too weak to eat.     Consider calling your pediatrician if:  -Your child has had thick nasal drainage for more than 7 days.   -Your child has ear pain.   -Your child is >3 years old and has white spots on his or her tonsils.   -Your child is unable to eat, has nausea, or is vomiting.   -Your child has increased tiredness and weakness.  -Your child's symptoms do not improve or get worse after 3 days.   -You have questions or concerns about your child's condition or care.

## 2025-06-20 NOTE — H&P PST ADULT - NSICDXPASTMEDICALHX_GEN_ALL_CORE_FT
{PAST MEDICAL HISTORY:  Angina pectoris     Anxiety and depression     Bipolar depression     CAD (coronary artery disease)     Carotid artery stenosis " 45 percent"    COPD (chronic obstructive pulmonary disease)     Cystic breast b/l    DDD (degenerative disc disease), lumbar     Diabetes mellitus     Essential hypertension     ETOH abuse last drank "2 months ago"    GERD (gastroesophageal reflux disease)     Hiatal hernia with GERD hiatal hernia repaired    History of colon polyps precancerous    History of gallbladder disease surgery    History of MRSA infection left lower extremity incisional area 2015    History of TIAs     Hyperlipidemia, unspecified hyperlipidemia type     Hypothyroidism     Lumbar herniated disc     Lung nodule     Osteoarthritis of spine, unspecified spinal osteoarthritis complication status, unspecified spinal region     Overactive bladder     PAD (peripheral artery disease)     Shoulder disorder 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 Decreased ROM.    Spinal stenosis of lumbar region     Substance abuse history of. last used 14 years ago.    Transient cerebral ischemia, unspecified type     Urinary incontinence, unspecified type